# Patient Record
Sex: FEMALE | Race: BLACK OR AFRICAN AMERICAN | HISPANIC OR LATINO | Employment: FULL TIME | URBAN - METROPOLITAN AREA
[De-identification: names, ages, dates, MRNs, and addresses within clinical notes are randomized per-mention and may not be internally consistent; named-entity substitution may affect disease eponyms.]

---

## 2017-01-03 ENCOUNTER — GENERIC CONVERSION - ENCOUNTER (OUTPATIENT)
Dept: OTHER | Facility: OTHER | Age: 52
End: 2017-01-03

## 2017-01-13 ENCOUNTER — GENERIC CONVERSION - ENCOUNTER (OUTPATIENT)
Dept: OTHER | Facility: OTHER | Age: 52
End: 2017-01-13

## 2017-01-13 LAB
BASOPHILS # BLD AUTO: 0 %
BASOPHILS # BLD AUTO: 0 X10E3/UL (ref 0–0.2)
DEPRECATED RDW RBC AUTO: 13.1 % (ref 12.3–15.4)
EOSINOPHIL # BLD AUTO: 0.1 X10E3/UL (ref 0–0.4)
EOSINOPHIL # BLD AUTO: 2 %
HCT VFR BLD AUTO: 38.4 % (ref 34–46.6)
HGB BLD-MCNC: 12.9 G/DL (ref 11.1–15.9)
IMM.GRANULOCYTES (CD4/8) (HISTORICAL): 0 %
IMM.GRANULOCYTES (CD4/8) (HISTORICAL): 0 X10E3/UL (ref 0–0.1)
INR PPP: 2.7 (ref 0.8–1.2)
LYMPHOCYTES # BLD AUTO: 1.4 X10E3/UL (ref 0.7–3.1)
LYMPHOCYTES # BLD AUTO: 26 %
MCH RBC QN AUTO: 29.2 PG (ref 26.6–33)
MCHC RBC AUTO-ENTMCNC: 33.6 G/DL (ref 31.5–35.7)
MCV RBC AUTO: 87 FL (ref 79–97)
MONOCYTES # BLD AUTO: 0.5 X10E3/UL (ref 0.1–0.9)
MONOCYTES (HISTORICAL): 10 %
NEUTROPHILS # BLD AUTO: 3.4 X10E3/UL (ref 1.4–7)
NEUTROPHILS # BLD AUTO: 62 %
PLATELET # BLD AUTO: 141 X10E3/UL (ref 150–379)
PROTHROMBIN TIME: 27.8 SEC (ref 9.1–12)
RBC (HISTORICAL): 4.42 X10E6/UL (ref 3.77–5.28)
WBC # BLD AUTO: 5.4 X10E3/UL (ref 3.4–10.8)

## 2017-01-14 LAB
T4 FREE SERPL-MCNC: 1.47 NG/DL (ref 0.82–1.77)
THYROGLOBULIN AB (HISTORICAL): <1 IU/ML (ref 0–0.9)
THYROGLOBULIN BY IMA (HISTORICAL): 0.1 NG/ML (ref 1.5–38.5)
TSH SERPL DL<=0.05 MIU/L-ACNC: 0.39 UIU/ML (ref 0.45–4.5)

## 2017-01-17 ENCOUNTER — GENERIC CONVERSION - ENCOUNTER (OUTPATIENT)
Dept: OTHER | Facility: OTHER | Age: 52
End: 2017-01-17

## 2017-01-17 ENCOUNTER — ALLSCRIPTS OFFICE VISIT (OUTPATIENT)
Dept: OTHER | Facility: OTHER | Age: 52
End: 2017-01-17

## 2017-01-17 DIAGNOSIS — E78.00 PURE HYPERCHOLESTEROLEMIA: ICD-10-CM

## 2017-01-17 DIAGNOSIS — R31.0 GROSS HEMATURIA: ICD-10-CM

## 2017-01-17 DIAGNOSIS — N18.2 CHRONIC KIDNEY DISEASE, STAGE II (MILD): ICD-10-CM

## 2017-01-17 DIAGNOSIS — E89.0 POSTPROCEDURAL HYPOTHYROIDISM: ICD-10-CM

## 2017-01-17 DIAGNOSIS — E21.3 HYPERPARATHYROIDISM (HCC): ICD-10-CM

## 2017-01-17 DIAGNOSIS — I12.9 HYPERTENSIVE CHRONIC KIDNEY DISEASE WITH STAGE 1 THROUGH STAGE 4 CHRONIC KIDNEY DISEASE, OR UNSPECIFIED CHRONIC KIDNEY DISEASE: ICD-10-CM

## 2017-01-17 DIAGNOSIS — E55.9 VITAMIN D DEFICIENCY: ICD-10-CM

## 2017-01-17 DIAGNOSIS — C73 MALIGNANT NEOPLASM OF THYROID GLAND (HCC): ICD-10-CM

## 2017-02-07 ENCOUNTER — ALLSCRIPTS OFFICE VISIT (OUTPATIENT)
Dept: OTHER | Facility: OTHER | Age: 52
End: 2017-02-07

## 2017-02-07 ENCOUNTER — LAB (OUTPATIENT)
Dept: LAB | Facility: HOSPITAL | Age: 52
End: 2017-02-07
Payer: COMMERCIAL

## 2017-02-07 DIAGNOSIS — R31.9 HEMATURIA SYNDROME: Primary | ICD-10-CM

## 2017-02-07 LAB
BILIRUB UR QL STRIP: NORMAL
CLARITY UR: NORMAL
COLOR UR: YELLOW
GLUCOSE (HISTORICAL): NORMAL
HGB UR QL STRIP.AUTO: NORMAL
KETONES UR STRIP-MCNC: NORMAL MG/DL
LEUKOCYTE ESTERASE UR QL STRIP: NORMAL
NITRITE UR QL STRIP: NORMAL
PH UR STRIP.AUTO: 6 [PH]
PROT UR STRIP-MCNC: NORMAL MG/DL
SP GR UR STRIP.AUTO: 1.02
UROBILINOGEN UR QL STRIP.AUTO: NORMAL

## 2017-02-07 PROCEDURE — 88112 CYTOPATH CELL ENHANCE TECH: CPT

## 2017-02-17 ENCOUNTER — GENERIC CONVERSION - ENCOUNTER (OUTPATIENT)
Dept: OTHER | Facility: OTHER | Age: 52
End: 2017-02-17

## 2017-02-18 LAB
BUN SERPL-MCNC: 18 MG/DL (ref 6–24)
BUN/CREA RATIO (HISTORICAL): 31 (ref 9–23)
CALCIUM SERPL-MCNC: 10.3 MG/DL (ref 8.7–10.2)
CHLORIDE SERPL-SCNC: 108 MMOL/L (ref 96–106)
CO2 SERPL-SCNC: 23 MMOL/L (ref 18–29)
CREAT SERPL-MCNC: 0.59 MG/DL (ref 0.57–1)
DEPRECATED RDW RBC AUTO: 13.4 % (ref 12.3–15.4)
EGFR AFRICAN AMERICAN (HISTORICAL): 124 ML/MIN/1.73
EGFR-AMERICAN CALC (HISTORICAL): 108 ML/MIN/1.73
GLUCOSE SERPL-MCNC: 85 MG/DL (ref 65–99)
HCT VFR BLD AUTO: 42.3 % (ref 34–46.6)
HGB BLD-MCNC: 14.1 G/DL (ref 11.1–15.9)
MAGNESIUM SERPL-MCNC: 1.9 MG/DL (ref 1.6–2.3)
MCH RBC QN AUTO: 28.9 PG (ref 26.6–33)
MCHC RBC AUTO-ENTMCNC: 33.3 G/DL (ref 31.5–35.7)
MCV RBC AUTO: 87 FL (ref 79–97)
PLATELET # BLD AUTO: 184 X10E3/UL (ref 150–379)
POTASSIUM SERPL-SCNC: 4 MMOL/L (ref 3.5–5.2)
RBC (HISTORICAL): 4.88 X10E6/UL (ref 3.77–5.28)
SODIUM SERPL-SCNC: 144 MMOL/L (ref 134–144)
WBC # BLD AUTO: 6.9 X10E3/UL (ref 3.4–10.8)

## 2017-02-19 LAB
CREATININE, URINE (HISTORICAL): 163 MG/DL
PROT/CREAT UR: 2456 MG/G CREAT (ref 0–200)
TOTAL PROTEIN (HISTORICAL): 400.4 MG/DL

## 2017-02-27 ENCOUNTER — GENERIC CONVERSION - ENCOUNTER (OUTPATIENT)
Dept: OTHER | Facility: OTHER | Age: 52
End: 2017-02-27

## 2017-03-02 ENCOUNTER — HOSPITAL ENCOUNTER (OUTPATIENT)
Dept: RADIOLOGY | Age: 52
Discharge: HOME | End: 2017-03-02
Payer: COMMERCIAL

## 2017-03-02 ENCOUNTER — TRANSCRIBE ORDERS (OUTPATIENT)
Dept: ADMINISTRATIVE | Age: 52
End: 2017-03-02

## 2017-03-02 DIAGNOSIS — R31.0 GROSS HEMATURIA: ICD-10-CM

## 2017-03-02 PROCEDURE — 74000 HB X-RAY EXAM OF ABDOMEN (SINGLE ANTEROPOSTERIOR VIEW): CPT

## 2017-03-02 PROCEDURE — 76770 US EXAM ABDO BACK WALL COMP: CPT

## 2017-03-09 ENCOUNTER — ALLSCRIPTS OFFICE VISIT (OUTPATIENT)
Dept: OTHER | Facility: OTHER | Age: 52
End: 2017-03-09

## 2017-03-09 LAB
CLARITY UR: NORMAL
COLOR UR: YELLOW
GLUCOSE (HISTORICAL): NORMAL
HGB UR QL STRIP.AUTO: NORMAL
KETONES UR STRIP-MCNC: NORMAL MG/DL
LEUKOCYTE ESTERASE UR QL STRIP: NORMAL
NITRITE UR QL STRIP: NORMAL
PH UR STRIP.AUTO: 6 [PH]
PROT UR STRIP-MCNC: 2000 MG/DL
SP GR UR STRIP.AUTO: 1.03

## 2017-03-11 ENCOUNTER — GENERIC CONVERSION - ENCOUNTER (OUTPATIENT)
Dept: OTHER | Facility: OTHER | Age: 52
End: 2017-03-11

## 2017-03-12 LAB
CREATININE, URINE (HISTORICAL): 93.5 MG/DL
CREATININE,UR 24HR (HISTORICAL): 935 MG/24 HR (ref 800–1800)
CULTURE RESULT (HISTORICAL): NORMAL
IGA (HISTORICAL): 165 MG/DL (ref 87–352)
MISCELLANEOUS LAB TEST RESULT (HISTORICAL): NORMAL
PROTEIN 24 HOUR URINE (HISTORICAL): 2889 MG/24 HR (ref 30–150)
TOTAL PROTEIN (HISTORICAL): 288.9 MG/DL

## 2017-03-13 LAB
ANTI DNA DOUBLE STRANDED (HISTORICAL): 5 IU/ML (ref 0–9)
CULTURE RESULT (HISTORICAL): NORMAL
MISCELLANEOUS LAB TEST RESULT (HISTORICAL): NORMAL

## 2017-03-15 ENCOUNTER — GENERIC CONVERSION - ENCOUNTER (OUTPATIENT)
Dept: OTHER | Facility: OTHER | Age: 52
End: 2017-03-15

## 2017-03-16 LAB
ANTI JO-1 IGG (HISTORICAL): <0.2 AI (ref 0–0.9)
ANTI-CENTROMERE B ANTIBODIES (HISTORICAL): <0.2 AI (ref 0–0.9)
ANTICHROMATIN ABS (HISTORICAL): <0.2 AI (ref 0–0.9)
ANTINUCLEAR ANTIBODIES, IFA (HISTORICAL): POSITIVE
ENA TO RNP ANTIBODY (HISTORICAL): 0.3 AI (ref 0–0.9)
ENA TO SMITH (SM) ANTIBODY (HISTORICAL): <0.2 AI (ref 0–0.9)
HOMOGENEOUS PATTERN (HISTORICAL): ABNORMAL
NOTE: (HISTORICAL): ABNORMAL
PLEASE NOTE: (HISTORICAL): ABNORMAL
SCLERODERMA (SCL-70) AB (HISTORICAL): <0.2 AI (ref 0–0.9)
SEE BELOW/SEE TEXT (HISTORICAL): ABNORMAL
SSA (RO) ANTIBODY (HISTORICAL): 0.2 AI (ref 0–0.9)
SSB (LA) ANTIBODY (HISTORICAL): <0.2 AI (ref 0–0.9)

## 2017-03-17 ENCOUNTER — LAB CONVERSION - ENCOUNTER (OUTPATIENT)
Dept: OTHER | Facility: OTHER | Age: 52
End: 2017-03-17

## 2017-03-22 ENCOUNTER — GENERIC CONVERSION - ENCOUNTER (OUTPATIENT)
Dept: OTHER | Facility: OTHER | Age: 52
End: 2017-03-22

## 2017-04-02 LAB — HBA1C MFR BLD HPLC: 5.4 % (ref 4.8–5.6)

## 2017-04-06 ENCOUNTER — GENERIC CONVERSION - ENCOUNTER (OUTPATIENT)
Dept: OTHER | Facility: OTHER | Age: 52
End: 2017-04-06

## 2017-04-28 ENCOUNTER — TRANSCRIBE ORDERS (OUTPATIENT)
Dept: ADMINISTRATIVE | Facility: HOSPITAL | Age: 52
End: 2017-04-28

## 2017-04-28 ENCOUNTER — ALLSCRIPTS OFFICE VISIT (OUTPATIENT)
Dept: OTHER | Facility: OTHER | Age: 52
End: 2017-04-28

## 2017-04-28 DIAGNOSIS — Z12.31 VISIT FOR SCREENING MAMMOGRAM: Primary | ICD-10-CM

## 2017-05-15 ENCOUNTER — HOSPITAL ENCOUNTER (OUTPATIENT)
Dept: RADIOLOGY | Age: 52
Discharge: HOME | End: 2017-05-15
Payer: COMMERCIAL

## 2017-05-15 DIAGNOSIS — E21.3 HYPERPARATHYROIDISM (HCC): ICD-10-CM

## 2017-05-15 DIAGNOSIS — N18.2 CHRONIC KIDNEY DISEASE, STAGE II (MILD): ICD-10-CM

## 2017-05-15 DIAGNOSIS — C73 MALIGNANT NEOPLASM OF THYROID GLAND (HCC): ICD-10-CM

## 2017-05-15 DIAGNOSIS — E55.9 VITAMIN D DEFICIENCY: ICD-10-CM

## 2017-05-15 DIAGNOSIS — Z12.31 VISIT FOR SCREENING MAMMOGRAM: ICD-10-CM

## 2017-05-15 DIAGNOSIS — I12.9 HYPERTENSIVE CHRONIC KIDNEY DISEASE WITH STAGE 1 THROUGH STAGE 4 CHRONIC KIDNEY DISEASE, OR UNSPECIFIED CHRONIC KIDNEY DISEASE: ICD-10-CM

## 2017-05-15 DIAGNOSIS — E78.00 PURE HYPERCHOLESTEROLEMIA: ICD-10-CM

## 2017-05-15 DIAGNOSIS — E89.0 POSTPROCEDURAL HYPOTHYROIDISM: ICD-10-CM

## 2017-05-15 PROCEDURE — 76536 US EXAM OF HEAD AND NECK: CPT

## 2017-05-15 PROCEDURE — G0202 SCR MAMMO BI INCL CAD: HCPCS

## 2017-05-16 DIAGNOSIS — D68.61 ANTIPHOSPHOLIPID SYNDROME (HCC): ICD-10-CM

## 2017-05-18 ENCOUNTER — GENERIC CONVERSION - ENCOUNTER (OUTPATIENT)
Dept: OTHER | Facility: OTHER | Age: 52
End: 2017-05-18

## 2017-07-01 ENCOUNTER — GENERIC CONVERSION - ENCOUNTER (OUTPATIENT)
Dept: OTHER | Facility: OTHER | Age: 52
End: 2017-07-01

## 2017-07-01 LAB — PLEASE NOTE (HISTORICAL): NORMAL

## 2017-07-02 LAB
A/G RATIO (HISTORICAL): 1.2 (ref 1.2–2.2)
ALBUMIN SERPL BCP-MCNC: 3.8 G/DL (ref 3.5–5.5)
ALP SERPL-CCNC: 88 IU/L (ref 39–117)
ALT SERPL W P-5'-P-CCNC: 16 IU/L (ref 0–32)
AST SERPL W P-5'-P-CCNC: 18 IU/L (ref 0–40)
BACTERIA UR QL AUTO: ABNORMAL
BASOPHILS # BLD AUTO: 0 %
BASOPHILS # BLD AUTO: 0 X10E3/UL (ref 0–0.2)
BILIRUB SERPL-MCNC: 0.5 MG/DL (ref 0–1.2)
BILIRUB UR QL STRIP: NEGATIVE
BUN SERPL-MCNC: 23 MG/DL (ref 6–24)
BUN/CREA RATIO (HISTORICAL): 28 (ref 9–23)
CALCIUM SERPL-MCNC: 10 MG/DL (ref 8.7–10.2)
CHLORIDE SERPL-SCNC: 103 MMOL/L (ref 96–106)
CO2 SERPL-SCNC: 26 MMOL/L (ref 18–29)
COLOR UR: YELLOW
COMMENT (HISTORICAL): CLEAR
CREAT SERPL-MCNC: 0.81 MG/DL (ref 0.57–1)
CREATININE, URINE (HISTORICAL): 94.1 MG/DL
DEPRECATED RDW RBC AUTO: 13.7 % (ref 12.3–15.4)
DEPRECATED RDW RBC AUTO: 13.9 % (ref 12.3–15.4)
EGFR AFRICAN AMERICAN (HISTORICAL): 98 ML/MIN/1.73
EGFR-AMERICAN CALC (HISTORICAL): 85 ML/MIN/1.73
EOSINOPHIL # BLD AUTO: 0.1 X10E3/UL (ref 0–0.4)
EOSINOPHIL # BLD AUTO: 1 %
FECAL OCCULT BLOOD DIAGNOSTIC (HISTORICAL): ABNORMAL
GLUCOSE (HISTORICAL): NEGATIVE
GLUCOSE SERPL-MCNC: 88 MG/DL (ref 65–99)
HCT VFR BLD AUTO: 41.1 % (ref 34–46.6)
HCT VFR BLD AUTO: 41.1 % (ref 34–46.6)
HGB BLD-MCNC: 14.1 G/DL (ref 11.1–15.9)
HGB BLD-MCNC: 14.2 G/DL (ref 11.1–15.9)
IMM.GRANULOCYTES (CD4/8) (HISTORICAL): 0 %
IMM.GRANULOCYTES (CD4/8) (HISTORICAL): 0 X10E3/UL (ref 0–0.1)
INR PPP: 2.3 (ref 0.8–1.2)
KETONES UR STRIP-MCNC: NEGATIVE MG/DL
LEUKOCYTE ESTERASE UR QL STRIP: NEGATIVE
LYMPHOCYTES # BLD AUTO: 1.6 X10E3/UL (ref 0.7–3.1)
LYMPHOCYTES # BLD AUTO: 32 %
MAGNESIUM SERPL-MCNC: 1.8 MG/DL (ref 1.6–2.3)
MCH RBC QN AUTO: 29.9 PG (ref 26.6–33)
MCH RBC QN AUTO: 30 PG (ref 26.6–33)
MCHC RBC AUTO-ENTMCNC: 34.3 G/DL (ref 31.5–35.7)
MCHC RBC AUTO-ENTMCNC: 34.5 G/DL (ref 31.5–35.7)
MCV RBC AUTO: 87 FL (ref 79–97)
MCV RBC AUTO: 87 FL (ref 79–97)
MICROSCOPIC EXAMINATION (HISTORICAL): ABNORMAL
MONOCYTES # BLD AUTO: 0.4 X10E3/UL (ref 0.1–0.9)
MONOCYTES (HISTORICAL): 8 %
MUCUS THREADS (HISTORICAL): PRESENT
NEUTROPHILS # BLD AUTO: 2.9 X10E3/UL (ref 1.4–7)
NEUTROPHILS # BLD AUTO: 59 %
NITRITE UR QL STRIP: NEGATIVE
NON-SQ EPI CELLS URNS QL MICRO: ABNORMAL /HPF
PH UR STRIP.AUTO: 6 [PH] (ref 5–7.5)
PHOSPHATE SERPL-MCNC: 2.2 MG/DL (ref 2.5–4.5)
PLATELET # BLD AUTO: 164 X10E3/UL (ref 150–379)
PLATELET # BLD AUTO: 164 X10E3/UL (ref 150–379)
POTASSIUM SERPL-SCNC: 4.4 MMOL/L (ref 3.5–5.2)
PROT UR STRIP-MCNC: ABNORMAL MG/DL
PROT/CREAT UR: 1405 MG/G CREAT (ref 0–200)
PROTHROMBIN TIME: 23.3 SEC (ref 9.1–12)
PTH-INTACT SERPL-MCNC: 78 PG/ML (ref 15–65)
RBC (HISTORICAL): 4.72 X10E6/UL (ref 3.77–5.28)
RBC (HISTORICAL): 4.74 X10E6/UL (ref 3.77–5.28)
RBC (HISTORICAL): ABNORMAL /HPF
SODIUM SERPL-SCNC: 142 MMOL/L (ref 134–144)
SP GR UR STRIP.AUTO: 1.02 (ref 1–1.03)
TOT. GLOBULIN, SERUM (HISTORICAL): 3.2 G/DL (ref 1.5–4.5)
TOTAL PROTEIN (HISTORICAL): 132.2 MG/DL
TOTAL PROTEIN (HISTORICAL): 7 G/DL (ref 6–8.5)
UROBILINOGEN UR QL STRIP.AUTO: 0.2 EU/DL (ref 0.2–1)
WBC # BLD AUTO: 5 X10E3/UL (ref 3.4–10.8)
WBC # BLD AUTO: 5 X10E3/UL (ref 3.4–10.8)
WBC # BLD AUTO: ABNORMAL /HPF

## 2017-08-16 DIAGNOSIS — D68.61 ANTIPHOSPHOLIPID SYNDROME (HCC): ICD-10-CM

## 2017-09-12 ENCOUNTER — GENERIC CONVERSION - ENCOUNTER (OUTPATIENT)
Dept: OTHER | Facility: OTHER | Age: 52
End: 2017-09-12

## 2017-09-12 LAB
BUN SERPL-MCNC: 16 MG/DL (ref 6–24)
BUN/CREA RATIO (HISTORICAL): 22 (ref 9–23)
CALCIUM SERPL-MCNC: 10 MG/DL (ref 8.7–10.2)
CHLORIDE SERPL-SCNC: 106 MMOL/L (ref 96–106)
CO2 SERPL-SCNC: 24 MMOL/L (ref 18–29)
CREAT SERPL-MCNC: 0.74 MG/DL (ref 0.57–1)
CREATININE, URINE (HISTORICAL): 198.3 MG/DL
EGFR AFRICAN AMERICAN (HISTORICAL): 109 ML/MIN/1.73
EGFR-AMERICAN CALC (HISTORICAL): 95 ML/MIN/1.73
GLUCOSE SERPL-MCNC: 104 MG/DL (ref 65–99)
POTASSIUM SERPL-SCNC: 4.3 MMOL/L (ref 3.5–5.2)
PROTEIN/CREAT RATIO (HISTORICAL): 2128 MG/G CREAT (ref 0–200)
SODIUM SERPL-SCNC: 142 MMOL/L (ref 134–144)
TOTAL PROTEIN (HISTORICAL): 421.9 MG/DL

## 2017-09-13 LAB
BACTERIA UR QL AUTO: ABNORMAL
BILIRUB UR QL STRIP: NEGATIVE
COLOR UR: YELLOW
COMMENT (HISTORICAL): CLEAR
CRYSTAL TYPE (HISTORICAL): ABNORMAL
CRYSTALS URNS QL MICRO: PRESENT
FECAL OCCULT BLOOD DIAGNOSTIC (HISTORICAL): ABNORMAL
GLUCOSE (HISTORICAL): NEGATIVE
KETONES UR STRIP-MCNC: NEGATIVE MG/DL
LEUKOCYTE ESTERASE UR QL STRIP: NEGATIVE
MICROSCOPIC EXAMINATION (HISTORICAL): ABNORMAL
MUCUS THREADS (HISTORICAL): PRESENT
NITRITE UR QL STRIP: NEGATIVE
NON-SQ EPI CELLS URNS QL MICRO: ABNORMAL /HPF
PH UR STRIP.AUTO: 6 [PH] (ref 5–7.5)
PROT UR STRIP-MCNC: ABNORMAL MG/DL
RBC (HISTORICAL): ABNORMAL /HPF
SP GR UR STRIP.AUTO: 1.02 (ref 1–1.03)
UROBILINOGEN UR QL STRIP.AUTO: 0.2 EU/DL (ref 0.2–1)
WBC # BLD AUTO: ABNORMAL /HPF

## 2017-09-18 ENCOUNTER — ALLSCRIPTS OFFICE VISIT (OUTPATIENT)
Dept: OTHER | Facility: OTHER | Age: 52
End: 2017-09-18

## 2017-09-18 DIAGNOSIS — N18.2 CHRONIC KIDNEY DISEASE, STAGE II (MILD): ICD-10-CM

## 2017-09-18 DIAGNOSIS — M32.14 GLOMERULAR DISEASE IN SYSTEMIC LUPUS ERYTHEMATOSUS (HCC): ICD-10-CM

## 2017-09-18 DIAGNOSIS — N20.0 CALCULUS OF KIDNEY: ICD-10-CM

## 2017-09-22 ENCOUNTER — GENERIC CONVERSION - ENCOUNTER (OUTPATIENT)
Dept: OTHER | Facility: OTHER | Age: 52
End: 2017-09-22

## 2017-10-14 ENCOUNTER — GENERIC CONVERSION - ENCOUNTER (OUTPATIENT)
Dept: OTHER | Facility: OTHER | Age: 52
End: 2017-10-14

## 2017-10-15 LAB
ALBUMIN SERPL BCP-MCNC: 3.9 G/DL (ref 3.5–5.5)
BUN SERPL-MCNC: 21 MG/DL (ref 6–24)
BUN/CREA RATIO (HISTORICAL): 30 (ref 9–23)
CALCIUM SERPL-MCNC: 10.5 MG/DL (ref 8.7–10.2)
CHLORIDE SERPL-SCNC: 105 MMOL/L (ref 96–106)
CO2 SERPL-SCNC: 23 MMOL/L (ref 18–29)
CREAT SERPL-MCNC: 0.71 MG/DL (ref 0.57–1)
EGFR AFRICAN AMERICAN (HISTORICAL): 115 ML/MIN/1.73
EGFR-AMERICAN CALC (HISTORICAL): 100 ML/MIN/1.73
GLUCOSE SERPL-MCNC: 104 MG/DL (ref 65–99)
PHOSPHATE SERPL-MCNC: 2.2 MG/DL (ref 2.5–4.5)
POTASSIUM SERPL-SCNC: 4.2 MMOL/L (ref 3.5–5.2)
PTH-INTACT SERPL-MCNC: 89 PG/ML (ref 15–65)
SODIUM SERPL-SCNC: 142 MMOL/L (ref 134–144)
T4 FREE SERPL-MCNC: 1.48 NG/DL (ref 0.82–1.77)
TSH SERPL DL<=0.05 MIU/L-ACNC: 3.91 UIU/ML (ref 0.45–4.5)

## 2017-10-16 LAB — 25(OH)D3 SERPL-MCNC: 21.2 NG/ML (ref 30–100)

## 2017-10-17 LAB
THYROGLOBULIN AB (HISTORICAL): <1 IU/ML (ref 0–0.9)
THYROGLOBULIN BY IMA (HISTORICAL): <0.1 NG/ML (ref 1.5–38.5)

## 2017-10-18 ENCOUNTER — GENERIC CONVERSION - ENCOUNTER (OUTPATIENT)
Dept: OTHER | Facility: OTHER | Age: 52
End: 2017-10-18

## 2017-10-19 ENCOUNTER — GENERIC CONVERSION - ENCOUNTER (OUTPATIENT)
Dept: OTHER | Facility: OTHER | Age: 52
End: 2017-10-19

## 2017-10-19 ENCOUNTER — ALLSCRIPTS OFFICE VISIT (OUTPATIENT)
Dept: OTHER | Facility: OTHER | Age: 52
End: 2017-10-19

## 2017-10-19 DIAGNOSIS — E21.3 HYPERPARATHYROIDISM (HCC): ICD-10-CM

## 2017-10-19 DIAGNOSIS — D68.61 ANTIPHOSPHOLIPID SYNDROME (HCC): ICD-10-CM

## 2017-10-19 DIAGNOSIS — E55.9 VITAMIN D DEFICIENCY: ICD-10-CM

## 2017-10-19 DIAGNOSIS — E89.0 POSTPROCEDURAL HYPOTHYROIDISM: ICD-10-CM

## 2017-10-19 DIAGNOSIS — C73 MALIGNANT NEOPLASM OF THYROID GLAND (HCC): ICD-10-CM

## 2017-10-19 DIAGNOSIS — D69.3 IMMUNE THROMBOCYTOPENIC PURPURA (HCC): ICD-10-CM

## 2017-10-19 DIAGNOSIS — E83.52 HYPERCALCEMIA: ICD-10-CM

## 2017-10-26 NOTE — PROGRESS NOTES
Assessment  1  Chronic kidney disease (CKD), stage II (mild) (585 2) (N18 2)   2  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,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)   3  Nephrolithiasis (592 0) (N20 0)   4  Proteinuria (791 0) (R80 9)   5  Vitamin D deficiency (268 9) (E55 9)   6  Hyperparathyroidism (252 00) (E21 3)   7  Hypercalcemia (275 42) (E83 52)   8  Hematuria (599 70) (R31 9)   9  Benign hypertensive CKD (403 10) (I12 9)    Plan  Benign hypertensive CKD    · Fosinopril Sodium 20 MG Oral Tablet; Take 1 tablet twice daily   Rx By: Haydee Lynch; Dispense: 45 Days ; #:1 X 90 Tablet Bottle; Refill: 2;For: Benign hypertensive CKD; LEONIDAS = N; Verified Transmission to [employer]; Last Updated By: [employer]; 9/18/2017 5:14:42 PM  Benign hypertensive CKD, Chronic kidney disease (CKD), stage II (mild), Lupus  nephritis, Nephrolithiasis, Proteinuria, Vitamin D deficiency    · Blood Pressure Device; Status:Complete;   Done: [id number] 05:02PM   Perform:Not Applicable; YLQ:[id number];GUAZPWK; For:Benign hypertensive CKD, Chronic kidney disease (CKD), stage II (mild), Lupus nephritis, Nephrolithiasis, Proteinuria, Vitamin D deficiency; Ordered By:Peg Stapleton;  Chronic kidney disease (CKD), stage II (mild), Lupus nephritis, Nephrolithiasis    · (1) STONE RISK PROFILE, 24 HOUR URINE; Status:Active; Requested for:[id number];    Perform:[facility]; Due:97Mxg1930; Ordered; For:Chronic kidney disease (CKD), stage II (mild), Lupus nephritis, Nephrolithiasis; Ordered By:Peg Stapleton;    Discussion/Summary    1  CKD stage II/history of lupus nephritis?sCr stable in 0 7-0 8 range  Latest C3 normal  levels were normal will continue to follow up with urology  proteinuria?+residual proteinuria from prior lupus nephritis (biopsy proven in 1996), now noted to have increased weight as well as HTN   HCTZ due to hypercalcemia, on fosinopril 20mg twice dailynot been taking aldactone 25mg daily as she thinks this is making her more edematousUpCr likely d/t noncompliance with spironolactone  volume status?mild LE edema, restart spironolactone 25mg daily  hypercalcemia/hyperparathyroidism - follows with endo, +nephrolithiasis history with recent Urinalysis showing calcium oxalate crystalsis imperative she drinks enough water to urinate at least 2L per day to prevent stone formation but this is difficult given her edema and need for diuretics  Unfortunately, diuretics will contribute to stone formation high salt diet both for stone reduction and to help blood pressure stay controlled  hypertension?has been high, need to remember to take medications dailynifedipine 30mg daily, fosinopril 20mg twice dailycaffeine tea  vitamin D insufficiency - Vit D level 22 7 - continue oral vitamin D 2000u daily  microscopic hematuria - trace on UA  Monitor this  in 2-3 weeks, to check BP twice a day at home  Have prescribed home BP cuff but may currently use wrist cuff  Bring wrist cuff to office with you to correlate readings  Call the office end of the week with BP readings  have recommended weight loss and exercise as it appears she is progressively gaining weight  The patient was counseled regarding diagnostic results,-- instructions for management,-- prognosis  Reason For Visit  CKD/hx lupus nephritis      History of Present Illness  51 yo F with hx CKD stage 2/lupus nephritis presents in follow up of the same  she feels well was previously on a no salt diet  She has gained weight but thinks it is water weight  - does not check BP at home  - sees Dr Joaquina Stanford, was seen July 10th and to be seen again in November 2017chronic leg painrashesstiffness with standinggross hematuria  Has seen urology in the past for kidney stone  see endo next month regarding hyperparathyroidism  Review of Systems    Constitutional: recent weight gain, but-- no fever-- and-- no chills  Integumentary: no rashes     Gastrointestinal: no abdominal pain,-- no constipation,-- no nausea,-- no diarrhea-- and-- no vomiting  Respiratory: no shortness of breath-- and-- no cough  Cardiovascular: leg edema improved compared to few weeks ago-- and-- lower extremity edema, but-- no chest pain  Musculoskeletal: joint pain-- and-- +dizzy with bending down, but-- no joint swelling  Neurological: dizziness, but-- no headache-- and-- no lightheadedness  Genitourinary: no dysuria-- and-- no hematuria  Eyes: no eyesight problems  ENT: no hearing loss  Psychiatric: no anxiety-- and-- no depression  ROS reviewed  Past Medical History    The active problems and past medical history were reviewed and updated today  Surgical History    The surgical history was reviewed and updated today  Family History    The family history was reviewed and updated today  Social History  The social history was reviewed and updated today  The social history was reviewed and is unchanged  Current Meds   1  Folic Acid 1 MG Oral Tablet; TAKE ONE TABLET BY MOUTH ONCE DAILY; Therapy: [id number] to (Evaluate:14Nov2015)  Requested for: [id number]; Last   Rx:19Nov2014 Ordered   2  Fosinopril Sodium 40 MG Oral Tablet; Take 1 tablet daily; Therapy: 26EEO5749 to (Last Rx:12Jun2017)  Requested for: 12Jun2017 Ordered   3  Iron-Vitamins TABS; Therapy: (Recorded:93Otj9868) to Recorded   4  Levothyroxine Sodium 125 MCG Oral Tablet; Take 1 tablet mon-fri 1 1/2 tablet sat only    Requested for: 30Jun2017; Last Rx:29Jun2017 Ordered   5  NIFEdipine ER Osmotic Release 30 MG Oral Tablet Extended Release 24 Hour; Take 1   tablet daily; Therapy: 91MHQ6748 to (Last Rx:19Apr2017)  Requested for: 19Apr2017 Ordered   6  Vitamin D 2000 UNIT Oral Capsule; take 1 tablet by mouth once daily; Therapy: 41BPR7262 to (Evaluate:83Jxf6559); Last Rx:97Ukt0707 Ordered   7  Warfarin Sodium 2 MG Oral Tablet; TAKE 1 TABLET ON SUNDAY, TUESDAY, FRIDAY, AND   SATURDAY OR AS DIRECTED;    Therapy: 08Apr2013 to (Evaluate:11Nov2017)  Requested for: 93HFK7415; Last   Rx:16Nov2016 Ordered   8  Warfarin Sodium 4 MG Oral Tablet; TAKE 1 TABLET ON MONDAY, WEDNESDAY, AND   THURSDAY OR AS DIRECTED; Therapy: 08Apr2013 to (Evaluate:11Nov2017)  Requested for: 87JFW2801; Last   Rx:16Nov2016 Ordered    The medication list was reviewed and updated today  Allergies  1  Penicillins    Vitals  Vital Signs    Recorded: 58Fnb4415 05:15PM Recorded: 04XGX8782 82:91WD   Systolic 692, RUE, Sitting 204   Diastolic 042, RUE, Sitting 110   Height  5 ft 0 5 in   Weight  205 lb 4 00 oz   BMI Calculated  39 42   BSA Calculated  1 91     Physical Exam    Constitutional: General appearance: No acute distress, well appearing and well nourished  ENT: External ears and nose appear normal      Eyes: Anicteric sclerae  Pulmonary: Respiratory effort: No increased work of breathing or signs of respiratory distress  -- Auscultation of lungs: Clear to auscultation  Cardiovascular: Auscultation of heart: Normal rate and rhythm, normal S1 and S2, without murmurs  Abdomen: Non-tender, no masses  Extremities: Extremities are abnormal--   +b/l LE edema  Rash: No rash present     Neurologic: Non Focal      Psychiatric: Orientation to person, place, and time: Normal  -- and-- Mood and affect: Normal        Results/Data  Nephrology Flowsheet [id number] 04:23PM Olivia Lopez     Test Name Result Flag Reference   WBC 4 9     Hemoglobin 14 4     Hematocrit 42 1     Platelets 38 2     Sodium 141     Potassium 4 0     Chloride 106     Carbon Dioxide 23     Calcium 10 1     GLUCOSE 106     BUN 16     Serum Creatinine 0 79     GFR,  101     GFR, NON- 88     Albumin 3 3     AST 20     ALT 18     Alkaline Phosphatase 88     25 OH VIT D 22 7     C3 117     SPECIFIC GRAVITY UA 1 021     BLOOD UA trace     PH UA 6 0     PROTEIN UA 4+     NITRITE UA NEGATIVE     LEUKOCYTE ESTERASE UA negative     WBC, Urine 0-5     RBC 0-2 SQUAMOUS EPITHELIAL CELLS 0-10     BACTERIA few     Glucose, Urine negative       (1) BASIC METABOLIC PROFILE [id number] 07:35AM Ade Armando     Test Name Result Flag Reference   Glucose, Serum 104 mg/dL H 65-99   BUN 16 mg/dL  6-24   Creatinine, Serum 0 74 mg/dL  0 57-1 00   BUN/Creatinine Ratio 22  9-23   Sodium, Serum 142 mmol/L  134-144   Potassium, Serum 4 3 mmol/L  3 5-5 2   Chloride, Serum 106 mmol/L     Carbon Dioxide, Total 24 mmol/L  18-29   Calcium, Serum 10 0 mg/dL  8 7-10 2   eGFR If NonAfricn Am 95 mL/min/1 73  >59   eGFR If Africn Am 109 mL/min/1 73  >59     (1) URINALYSIS (will reflex a microscopy if leukocytes, occult blood, protein or nitrites are not within normal limits) 12Sep2017 07:35AM Ademirian Roberts     Test Name Result Flag Reference   Specific Gravity 1 022  1 005-1 030   pH 6 0  5 0-7 5   Urine-Color Yellow  Yellow   Appearance Clear  Clear   WBC Esterase Negative  Negative   Protein 4+ A Negative/Trace   Glucose Negative  Negative   Ketones Negative  Negative   Occult Blood Trace A Negative   Bilirubin Negative  Negative   Urobilinogen,Semi-Qn 0 2 EU/dL  0 2-1 0   Nitrite, Urine Negative  Negative   Microscopic Examination See below:     WBC 0-5 /hpf  0 -  5   RBC 0-2 /hpf  0 -  2   Epithelial Cells (non renal) 0-10 /hpf  0 - 10   Mucus Threads Present  Not Estab  Bacteria Few  None seen/Few   Crystals Present A N/A   Crystal Type Calcium Oxalate  N/A     (LC) Prot+CreatU (Random) 12Sep2017 07:35AM Ade Armando     Test Name Result Flag Reference   Creatinine, Urine 198 3 mg/dL  Not Estab  Protein,Total,Urine 421 9 mg/dL  Not Estab  Results confirmed on  dilution     Protein/Creat Ratio 2128 mg/g creat H 0-200       Future Appointments    Date/Time Provider Specialty Site   11/08/2017 03:45 PM KIERSTEN Alexandre , DO, [facility] Hematology Oncology [facility] [place]   10/19/2017 03:15 PM Siomara De, [address]  Endocrinology [facility] ENDOCRINOLOGY [address] [place] [address] Signatures   Electronically signed by : Kelly Azevedo DO; Sep 18 2017  5:26PM EST                       (Author)

## 2017-11-07 NOTE — PROGRESS NOTES
Assessment  1  Postsurgical hypothyroidism (244 0) (E89 0)   2  Thyroid cancer (193) (C73)   3  Hyperparathyroidism (252 00) (E21 3)   4  Vitamin D deficiency (268 9) (E55 9)   5  Hypercalcemia (275 42) (E83 52)    Plan  Hypercalcemia, Hyperparathyroidism, Postsurgical hypothyroidism, Thyroid cancer,  Vitamin D deficiency    · (1) PTH N-TERMINAL (INTACT); Status:Active; Requested for:30Nov2017;    Perform:[facility]; Due:30Nov2018; Ordered;For:Hypercalcemia, Hyperparathyroidism, Postsurgical hypothyroidism, Thyroid cancer, Vitamin D deficiency; Ordered By:Afshan Marroquin;   · (1) RENAL FUNCTION PANEL; Status:Active; Requested for:30Nov2017;    Perform:[facility]; Due:30Nov2018; Ordered;For:Hypercalcemia, Hyperparathyroidism, Postsurgical hypothyroidism, Thyroid cancer, Vitamin D deficiency; Ordered By:Afshan Marroquin;   · (1) T4, FREE; Status:Active; Requested for:30Nov2017;    Perform:[facility]; Due:30Nov2018; Ordered;For:Hypercalcemia, Hyperparathyroidism, Postsurgical hypothyroidism, Thyroid cancer, Vitamin D deficiency; Ordered By:Afshan Marroquin;   · (1) TSH; Status:Active; Requested for:30Nov2017;    Perform:[facility]; Due:30Nov2018; Ordered;For:Hypercalcemia, Hyperparathyroidism, Postsurgical hypothyroidism, Thyroid cancer, Vitamin D deficiency; Ordered By:Afshan Marroquin;   · * DXA BONE DENSITY SPINE HIP AND PELVIS; Status:Active; Requested EEZ:[id number];    Perform:[facility]; Order Comments:check forearm; Due:19Oct2018; Last Updated Alpa Nava; 10/19/2017 5:06:29 PM;Ordered;For:Hypercalcemia, Hyperparathyroidism, Postsurgical hypothyroidism, Thyroid cancer, Vitamin D deficiency; Ordered By:Afshan Marroquin;   · US HEAD NECK LYMPH NODE MAPPING; Status:Active; Requested for:02Apr2018; Perform:[facility]; XNL:[id number]; Last Updated Alpa Nava; 10/19/2017 5:06:20 PM;Ordered;For:Hypercalcemia, Hyperparathyroidism, Postsurgical hypothyroidism, Thyroid cancer, Vitamin D deficiency;  Ordered By:Afshan Marroquin;   · (1) THYROGLOBULIN/QUANT W/ANTIBODY PANEL; Status:Active; Requested  for:03Apr2017;    Perform:[facility]; Due:03Apr2018; Ordered;For:Hypercalcemia, Hyperparathyroidism, Postsurgical hypothyroidism, Thyroid cancer, Vitamin D deficiency; Ordered By:Afshan Marroquin;   · (1) TSH; Status:Active; Requested for:03Apr2017;    Perform:[facility]; Due:03Apr2018; Ordered;For:Hypercalcemia, Hyperparathyroidism, Postsurgical hypothyroidism, Thyroid cancer, Vitamin D deficiency; Ordered By:Afshan Marroquin;   · Follow-up visit in 6 months Evaluation and Treatment  Follow-up  Status: Complete   Done: [id number]   Ordered; For: Hypercalcemia, Hyperparathyroidism, Postsurgical hypothyroidism, Thyroid cancer, Vitamin D deficiency; Ordered By: Keila Iraheta Performed:  Due: 49TFX0299; Last Updated By: Nat Lennon; 10/19/2017 5:06:12 PM  Hypercalcemia, Postsurgical hypothyroidism, Thyroid cancer    · (1) T4, FREE; Status:Active; Requested for:03Apr2017;    Perform:[facility]; Due:03Apr2018; Ordered;For:Hypercalcemia, Postsurgical hypothyroidism, Thyroid cancer; Ordered By:Afshan Marroquin; Thyroid cancer    · Levothyroxine Sodium 125 MCG Oral Tablet (Synthroid); Take one tablet Monday  thru Friday; and 1 5 tablets every Saturday and Sunday   Rx By: Keila Iraheta; Dispense: 90 Days ; #:102 Tablet; Refill: 1;For: Thyroid cancer; LEONIDAS = N; Verified Transmission to [facility]; Last Updated By: System, [employer]; 10/19/2017 4:44:50 PM    Discussion/Summary  Discussion Summary:   1  Post-surgical hypothyroidism- last TSH >3, increase Levothyroxine 125 mcg by taking 1 5 Saturday and Sunday, continue 1 tab Monday thru Friday  Repeat TSH and free T4 in 6 weeks  Thyroid cancer- last neck US with lymph node mapping done in May 2017 showed no evidence of recurrent disease  Recent thyroglobulin level with antibody undetectable  Surveillance neck US due in April 2018    Hyperparathyroidism/hypercalcemia- elevated PTH and calcium, GFR >100, stay well hydrated, repeat renal function panel with next lab  Will discuss with Dr Amanda Valdes need for nuclear medicine CT scan to assess for parathyroid adenoma  Or possibly trying Sensipar if calcium levels increase >11  Vitamin D deficiency- level D level, try to be consistent with OTC D3 2000 iu daily  in 6 months  Counseling Documentation With Imm: The patient was counseled regarding diagnostic results,-- instructions for management,-- risk factor reductions,-- patient and family education,-- impressions,-- importance of compliance with treatment  Patient's Capacity to Self-Care: Patient is able to Self-Care  Medication SE Review and Pt Understands Tx: Possible side effects of new medications were reviewed with the patient/guardian today  The treatment plan was reviewed with the patient/guardian  The patient/guardian understands and agrees with the treatment plan      Chief Complaint  Chief Complaint Free Text Note Form: f/u   Chief Complaint Chronic Condition  Kit Johnson: Patient is here today for follow up of chronic conditions described in HPI  History of Present Illness  HPI: Ramonita Sands is a 51 yo female here for follow-up post-surgical hypothyroidism, history of thyroid cancer, vitamin D deficiency, hyperparathyroidism and hypercalcemia  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  on Levothyroxine 125 mcg 1 tab 6 days a week and 1 5 tab 1 day a week, takes it correctly  cancer- history of right hemithyroidectomy in 2006, s/p total thyroidectomy 2010 with diagnosis of 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, pT1a, N0, G1, Stage 1  No LEONARDO ablation at that time  10/2016 had LEONARDO ablation  d deficiency- on OTC D3 2000 iu but not consistent with doses  history of negative sestamibi scan, previous surgical exploration for parathyroid adenoma with none found  Normal DEXAscan  No calcium supplements  CT scan for localization not done as recommended by Dr Cesilia Franklin    Hyperparathyroidism (Follow-Up):  The patient reports no change in the condition  The patient is currently asymptomatic  Associated symptoms: no abdominal pain,-- no flank pain,-- no paresthesias-- and-- no confusion  Medications include vitamin D supplement  Hypothyroidism: The patient is being seen for follow-up of hypothyroidism  The patient has surgically induced hypothyroidism  Current treatment includes levothyroxine  Electrolyte Imbalance (Brief): The patient is being seen for a routine clinic follow-up of electrolyte imbalance  The patient is currently asymptomatic  Thyroid Cancer: The patient is being seen for follow-up of thyroid cancer  Recent results: thyroglobulin level date 10/2017, thyroglobulin <0 1 ng/mL, anti-thyroglobulin antibody <1 0 IU/mL, TSH date 10/2017, I-131 whole body scan date 10/2016, I-131 whole body scan results: no evidence of recurrent disease, neck ultrasound date 5/2017  Current treatment includes vitamin D  Symptoms:  no dysphagia,-- no dyspnea,-- no cough,-- no cold intolerance,-- no heat intolerance,-- no fatigue,-- no palpitations,-- no constipation-- and-- no frequent stools  Vitamin D Deficiency: The patient is being seen for follow-up of vitamin D deficiency  Disease type: vitamin D deficiency  Current treatment includes vitamin D3 (cholecalciferol)  The patient is currently asymptomatic  Review of Systems  ROS Reviewed:   ROS reviewed  Endo Adult ROS Female Established v2 Update - [facility]:   Constitutional/General: no recent weight gain,-- no recent weight loss,-- no poor energy/fatigue,-- no increased energy level,-- no insomnia/sleep problems,-- no fever-- and-- no feeling weak  Breasts: no nipple discharge  Heart: high blood pressure, but-- no chest pain/tightness,-- no rapid/racing heart rate-- and-- no palpitations  Genitourinary - Urinary: no frequent urination,-- no excess urination-- and-- no urinating during the night     Eyes: no blurred vision,-- no double vision,-- no bulging eyes,-- no gritty/scratchy eyes-- and-- no excessive tearing  Mouth / Throat: no hoarseness-- and-- no difficulty swallowing  Neck: no lumps,-- no swollen glands,-- no neck pain,-- no neck stiffness-- and-- no enlarged thyroid  Respiratory: no wheezing,-- no asthma-- and-- no persistent cough  Musculoskeletal: no muscle aches/pain,-- no joint aches/pain-- and-- no muscle weakness  Skin & Hair: no dry skin,-- no acne,-- the hair texture was not oily,-- no hair loss-- and-- no excessive hair growth  Gastrointestinal: no constipation,-- no diarrhea,-- no waking at night to drink-- and-- no stomach ache  Neurological: no blackouts,-- no weakness-- and-- no tremors  Reproductive:  frequency of period is not applicable  -- duration of period is not applicable  -- Date of last menstruation is not applicable  -- regular periods are not applicable  -- discomfort with periods is not applicable  -- excessive bleeding during period is not applicable  -- mood swings are not applicable  Endocrine: no feeling hot frequently,-- no feeling cold frequently,-- no shifts between feeling hot and cold,-- no cold hands or feet,-- no excessive sweating,-- thyroid problems,-- no blood sugar problems,-- no excessive thirst,-- no excessive hunger,-- no change in shoe size,-- no nausea or vomiting-- and-- no shaky hands  Active Problems  1  Acute pain of right hip (719 45) (M25 551)   2  Antiphospholipid antibody syndrome (289 81) (D68 61)   3  Avascular necrosis (733 40) (M87 00)   4  Benign hypertensive CKD (403 10) (I12 9)   5  Bursitis of left hip (726 5) (M70 72)   6  Chronic kidney disease (CKD), stage II (mild) (585 2) (N18 2)   7  DDD (degenerative disc disease), lumbosacral (722 52) (M51 37)   8  Edema (782 3) (R60 9)   9  Elevated glucose level (790 29) (R73 09)   10  Gross hematuria (599 71) (R31 0)   11  Hematuria (599 70) (R31 9)   12  Homocysteinemia (270 4) (E72 11)   13  Hypercalcemia (275 42) (E83 52)   14  Hypercholesterolemia (272 0) (E78 00)   15  Hyperparathyroidism (252 00) (E21 3)   16  Left hip pain (719 45) (M25 552)   17  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,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)   18  Nephrolithiasis (592 0) (N20 0)   19  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)   20  Postsurgical hypothyroidism (244 0) (E89 0)   21  Proteinuria (791 0) (R80 9)   22  Pulmonary embolism (415 19) (I26 99)   23  Sciatica (724 3) (M54 30)   24  Sleep apnea (780 57) (G47 30)   25  Systemic lupus erythematosus (710 0) (M32 9)   26  Thyroid cancer (193) (C73)   27  Vitamin D deficiency (268 9) (E55 9)    Past Medical History  1  History of anemia (V12 3) (Z86 2)   2  History of gestational diabetes (V12 21) (Z86 32)   3  History of idiopathic thrombocytopenic purpura (V12 3) (Z86 2)   4  History of systemic lupus erythematosus (SLE) (V12 29) (Z87 39)  Active Problems And Past Medical History Reviewed: The active problems and past medical history were reviewed and updated today  Surgical History  1  History of  Section   2  History of Parathyroid Surgery   3  History of Thymectomy  Surgical History Reviewed: The surgical history was reviewed and updated today  Family History  Father    1  Family history of Diabetes (250 00) (E11 9)   2  Family history of cerebrovascular accident (CVA) (V17 1) (Z82 3)  Grandmother    3  Family history of diabetes mellitus (V18 0) (Z83 3)  Grandfather    4  Family history of malignant neoplasm (V16 9) (Z80 9)  Family History Reviewed: The family history was reviewed and updated today  Social History   · No drug use   · Non-smoker (V49 89) (Z78 9)   · Occasional alcohol use  Social History Reviewed: The social history was reviewed and updated today  The social history was reviewed and is unchanged  Current Meds   1  Folic Acid 1 MG Oral Tablet; TAKE ONE TABLET BY MOUTH ONCE DAILY; Therapy: 74ZZS5763 to (Evaluate:2015)  Requested for: [phone];  Last   Rx:2014 Ordered   2  Fosinopril Sodium 20 MG Oral Tablet; Take 1 tablet twice daily; Therapy: 80MNV9243 to (OMSNWIAB:07CBH3536)  Requested for: 74Jec0616; Last   Rx:66Zik2020 Ordered   3  Iron-Vitamins TABS; Therapy: (Recorded:21Bpd5939) to Recorded   4  Levothyroxine Sodium 125 MCG Oral Tablet; Take 1 tablet mon-fri 1 1/2 tablet sat only; Therapy: (Recorded:41Yrl6274) to  Requested for: 07VWB2035 Recorded   5  NIFEdipine ER Osmotic Release 30 MG Oral Tablet Extended Release 24 Hour; Take 1   tablet daily; Therapy: 45XOJ0488 to (Last Rx:64Pzn5019)  Requested for: 43Gdm2214 Ordered   6  Spironolactone 25 MG Oral Tablet; TAKE 1 TABLET DAILY; Therapy: 69YWB9680 to (Evaluate:13Oct2018) Recorded   7  Vitamin D 2000 UNIT Oral Capsule; take 1 tablet by mouth once daily; Therapy: 57LQX7188 to (Evaluate:16Apr2018) Recorded   8  Warfarin Sodium 2 MG Oral Tablet; TAKE 1 TABLET ON SUNDAY, TUESDAY, FRIDAY, AND   SATURDAY OR AS DIRECTED; Therapy: 63Kjm5422 to (Evaluate:11Nov2017)  Requested for: 88OWH8467; Last   Rx:16Nov2016 Ordered   9  Warfarin Sodium 4 MG Oral Tablet; TAKE 1 TABLET ON MONDAY, WEDNESDAY, AND   THURSDAY OR AS DIRECTED; Therapy: 78Umv3978 to (Evaluate:11Nov2017)  Requested for: 29RBD0733; Last   Rx:67Mre3077 Ordered  Medication List Reviewed: The medication list was reviewed and updated today  Allergies  1  Penicillins    Vitals  Vital Signs    Recorded: 49XNZ1837 03:20PM   Heart Rate 70   Systolic 341   Diastolic 78   Height 5 ft 0 5 in   Weight 202 lb 2 08 oz   BMI Calculated 38 83   BSA Calculated 1 89     Physical Exam    Constitutional   General appearance: No acute distress, well appearing and well nourished  Eyes   Conjunctiva and lids: No swelling, erythema, or discharge  Pupils: Equal, round and reactive to light  The sclera are anicteric  Extraocular movements are intact      Ears, Nose, Mouth, and Throat   External inspection of ears, nose and lips: Normal     Oropharynx: Normal with no erythema, edema, exudate or lesions  Exam of Head: The head is atraumatic and normocephalic  Neck: The neck is supple  The thyroid is normal in size with no palpable nodules  Pulmonary   Auscultation of lungs: Clear to auscultation bilaterally with normal chest expansion  Cardiovascular   Auscultation of heart: Normal rate and rhythm with no murmurs, gallops or rubs  Examination of pulses: Dorsalis pedal pulses are +2 and equal bilaterally  Examination of carotids: No bruit    Abdomen   Abdomen: Abnormal   The abdomen was obese  Lymphatic   Palpation of lymph nodes: No supraclavicular or suboccipital lymphadenopathy  Musculoskeletal   Inspection/palpation of joints, bones, and muscles: Muscle bulk and tone is normal     Skin   Skin and subcutaneous tissue: Normal skin temperature and color  Neurologic   Reflexes: 2+ and symmetric  Motor Strength: Strength is 5/5 bilaterally      Psychiatric   Orientation to person, place and time: Normal     Mood and affect: Affect and attention span are normal        Results/Data  (1) RENAL FUNCTION PANEL 14Oct2017 08:30AM [employer]     Test Name Result Flag Reference   Glucose, Serum 104 mg/dL H 65-99   BUN 21 mg/dL  6-24   Creatinine, Serum 0 71 mg/dL  0 57-1 00   BUN/Creatinine Ratio 30 H 9-23   Sodium, Serum 142 mmol/L  134-144   Potassium, Serum 4 2 mmol/L  3 5-5 2   Chloride, Serum 105 mmol/L     Carbon Dioxide, Total 23 mmol/L  18-29   Calcium, Serum 10 5 mg/dL H 8 7-10 2   **Verified by repeat analysis**   Phosphorus, Serum 2 2 mg/dL L 2 5-4 5   Albumin, Serum 3 9 g/dL  3 5-5 5   eGFR If NonAfricn Am 100 mL/min/1 73  >59   eGFR If Africn Am 115 mL/min/1 73  >59     (LC) Thyroxine (T4) Free, Direct, S 13Oii4356 08:30AM [employer]     Test Name Result Flag Reference   T4,Free(Direct) 1 48 ng/dL  0 82-1 77     (1) TSH [id number] 08:30AM [employer]     Test Name Result Flag Reference   TSH 3 910 uIU/mL  0 450-4 500     (1) PTH N-TERMINAL (INTACT) 20XWV1151 08:30AM Charan Shawn     Test Name Result Flag Reference   PTH, Intact 89 pg/mL H 15-65     (1) VITAMIN D 25-HYDROXY 11Opz7455 08:30AM Joy Shawn     Test Name Result Flag Reference   Vitamin D, 25-Hydroxy 21 2 ng/mL L 30 0-100 0   Vitamin D deficiency has been defined by the [address] practice guideline as a  level of serum 25-OH vitamin D less than 20 ng/mL (1,2)  The Endocrine Society went on to further define vitamin D  insufficiency as a level between 21 and 29 ng/mL (2)  1  IOM ([place] [employer])  2010  Dietary reference     intakes for calcium and D  [facility]: The     [employer]  2  Alicja MF, Jayla TORREZ, Kasey LEACH, et al      Evaluation, treatment, and prevention of vitamin D     deficiency: an Endocrine Society clinical practice     guideline  [facility] [place]  2011 Jul; 96(7):1911-30  [facility]) TgAb+Thyroglobulin,MIRACLE or LCMS 60ZSA3050 08:30AM Charan Shawn     Test Name Result Flag Reference   Thyroglobulin Antibody Thyroglobulin Ab <1 0 IU/mL  0 0-0 9   Thyroglobulin Antibody measured by [facility] Methodology   Thyroglobulin by MIRACLE <0 1 ng/mL L 1 5-38 5   According to the [facility] of Clinical Biochemistry, the  reference interval for Thyroglobulin (TG) should be related to  euthyroid patients and not for patients who underwent thyroidectomy  TG reference intervals for these patients depend on the residual  mass of the thyroid tissue left after surgery  Establishing a  post-operative baseline is recommended  The assay limit of quantitation is 0 1 ng/mL  Thyroglobulin measured by [facility] Immunometric Assay       Future Appointments    Date/Time Provider Specialty Site   11/08/2017 03:45 PM KIERSTEN Velasco , , [facility] Hematology Oncology [address]   04/02/2018 02:00 PM KIERSTEN Anaya   Endocrinology [facility]     Signatures Electronically signed by : Katherine Rose; Nov 5 2017 12:14PM EST                       (Author)    Electronically signed by : Isidoro Gottron, M D ; Nov 6 2017  8:48AM EST                       (Author)

## 2017-11-08 ENCOUNTER — ALLSCRIPTS OFFICE VISIT (OUTPATIENT)
Dept: OTHER | Facility: OTHER | Age: 52
End: 2017-11-08

## 2017-11-10 NOTE — PROGRESS NOTES
Assessment  1  Chronic ITP (idiopathic thrombocytopenia) (287 31) (D69 3)    Plan  Antiphospholipid antibody syndrome, Chronic ITP (idiopathic thrombocytopenia)    · Drink plenty of fluids ; Status:Complete;   Done: 48ESH4350   Ordered; For:Antiphospholipid antibody syndrome, Chronic ITP (idiopathic thrombocytopenia); Ordered By:Azeem Duque;  Chronic ITP (idiopathic thrombocytopenia)    · (1) CBC/PLT/DIFF; Status:Active; Requested ANGIE:[id number]; Perform:[facility]; MMR:[id number]; Last Updated By:Deniz Molina; 11/8/2017 4:42:31 PM;Ordered; For:Chronic ITP (idiopathic thrombocytopenia); Ordered By:Kalyan Duque;   · (1) CBC/PLT/DIFF; Status:Active; Requested NQJ:[id number];    Perform:[facility]; VNE:[id number]; Ordered; For:Chronic ITP (idiopathic thrombocytopenia); Ordered By:Kalyan Duque;   · (1) CBC/PLT/DIFF; Status:Active; Requested for:10Oct2018; Perform:[facility]; Due:10Oct2019; Last Updated By:Deniz Molina; 11/8/2017 4:46:14 PM;Ordered; For:Chronic ITP (idiopathic thrombocytopenia); Ordered By:Kalyan Duque;   · (1) CBC/PLT/DIFF; Status:Active; Requested for:81Vam9291; Perform:[facility]; Due:08Lhd9816; Last Updated By:Deniz Molina; 11/8/2017 4:43:03 PM;Ordered; For:Chronic ITP (idiopathic thrombocytopenia); Ordered By:Kalyan Duque;   · (1) CBC/PLT/DIFF; Status:Active; Requested MDS:[id number]; Perform:[facility]; KKL:[id number]; Last Updated By:Deniz Molina; 11/8/2017 4:42:55 PM;Ordered; For:Chronic ITP (idiopathic thrombocytopenia); Ordered By:Kalyan Duque;   · (1) CBC/PLT/DIFF; Status:Active; Requested for:25Apr2018; Perform:[facility]; RBL:34OGV4899; Last Updated By:Deniz Molina; 11/8/2017 4:42:47 PM;Ordered; For:Chronic ITP (idiopathic thrombocytopenia); Ordered By:Kalyan Duque;   · (1) CBC/PLT/DIFF; Status:Active; Requested for:73Act2382;     Perform:[facility]; MARIO:[id number]; Last Updated By:Deniz Molina; 11/8/2017 4:42:38 PM;Ordered;ITP (idiopathic thrombocytopenia); Ordered By:Chelsea Duque;   · (1) CBC/PLT/DIFF; Status:Complete; Requested for:Recurring Schedule: 11/8/2017;1/3/2018; 2/28/2018; 4/25/2018; 6/20/2018; 8/15/2018; 10/10/2018 ; Perform:[facility]; DGS:[id number]; Ordered; For:Chronic ITP (idiopathic thrombocytopenia); Ordered By:Azeem Duque;   · (1) PT WITH INR; Status:Active; Requested UQX:[id number]; Perform:[facility]; VBK:53TZB2161; Last Updated By:Tamanna Molina; 11/8/2017 4:43:34 PM;Ordered; For:Chronic ITP (idiopathic thrombocytopenia); Ordered By:Azeem Duque;   · (1) PT WITH INR; Status:Active; Requested for:54Cpq9696;    Perform:[facility]; SVO:08LOM0045; Last Updated By:Tamanna Molina; 11/8/2017 4:43:19 PM;Ordered; For:Chronic ITP (idiopathic thrombocytopenia); Ordered By:Azeem Duque;   · (1) PT WITH INR; Status:Active; Requested KDA:[id number];    Perform:[facility]; CPJ:[id number]; Ordered; For:Chronic ITP (idiopathic thrombocytopenia); Ordered By:Azeem Duque;   · (1) PT WITH INR; Status:Active; Requested for:45Gnh1880; Perform:[facility]; YLJ:12FSZ4455; Last Updated By:Tamanna Molina; 11/8/2017 4:44:49 PM;Ordered; For:Chronic ITP (idiopathic thrombocytopenia); Ordered By:Azeem Duque;   · (1) PT WITH INR; Status:Active; Requested for:87Iwj5486; Perform:[facility]; Due:45Mqe0263; Last Updated By:Tamanna Molina; 11/8/2017 4:44:33 PM;Ordered; For:Chronic ITP (idiopathic thrombocytopenia); Ordered By:Azeem Duque;   · (1) PT WITH INR; Status:Active; Requested for:05Bum1710; Perform:[facility]; OYJ:[id number]; Last Updated By:Tamanna Molina; 11/8/2017 4:43:57 PM;Ordered; For:Chronic ITP (idiopathic thrombocytopenia); Ordered By:Azeem Duque;   · (1) PT WITH INR; Status:Active; Requested for:30Iuk4151; Perform:[facility]; JQI:73KBV1245; Last Updated By:Tamanna Molina; 11/8/2017 4:44:12 PM;Ordered; For:Chronic ITP (idiopathic thrombocytopenia); Ordered By:Azeem Duque;   · (1) PT WITH INR; Status:Active; Requested ZKW:[id number]; Perform:[facility]; ULO:10RFP7069; Last Updated By:Edward Molina; 11/8/2017 4:43:42 PM;Ordered; For:Chronic ITP (idiopathic thrombocytopenia); Ordered By:Azeem Duque;   · (1) PT WITH INR; Status:Active; Requested for:05Dcy3466;    Perform:[facility]; Due:15Vcq9607; Last Updated By:Edward Molina; 11/8/2017 4:43:27 PM;Ordered; For:Chronic ITP (idiopathic thrombocytopenia); Ordered By:Azeem Duque;   · (1) PT WITH INR; Status:Active; Requested for:22Nov2017;    Perform:[facility]; UXT:[id number]; Last Updated By:Edward Molina; 11/8/2017 4:43:12 PM;Ordered; For:Chronic ITP (idiopathic thrombocytopenia); Ordered By:Azeem Duque;   · (1) PT WITH INR; Status:Active; Requested for:25Apr2018; Perform:[facility]; WBP:49VAB3301; Last Updated By:Edward Molina; 11/8/2017 4:44:41 PM;Ordered; For:Chronic ITP (idiopathic thrombocytopenia); Ordered By:Azeem Duque;   · (1) PT WITH INR; Status:Active; Requested for:77Vda2326; Perform:[facility]; AMBER:[id number]; Last Updated By:Edward Molina; 11/8/2017 4:44:04 PM;Ordered; For:Chronic ITP (idiopathic thrombocytopenia); Ordered By:Azeem Duque;   · (1) PT WITH INR; Status:Active; Requested for:28Mar2018; Perform:[facility]; Due:28Mar2019; Last Updated By:Edward Molina; 11/8/2017 4:44:25 PM;Ordered; For:Chronic ITP (idiopathic thrombocytopenia); Ordered By:Azeem Duque;   · (1) PT WITH INR; Status:Active; Requested [facility]:[id number]; Perform:[facility]; DRV:45OZB7979; Last Updated By:Edward Molina; 11/8/2017 4:43:49 PM;Ordered;ITP (idiopathic thrombocytopenia); Ordered By:Azeem Duque;   · (1) PT WITH INR; Status: In Progress - Order Generated; [facility] 11/8/2017; 11/22/2017; 12/6/2017; 12/20/2017; 1/3/2018; 1/17/2018;1/31/2018; 2/   ;    Perform:[facility]; RIVER:[id number]; Ordered;ITP (idiopathic thrombocytopenia); Ordered By:Keli Duque;   · Follow-up visit in 6 months Evaluation and Treatment  Follow-up  Status: Complete Done: 35LAX4784 03:45PM   Ordered;Chronic ITP (idiopathic thrombocytopenia); Ordered By: Jay Jeffrey Performed:  Due: 30Apr2018; Last Updated By: Corinne Malta; 11/8/2017 4:49:36 PM  Pulmonary embolism    · Warfarin Sodium 4 MG Oral Tablet; TAKE 1 TABLET ON MONDAY, WEDNESDAY,AND THURSDAY OR AS DIRECTED   Rx By: Jay Jeffrey; Dispense: 90 Days ; #:39 Tablet; Refill: 3;Pulmonary embolism; LEONIDAS = N; Verified Transmission to [employer]; Last Updated By: System, [employer]; 11/8/2017 4:42:45 PM    Discussion/Summary  Discussion Summary:   In summary, this is a 82-year-old female history of DVT and ITP  she is doing well  She continues to function without restriction  has no evidence of bleeding or thrombosis  continues on Coumadin as before  was renewed  reviewed the above with her  She voiced understanding and agreement  Counseling Documentation With Imm: The patient was counseled regarding diagnostic results,-- instructions for management,-- patient and family education,-- impressions  total time of encounter was 15 minutes  Chief Complaint  Chief Complaint Free Text Note Form: followup regarding DVT  History of Present Illness  Previous Therapy:   patient is a 82-year-old female with history of SLE and ITP  Her ITP was diagnosed in approximately late 2010  She was treated with pulse Decadron with improvement  also had a pulmonary embolism 20 years ago  She had a positive cardiolipin antibody  homocystinemia  AIHA (IgG +1 DINA)  Current Therapy: Coumadin, target INR 2-3  Interval History: Had steroid shot left SI with benefit some menorrhagia about 6 months ago  Has infrequently but most recent was 2-3 months        Review of Systems  Complete-Female:  Constitutional: No fever, no chills, feels well, no tiredness, no recent weight gain or weight loss  Eyes: No complaints of eye pain, no red eyes, no eyesight problems, no discharge, no dry eyes, no itching of eyes  ENT: no complaints of earache, no loss of hearing, no nose bleeds, no nasal discharge, no sore throat, no hoarseness  Cardiovascular: No complaints of slow heart rate, no fast heart rate, no chest pain, no palpitations, no leg claudication, no lower extremity edema  Respiratory: No complaints of shortness of breath, no wheezing, no cough, no SOB on exertion, no orthopnea, no PND  Gastrointestinal: No complaints of abdominal pain, no constipation, no nausea or vomiting, no diarrhea, no bloody stools  Genitourinary: dysmenorrhea  Musculoskeletal: BL hips, osteonecrosis  Integumentary: No complaints of skin rash or lesions, no itching, no skin wounds, no breast pain or lump  Neurological: No complaints of headache, no confusion, no convulsions, no numbness, no dizziness or fainting, no tingling, no limb weakness, no difficulty walking  Psychiatric: Not suicidal, no sleep disturbance, no anxiety or depression, no change in personality, no emotional problems  Endocrine: No complaints of proptosis, no hot flashes, no muscle weakness, no deepening of the voice, no feelings of weakness  Hematologic/Lymphatic: No complaints of swollen glands, no swollen glands in the neck, does not bleed easily, does not bruise easily  Active Problems    1  Acute pain of right hip (719 45) (M25 551)   2  Antiphospholipid antibody syndrome (289 81) (D68 61)   3  Avascular necrosis (733 40) (M87 00)   4  Benign hypertensive CKD (403 10) (I12 9)   5  Bursitis of left hip (726 5) (M70 72)   6  Chronic kidney disease (CKD), stage II (mild) (585 2) (N18 2)   7  DDD (degenerative disc disease), lumbosacral (722 52) (M51 37)   8  Edema (782 3) (R60 9)   9  Elevated glucose level (790 29) (R73 09)   10  Gross hematuria (599 71) (R31 0)   11  Hematuria (599 70) (R31 9)   12   Homocysteinemia (270 4) (E72 11)   13  Hypercalcemia (275 42) (E83 52)   14  Hypercholesterolemia (272 0) (E78 00)   15  Hyperparathyroidism (252 00) (E21 3)   16  Left hip pain (719 45) (M25 552)   17  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,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)   18  Nephrolithiasis (592 0) (N20 0)   19  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)   20  Postsurgical hypothyroidism (244 0) (E89 0)   21  Proteinuria (791 0) (R80 9)   22  Pulmonary embolism (415 19) (I26 99)   23  Sciatica (724 3) (M54 30)   24  Sleep apnea (780 57) (G47 30)   25  Systemic lupus erythematosus (710 0) (M32 9)   26  Thyroid cancer (193) (C73)   27  Vitamin D deficiency (268 9) (E55 9)    Past Medical History  1  History of anemia (V12 3) (Z86 2)   2  History of gestational diabetes (V12 21) (Z86 32)   3  History of idiopathic thrombocytopenic purpura (V12 3) (Z86 2)   4  History of systemic lupus erythematosus (SLE) (V12 29) (Z87 39)    Surgical History  1  History of  Section   2  History of Parathyroid Surgery   3  History of Thymectomy    Family History  Father    1  Family history of Diabetes (250 00) (E11 9)   2  Family history of cerebrovascular accident (CVA) (V17 1) (Z82 3)  Grandmother    3  Family history of diabetes mellitus (V18 0) (Z83 3)  Grandfather    4  Family history of malignant neoplasm (V16 9) (Z80 9)    Social History     · No drug use   · Non-smoker (V49 89) (Z78 9)   · Occasional alcohol use    Current Meds   1  Folic Acid 1 MG Oral Tablet; TAKE ONE TABLET BY MOUTH ONCE DAILY; Therapy: 63OOF6669 to (Evaluate:58Wvm4300)  Requested for: 134366-; Last Rx:2014 Ordered   2  Fosinopril Sodium 20 MG Oral Tablet; Take 1 tablet twice daily; Therapy: 31SZN7698 to (NIODYOPL:53AWJ7154)  Requested for: 99Abw7059; Last Rx:48Rvs9784 Ordered   3  Iron-Vitamins TABS; Therapy: (Recorded:69Lkw3916) to Recorded   4  Levothyroxine Sodium 125 MCG Oral Tablet;  Take one tablet Monday thru Friday; and 1 5 tablets every Saturday and   Requested for: 20INT0290; Last Rx:19Oct2017 Ordered   5  NIFEdipine ER Osmotic Release 30 MG Oral Tablet Extended Release 24 Hour; Take 1 tablet daily; Therapy: 33OGM5547 to (Last Rx:19Apr2017)  Requested for: 19Apr2017 Ordered   6  Spironolactone 25 MG Oral Tablet; TAKE 1 TABLET DAILY; Therapy: 52KSJ2635 to (Evaluate:13Oct2018) Recorded   7  Vitamin D 2000 UNIT Oral Capsule; take 1 tablet by mouth once daily; Therapy: 44TJB2992 to (Evaluate:16Apr2018) Recorded   8  Warfarin Sodium 2 MG Oral Tablet; TAKE 1 TABLET ON SUNDAY, TUESDAY, FRIDAY, AND SATURDAY OR AS DIRECTED; Therapy: 08Apr2013 to (Evaluate:11Nov2017)  Requested for: 42UEE3100; Last Rx:16Nov2016 Ordered   9  Warfarin Sodium 4 MG Oral Tablet; TAKE 1 TABLET ON MONDAY, WEDNESDAY, AND THURSDAY OR AS DIRECTED; Therapy: 08Apr2013 to (Evaluate:11Nov2017)  Requested for: 78ANQ3618; Last Rx:16Nov2016 Ordered  Medication List Reviewed: The medication list was reviewed and updated today  Allergies    1  Penicillins    Physical Exam   Constitutional  General appearance: No acute distress, well appearing and well nourished  Eyes  Conjunctiva and lids: No swelling, erythema or discharge  Ears, Nose, Mouth, and Throat  External inspection of ears and nose: Normal    Oropharynx: Normal with no erythema, edema, exudate or lesions  Pulmonary  Auscultation of lungs: Clear to auscultation  Cardiovascular  Auscultation of heart: Normal rate and rhythm, normal S1 and S2, without murmurs  Examination of extremities for edema and/or varicosities: Normal    Abdomen  Abdomen: Non-tender, no masses  Liver and spleen: No hepatomegaly or splenomegaly  Lymphatic  Palpation of lymph nodes in neck: No lymphadenopathy  Musculoskeletal  Gait and station: Normal    Skin  Skin and subcutaneous tissue: Normal without rashes or lesions  Neurologic  Cranial nerves: Cranial nerves 2-12 intact     Psychiatric  Orientation to person, place, and time: Normal          Future Appointments    Date/Time Provider Specialty Site   04/25/2018 03:45 PM KIERSTEN Mulligan , [facility] Hematology Oncology [address]   04/02/2018 02:00 PM KIERSTEN De La Torre  Endocrinology [address]       Signatures   Electronically signed by : KIERSTEN Baltazar  Nov 8 2017  4:57PM EST                       (Author)

## 2017-11-18 ENCOUNTER — GENERIC CONVERSION - ENCOUNTER (OUTPATIENT)
Dept: OTHER | Facility: OTHER | Age: 52
End: 2017-11-18

## 2017-11-19 LAB
ALBUMIN SERPL BCP-MCNC: 3.8 G/DL (ref 3.5–5.5)
BASOPHILS # BLD AUTO: 0 %
BASOPHILS # BLD AUTO: 0 X10E3/UL (ref 0–0.2)
BUN SERPL-MCNC: 21 MG/DL (ref 6–24)
BUN/CREA RATIO (HISTORICAL): 31 (ref 9–23)
CALCIUM SERPL-MCNC: 10.3 MG/DL (ref 8.7–10.2)
CHLORIDE SERPL-SCNC: 105 MMOL/L (ref 96–106)
CO2 SERPL-SCNC: 22 MMOL/L (ref 18–29)
CREAT SERPL-MCNC: 0.67 MG/DL (ref 0.57–1)
DEPRECATED RDW RBC AUTO: 12.9 % (ref 12.3–15.4)
EGFR AFRICAN AMERICAN (HISTORICAL): 119 ML/MIN/1.73
EGFR-AMERICAN CALC (HISTORICAL): 103 ML/MIN/1.73
EOSINOPHIL # BLD AUTO: 0.1 X10E3/UL (ref 0–0.4)
EOSINOPHIL # BLD AUTO: 1 %
GLUCOSE SERPL-MCNC: 105 MG/DL (ref 65–99)
HCT VFR BLD AUTO: 39.5 % (ref 34–46.6)
HGB BLD-MCNC: 14 G/DL (ref 11.1–15.9)
IMM.GRANULOCYTES (CD4/8) (HISTORICAL): 0 %
IMM.GRANULOCYTES (CD4/8) (HISTORICAL): 0 X10E3/UL (ref 0–0.1)
INR PPP: 1.5 (ref 0.8–1.2)
LYMPHOCYTES # BLD AUTO: 1.6 X10E3/UL (ref 0.7–3.1)
LYMPHOCYTES # BLD AUTO: 33 %
MCH RBC QN AUTO: 29.3 PG (ref 26.6–33)
MCHC RBC AUTO-ENTMCNC: 35.4 G/DL (ref 31.5–35.7)
MCV RBC AUTO: 83 FL (ref 79–97)
MONOCYTES # BLD AUTO: 0.5 X10E3/UL (ref 0.1–0.9)
MONOCYTES (HISTORICAL): 9 %
NEUTROPHILS # BLD AUTO: 2.7 X10E3/UL (ref 1.4–7)
NEUTROPHILS # BLD AUTO: 57 %
PHOSPHATE SERPL-MCNC: 2.4 MG/DL (ref 2.5–4.5)
PLATELET # BLD AUTO: 155 X10E3/UL (ref 150–379)
POTASSIUM SERPL-SCNC: 4.1 MMOL/L (ref 3.5–5.2)
PROTHROMBIN TIME: 15.1 SEC (ref 9.1–12)
PTH-INTACT SERPL-MCNC: 60 PG/ML (ref 15–65)
RBC (HISTORICAL): 4.78 X10E6/UL (ref 3.77–5.28)
SODIUM SERPL-SCNC: 141 MMOL/L (ref 134–144)
T4 FREE SERPL-MCNC: 1.36 NG/DL (ref 0.82–1.77)
TSH SERPL DL<=0.05 MIU/L-ACNC: 1.01 UIU/ML (ref 0.45–4.5)
WBC # BLD AUTO: 4.9 X10E3/UL (ref 3.4–10.8)

## 2017-11-22 DIAGNOSIS — D69.3 IMMUNE THROMBOCYTOPENIC PURPURA (HCC): ICD-10-CM

## 2017-11-26 ENCOUNTER — GENERIC CONVERSION - ENCOUNTER (OUTPATIENT)
Dept: OTHER | Facility: OTHER | Age: 52
End: 2017-11-26

## 2017-12-08 ENCOUNTER — HOSPITAL ENCOUNTER (OUTPATIENT)
Dept: RADIOLOGY | Age: 52
Discharge: HOME | End: 2017-12-08
Payer: COMMERCIAL

## 2017-12-08 DIAGNOSIS — E55.9 VITAMIN D DEFICIENCY: ICD-10-CM

## 2017-12-08 DIAGNOSIS — E89.0 POSTPROCEDURAL HYPOTHYROIDISM: ICD-10-CM

## 2017-12-08 DIAGNOSIS — E21.3 HYPERPARATHYROIDISM (HCC): ICD-10-CM

## 2017-12-08 DIAGNOSIS — C73 MALIGNANT NEOPLASM OF THYROID GLAND (HCC): ICD-10-CM

## 2017-12-08 DIAGNOSIS — E83.52 HYPERCALCEMIA: ICD-10-CM

## 2017-12-08 PROCEDURE — 77080 DXA BONE DENSITY AXIAL: CPT

## 2017-12-14 ENCOUNTER — GENERIC CONVERSION - ENCOUNTER (OUTPATIENT)
Dept: OTHER | Facility: OTHER | Age: 52
End: 2017-12-14

## 2017-12-21 ENCOUNTER — GENERIC CONVERSION - ENCOUNTER (OUTPATIENT)
Dept: OTHER | Facility: OTHER | Age: 52
End: 2017-12-21

## 2017-12-22 LAB
BASOPHILS # BLD AUTO: 0 %
BASOPHILS # BLD AUTO: 0 X10E3/UL (ref 0–0.2)
DEPRECATED RDW RBC AUTO: 13.2 % (ref 12.3–15.4)
EOSINOPHIL # BLD AUTO: 0.1 X10E3/UL (ref 0–0.4)
EOSINOPHIL # BLD AUTO: 1 %
HCT VFR BLD AUTO: 39.4 % (ref 34–46.6)
HGB BLD-MCNC: 13.5 G/DL (ref 11.1–15.9)
IMM.GRANULOCYTES (CD4/8) (HISTORICAL): 0 %
IMM.GRANULOCYTES (CD4/8) (HISTORICAL): 0 X10E3/UL (ref 0–0.1)
INR PPP: 3.4 (ref 0.8–1.2)
LYMPHOCYTES # BLD AUTO: 1.5 X10E3/UL (ref 0.7–3.1)
LYMPHOCYTES # BLD AUTO: 26 %
MCH RBC QN AUTO: 29.7 PG (ref 26.6–33)
MCHC RBC AUTO-ENTMCNC: 34.3 G/DL (ref 31.5–35.7)
MCV RBC AUTO: 87 FL (ref 79–97)
MONOCYTES # BLD AUTO: 0.6 X10E3/UL (ref 0.1–0.9)
MONOCYTES (HISTORICAL): 11 %
NEUTROPHILS # BLD AUTO: 3.5 X10E3/UL (ref 1.4–7)
NEUTROPHILS # BLD AUTO: 62 %
PLATELET # BLD AUTO: 176 X10E3/UL (ref 150–379)
PROTHROMBIN TIME: 32.8 SEC (ref 9.1–12)
RBC (HISTORICAL): 4.55 X10E6/UL (ref 3.77–5.28)
WBC # BLD AUTO: 5.7 X10E3/UL (ref 3.4–10.8)

## 2018-01-03 DIAGNOSIS — D69.3 IMMUNE THROMBOCYTOPENIC PURPURA (HCC): ICD-10-CM

## 2018-01-11 NOTE — MISCELLANEOUS
Message  Fu with Nephro for elevated proteinuria  She is getting I-131 for thyroid cancer  She is asked to fu with PCP for abnormal LFT;s she has no PCP, and we suggested names but she wants to hold on  Repeat 24 hr urine calcium and cr with PTH for hypercalcemia as they were not done properly a lab  decide on vitamin D dose based on 24 hr urine calcium labs  Cont same lt4 as she has cancer and has no hyperthyroid symptoms  Plan  Benign hypertensive CKD, Hyperparathyroidism, Hypophosphatemia, Vitamin D  deficiency    · (1) CALCIUM, URINE 24HR; Status:Active; Requested for:66Pya8342;    · (1) CREATININE, URINE 24HR; Status:Active; Requested for:22Ktz3906;    · (1) PTH N-TERMINAL (INTACT); Status:Active; Requested for:94Uqh1802;    · (1) RENAL FUNCTION PANEL; Status:Active;  Requested for:72Waf8389;     Signatures   Electronically signed by : KIERSTEN Encarnacion ; Jul 22 2016  3:40PM EST                       (Author)

## 2018-01-11 NOTE — PROCEDURES
Assessment    1  Gross hematuria (169 45) (R31 0)    Plan  Gross hematuria    · Urine Dip Non-Automated- POC; Status:Complete - Retrospective By Protocol  Authorization;   Done: 13BDG0348 02:51PM   Performed: In Office; 21 ; Last Updated Robert Michel; 3/9/2017 2:52:39 PM;Ordered; For:Gross hematuria; Ordered By:Skylar Perez; Nephrolithiasis    · (1) URINE CULTURE; Source:Urine, Clean Catch; Status:Active - Retrospective By  Protocol Authorization; Requested KWF:[id number];    Perform:[facility]; OZJ:[id number]; Last Updated By:Kyleigh Heredia; 3/9/2017 3:51:07 PM;Ordered;  For:Nephrolithiasis; Ordered By:Skylar Perez; Discussion/Summary  Discussion Summary:   73-year-old female with nephrolithiasis and gross hematuria  Cystoscopy today was normal  We reviewed her imaging results  The patient has a large left nonobstructing renal calculi  We discussed that is of a size that would not pass on its own  I discussed options including observation, ESWL, and ureteroscopy with laser lithotripsy  I recommended proceeding with surgery  She wishes to proceed with ESWL but she is on Coumadin for history of PE  We will check with her hematologist whether she can come off of this medication temporarily  We may also be able to do Lovenox bridging  The patient will like to think about her options and schedule in the future  Chief Complaint  Chief Complaint Free Text Note Form: Patient presents for Cystoscopy; gross hematuria      History of Present Illness  HPI: 73-year-old female presents for cystoscopy for evaluation of gross hematuria  The patient denies any recurrent hematuria  She denies any episodes of flank pain  She has no other complaints  Review of Systems  Complete-Female Urology:   Constitutional: No fever, no chills, feels well, no tiredness, no recent weight gain or weight loss     Respiratory: No complaints of shortness of breath, no wheezing, no cough, no SOB on exertion, no orthopnea, no PND    Cardiovascular: No complaints of slow heart rate, no fast heart rate, no chest pain, no palpitations, no leg claudication, no lower extremity edema  Gastrointestinal: No complaints of abdominal pain, no constipation, no nausea or vomiting, no diarrhea, no bloody stools  Genitourinary: Empty sensation and stream quality fair, but No complaints of dysuria, no incontinence, no pelvic pain, no dysmenorrhea, no vaginal discharge or bleeding  Musculoskeletal: No complaints of arthralgias, no myalgias, no joint swelling or stiffness, no limb pain or swelling  Integumentary: No complaints of skin rash or lesions, no itching, no skin wounds, no breast pain or lump  Hematologic/Lymphatic: No complaints of swollen glands, no swollen glands in the neck, does not bleed easily, does not bruise easily  Neurological: No complaints of headache, no confusion, no convulsions, no numbness, no dizziness or fainting, no tingling, no limb weakness, no difficulty walking  ROS Reviewed:   ROS reviewed  Active Problems    1  Acute pain of right hip (719 45) (M25 551)   2  Anemia (285 9) (D64 9)   3  Antiphospholipid antibody syndrome (289 81) (D68 61)   4  Avascular necrosis (733 40) (M87 00)   5  Benign hypertensive CKD (403 10) (I12 9)   6  Bursitis of left hip (726 5) (M70 72)   7  Chronic kidney disease (CKD), stage II (mild) (585 2) (N18 2)   8  DDD (degenerative disc disease), lumbosacral (722 52) (M51 37)   9  Edema (782 3) (R60 9)   10  Gross hematuria (599 71) (R31 0)   11  Hematuria (599 70) (R31 9)   12  Homocysteinemia (270 4) (E72 11)   13  Hypercalcemia (275 42) (E83 52)   14  Hypercholesterolemia (272 0) (E78 00)   15  Hyperparathyroidism (252 00) (E21 3)   16  Left hip pain (719 45) (M25 552)   17  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,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)   18  Nephrolithiasis (592 0) (N20 0)   19  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)   20  Postsurgical hypothyroidism (244 0) (E89 0)   21   Proteinuria (791 0) (R80 9)   22  Pulmonary embolism (415 19) (I26 99)   23  Sciatica (724 3) (M54 30)   24  Sleep apnea (780 57) (G47 30)   25  Systemic lupus erythematosus (710 0) (M32 9)   26  Thyroid cancer (193) (C73)   27  Vitamin D deficiency (268 9) (E55 9)    Past Medical History    1  History of gestational diabetes (V12 21) (Z86 32)   2  History of idiopathic thrombocytopenic purpura (V12 3) (Z86 2)   3  History of systemic lupus erythematosus (SLE) (V12 29) (Z87 39)  Active Problems And Past Medical History Reviewed: The active problems and past medical history were reviewed and updated today  Surgical History    1  History of  Section   2  History of Parathyroid Surgery   3  History of Thymectomy  Surgical History Reviewed: The surgical history was reviewed and updated today  Family History  Father    1  Family history of Diabetes (250 00) (E11 9)   2  Family history of cerebrovascular accident (CVA) (V17 1) (Z82 3)  Grandmother    3  Family history of diabetes mellitus (V18 0) (Z83 3)  Grandfather    4  Family history of malignant neoplasm (V16 9) (Z80 9)  Family History Reviewed: The family history was reviewed and updated today  Social History    · Former smoker (V15 82) (Y91 445)   · No drug use   · Non-smoker (V49 89) (Z78 9)   · Occasional alcohol use  Social History Reviewed: The social history was reviewed and updated today  Current Meds   1  Folic Acid 1 MG Oral Tablet; TAKE ONE TABLET BY MOUTH ONCE DAILY; Therapy: 40SED1117 to (Evaluate:2015)  Requested for: 051-029-550; Last   Rx:2014 Ordered   2  Fosinopril Sodium 40 MG Oral Tablet; Take 1 tablet daily; Therapy: 98FPX9497 to (Evaluate:09Iwv2720)  Requested for: 92NRO4596; Last   Rx:33Bot9442 Ordered   3  Iron-Vitamins TABS; Therapy: (Recorded:85Jsd0955) to Recorded   4  Levothyroxine Sodium 125 MCG Oral Tablet;  Take 1 tablet mon-fri 1 1/2 tablet sat only    Requested for: 57ZLV7985; Last Rx:2017 Ordered   5  NIFEdipine ER Osmotic Release 30 MG Oral Tablet Extended Release 24 Hour; TAKE 1   TABLET DAILY; Therapy: 24FHH0430 to (Evaluate:24Mar2017)  Requested for: 55ZCF8036; Last   Rx:29Mar2016 Ordered   6  Vitamin D 2000 UNIT Oral Capsule; take 1 tablet by mouth once daily; Therapy: 86SMK2038 to (Evaluate:85Kml6088); Last Rx:60Nst1049 Ordered   7  Warfarin Sodium 2 MG Oral Tablet; TAKE 1 TABLET ON SUNDAY, TUESDAY, FRIDAY, AND   SATURDAY OR AS DIRECTED; Therapy: 08Apr2013 to (Evaluate:11Nov2017)  Requested for: 09LHR9290; Last   Rx:16Nov2016 Ordered   8  Warfarin Sodium 4 MG Oral Tablet; TAKE 1 TABLET ON MONDAY, WEDNESDAY, AND   THURSDAY OR AS DIRECTED; Therapy: 08Apr2013 to (Evaluate:11Nov2017)  Requested for: 44ZQG4425; Last   Rx:16Nov2016 Ordered  Medication List Reviewed: The medication list was reviewed and updated today  Allergies    1  Penicillins    Vitals  Vital Signs    Recorded: 81CYD9593 02:50PM   Heart Rate 64   Systolic 910   Diastolic 96   Height 5 ft 0 5 in   Weight 200 lb    BMI Calculated 38 42   BSA Calculated 1 88     Physical Exam    Constitutional   General appearance: No acute distress, well appearing and well nourished  Pulmonary   Respiratory effort: No increased work of breathing or signs of respiratory distress  Auscultation of lungs: Clear to auscultation  Cardiovascular   Auscultation of heart: Normal rate and rhythm, normal S1 and S2, without murmurs  Examination of extremities for edema and/or varicosities: Normal     Abdomen   Abdomen: Non-tender, no masses  Liver and spleen: No hepatomegaly or splenomegaly  Genitourinary   External genitalia and vagina: Normal, no lesions appreciated  Urethra: Normal, no discharge  Bladder: Not distended, no tenderness  Musculoskeletal   Gait and station: Normal     Skin   Skin and subcutaneous tissue: Normal without rashes or lesions      Additional Exam:  Neuro exam nonfocal       Results/Data  Urine Dip Non-Automated- POC 79KSE0493 02:51PM Malik Nunez     Test Name Result Flag Reference   Color Yellow     Clarity Transparent     Leukocytes -     Nitrite -     Blood moderate     Protein 2000     Ph 6 0     Specific Gravity 1 030     Ketone -     Glucose -       * XR ABDOMEN 1 VIEW KUB 02Mar2017 04:32PM Malik Nunez    Order Number: [id number]     Test Name Result Flag Reference   XR ABDOMEN 1 VIEW KUB (Report)     ABDOMEN     INDICATION: History of previous calculus  Hematuria 2 months ago  COMPARISON: 6/29/2006, concurrent renal ultrasound     VIEWS: AP supine     IMAGES: 2     FINDINGS:     9 mm left ureteral calculus  No ureteral calculi identified  Nonobstructive bowel gas pattern  Osseous structures appear intact  IMPRESSION:     9 mm left ureteral calculus  Workstation performed: [id number]     Signed by:   Pam Ross MD   3/3/17     US KIDNEY AND BLADDER 77HPS8801 04:27PM Dotty Du Order Number: [id number]    - Patient Instructions: To schedule this appointment, please contact Central Scheduling at [phone]  Test Name Result Flag Reference   US KIDNEY AND BLADDER (Report)     RENAL ULTRASOUND     INDICATION: Gross hematuria     COMPARISON: 1/6/2009     TECHNIQUE:  Ultrasound of the retroperitoneum was performed with a curvilinear transducer utilizing volumetric sweeps and still imaging techniques  FINDINGS:     KIDNEYS:   Symmetric and normal size  Right kidney: 10 7 x 5 6 cm with cortical thickness of 1 8 cm  Normal echogenicity and contour  No suspicious masses detected  No hydronephrosis  No shadowing calculi  No perinephric fluid collections  Left kidney: 10 9 x 5 7 cm with cortical thickness of 1 4 cm  Normal echogenicity and contour  No suspicious masses detected  No hydronephrosis  9 mm nonobstructing mid renal calculus  No perinephric fluid collections  URETERS:   Nonvisualized       BLADDER: Normally distended  No focal thickening or mass lesions  Bilateral ureteral jets detected  IMPRESSION:     9 mm nonobstructing left mid renal calculus  Workstation performed: [id number]     Signed by:   Hanna Vinson MD   3/3/17     Procedure  The flexible cystoscope was introduced into the urethra and advanced into the bladder  URETHRA: Normal, without strictures or lesions  TRIGONE & UOs: Normal anatomy with efflux of clear urine  No mucosal lesions or subtrigonal masses  BLADDER MUCOSA: Normal, without neoplasms or other lesions  DETRUSOR: Normal capacity, without flaccidity, without excessive compliance, without trabeculation or diverticula, without uninhibited bladder contractions on fillings  RETROFLEXED SCOPE VIEW: Normal bladder neck      Future Appointments    Date/Time Provider Specialty Site   05/17/2017 02:10 PM KIERSTEN Gilman   Endocrinology [facility] ENDOCRINOLOGY [facility]   11/08/2017 03:45 PM KIERSTEN Sexton , [facility] Hematology Oncology [facility] [place]   03/21/2017 04:00 PM Sabrina Arredondo, [address]  Nephrology  [address] [facility]     Signatures   Electronically signed by : KIERSTEN Renner ; Mar 10 2017  8:34AM EST                       (Author)

## 2018-01-11 NOTE — MISCELLANEOUS
Message   Recorded as Task   Date: 03/21/2017 03:04 PM, Created By: Quinten Beltran   Task Name: Miscellaneous   Assigned To: Amy Cydney   Regarding Patient: Johnathan Singer, Status: In Progress   Comment:    Andres Jules - 21 Mar 2017 3:04 PM     TASK CREATED  re: labs:  1) Urine protein increased--- start aldactazide 12 5/12 5 1 daily  2) BMP in 10 and 21 days  3) Notify Dr Adore Morales of elevated HECTOR and document who spoke to   Amy Lamas - 21 Mar 2017 3:10 PM     TASK IN PROGRESS   Olya Arellano - 22 Mar 2017 11:12 AM     TASK EDITED  Left message for pt to call the office  [address]   I spoke with Violet Hope re: increased urine protein,    - Aldactazide 12 5 has been sent to [facility] and will be started today  - BMP have been ordered for 4/1 and 4/12, pt has been made aware  - I spoke with Elvia from Dr Agustin Manual office regarding Rina's elevated HECTOR, she states she will making Dr Adore Morales aware  All labs have been faxed over to the office for review  [address] 3/22/2017      Active Problems    1  Acute pain of right hip (719 45) (M25 551)   2  Anemia (285 9) (D64 9)   3  Antiphospholipid antibody syndrome (289 81) (D68 61)   4  Avascular necrosis (733 40) (M87 00)   5  Benign hypertensive CKD (403 10) (I12 9)   6  Bursitis of left hip (726 5) (M70 72)   7  Chronic kidney disease (CKD), stage II (mild) (585 2) (N18 2)   8  DDD (degenerative disc disease), lumbosacral (722 52) (M51 37)   9  Edema (782 3) (R60 9)   10  Elevated glucose level (790 29) (R73 09)   11  Gross hematuria (599 71) (R31 0)   12  Hematuria (599 70) (R31 9)   13  Homocysteinemia (270 4) (E72 11)   14  Hypercalcemia (275 42) (E83 52)   15  Hypercholesterolemia (272 0) (E78 00)   16  Hyperparathyroidism (252 00) (E21 3)   17  Left hip pain (719 45) (M25 552)   18  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,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)   19  Nephrolithiasis (592 0) (N20 0)   20  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)   21   Postsurgical hypothyroidism (244 0) (E89 0) 22  Proteinuria (791 0) (R80 9)   23  Pulmonary embolism (415 19) (I26 99)   24  Sciatica (724 3) (M54 30)   25  Sleep apnea (780 57) (G47 30)   26  Systemic lupus erythematosus (710 0) (M32 9)   27  Thyroid cancer (193) (C73)   28  Vitamin D deficiency (268 9) (E55 9)    Current Meds   1  Folic Acid 1 MG Oral Tablet; TAKE ONE TABLET BY MOUTH ONCE DAILY; Therapy: 43DQF7024 to (Evaluate:14Nov2015)  Requested for: 051-029-550; Last   Rx:19Nov2014 Ordered   2  Fosinopril Sodium 40 MG Oral Tablet; Take 1 tablet daily; Therapy: 52DPP2798 to (Evaluate:13May2017)  Requested for: 56IFW2811; Last   Rx:18May2016 Ordered   3  Iron-Vitamins TABS; Therapy: (Recorded:13Kgc6134) to Recorded   4  Levothyroxine Sodium 125 MCG Oral Tablet (Synthroid); Take 1 tablet mon-fri 1 1/2   tablet sat only  Requested for: 95POF8722; Last Rx:17Jan2017 Ordered   5  NIFEdipine ER Osmotic Release 30 MG Oral Tablet Extended Release 24 Hour; TAKE 1   TABLET DAILY; Therapy: 02SFS1750 to (Evaluate:24Mar2017)  Requested for: 55KAL8809; Last   Rx:29Mar2016 Ordered   6  Spironolactone-HCTZ 25-25 MG Oral Tablet; TAKE HALF A TABLET DAILY TO EQUAL   12 5 MG; Therapy: 06BYF1929 to (Evaluate:21Apr2017)  Requested for: 22Mar2017; Last   Rx:22Mar2017; Status: ACTIVE - Retrospective By Protocol Authorization Ordered   7  Vitamin D 2000 UNIT Oral Capsule; take 1 tablet by mouth once daily; Therapy: 55QNC9182 to (Evaluate:09Emb9095); Last Rx:57Fbt3912 Ordered   8  Warfarin Sodium 2 MG Oral Tablet; TAKE 1 TABLET ON SUNDAY, TUESDAY, FRIDAY,   AND SATURDAY OR AS DIRECTED; Therapy: 08Apr2013 to (Evaluate:11Nov2017)  Requested for: 89VTY3817; Last   Rx:16Nov2016 Ordered   9  Warfarin Sodium 4 MG Oral Tablet; TAKE 1 TABLET ON MONDAY, WEDNESDAY, AND   THURSDAY OR AS DIRECTED; Therapy: 08Apr2013 to (Evaluate:11Nov2017)  Requested for: 38OAV0922; Last   Rx:16Nov2016 Ordered    Allergies    1   Penicillins    Signatures   Electronically signed by : Davide Givens KIERSTEN Martinez ; Mar 22 2017 11:51AM EST

## 2018-01-11 NOTE — PROGRESS NOTES
Assessment    1  Hyperparathyroidism (252 00) (E21 3)   2  Postsurgical hypothyroidism (244 0) (E89 0)   3  Chronic kidney disease (CKD), stage II (mild) (585 2) (N18 2)   4  Hypercalcemia (275 42) (E83 52)   5  Thyroid cancer (193) (C73)   6  Vitamin D deficiency (268 9) (E55 9)    Plan  Thyroid cancer    · Levothyroxine Sodium 125 MCG Oral Tablet (Synthroid); Take 1 tablet mon-fri 1  1/2 tablet sat and sun   Rx By: Crow Sheppard; Dispense: 90 Days ; #:90 Tablet; Refill: 1; For: Thyroid cancer; LEONIDAS = N; Verified Transmission to [facility]; Last Updated By: System, [employer]; 6/21/2016 10:55:40 AM    Discussion/Summary  Discussion Summary:   54 y o  female:  1) Thyroid cancer/ Hypothyroidism s/p thyroid gland and cancer resection: Pt denies prev hx of I-131  Obtain old surgical path reports to understand her baseline  Will order thyrogen stim Tg and scan to r/o residual or recurrent disease  A thyroid US has showed some thyroid vs PTH tissue in past  Thyroid function ordered to adjust the Lt4 dose  f/u in 6-weeks  2) Hyperparathyroidism: Will order 24-hour urine calcium and serum calcium levels  Prev parathyroid sestamibi scan was neg but can be false neg in 20% of cases  Obtain old surg path reports to understand the procedures  Normal BMD and no recent fractures  Normal renal fn and no stones  Hypercalcemia is mild and stable  Remain hydrated  3) Vit D deficiency: will check vitamin D level, and adjust supplementation  She does not know the dose and we will adjust it  Counseling Documentation With Imm: The patient was counseled regarding diagnostic results, instructions for management, risk factor reductions, prognosis, patient and family education, impressions, risks and benefits of treatment options, importance of compliance with treatment  total time of encounter was 46 minutes and 34 minutes was spent counseling     Medication SE Review and Pt Understands Tx: Possible side effects of new medications were reviewed with the patient/guardian today  The treatment plan was reviewed with the patient/guardian  The patient/guardian understands and agrees with the treatment plan      Chief Complaint  Chief Complaint Free Text Note Form: Follow up   Chief Complaint Chronic Condition [facility]: Patient is here today for follow up of chronic conditions described in HPI  History of Present Illness  HPI: 54 y o  female with hyperparathyroidism, hypothyroidism 2/2 thyroidectomy, h/o thyroid cancer, h/o PE/DVT, CKD stage 2, Vit D deficiency, SLE, is seen for follow up  She takes 125 mcg levothyroxine daily with an extra half tablet on Saturday and Sunday  She is compliant with her treatment regimen  Does not miss any doses  She denies any symptoms hot/cold intolerance, diarrhea/constipation, weight loss/gain, diaphoresis, hair loss, brittle, abdominal pain, palpitations  Review of Systems  Endo Adult ROS Female Established v2 - [facility]:   Constitutional/General: no recent weight gain, no recent weight loss, no poor energy/fatigue, no increased energy level, no insomnia/sleep problems, no fever and no feeling weak  Breasts: no nipple discharge  Heart: high blood pressure, but no chest pain/tightness, no rapid/racing heart rate and no palpitations  Genitourinary - Urinary no frequent urination, no excess urination and no urinating during the night  Eyes: no blurred vision, no double vision, no bulging eyes, no gritty/scratchy eyes and no excessive tearing  Mouth / Throat: no hoarseness and no difficulty swallowing  Neck: no lumps, no swollen glands, no neck pain, no neck stiffness and no enlarged thyroid  Respiratory: no wheezing, no asthma and no persistent cough  Musculoskeletal: no muscle aches/pain, joint aches/pain and muscle weakness  Skin & Hair: no dry skin, no acne, the hair texture was not oily, no hair loss and no excessive hair growth     Gastrointestinal: no constipation, no diarrhea, no waking at night to drink and no stomach ache  Neurological: no blackouts, no weakness and no tremors  Reproductive: Unknown, unknown, date of last menstruation February? , periods are not regular, discomfort with periods, excessive bleeding with periods and no mood swings  Endocrine: no feeling hot frequently, no feeling cold frequently, no shifts between feeling hot and cold, no cold hands or feet, no excessive sweating, thyroid problems, no blood sugar problems, no excessive thirst, no excessive hunger, no change in shoe size, no nausea or vomiting and no shaky hands  ROS Reviewed:   ROS reviewed  Active Problems    1  Acute pain of right hip (719 45) (M25 551)   2  Anemia (285 9) (D64 9)   3  Antiphospholipid antibody syndrome (289 81) (D68 61)   4  Avascular necrosis (733 40) (M87 00)   5  Benign hypertensive CKD (403 10) (I12 9)   6  Bursitis of left hip (726 5) (M70 72)   7  Chronic kidney disease (CKD), stage II (mild) (585 2) (N18 2)   8  DDD (degenerative disc disease), lumbosacral (722 52) (M51 37)   9  Edema (782 3) (R60 9)   10  Hematuria (599 70) (R31 9)   11  Homocysteinemia (270 4) (E72 11)   12  Hypercalcemia (275 42) (E83 52)   13  Hypercholesterolemia (272 0) (E78 0)   14  Hyperparathyroidism (252 00) (E21 3)   15  Hypophosphatemia (275 3)   16  Left hip pain (719 45) (M25 552)   17  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,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)   18  Nephrolithiasis (592 0) (N20 0)   19  Postsurgical hypothyroidism (244 0) (E89 0)   20  Proteinuria (791 0) (R80 9)   21  Pulmonary embolism (415 19) (I26 99)   22  Sciatica (724 3) (M54 30)   23  Sleep apnea (780 57) (G47 30)   24  Systemic lupus erythematosus (710 0) (M32 9)   25  Thyroid cancer (193) (C73)   26  Vitamin D deficiency (268 9) (E55 9)    Past Medical History    1  History of gestational diabetes (V12 21) (Z86 32)   2  History of idiopathic thrombocytopenic purpura (V12 3) (Z86 2)   3   History of systemic lupus erythematosus (SLE) (V12 29) (Z87 39)  Active Problems And Past Medical History Reviewed: The active problems and past medical history were reviewed and updated today  Surgical History    1  History of Parathyroid Surgery   2  History of Thymectomy  Surgical History Reviewed: The surgical history was reviewed and updated today  Family History  Father    1  Family history of Diabetes (250 00) (E11 9)  Grandmother    2  Family history of diabetes mellitus (V18 0) (Z83 3)  Grandfather    3  Family history of malignant neoplasm (V16 9) (Z80 9)  Family History Reviewed: The family history was reviewed and updated today  Social History    · Former smoker (V15 82) (F16 928)   · No drug use   · Non-smoker (V49 89) (Z78 9)   · Occasional alcohol use  Social History Reviewed: The social history was reviewed and updated today  The social history was reviewed and is unchanged  Current Meds   1  Folic Acid 1 MG Oral Tablet; TAKE ONE TABLET BY MOUTH ONCE DAILY; Therapy: 60YNS9280 to (Evaluate:14Nov2015)  Requested for: [phone]; Last   Rx:19Nov2014 Ordered   2  Fosinopril Sodium 40 MG Oral Tablet; Take 1 tablet daily; Therapy: 19EXP5445 to (Evaluate:56Pqt8432)  Requested for: 28LSV2758; Last   Rx:[id number] Ordered   3  Levothyroxine Sodium 125 MCG Oral Tablet; Take 1 tablet mon-fri 1 1/2 tablet sat and   sun  Requested for: 28CQX2176; Last Rx:84Ajz9991 Ordered   4  Magnesium 400 MG Oral Capsule; Therapy: 46UTI4657 to Recorded   5  NIFEdipine ER Osmotic Release 30 MG Oral Tablet Extended Release 24 Hour; TAKE 1   TABLET DAILY; Therapy: 58XSE2366 to (Evaluate:24Mar2017)  Requested for: 77HZX8908; Last   Rx:29Mar2016 Ordered   6  TraMADol HCl - 50 MG Oral Tablet; Therapy: ([phone]) to Recorded   7  Warfarin Sodium 2 MG Oral Tablet; TAKE 1 TABLET ON SUNDAY, TUESDAY, FRIDAY, AND   SATURDAY OR AS DIRECTED;    Therapy: 08Apr2013 to (Jayme Fish)  Requested for: 68NDH9365; Last Rx:34Xpv7956 Ordered   8  Warfarin Sodium 4 MG Oral Tablet; TAKE 1 TABLET ON MONDAY, WEDNESDAY, AND   THURSDAY OR AS DIRECTED; Therapy: 08Apr2013 to (Keith Salazar)  Requested for: 89AJT8843; Last   Rx:[id number] Ordered  Medication List Reviewed: The medication list was reviewed and updated today  Allergies    1  Penicillins    Vitals  Vital Signs [Data Includes: Current Encounter]    Recorded: 21Jun2016 09:59AM Recorded: 21Jun2016 09:56AM   Heart Rate 56    Systolic 265    Diastolic 90    Height  5 ft 1 2 in   Weight  192 lb 2 00 oz   BMI Calculated  36 06   BSA Calculated  1 85     Physical Exam    Constitutional   General appearance: No acute distress, well appearing and well nourished  Ears, Nose, Mouth, and Throat   Oropharynx: Normal with no erythema, edema, exudate or lesions  Exam of Head: The head is atraumatic and normocephalic  Neck: Abnormal   absent thyroid, no neck LAP  Pulmonary   Respiratory effort: No increased work of breathing or signs of respiratory distress  Auscultation of lungs: Clear to auscultation bilaterally with normal chest expansion  Cardiovascular   Palpation of heart: Normal PMI, no thrills  Auscultation of heart: Normal rate and rhythm with no murmurs, gallops or rubs  Examination of extremities for edema and/or varicosities: Normal     Examination of pulses: Dorsalis pedal pulses are +2 and equal bilaterally  Abdomen   Abdomen: Abdomen is soft, non-tender with normal bowel sounds  Liver and spleen: No hepatomegaly or splenomegaly  Lymphatic   Palpation of lymph nodes: No supraclavicular or suboccipital lymphadenopathy  Musculoskeletal   Gait and station: Normal     Digits and nails: Normal without clubbing or cyanosis  Inspection/palpation of joints, bones, and muscles: Muscle bulk and tone is normal     Skin   Skin and subcutaneous tissue: Normal skin temperature and color      Neurologic   Cranial nerves: Cranial nerves 2-12 intact  Reflexes: 2+ and symmetric  Sensation: No sensory loss  Motor Strength: Strength is 5/5 bilaterally  Psychiatric   Orientation to person, place and time: Normal     Mood and affect: Affect and attention span are normal        Results/Data  Diagnostic Studies Reviewed: I personally reviewed the films/images/results in the office today  My interpretation follows  Results   (1) THYROGLOBULIN/QUANT W/ANTIBODY PANEL 21TUC1727 04:54PM Sina Balzarine     Test Name Result Flag Reference   THYROGLOB AB <1 0 IU/mL  0 0 - 0 9   Thyroglobulin Antibody measured by [facility] Methodology  Performed at:  [address]  [id number]  : Julio Cesar Dukes MD, Phone:  [phone]   THYROGLOBULIN-MIRACLE 0 5 ng/mL L 1 5 - 38 5   According to the [facility] of Clinical Biochemistry, thereference interval for Thyroglobulin (TG) should be related toeuthyroid patients and not for patients who underwent thyroidectomy  TG reference intervals for these patients depend on the residualmass of the thyroid tissue left after surgery  Establishing apost-operative baseline is recommended  The assay limit of quantitation is 0 1 ng/mLThyroglobulin measured by [facility] Immunometric Assay  Performed at:  [address]  [id number]  : Julio Cesar Dukes MD, Phone:  [phone]     (Q) CBC (H/H, RBC, INDICES, WBC, PLT) [id number] 10:47AM Reynaldo Valle   REPORT COMMENT:  FASTING:YES     Test Name Result Flag Reference   WHITE BLOOD CELL COUNT 4 7 Thousand/uL  3 8-10 8   RED BLOOD CELL COUNT 5 32 Million/uL H 3 80-5 10   HEMOGLOBIN 14 5 g/dL  11 7-15 5   HEMATOCRIT 44 6 %  35 0-45 0   MCV 83 8 fL  80 0-100 0   MCH 27 3 pg  27 0-33 0   MCHC 32 6 g/dL  32 0-36 0   RDW 15 0 %  11 0-15 0   PLATELET COUNT 868 Thousand/uL  140-400     (Q) PTH, INTACT AND CALCIUM 54Bjp8179 10:33AM Sina Balzarine     Test Name Result Flag Reference   PARATHYROID HORMONE,$INTACT 135 pg/mL H 14-64   Interpretive Guide    Intact PTH           Calcium  ------------------    ----------           -------  Normal Parathyroid    Normal               Normal  Hypoparathyroidism    Low or Low Normal    Low  Hyperparathyroidism     Primary            Normal or High       High     Secondary          High                 Normal or Low     Tertiary           High                 High  Non-Parathyroid     Hypercalcemia      Low or Low Normal    High   CALCIUM 10 8 mg/dL H 8 6-10 2     (1) RENAL FUNCTION PANEL 36Ycg6510 10:33AM Alivia Minor     Test Name Result Flag Reference   GLUCOSE 97 mg/dL  65-99   Fasting reference interval   UREA NITROGEN (BUN) 18 mg/dL  7-25   CREATININE 0 73 mg/dL  0 50-1 10   eGFR NON-AFR  AMERICAN 97 mL/min/1 73m2  > OR = 60   eGFR AFRICAN AMERICAN 113 mL/min/1 73m2  > OR = 60   BUN/CREATININE RATIO   0-93   NOT APPLICABLE (calc)   SODIUM 139 mmol/L  135-146   POTASSIUM 4 7 mmol/L  3 5-5 3   CHLORIDE 105 mmol/L     CARBON DIOXIDE 28 mmol/L  19-30   CALCIUM 10 8 mg/dL H 8 6-10 2   PHOSPHATE (AS PHOSPHORUS) 2 5 mg/dL  2 5-4 5   ALBUMIN 3 7 g/dL  3 6-5 1     *(Q) VITAMIN D, 25-HYDROXY, LC/MS/MS [id number] 10:33AM Afshan Mccall   REPORT COMMENT:  FASTING:YES     Test Name Result Flag Reference   VITAMIN D, 25-OH, TOTAL 13 ng/mL L    Vitamin D Status         25-OH Vitamin D:     Deficiency:                    <20 ng/mL  Insufficiency:             20 - 29 ng/mL  Optimal:                 > or = 30 ng/mL     For 25-OH Vitamin D testing on patients on   D2-supplementation and patients for whom quantitation   of D2 and D3 fractions is required, the QuestAssureD(TM)  25-OH VIT D, (D2,D3), LC/MS/MS is recommended: order   code 43191 (patients >2yrs)  For more information on this test, go to:  [email or web address]/JKI805  (This link is being provided for   informational/educational purposes only )     U/S Head and Neck ( Soft Tissue) 36PJW5235 11: Braulio Kaplan     Test Name Result Flag Reference   U/S Head and Neck (Report)     [place] [address];;[place];[address]   11/20/2015 1243   11/20/2015 1303       NECK ULTRASOUND     INDICATION- Status post thyroidectomy  Evaluate cervical lymph node   status     COMPARISON- 4/1/2015     FINDINGS-     Ultrasound of the thyroidectomy bed and cervical lymph node chains was   performed with a high frequency linear transducer  Soft tissue density in the right thyroid bed currently measures 0 6 x   2 0 x 0 8 cm earlier 2 4 x 0 7 x 0 8 cm  Hypoechoic nodular area in   the inferior portion of the thyroid bed measures 0 7 x 0 4 x 0 6 cm   earlier 0 8 x 0 4 x 0 7 cm  Lymph nodes maintain normal morphologic contour, echogenicity and short   axis dimensions of less than 0 7 cm  No evidence for   microcalcification or focal nodularity  IMPRESSION-        1  Soft tissue in the right thyroid bed, probably indicative of   residual or recurrent thyroid tissue  Stable size of a hypoechoic   nodule within the right inferior thyroid bed  2  No residual tissue seen on the left   3   No pathologically enlarged or morphologically abnormal-appearing   cervical lymph nodes       Transcribed on- [id number]     - DOMINGO Mayo MD   Reading Radiologist- DOMINGO Mayo MD   Electronically Signed- DOMINGO Mayo MD   Released Date Time- 11/20/15 1324   ------------------------------------------------------------------------------   [id number]^NOEMY REED   92600^NOEMY REED     (Q) PTH, INTACT AND CALCIUM [id number] 08:34AM Hari Garcia     Test Name Result Flag Reference   PARATHYROID HORMONE,$INTACT 128 pg/mL H 14-64   Interpretive Guide    Intact PTH           Calcium  ------------------    ----------           -------  Normal Parathyroid    Normal               Normal  Hypoparathyroidism    Low or Low Normal    Low  Hyperparathyroidism     Primary Normal or High       High     Secondary          High                 Normal or Low     Tertiary           High                 High  Non-Parathyroid     Hypercalcemia      Low or Low Normal    High   CALCIUM 10 2 mg/dL  8 6-10 2     Attending Note  Attending Note [address]: Attending Note: I interviewed, took the history and examined the patient, I discussed the case with the Resident and reviewed the Resident's note, I supervised the Resident and I agree with the Resident management plan as it was presented to me  I agree with the Resident's note  Future Appointments    Date/Time Provider Specialty Site   08/02/2016 03:30 PM KIERSTEN Haney  Endocrinology [facility]   11/16/2016 03:45 PM KIERSTEN Silva , [facility]     Signatures   Electronically signed by :  Regulo Rae MD; Jun 21 2016 10:55AM EST                       (Author)    Electronically signed by : KIERSTEN Obando ; Jun 21 2016  5:42PM EST                       (Author)

## 2018-01-12 NOTE — PROGRESS NOTES
Assessment    1  Antiphospholipid antibody syndrome (289 81) (D68 61)    Plan  Antiphospholipid antibody syndrome    · Drink plenty of fluids ; Status:Complete;   Done: 66JPC5453   Ordered; For:Antiphospholipid antibody syndrome; Ordered By:Raghu Duque;   · (1) CBC/PLT/DIFF; Status:Active; Requested [id number];    Perform:[facility]; ISL:91NOB2219;HZEYUHK; For:Antiphospholipid antibody syndrome; Ordered By:Azeem Duque;   · (1) PT WITH INR; Status:Active; Requested NKD:48THE1254;    Perform:[facility]; OGS:55KXL9689; Last Updated By:Marisa Tran; 2016 4:36:07 PM;Ordered; For:Antiphospholipid antibody syndrome; Ordered By:Azeem Duque;   · (1) PT WITH INR; Status:Active; Requested ICP:93MPF0741;    Perform:[facility]; CYK:53TVF4977; Last Updated By:Marisa Tran; 2016 4:36:02 PM;Ordered; For:Antiphospholipid antibody syndrome; Ordered By:Azeem Duque;   · (1) PT WITH INR; Status:Active; Requested YMO:78IGK5365;    Perform:[facility]; XIF:45CAS0626; Last Updated By:Marisa Tran; 2016 4:35:53 PM;Ordered; For:Antiphospholipid antibody syndrome; Ordered By:Azeem Duque;   · (1) PT WITH INR; Status:Active; Requested for:15Por9017;    Perform:[facility]; YTH:76WSI5501;UIJLCZU; For:Antiphospholipid antibody syndrome; Ordered By:Azeem Duque;   · (1) PT WITH INR; Status:Active; Requested for:03Dlj6984;    Perform:[facility]; Due:21Lzi8329; Last Updated By:Marisa Tran; 2016 4:36:34 PM;Ordered; For:Antiphospholipid antibody syndrome; Ordered By:Azeem Duque;   · (1) PT WITH INR; Status:Active; Requested for:2016;    Perform:[facility]; VGO:06NCG1079; Last Updated By:Marisa Tran; 2016 4:36:28 PM;Ordered; For:Antiphospholipid antibody syndrome; Ordered By:Azeem Duque;   · (1) PT WITH INR; Status:Active;  Requested WND:48KEZ9712;    Perform:[facility]; NLX:86COS9800; Last Updated By:Chelsea Marisa; 5/11/2016 4:36:23 PM;Ordered; For:Antiphospholipid antibody syndrome; Ordered By:Azeem Duque;   · (1) PT WITH INR; Status:Active; Requested XLA:47GUP6711;    Perform:[facility]; Due:26Vze8676; Last Updated By:Marisa Tran; 5/11/2016 4:36:18 PM;Ordered; For:Antiphospholipid antibody syndrome; Ordered By:Azeem Duque;   · (1) PT WITH INR; Status:Active; Requested for:77Chy1262;    Perform:[facility]; Due:03Xml9254; Last Updated By:Marisa Tran; 5/11/2016 4:36:13 PM;Ordered; For:Antiphospholipid antibody syndrome; Ordered By:Azeem Duque;   · (1) PT WITH INR; Status: In Progress - Order Generated; Requested for:Recurring  Schedule: 5/11/2016; 6/8/2016; 7/6/2016; 8/3/2016; 8/31/2016; 9/28/2016; 10/26/2016;  11/23   ;    Perform:[facility]; JHT:27VDX1225;FWOLMGF; For:Antiphospholipid antibody syndrome; Ordered By:Liliane Duque;   · Follow-up visit in 6 months Evaluation and Treatment  Follow-up  Status: Complete   Done: 14KLG4539 03:45PM   Ordered; For: Antiphospholipid antibody syndrome; Ordered By: Fabricio Dumont Performed:  Due: 12NXX8641; Last Updated By: Christine Maddox; 5/11/2016 4:41:48 PM  Pulmonary embolism    · Warfarin Sodium 2 MG Oral Tablet; TAKE 1 TABLET ON SUNDAY, TUESDAY,  FRIDAY, AND SATURDAY OR AS DIRECTED   Rx By: Fabricio Dumont; Dispense: 90 Days ; #:156 Tablet; Refill: 3; For: Pulmonary embolism; LEONIDAS = N; Verified Transmission to [facility]; Last Updated By: System, [employer]; 5/11/2016 4:35:00 PM   · Warfarin Sodium 4 MG Oral Tablet; TAKE 1 TABLET ON MONDAY, WEDNESDAY,  AND THURSDAY OR AS DIRECTED   Rx By: Fabricio Dumont; Dispense: 90 Days ; #:39 Tablet; Refill: 1; For: Pulmonary embolism; LEONIDAS = N; Verified Transmission to [facility];  Last Updated By: System, [employer]; 5/11/2016 4:35:00 PM    Discussion/Summary  Discussion Summary:   In summary, this is a 54year old female with a history of lupus, as well as ITP   Clinically, she is doing well  She continues to work full-time, as well as a part-time job at a [employer]  She has no bleeding symptoms  At present  She has no shortness of breath or cough  She does have dyspnea with exertion  We will continue her Coumadin forward  CBC is requested on when necessary basis  I'll see her back in 6 months for review  Understands and agrees with treatment plan: The treatment plan was reviewed with the patient/guardian  The patient/guardian understands and agrees with the treatment plan   Counseling Documentation With Imm: The patient was counseled regarding diagnostic results, instructions for management, patient and family education, impressions  total time of encounter was 15 minutes  Chief Complaint  Chief Complaint Free Text Note Form: Followup regarding anemia  History of Present Illness  Previous Therapy:   patient is a 53-year-old female with history of SLE and ITP  Her ITP was diagnosed in approximately late 2010  She was treated with pulse Decadron with improvement  She also had a pulmonary embolism 20 years ago  She had a positive cardiolipin antibody  Hyper homocystinemia  Subclinical AIHA (IgG +1 DINA)  Current Therapy: Coumadin, target INR 2-3  Interval History: Had steroid shot left SI with benefit  Had some menorrhagia about 6 months ago  Has infrequently but most recent was 2-3 months  Review of Systems  Complete-Female:   Constitutional: No fever, no chills, feels well, no tiredness, no recent weight gain or weight loss  Eyes: No complaints of eye pain, no red eyes, no eyesight problems, no discharge, no dry eyes, no itching of eyes  ENT: no complaints of earache, no loss of hearing, no nose bleeds, no nasal discharge, no sore throat, no hoarseness  Cardiovascular: No complaints of slow heart rate, no fast heart rate, no chest pain, no palpitations, no leg claudication, no lower extremity edema     Respiratory: No complaints of shortness of breath, no wheezing, no cough, no SOB on exertion, no orthopnea, no PND  Gastrointestinal: No complaints of abdominal pain, no constipation, no nausea or vomiting, no diarrhea, no bloody stools  Genitourinary: dysmenorrhea  Musculoskeletal: BL hips, osteonecrosis  Integumentary: No complaints of skin rash or lesions, no itching, no skin wounds, no breast pain or lump  Neurological: No complaints of headache, no confusion, no convulsions, no numbness, no dizziness or fainting, no tingling, no limb weakness, no difficulty walking  Psychiatric: Not suicidal, no sleep disturbance, no anxiety or depression, no change in personality, no emotional problems  Endocrine: No complaints of proptosis, no hot flashes, no muscle weakness, no deepening of the voice, no feelings of weakness  Hematologic/Lymphatic: No complaints of swollen glands, no swollen glands in the neck, does not bleed easily, does not bruise easily  Active Problems    1  Acute pain of right hip (719 45) (M25 551)   2  Anemia (285 9) (D64 9)   3  Antiphospholipid antibody syndrome (289 81) (D68 61)   4  Avascular necrosis (733 40) (M87 00)   5  Benign hypertensive CKD (403 10) (I12 9)   6  Bursitis of left hip (726 5) (M70 72)   7  Chronic kidney disease (CKD), stage II (mild) (585 2) (N18 2)   8  DDD (degenerative disc disease), lumbosacral (722 52) (M51 37)   9  Edema (782 3) (R60 9)   10  Hematuria (599 70) (R31 9)   11  Homocysteinemia (270 4) (E72 11)   12  Hypercalcemia (275 42) (E83 52)   13  Hypercholesterolemia (272 0) (E78 0)   14  Hyperparathyroidism (252 00) (E21 3)   15  Hypophosphatemia (275 3)   16  Left hip pain (719 45) (M25 552)   17  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,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)   18  Nephrolithiasis (592 0) (N20 0)   19  Postsurgical hypothyroidism (244 0) (E89 0)   20  Proteinuria (791 0) (R80 9)   21  Pulmonary embolism (415 19) (I26 99)   22  Sciatica (724 3) (M54 30)   23   Sleep apnea (129 57) (G47 30)   24  Systemic lupus erythematosus (710 0) (M32 9)   25  Thyroid cancer (193) (C73)   26  Vitamin D deficiency (268 9) (E55 9)    Past Medical History    1  History of gestational diabetes (V12 21) (Z86 32)   2  History of idiopathic thrombocytopenic purpura (V12 3) (Z86 2)   3  History of systemic lupus erythematosus (SLE) (V12 29) (Z87 39)    Surgical History    1  History of Parathyroid Surgery   2  History of Thymectomy    Family History  Father    1  Family history of Diabetes (250 00) (E11 9)  Grandmother    2  Family history of diabetes mellitus (V18 0) (Z83 3)  Grandfather    3  Family history of malignant neoplasm (V16 9) (Z80 9)    Social History    · Former smoker (V15 82) (M41 679)   · No drug use   · Non-smoker (V49 89) (Z78 9)   · Occasional alcohol use    Current Meds   1  Folic Acid 1 MG Oral Tablet; TAKE ONE TABLET BY MOUTH ONCE DAILY; Therapy: 54SEI6338 to (Evaluate:14Nov2015)  Requested for: 051-029-550; Last   Rx:19Nov2014 Ordered   2  Fosinopril Sodium 40 MG Oral Tablet; Take 1 tablet daily; Therapy: 74NOG1903 to (Last Rx:93Itr0786)  Requested for: 59WII9944 Ordered   3  Levothyroxine Sodium 125 MCG Oral Tablet; Take 1 tablet mon-fri 1 1/2 tablet sat and   sun  Requested for: 48XXZ1181; Last Rx:71Esv4198 Ordered   4  Magnesium 400 MG Oral Capsule; Therapy: 00WFK6165 to Recorded   5  NIFEdipine ER Osmotic Release 30 MG Oral Tablet Extended Release 24 Hour; TAKE 1   TABLET DAILY; Therapy: 89BCT8271 to (Evaluate:24Mar2017)  Requested for: 96IYU7924; Last   Rx:29Mar2016 Ordered   6  TraMADol HCl - 50 MG Oral Tablet; Therapy: (0498 72 13 49) to Recorded   7  Warfarin Sodium 2 MG Oral Tablet; TAKE 1 TABLET ON SUNDAY, TUESDAY, FRIDAY, AND   SATURDAY OR AS DIRECTED; Therapy: 83Kvp5778 to (Evaluate:12Nov2016)  Requested for: 31SKY9781; Last   Rx:18Nov2015 Ordered   8  Warfarin Sodium 4 MG Oral Tablet; TAKE 1 TABLET ON MONDAY, WEDNESDAY, AND   THURSDAY OR AS DIRECTED;    Therapy: 70NGP3196 to (Evaluate:13Jun2016)  Requested for: 26ULX9206; Last   Rx:56Grf8679 Ordered    Allergies    1  Penicillins    Vitals  Vital Signs [Data Includes: Current Encounter]    Recorded: 69GRY4326 04:04PM   Temperature 98 3 F   Heart Rate 52   Respiration 16   Systolic 276   Diastolic 84   Height 5 ft 1 2 in   Weight 195 lb    BMI Calculated 36 6   BSA Calculated 1 87   O2 Saturation 99     Physical Exam    Constitutional   General appearance: No acute distress, well appearing and well nourished  Eyes   Conjunctiva and lids: No swelling, erythema or discharge  Ears, Nose, Mouth, and Throat   External inspection of ears and nose: Normal     Oropharynx: Normal with no erythema, edema, exudate or lesions  Pulmonary   Auscultation of lungs: Clear to auscultation  Cardiovascular   Auscultation of heart: Normal rate and rhythm, normal S1 and S2, without murmurs  Examination of extremities for edema and/or varicosities: Normal     Abdomen   Abdomen: Non-tender, no masses  Liver and spleen: No hepatomegaly or splenomegaly  Lymphatic   Palpation of lymph nodes in neck: No lymphadenopathy  Musculoskeletal   Gait and station: Normal     Skin   Skin and subcutaneous tissue: Normal without rashes or lesions  Neurologic   Cranial nerves: Cranial nerves 2-12 intact  Psychiatric   Orientation to person, place, and time: Normal          Future Appointments    Date/Time Provider Specialty Site   06/21/2016 09:50 AM KIERSTEN Bonilla   Endocrinology [facility] ENDOCRINOLOGY [facility]   11/16/2016 03:45 PM KIERSTEN Shaver , DO, [facility] H[facility]ology On[facility] [place]     Signatures   Electronically signed by : KIERSTEN Levine ,DO; May 11 2016  6:04PM EST                       (Author)

## 2018-01-13 VITALS
BODY MASS INDEX: 37.76 KG/M2 | SYSTOLIC BLOOD PRESSURE: 140 MMHG | HEART RATE: 64 BPM | HEIGHT: 61 IN | DIASTOLIC BLOOD PRESSURE: 96 MMHG | WEIGHT: 200 LBS

## 2018-01-13 VITALS
BODY MASS INDEX: 38.19 KG/M2 | WEIGHT: 202.25 LBS | HEIGHT: 61 IN | HEART RATE: 70 BPM | SYSTOLIC BLOOD PRESSURE: 138 MMHG | DIASTOLIC BLOOD PRESSURE: 88 MMHG

## 2018-01-13 VITALS
SYSTOLIC BLOOD PRESSURE: 170 MMHG | HEIGHT: 61 IN | BODY MASS INDEX: 38.75 KG/M2 | DIASTOLIC BLOOD PRESSURE: 100 MMHG | WEIGHT: 205.25 LBS

## 2018-01-13 NOTE — MISCELLANEOUS
Message   Recorded as Task   Date: 02/26/2017 02:45 PM, Created By: Raquel Lau   Task Name: Miscellaneous   Assigned To: Trinh Saul   Regarding Patient: Bienvenido Nicole, Status: In Progress   Yesica Mascorro - 26 Feb 2017 2:45 PM     TASK CREATED  re: elevated urine protein  1) Qasim't w D or V for BP check in next 1-3 weeks  2) Labs for now: 24 hour urine for protein and creat clearance/ C3/C4/CH50/HECTOR/DsDNA /  IgA/ urine for culture   Olya Arellano - 27 Feb 2017 9:26 AM     TASK IN PROGRESS   Olya Arellano - 27 Feb 2017 9:40 AM     TASK EDITED  Left message for pt to call the office  [facility]   The following was discussed with Kathy Mendez re: elevated urine protein,    - Qasim't with Matt Ashby for a BP check has been scheduled on 3/21  - All above labs have been ordered and faxed over to [facility], pt will be going tomorrow morning for those labs  [facility] 2/27/2017      Active Problems    1  Acute pain of right hip (719 45) (M25 551)   2  Anemia (285 9) (D64 9)   3  Antiphospholipid antibody syndrome (289 81) (D68 61)   4  Avascular necrosis (733 40) (M87 00)   5  Benign hypertensive CKD (403 10) (I12 9)   6  Bursitis of left hip (726 5) (M70 72)   7  Chronic kidney disease (CKD), stage II (mild) (585 2) (N18 2)   8  DDD (degenerative disc disease), lumbosacral (722 52) (M51 37)   9  Edema (782 3) (R60 9)   10  Gross hematuria (599 71) (R31 0)   11  Hematuria (599 70) (R31 9)   12  Homocysteinemia (270 4) (E72 11)   13  Hypercalcemia (275 42) (E83 52)   14  Hypercholesterolemia (272 0) (E78 00)   15  Hyperparathyroidism (252 00) (E21 3)   16  Left hip pain (719 45) (M25 552)   17  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,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)   18  Nephrolithiasis (592 0) (N20 0)   19  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)   20  Postsurgical hypothyroidism (244 0) (E89 0)   21  Proteinuria (791 0) (R80 9)   22  Pulmonary embolism (415 19) (I26 99)   23  Sciatica (724 3) (M54 30)   24   Sleep apnea (780 57) (G47 30) 25  Systemic lupus erythematosus (710 0) (M32 9)   26  Thyroid cancer (193) (C73)   27  Vitamin D deficiency (268 9) (E55 9)    Current Meds   1  Folic Acid 1 MG Oral Tablet; TAKE ONE TABLET BY MOUTH ONCE DAILY; Therapy: 72QRZ0946 to (Evaluate:14Nov2015)  Requested for: [phone]; Last   Rx:19Nov2014 Ordered   2  Fosinopril Sodium 40 MG Oral Tablet; Take 1 tablet daily; Therapy: 39ALP9663 to (Evaluate:13May2017)  Requested for: 05MAX9234; Last   Rx:18May2016 Ordered   3  Iron-Vitamins TABS; Therapy: (Recorded:63Ret9691) to Recorded   4  Levothyroxine Sodium 125 MCG Oral Tablet (Synthroid); Take 1 tablet mon-fri 1 1/2   tablet sat only  Requested for: 39HUB3534; Last Rx:17Jan2017 Ordered   5  NIFEdipine ER Osmotic Release 30 MG Oral Tablet Extended Release 24 Hour; TAKE 1   TABLET DAILY; Therapy: 69IIE5966 to (Evaluate:24Mar2017)  Requested for: 23MAL4621; Last   Rx:29Mar2016 Ordered   6  Vitamin D 2000 UNIT Oral Capsule; take 1 tablet by mouth once daily; Therapy: 71GTL6754 to (Evaluate:14May2017); Last Rx:15Nov2016 Ordered   7  Warfarin Sodium 2 MG Oral Tablet; TAKE 1 TABLET ON SUNDAY, TUESDAY, FRIDAY,   AND SATURDAY OR AS DIRECTED; Therapy: 08Apr2013 to (Evaluate:11Nov2017)  Requested for: 89QFI5875; Last   Rx:16Nov2016 Ordered   8  Warfarin Sodium 4 MG Oral Tablet; TAKE 1 TABLET ON MONDAY, WEDNESDAY, AND   THURSDAY OR AS DIRECTED; Therapy: 08Apr2013 to (Evaluate:11Nov2017)  Requested for: 82BBE8607; Last   Rx:16Nov2016 Ordered    Allergies    1   Penicillins    Signatures   Electronically signed by : KIERSTEN Mtz ; Feb 28 2017 10:27AM EST

## 2018-01-14 VITALS
SYSTOLIC BLOOD PRESSURE: 122 MMHG | DIASTOLIC BLOOD PRESSURE: 78 MMHG | HEIGHT: 61 IN | WEIGHT: 199 LBS | BODY MASS INDEX: 37.57 KG/M2 | HEART RATE: 66 BPM

## 2018-01-14 VITALS
DIASTOLIC BLOOD PRESSURE: 78 MMHG | SYSTOLIC BLOOD PRESSURE: 122 MMHG | HEART RATE: 70 BPM | HEIGHT: 61 IN | WEIGHT: 202.13 LBS | BODY MASS INDEX: 38.16 KG/M2

## 2018-01-14 VITALS — SYSTOLIC BLOOD PRESSURE: 138 MMHG | DIASTOLIC BLOOD PRESSURE: 100 MMHG | HEART RATE: 62 BPM

## 2018-01-14 NOTE — RESULT NOTES
Message   Chronic history of hypercalcemia, elevated PTH, normal renal function, vitamin D low but given history of hypercalcemia will not recommend supplements at this time  24 hour urine shows elevated calcium, parathyroid scan did not localize to identify parathyroid adenoma  Dexascan normal  History of thyroid cancer and is on LT4 suppression  Will continue to monitor calcium levels  Verified Results  (Q) PTH, INTACT AND CALCIUM 61Wfu6319 10:33AM [employer]     Test Name Result Flag Reference   PARATHYROID HORMONE,$INTACT 135 pg/mL H 14-64   Interpretive Guide    Intact PTH           Calcium  ------------------    ----------           -------  Normal Parathyroid    Normal               Normal  Hypoparathyroidism    Low or Low Normal    Low  Hyperparathyroidism     Primary            Normal or High       High     Secondary          High                 Normal or Low     Tertiary           High                 High  Non-Parathyroid     Hypercalcemia      Low or Low Normal    High   CALCIUM 10 8 mg/dL H 8 6-10 2     (1) MAGNESIUM 10Bit8955 10:33AM [employer]     Test Name Result Flag Reference   MAGNESIUM 2 1 mg/dL  1 5-2 5     (1) RENAL FUNCTION PANEL 89Qcp3395 10:33AM [employer]     Test Name Result Flag Reference   GLUCOSE 97 mg/dL  65-99   Fasting reference interval   UREA NITROGEN (BUN) 18 mg/dL  7-25   CREATININE 0 73 mg/dL  0 50-1 10   eGFR NON-AFR   AMERICAN 97 mL/min/1 73m2  > OR = 60   eGFR AFRICAN AMERICAN 113 mL/min/1 73m2  > OR = 60   BUN/CREATININE RATIO   4-21   NOT APPLICABLE (calc)   SODIUM 139 mmol/L  135-146   POTASSIUM 4 7 mmol/L  3 5-5 3   CHLORIDE 105 mmol/L     CARBON DIOXIDE 28 mmol/L  19-30   CALCIUM 10 8 mg/dL H 8 6-10 2   PHOSPHATE (AS PHOSPHORUS) 2 5 mg/dL  2 5-4 5   ALBUMIN 3 7 g/dL  3 6-5 1     *(Q) VITAMIN D, 25-HYDROXY, LC/MS/MS 36PTH8750 10:33AM [employer]   REPORT COMMENT:  FASTING:YES     Test Name Result Flag Reference   VITAMIN D, 25-OH, TOTAL 13 ng/mL L  Vitamin D Status         25-OH Vitamin D:     Deficiency:                    <20 ng/mL  Insufficiency:             20 - 29 ng/mL  Optimal:                 > or = 30 ng/mL     For 25-OH Vitamin D testing on patients on   D2-supplementation and patients for whom quantitation   of D2 and D3 fractions is required, the QuestAssureD(TM)  25-OH VIT D, (D2,D3), LC/MS/MS is recommended: order   code 99584 (patients >2yrs)  For more information on this test, go to:  http://education  [employer]/faq/PLN275  (This link is being provided for   informational/educational purposes only )       Signatures   Electronically signed by : Diana Abraham, ; Jan 18 2016 10:45AM EST                       (Author)

## 2018-01-14 NOTE — MISCELLANEOUS
Message  Isak Novak called the office this morning c/o of blood in her urine  She denied having any other symptoms associated with this  As per Dr Kumar Heart request pt was advised to increase her fluid intake and should she develop gross hematuria she is advised to go the hospital since pt still does NOT have a PCP  Pt also stated she does not follow with urology  Dr Cristiane Mac did review Rina's labs from 12/7 and her kidney function remains stable  Isak Novak did state she will call the office should the bleeding persist and will report to the [facility] 12/9/2016      Active Problems    1  Acute pain of right hip (719 45) (M25 551)   2  Anemia (285 9) (D64 9)   3  Antiphospholipid antibody syndrome (289 81) (D68 61)   4  Avascular necrosis (733 40) (M87 00)   5  Benign hypertensive CKD (403 10) (I12 9)   6  Bursitis of left hip (726 5) (M70 72)   7  Chronic kidney disease (CKD), stage II (mild) (585 2) (N18 2)   8  DDD (degenerative disc disease), lumbosacral (722 52) (M51 37)   9  Edema (782 3) (R60 9)   10  Hematuria (599 70) (R31 9)   11  Homocysteinemia (270 4) (E72 11)   12  Hypercalcemia (275 42) (E83 52)   13  Hypercholesterolemia (272 0) (E78 00)   14  Hyperparathyroidism (252 00) (E21 3)   15  Left hip pain (719 45) (M25 552)   16  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,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)   17  Nephrolithiasis (592 0) (N20 0)   18  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)   19  Postsurgical hypothyroidism (244 0) (E89 0)   20  Proteinuria (791 0) (R80 9)   21  Pulmonary embolism (415 19) (I26 99)   22  Sciatica (724 3) (M54 30)   23  Sleep apnea (780 57) (G47 30)   24  Systemic lupus erythematosus (710 0) (M32 9)   25  Thyroid cancer (193) (C73)   26  Vitamin D deficiency (268 9) (E55 9)    Current Meds   1  Folic Acid 1 MG Oral Tablet; TAKE ONE TABLET BY MOUTH ONCE DAILY; Therapy: 72TGL4861 to (Evaluate:14Nov2015)  Requested for: 253-297-113; Last   Rx:19Nov2014 Ordered   2   Fosinopril Sodium 40 MG Oral Tablet; Take 1 tablet daily; Therapy: 58BNJ7368 to (Evaluate:13May2017)  Requested for: 85WMH1549; Last   Rx:79Gek8425 Ordered   3  Levothyroxine Sodium 125 MCG Oral Tablet; Take 1 tablet mon-fri 1 1/2 tablet sat and   sun  Requested for: 21Jun2016; Last Rx:21Jun2016 Ordered   4  Magnesium 400 MG CAPS; Therapy: 43RRH9974 to Recorded   5  NIFEdipine ER Osmotic Release 30 MG Oral Tablet Extended Release 24 Hour; TAKE 1   TABLET DAILY; Therapy: 70HYO0729 to (Evaluate:24Mar2017)  Requested for: 66KAX0292; Last   Rx:29Mar2016 Ordered   6  Phos-NaK 280-160-250 MG Oral Packet; take 1 packet twice daily; Therapy: 70YNP7748 to (Evaluate:19Nov2016)  Requested for: 10QQX7603; Last   Rx:14Nov2016 Ordered   7  TraMADol HCl - 50 MG Oral Tablet; Therapy: (0498 72 13 49) to Recorded   8  Vitamin D 2000 UNIT Oral Capsule; take 1 tablet by mouth once daily; Therapy: 11SGK8595 to (Evaluate:14May2017); Last Rx:72Qio3229 Ordered   9  Vitamin D3 13579 UNIT Oral Capsule; TAKE 1 CAPSULE Weekly; Therapy: 39SXB6088 to (Jennifer Apodaca)  Requested for: 20RLH6954; Last   Rx:14Nov2016 Ordered   10  Warfarin Sodium 2 MG Oral Tablet; TAKE 1 TABLET ON SUNDAY, TUESDAY, FRIDAY,    AND SATURDAY OR AS DIRECTED; Therapy: 08Apr2013 to (Evaluate:11Nov2017)  Requested for: 48SBY7083; Last    Rx:16Nov2016 Ordered   11  Warfarin Sodium 4 MG Oral Tablet; TAKE 1 TABLET ON MONDAY, WEDNESDAY, AND    THURSDAY OR AS DIRECTED; Therapy: 08Apr2013 to (Evaluate:11Nov2017)  Requested for: 51VTX0538; Last    Rx:16Nov2016 Ordered    Allergies    1   Penicillins    Signatures   Electronically signed by : KIERSTEN Ross Ala ; Dec 12 2016 11:59AM EST

## 2018-01-14 NOTE — RESULT NOTES
Message   TG level trending down and antibody undetectable, TSH within normal, continue Levothyroxine  Forward copy of renal function panel to PCP  Verified Results  (1) RENAL FUNCTION PANEL [id number] 08:45AM Maridee Felty courtesy copy of this report has been sent to  the patient  Test Name Result Flag Reference   Glucose, Serum 96 mg/dL  65-99   BUN 15 mg/dL  6-24   Creatinine, Serum 0 62 mg/dL  0 57-1 00   eGFR If NonAfricn Am 106 mL/min/1 73  >59   eGFR If Africn Am 122 mL/min/1 73  >59   BUN/Creatinine Ratio 24 H 9-23   Sodium, Serum 142 mmol/L  136-144   **Effective December 12, 2016 the reference interval**                   for Sodium, Serum will be changing to:                                                             134 - 144   Potassium, Serum 4 5 mmol/L  3 5-5 2   Chloride, Serum 107 mmol/L H    **Effective December 12, 2016 the reference interval**                   for Chloride, Serum will be changing to:                                                              96 - 106   Carbon Dioxide, Total 23 mmol/L  18-29   Calcium, Serum 10 4 mg/dL H 8 7-10 2   **Verified by repeat analysis**   Phosphorus, Serum 2 6 mg/dL  2 5-4 5   Albumin, Serum 3 7 g/dL  3 5-5 5     (1) TSH [id number] 08:45AM [facility]     Test Name Result Flag Reference   TSH 0 687 uIU/mL  0 450-4 500     (LC) Thyroxine (T4) Free, Direct, [facility] [place] [id number] 08:45AM [facility]     Test Name Result Flag Reference   T4,Free(Direct) 1 64 ng/dL  0 82-1 77     [facility]) TgAb+Thyroglobulin,MIRACLE or LCMS [id number] 08:45AM [facility]     Test Name Result Flag Reference   Thyroglobulin Antibody Thyroglobulin Ab <1 0 IU/mL  0 0-0 9   Thyroglobulin Antibody measured by [facility] Rajendra Methodology   Thyroglobulin by MIRACLE 0 4 ng/mL L 1 5-38 5   According to the [facility] of Clinical Biochemistry, the  reference interval for Thyroglobulin (TG) should be related to  euthyroid patients and not for patients who underwent thyroidectomy    TG reference intervals for these patients depend on the residual  mass of the thyroid tissue left after surgery  Establishing a  post-operative baseline is recommended  The assay limit of quantitation is 0 1 ng/mL  Thyroglobulin measured by [facility] Jackson Immunometric Assay       Plan  Hyperparathyroidism    · (1) CALCIUM, URINE 24HR; Status:Active; Requested for:16Efq3746;    · (1) CREATININE, URINE 24HR; Status:Active; Requested for:82Hsv8369;    · (1) RENAL FUNCTION PANEL; Status:Active;  Requested for:42Ept1508;

## 2018-01-15 NOTE — RESULT NOTES
Message   a1c is normal     Verified Results  (1) HEMOGLOBIN A1C 01Apr2017 08:01AM Radha PALACIO courtesy copy of this report has been sent to  the patient       Test Name Result Flag Reference   Hemoglobin A1c 5 4 %  4 8-5 6   Pre-diabetes: 5 7 - 6 4           Diabetes: >6 4           Glycemic control for adults with diabetes: <7 0

## 2018-01-15 NOTE — RESULT NOTES
Message   No evidence of distant metastases  Repeat TSH, free T4, thyroglobulin, antithyroglobulin antibody, renal panel, PTH and vitamin D one week before next visit  Verified Results  NM THYROID CANCER METASTATIC SCAN WHOLE BODY 13Oct2016 07:36AM Mercy Stevenson Order Number: [id number]     Test Name Result Flag Reference   NM THYROID CANCER METASTATIC SCAN WHOLE BODY (Report)     POST I-131 THERAPY WHOLE BODY TUMOR LOCALIZATION SCAN     INDICATION: Thyroid cancer     COMPARISON: 8/18/2016     FINDINGS:      Whole body images demonstrate a stable distribution of radioiodine  Stable anterior neck activity  There are no new lesions that are suspicious for distant metastases  Physiologic bowel uptake is noted  IMPRESSION:     Stable anterior neck activity  No distant metastases visualized  Workstation performed: [id number]     Signed by:   Blanca Hutchins MD   10/14/16   Pt had post therapy wbs   Images reviewed with Dr Isi Abrams    Pt will follow up with Dr Maddie Brown

## 2018-01-15 NOTE — RESULT NOTES
Message   TSH at goal 0 1-0 5, continue current dose of Levothyroxine  TG levels show no evidence of recurrent disease  Verified Results  [facility]) Thyroxine (T4) Free, Direct, [place] [id number] 07:20AM Trice Phlegm     Test Name Result Flag Reference   T4,Free(Direct) 1 47 ng/dL  0 82-1 77     (1) TSH [id number] 07:20AM Trice Phlegm     Test Name Result Flag Reference   TSH 0 392 uIU/mL L 0 450-4 500     (LC) TgAb+Thyroglobulin,MIRACLE or TRISH [id number] 07:20AM Trice Phlegm     Test Name Result Flag Reference   Thyroglobulin Antibody Thyroglobulin Ab <1 0 IU/mL  0 0-0 9   Thyroglobulin Antibody measured by [facility] Methodology   Thyroglobulin by MIRACLE 0 1 ng/mL L 1 5-38 5   According to the [facility] of Clinical Biochemistry, the  reference interval for Thyroglobulin (TG) should be related to  euthyroid patients and not for patients who underwent thyroidectomy  TG reference intervals for these patients depend on the residual  mass of the thyroid tissue left after surgery  Establishing a  post-operative baseline is recommended    The assay limit of quantitation is 0 1 ng/mL  Thyroglobulin measured by [facility] Immunometric Assay

## 2018-01-15 NOTE — RESULT NOTES
Message   stay well hydrated  can take vitamin D 2000 units a day  will discuss all labs and next steps in office     Verified Results  (1) TSH [id number] 10:46AM Jason Speed     Test Name Result Flag Reference   TSH 0 812 uIU/mL  0 450-4 500     (1) RENAL FUNCTION PANEL [id number] 10:46AM Jason Speed     Test Name Result Flag Reference   Glucose, Serum 94 mg/dL  65-99   BUN 17 mg/dL  6-24   Creatinine, Serum 0 58 mg/dL  0 57-1 00   eGFR If NonAfricn Am 109 mL/min/1 73  >59   eGFR If Africn Am 125 mL/min/1 73  >59   BUN/Creatinine Ratio 29 H 9-23   Sodium, Serum 147 mmol/L H 136-144   Potassium, Serum 4 2 mmol/L  3 5-5 2   Chloride, Serum 108 mmol/L H    Carbon Dioxide, Total 23 mmol/L  18-29   Calcium, Serum 10 3 mg/dL H 8 7-10 2   Phosphorus, Serum 2 3 mg/dL L 2 5-4 5   Albumin, Serum 3 7 g/dL  3 5-5 5     (1) PTH N-TERMINAL (INTACT) [id number] 10:46AM Jason Speed     Test Name Result Flag Reference   PTH, Intact 79 pg/mL H 15-65     (1) VITAMIN D 25-HYDROXY [id number] 10:46AM Jason Speed     Test Name Result Flag Reference   Vitamin D, 25-Hydroxy 16 9 ng/mL L 30 0-100 0   Vitamin D deficiency has been defined by the [place]  [employer] and an Endocrine Society practice guideline as a  level of serum 25-OH vitamin D less than 20 ng/mL (1,2)  The Endocrine Society went on to further define vitamin D  insufficiency as a level between 21 and 29 ng/mL (2)  1  IOM ([place] [employer])  2010  Dietary reference     intakes for calcium and D  [facility]: The     [employer]  2  Alicja MF, Jayla NC, Kasey LEACH, et al      Evaluation, treatment, and prevention of vitamin D     deficiency: an Endocrine Society clinical practice     guideline  JC  2011 Jul; 96(7):1911-30       [facility]) Thyroxine (T4) Free, Direct, [place] [id number] 10:46AM Jason Speed     Test Name Result Flag Reference   T4,Free(Direct) 1 33 ng/dL  0 82-1 77     (LC) TgAb+Thyroglobulin,MIRACLE or TRISH [id number] 10:46AM Jason Speed     Test Name Result Flag Reference   Thyroglobulin Antibody Thyroglobulin Ab <1 0 IU/mL  0 0-0 9   Thyroglobulin Antibody measured by [facility] [place] Methodology   Thyroglobulin by MIRACLE 4 9 ng/mL  1 5-38 5   According to the [facility] of Clinical Biochemistry, the  reference interval for Thyroglobulin (TG) should be related to  euthyroid patients and not for patients who underwent thyroidectomy  TG reference intervals for these patients depend on the residual  mass of the thyroid tissue left after surgery  Establishing a  post-operative baseline is recommended    The assay limit of quantitation is 0 1 ng/mL  Thyroglobulin measured by [facility] Immunometric Assay

## 2018-01-15 NOTE — RESULT NOTES
Message   remain hydrated to prevent high calcium levels  Recheck renal panel before next visit  When is she scheduled for thyroid cancer iodine treatment? Verified Results  (1) TSH 30Sep2016 07:30AM Willistine Jovita     Test Name Result Flag Reference   TSH 0 143 uIU/mL L 0 450-4 500     (1) PTH N-TERMINAL (INTACT) 30Sep2016 07:30AM Iqra Robins courtesy copy of this report has been sent to  the patient       Test Name Result Flag Reference   PTH, Intact 78 pg/mL H 15-65     (1) RENAL FUNCTION PANEL 30Sep2016 07:30AM Willistine Jovita     Test Name Result Flag Reference   Glucose, Serum 93 mg/dL  65-99   BUN 27 mg/dL H 6-24   Creatinine, Serum 0 84 mg/dL  0 57-1 00   eGFR If NonAfricn Am 82 mL/min/1 73  >59   eGFR If Africn Am 94 mL/min/1 73  >59   BUN/Creatinine Ratio 32 H 9-23   Sodium, Serum 142 mmol/L  134-144   Potassium, Serum 4 5 mmol/L  3 5-5 2   Chloride, Serum 105 mmol/L     Carbon Dioxide, Total 21 mmol/L  18-29   Calcium, Serum 10 5 mg/dL H 8 7-10 2   **Verified by repeat analysis**   Phosphorus, Serum 2 0 mg/dL L 2 5-4 5   Albumin, Serum 3 8 g/dL  3 5-5 5     (LC) Thyroxine (T4) Free, Direct, S 30Sep2016 07:30AM Willistine Jovita     Test Name Result Flag Reference   T4,Free(Direct) 1 58 ng/dL  0 82-1 77     (LC) Thyroglobulin Antibody [id number] 07:30AM Willistine Jovita     Test Name Result Flag Reference   Thyroglobulin Antibody Thyroglobulin Ab <1 0 IU/mL  0 0-0 9   Thyroglobulin Antibody measured by [facility]

## 2018-01-15 NOTE — RESULT NOTES
Message   TG level has decreased and improved  Make sure patient has follow-up appt  Verified Results  (1) THYROGLOBULIN/QUANT W/ANTIBODY PANEL 05NIH1413 04:54PM Shreya Cash     Test Name Result Flag Reference   THYROGLOB AB <1 0 IU/mL  0 0 - 0 9   Thyroglobulin Antibody measured by [facility] Methodology  Performed at:  [address] [place] [address]  [id number]  : Sacha Lomas MD, Phone:  [phone]   THYROGLOBULIN-MIRACLE 0 5 ng/mL L 1 5 - 38 5   According to the [facility] of Clinical Biochemistry, thereference interval for Thyroglobulin (TG) should be related toeuthyroid patients and not for patients who underwent thyroidectomy  TG reference intervals for these patients depend on the residualmass of the thyroid tissue left after surgery  Establishing apost-operative baseline is recommended  The assay limit of quantitation is 0 1 ng/mLThyroglobulin measured by [facility] Immunometric Assay  Performed at:  [address] [place] [address]  [id number]  : Sacha Lomas MD, Phone:  [phone]       Signatures   Electronically signed by : Bebeto Simons, ; May 18 2016  6:30PM EST                       (Author)

## 2018-01-15 NOTE — MISCELLANEOUS
Message   Recorded as Task   Date: 03/13/2017 10:15 AM, Created By: Kaleigh Lake   Task Name: Miscellaneous   Assigned To: Linda Andino   Regarding Patient: Jules Patel, Status: In Progress   AidaSheeba Cordova - 13 Mar 2017 10:15 AM     TASK CREATED  1) Does Quanrenay Mtz have nohemi't w D or V for BP check re increased urine protein  2) Please send Dr Israel Dunn results re: Increased calcium and glucose   Olya Arellano - 15 Mar 2017 11:58 AM     TASK IN PROGRESS   [facility] does have a BP check on 3/21 with Pema Boone, I did fax over lab results to Dr Padmini Tate to address increase calcium and glucose levels  [facility] 3/15/2017      Active Problems    1  Acute pain of right hip (719 45) (M25 551)   2  Anemia (285 9) (D64 9)   3  Antiphospholipid antibody syndrome (289 81) (D68 61)   4  Avascular necrosis (733 40) (M87 00)   5  Benign hypertensive CKD (403 10) (I12 9)   6  Bursitis of left hip (726 5) (M70 72)   7  Chronic kidney disease (CKD), stage II (mild) (585 2) (N18 2)   8  DDD (degenerative disc disease), lumbosacral (722 52) (M51 37)   9  Edema (782 3) (R60 9)   10  Gross hematuria (599 71) (R31 0)   11  Hematuria (599 70) (R31 9)   12  Homocysteinemia (270 4) (E72 11)   13  Hypercalcemia (275 42) (E83 52)   14  Hypercholesterolemia (272 0) (E78 00)   15  Hyperparathyroidism (252 00) (E21 3)   16  Left hip pain (719 45) (M25 552)   17  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,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)   18  Nephrolithiasis (592 0) (N20 0)   19  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)   20  Postsurgical hypothyroidism (244 0) (E89 0)   21  Proteinuria (791 0) (R80 9)   22  Pulmonary embolism (415 19) (I26 99)   23  Sciatica (724 3) (M54 30)   24  Sleep apnea (780 57) (G47 30)   25  Systemic lupus erythematosus (710 0) (M32 9)   26  Thyroid cancer (193) (C73)   27  Vitamin D deficiency (268 9) (E55 9)    Current Meds   1  Folic Acid 1 MG Oral Tablet; TAKE ONE TABLET BY MOUTH ONCE DAILY;    Therapy: 15QXY5839 to (Evaluate:14Nov2015)  Requested for: [phone]; Last   Rx:19Nov2014 Ordered   2  Fosinopril Sodium 40 MG Oral Tablet; Take 1 tablet daily; Therapy: 92KVJ1253 to (Evaluate:13May2017)  Requested for: 77FGD1693; Last   Rx:18May2016 Ordered   3  Iron-Vitamins TABS; Therapy: (Recorded:76Pkw9553) to Recorded   4  Levothyroxine Sodium 125 MCG Oral Tablet (Synthroid); Take 1 tablet mon-fri 1 1/2   tablet sat only  Requested for: 48GPC5717; Last Rx:17Jan2017 Ordered   5  NIFEdipine ER Osmotic Release 30 MG Oral Tablet Extended Release 24 Hour; TAKE 1   TABLET DAILY; Therapy: 56VXA8920 to (Evaluate:24Mar2017)  Requested for: 13VMO3136; Last   Rx:29Mar2016 Ordered   6  Vitamin D 2000 UNIT Oral Capsule; take 1 tablet by mouth once daily; Therapy: 50WZS1983 to (Evaluate:14May2017); Last Rx:22Zdq5190 Ordered   7  Warfarin Sodium 2 MG Oral Tablet; TAKE 1 TABLET ON SUNDAY, TUESDAY, FRIDAY,   AND SATURDAY OR AS DIRECTED; Therapy: 25Jro3339 to (Evaluate:11Nov2017)  Requested for: 06SYY1124; Last   Rx:16Nov2016 Ordered   8  Warfarin Sodium 4 MG Oral Tablet; TAKE 1 TABLET ON MONDAY, WEDNESDAY, AND   THURSDAY OR AS DIRECTED; Therapy: 22Vcy0125 to (Evaluate:11Nov2017)  Requested for: 06ODZ9996; Last   Rx:16Nov2016 Ordered    Allergies    1   Penicillins    Signatures   Electronically signed by : KIERSTEN Laird ; Mar 15 2017  2:45PM EST

## 2018-01-15 NOTE — MISCELLANEOUS
Message   Recorded as Task   Date: 09/22/2016 01:37 PM, Created By: Carlos A Sen   Task Name: Miscellaneous   Assigned To: Salena Cedillo   Regarding Patient: Ene Santana, Status: In Progress   Comment:    Andres Jules - 22 Sep 2016 1:37 PM     TASK CREATED  Please order spot urine for prot and creat now   Olya Arellano - 22 Sep 2016 1:59 PM     TASK IN PROGRESS   Olya Arellano - 22 Sep 2016 2:00 PM     TASK EDITED  Left message for pt to call the office  [facility]   A spot urine was ordered for Cesilia Godoy, pt did state that she will have it done as soon as possible  She states she is having some car trouble at the moment  She also states she was recently diagnosed with Thyroid cancer stage 1 by her endocrinologist Dr Dale Munoz  Office note from that visit 9/19/2016 is in [facility] 9/23/2016      Active Problems    1  Acute pain of right hip (719 45) (M25 551)   2  Anemia (285 9) (D64 9)   3  Antiphospholipid antibody syndrome (289 81) (D68 61)   4  Avascular necrosis (733 40) (M87 00)   5  Benign hypertensive CKD (403 10) (I12 9)   6  Bursitis of left hip (726 5) (M70 72)   7  Chronic kidney disease (CKD), stage II (mild) (585 2) (N18 2)   8  DDD (degenerative disc disease), lumbosacral (722 52) (M51 37)   9  Edema (782 3) (R60 9)   10  Hematuria (599 70) (R31 9)   11  Homocysteinemia (270 4) (E72 11)   12  Hypercalcemia (275 42) (E83 52)   13  Hypercholesterolemia (272 0) (E78 0)   14  Hyperparathyroidism (252 00) (E21 3)   15  Left hip pain (719 45) (M25 552)   16  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,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)   17  Nephrolithiasis (592 0) (N20 0)   18  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)   19  Postsurgical hypothyroidism (244 0) (E89 0)   20  Proteinuria (791 0) (R80 9)   21  Pulmonary embolism (415 19) (I26 99)   22  Sciatica (724 3) (M54 30)   23  Sleep apnea (780 57) (G47 30)   24  Systemic lupus erythematosus (710 0) (M32 9)   25  Thyroid cancer (193) (C73)   26   Vitamin D deficiency (268 9) (E55 9)    Current Meds   1  Folic Acid 1 MG Oral Tablet; TAKE ONE TABLET BY MOUTH ONCE DAILY; Therapy: 89YMZ5266 to (Evaluate:14Nov2015)  Requested for: [phone]; Last   Rx:19Nov2014 Ordered   2  Fosinopril Sodium 40 MG Oral Tablet; Take 1 tablet daily; Therapy: 99XKA6027 to (Evaluate:13May2017)  Requested for: 18DTU0339; Last   Rx:29Ybr2114 Ordered   3  Levothyroxine Sodium 125 MCG Oral Tablet (Synthroid); Take 1 tablet mon-fri 1 1/2   tablet sat and sun  Requested for: 21Jun2016; Last Rx:21Jun2016 Ordered   4  Magnesium 400 MG Oral Capsule; Therapy: 45UIK2524 to Recorded   5  NIFEdipine ER Osmotic Release 30 MG Oral Tablet Extended Release 24 Hour; TAKE 1   TABLET DAILY; Therapy: 23ZJV7367 to (Evaluate:24Mar2017)  Requested for: 95QKP1507; Last   Rx:29Mar2016 Ordered   6  TraMADol HCl - 50 MG Oral Tablet; Therapy: ([phone]) to Recorded   7  Vitamin D 400 UNIT/ML Oral Liquid; TAKES 400 IU DAILY; Therapy: 87Gle3590 to (Last Rx:06Lhv0260) Ordered   8  Warfarin Sodium 2 MG Oral Tablet; TAKE 1 TABLET ON SUNDAY, TUESDAY, FRIDAY,   AND SATURDAY OR AS DIRECTED; Therapy: 08Apr2013 to (Minerva Mealing)  Requested for: 18YXH5698; Last   Rx:11May2016 Ordered   9  Warfarin Sodium 4 MG Oral Tablet; TAKE 1 TABLET ON MONDAY, WEDNESDAY, AND   THURSDAY OR AS DIRECTED; Therapy: 08Apr2013 to (Evaluate:07Nov2016)  Requested for: 13HNZ5186; Last   Rx:51Dps1004 Ordered    Allergies    1  Penicillins    Plan  Chronic kidney disease (CKD), stage II (mild), Proteinuria    · (1) URINE PROTEIN CREATININE RATIO; Status:Active - Retrospective By Protocol  Authorization;  Requested ZXB:16BRJ5355;     Signatures   Electronically signed by : KIERSTEN Shipman ; Sep 26 2016  8:56PM EST

## 2018-01-16 NOTE — RESULT NOTES
Discussion/Summary   TSH normal, continue current dose of Levothyroxine  Calcium improved, PTH level within normal, stay well hydrated, no calcium supplements, continue D supplements  Elevated blood sugar, stay away from high carbs         Verified Results  (1) TSH 98WFE6141 07:59AM Julio Inkster     Test Name Result Flag Reference   TSH 1 010 uIU/mL  0 450-4 500     (1) RENAL FUNCTION PANEL 11QCM4202 07:59AM Julio Inkster     Test Name Result Flag Reference   Glucose, Serum 105 mg/dL H 65-99   BUN 21 mg/dL  6-24   Creatinine, Serum 0 67 mg/dL  0 57-1 00   BUN/Creatinine Ratio 31 H 9-23   Sodium, Serum 141 mmol/L  134-144   Potassium, Serum 4 1 mmol/L  3 5-5 2   Chloride, Serum 105 mmol/L     Carbon Dioxide, Total 22 mmol/L  18-29   Calcium, Serum 10 3 mg/dL H 8 7-10 2   Phosphorus, Serum 2 4 mg/dL L 2 5-4 5   Albumin, Serum 3 8 g/dL  3 5-5 5   eGFR If NonAfricn Am 103 mL/min/1 73  >59   eGFR If Africn Am 119 mL/min/1 73  >59     (1) PTH N-TERMINAL (INTACT) 16BWL5778 07:59AM [facility]     Test Name Result Flag Reference   PTH, Intact 60 pg/mL  15-65     [facility]) Thyroxine (T4) Free, Direct, S 56MCX6596 07:59AM Julio Inkster     Test Name Result Flag Reference   T4,Free(Direct) 1 36 ng/dL  0 82-1 77

## 2018-01-16 NOTE — MISCELLANEOUS
Message   Recorded as Task   Date: 12/12/2016 12:00 PM, Created By: Supriya Davidson   Task Name: Miscellaneous   Assigned To: Andres Abraham   Regarding Patient: Kim Fields, Status: In Progress   Comment:    Andres Jules - 12 Dec 2016 12:00 PM     TASK CREATED  re: her gross hematuria  1) Call Levell Bumpers and see if she still has any gross hematuria  2) Urine for culture now  3) Eval w urology  Ilya Becker - 12 Dec 2016 1:52 PM     TASK IN PROGRESS   Olya Arellano - 12 Dec 2016 2:25 PM     TASK EDITED  Left message for pt to call the office  [facility]   I spoke with Levell Bumpers re: the following,  - Levell Bubrian states that she no longer has gross hematuria and has stayed well hydrated over the weekend  - She has been referred to Urology to be evaluated, a order slip was mailed to pt home along slip for urine culture  [facility] 12/12/2016      Active Problems    1  Acute pain of right hip (719 45) (M25 551)   2  Anemia (285 9) (D64 9)   3  Antiphospholipid antibody syndrome (289 81) (D68 61)   4  Avascular necrosis (733 40) (M87 00)   5  Benign hypertensive CKD (403 10) (I12 9)   6  Bursitis of left hip (726 5) (M70 72)   7  Chronic kidney disease (CKD), stage II (mild) (585 2) (N18 2)   8  DDD (degenerative disc disease), lumbosacral (722 52) (M51 37)   9  Edema (782 3) (R60 9)   10  Gross hematuria (599 71) (R31 0)   11  Hematuria (599 70) (R31 9)   12  Homocysteinemia (270 4) (E72 11)   13  Hypercalcemia (275 42) (E83 52)   14  Hypercholesterolemia (272 0) (E78 00)   15  Hyperparathyroidism (252 00) (E21 3)   16  Left hip pain (719 45) (M25 552)   17  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,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)   18  Nephrolithiasis (592 0) (N20 0)   19  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)   20  Postsurgical hypothyroidism (244 0) (E89 0)   21  Proteinuria (791 0) (R80 9)   22  Pulmonary embolism (415 19) (I26 99)   23  Sciatica (724 3) (M54 30)   24  Sleep apnea (780 57) (G47 30)   25   Systemic lupus erythematosus (710 0) (M32 9)   26  Thyroid cancer (193) (C73)   27  Vitamin D deficiency (268 9) (E55 9)    Current Meds   1  Folic Acid 1 MG Oral Tablet; TAKE ONE TABLET BY MOUTH ONCE DAILY; Therapy: 91TIN4892 to (Evaluate:14Nov2015)  Requested for: [phone]; Last   Rx:19Nov2014 Ordered   2  Fosinopril Sodium 40 MG Oral Tablet; Take 1 tablet daily; Therapy: 69PRH1827 to (Evaluate:13May2017)  Requested for: 93BPH3246; Last   Rx:18May2016 Ordered   3  Levothyroxine Sodium 125 MCG Oral Tablet (Synthroid); Take 1 tablet mon-fri 1 1/2   tablet sat and sun  Requested for: 21Jun2016; Last Rx:21Jun2016 Ordered   4  Magnesium 400 MG CAPS; Therapy: 17ERO3398 to Recorded   5  NIFEdipine ER Osmotic Release 30 MG Oral Tablet Extended Release 24 Hour; TAKE 1   TABLET DAILY; Therapy: 07XBT1149 to (Evaluate:24Mar2017)  Requested for: 78BZU8293; Last   Rx:29Mar2016 Ordered   6  Phos-NaK 280-160-250 MG Oral Packet; take 1 packet twice daily; Therapy: 07UFX5941 to (Evaluate:19Nov2016)  Requested for: 34EHG4355; Last   Rx:14Nov2016 Ordered   7  TraMADol HCl - 50 MG Oral Tablet; Therapy: ([phone]) to Recorded   8  Vitamin D 2000 UNIT Oral Capsule; take 1 tablet by mouth once daily; Therapy: 13WRL5165 to (Evaluate:14May2017); Last Rx:15Nov2016 Ordered   9  Vitamin D3 92728 UNIT Oral Capsule; TAKE 1 CAPSULE Weekly; Therapy: 44JQC4336 to (Donzetta Carlie)  Requested for: 85NVS0098; Last   Rx:14Nov2016 Ordered   10  Warfarin Sodium 2 MG Oral Tablet; TAKE 1 TABLET ON SUNDAY, TUESDAY, FRIDAY,    AND SATURDAY OR AS DIRECTED; Therapy: 08Apr2013 to (Evaluate:11Nov2017)  Requested for: 36HHW9211; Last    Rx:16Nov2016 Ordered   11  Warfarin Sodium 4 MG Oral Tablet; TAKE 1 TABLET ON MONDAY, WEDNESDAY, AND    THURSDAY OR AS DIRECTED; Therapy: 08Apr2013 to (Evaluate:11Nov2017)  Requested for: 67ZUE0007; Last    Rx:16Nov2016 Ordered    Allergies    1   Penicillins    Signatures   Electronically signed by : Deven Macias Merlinda Byars, M D ; Dec 12 2016 10:20PM EST

## 2018-01-16 NOTE — MISCELLANEOUS
Message   Recorded as Task   Date: 11/11/2016 12:28 PM, Created By: Bienvenido Acevedo   Task Name: Miscellaneous   Assigned To: [facility]   Regarding Patient: Yolanda Samuels, Status: In Progress   CommentTammi Paredes - 11 Nov 2016 12:28 PM     TASK CREATED  re Hypophosphatemia and vit D def    1) change vit d to 50,000 units weekly x 2 months and then 4000 units daily  2) Low phos likely due to dec'd vit d    Sharmila Cutting Neutraphos or equivalent 1 po BID x 5 days  3) Repeat ca and p04 level in 4 weeks   Olya Arellano - 14 Nov 2016 8:20 AM     TASK IN PROGRESS   Olya Arellano - 14 Nov 2016 9:27 AM     TASK EDITED  Left message for pt to call the office  MC   The following was discussed with Noa Perez,     - Vit D was changed to 50,000 units weekly for 2 months and then will resume to 4,000 units daily      - Low phos is likely do to her Vid D deficiency, phos - NaK 1 po BID has been sent to [facility] pharmacy for a total of 5 days  - A repeat ca and P04 has been ordered and mailed to pt home address to be done on 12/12/206  [facility] 11/14/2016      Active Problems    1  Acute pain of right hip (719 45) (M25 551)   2  Anemia (285 9) (D64 9)   3  Antiphospholipid antibody syndrome (289 81) (D68 61)   4  Avascular necrosis (733 40) (M87 00)   5  Benign hypertensive CKD (403 10) (I12 9)   6  Bursitis of left hip (726 5) (M70 72)   7  Chronic kidney disease (CKD), stage II (mild) (585 2) (N18 2)   8  DDD (degenerative disc disease), lumbosacral (722 52) (M51 37)   9  Edema (782 3) (R60 9)   10  Hematuria (599 70) (R31 9)   11  Homocysteinemia (270 4) (E72 11)   12  Hypercalcemia (275 42) (E83 52)   13  Hypercholesterolemia (272 0) (E78 00)   14  Hyperparathyroidism (252 00) (E21 3)   15  Left hip pain (719 45) (M25 552)   16  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,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)   17  Nephrolithiasis (592 0) (N20 0)   18  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)   19  Postsurgical hypothyroidism (244 0) (E89 0)   20   Proteinuria (791 0) (R80 9)   21  Pulmonary embolism (415 19) (I26 99)   22  Sciatica (724 3) (M54 30)   23  Sleep apnea (780 57) (G47 30)   24  Systemic lupus erythematosus (710 0) (M32 9)   25  Thyroid cancer (193) (C73)   26  Vitamin D deficiency (268 9) (E55 9)    Current Meds   1  Folic Acid 1 MG Oral Tablet; TAKE ONE TABLET BY MOUTH ONCE DAILY; Therapy: 13RNU8465 to (Evaluate:14Nov2015)  Requested for: [phone]; Last   Rx:19Nov2014 Ordered   2  Fosinopril Sodium 40 MG Oral Tablet; Take 1 tablet daily; Therapy: 63EAC3130 to (Evaluate:13May2017)  Requested for: 66LYG6796; Last   Rx:18May2016 Ordered   3  Levothyroxine Sodium 125 MCG Oral Tablet (Synthroid); Take 1 tablet mon-fri 1 1/2   tablet sat and sun  Requested for: 21Jun2016; Last Rx:21Jun2016 Ordered   4  Magnesium 400 MG CAPS; Therapy: 52MIQ0060 to Recorded   5  NIFEdipine ER Osmotic Release 30 MG Oral Tablet Extended Release 24 Hour; TAKE 1   TABLET DAILY; Therapy: 78QYI9392 to (Evaluate:24Mar2017)  Requested for: 21SRO8803; Last   Rx:29Mar2016 Ordered   6  Phos-NaK 280-160-250 MG Oral Packet; take 1 packet twice daily; Therapy: 59XHI9943 to (Evaluate:19Nov2016)  Requested for: 20WGT0667; Last   Rx:14Nov2016; Status: ACTIVE - Retrospective By Protocol Authorization Ordered   7  TraMADol HCl - 50 MG Oral Tablet; Therapy: (498 7863) to Recorded   8  Vitamin D 400 UNIT/ML Oral Liquid; TAKES 400 IU DAILY; Therapy: 04Syp8397 to (Last Rx:01Qlj5434) Ordered   9  Vitamin D3 06409 UNIT Oral Capsule; TAKE 1 CAPSULE Weekly; Therapy: 95FXA5762 to (Paulette Estimable)  Requested for: 51GTZ2152; Last   Rx:14Nov2016; Status: ACTIVE - Retrospective By Protocol Authorization Ordered   10  Warfarin Sodium 2 MG Oral Tablet; TAKE 1 TABLET ON SUNDAY, TUESDAY, FRIDAY,    AND SATURDAY OR AS DIRECTED; Therapy: 83Xvb6301 to (Aiyana Friend)  Requested for: 25RNU2815; Last    Rx:78Bba5269 Ordered   11   Warfarin Sodium 4 MG Oral Tablet; TAKE 1 TABLET ON MONDAY, WEDNESDAY, AND    THURSDAY OR AS DIRECTED; Therapy: 08Apr2013 to (Evaluate:07Nov2016)  Requested for: 81RBF6757; Last    Rx:26Vsg8753 Ordered    Allergies    1   Penicillins    Signatures   Electronically signed by : KIERSTEN Sheriff ; Nov 14 2016  3:55PM EST

## 2018-01-17 NOTE — RESULT NOTES
Message   residual thyroid tissue in right bed is stable  No new lymph nodes  pls also get old thyroid operative reports from portal in 2010     Verified Results  [facility] SOFT TISSUE 65Wpc8250 08:00AM Sherrie Mike Order Number: [id number]     Test Name Result Flag Reference   US HEAD NECK SOFT TISSUE (Report)     NECK ULTRASOUND     INDICATION:  History of thyroid cancer     COMPARISON: November 20, 2015     FINDINGS:   Ultrasound of the thyroidectomy bed and cervical lymph node chains was performed with a high frequency linear transducer  Stable tissue or prominent lymph node in the right thyroidectomy bed  Lymph nodes maintain normal morphologic contour, echogenicity and short axis dimensions of less than 0 7 cm  No evidence for microcalcification or focal nodularity  IMPRESSION:   Stable residual thyroid tissue in the right thyroid bed  No adenopathy seen  Workstation performed: [id number]     Signed by:    Davida Ly DO   6/23/16

## 2018-01-17 NOTE — RESULT NOTES
Discussion/Summary   neck US does not show recurrence or presence of mass or lymph nodes     Verified Results  US HEAD NECK LYMPH NODE MAPPING [id number] 01:20PM Vanessa Gaytan Order Number: [id number]    - Patient Instructions: To schedule this appointment, please contact Central Scheduling at [phone]  Test Name Result Flag Reference   US HEAD NECK LYMPH NODE MAPPING (Report)     NECK ULTRASOUND     INDICATION:  History of thyroid cancer status post thyroidectomy  COMPARISON: Ultrasound 6/22/2016     FINDINGS:     Ultrasound of the thyroidectomy bed and cervical lymph node chains was performed with a high frequency linear transducer  There is no suspicion of recurrent mass in the thyroidectomy bed  Lymph nodes maintain normal morphologic contour, echogenicity and short axis dimensions of less than 0 7 cm  No evidence for microcalcification or focal nodularity  IMPRESSION:     No evidence of recurrent or metastatic disease         Workstation performed: [id number]     Signed by:   Khadijah Hong DO   5/17/17

## 2018-01-18 NOTE — RESULT NOTES
Message   Please scan copy of lab results, where is urine calcium? Verified Results  (1) CREATININE, URINE 24HR [id number] 07:54AM Marika Clark   A courtesy copy of this report has been sent to  the patient  Test Name Result Flag Reference   Creatinine, Urine 70 9 mg/dL  Not Estab     Creatinine, Ur 24hr 1064 mg/24 hr  800-1800     (1) RENAL FUNCTION PANEL [id number] 07:54AM Marika Clark     Test Name Result Flag Reference   Glucose, Serum 103 mg/dL H 65-99   BUN 17 mg/dL  6-24   Creatinine, Serum 0 72 mg/dL  0 57-1 00   eGFR If NonAfricn Am 99 mL/min/1 73  >59   eGFR If Africn Am 114 mL/min/1 73  >59   BUN/Creatinine Ratio 24 H 9-23   Sodium, Serum 143 mmol/L  134-144   Potassium, Serum 4 2 mmol/L  3 5-5 2   Chloride, Serum 104 mmol/L     Carbon Dioxide, Total 23 mmol/L  18-29   Calcium, Serum 10 4 mg/dL H 8 7-10 2   **Verified by repeat analysis**   Phosphorus, Serum 2 2 mg/dL L 2 5-4 5   Albumin, Serum 3 6 g/dL  3 5-5 5

## 2018-01-18 NOTE — MISCELLANEOUS
Message   Recorded as Task   Date: 03/23/2016 04:17 PM, Created By: Laura Carlin   Task Name: Miscellaneous   Assigned To: Nazia Gardiner   Regarding Patient: Eliceo Lara, Status: In Progress   CommentCatterese Basurto - 23 Mar 2016 4:17 PM     TASK CREATED  HCO# low   ? any diarrhea  Repeat BMP in 2 weeks   Nazia Gardiner - 23 Mar 2016 4:26 PM     TASK REASSIGNED: Previously Assigned To NEPHROLOGY ASSASHU Trevizo - 24 Mar 2016 9:00 AM     TASK IN PROGRESS   Nazia Gardiner - 24 Mar 2016 9:00 AM     TASK EDITED  I left a message for the patient to call the office  I spoke with the patient and she states that she has not had any recent diarrhea  She is aware to repeat the BMP in 2 weeks  I mailed her the lab slip  angelique      Active Problems    1  Acute pain of right hip (719 45) (M25 551)   2  Anemia (285 9) (D64 9)   3  Antiphospholipid antibody syndrome (289 81) (D68 61)   4  Avascular necrosis (733 40) (M87 00)   5  Benign hypertensive CKD (403 10) (I12 9)   6  Bursitis of left hip (726 5) (M70 72)   7  Chronic kidney disease (CKD), stage II (mild) (585 2) (N18 2)   8  DDD (degenerative disc disease), lumbosacral (722 52) (M51 37)   9  Edema (782 3) (R60 9)   10  Hematuria (599 70) (R31 9)   11  Homocysteinemia (270 4) (E72 11)   12  Hypercalcemia (275 42) (E83 52)   13  Hypercholesterolemia (272 0) (E78 0)   14  Hyperparathyroidism (252 00) (E21 3)   15  Hypophosphatemia (275 3)   16  Left hip pain (719 45) (M25 552)   17  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,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)   18  Nephrolithiasis (592 0) (N20 0)   19  Postsurgical hypothyroidism (244 0) (E89 0)   20  Proteinuria (791 0) (R80 9)   21  Pulmonary embolism (415 19) (I26 99)   22  Sciatica (724 3) (M54 30)   23  Sleep apnea (780 57) (G47 30)   24  Systemic lupus erythematosus (710 0) (M32 9)   25  Thyroid cancer (193) (C73)   26  Vitamin D deficiency (268 9) (E55 9)    Current Meds   1   Folic Acid 1 MG Oral Tablet; TAKE ONE TABLET BY MOUTH ONCE DAILY; Therapy: 56FZZ3458 to (Evaluate:14Nov2015)  Requested for: 051-029-550; Last   Rx:82Nyj2960 Ordered   2  Fosinopril Sodium 40 MG Oral Tablet; Take 1 tablet daily; Therapy: 44LAU0606 to (Last Rx:89Akw9904)  Requested for: 48SLG0660 Ordered   3  Levothyroxine Sodium 125 MCG Oral Tablet (Synthroid); Take 1 tablet mon-fri 1 1/2   tablet sat and sun  Requested for: 55TGY8338; Last Rx:35Pms3573 Ordered   4  Magnesium 400 MG Oral Capsule; Therapy: 60AJK0655 to Recorded   5  NIFEdipine ER Osmotic Release 30 MG Oral Tablet Extended Release 24 Hour; Take 1   tablet daily; Therapy: 97WEY1237 to (Evaluate:22Dec2015)  Requested for: 85YUZ5894; Last   Rx:95Mxa5885 Ordered   6  TraMADol HCl - 50 MG Oral Tablet; Therapy: () to Recorded   7  Warfarin Sodium 2 MG Oral Tablet; TAKE 1 TABLET ON SUNDAY, TUESDAY, FRIDAY,   AND SATURDAY OR AS DIRECTED; Therapy: 08Apr2013 to (Evaluate:12Nov2016)  Requested for: 46PIT3738; Last   Rx:18Nov2015 Ordered   8  Warfarin Sodium 4 MG Oral Tablet; TAKE 1 TABLET ON MONDAY, WEDNESDAY, AND   THURSDAY OR AS DIRECTED; Therapy: 08Apr2013 to (Evaluate:13Jun2016)  Requested for: 59RWL5486; Last   Rx:00Iyc3093 Ordered    Allergies    1  Penicillins    Plan  Benign hypertensive CKD, Chronic kidney disease (CKD), stage II (mild)    · (1) BASIC METABOLIC PROFILE; Status:Active - Retrospective By Protocol  Authorization;  Requested for:07Apr2016;     Signatures   Electronically signed by : KIERSTEN Sheriff ; Mar 28 2016  3:48PM EST

## 2018-01-18 NOTE — MISCELLANEOUS
Message   Recorded as Task   Date: 09/18/2017 05:24 PM, Created By: Blaire Chan   Task Name: Care Coordination   Assigned To: Michael Parry   Regarding Patient: Radha Neville, Status: In Progress   Comment:    Blaire Chan - 18 Sep 2017 5:24 PM     TASK CREATED  Please obtain this patient's bloodwork performed at [facility] from 9/11/17 and scan in  Could not find the scanned version of the bloodwork  Thanks  Adrienne Myers - 21 Sep 2017 3:18 PM     TASK IN PROGRESS   I have reviewed the scanned in bloodwork - HT      Active Problems    1  Acute pain of right hip (719 45) (M25 551)   2  Antiphospholipid antibody syndrome (289 81) (D68 61)   3  Avascular necrosis (733 40) (M87 00)   4  Benign hypertensive CKD (403 10) (I12 9)   5  Bursitis of left hip (726 5) (M70 72)   6  Chronic kidney disease (CKD), stage II (mild) (585 2) (N18 2)   7  DDD (degenerative disc disease), lumbosacral (722 52) (M51 37)   8  Edema (782 3) (R60 9)   9  Elevated glucose level (790 29) (R73 09)   10  Gross hematuria (599 71) (R31 0)   11  Hematuria (599 70) (R31 9)   12  Homocysteinemia (270 4) (E72 11)   13  Hypercalcemia (275 42) (E83 52)   14  Hypercholesterolemia (272 0) (E78 00)   15  Hyperparathyroidism (252 00) (E21 3)   16  Left hip pain (719 45) (M25 552)   17  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,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)   18  Nephrolithiasis (592 0) (N20 0)   19  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)   20  Postsurgical hypothyroidism (244 0) (E89 0)   21  Proteinuria (791 0) (R80 9)   22  Pulmonary embolism (415 19) (I26 99)   23  Sciatica (724 3) (M54 30)   24  Sleep apnea (780 57) (G47 30)   25  Systemic lupus erythematosus (710 0) (M32 9)   26  Thyroid cancer (193) (C73)   27  Vitamin D deficiency (268 9) (E55 9)    Current Meds   1  Folic Acid 1 MG Oral Tablet; TAKE ONE TABLET BY MOUTH ONCE DAILY; Therapy: 06SSX6097 to (Evaluate:14Nov2015)  Requested for: [phone]; Last   Rx:19Nov2014 Ordered   2   Fosinopril Sodium 20 MG Oral Tablet; Take 1 tablet twice daily; Therapy: 73VUZ8155 to (YBNUTZGX:55ACT0403)  Requested for: 76Xgt7793; Last   Rx:52Hqq9859 Ordered   3  Iron-Vitamins TABS; Therapy: (Recorded:34Lnd3137) to Recorded   4  Levothyroxine Sodium 125 MCG Oral Tablet; Take 1 tablet mon-fri 1 1/2 tablet sat only    Requested for: 30Jun2017; Last Rx:29Jun2017 Ordered   5  NIFEdipine ER Osmotic Release 30 MG Oral Tablet Extended Release 24 Hour; Take 1   tablet daily; Therapy: 13DCF2423 to (Last Rx:87Jpj6899)  Requested for: 69Ggg0186 Ordered   6  Vitamin D 2000 UNIT Oral Capsule; take 1 tablet by mouth once daily; Therapy: 56PKO8321 to (Evaluate:26Nhh9579); Last Rx:07Brv8644 Ordered   7  Warfarin Sodium 2 MG Oral Tablet; TAKE 1 TABLET ON SUNDAY, TUESDAY, FRIDAY,   AND SATURDAY OR AS DIRECTED; Therapy: 08Apr2013 to (Evaluate:11Nov2017)  Requested for: 99OWX0101; Last   Rx:16Nov2016 Ordered   8  Warfarin Sodium 4 MG Oral Tablet; TAKE 1 TABLET ON MONDAY, WEDNESDAY, AND   THURSDAY OR AS DIRECTED; Therapy: 08Apr2013 to (Evaluate:11Nov2017)  Requested for: 28MZN8019; Last   Rx:16Nov2016 Ordered    Allergies    1   Penicillins    Signatures   Electronically signed by : Taniya Mcintosh DO; Sep 22 2017  4:51PM EST                       (Author)

## 2018-01-22 VITALS — SYSTOLIC BLOOD PRESSURE: 130 MMHG | DIASTOLIC BLOOD PRESSURE: 84 MMHG

## 2018-01-22 VITALS — BODY MASS INDEX: 38.21 KG/M2 | HEIGHT: 61 IN | WEIGHT: 202.38 LBS

## 2018-01-23 NOTE — RESULT NOTES
Discussion/Summary   Normal     Verified Results  * DXA BONE DENSITY SPINE HIP AND PELVIS 81BVU2724 06:51AM [address] Order Number: [id number]   Performing Comments: check forearm   - Patient Instructions: To schedule this appointment, please contact Central Scheduling at [phone]  Test Name Result Flag Reference   DXA BONE DENSITY SPINE HIP AND PELVIS (Report)     CENTRAL DXA SCAN     CLINICAL HISTORY:  50year old postmenopausal  female with history of thyroid cancer, hypothyroidism, lupus and left hip replacement  The patient took steroids in the past  She does not exercise and takes vitamin D supplements  TECHNIQUE: Bone densitometry was performed using a HoloAlverix Horizon A bone densitometer  Regions of interest appear properly placed  There are no obvious fractures or other confounding variables which could limit the study  COMPARISON: June 29, 2015 and before     RESULTS:    LUMBAR SPINE: L1-L4:   BMD 1 040 gm/cm2   T-score -0 1   Z-score 0 7     RIGHT TOTAL HIP:   BMD 1 049 gm/cm2   T-score 0 9   Z-score 1 1     RIGHT FEMORAL NECK:   BMD 0 775 gm/cm2   T-score -0 7   Z-score -0 1     LEFT FOREARM-ONE 3RD REGION:   BMD 0 774 gm/cm2   T-score 1 6   Z-score 2 3       IMPRESSION:   1  Based on the [facility] classification, this study is normal and the patient is considered at low risk for fracture  2  The 10 year risk of hip fracture is 0 1 %, with the 10 year risk of major osteoporotic fracture being 3 5 %, as calculated by the WHO fracture risk assessment tool (FRAX)  The current NOF guidelines recommend treating patients with FRAX 10 year risk   score of >3% for hip fracture and >20% for major osteoporotic fracture  3  Since the prior study, there has been a significant decrease in BMD in the lumbar spine of 0 048 Gm/cm2 or 4 4%  In the right hip, there has been a significant decrease in BMD of 0 039 Gm/cm2 or 3 6%   There is been no significant change in BMD in    the forearm  These changes do exceed our own least significant change and, therefore, are statistically significant within 95% confidence level  4  A daily intake of calcium of at least 1200 mg and vitamin D, 800-1000 IU, as well as weight bearing and muscle strengthening exercise, fall prevention and avoidance of tobacco and excessive alcohol intake  5  Repeat DXA in 18 - 24 months, on the same machine, as clinically indicated       WHO CLASSIFICATION:   Normal (a T-score of -1 0 or higher)   Low bone mineral density (a T-score of less than -1 0 but higher than -2 5)   Osteoporosis (a T-score of -2 5 or less)   Severe osteoporosis (a T-score of -2 5 or less with a fragility fracture)             Workstation performed: [id number]     Signed by:   Héctor Joel MD   12/8/17

## 2018-01-30 DIAGNOSIS — I10 ESSENTIAL HYPERTENSION: Primary | ICD-10-CM

## 2018-01-31 RX ORDER — NIFEDIPINE 30 MG/1
TABLET, EXTENDED RELEASE ORAL
Qty: 90 TABLET | Refills: 2 | OUTPATIENT
Start: 2018-01-31

## 2018-01-31 RX ORDER — NIFEDIPINE 30 MG/1
30 TABLET, EXTENDED RELEASE ORAL DAILY
Qty: 90 TABLET | Refills: 1 | Status: SHIPPED | OUTPATIENT
Start: 2018-01-31 | End: 2018-07-18 | Stop reason: SDUPTHER

## 2018-02-14 DIAGNOSIS — D69.3 IMMUNE THROMBOCYTOPENIC PURPURA (HCC): ICD-10-CM

## 2018-02-28 DIAGNOSIS — D69.3 IMMUNE THROMBOCYTOPENIC PURPURA (HCC): ICD-10-CM

## 2018-03-29 ENCOUNTER — TELEPHONE (OUTPATIENT)
Dept: ENDOCRINOLOGY | Facility: CLINIC | Age: 53
End: 2018-03-29

## 2018-03-29 DIAGNOSIS — E55.9 VITAMIN D DEFICIENCY: ICD-10-CM

## 2018-03-29 DIAGNOSIS — C73 THYROID CANCER (HCC): ICD-10-CM

## 2018-03-29 DIAGNOSIS — E83.52 HYPERCALCEMIA: Primary | ICD-10-CM

## 2018-03-29 DIAGNOSIS — E21.3 HYPERPARATHYROIDISM (HCC): ICD-10-CM

## 2018-03-29 DIAGNOSIS — E89.0 POSTOPERATIVE HYPOTHYROIDISM: ICD-10-CM

## 2018-03-29 NOTE — TELEPHONE ENCOUNTER
----- Message from Dwaine Rushing sent at 3/29/2018  1:14 PM EDT -----  Patient has upcoming appointment but no labs  What would you like to order?

## 2018-03-29 NOTE — TELEPHONE ENCOUNTER
Patient will need PTH, TSH, free T4 , thyroglobulin tumor marker, vitamin-D  She will also need thyroid ultrasound and DEXA scan before next appointment  Omkar Walker MD

## 2018-03-29 NOTE — TELEPHONE ENCOUNTER
Labs, US and Dexa ordered  Please sign off  Clerical - please call patient to reschedule so she can get all tests completed prior to appointment

## 2018-04-02 DIAGNOSIS — E83.52 HYPERCALCEMIA: ICD-10-CM

## 2018-04-02 DIAGNOSIS — C73 MALIGNANT NEOPLASM OF THYROID GLAND (HCC): ICD-10-CM

## 2018-04-02 DIAGNOSIS — E55.9 VITAMIN D DEFICIENCY: ICD-10-CM

## 2018-04-02 DIAGNOSIS — E89.0 POSTPROCEDURAL HYPOTHYROIDISM: ICD-10-CM

## 2018-04-02 DIAGNOSIS — D69.3 IMMUNE THROMBOCYTOPENIC PURPURA (HCC): ICD-10-CM

## 2018-04-02 DIAGNOSIS — E21.3 HYPERPARATHYROIDISM (HCC): ICD-10-CM

## 2018-04-22 LAB
BUN SERPL-MCNC: 27 MG/DL (ref 6–24)
BUN/CREAT SERPL: 36 (ref 9–23)
CALCIUM SERPL-MCNC: 10.2 MG/DL (ref 8.7–10.2)
CHLORIDE SERPL-SCNC: 103 MMOL/L (ref 96–106)
CO2 SERPL-SCNC: 22 MMOL/L (ref 18–29)
CREAT SERPL-MCNC: 0.76 MG/DL (ref 0.57–1)
GLUCOSE SERPL-MCNC: 84 MG/DL (ref 65–99)
POTASSIUM SERPL-SCNC: 4.9 MMOL/L (ref 3.5–5.2)
SL AMB EGFR AFRICAN AMERICAN: 106 ML/MIN/1.73
SL AMB EGFR NON AFRICAN AMERICAN: 92 ML/MIN/1.73
SODIUM SERPL-SCNC: 141 MMOL/L (ref 134–144)

## 2018-04-24 LAB
25(OH)D3+25(OH)D2 SERPL-MCNC: 26.4 NG/ML (ref 30–100)
BASOPHILS # BLD AUTO: 0 X10E3/UL (ref 0–0.2)
BASOPHILS NFR BLD AUTO: 0 %
EOSINOPHIL # BLD AUTO: 0.1 X10E3/UL (ref 0–0.4)
EOSINOPHIL NFR BLD AUTO: 1 %
ERYTHROCYTE [DISTWIDTH] IN BLOOD BY AUTOMATED COUNT: 13 % (ref 12.3–15.4)
HCT VFR BLD AUTO: 40 % (ref 34–46.6)
HGB BLD-MCNC: 13.6 G/DL (ref 11.1–15.9)
IMM GRANULOCYTES # BLD: 0 X10E3/UL (ref 0–0.1)
IMM GRANULOCYTES NFR BLD: 0 %
INR PPP: 2.2 (ref 0.8–1.2)
LYMPHOCYTES # BLD AUTO: 1.6 X10E3/UL (ref 0.7–3.1)
LYMPHOCYTES NFR BLD AUTO: 27 %
MCH RBC QN AUTO: 29.4 PG (ref 26.6–33)
MCHC RBC AUTO-ENTMCNC: 34 G/DL (ref 31.5–35.7)
MCV RBC AUTO: 86 FL (ref 79–97)
MONOCYTES # BLD AUTO: 0.4 X10E3/UL (ref 0.1–0.9)
MONOCYTES NFR BLD AUTO: 7 %
NEUTROPHILS # BLD AUTO: 3.9 X10E3/UL (ref 1.4–7)
NEUTROPHILS NFR BLD AUTO: 65 %
PLATELET # BLD AUTO: 181 X10E3/UL (ref 150–379)
PROTHROMBIN TIME: 22.2 SEC (ref 9.1–12)
PTH-INTACT SERPL-MCNC: 74 PG/ML (ref 15–65)
RBC # BLD AUTO: 4.63 X10E6/UL (ref 3.77–5.28)
T4 FREE SERPL-MCNC: 1.77 NG/DL (ref 0.82–1.77)
THYROGLOB AB SERPL-ACNC: <1 IU/ML (ref 0–0.9)
THYROGLOB SERPL-MCNC: <0.1 NG/ML (ref 1.5–38.5)
TSH SERPL DL<=0.005 MIU/L-ACNC: 0.18 UIU/ML (ref 0.45–4.5)
WBC # BLD AUTO: 6 X10E3/UL (ref 3.4–10.8)

## 2018-04-25 ENCOUNTER — OFFICE VISIT (OUTPATIENT)
Dept: HEMATOLOGY ONCOLOGY | Facility: CLINIC | Age: 53
End: 2018-04-25
Payer: COMMERCIAL

## 2018-04-25 VITALS
HEART RATE: 61 BPM | WEIGHT: 207 LBS | BODY MASS INDEX: 39.08 KG/M2 | DIASTOLIC BLOOD PRESSURE: 80 MMHG | TEMPERATURE: 97.9 F | RESPIRATION RATE: 16 BRPM | SYSTOLIC BLOOD PRESSURE: 160 MMHG | OXYGEN SATURATION: 98 % | HEIGHT: 61 IN

## 2018-04-25 DIAGNOSIS — Z86.2 HISTORY OF ITP: ICD-10-CM

## 2018-04-25 DIAGNOSIS — I82.5Y3 CHRONIC DEEP VEIN THROMBOSIS (DVT) OF PROXIMAL VEIN OF BOTH LOWER EXTREMITIES (HCC): Primary | ICD-10-CM

## 2018-04-25 PROCEDURE — 99215 OFFICE O/P EST HI 40 MIN: CPT | Performed by: INTERNAL MEDICINE

## 2018-04-25 RX ORDER — LANOLIN ALCOHOL/MO/W.PET/CERES
CREAM (GRAM) TOPICAL
COMMUNITY
Start: 2013-03-06 | End: 2018-05-04 | Stop reason: SDUPTHER

## 2018-04-25 RX ORDER — WARFARIN SODIUM 4 MG/1
TABLET ORAL
COMMUNITY
Start: 2015-05-26 | End: 2018-05-04 | Stop reason: SDUPTHER

## 2018-04-25 RX ORDER — LEVOTHYROXINE SODIUM 0.12 MG/1
TABLET ORAL
COMMUNITY
Start: 2015-08-13 | End: 2018-05-04 | Stop reason: SDUPTHER

## 2018-04-25 RX ORDER — TRAMADOL HYDROCHLORIDE 50 MG/1
TABLET ORAL
COMMUNITY
Start: 2015-06-26 | End: 2018-11-30 | Stop reason: ALTCHOICE

## 2018-04-25 RX ORDER — SPIRONOLACTONE 25 MG/1
TABLET ORAL
COMMUNITY
Start: 2017-10-10 | End: 2018-05-04 | Stop reason: SDUPTHER

## 2018-04-25 RX ORDER — FOSINOPRIL SODIUM 10 MG/1
20 TABLET ORAL
COMMUNITY
Start: 2010-04-12 | End: 2018-05-04 | Stop reason: SDUPTHER

## 2018-04-25 RX ORDER — WARFARIN SODIUM 2 MG/1
TABLET ORAL
COMMUNITY
Start: 2015-05-25 | End: 2018-04-25 | Stop reason: SDUPTHER

## 2018-04-25 RX ORDER — WARFARIN SODIUM 2 MG/1
TABLET ORAL
Qty: 30 TABLET | Refills: 5 | Status: SHIPPED | OUTPATIENT
Start: 2018-04-25 | End: 2018-05-04 | Stop reason: SDUPTHER

## 2018-04-25 RX ORDER — NYSTATIN 100000 [USP'U]/G
POWDER TOPICAL AS NEEDED
COMMUNITY
Start: 2018-01-05 | End: 2019-05-07 | Stop reason: ALTCHOICE

## 2018-04-25 NOTE — PROGRESS NOTES
Daron Calderón  1965  [address] [place] [address] [facility] [phone]    Chief Complaint   Patient presents with    Follow-up            No history exists  History of Present Illness:  -Anabela Yin MD:  -Previous Therapy (if not listed in Oncology History):  -Current Therapy (if not listed in Oncology History):  -Disease Status:  -Interval History:  -Tumor Markers:    Review of Systems:  Review of Systems   Constitutional: Negative for appetite change, diaphoresis, fatigue and fever  HENT: Negative for sinus pain  Eyes: Negative for discharge  Respiratory: Negative for cough and shortness of breath  Cardiovascular: Negative for chest pain  Gastrointestinal: Negative for abdominal pain, constipation and diarrhea  Endocrine: Negative for cold intolerance  Genitourinary: Negative for difficulty urinating and hematuria  Musculoskeletal: Negative for joint swelling  Skin: Negative for rash  Allergic/Immunologic: Negative for environmental allergies  Neurological: Negative for dizziness and headaches  Hematological: Negative for adenopathy  Psychiatric/Behavioral: Negative for agitation  There is no problem list on file for this patient  History reviewed  No pertinent past medical history  History reviewed  No pertinent surgical history  History reviewed  No pertinent family history  Social History     Social History    Marital status: /Civil Union     Spouse name: N/A    Number of children: N/A    Years of education: N/A     Occupational History    Not on file       Social History Main Topics    Smoking status: Not on file    Smokeless tobacco: Not on file    Alcohol use Not on file    Drug use: Unknown    Sexual activity: Not on file     Other Topics Concern    Not on file     Social History Narrative    No narrative on file       Current Outpatient Prescriptions:     folic acid ([place] Best) 400 mcg tablet, , Disp: , Rfl:     fosinopril (MONOPRIL) 10 mg tablet, 20 mg, Disp: , Rfl:     IRON PO, , Disp: , Rfl:     levothyroxine (SYNTHROID) 125 mcg tablet, Indications: saturday and sunday take an extra half tablet   , Disp: , Rfl:     nystatin (MYCOSTATIN) powder, Apply topically, Disp: , Rfl:     spironolactone (ALDACTONE) 25 mg tablet, TAKE 1 TABLET EVERY DAY, Disp: , Rfl:     traMADol (ULTRAM) 50 mg tablet, Reported on 11/18/2016 , Disp: , Rfl:     warfarin (COUMADIN) 2 mg tablet, , Disp: , Rfl:     warfarin (COUMADIN) 4 mg tablet, , Disp: , Rfl:     Cholecalciferol (VITAMIN D) 2000 units CAPS, Take 1 tablet by mouth daily, Disp: , Rfl:     Cholecalciferol 2000 units CAPS, Take 1 capsule by mouth, Disp: , Rfl:     folic acid (FOLVITE) 1 mg tablet, Take 1 tablet by mouth daily, Disp: , Rfl:     fosinopril (MONOPRIL) 20 mg tablet, Take 1 tablet by mouth 2 (two) times a day, Disp: , Rfl:     IRON-VITAMINS PO, Take by mouth, Disp: , Rfl:     levothyroxine 125 mcg tablet, Take by mouth, Disp: , Rfl:     NIFEdipine (PROCARDIA XL) 30 mg 24 hr tablet, Take 1 tablet (30 mg total) by mouth daily, Disp: 90 tablet, Rfl: 1    spironolactone (ALDACTONE) 25 mg tablet, Take 1 tablet by mouth daily, Disp: , Rfl:     warfarin (COUMADIN) 2 mg tablet, Take by mouth, Disp: , Rfl:     warfarin (COUMADIN) 4 mg tablet, Take by mouth, Disp: , Rfl:   Allergies   Allergen Reactions    Penicillins      Vitals:    04/25/18 1600   BP: 160/80   Pulse: 61   Resp: 16   Temp: 97 9 °F (36 6 °C)   SpO2: 98%         Physical Exam   Constitutional: She is oriented to person, place, and time  She appears well-developed  HENT:   Head: Normocephalic  Eyes: Pupils are equal, round, and reactive to light  Neck: Neck supple  Cardiovascular: Normal rate  No murmur heard  Pulmonary/Chest: No respiratory distress  She has no wheezes  She has no rales  Abdominal: Soft  She exhibits no distension   There is no tenderness  There is no rebound  Musculoskeletal: She exhibits no edema  Lymphadenopathy:     She has no cervical adenopathy  Neurological: She is alert and oriented to person, place, and time  She displays normal reflexes  Skin: Skin is warm  No rash noted  Psychiatric: She has a normal mood and affect  Thought content normal            Performance Status: ECOG/Zubrod/WHO: 1 - Symptomatic but completely ambulatory    Labs:  CBC, Coags, BMP, Mg, Phos     Imaging  No results found  I reviewed the above laboratory and imaging data  Discussion/Summary:  In summary, this is a 65-year-old female 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   More distantly, she had history of pulmonary embolism  She is on maintenance anticoagulation  INR most recently was 2 2  CBC showed normal platelet count  She is eligible for screening colonoscopy  We reviewed that discontinuation of anticoagulation about 5 days prior to the procedure would be recommended with resumption of oral therapy the next day  We reviewed that this does leave open a slight increase in risk of DVT  Bridging does not seem necessary, in my opinion  Additionally, we reviewed the potential utility of temporary IVC filter  The patient is not in favor of that  Alternative to colonoscopy would include fecal occult blood testing and possible CT colonography  Again, the patient is in favor of routine colonoscopy  If all is well see her back in 6 months for review

## 2018-04-25 NOTE — LETTER
April 25, 2018     Kandace Verde, [address]    Patient: Valentino Hosteller   YOB: 1965   Date of Visit: 4/25/2018       Dear Dr Ryan Cook: Thank you for referring Valentino Hosteller to me for evaluation  Below are my notes for this consultation  If you have questions, please do not hesitate to call me  I look forward to following your patient along with you  Sincerely,        Julien Kent DO        CC: No Recipients  Julien Kent DO  4/25/2018  4:41 PM  Incomplete  Valentino Hosteller  1965  [id number] [facility]  [facility] [place]  [address] [place] [id number]    Chief Complaint   Patient presents with    Follow-up            No history exists  History of Present Illness:  -Brenda Johnson MD:  -Previous Therapy (if not listed in Oncology History):  -Current Therapy (if not listed in Oncology History):  -Disease Status:  -Interval History:  -Tumor Markers:    Review of Systems:  Review of Systems   Constitutional: Negative for appetite change, diaphoresis, fatigue and fever  HENT: Negative for sinus pain  Eyes: Negative for discharge  Respiratory: Negative for cough and shortness of breath  Cardiovascular: Negative for chest pain  Gastrointestinal: Negative for abdominal pain, constipation and diarrhea  Endocrine: Negative for cold intolerance  Genitourinary: Negative for difficulty urinating and hematuria  Musculoskeletal: Negative for joint swelling  Skin: Negative for rash  Allergic/Immunologic: Negative for environmental allergies  Neurological: Negative for dizziness and headaches  Hematological: Negative for adenopathy  Psychiatric/Behavioral: Negative for agitation  There is no problem list on file for this patient  History reviewed  No pertinent past medical history  History reviewed  No pertinent surgical history  History reviewed  No pertinent family history    Social History Social History    Marital status: /Civil Union     Spouse name: N/A    Number of children: N/A    Years of education: N/A     Occupational History    Not on file       Social History Main Topics    Smoking status: Not on file    Smokeless tobacco: Not on file    Alcohol use Not on file    Drug use: Unknown    Sexual activity: Not on file     Other Topics Concern    Not on file     Social History Narrative    No narrative on file       Current Outpatient Prescriptions:     folic acid (FOLVITE) 165 mcg tablet, , Disp: , Rfl:     fosinopril (MONOPRIL) 10 mg tablet, 20 mg, Disp: , Rfl:     IRON PO, , Disp: , Rfl:     levothyroxine (SYNTHROID) 125 mcg tablet, Indications: saturday and sunday take an extra half tablet   , Disp: , Rfl:     nystatin (MYCOSTATIN) powder, Apply topically, Disp: , Rfl:     spironolactone (ALDACTONE) 25 mg tablet, TAKE 1 TABLET EVERY DAY, Disp: , Rfl:     traMADol (ULTRAM) 50 mg tablet, Reported on 11/18/2016 , Disp: , Rfl:     warfarin (COUMADIN) 2 mg tablet, , Disp: , Rfl:     warfarin (COUMADIN) 4 mg tablet, , Disp: , Rfl:     Cholecalciferol (VITAMIN D) 2000 units CAPS, Take 1 tablet by mouth daily, Disp: , Rfl:     Cholecalciferol 2000 units CAPS, Take 1 capsule by mouth, Disp: , Rfl:     folic acid (FOLVITE) 1 mg tablet, Take 1 tablet by mouth daily, Disp: , Rfl:     fosinopril (MONOPRIL) 20 mg tablet, Take 1 tablet by mouth 2 (two) times a day, Disp: , Rfl:     IRON-VITAMINS PO, Take by mouth, Disp: , Rfl:     levothyroxine 125 mcg tablet, Take by mouth, Disp: , Rfl:     NIFEdipine (PROCARDIA XL) 30 mg 24 hr tablet, Take 1 tablet (30 mg total) by mouth daily, Disp: 90 tablet, Rfl: 1    spironolactone (ALDACTONE) 25 mg tablet, Take 1 tablet by mouth daily, Disp: , Rfl:     warfarin (COUMADIN) 2 mg tablet, Take by mouth, Disp: , Rfl:     warfarin (COUMADIN) 4 mg tablet, Take by mouth, Disp: , Rfl:   Allergies   Allergen Reactions    Penicillins Vitals:    04/25/18 1600   BP: 160/80   Pulse: 61   Resp: 16   Temp: 97 9 °F (36 6 °C)   SpO2: 98%         Physical Exam   Constitutional: She is oriented to person, place, and time  She appears well-developed  HENT:   Head: Normocephalic  Eyes: Pupils are equal, round, and reactive to light  Neck: Neck supple  Cardiovascular: Normal rate  No murmur heard  Pulmonary/Chest: No respiratory distress  She has no wheezes  She has no rales  Abdominal: Soft  She exhibits no distension  There is no tenderness  There is no rebound  Musculoskeletal: She exhibits no edema  Lymphadenopathy:     She has no cervical adenopathy  Neurological: She is alert and oriented to person, place, and time  She displays normal reflexes  Skin: Skin is warm  No rash noted  Psychiatric: She has a normal mood and affect  Thought content normal            Performance Status: ECOG/Zubrod/WHO: 1 - Symptomatic but completely ambulatory    Labs:  CBC, Coags, BMP, Mg, Phos     Imaging  No results found  I reviewed the above laboratory and imaging data        Discussion/Summary:

## 2018-05-04 ENCOUNTER — OFFICE VISIT (OUTPATIENT)
Dept: ENDOCRINOLOGY | Facility: CLINIC | Age: 53
End: 2018-05-04
Payer: COMMERCIAL

## 2018-05-04 VITALS
HEART RATE: 64 BPM | SYSTOLIC BLOOD PRESSURE: 128 MMHG | HEIGHT: 61 IN | DIASTOLIC BLOOD PRESSURE: 90 MMHG | BODY MASS INDEX: 39.08 KG/M2 | WEIGHT: 207 LBS

## 2018-05-04 DIAGNOSIS — E83.52 HYPERCALCEMIA: Primary | ICD-10-CM

## 2018-05-04 DIAGNOSIS — E89.0 POSTOPERATIVE HYPOTHYROIDISM: ICD-10-CM

## 2018-05-04 DIAGNOSIS — E21.3 HYPERPARATHYROIDISM (HCC): ICD-10-CM

## 2018-05-04 DIAGNOSIS — C73 THYROID CANCER (HCC): ICD-10-CM

## 2018-05-04 DIAGNOSIS — E55.9 VITAMIN D DEFICIENCY: ICD-10-CM

## 2018-05-04 PROBLEM — I82.5Y3 CHRONIC DEEP VEIN THROMBOSIS (DVT) OF PROXIMAL VEIN OF BOTH LOWER EXTREMITIES (HCC): Chronic | Status: ACTIVE | Noted: 2018-04-25

## 2018-05-04 PROCEDURE — 99214 OFFICE O/P EST MOD 30 MIN: CPT | Performed by: INTERNAL MEDICINE

## 2018-05-04 RX ORDER — LEVOTHYROXINE SODIUM 0.12 MG/1
TABLET ORAL
Qty: 100 TABLET | Refills: 1 | Status: SHIPPED | OUTPATIENT
Start: 2018-05-04 | End: 2018-10-15 | Stop reason: DRUGHIGH

## 2018-05-04 NOTE — PROGRESS NOTES
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) is here for follow-up    Hyperparathyroidism (Follow-Up): secondary to primary hyperparathyroidism/secondary hyperparathyroidism    Medications include vitamin D supplement, 2000 International Units daily  H/o hypercalcemia- history of negative sestamibi scan, previous surgical exploration for parathyroid adenoma with none found  Normal DEXAscan  No calcium supplements  Calcium has been within normal range recently  - prev PTH exploration has failed to reveal the adenoma in 2010, Super and infe PTh gland fragments identified,  Intra PTH gland was noted in 2010 and 2006 at [place], continues to have mild hypercalcemia  -now has stable Mild hypercalcemia in mid 10 range and phos 2 2, gfr is 90 , slightly elevated 24-hour urine calcium is 358 and FeCa is 0 02  Normal BMD and no recent fractures  Normal renal fn and no stones  Plan is to continue to monitor calcium   -most recent bone density from December 2017 showed lumbar spine L1-L4 T-score-0 1, right total hip T-score -0 9 right femoral neck T-score-0 7, left forearm T-score 1 6, normal bone density      Hypothyroidism: The patient is being seen for follow-up of hypothyroidism  The patient has surgically induced hypothyroidism  Current treatment includes levothyroxine  She takes levothyroxine, generic 125 mcg daily except on Saturday and Sunday she takes 1 5 tablets     Thyroid Cancer: The patient is being seen for follow-up of thyroid cancer   Recent results: thyroglobulin level date 10/2017, as well as from April 2018 thyroglobulin <0 1 ng/mL, anti-thyroglobulin antibody <1 0 IU/mL, Thyroid cancer: pT1a, No G1 stage I, S/p TThx for PTC unifocal left lobe micro cancer in  with capsule invasion, patient had total thyroidectomy for thyroid cancer,  Rt thyroid lobecotmy in  at [place] ,  [facility] Tg level is 12 and s/p remnant I-131 ablation 2016, No distant mets in WBS and Tg levels dropped to 0 1    Vitamin D Deficiency: The patient is being seen for follow-up of vitamin D deficiency  Disease type: vitamin D deficiency  Current treatment includes vitamin D3 (cholecalciferol), 2000 IU daily   The patient is currently asymptomatic  Most recent vitamin-D is 26         Component      Latest Ref Rng & Units 2018   BUN      6 - 24 mg/dL 27 (H)   Creatinine      0 57 - 1 00 mg/dL 0 76   eGFR Non       >59 mL/min/1 73 92   SL AMB EGFR AFRICAN AMERICAN      >59 mL/min/1 73 106   SL AMB BUN/CREATININE RATIO      9 - 23 36 (H)   SL AMB SODIUM      134 - 144 mmol/L 141   SL AMB POTASSIUM      3 5 - 5 2 mmol/L 4 9   SL AMB CHLORIDE      96 - 106 mmol/L 103   SL AMB CARBON DIOXIDE      18 - 29 mmol/L 22   CALCIUM      8 7 - 10 2 mg/dL 10 2   TSH      0 450 - 4 500 uIU/mL 0 178 (L)   Free T4      0 82 - 1 77 ng/dL 1 77   25-Hydroxy, Vitamin D (HISTORICAL)      30 0 - 100 0 ng/mL 26 4 (L)   THYROGLOBULIN AB      0 0 - 0 9 IU/mL <1 0   Thyroglobulin-MIRACLE      1 5 - 38 5 ng/mL <0 1 (L)   PTH, Intact      15 - 65 pg/mL 74 (H)     Patient Active Problem List   Diagnosis    Chronic deep vein thrombosis (DVT) of proximal vein of both lower extremities (HCC)    History of ITP     History reviewed  No pertinent past medical history     Past Surgical History:   Procedure Laterality Date     SECTION      /0    HERNIA REPAIR      THYROID SURGERY        Family History   Problem Relation Age of Onset    Diabetes type II Father     Diabetes type II Paternal Aunt     Diabetes type II Paternal Uncle     Diabetes type II Paternal Grandmother      Social History   Substance Use Topics    Smoking status: Never Smoker    Smokeless tobacco: Never Used    Alcohol use Yes      Comment: rare     Allergies   Allergen Reactions    Penicillins      [unfilled]    Review of Systems   Constitutional: Positive for unexpected weight change  HENT: Negative  Eyes: Negative for visual disturbance  Respiratory: Negative  Cardiovascular: Negative  Gastrointestinal: Negative  Musculoskeletal: Negative  Hematological: Negative  Psychiatric/Behavioral: Negative  Physical Exam:  Body mass index is 39 11 kg/m²  /90   Pulse 64   Ht 5' 1" (1 549 m)   Wt 93 9 kg (207 lb)   BMI 39 11 kg/m²    Wt Readings from Last 3 Encounters:   05/04/18 93 9 kg (207 lb)   04/25/18 93 9 kg (207 lb)   10/19/17 91 7 kg (202 lb 2 1 oz)       Physical Exam   Constitutional: She is oriented to person, place, and time  She appears well-developed and well-nourished  No distress  HENT:   Head: Normocephalic and atraumatic  Eyes: Conjunctivae and EOM are normal  Right eye exhibits no discharge  Left eye exhibits no discharge  Neck: Normal range of motion  Neck supple  No thyromegaly present  Scar present    Cardiovascular: Normal rate, regular rhythm and normal heart sounds  No murmur heard  Pulmonary/Chest: Effort normal and breath sounds normal  No respiratory distress  She has no wheezes  Musculoskeletal: Normal range of motion  She exhibits no edema, tenderness or deformity  Neurological: She is alert and oriented to person, place, and time  She has normal reflexes  No cranial nerve deficit  She exhibits normal muscle tone  Skin: Skin is warm and dry  She is not diaphoretic  Psychiatric: She has a normal mood and affect  Her behavior is normal    Vitals reviewed      Diabetic Foot Exam    Labs:   No components found for: HA1C  No results found for: GLU    Lab Results   Component Value Date    CREATININE 0 76 04/21/2018    CREATININE 0 67 11/18/2017    CREATININE 0 71 10/14/2017    BUN 27 (H) 04/21/2018     11/18/2017    K 4 1 11/18/2017     11/18/2017    CO2 22 11/18/2017     No results found for: EGFR  No components found for: MALBCRER    No results found for: CHOL, HDL, TRIG    Lab Results   Component Value Date    ALT 16 07/01/2017    AST 18 07/01/2017    ALKPHOS 88 07/01/2017    BILITOT 0 5 07/01/2017       Lab Results   Component Value Date    FREET4 1 77 04/21/2018       Impression:  1  Hypercalcemia    2  Vitamin D deficiency    3  Postoperative hypothyroidism    4  Thyroid cancer ([facility] [address] )    5  [facility] ([facility] )         Plan:    d all ordDiagnoses aners for this visit:    Hypercalcemia  Recent calcium is 10 2  Patient had parathyroid adenoma exploration surgery previously, however calcium did not normalize completely  Will continue to monitor calcium  DEXA scan from December 2017 showed stable findings with normal bone density, will repeat DEXA scan in summer 2019  Continue to monitor calcium every 6 months    Vitamin D deficiency  Recent vitamin D is normal, continue current dose of vitamin  d  -     Vitamin D 25 hydroxy; Future    Postoperative hypothyroidism  TSH is low , goal for TSH is 0 5-2 0 as TG undetectable now   Will make following adjustment   Repeat TSH , free T4 in 6 weeks   Follow up in 6 mths   -     levothyroxine 125 mcg tablet; Take one tablet daily, except on Sunday take 1 5 tablet  -     TSH, 3rd generation; Future  -     T4, free; Future  -     TSH, 3rd generation; Future  -     T4, free; Future  -     TSH, 3rd generation; Future  -     T4, free;  Future    Thyroid cancer [facility])  S/p TThx for PTC unifocal left lobe micro cancer in 2010 with capsule invasion  -Rt thyroid lobecotmy in 2006 at [address] did not show recurrent disease from 5/5/2017  - No distant mets on  WBS  -TG undetectable on levothyroxine which is assuring suggestive of no recurrence or persistence of disease   - Will relax the goals of TSH to 0 5-2 0   -Get Neck USG for lymph node mapping soon     -     Thyroglobulin, Quant w/ AB ([place]); Future    Hyperparathyroidism ([facility] [address] )  See above   Recent calcium is 10 2  Patient had parathyroid adenoma exploration surgery previously, however calcium did not normalize completely  Will continue to monitor calcium  DEXA scan from December 2017 showed stable findings with normal bone density, will repeat DEXA scan in summer 2019  Continue to monitor calcium every 6 months  Other orders  -     Basic metabolic panel; Future    Discussed with the patient and all questioned fully answered  She will call me if any problems arise      Counseled patient on diagnostic results, prognosis, risk and benefit of treatment options, instruction for management, importance of treatment compliance, Risk  factor reduction and impressions      Izaiah Hendrix MD

## 2018-05-09 DIAGNOSIS — D69.3 IMMUNE THROMBOCYTOPENIC PURPURA (HCC): ICD-10-CM

## 2018-06-20 DIAGNOSIS — D69.3 IMMUNE THROMBOCYTOPENIC PURPURA (HCC): ICD-10-CM

## 2018-07-18 DIAGNOSIS — I10 ESSENTIAL HYPERTENSION: ICD-10-CM

## 2018-07-18 RX ORDER — NIFEDIPINE 30 MG/1
TABLET, EXTENDED RELEASE ORAL
Qty: 90 TABLET | Refills: 1 | Status: SHIPPED | OUTPATIENT
Start: 2018-07-18 | End: 2019-03-31 | Stop reason: SDUPTHER

## 2018-07-25 ENCOUNTER — OFFICE VISIT (OUTPATIENT)
Dept: NEPHROLOGY | Facility: CLINIC | Age: 53
End: 2018-07-25
Payer: COMMERCIAL

## 2018-07-25 VITALS
HEIGHT: 61 IN | SYSTOLIC BLOOD PRESSURE: 132 MMHG | WEIGHT: 211 LBS | DIASTOLIC BLOOD PRESSURE: 82 MMHG | BODY MASS INDEX: 39.84 KG/M2

## 2018-07-25 DIAGNOSIS — N18.2 CHRONIC KIDNEY DISEASE (CKD), STAGE II (MILD): Primary | ICD-10-CM

## 2018-07-25 DIAGNOSIS — R80.9 PROTEINURIA, UNSPECIFIED TYPE: ICD-10-CM

## 2018-07-25 DIAGNOSIS — I10 ESSENTIAL HYPERTENSION: ICD-10-CM

## 2018-07-25 DIAGNOSIS — N20.0 NEPHROLITHIASIS: ICD-10-CM

## 2018-07-25 DIAGNOSIS — M32.14 SYSTEMIC LUPUS ERYTHEMATOSUS WITH GLOMERULAR DISEASE, UNSPECIFIED SLE TYPE (HCC): ICD-10-CM

## 2018-07-25 DIAGNOSIS — E55.9 VITAMIN D DEFICIENCY: ICD-10-CM

## 2018-07-25 DIAGNOSIS — E83.52 HYPERCALCEMIA: ICD-10-CM

## 2018-07-25 PROCEDURE — 99214 OFFICE O/P EST MOD 30 MIN: CPT | Performed by: INTERNAL MEDICINE

## 2018-07-25 NOTE — PATIENT INSTRUCTIONS
1  CKD stage II/history of lupus nephritis  -sCr stable in 0 7-0 8 range  Latest C3 normal  Last sCr 0 76 from 4/21/18   -continue to avoid nonsteroidals(ibuprofen, aleve, advil, motrin, celebrex, naproxen, toradol)     2  proteinuria-+residual proteinuria from prior lupus nephritis (biopsy proven in 1996), now noted to have increased weight as well as HTN    -Off HCTZ due to hypercalcemia, on fosinopril 20mg twice daily  -back on aldactone 25mg daily as she thinks this is making her more edematous  -will recheck UpCr      3  volume status-mild LE edema, continue spironolactone 25mg daily, K ok     4  hypercalcemia/hyperparathyroidism - follows with endo, +nephrolithiasis history with recent Urinalysis showing calcium oxalate crystals  -You must drink enough water to urinate at least 2L per day to prevent stone formation but this is difficult given her edema and need for diuretics  Unfortunately, diuretics will contribute to stone formation   -avoid high salt diet both for stone reduction and to help blood pressure stay controlled     5  hypertension-well controlled  -continue nifedipine 30mg daily(at night), fosinopril 20mg twice daily, spironolactone 25mg daily (at lunch)  -avoid caffeine/tea     6  vitamin D insufficiency - Vit D level 21 2 - restart oral vitamin D 2000u daily, should discuss this with endocrinology    Obtain bloodwork and urine testing next month  Return to office in 6 months

## 2018-07-25 NOTE — PROGRESS NOTES
NEPHROLOGY OUTPATIENT PROGRESS NOTE   Sly Lemons 48 y o  female MRN: [id number]  DATE: 7/25/2018  Reason for visit:   Chief Complaint   Patient presents with    Follow-up        Patient Instructions   1  CKD stage II/history of lupus nephritis  -sCr stable in 0 7-0 8 range  Latest C3 normal  Last sCr 0 76 from 4/21/18   -continue to avoid nonsteroidals(ibuprofen, aleve, advil, motrin, celebrex, naproxen, toradol)     2  proteinuria-+residual proteinuria from prior lupus nephritis (biopsy proven in 1996), now noted to have increased weight as well as HTN    -Off HCTZ due to hypercalcemia, on fosinopril 20mg twice daily  -back on aldactone 25mg daily as she thinks this is making her more edematous  -will recheck UpCr      3  volume status-mild LE edema, continue spironolactone 25mg daily, K ok     4  hypercalcemia/hyperparathyroidism - follows with endo, +nephrolithiasis history with recent Urinalysis showing calcium oxalate crystals  -You must drink enough water to urinate at least 2L per day to prevent stone formation but this is difficult given her edema and need for diuretics  Unfortunately, diuretics will contribute to stone formation   -avoid high salt diet both for stone reduction and to help blood pressure stay controlled     5  hypertension-well controlled  -continue nifedipine 30mg daily(at night), fosinopril 20mg twice daily, spironolactone 25mg daily (at lunch)  -avoid caffeine/tea     6  vitamin D insufficiency - Vit D level 21 2 - restart oral vitamin D 2000u daily, should discuss this with endocrinology    Obtain bloodwork and urine testing next month  Return to office in 6 months  Diagnoses and all orders for this visit:    Chronic kidney disease (CKD), stage II (mild)  -     Basic metabolic panel; Future    Essential hypertension    Systemic lupus erythematosus with glomerular disease, unspecified SLE type ([facility] [address] )    Hypercalcemia  -     Basic metabolic panel;  Future    Proteinuria, unspecified type  -     Protein / creatinine ratio, urine; Future    Vitamin D deficiency    Nephrolithiasis        Assessment/Plan:  1  CKD stage II/history of lupus nephritis  -sCr stable in 0 7-0 8 range  Latest C3 normal  Last sCr 0 76 from 4/21/18   -continue to avoid nonsteroidals(ibuprofen, aleve, advil, motrin, celebrex, naproxen, toradol)     2  proteinuria-+residual proteinuria from prior lupus nephritis (biopsy proven in 1996), now noted to have increased weight as well as HTN    -Off HCTZ due to hypercalcemia, on fosinopril 20mg twice daily  -back on aldactone 25mg daily as she thinks this is making her more edematous  -will recheck UpCr      3  volume status-mild LE edema, continue spironolactone 25mg daily, potassium ok     4  hypercalcemia/hyperparathyroidism - follows with endo, +nephrolithiasis history with recent Urinalysis showing calcium oxalate crystals  -You must drink enough water to urinate at least 2L per day to prevent stone formation but this is difficult given her edema and need for diuretics  Unfortunately, diuretics will contribute to stone formation   -avoid high salt diet both for stone reduction and to help blood pressure stay controlled     5  hypertension-well controlled  -continue nifedipine 30mg daily(at night), fosinopril 20mg twice daily, spironolactone 25mg daily (at lunch)  -avoid caffeine/tea     6  vitamin D insufficiency - Vit D level 21 2 - continue oral vitamin D 2000u daily    Obtain bloodwork and urine testing next month  Return to office in 6 months  SUBJECTIVE / INTERVAL HISTORY:  48 y o  female presents in follow up of CKD  Gia Mackay denies any recent illness/hospitalizations/medication changes since last office visit  She feels she is gaining weight despite not eating much  She has been swollen but does not think this is an issue  She tries to drink fluids all day  Denies NSAID use      Review of Systems   Constitutional: Negative for chills and fever  HENT: Negative for sore throat and trouble swallowing  Eyes: Negative for visual disturbance  Respiratory: Negative for cough and shortness of breath  Cardiovascular: Negative for chest pain and leg swelling  Gastrointestinal: Negative for diarrhea, nausea and vomiting  Endocrine: Negative for polyuria  Genitourinary: Negative for difficulty urinating, dysuria and hematuria  Musculoskeletal: Negative for back pain and neck pain  Left hip pain, chronic   Skin: Negative for rash  Neurological: Negative for dizziness, light-headedness and numbness  Hematological: Negative for adenopathy  Does not bruise/bleed easily  Psychiatric/Behavioral: The patient is not nervous/anxious  OBJECTIVE:  /82 (BP Location: Left arm, Patient Position: Sitting, Cuff Size: Large)   Ht 5' 1 25" (1 556 m)   Wt 95 7 kg (211 lb)   BMI 39 54 kg/m²  Body mass index is 39 54 kg/m²  Physical exam:  Physical Exam   Constitutional: She appears well-developed and well-nourished  No distress  HENT:   Head: Normocephalic and atraumatic  Mouth/Throat: No oropharyngeal exudate  Eyes: Right eye exhibits no discharge  Left eye exhibits no discharge  No scleral icterus  Neck: Normal range of motion  Neck supple  No thyromegaly present  Cardiovascular: Normal rate and regular rhythm  No murmur heard  Pulmonary/Chest: Effort normal and breath sounds normal  No respiratory distress  She has no wheezes  Abdominal: Soft  Bowel sounds are normal  She exhibits no distension  Musculoskeletal: She exhibits no edema  Neurological: She is alert  She exhibits normal muscle tone  awake   Skin: Skin is warm and dry  No rash noted  She is not diaphoretic  Psychiatric: She has a normal mood and affect  Her behavior is normal    Nursing note and vitals reviewed        Medications:    Current Outpatient Prescriptions:     Cholecalciferol (VITAMIN D) 2000 units CAPS, Take 1 tablet by mouth daily, Disp: , Rfl:     folic acid (FOLVITE) 1 mg tablet, Take 1 tablet by mouth daily, Disp: , Rfl:     fosinopril (MONOPRIL) 20 mg tablet, Take 1 tablet by mouth 2 (two) times a day, Disp: , Rfl:     IRON-VITAMINS PO, Take 1 tablet by mouth 3 (three) times a week  , Disp: , Rfl:     levothyroxine 125 mcg tablet, Take one tablet daily, except on Sunday take 1 5 tablet, Disp: 100 tablet, Rfl: 1    NIFEdipine (PROCARDIA XL) 30 mg 24 hr tablet, TAKE 1 TABLET DAILY, Disp: 90 tablet, Rfl: 1    nystatin (MYCOSTATIN) powder, Apply topically as needed  , Disp: , Rfl:     spironolactone (ALDACTONE) 25 mg tablet, Take 1 tablet by mouth daily, Disp: , Rfl:     traMADol (ULTRAM) 50 mg tablet, Reported on 11/18/2016 , Disp: , Rfl:     warfarin (COUMADIN) 2 mg tablet, Take by mouth, Disp: , Rfl:     warfarin (COUMADIN) 4 mg tablet, Take by mouth, Disp: , Rfl:     Allergies: Allergies as of 07/25/2018 - Reviewed 05/04/2018   Allergen Reaction Noted    Penicillins  04/25/2013       The following portions of the patient's history were reviewed and updated as appropriate: past family history, past surgical history and problem list     Laboratory Results:  Lab Results   Component Value Date    GLUCOSE 105 (H) 11/18/2017    CALCIUM 10 3 (H) 11/18/2017     11/18/2017    K 4 1 11/18/2017    CO2 22 11/18/2017     11/18/2017    BUN 27 (H) 04/21/2018    CREATININE 0 76 04/21/2018        Lab Results   Component Value Date    PTH 60 11/18/2017    CALCIUM 10 3 (H) 11/18/2017    PHOS 2 4 (L) 11/18/2017       Portions of the record may have been created with voice recognition software   Occasional wrong word or "sound a like" substitutions may have occurred due to the inherent limitations of voice recognition software   Read the chart carefully and recognize, using context, where substitutions have occurred

## 2018-08-15 DIAGNOSIS — D69.3 IMMUNE THROMBOCYTOPENIC PURPURA (HCC): ICD-10-CM

## 2018-10-10 DIAGNOSIS — D69.3 IMMUNE THROMBOCYTOPENIC PURPURA (HCC): ICD-10-CM

## 2018-10-14 LAB — TSH SERPL DL<=0.005 MIU/L-ACNC: 0.3 UIU/ML (ref 0.45–4.5)

## 2018-10-15 DIAGNOSIS — E89.0 POSTOPERATIVE HYPOTHYROIDISM: ICD-10-CM

## 2018-10-15 LAB
CREAT ?TM UR-SCNC: 33.2 UMOL/L
EXT GLUCOSE BLD: 94
EXT PROTEIN URINE: 63
EXTERNAL BUN: 22
EXTERNAL CALCIUM: 10.8
EXTERNAL CHLORIDE: 104
EXTERNAL CO2: 22
EXTERNAL CREATININE: 0.73
EXTERNAL POTASSIUM: 4.6
EXTERNAL SODIUM: 138
PROT/CREAT UR: 1898 MG/G{CREAT}

## 2018-10-15 RX ORDER — LEVOTHYROXINE SODIUM 0.12 MG/1
TABLET ORAL
Qty: 100 TABLET | Refills: 0
Start: 2018-10-15 | End: 2019-01-22 | Stop reason: SDUPTHER

## 2018-10-15 NOTE — TELEPHONE ENCOUNTER
Spoke to pt who understood change in levo dose  Med list updated  Labs ordered for 11/2019 pt will have done before appt

## 2018-10-15 NOTE — TELEPHONE ENCOUNTER
----- Message from Judi Rojas MD sent at 10/15/2018 12:35 PM EDT -----  Please call the patient regarding her change in levothyroxine dose , she should take levothyroxine 125 mcg from Monday to Saturday, no tablet on Sunday, she will need repeat TSH and free T4 before her next appointment

## 2018-10-23 ENCOUNTER — TELEPHONE (OUTPATIENT)
Dept: NEPHROLOGY | Facility: CLINIC | Age: 53
End: 2018-10-23

## 2018-10-23 NOTE — TELEPHONE ENCOUNTER
----- Message from Go Gilliland DO sent at 10/23/2018  4:07 PM EDT -----  Please encourage the patient to drink more fluids in light of high calcium level at 10 8  She should stop taking vitamin D  Her urine protein level has increased  Should avoid ibuprofen, aleve, advil, motrin  Ensure she continues taking fosinopril twice daily as well as spironolactone 2mg daily  Renal function appears stable

## 2018-10-24 ENCOUNTER — TELEPHONE (OUTPATIENT)
Dept: HEMATOLOGY ONCOLOGY | Facility: CLINIC | Age: 53
End: 2018-10-24

## 2018-10-24 DIAGNOSIS — I12.9 PARENCHYMAL RENAL HYPERTENSION, STAGE 1 THROUGH STAGE 4 OR UNSPECIFIED CHRONIC KIDNEY DISEASE: Primary | ICD-10-CM

## 2018-10-24 RX ORDER — FOSINOPRIL SODIUM 20 MG/1
20 TABLET ORAL 2 TIMES DAILY
Qty: 180 TABLET | Refills: 3 | Status: SHIPPED | OUTPATIENT
Start: 2018-10-24 | End: 2019-02-26 | Stop reason: SDUPTHER

## 2018-10-24 NOTE — TELEPHONE ENCOUNTER
Patient is aware of stopping Vitamin D and increasing fluids  She is still on the Fosinopril and the Spironalactone

## 2018-10-24 NOTE — TELEPHONE ENCOUNTER
Patient needs lab scripts faxed to [phone]    Faxed bmp, protein and protime to [phone]

## 2018-10-24 NOTE — TELEPHONE ENCOUNTER
----- Message from Henok Serra DO sent at 10/23/2018  4:07 PM EDT -----  Please encourage the patient to drink more fluids in light of high calcium level at 10 8  She should stop taking vitamin D  Her urine protein level has increased  Should avoid ibuprofen, aleve, advil, motrin  Ensure she continues taking fosinopril twice daily as well as spironolactone 2mg daily  Renal function appears stable

## 2018-10-30 LAB
BUN SERPL-MCNC: 21 MG/DL (ref 6–24)
BUN/CREAT SERPL: 28 (ref 9–23)
CALCIUM SERPL-MCNC: 10.1 MG/DL (ref 8.7–10.2)
CHLORIDE SERPL-SCNC: 107 MMOL/L (ref 96–106)
CO2 SERPL-SCNC: 23 MMOL/L (ref 20–29)
CREAT SERPL-MCNC: 0.76 MG/DL (ref 0.57–1)
GLUCOSE SERPL-MCNC: 98 MG/DL (ref 65–99)
INR PPP: 2.6 (ref 0.8–1.2)
POTASSIUM SERPL-SCNC: 4.9 MMOL/L (ref 3.5–5.2)
PROTHROMBIN TIME: 25.6 SEC (ref 9.1–12)
SL AMB EGFR AFRICAN AMERICAN: 105 ML/MIN/1.73
SL AMB EGFR NON AFRICAN AMERICAN: 91 ML/MIN/1.73
SODIUM SERPL-SCNC: 142 MMOL/L (ref 134–144)

## 2018-11-20 ENCOUNTER — TELEPHONE (OUTPATIENT)
Dept: NEPHROLOGY | Facility: CLINIC | Age: 53
End: 2018-11-20

## 2018-11-20 NOTE — TELEPHONE ENCOUNTER
Called patient to schedule her January follow up appointment  She is scheduled for January 22nd at 2pm  Patient is aware of time, date and location of appointment

## 2018-11-28 ENCOUNTER — OFFICE VISIT (OUTPATIENT)
Dept: HEMATOLOGY ONCOLOGY | Facility: CLINIC | Age: 53
End: 2018-11-28
Payer: COMMERCIAL

## 2018-11-28 ENCOUNTER — TRANSITIONAL CARE MANAGEMENT (OUTPATIENT)
Dept: HEMATOLOGY ONCOLOGY | Facility: CLINIC | Age: 53
End: 2018-11-28

## 2018-11-28 VITALS
SYSTOLIC BLOOD PRESSURE: 124 MMHG | WEIGHT: 213 LBS | TEMPERATURE: 98.3 F | DIASTOLIC BLOOD PRESSURE: 80 MMHG | HEIGHT: 61 IN | RESPIRATION RATE: 12 BRPM | HEART RATE: 81 BPM | OXYGEN SATURATION: 97 % | BODY MASS INDEX: 40.22 KG/M2

## 2018-11-28 DIAGNOSIS — Z86.2 HISTORY OF ITP: Primary | ICD-10-CM

## 2018-11-28 DIAGNOSIS — Z79.01 ANTICOAGULANT LONG-TERM USE: ICD-10-CM

## 2018-11-28 DIAGNOSIS — D68.61 APS (ANTIPHOSPHOLIPID SYNDROME) (HCC): Primary | ICD-10-CM

## 2018-11-28 DIAGNOSIS — Z86.2 HISTORY OF ITP: ICD-10-CM

## 2018-11-28 PROCEDURE — 99215 OFFICE O/P EST HI 40 MIN: CPT | Performed by: PHYSICIAN ASSISTANT

## 2018-11-28 NOTE — PROGRESS NOTES
[address] [facility] [id number]  Hematology Ambulatory Follow-Up  Tim Peña, 1965, [id number]  11/28/2018    Assessment/Plan:    1  History of ITP  Patient's typical range for platelets is between 140 and 200 Thous/uL  The development of ITP in this patient with other significant rheumatologic disorders is not surprising  In the past, patient has had 2 episodes of acute ITP  Patient is under regular observation of her platelet count with hematology  She is to continue to go for blood work at every 6 month intervals for us specifically  Patient does get CBCs completed approximately every 3 months  I will have this office look into CBC that was drawn earlier today  We will call the patient with the results  I rediscussed signs and symptoms of progressing low platelet count  Patient understands to call our office if she has any questions or concerns  2   Antiphospholipid syndrome  Patient notes being noncompliant following up with INR checks  I have provided the patient with weekly labs for INR draws  Patient understands that at this time she is to follow up with blood work every 4 weeks  Patient will go and have INR checked within the next week  Regimen:   Coumadin - titrated dose depending on INR  INR checks every 4 weeks unless outside ranges detected  Therapeutic range for INR = 2-3    3  Hypercalcemia  This is a relatively new finding  Blood work was reviewed and did not demonstrate hyperproteinemia, anemia  Patient has underlying kidney issues from lupus however creatinine and BUN are stable  I do not believe that the patient has an underlying plasma cell disorder  Patient will continue workup with Endocrinology  4   Multiple medical conditions seeing multiple specialist   Patient notes she has trouble with coordinating and the site for ring multiple specialists    Patient does not have a primary  I discussed with the patient at length the role of a primary physician in coordinating in the care and also to help manage when there are discrepancies between specialist   At this time, the patient does not wish to follow up with the PCP and had another doctor to her list     Labs and imaging for follow up:  Orders Placed This Encounter   Procedures    CBC and differential     This is a patient instruction: This test is non-fasting  Please drink two glasses of water morning of bloodwork  Standing Status:   Future     Standing Expiration Date:   11/28/2019    Protime-INR     Standing Status:   Standing     Number of Occurrences:   24     Standing Expiration Date:   11/28/2019     The patient is scheduled for follow-up in approximately 6 months with Dr Wendy Bertrand  Patient voiced agreement and understanding to the above  Patient knows to call the Hematology/Oncology office with any questions and concerns regarding the above  Carefully review your medication list in verify the list is accurate and up-to-date  Please call the hematologic/oncology office if there medications missing from the less, medications on the list that your not currently taking or if there is a dosage or instruction that is different from higher taking medication  I have spent 30 minutes with Patient  today in which greater than 50% of this time was spent in counseling/coordination of care regarding Diagnostic results and Intructions for management  Barrier(s) to care: None  The patient is able to self care     -------------------------------------------------------------------------------------------------------    Chief complaint:   Chief Complaint   Patient presents with    Follow-up     Chronic deep vein thrombosis (DVT) of proximal vein of both lower extremities        History of present illness:   This is a 43-year-old female with past medical history of SLE, hypertension, chronic kidney disease, APS with a history of DVT hyperparathyroidism, nephrolithiasis and protein urea who has been following with Hematology since 2010 secondary to acute episode of ITP  Patient was diagnosed in late 2010  At that time she underwent pulse Decadron with significant improvement  At the start of her diagnosis, the patient noted that she had petechiae on her tongue  Patient also gives a thrombosis history including DVT and pulmonary embolism in the 1990s  At that time patient had a positive cardiolipin antibody  Patient was also noted to have hyper homocystinemia  Patient's blood work also demonstrated a 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  Patient was started on Coumadin with a target between 2 and 3 and has been on this medication since  Patient follows with hematology every 6 months to review platelet count as well as to check in on Coumadin/compliance on therapy  Interval history:  Overall patient feels well  Patient notes that she is losing her job in a few months and is getting paid significantly in severance  Otherwise patient notes that she has developed hypercalcemia for which he is following up with her endocrinologist   Patient notes that she was told to discontinue vitamin-D however physicians are disagree regarding this issue  Review of Systems   Constitutional: Negative for appetite change, fatigue, fever and unexpected weight change  HENT: Negative for nosebleeds  Respiratory: Negative for cough, choking and shortness of breath  Negative hemoptysis  Cardiovascular: Negative for chest pain, palpitations and leg swelling  Gastrointestinal: Negative  Negative for abdominal distention, abdominal pain, anal bleeding, blood in stool, constipation, diarrhea, nausea and vomiting  Endocrine: Negative  Negative for cold intolerance  Genitourinary: Negative  Negative for hematuria, menstrual problem, vaginal bleeding, vaginal discharge and vaginal pain  Musculoskeletal: Negative  Negative for arthralgias, myalgias, neck pain and neck stiffness  Skin: Negative  Negative for color change, pallor and rash  Allergic/Immunologic: Negative  Negative for immunocompromised state  Neurological: Negative  Negative for weakness and headaches  Hematological: Negative for adenopathy  Does not bruise/bleed easily  All other systems reviewed and are negative  Patient Active Problem List   Diagnosis    Chronic deep vein thrombosis (DVT) of proximal vein of both lower extremities (HCC)    History of ITP    Benign hypertensive CKD    Chronic kidney disease (CKD), stage II (mild)    Edema    Gross hematuria    Hypercalcemia    [facility]roidism ([facility] [address]    Hypertension    Nephrolithiasis    Proteinuria    Systemic lupus erythematosus ([facility] [address] [facility]    Vitamin D deficiency     History reviewed  No pertinent past medical history      Past Surgical History:   Procedure Laterality Date     SECTION          HERNIA REPAIR      THYROID SURGERY         Family History   Problem Relation Age of Onset    Diabetes type II Father     Diabetes type II Paternal Aunt     Diabetes type II Paternal Uncle     Diabetes type II Paternal Grandmother        Social History     Social History    Marital status: /Civil Union     Spouse name: N/A    Number of children: N/A    Years of education: N/A     Social History Main Topics    Smoking status: Never Smoker    Smokeless tobacco: Never Used    Alcohol use Yes      Comment: rare    Drug use: No    Sexual activity: Not Asked     Other Topics Concern    None     Social History Narrative    None         Current Outpatient Prescriptions:     Cholecalciferol (VITAMIN D) 2000 units CAPS, Take 1 tablet by mouth daily, Disp: , Rfl:     folic acid (FOLVITE) 1 mg tablet, Take 1 tablet by mouth daily, Disp: , Rfl:     fosinopril (MONOPRIL) 20 mg tablet, Take 1 tablet (20 mg total) by mouth 2 (two) times a day, Disp: 180 tablet, Rfl: 3    IRON-VITAMINS PO, Take 1 tablet by mouth 3 (three) times a week  , Disp: , Rfl:     levothyroxine 125 mcg tablet, Take one tablet daily, No tablet on Sunday, Disp: 100 tablet, Rfl: 0    NIFEdipine (PROCARDIA XL) 30 mg 24 hr tablet, TAKE 1 TABLET DAILY, Disp: 90 tablet, Rfl: 1    nystatin (MYCOSTATIN) powder, Apply topically as needed  , Disp: , Rfl:     spironolactone (ALDACTONE) 25 mg tablet, Take 1 tablet by mouth daily, Disp: , Rfl:     traMADol (ULTRAM) 50 mg tablet, Reported on 11/18/2016 , Disp: , Rfl:     warfarin (COUMADIN) 2 mg tablet, Take by mouth, Disp: , Rfl:     warfarin (COUMADIN) 4 mg tablet, Take by mouth, Disp: , Rfl:     Allergies   Allergen Reactions    Penicillins        Objective:  /80   Pulse 81   Temp 98 3 °F (36 8 °C)   Resp 12   Ht 5' 1 25" (1 556 m)   Wt 96 6 kg (213 lb)   SpO2 97%   BMI 39 92 kg/m²    Physical Exam   Constitutional: She is oriented to person, place, and time  She appears well-developed and well-nourished  No distress  HENT:   Head: Normocephalic and atraumatic  Eyes: Pupils are equal, round, and reactive to light  No scleral icterus  Cardiovascular: Normal rate and regular rhythm  No murmur heard  Pulmonary/Chest: Effort normal  No respiratory distress  Musculoskeletal: She exhibits no edema  Neurological: She is alert and oriented to person, place, and time  Skin: Skin is warm  No rash noted  No pallor  Psychiatric: She has a normal mood and affect  Thought content normal      Result Review  Labs:  [facility]- see scanned image for 10/14/18  Imaging:     Please note: This report has been generated by a voice recognition software system  Therefore there may be syntax, spelling, and/or grammatical errors  Please call if you have any questions

## 2018-11-29 LAB
25(OH)D3+25(OH)D2 SERPL-MCNC: 27.9 NG/ML (ref 30–100)
T4 FREE SERPL-MCNC: 1.45 NG/DL (ref 0.82–1.77)
THYROGLOB AB SERPL-ACNC: <1 IU/ML (ref 0–0.9)
THYROGLOB SERPL-MCNC: <0.1 NG/ML (ref 1.5–38.5)
TSH SERPL DL<=0.005 MIU/L-ACNC: 4.87 UIU/ML (ref 0.45–4.5)

## 2018-11-30 ENCOUNTER — OFFICE VISIT (OUTPATIENT)
Dept: ENDOCRINOLOGY | Facility: CLINIC | Age: 53
End: 2018-11-30
Payer: COMMERCIAL

## 2018-11-30 VITALS
HEIGHT: 61 IN | WEIGHT: 212 LBS | HEART RATE: 64 BPM | SYSTOLIC BLOOD PRESSURE: 102 MMHG | DIASTOLIC BLOOD PRESSURE: 74 MMHG | BODY MASS INDEX: 40.02 KG/M2

## 2018-11-30 DIAGNOSIS — C73 THYROID CANCER (HCC): ICD-10-CM

## 2018-11-30 DIAGNOSIS — E89.0 POSTOPERATIVE HYPOTHYROIDISM: ICD-10-CM

## 2018-11-30 DIAGNOSIS — E55.9 VITAMIN D DEFICIENCY: ICD-10-CM

## 2018-11-30 DIAGNOSIS — E21.3 HYPERPARATHYROIDISM (HCC): ICD-10-CM

## 2018-11-30 DIAGNOSIS — E83.52 HYPERCALCEMIA: Primary | ICD-10-CM

## 2018-11-30 PROCEDURE — 99214 OFFICE O/P EST MOD 30 MIN: CPT | Performed by: INTERNAL MEDICINE

## 2018-11-30 NOTE — PROGRESS NOTES
Follow up  Patient Progress Note      Referring Provider  No referring provider defined for this encounter  History of Present Illness:   80-year-old woman with past medical history of postsurgical hypothyroidism, history of thyroid cancer, vitamin-D deficiency, hyperparathyroidism and hypercalcemia, CKD with lupus nephritis is here for follow-up  He has history of thyroid cancer  She has pT1a, N0 M1 stage I thyroid cancer, status post left thyroidectomy in 2010  She had capsular invasion  She also had removal of superior inferior left parathyroid gland, cannot exclude parathyroid adenoma  She had parathyroid exploration for hypercalcemia and primary hyperparathyroidism  Patient had right thyroid lobectomy in 2006 at [facility]  And pathology results showed hyperplastic nodule  She also had removal of right parathyroid gland, for primary hyperparathyroidism, as well as was positive for neuroendocrine tumor on biopsy results in 2006  She continues to have hypercalcemia, in the range of 10 5-10 8, her GFR is in 90 range she had 24 hr urine calcium was 358 weight fax renal excretion calcium creatinine ratio is 0 02 suggestive of primary hyperparathyroidism  She has normal bone density and no recent history of fracture  She has history of kidney stones  Last bone density from December 2017 showed lumbar spine T-score -0 1 total hip T-score-0 9, right femoral neck T-score -0 7 left forearm T-score 1 6      For vitamin-D deficiency she is currently taking vitamin-D 3 2000 International Units daily      Lab Results   Component Value Date    GLUCOSE 105 (H) 11/18/2017    CALCIUM 10 8 10/13/2018     11/18/2017    K 4 9 10/27/2018    CO2 23 10/27/2018     (H) 10/27/2018    BUN 21 10/27/2018    CREATININE 0 73 10/13/2018     Patient Active Problem List   Diagnosis    Chronic deep vein thrombosis (DVT) of proximal vein of both lower extremities (HCC)    History of ITP    Benign hypertensive CKD    Chronic kidney disease (CKD), stage II (mild)    Edema    Gross hematuria    Hypercalcemia    [facility]roidism ([facility] [address]    Hypertension    Nephrolithiasis    Proteinuria    Systemic lupus erythematosus ([facility] [address] [facility]    Vitamin D deficiency     History reviewed  No pertinent past medical history  Past Surgical History:   Procedure Laterality Date     SECTION      [id number]    HERNIA REPAIR      THYROID SURGERY        Family History   Problem Relation Age of Onset    Diabetes type II Father     Diabetes type II Paternal Aunt     Diabetes type II Paternal Uncle     Diabetes type II Paternal Grandmother      Social History   Substance Use Topics    Smoking status: Never Smoker    Smokeless tobacco: Never Used    Alcohol use Yes      Comment: rare     Allergies   Allergen Reactions    Nsaids Other (See Comments)    Penicillins      [unfilled]    Review of Systems   Constitutional: Positive for unexpected weight change  Negative for activity change, diaphoresis, fatigue and fever  HENT: Negative  Eyes: Negative for visual disturbance  Respiratory: Negative for cough, chest tightness and shortness of breath  Cardiovascular: Negative for chest pain, palpitations and leg swelling  Gastrointestinal: Negative for abdominal pain, blood in stool, constipation, diarrhea, nausea and vomiting  Endocrine: Negative for cold intolerance, heat intolerance, polydipsia, polyphagia and polyuria  Genitourinary: Negative for dysuria, enuresis, frequency and urgency  Musculoskeletal: Negative for arthralgias and myalgias  Skin: Negative for pallor, rash and wound  Allergic/Immunologic: Negative  Neurological: Negative for dizziness, tremors, weakness and numbness  Hematological: Negative  Psychiatric/Behavioral: Negative  Physical Exam:  Body mass index is 39 73 kg/m²    /74   Pulse 64   Ht 5' 1 25" (1 556 m)   Wt 96 2 kg (212 lb)   BMI 39 73 kg/m²    Wt Readings from Last 3 Encounters:   11/30/18 96 2 kg (212 lb)   11/28/18 96 6 kg (213 lb)   07/25/18 95 7 kg (211 lb)       Physical Exam   Constitutional: She is oriented to person, place, and time  She appears well-developed and well-nourished  No distress  HENT:   Head: Normocephalic and atraumatic  Eyes: Conjunctivae and EOM are normal  Right eye exhibits no discharge  Left eye exhibits no discharge  Neck: Normal range of motion  Neck supple  No thyromegaly present  Cardiovascular: Normal rate, regular rhythm and normal heart sounds  No murmur heard  Pulmonary/Chest: Effort normal and breath sounds normal  No respiratory distress  She has no wheezes  Abdominal: Soft  Bowel sounds are normal  She exhibits no distension  There is no tenderness  Musculoskeletal: Normal range of motion  She exhibits no edema, tenderness or deformity  Neurological: She is alert and oriented to person, place, and time  She has normal reflexes  Skin: Skin is warm and dry  She is not diaphoretic  Psychiatric: She has a normal mood and affect  Her behavior is normal    Vitals reviewed  Diabetic Foot Exam    Labs:   No components found for: HA1C  No components found for: GLU    Lab Results   Component Value Date    CREATININE 0 73 10/13/2018    CREATININE 0 67 11/18/2017    CREATININE 0 71 10/14/2017    BUN 21 10/27/2018     11/18/2017    K 4 9 10/27/2018     (H) 10/27/2018    CO2 23 10/27/2018     No results found for: EGFR  No components found for: MALBCRER    No results found for: CHOL, HDL, TRIG, CHOLHDL    Lab Results   Component Value Date    ALT 16 07/01/2017    AST 18 07/01/2017    ALKPHOS 88 07/01/2017    BILITOT 0 5 07/01/2017       Lab Results   Component Value Date    TSH 4 870 (H) 11/28/2018    FREET4 1 45 11/28/2018       Impression:  1  Hypercalcemia    2  Vitamin D deficiency    3  Postoperative hypothyroidism    4  Thyroid cancer ([facility] [address] )    5   Hyperparathyroidism ([facility] [address] )         Plan:    Diagnoses and all orders for this visit:    Hypercalcemia  Most recent calcium was 10 8, patient has been having calcium in the range of 10 3-10 8  She has history of kidney stones  She has underwent parathyroid exploration, twice in 2006 as well as in 2010, and parathyroid suspicious adenoma was removed, with persistent hypercalcemia  Her recent 24 hr urine calcium creatinine excretion ratio was 0 02, consistent with primary hyperparathyroidism  Will repeat 24 hr urine calcium and creatinine, if the ratio is more than 0 01, discussed with patient that we can refer her again opinion for Parathyroid surgery  She has failed to previous parathyroid surgery  (parathyroid scan negative x2 previously)  Patient is hesitant to go for 3rd surgery, she will let us know  -     Calcium, urine, 24 hour Lab Collect; Future  -     Creatinine, urine, 24 hour- Lab Collect; Future  -     Basic metabolic panel; Future  -     Calcium, ionized; Future    Vitamin D deficiency  Continue current supplementation of vitamin-D 27 9    -     Vitamin D 25 hydroxy; Future    Postoperative hypothyroidism  TSH is slightly elevated, 4 8, however free T4 is 1 45 which is upper range of normal  Will not make any changes in her dose as her free T4 is on the high side  Will repeat TSH and free T4  -     TSH, 3rd generation; Future  -     T4, free; Future    Thyroid cancer ([facility] [address] )  Thyroglobulin tumor marker is undetectable with negative antibodies as well as neck ultrasound not suggestive of recurrence are persistent disease  Will continue to monitor with ultrasound as well as thyroglobulin tumor marker on suppression  -     US head neck lymph node mapping; Future    Hyperparathyroidism ([facility] [address] )    Will repeat 24 hr urine calcium as well as creatinine  If the ratio is more than 0 01, discussed with patient that she will benefit from parathyroid surgery  Previously patient had 2 surgeries with removal of portion of superior and inferior parathyroid gland    As well as right parathyroid gland, as per previous records  Patient is hesitant about going for 3rd surgery, she will let us know when she makes up her mind    -educated to avoid calcium supplementation  -also discussed to check with family members to see they have elevated calcium     -     PTH, intact- Lab Collect; Future      Discussed with the patient and all questioned fully answered  She will call me if any problems arise      Counseled patient on diagnostic results, prognosis, risk and benefit of treatment options, instruction for management, importance of treatment compliance, Risk  factor reduction and impressions      Robin Barrett MD

## 2018-12-17 LAB
INR PPP: 1.6 (ref 0.8–1.2)
PROTHROMBIN TIME: 16.5 SEC (ref 9.1–12)

## 2018-12-18 LAB
CALCIUM 24H UR-MCNC: 13.5 MG/DL
CALCIUM 24H UR-MRATE: 202.5 MG/24 HR (ref 100–300)
CREAT 24H UR-MRATE: 1271 MG/24 HR (ref 800–1800)
CREAT UR-MCNC: 84.7 MG/DL

## 2018-12-18 NOTE — PROGRESS NOTES
Pt INR is subtherapeutic, please see if she was on antibiotics recently or if she is taking her medication    Please report back to me for further action

## 2018-12-19 ENCOUNTER — ANTICOAG VISIT (OUTPATIENT)
Dept: HEMATOLOGY ONCOLOGY | Facility: CLINIC | Age: 53
End: 2018-12-19

## 2018-12-20 DIAGNOSIS — I10 ESSENTIAL HYPERTENSION, BENIGN: Primary | ICD-10-CM

## 2018-12-20 DIAGNOSIS — E89.0 POSTOPERATIVE HYPOTHYROIDISM: ICD-10-CM

## 2018-12-20 DIAGNOSIS — I82.90 DEEP VEIN THROMBOSIS (DVT) OF NON-EXTREMITY VEIN, UNSPECIFIED CHRONICITY: Primary | ICD-10-CM

## 2018-12-20 RX ORDER — SPIRONOLACTONE 25 MG/1
TABLET ORAL
Qty: 90 TABLET | Refills: 3 | Status: SHIPPED | OUTPATIENT
Start: 2018-12-20 | End: 2020-02-20 | Stop reason: SDUPTHER

## 2018-12-20 RX ORDER — LEVOTHYROXINE SODIUM 0.12 MG/1
TABLET ORAL
Qty: 100 TABLET | Refills: 1 | Status: SHIPPED | OUTPATIENT
Start: 2018-12-20 | End: 2019-04-12

## 2018-12-21 RX ORDER — WARFARIN SODIUM 4 MG/1
TABLET ORAL
Qty: 39 TABLET | Refills: 3 | Status: SHIPPED | OUTPATIENT
Start: 2018-12-21 | End: 2019-05-07 | Stop reason: ALTCHOICE

## 2019-01-21 ENCOUNTER — TELEPHONE (OUTPATIENT)
Dept: NEPHROLOGY | Facility: CLINIC | Age: 54
End: 2019-01-21

## 2019-01-22 ENCOUNTER — TRANSCRIBE ORDERS (OUTPATIENT)
Dept: ADMINISTRATIVE | Age: 54
End: 2019-01-22

## 2019-01-22 ENCOUNTER — APPOINTMENT (OUTPATIENT)
Dept: RADIOLOGY | Age: 54
End: 2019-01-22
Payer: COMMERCIAL

## 2019-01-22 ENCOUNTER — OFFICE VISIT (OUTPATIENT)
Dept: NEPHROLOGY | Facility: CLINIC | Age: 54
End: 2019-01-22
Payer: COMMERCIAL

## 2019-01-22 VITALS
SYSTOLIC BLOOD PRESSURE: 142 MMHG | HEART RATE: 68 BPM | WEIGHT: 214.19 LBS | DIASTOLIC BLOOD PRESSURE: 96 MMHG | HEIGHT: 61 IN | RESPIRATION RATE: 16 BRPM | BODY MASS INDEX: 40.44 KG/M2
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
## 2019-01-22 DIAGNOSIS — N20.0 NEPHROLITHIASIS: ICD-10-CM

## 2019-01-22 DIAGNOSIS — N18.2 CHRONIC KIDNEY DISEASE (CKD), STAGE II (MILD): Primary | ICD-10-CM

## 2019-01-22 DIAGNOSIS — E83.52 HYPERCALCEMIA: ICD-10-CM

## 2019-01-22 DIAGNOSIS — I12.9 BENIGN HYPERTENSIVE KIDNEY DISEASE WITH CHRONIC KIDNEY DISEASE STAGE I THROUGH STAGE IV, OR UNSPECIFIED: ICD-10-CM

## 2019-01-22 DIAGNOSIS — R60.9 EDEMA, UNSPECIFIED TYPE: ICD-10-CM

## 2019-01-22 DIAGNOSIS — R80.9 PROTEINURIA, UNSPECIFIED TYPE: ICD-10-CM

## 2019-01-22 DIAGNOSIS — E55.9 VITAMIN D DEFICIENCY: ICD-10-CM

## 2019-01-22 PROCEDURE — 73130 X-RAY EXAM OF HAND: CPT

## 2019-01-22 PROCEDURE — 99214 OFFICE O/P EST MOD 30 MIN: CPT | Performed by: INTERNAL MEDICINE

## 2019-01-22 NOTE — PATIENT INSTRUCTIONS
1  CKD stage II/history of lupus nephritis  -sCr stable in 0 7-0 8 range  Latest C3 normal  Last sCr 0 76 from 10/27/18   -continue to avoid nonsteroidals(ibuprofen, aleve, advil, motrin, celebrex, naproxen, toradol)  -repeat BMP along with UpCr now     2  proteinuria-+residual proteinuria from prior lupus nephritis (biopsy proven in 1996), now noted to have increased weight as well as HTN    -Off HCTZ due to hypercalcemia, on fosinopril 20mg twice daily  -back on aldactone 25mg daily as she thinks this is making her more edematous  -latest UpCr 1898 as of Oct  2018     3  volume status-mild LE edema, continue spironolactone 25mg daily, potassium high normal as of Oct  2018     4  hypercalcemia/hyperparathyroidism - follows with endo, +nephrolithiasis history with recent Urinalysis showing calcium oxalate crystals  -You must drink enough water to urinate at least 2L per day to prevent stone formation but this is difficult given her edema and need for diuretics  Unfortunately, diuretics will contribute to stone formation   -avoid high salt diet both for stone reduction and to help blood pressure stay controlled     5  hypertension-BP well controlled generally    -continue nifedipine 30mg daily(at night), fosinopril 20mg twice daily, spironolactone 25mg daily (at lunch)  -avoid caffeine/tea     6  vitamin D insufficiency - Vit D level 21 2 - restart oral vitamin D 2000u daily, should discuss this with endocrinology    7  Right finger swelling s/p fall - will obtain xray of right hand to evaluate for fracture     Obtain bloodwork and urine testing this month  Return to office in 6 months  Consider calling this office for primary care doctor, Daniel Blake 85: [phone]   Ask to see Dr Montez Monson or Dr Betsy Kraus

## 2019-01-22 NOTE — PROGRESS NOTES
NEPHROLOGY OUTPATIENT PROGRESS NOTE   Ozzie Peralta 48 y o  female MRN: [id number]  DATE: 1/22/2019  Reason for visit:   Chief Complaint   Patient presents with    Chronic Kidney Disease    Follow-up        Patient Instructions   1  CKD stage II/history of lupus nephritis  -sCr stable in 0 7-0 8 range  Latest C3 normal  Last sCr 0 76 from 10/27/18   -continue to avoid nonsteroidals(ibuprofen, aleve, advil, motrin, celebrex, naproxen, toradol)  -repeat BMP along with UpCr now     2  proteinuria-+residual proteinuria from prior lupus nephritis (biopsy proven in 1996), now noted to have increased weight as well as HTN    -Off HCTZ due to hypercalcemia, on fosinopril 20mg twice daily  -back on aldactone 25mg daily as she thinks this is making her more edematous  -latest UpCr 1898 as of Oct  2018     3  volume status-mild LE edema, continue spironolactone 25mg daily, potassium high normal as of Oct  2018     4  hypercalcemia/hyperparathyroidism - follows with endo, +nephrolithiasis history with recent Urinalysis showing calcium oxalate crystals  -You must drink enough water to urinate at least 2L per day to prevent stone formation but this is difficult given her edema and need for diuretics  Unfortunately, diuretics will contribute to stone formation   -avoid high salt diet both for stone reduction and to help blood pressure stay controlled     5  hypertension-BP well controlled generally    -continue nifedipine 30mg daily(at night), fosinopril 20mg twice daily, spironolactone 25mg daily (at lunch)  -avoid caffeine/tea     6  vitamin D insufficiency - Vit D level 21 2 - restart oral vitamin D 2000u daily, should discuss this with endocrinology    7  Right finger swelling s/p fall - will obtain xray of right hand to evaluate for fracture     Obtain bloodwork and urine testing this month  Return to office in 6 months    Consider calling this office for primary care doctor, Daniel Blake 83: [phone]  Ask to see Dr Liza Burroughs or Dr Karsten Cheng was seen today for chronic kidney disease and follow-up  Diagnoses and all orders for this visit:    Chronic kidney disease (CKD), stage II (mild)  -     Basic metabolic panel; Future    Benign hypertensive kidney disease with chronic kidney disease stage I through stage IV, or unspecified    Nephrolithiasis    Hypercalcemia  -     Basic metabolic panel; Future    Proteinuria, unspecified type  -     Protein / creatinine ratio, urine; Future    Edema, unspecified type    Vitamin D deficiency    Fall, initial encounter  -     XR hand 2 vw right; Future        Assessment/Plan:  1  CKD stage II/history of lupus nephritis  -sCr stable in 0 7-0 8 range  Latest C3 normal  Last sCr 0 76 from 10/27/18   -continue to avoid nonsteroidals(ibuprofen, aleve, advil, motrin, celebrex, naproxen, toradol)  -repeat BMP along with UpCr now     2  proteinuria-+residual proteinuria from prior lupus nephritis (biopsy proven in 1996), now noted to have increased weight as well as HTN    -Off HCTZ due to hypercalcemia, on fosinopril 20mg twice daily  -back on aldactone 25mg daily as she thinks this is making her more edematous  -latest UpCr 1898 as of Oct  2018     3  volume status-mild LE edema, continue spironolactone 25mg daily, potassium high normal as of Oct  2018     4  hypercalcemia/hyperparathyroidism - follows with endo, +nephrolithiasis history with recent Urinalysis showing calcium oxalate crystals  -You must drink enough water to urinate at least 2L per day to prevent stone formation but this is difficult given her edema and need for diuretics   Unfortunately, diuretics will contribute to stone formation   -avoid high salt diet both for stone reduction and to help blood pressure stay controlled     5  hypertension-BP well controlled generally    -continue nifedipine 30mg daily(at night), fosinopril 20mg twice daily, spironolactone 25mg daily (at lunch)  -avoid caffeine/tea     6  vitamin D insufficiency - Vit D level 21 2 - restart oral vitamin D 2000u daily, should discuss this with endocrinology    7  Right finger swelling s/p fall - will obtain xray of right hand to evaluate for fracture     Obtain bloodwork and urine testing this month  Return to office in 6 months  SUBJECTIVE / INTERVAL HISTORY:  48 y o  female presents in follow up of CKD  Bernarda Baumann denies any recent illness/hospitalizations/medication changes since last office visit  Denies NSAID use  Does not have significant LE edema  Has not yet taken her BP medications today  She fell three weeks ago and jammed her finger  Review of Systems   Constitutional: Negative for chills and fever  HENT: Negative for sore throat and trouble swallowing  Eyes: Negative for visual disturbance  Respiratory: Negative for cough and shortness of breath  Cardiovascular: Negative for chest pain and leg swelling  Gastrointestinal: Negative for abdominal pain, constipation, diarrhea, nausea and vomiting  Endocrine: Negative for polyuria  Genitourinary: Negative for difficulty urinating, dysuria and hematuria  Musculoskeletal: Positive for back pain  Negative for neck pain  Skin: Negative for rash  Neurological: Negative for dizziness, light-headedness and numbness  Hematological: Negative for adenopathy  Bruises/bleeds easily  Psychiatric/Behavioral: The patient is not nervous/anxious  OBJECTIVE:  /96 (BP Location: Left arm, Patient Position: Sitting, Cuff Size: Large)   Pulse 68   Resp 16   Ht 5' 1 25" (1 556 m)   Wt 97 2 kg (214 lb 3 oz)   BMI 40 14 kg/m²  Body mass index is 40 14 kg/m²  Physical exam:  Physical Exam   Constitutional: She appears well-developed and well-nourished  No distress  obese   HENT:   Head: Normocephalic and atraumatic  Mouth/Throat: No oropharyngeal exudate  Eyes: Right eye exhibits no discharge   Left eye exhibits no discharge  No scleral icterus  Neck: Normal range of motion  Neck supple  No thyromegaly present  Cardiovascular: Normal rate and regular rhythm  No murmur heard  Pulmonary/Chest: Effort normal and breath sounds normal  No respiratory distress  She has no wheezes  Abdominal: Soft  Bowel sounds are normal  She exhibits no distension  Musculoskeletal: She exhibits edema (b/l LE)  Neurological: She is alert  She exhibits normal muscle tone  awake   Skin: Skin is warm and dry  No rash noted  She is not diaphoretic  Psychiatric: She has a normal mood and affect  Her behavior is normal    Nursing note and vitals reviewed  Medications:    Current Outpatient Prescriptions:     Cholecalciferol (VITAMIN D) 2000 units CAPS, Take 1 tablet by mouth daily, Disp: , Rfl:     folic acid (FOLVITE) 1 mg tablet, Take 1 tablet by mouth daily, Disp: , Rfl:     fosinopril (MONOPRIL) 20 mg tablet, Take 1 tablet (20 mg total) by mouth 2 (two) times a day, Disp: 180 tablet, Rfl: 3    IRON-VITAMINS PO, Take 1 tablet by mouth 3 (three) times a week  , Disp: , Rfl:     levothyroxine 125 mcg tablet, TAKE 1 TABLET DAILY EXCEPT ON SUNDAY TAKE ONE AND ONE-HALF TABLETS, Disp: 100 tablet, Rfl: 1    NIFEdipine (PROCARDIA XL) 30 mg 24 hr tablet, TAKE 1 TABLET DAILY, Disp: 90 tablet, Rfl: 1    spironolactone (ALDACTONE) 25 mg tablet, TAKE 1 TABLET DAILY, Disp: 90 tablet, Rfl: 3    warfarin (COUMADIN) 2 mg tablet, Take by mouth, Disp: , Rfl:     warfarin (COUMADIN) 4 mg tablet, TAKE 1 TABLET ON MONDAY, WEDNESDAY AND THURSDAY OR AS DIRECTED, Disp: 39 tablet, Rfl: 3    nystatin (MYCOSTATIN) powder, Apply topically as needed  , Disp: , Rfl:     Allergies:   Allergies as of 01/22/2019 - Reviewed 01/22/2019   Allergen Reaction Noted    Nsaids Other (See Comments)     Penicillins  04/25/2013       The following portions of the patient's history were reviewed and updated as appropriate: past family history, past surgical history and problem list     Laboratory Results:  Lab Results   Component Value Date    GLUCOSE 105 (H) 11/18/2017    CALCIUM 10 8 10/13/2018     11/18/2017    K 4 9 10/27/2018    CO2 23 10/27/2018     (H) 10/27/2018    BUN 21 10/27/2018    CREATININE 0 73 10/13/2018        Lab Results   Component Value Date    PTH 60 11/18/2017    CALCIUM 10 8 10/13/2018    PHOS 2 4 (L) 11/18/2017       Portions of the record may have been created with voice recognition software   Occasional wrong word or "sound a like" substitutions may have occurred due to the inherent limitations of voice recognition software   Read the chart carefully and recognize, using context, where substitutions have occurred

## 2019-01-23 ENCOUNTER — TELEPHONE (OUTPATIENT)
Dept: NEPHROLOGY | Facility: CLINIC | Age: 54
End: 2019-01-23

## 2019-02-03 LAB
INR PPP: 4.3 (ref 0.8–1.2)
PROTHROMBIN TIME: 41.9 SEC (ref 9.1–12)

## 2019-02-05 ENCOUNTER — TELEPHONE (OUTPATIENT)
Dept: HEMATOLOGY ONCOLOGY | Facility: CLINIC | Age: 54
End: 2019-02-05

## 2019-02-05 LAB
CREAT ?TM UR-SCNC: 144.8 UMOL/L
EXT GLUCOSE BLD: 96
EXT PROTEIN URINE: 105.1
EXTERNAL BUN: 25
EXTERNAL CALCIUM: 9.5
EXTERNAL CHLORIDE: 105
EXTERNAL CO2: 23
EXTERNAL CREATININE: 0.93
EXTERNAL POTASSIUM: 4.5
EXTERNAL SODIUM: 140
PROT/CREAT UR: 726 MG/G{CREAT}

## 2019-02-05 NOTE — TELEPHONE ENCOUNTER
----- Message from Emma Gil sent at 2/5/2019 10:46 AM EST -----      ----- Message -----  From: Grabiel Miller PA-C  Sent: 2/4/2019   4:22 PM  To: Alize Clinton MA    INR is elevated  Please check dose of medication  Please let me know when you talk to her

## 2019-02-05 NOTE — TELEPHONE ENCOUNTER
LVM instructing patient to call our office so she is able to provide us with her current Coumadin dose

## 2019-02-05 NOTE — TELEPHONE ENCOUNTER
Patient called and informed us that she is taking 4 mg daily  I informed her per DOLLY Tinoco she should hold today dose and start tomorrow, 02/06/19, taking 4 mg and alternating with 3 mg and she is to recheck her labs in 1 week  Patient voiced understanding       [id number], [address] [facility] [address] [facility] [address] [place] script into this pharmacy per patient request

## 2019-02-21 DIAGNOSIS — I82.5Y3 CHRONIC DEEP VEIN THROMBOSIS (DVT) OF PROXIMAL VEIN OF BOTH LOWER EXTREMITIES (HCC): Primary | Chronic | ICD-10-CM

## 2019-02-21 RX ORDER — WARFARIN SODIUM 3 MG/1
TABLET ORAL
Qty: 30 TABLET | Refills: 2 | Status: SHIPPED | OUTPATIENT
Start: 2019-02-21 | End: 2019-04-24 | Stop reason: SDUPTHER

## 2019-02-26 DIAGNOSIS — I12.9 PARENCHYMAL RENAL HYPERTENSION, STAGE 1 THROUGH STAGE 4 OR UNSPECIFIED CHRONIC KIDNEY DISEASE: ICD-10-CM

## 2019-02-26 RX ORDER — FOSINOPRIL SODIUM 20 MG/1
40 TABLET ORAL DAILY
Qty: 90 TABLET | Refills: 3 | Status: SHIPPED | OUTPATIENT
Start: 2019-02-26 | End: 2020-01-07 | Stop reason: SDUPTHER

## 2019-02-28 ENCOUNTER — TELEPHONE (OUTPATIENT)
Dept: NEPHROLOGY | Facility: CLINIC | Age: 54
End: 2019-02-28

## 2019-02-28 NOTE — TELEPHONE ENCOUNTER
[facility]  The women's name is damir  [phone]  Its in regards to patient's medication: Fosinopril 20 mg 1 tablet 2x a day  Ref #[id number]

## 2019-02-28 NOTE — TELEPHONE ENCOUNTER
I spoke to the pharmacist and they asked to change the script from 20mg 2 tablets a day to 40mg once a day as a cost reduced option for the patient

## 2019-03-31 DIAGNOSIS — I10 ESSENTIAL HYPERTENSION: ICD-10-CM

## 2019-03-31 LAB
INR PPP: 2 (ref 0.8–1.2)
PROTHROMBIN TIME: 19.9 SEC (ref 9.1–12)

## 2019-04-01 LAB
25(OH)D3+25(OH)D2 SERPL-MCNC: 20.8 NG/ML (ref 30–100)
BUN SERPL-MCNC: 21 MG/DL (ref 6–24)
BUN/CREAT SERPL: 24 (ref 9–23)
CA-I SERPL ISE-MCNC: 5.8 MG/DL (ref 4.5–5.6)
CALCIUM SERPL-MCNC: 10.4 MG/DL (ref 8.7–10.2)
CHLORIDE SERPL-SCNC: 102 MMOL/L (ref 96–106)
CO2 SERPL-SCNC: 25 MMOL/L (ref 20–29)
CREAT SERPL-MCNC: 0.87 MG/DL (ref 0.57–1)
GLUCOSE SERPL-MCNC: 92 MG/DL (ref 65–99)
POTASSIUM SERPL-SCNC: 4.9 MMOL/L (ref 3.5–5.2)
PTH-INTACT SERPL-MCNC: 72 PG/ML (ref 15–65)
SL AMB EGFR AFRICAN AMERICAN: 89 ML/MIN/1.73
SL AMB EGFR NON AFRICAN AMERICAN: 77 ML/MIN/1.73
SODIUM SERPL-SCNC: 140 MMOL/L (ref 134–144)
T4 FREE SERPL-MCNC: 1.15 NG/DL (ref 0.82–1.77)
TSH SERPL DL<=0.005 MIU/L-ACNC: 16.08 UIU/ML (ref 0.45–4.5)

## 2019-04-01 RX ORDER — NIFEDIPINE 30 MG/1
TABLET, EXTENDED RELEASE ORAL
Qty: 90 TABLET | Refills: 1 | Status: SHIPPED | OUTPATIENT
Start: 2019-04-01 | End: 2019-10-11 | Stop reason: SDUPTHER

## 2019-04-12 ENCOUNTER — TRANSCRIBE ORDERS (OUTPATIENT)
Dept: ADMINISTRATIVE | Facility: HOSPITAL | Age: 54
End: 2019-04-12

## 2019-04-12 ENCOUNTER — HOSPITAL ENCOUNTER (OUTPATIENT)
Dept: RADIOLOGY | Age: 54
Discharge: HOME | End: 2019-04-12
Payer: COMMERCIAL

## 2019-04-12 ENCOUNTER — OFFICE VISIT (OUTPATIENT)
Dept: ENDOCRINOLOGY | Facility: CLINIC | Age: 54
End: 2019-04-12
Payer: COMMERCIAL

## 2019-04-12 VITALS
WEIGHT: 213 LBS | HEIGHT: 61 IN | HEART RATE: 68 BPM | BODY MASS INDEX: 40.22 KG/M2 | DIASTOLIC BLOOD PRESSURE: 90 MMHG | SYSTOLIC BLOOD PRESSURE: 132 MMHG

## 2019-04-12 VITALS — WEIGHT: 213 LBS | BODY MASS INDEX: 40.22 KG/M2 | HEIGHT: 61 IN

## 2019-04-12 DIAGNOSIS — Z12.39 BREAST SCREENING, UNSPECIFIED: ICD-10-CM

## 2019-04-12 DIAGNOSIS — E89.0 POSTOPERATIVE HYPOTHYROIDISM: ICD-10-CM

## 2019-04-12 DIAGNOSIS — E21.0 PRIMARY HYPERPARATHYROIDISM (HCC): ICD-10-CM

## 2019-04-12 DIAGNOSIS — E55.9 VITAMIN D DEFICIENCY: ICD-10-CM

## 2019-04-12 DIAGNOSIS — Z12.39 BREAST SCREENING, UNSPECIFIED: Primary | ICD-10-CM

## 2019-04-12 DIAGNOSIS — Z85.850 HISTORY OF THYROID CANCER: ICD-10-CM

## 2019-04-12 DIAGNOSIS — Z85.850 HISTORY OF THYROID CANCER: Primary | ICD-10-CM

## 2019-04-12 PROCEDURE — 77067 SCR MAMMO BI INCL CAD: CPT

## 2019-04-12 PROCEDURE — 99214 OFFICE O/P EST MOD 30 MIN: CPT | Performed by: PHYSICIAN ASSISTANT

## 2019-04-12 PROCEDURE — 76536 US EXAM OF HEAD AND NECK: CPT

## 2019-04-12 RX ORDER — LEVOTHYROXINE SODIUM 0.12 MG/1
125 TABLET ORAL DAILY
Qty: 90 TABLET | Refills: 1 | Status: SHIPPED | OUTPATIENT
Start: 2019-04-12 | End: 2019-06-13 | Stop reason: SDUPTHER

## 2019-04-15 ENCOUNTER — TELEPHONE (OUTPATIENT)
Dept: ENDOCRINOLOGY | Facility: CLINIC | Age: 54
End: 2019-04-15

## 2019-04-24 ENCOUNTER — TELEPHONE (OUTPATIENT)
Dept: HEMATOLOGY ONCOLOGY | Facility: CLINIC | Age: 54
End: 2019-04-24

## 2019-04-24 DIAGNOSIS — I82.5Y3 CHRONIC DEEP VEIN THROMBOSIS (DVT) OF PROXIMAL VEIN OF BOTH LOWER EXTREMITIES (HCC): Chronic | ICD-10-CM

## 2019-04-24 RX ORDER — WARFARIN SODIUM 3 MG/1
TABLET ORAL
Qty: 90 TABLET | Refills: 1 | Status: SHIPPED | OUTPATIENT
Start: 2019-04-24 | End: 2019-10-11 | Stop reason: SDUPTHER

## 2019-05-01 PROBLEM — Z85.850 HISTORY OF THYROID CANCER: Status: ACTIVE | Noted: 2019-05-01

## 2019-05-01 PROBLEM — R79.89 ELEVATED PTHRP LEVEL: Status: ACTIVE | Noted: 2019-05-01

## 2019-05-07 ENCOUNTER — CONSULT (OUTPATIENT)
Dept: SURGICAL ONCOLOGY | Facility: CLINIC | Age: 54
End: 2019-05-07
Payer: COMMERCIAL

## 2019-05-07 VITALS
WEIGHT: 214 LBS | SYSTOLIC BLOOD PRESSURE: 120 MMHG | RESPIRATION RATE: 16 BRPM | DIASTOLIC BLOOD PRESSURE: 80 MMHG | HEIGHT: 61 IN | HEART RATE: 71 BPM | BODY MASS INDEX: 40.4 KG/M2

## 2019-05-07 DIAGNOSIS — E21.3 HYPERPARATHYROIDISM (HCC): Primary | ICD-10-CM

## 2019-05-07 DIAGNOSIS — E83.52 HYPERCALCEMIA: ICD-10-CM

## 2019-05-07 DIAGNOSIS — R79.89 ELEVATED PTHRP LEVEL: ICD-10-CM

## 2019-05-07 DIAGNOSIS — E21.0 PRIMARY HYPERPARATHYROIDISM (HCC): ICD-10-CM

## 2019-05-07 PROCEDURE — 99204 OFFICE O/P NEW MOD 45 MIN: CPT | Performed by: SURGERY

## 2019-05-07 RX ORDER — NYSTATIN 100000 [USP'U]/G
POWDER TOPICAL AS NEEDED
COMMUNITY
End: 2020-03-05 | Stop reason: SDUPTHER

## 2019-05-14 ENCOUNTER — HOSPITAL ENCOUNTER (OUTPATIENT)
Dept: NUCLEAR MEDICINE | Facility: HOSPITAL | Age: 54
Discharge: HOME | End: 2019-05-14
Attending: SURGERY
Payer: COMMERCIAL

## 2019-05-14 DIAGNOSIS — E21.3 HYPERPARATHYROIDISM (HCC): ICD-10-CM

## 2019-05-14 PROCEDURE — 78072 PARATHYRD PLANAR W/SPECT&CT: CPT

## 2019-05-14 PROCEDURE — A9500 TC99M SESTAMIBI: HCPCS

## 2019-05-16 LAB
25(OH)D3+25(OH)D2 SERPL-MCNC: 28.7 NG/ML (ref 30–100)
CALCIUM SERPL-MCNC: 9.5 MG/DL (ref 8.7–10.2)
PTH-INTACT SERPL-MCNC: 113 PG/ML (ref 15–65)

## 2019-05-17 LAB
BUTYRYL+ISOBUTYRYLCARN SERPL-SCNC: 34 UMOL/L
DSDNA AB SER-ACNC: 4 [IU]/ML
ERYTHROCYTE [SEDIMENTATION RATE] IN BLOOD BY WESTERGREN METHOD: 47 MM/H
EXT BILIRUBIN, UA: NEGATIVE
EXT BLOOD, UA: NORMAL
EXT COLOR, UA: YELLOW
EXT DIFF-ABS BASOPHILS: 0
EXT DIFF-ABS EOSINOPHILS: 1
EXT DIFF-ABS LYMPHOCYTES: 24
EXT DIFF-ABS MONOCYTES: 9
EXT DIFF-ABS NEUTROPHILS: 66
EXT GLUCOSE BLD: 93
EXT GLUCOSE, UA: NORMAL
EXT KETONES: NORMAL
EXT NITRITE, UA: NEGATIVE
EXT PH, UA: 6
EXT PROTEIN, UA: NORMAL
EXT SPECIFIC GRAVITY, UA: 1.02
EXT UROBILINOGEN: 0.2
EXTERNAL ALBUMIN: 3.9
EXTERNAL ALK PHOS: 88
EXTERNAL ALT: 33
EXTERNAL AST: 34
EXTERNAL BACTERIA (UA): NORMAL
EXTERNAL BICARBONATE: 20
EXTERNAL BUN: 18
EXTERNAL CALCIUM: 9.6
EXTERNAL CASTS (UA): PRESENT
EXTERNAL CHLORIDE: 107
EXTERNAL CREATININE: 0.92
EXTERNAL EGFR: 72
EXTERNAL HEMATOCRIT: 41 %
EXTERNAL HEMOGLOBIN: 13.9 G/DL
EXTERNAL MCV: 87
EXTERNAL PLATELET COUNT: 181 K/ΜL
EXTERNAL POTASSIUM: 4
EXTERNAL RBC (UA): NORMAL
EXTERNAL RBC: 4.65
EXTERNAL RDW: 12.6
EXTERNAL SODIUM: 140
EXTERNAL T.BILIRUBIN: 0.3
EXTERNAL TOTAL PROTEIN: 7
EXTERNAL WBC (UA): NORMAL
EXTERNAL WBC: 6.4
PROPIONYLCARN SERPL-SCNC: 131 UMOL/L
WBC # BLD EST: NEGATIVE 10*3/UL

## 2019-05-20 ENCOUNTER — OFFICE VISIT (OUTPATIENT)
Dept: SURGICAL ONCOLOGY | Facility: CLINIC | Age: 54
End: 2019-05-20
Payer: COMMERCIAL

## 2019-05-20 VITALS
DIASTOLIC BLOOD PRESSURE: 80 MMHG | HEIGHT: 61 IN | BODY MASS INDEX: 40.22 KG/M2 | HEART RATE: 72 BPM | RESPIRATION RATE: 16 BRPM | SYSTOLIC BLOOD PRESSURE: 128 MMHG | TEMPERATURE: 99.1 F | WEIGHT: 213 LBS

## 2019-05-20 DIAGNOSIS — E21.3 HYPERPARATHYROIDISM (HCC): Primary | ICD-10-CM

## 2019-05-20 PROCEDURE — 99212 OFFICE O/P EST SF 10 MIN: CPT | Performed by: SURGERY

## 2019-05-22 ENCOUNTER — TELEPHONE (OUTPATIENT)
Dept: HEMATOLOGY ONCOLOGY | Facility: CLINIC | Age: 54
End: 2019-05-22

## 2019-05-23 DIAGNOSIS — D68.61 APS (ANTIPHOSPHOLIPID SYNDROME) (HCC): ICD-10-CM

## 2019-05-23 DIAGNOSIS — I82.90 DEEP VEIN THROMBOSIS (DVT) OF NON-EXTREMITY VEIN, UNSPECIFIED CHRONICITY: Primary | ICD-10-CM

## 2019-05-24 ENCOUNTER — TELEPHONE (OUTPATIENT)
Dept: HEMATOLOGY ONCOLOGY | Facility: CLINIC | Age: 54
End: 2019-05-24

## 2019-05-24 ENCOUNTER — HOSPITAL ENCOUNTER (OUTPATIENT)
Dept: RADIOLOGY | Age: 54
Discharge: HOME | End: 2019-05-24
Payer: COMMERCIAL

## 2019-05-24 DIAGNOSIS — E21.3 HYPERPARATHYROIDISM (HCC): ICD-10-CM

## 2019-05-24 PROCEDURE — 70492 CT SFT TSUE NCK W/O & W/DYE: CPT

## 2019-05-24 RX ADMIN — IOHEXOL 85 ML: 350 INJECTION, SOLUTION INTRAVENOUS at 09:45

## 2019-05-29 ENCOUNTER — OFFICE VISIT (OUTPATIENT)
Dept: HEMATOLOGY ONCOLOGY | Facility: CLINIC | Age: 54
End: 2019-05-29
Payer: COMMERCIAL

## 2019-05-29 VITALS
HEART RATE: 80 BPM | DIASTOLIC BLOOD PRESSURE: 70 MMHG | HEIGHT: 61 IN | TEMPERATURE: 98.1 F | WEIGHT: 210 LBS | SYSTOLIC BLOOD PRESSURE: 104 MMHG | BODY MASS INDEX: 39.65 KG/M2 | RESPIRATION RATE: 14 BRPM

## 2019-05-29 DIAGNOSIS — D68.61 APS (ANTIPHOSPHOLIPID SYNDROME) (HCC): ICD-10-CM

## 2019-05-29 DIAGNOSIS — Z86.2 HISTORY OF ITP: Primary | ICD-10-CM

## 2019-05-29 PROCEDURE — 99215 OFFICE O/P EST HI 40 MIN: CPT | Performed by: PHYSICIAN ASSISTANT

## 2019-06-03 LAB
INR PPP: 2.2 (ref 0.8–1.2)
PROTHROMBIN TIME: 22.2 SEC (ref 9.1–12)

## 2019-06-04 ENCOUNTER — TELEPHONE (OUTPATIENT)
Dept: HEMATOLOGY ONCOLOGY | Facility: CLINIC | Age: 54
End: 2019-06-04

## 2019-06-04 NOTE — TELEPHONE ENCOUNTER
Spoke to pt  INR is 2 2  She is to discontinue her Xarelto and just stay on her 3mg of coumadin daily and recheck her INR in 1 week per DOLLY Monsivais  She voiced understanding

## 2019-06-10 ENCOUNTER — OFFICE VISIT (OUTPATIENT)
Dept: SURGICAL ONCOLOGY | Facility: CLINIC | Age: 54
End: 2019-06-10
Payer: COMMERCIAL

## 2019-06-10 VITALS
TEMPERATURE: 98 F | HEIGHT: 61 IN | RESPIRATION RATE: 16 BRPM | DIASTOLIC BLOOD PRESSURE: 80 MMHG | SYSTOLIC BLOOD PRESSURE: 112 MMHG | BODY MASS INDEX: 40.02 KG/M2 | WEIGHT: 212 LBS | HEART RATE: 61 BPM

## 2019-06-10 DIAGNOSIS — E21.3 HYPERPARATHYROIDISM (HCC): Primary | ICD-10-CM

## 2019-06-10 PROCEDURE — 99213 OFFICE O/P EST LOW 20 MIN: CPT | Performed by: SURGERY

## 2019-06-11 LAB
T4 FREE SERPL-MCNC: 1.15 NG/DL (ref 0.82–1.77)
THYROGLOB AB SERPL-ACNC: <1 IU/ML (ref 0–0.9)
TSH SERPL DL<=0.005 MIU/L-ACNC: 6.14 UIU/ML (ref 0.45–4.5)

## 2019-06-13 DIAGNOSIS — E89.0 POSTOPERATIVE HYPOTHYROIDISM: ICD-10-CM

## 2019-06-13 RX ORDER — LEVOTHYROXINE SODIUM 0.12 MG/1
TABLET ORAL
Qty: 100 TABLET | Refills: 1 | Status: SHIPPED | OUTPATIENT
Start: 2019-06-13 | End: 2020-02-04

## 2019-08-05 ENCOUNTER — TELEPHONE (OUTPATIENT)
Dept: ENDOCRINOLOGY | Facility: CLINIC | Age: 54
End: 2019-08-05

## 2019-08-07 LAB
ALBUMIN SERPL-MCNC: 3.7 G/DL (ref 3.5–5.5)
BUN SERPL-MCNC: 20 MG/DL (ref 6–24)
BUN/CREAT SERPL: 26 (ref 9–23)
CALCIUM SERPL-MCNC: 10.9 MG/DL (ref 8.7–10.2)
CHLORIDE SERPL-SCNC: 109 MMOL/L (ref 96–106)
CO2 SERPL-SCNC: 21 MMOL/L (ref 20–29)
CREAT ?TM UR-SCNC: 69.8 UMOL/L
CREAT SERPL-MCNC: 0.76 MG/DL (ref 0.57–1)
EXT GLUCOSE BLD: 108
EXT PROTEIN URINE: 80.9
EXTERNAL BUN: 19
EXTERNAL CALCIUM: 10.6
EXTERNAL CHLORIDE: 108
EXTERNAL CO2: 21
EXTERNAL CREATININE: 0.73
EXTERNAL POTASSIUM: 4.4
EXTERNAL SODIUM: 139
GLUCOSE SERPL-MCNC: 109 MG/DL (ref 65–99)
POTASSIUM SERPL-SCNC: 4.3 MMOL/L (ref 3.5–5.2)
PROT/CREAT UR: 1159 MG/G{CREAT}
PTH-INTACT SERPL-MCNC: 64 PG/ML (ref 15–65)
SL AMB EGFR AFRICAN AMERICAN: 104 ML/MIN/1.73
SL AMB EGFR NON AFRICAN AMERICAN: 90 ML/MIN/1.73
SODIUM SERPL-SCNC: 141 MMOL/L (ref 134–144)
T4 FREE SERPL-MCNC: 1.42 NG/DL (ref 0.82–1.77)
TSH SERPL DL<=0.005 MIU/L-ACNC: 0.47 UIU/ML (ref 0.45–4.5)

## 2019-08-08 ENCOUNTER — TELEPHONE (OUTPATIENT)
Dept: ENDOCRINOLOGY | Facility: CLINIC | Age: 54
End: 2019-08-08

## 2019-08-08 ENCOUNTER — TELEPHONE (OUTPATIENT)
Dept: NEPHROLOGY | Facility: CLINIC | Age: 54
End: 2019-08-08

## 2019-08-08 DIAGNOSIS — E83.52 HYPERCALCEMIA: ICD-10-CM

## 2019-08-08 DIAGNOSIS — E21.3 HYPERPARATHYROIDISM (HCC): Primary | ICD-10-CM

## 2019-08-08 NOTE — TELEPHONE ENCOUNTER
Spoke to patient who understood calcium results   Pt has appt with Neph on Tuesday  Pt understood to have labs done, labs ordered  Per pt she is not taking any calcium supplements

## 2019-08-08 NOTE — TELEPHONE ENCOUNTER
----- Message from Jose G Radford PA-C sent at 8/8/2019 12:31 PM EDT -----  Please call the patient regarding her abnormal result  Calcium levels elevated at 10 9 which is high! May need to consider starting medication to lower calcium levels  However, advise patient to hydrate well for the next couple days and repeat blood work to see if calcium levels improve with improving hydration  Order calcium + albumin to do in 1-2 days

## 2019-08-13 ENCOUNTER — OFFICE VISIT (OUTPATIENT)
Dept: NEPHROLOGY | Facility: CLINIC | Age: 54
End: 2019-08-13
Payer: COMMERCIAL

## 2019-08-13 VITALS
HEIGHT: 61 IN | RESPIRATION RATE: 20 BRPM | SYSTOLIC BLOOD PRESSURE: 114 MMHG | WEIGHT: 210 LBS | HEART RATE: 58 BPM | DIASTOLIC BLOOD PRESSURE: 60 MMHG | BODY MASS INDEX: 39.65 KG/M2

## 2019-08-13 DIAGNOSIS — I10 ESSENTIAL HYPERTENSION: ICD-10-CM

## 2019-08-13 DIAGNOSIS — R80.9 PROTEINURIA, UNSPECIFIED TYPE: ICD-10-CM

## 2019-08-13 DIAGNOSIS — N18.2 CHRONIC KIDNEY DISEASE (CKD), STAGE II (MILD): Primary | ICD-10-CM

## 2019-08-13 DIAGNOSIS — E83.52 HYPERCALCEMIA: ICD-10-CM

## 2019-08-13 DIAGNOSIS — N20.0 NEPHROLITHIASIS: ICD-10-CM

## 2019-08-13 PROCEDURE — 3074F SYST BP LT 130 MM HG: CPT | Performed by: INTERNAL MEDICINE

## 2019-08-13 PROCEDURE — 99214 OFFICE O/P EST MOD 30 MIN: CPT | Performed by: INTERNAL MEDICINE

## 2019-08-13 NOTE — PROGRESS NOTES
NEPHROLOGY OUTPATIENT PROGRESS NOTE   Sly Lemons 48 y o  female MRN: [id number]  DATE: 8/13/2019  Reason for visit:   Chief Complaint   Patient presents with    Follow-up    Chronic Kidney Disease        Patient Instructions   1  CKD stage II/history of lupus nephritis  -sCr stable in 0 7-0 8 range  Latest C3 normal  Last sCr 0 76 from 8/6/19  -continue to avoid nonsteroidals(ibuprofen, aleve, advil, motrin, celebrex, naproxen, toradol)  -monitor BMP, f/u UA with microscopy as well along with repeat UpCr       2  proteinuria-+residual proteinuria from prior lupus nephritis (biopsy proven in 1996), now noted to have increased weight as well as HTN    -Off HCTZ due to hypercalcemia, on fosinopril 20mg twice daily  -on aldactone 25mg daily   -latest UpCr 1159 as of August 2019 - improved     3  volume status-appears euvolemic, continue spironolactone 25mg daily, potassium normal as of 8/6/19     4  hypercalcemia/hyperparathyroidism - follows with endo, +nephrolithiasis history with recent Urinalysis showing calcium oxalate crystals  -You must drink enough water to urinate at least 2L per day to prevent stone formation but this is difficult given her edema and need for diuretics  Unfortunately, diuretics will contribute to stone formation   -avoid high salt diet both for stone reduction and to help blood pressure stay controlled  -last serum calcium level 10 6 (slightly high)  -repeat calcium, albumin and PTH levels     5  hypertension-BP well controlled  -continue nifedipine XL 30mg daily(at night), fosinopril 20mg twice daily, spironolactone 25mg daily (at lunch)  -avoid caffeine/tea     6  vitamin D insufficiency - Vit D level 21 2 - on vitamin D, stop vitamin D     Obtain bloodwork and urine testing in 3 months and again just a BMP in 6 months prior to appointment  Return to office in 6 months  Consider calling this office for primary care doctorDaniel 85: [phone]  Ask to see Dr Maximiliano Miguel or Dr Mortimer Chough was seen today for follow-up and chronic kidney disease  Diagnoses and all orders for this visit:    Chronic kidney disease (CKD), stage II (mild)  -     Basic metabolic panel; Future  -     Urinalysis with microscopic; Future  -     Protein / creatinine ratio, urine; Future  -     Basic metabolic panel; Future  -     PTH, intact; Future    Essential hypertension    Nephrolithiasis    Hypercalcemia  -     Basic metabolic panel; Future  -     Albumin; Future  -     PTH, intact; Future    Proteinuria, unspecified type  -     Protein / creatinine ratio, urine; Future        Assessment/Plan:  1  CKD stage II/history of lupus nephritis  -sCr stable in 0 7-0 8 range  Latest C3 normal  Last sCr 0 76 from 8/6/19  -continue to avoid nonsteroidals(ibuprofen, aleve, advil, motrin, celebrex, naproxen, toradol)  -monitor BMP, f/u UA with microscopy as well along with repeat UpCr       2  proteinuria-+residual proteinuria from prior lupus nephritis (biopsy proven in 1996), now noted to have increased weight as well as HTN    -Off HCTZ due to hypercalcemia, on fosinopril 20mg twice daily  -on aldactone 25mg daily   -latest UpCr 1159 as of August 2019 - improved     3  volume status-mild LE edema, continue spironolactone 25mg daily, potassium normal as of 8/6/19     4  hypercalcemia/hyperparathyroidism - follows with endo, +nephrolithiasis history with recent Urinalysis showing calcium oxalate crystals  -You must drink enough water to urinate at least 2L per day to prevent stone formation but this is difficult given her edema and need for diuretics  Unfortunately, diuretics will contribute to stone formation   -avoid high salt diet both for stone reduction and to help blood pressure stay controlled  -last serum calcium level 10 6 (slightly high)  -repeat calcium, albumin and PTH levels     5  hypertension-BP well controlled     -continue nifedipine XL 30mg daily(at night), fosinopril 20mg twice daily, spironolactone 25mg daily (at lunch)  -avoid caffeine/tea     6  vitamin D insufficiency - Vit D level 21 2 - on vitamin D, stop vitamin D     Obtain bloodwork and urine testing in 3 months and again just a BMP in 6 months prior to appointment  Return to office in 6 months  Consider calling this office for primary care doctorDaniel  Sys[place]scott 83: [phone]  Ask to see Dr Olivia Muñoz or Dr Chadwick Oro / INTERVAL HISTORY:  48 y o  female presents in follow up of CKD  Oswaldo Crys denies any recent illness/hospitalizations/medication changes since last office visit  Denies NSAID use  She is excited about the potential of a new job  Lost her job in May 2019  BP well controlled  No recent kidney stones  Review of Systems   Constitutional: Negative for chills and fever  HENT: Negative for sore throat and trouble swallowing  Eyes: Negative for visual disturbance  Respiratory: Positive for shortness of breath (with over exertion at times)  Negative for cough  Cardiovascular: Negative for chest pain and leg swelling  Gastrointestinal: Negative for abdominal pain, constipation, diarrhea, nausea and vomiting  Endocrine: Negative for polyuria  Genitourinary: Negative for difficulty urinating, dysuria and hematuria  Musculoskeletal: Positive for back pain (occasional)  Negative for neck pain  Skin: Negative for rash  Neurological: Positive for numbness (down left leg gets tingling)  Negative for dizziness and light-headedness  Hematological: Negative for adenopathy  Psychiatric/Behavioral: The patient is not nervous/anxious  OBJECTIVE:  /60 (BP Location: Left arm, Patient Position: Sitting, Cuff Size: Large)   Pulse 58   Resp 20   Ht 5' 1" (1 549 m)   Wt 95 3 kg (210 lb)   BMI 39 68 kg/m²  Body mass index is 39 68 kg/m²      Physical exam:  Physical Exam   Constitutional: She appears well-developed and well-nourished  No distress  HENT:   Head: Normocephalic and atraumatic  Mouth/Throat: No oropharyngeal exudate  Eyes: Right eye exhibits no discharge  Left eye exhibits no discharge  No scleral icterus  Neck: Normal range of motion  Neck supple  No thyromegaly present  Cardiovascular: Normal rate and regular rhythm  No murmur heard  Pulmonary/Chest: Effort normal and breath sounds normal  No respiratory distress  She has no wheezes  Abdominal: Soft  Bowel sounds are normal  She exhibits no distension  Musculoskeletal: She exhibits edema (trace b/l LE)  Neurological: She is alert  She exhibits normal muscle tone  awake   Skin: Skin is warm and dry  No rash noted  She is not diaphoretic  Psychiatric: She has a normal mood and affect  Her behavior is normal    Nursing note and vitals reviewed  Medications:    Current Outpatient Medications:     Cholecalciferol (VITAMIN D) 2000 units CAPS, Take 1 tablet by mouth daily , Disp: , Rfl:     folic acid (FOLVITE) 1 mg tablet, Take 1 tablet by mouth daily, Disp: , Rfl:     fosinopril (MONOPRIL) 20 mg tablet, Take 2 tablets (40 mg total) by mouth daily (Patient taking differently: Take 20 mg by mouth 2 (two) times a day ), Disp: 90 tablet, Rfl: 3    IRON-VITAMINS PO, Take 1 tablet by mouth 3 (three) times a week  , Disp: , Rfl:     levothyroxine 125 mcg tablet, Take 1 tablet Monday-Saturday, 1 5 tablets on Sunday, Disp: 100 tablet, Rfl: 1    NIFEdipine (PROCARDIA XL) 30 mg 24 hr tablet, TAKE 1 TABLET DAILY, Disp: 90 tablet, Rfl: 1    nystatin (MYCOSTATIN) powder, Apply topically as needed, Disp: , Rfl:     spironolactone (ALDACTONE) 25 mg tablet, TAKE 1 TABLET DAILY, Disp: 90 tablet, Rfl: 3    warfarin (COUMADIN) 3 mg tablet, One tablet by mouth daily or as directed, Disp: 90 tablet, Rfl: 1    Allergies:   Allergies as of 08/13/2019 - Reviewed 08/13/2019   Allergen Reaction Noted    Penicillins Itching 04/25/2013 The following portions of the patient's history were reviewed and updated as appropriate: past family history, past surgical history and problem list     Laboratory Results:  Lab Results   Component Value Date    SODIUM 141 08/06/2019    K 4 3 08/06/2019     (H) 08/06/2019    CO2 21 08/06/2019    BUN 20 08/06/2019    CREATININE 0 76 08/06/2019    GLUC 109 (H) 08/06/2019    CALCIUM 10 6 08/06/2019        Lab Results   Component Value Date    PTH 60 11/18/2017    CALCIUM 10 6 08/06/2019    PHOS 2 4 (L) 11/18/2017       Portions of the record may have been created with voice recognition software   Occasional wrong word or "sound a like" substitutions may have occurred due to the inherent limitations of voice recognition software   Read the chart carefully and recognize, using context, where substitutions have occurred

## 2019-08-13 NOTE — PATIENT INSTRUCTIONS
1  CKD stage II/history of lupus nephritis  -sCr stable in 0 7-0 8 range  Latest C3 normal  Last sCr 0 76 from 8/6/19  -continue to avoid nonsteroidals(ibuprofen, aleve, advil, motrin, celebrex, naproxen, toradol)  -monitor BMP, f/u UA with microscopy as well along with repeat UpCr       2  proteinuria-+residual proteinuria from prior lupus nephritis (biopsy proven in 1996), now noted to have increased weight as well as HTN    -Off HCTZ due to hypercalcemia, on fosinopril 20mg twice daily  -on aldactone 25mg daily   -latest UpCr 1159 as of August 2019 - improved     3  volume status-appears euvolemic, continue spironolactone 25mg daily, potassium normal as of 8/6/19     4  hypercalcemia/hyperparathyroidism - follows with endo, +nephrolithiasis history with recent Urinalysis showing calcium oxalate crystals  -You must drink enough water to urinate at least 2L per day to prevent stone formation but this is difficult given her edema and need for diuretics  Unfortunately, diuretics will contribute to stone formation   -avoid high salt diet both for stone reduction and to help blood pressure stay controlled  -last serum calcium level 10 6 (slightly high)  -repeat calcium, albumin and PTH levels     5  hypertension-BP well controlled  -continue nifedipine XL 30mg daily(at night), fosinopril 20mg twice daily, spironolactone 25mg daily (at lunch)  -avoid caffeine/tea     6  vitamin D insufficiency - Vit D level 21 2 - on vitamin D, stop vitamin D     Obtain bloodwork and urine testing in 3 months and again just a BMP in 6 months prior to appointment  Return to office in 6 months  Consider calling this office for primary care doctor, Daniel Blake 83: [phone]   Ask to see Dr Mendez Haywood or Dr Marylen Atta

## 2019-08-13 NOTE — LETTER
August 13, 2019     [address] [place] [address]    Patient: Sandra Wong   YOB: 1965   Date of Visit: 8/13/2019       Dear Dr Amanda Valdes: Thank you for referring Sandra Wong to me for evaluation  Below are my notes for this consultation  Serum calcium recently elevated  I will check PTH level with next bloodwork but will hold vitamin D OTC due to higher serum calcium since May 2019  If you have questions, please do not hesitate to call me  I look forward to following your patient along with you  Sincerely,        Rhiannon Dutta DO        CC: No Recipients  Rhiannon Dutta DO  8/13/2019  7:52 AM  Incomplete  NEPHROLOGY OUTPATIENT PROGRESS NOTE   Sandra Wong 48 y o  female MRN: [id number]  DATE: 8/13/2019  Reason for visit:   Chief Complaint   Patient presents with    Follow-up    Chronic Kidney Disease        There are no Patient Instructions on file for this visit  Ramonita Sands was seen today for follow-up and chronic kidney disease  Diagnoses and all orders for this visit:    Chronic kidney disease (CKD), stage II (mild)  -     Basic metabolic panel; Future  -     Urinalysis with microscopic; Future  -     Protein / creatinine ratio, urine; Future  -     Basic metabolic panel; Future    Essential hypertension    Nephrolithiasis    Hypercalcemia  -     Basic metabolic panel; Future  -     Albumin; Future    Proteinuria, unspecified type  -     Protein / creatinine ratio, urine; Future        Assessment/Plan:  1  CKD stage II/history of lupus nephritis  sCr stable in 0 7-0 8 range   Latest C3 normal  Last sCr 0 76 from 8/6/19  -continue to avoid nonsteroidals(ibuprofen, aleve, advil, motrin, celebrex, naproxen, toradol)  -monitor BMP, f/u UA with microscopy as well along with repeat UpCr       2  proteinuria+residual proteinuria from prior lupus nephritis (biopsy proven in 1996), now noted to have increased weight as well as HTN    -Off HCTZ due to hypercalcemia, on fosinopril 20mg twice daily  -on aldactone 25mg daily   -latest UpCr 1159 as of August 2019 - improved     3  volume statusmild LE edema, continue spironolactone 25mg daily, potassium high normal as of Oct  2018     4  hypercalcemia/hyperparathyroidism - follows with endo, +nephrolithiasis history with recent Urinalysis showing calcium oxalate crystals  -You must drink enough water to urinate at least 2L per day to prevent stone formation but this is difficult given her edema and need for diuretics  Unfortunately, diuretics will contribute to stone formation   -avoid high salt diet both for stone reduction and to help blood pressure stay controlled  -last serum calcium level 10 6 (slightly high)     5  hypertensionBP well controlled  -continue nifedipine XL 30mg daily(at night), fosinopril 20mg twice daily, spironolactone 25mg daily (at lunch)  -avoid caffeine/tea     6  vitamin D insufficiency - Vit D level 21 2 - on vitamin D, stop      Obtain bloodwork and urine testing in 3 months and again just a BMP in 6 months prior to appointment  Return to office in 6 months  Consider calling this office for primary care doctor, Daniel Blake 85: [phone]  Ask to see Dr Jose Urena or Dr Wiliam Powell / INTERVAL HISTORY:  48 y o  female presents in follow up of CKD  Cam Squibb denies any recent illness/hospitalizations/medication changes since last office visit  ?NSAID use  She is excited about the potential of a new job  Lost her job in May 2019  Review of Systems   Constitutional: Negative for chills and fever  HENT: Negative for sore throat and trouble swallowing  Eyes: Negative for visual disturbance  Respiratory: Negative for cough and shortness of breath  Cardiovascular: Negative for chest pain and leg swelling  Gastrointestinal: Negative for diarrhea, nausea and vomiting     Endocrine: Negative for polyuria  Genitourinary: Negative for difficulty urinating, dysuria and hematuria  Musculoskeletal: Negative for back pain and neck pain  Skin: Negative for rash  Neurological: Negative for dizziness, light-headedness and numbness  Hematological: Negative for adenopathy  Does not bruise/bleed easily  Psychiatric/Behavioral: The patient is not nervous/anxious  OBJECTIVE:  /60 (BP Location: Left arm, Patient Position: Sitting, Cuff Size: Large)   Pulse 58   Resp 20   Ht 5' 1" (1 549 m)   Wt 95 3 kg (210 lb)   BMI 39 68 kg/m²   Body mass index is 39 68 kg/m²  Physical exam:  Physical Exam   Constitutional: She appears well-developed and well-nourished  No distress  HENT:   Head: Normocephalic and atraumatic  Mouth/Throat: No oropharyngeal exudate  Eyes: Right eye exhibits no discharge  Left eye exhibits no discharge  No scleral icterus  Neck: Normal range of motion  Neck supple  No thyromegaly present  Cardiovascular: Normal rate and regular rhythm  No murmur heard  Pulmonary/Chest: Effort normal and breath sounds normal  No respiratory distress  She has no wheezes  Abdominal: Soft  Bowel sounds are normal  She exhibits no distension  Musculoskeletal: She exhibits no edema  Neurological: She is alert  She exhibits normal muscle tone  awake   Skin: Skin is warm and dry  No rash noted  She is not diaphoretic  Psychiatric: She has a normal mood and affect  Her behavior is normal    Nursing note and vitals reviewed        Medications:    Current Outpatient Medications:     Cholecalciferol (VITAMIN D) 2000 units CAPS, Take 1 tablet by mouth daily , Disp: , Rfl:     folic acid (FOLVITE) 1 mg tablet, Take 1 tablet by mouth daily, Disp: , Rfl:     fosinopril (MONOPRIL) 20 mg tablet, Take 2 tablets (40 mg total) by mouth daily (Patient taking differently: Take 20 mg by mouth 2 (two) times a day ), Disp: 90 tablet, Rfl: 3    IRON-VITAMINS PO, Take 1 tablet by mouth 3 (three) times a week  , Disp: , Rfl:     levothyroxine 125 mcg tablet, Take 1 tablet Monday-Saturday, 1 5 tablets on Sunday, Disp: 100 tablet, Rfl: 1    NIFEdipine (PROCARDIA XL) 30 mg 24 hr tablet, TAKE 1 TABLET DAILY, Disp: 90 tablet, Rfl: 1    nystatin (MYCOSTATIN) powder, Apply topically as needed, Disp: , Rfl:     spironolactone (ALDACTONE) 25 mg tablet, TAKE 1 TABLET DAILY, Disp: 90 tablet, Rfl: 3    warfarin (COUMADIN) 3 mg tablet, One tablet by mouth daily or as directed, Disp: 90 tablet, Rfl: 1    Allergies: Allergies as of 08/13/2019 - Reviewed 08/13/2019   Allergen Reaction Noted    Penicillins Itching 04/25/2013       The following portions of the patient's history were reviewed and updated as appropriate: past family history, past surgical history and problem list     Laboratory Results:  Lab Results   Component Value Date    SODIUM 141 08/06/2019    K 4 3 08/06/2019     (H) 08/06/2019    CO2 21 08/06/2019    BUN 20 08/06/2019    CREATININE 0 76 08/06/2019    GLUC 109 (H) 08/06/2019    CALCIUM 10 6 08/06/2019        Lab Results   Component Value Date    PTH 60 11/18/2017    CALCIUM 10 6 08/06/2019    PHOS 2 4 (L) 11/18/2017       Portions of the record may have been created with voice recognition software   Occasional wrong word or "sound a like" substitutions may have occurred due to the inherent limitations of voice recognition software   Read the chart carefully and recognize, using context, where substitutions have occurred  Kaelyn Taylor DO  8/13/2019  7:48 AM  Sign at close encounter  [facility]  48 y o  female MRN: [id number]  DATE: 8/13/2019  Reason for visit:   Chief Complaint   Patient presents with    Follow-up    Chronic Kidney Disease        There are no Patient Instructions on file for this visit  Ganganavya Duval was seen today for follow-up and chronic kidney disease      Diagnoses and all orders for this visit:    Chronic kidney disease (CKD), stage II (mild)    Essential hypertension    Nephrolithiasis    Hypercalcemia    Proteinuria, unspecified type        Assessment/Plan:  1  CKD stage II/history of lupus nephritis  sCr stable in 0 7-0 8 range  Latest C3 normal  Last sCr 0 76 from 8/6/19  -continue to avoid nonsteroidals(ibuprofen, aleve, advil, motrin, celebrex, naproxen, toradol)  -monitor BMP, f/u UA with microscopy as well along with repeat UpCr       2  proteinuria+residual proteinuria from prior lupus nephritis (biopsy proven in 1996), now noted to have increased weight as well as HTN    -Off HCTZ due to hypercalcemia, on fosinopril 20mg twice daily  -on aldactone 25mg daily   -latest UpCr 1159 as of August 2019 - improved     3  volume statusmild LE edema, continue spironolactone 25mg daily, potassium high normal as of Oct  2018     4  hypercalcemia/hyperparathyroidism - follows with endo, +nephrolithiasis history with recent Urinalysis showing calcium oxalate crystals  -You must drink enough water to urinate at least 2L per day to prevent stone formation but this is difficult given her edema and need for diuretics  Unfortunately, diuretics will contribute to stone formation   -avoid high salt diet both for stone reduction and to help blood pressure stay controlled  -last serum calcium level 10 6 (slightly high)     5  hypertensionBP well controlled  -continue nifedipine XL 30mg daily(at night), fosinopril 20mg twice daily, spironolactone 25mg daily (at lunch)  -avoid caffeine/tea     6  vitamin D insufficiency - Vit D level 21 2 - continue vitamin D 2000u daily, should discuss this with endocrinology     Obtain bloodwork and urine testing in 3 months and again just a BMP in 6 months prior to appointment  Return to office in 6 months  Consider calling this office for primary care doctor, Daniel Blake 85: [phone]   Ask to see Dr Sacha Gonzáles or Dr Kaye Costello / Yudelka Primes HISTORY:  48 y o  female presents in follow up of CKD  Valentino Hosteller denies any recent illness/hospitalizations/medication changes since last office visit  ?NSAID use    Review of Systems   Constitutional: Negative for chills and fever  HENT: Negative for sore throat and trouble swallowing  Eyes: Negative for visual disturbance  Respiratory: Negative for cough and shortness of breath  Cardiovascular: Negative for chest pain and leg swelling  Gastrointestinal: Negative for diarrhea, nausea and vomiting  Endocrine: Negative for polyuria  Genitourinary: Negative for difficulty urinating, dysuria and hematuria  Musculoskeletal: Negative for back pain and neck pain  Skin: Negative for rash  Neurological: Negative for dizziness, light-headedness and numbness  Hematological: Negative for adenopathy  Does not bruise/bleed easily  Psychiatric/Behavioral: The patient is not nervous/anxious  OBJECTIVE:  /60 (BP Location: Left arm, Patient Position: Sitting, Cuff Size: Large)   Pulse 58   Resp 20   Ht 5' 1" (1 549 m)   Wt 95 3 kg (210 lb)   BMI 39 68 kg/m²   Body mass index is 39 68 kg/m²  Physical exam:  Physical Exam   Constitutional: She appears well-developed and well-nourished  No distress  HENT:   Head: Normocephalic and atraumatic  Mouth/Throat: No oropharyngeal exudate  Eyes: Right eye exhibits no discharge  Left eye exhibits no discharge  No scleral icterus  Neck: Normal range of motion  Neck supple  No thyromegaly present  Cardiovascular: Normal rate and regular rhythm  No murmur heard  Pulmonary/Chest: Effort normal and breath sounds normal  No respiratory distress  She has no wheezes  Abdominal: Soft  Bowel sounds are normal  She exhibits no distension  Musculoskeletal: She exhibits no edema  Neurological: She is alert  She exhibits normal muscle tone  awake   Skin: Skin is warm and dry  No rash noted  She is not diaphoretic     Psychiatric: She has a normal mood and affect  Her behavior is normal    Nursing note and vitals reviewed  Medications:    Current Outpatient Medications:     Cholecalciferol (VITAMIN D) 2000 units CAPS, Take 1 tablet by mouth daily , Disp: , Rfl:     folic acid (FOLVITE) 1 mg tablet, Take 1 tablet by mouth daily, Disp: , Rfl:     fosinopril (MONOPRIL) 20 mg tablet, Take 2 tablets (40 mg total) by mouth daily (Patient taking differently: Take 20 mg by mouth 2 (two) times a day ), Disp: 90 tablet, Rfl: 3    IRON-VITAMINS PO, Take 1 tablet by mouth 3 (three) times a week  , Disp: , Rfl:     levothyroxine 125 mcg tablet, Take 1 tablet Monday-Saturday, 1 5 tablets on Sunday, Disp: 100 tablet, Rfl: 1    NIFEdipine (PROCARDIA XL) 30 mg 24 hr tablet, TAKE 1 TABLET DAILY, Disp: 90 tablet, Rfl: 1    nystatin (MYCOSTATIN) powder, Apply topically as needed, Disp: , Rfl:     spironolactone (ALDACTONE) 25 mg tablet, TAKE 1 TABLET DAILY, Disp: 90 tablet, Rfl: 3    warfarin (COUMADIN) 3 mg tablet, One tablet by mouth daily or as directed, Disp: 90 tablet, Rfl: 1    Allergies:   Allergies as of 08/13/2019 - Reviewed 08/13/2019   Allergen Reaction Noted    Penicillins Itching 04/25/2013       The following portions of the patient's history were reviewed and updated as appropriate: past family history, past surgical history and problem list     Laboratory Results:  Lab Results   Component Value Date    SODIUM 141 08/06/2019    K 4 3 08/06/2019     (H) 08/06/2019    CO2 21 08/06/2019    BUN 20 08/06/2019    CREATININE 0 76 08/06/2019    GLUC 109 (H) 08/06/2019    CALCIUM 10 6 08/06/2019        Lab Results   Component Value Date    PTH 60 11/18/2017    CALCIUM 10 6 08/06/2019    PHOS 2 4 (L) 11/18/2017       Portions of the record may have been created with voice recognition software   Occasional wrong word or "sound a like" substitutions may have occurred due to the inherent limitations of voice recognition software   Read the chart carefully and recognize, using context, where substitutions have occurred

## 2019-08-27 ENCOUNTER — OFFICE VISIT (OUTPATIENT)
Dept: INTERNAL MEDICINE CLINIC | Facility: CLINIC | Age: 54
End: 2019-08-27
Payer: COMMERCIAL

## 2019-08-27 VITALS
HEART RATE: 58 BPM | WEIGHT: 210.2 LBS | HEIGHT: 61 IN | OXYGEN SATURATION: 95 % | TEMPERATURE: 97.9 F | RESPIRATION RATE: 14 BRPM | DIASTOLIC BLOOD PRESSURE: 88 MMHG | BODY MASS INDEX: 39.69 KG/M2 | SYSTOLIC BLOOD PRESSURE: 132 MMHG

## 2019-08-27 DIAGNOSIS — Z23 NEED FOR TETANUS BOOSTER: Primary | ICD-10-CM

## 2019-08-27 DIAGNOSIS — Z00.00 ANNUAL PHYSICAL EXAM: ICD-10-CM

## 2019-08-27 DIAGNOSIS — Z23 NEED FOR MMR VACCINE: ICD-10-CM

## 2019-08-27 DIAGNOSIS — Z12.11 SCREENING FOR MALIGNANT NEOPLASM OF COLON: ICD-10-CM

## 2019-08-27 DIAGNOSIS — Z11.1 SCREENING FOR TUBERCULOSIS: ICD-10-CM

## 2019-08-27 PROCEDURE — 86580 TB INTRADERMAL TEST: CPT

## 2019-08-27 PROCEDURE — 90715 TDAP VACCINE 7 YRS/> IM: CPT

## 2019-08-27 PROCEDURE — 90471 IMMUNIZATION ADMIN: CPT

## 2019-08-27 PROCEDURE — 99396 PREV VISIT EST AGE 40-64: CPT | Performed by: NURSE PRACTITIONER

## 2019-08-27 NOTE — PROGRESS NOTES
ADULT ANNUAL PHYSICAL  [facility]    NAME: Donato Salter  AGE: 48 y o  SEX: female  : 1965     DATE: 9/3/2019     Assessment and Plan:     Problem List Items Addressed This Visit     None      Visit Diagnoses     Need for tetanus booster    -  Primary    Relevant Orders    TDAP VACCINE GREATER THAN OR EQUAL TO 6YO IM (Completed)    Hepatitis B surface antibody    Screening for malignant neoplasm of colon        Relevant Orders    Cologuard    Need for MMR vaccine        Relevant Orders    Hepatitis B surface antibody    Measles/Mumps/Rubella Immunity    Annual physical exam        Screening for tuberculosis        Relevant Orders    TB Skin Test (Completed)          Immunizations and preventive care screenings were discussed with patient today  Appropriate education was printed on patient's after visit summary  Counseling:  · BMI Counseling: Body mass index is 40 04 kg/m²  Discussed the patient's BMI with her  The BMI is above average  BMI counseling and education was provided to the patient  Nutrition recommendations include reducing portion sizes and decreasing overall calorie intake  Exercise recommendations include exercising 3-5 times per week  Return in about 6 months (around 2020) for Next scheduled follow up  Chief Complaint:     Chief Complaint   Patient presents with   [facility]     new patient, forms filled out      History of Present Illness:     Adult Annual Physical   Patient here for a comprehensive physical exam  The patient reports no problems  Diet and Physical Activity  · Diet/Nutrition: well balanced diet  · Exercise: walking        Depression Screening  PHQ-9 Depression Screening    PHQ-9:    Frequency of the following problems over the past two weeks:       Little interest or pleasure in doing things:  0 - not at all  Feeling down, depressed, or hopeless:  0 - not at all  PHQ-2 Score:  0       General Health  · Sleep: sleeps well  · Hearing: normal - bilateral   · Vision: no vision problems  · Dental: regular dental visits  ·    Review of Systems:     Review of Systems   Constitutional: Negative  HENT: Negative  Eyes: Negative  Respiratory: Negative  Cardiovascular: Negative  Gastrointestinal: Negative  Musculoskeletal: Negative  Neurological: Negative         Past Medical History:     Past Medical History:   Diagnosis Date    Gestational diabetes     Hyperparathyroidism ([facility] )     Hypertension     Idiopathic thrombocytopenic purpura (ITP) ([facility] )     Lupus (HCC)     Vitamin D deficiency       Past Surgical History:     Past Surgical History:   Procedure Laterality Date     SECTION          HERNIA REPAIR      HIP SURGERY      left side, bone decompression    THYROID SURGERY        Social History:     Social History     Socioeconomic History    Marital status: /Civil Union     Spouse name: None    Number of children: None    Years of education: None    Highest education level: None   Occupational History    None   Social Needs    Financial resource strain: None    Food insecurity:     Worry: None     Inability: None    Transportation needs:     Medical: None     Non-medical: None   Tobacco Use    Smoking status: Never Smoker    Smokeless tobacco: Never Used   Substance and Sexual Activity    Alcohol use: Not Currently    Drug use: No    Sexual activity: None   Lifestyle    Physical activity:     Days per week: None     Minutes per session: None    Stress: None   Relationships    Social connections:     Talks on phone: None     Gets together: None     Attends [employer]ism service: None     Active member of club or organization: None     Attends meetings of clubs or organizations: None     Relationship status: None    Intimate partner violence:     Fear of current or ex partner: None     Emotionally abused: None     Physically abused: None Forced sexual activity: None   Other Topics Concern    None   Social History Narrative    None      Family History:     Family History   Problem Relation Age of Onset    Diabetes type II Father     Diabetes Father     Stroke Father     Diabetes type II Paternal Aunt     Diabetes type II Paternal Uncle     Diabetes type II Paternal Grandmother     Stomach cancer Paternal Grandfather       Current Medications:     Current Outpatient Medications   Medication Sig Dispense Refill    folic acid (FOLVITE) 1 mg tablet Take 1 tablet by mouth daily      fosinopril (MONOPRIL) 20 mg tablet Take 2 tablets (40 mg total) by mouth daily (Patient taking differently: Take 20 mg by mouth 2 (two) times a day ) 90 tablet 3    levothyroxine 125 mcg tablet Take 1 tablet Monday-Saturday, 1 5 tablets on Sunday 100 tablet 1    NIFEdipine (PROCARDIA XL) 30 mg 24 hr tablet TAKE 1 TABLET DAILY 90 tablet 1    nystatin (MYCOSTATIN) powder Apply topically as needed      spironolactone (ALDACTONE) 25 mg tablet TAKE 1 TABLET DAILY 90 tablet 3    warfarin (COUMADIN) 3 mg tablet One tablet by mouth daily or as directed 90 tablet 1    IRON-VITAMINS PO Take 1 tablet by mouth 3 (three) times a week         No current facility-administered medications for this visit  Allergies: Allergies   Allergen Reactions    Penicillins Itching      Physical Exam:     /88 (BP Location: Left arm, Patient Position: Sitting, Cuff Size: Large)   Pulse 58   Temp 97 9 °F (36 6 °C) (Oral)   Resp 14   Ht 5' 0 75" (1 543 m)   Wt 95 3 kg (210 lb 3 2 oz)   SpO2 95%   BMI 40 04 kg/m²     Physical Exam   Constitutional: She is oriented to person, place, and time  She appears well-developed and well-nourished  HENT:   Head: Normocephalic and atraumatic     Right Ear: External ear normal    Left Ear: External ear normal    Nose: Nose normal    Mouth/Throat: Oropharynx is clear and moist    Eyes: Pupils are equal, round, and reactive to light  Conjunctivae are normal    Neck: Normal range of motion  Neck supple  Cardiovascular: Normal rate and regular rhythm  Pulmonary/Chest: Effort normal and breath sounds normal    Abdominal: Soft  Bowel sounds are normal    Musculoskeletal: Normal range of motion  Neurological: She is alert and oriented to person, place, and time  Skin: Skin is warm and dry  Nursing note and vitals reviewed        PEACE Harrington  [facility]

## 2019-08-27 NOTE — PATIENT INSTRUCTIONS

## 2019-08-29 LAB
HBV SURFACE AB SER QL: NON REACTIVE
MEV IGG SER IA-ACNC: 28.1 AU/ML
MUV IGG SER IA-ACNC: 186 AU/ML
RUBV IGG SERPL IA-ACNC: 6.27 INDEX

## 2019-09-03 ENCOUNTER — CLINICAL SUPPORT (OUTPATIENT)
Dept: INTERNAL MEDICINE CLINIC | Facility: CLINIC | Age: 54
End: 2019-09-03
Payer: COMMERCIAL

## 2019-09-03 DIAGNOSIS — Z23 NEED FOR VACCINATION: Primary | ICD-10-CM

## 2019-09-03 PROCEDURE — 90471 IMMUNIZATION ADMIN: CPT

## 2019-09-03 PROCEDURE — 90746 HEPB VACCINE 3 DOSE ADULT IM: CPT

## 2019-09-24 ENCOUNTER — TELEPHONE (OUTPATIENT)
Dept: ENDOCRINOLOGY | Facility: CLINIC | Age: 54
End: 2019-09-24

## 2019-09-28 ENCOUNTER — APPOINTMENT (OUTPATIENT)
Dept: LAB | Age: 54
End: 2019-09-28
Attending: PREVENTIVE MEDICINE

## 2019-09-28 ENCOUNTER — TRANSCRIBE ORDERS (OUTPATIENT)
Dept: ADMINISTRATIVE | Age: 54
End: 2019-09-28

## 2019-09-28 DIAGNOSIS — Z02.1 PHYSICAL EXAM, PRE-EMPLOYMENT: Primary | ICD-10-CM

## 2019-09-28 DIAGNOSIS — Z02.1 PHYSICAL EXAM, PRE-EMPLOYMENT: ICD-10-CM

## 2019-09-28 PROCEDURE — 86480 TB TEST CELL IMMUN MEASURE: CPT

## 2019-09-28 PROCEDURE — 36415 COLL VENOUS BLD VENIPUNCTURE: CPT

## 2019-09-28 PROCEDURE — 86787 VARICELLA-ZOSTER ANTIBODY: CPT

## 2019-09-30 LAB
GAMMA INTERFERON BACKGROUND BLD IA-ACNC: 0.05 IU/ML
M TB IFN-G BLD-IMP: NEGATIVE
M TB IFN-G CD4+ BCKGRND COR BLD-ACNC: -0.01 IU/ML
M TB IFN-G CD4+ BCKGRND COR BLD-ACNC: 0 IU/ML
MITOGEN IGNF BCKGRD COR BLD-ACNC: >10 IU/ML
VZV IGG SER IA-ACNC: NORMAL

## 2019-10-01 ENCOUNTER — ANTICOAG VISIT (OUTPATIENT)
Dept: HEMATOLOGY ONCOLOGY | Facility: MEDICAL CENTER | Age: 54
End: 2019-10-01

## 2019-10-01 ENCOUNTER — OFFICE VISIT (OUTPATIENT)
Dept: ENDOCRINOLOGY | Facility: CLINIC | Age: 54
End: 2019-10-01
Payer: COMMERCIAL

## 2019-10-01 ENCOUNTER — TELEPHONE (OUTPATIENT)
Dept: HEMATOLOGY ONCOLOGY | Facility: CLINIC | Age: 54
End: 2019-10-01

## 2019-10-01 VITALS
SYSTOLIC BLOOD PRESSURE: 138 MMHG | WEIGHT: 211 LBS | HEIGHT: 61 IN | BODY MASS INDEX: 39.84 KG/M2 | DIASTOLIC BLOOD PRESSURE: 72 MMHG

## 2019-10-01 DIAGNOSIS — Z85.850 HISTORY OF THYROID CANCER: ICD-10-CM

## 2019-10-01 DIAGNOSIS — E55.9 VITAMIN D DEFICIENCY: ICD-10-CM

## 2019-10-01 DIAGNOSIS — E89.0 POSTOPERATIVE HYPOTHYROIDISM: Primary | ICD-10-CM

## 2019-10-01 DIAGNOSIS — E21.3 HYPERPARATHYROIDISM (HCC): ICD-10-CM

## 2019-10-01 LAB
INR PPP: 2.5 (ref 0.8–1.2)
PROTHROMBIN TIME: 24 SEC (ref 9.1–12)
T4 FREE SERPL-MCNC: 1.64 NG/DL (ref 0.82–1.77)
TSH SERPL DL<=0.005 MIU/L-ACNC: 0.35 UIU/ML (ref 0.45–4.5)

## 2019-10-01 PROCEDURE — 99214 OFFICE O/P EST MOD 30 MIN: CPT | Performed by: PHYSICIAN ASSISTANT

## 2019-10-01 NOTE — TELEPHONE ENCOUNTER
Patient called on 10/1 regarding fmla paper work that was dropped off at the [place] location yesterday on 9/30  Patient stated that if the paper work was completed and faxed that her employer has yet to receive it  Patient would like a call back when possible [phone]

## 2019-10-01 NOTE — PROGRESS NOTES
Patient Progress Note    CC: hypothyroidism, primary hyperparathyroidism     Referring Provider  Henrry Carpenter, [address] [facility], [address] [facility] [place]      History of Present Illness:     Patient is a 62-year-old female here for follow-up of postsurgical hypothyroidism, primary hyperparathyroidism, vitamin-D deficiency  Hypothyroidism:  Patient has a history of papillary thyroid cancer  Patient had right hemithyroidectomy and right parathyroid gland removal in 2006  In 2010 patient underwent completion of thyroidectomy  Pathology was consistent with papillary thyroid cancer  At that time she also underwent removal of left parathyroid gland  Patient is on levothyroxine 125 mcg 1 tablet daily Monday through Saturday and 1 5 tablets on Sunday  Patient is taking it 1 hr before breakfast on an empty stomach and at least 4 hrs apart from supplements  Tolerating medication well  Hyperparathyroidism: despite removal of 2 parathyroid glands, she continues to have elevated calcium levels and elevated PTH  She had a 24 hour urine calcium done and calcium creatinine excretion ratio was greater than 0 01 which is consistent with primary hyperparathyroidism  Patient did follow-up with Surgical Oncology to discuss treatment options  She had a CT scan done which suggested a right-sided target  Dr Boaz Kelly did suggest surgery, however surgery was canceled by patient  She is on chronic blood thinners for history of hypercoagulability and PE  For the surgery she would need to be off Coumadin for 5 days and would need a Lovenox bridge which she stated at the time that she could not afford  It was decided that if surgery would not done, she would follow-up with Dr Boaz Kelly in 3 months  She is avoiding any calcium supplementation  She reports she was advised by Dr Bubba Villatoro to stop vitamin D3 supplementation  She does have a history of nephrolithiasis but denies history of fractured bones        Patient Active Problem List Diagnosis    Chronic deep vein thrombosis (DVT) of proximal vein of both lower extremities (HCC)    History of ITP    Benign hypertensive CKD    Chronic kidney disease (CKD), stage II (mild)    Edema    Gross hematuria    Hypercalcemia    [facility] [address] [facility]    Hypertension    Nephrolithiasis    Proteinuria    Systemic lupus erythematosus ([facility] [address] [facility]    Vitamin D deficiency    Elevated PTHrP level    History of thyroid cancer     Past Medical History:   Diagnosis Date    Gestational diabetes     Hyperparathyroidism ([facility] [address] [facility]     Hypertension     Idiopathic thrombocytopenic purpura (ITP) (HCC)     Lupus (HCC)     Vitamin D deficiency       Past Surgical History:   Procedure Laterality Date     SECTION          HERNIA REPAIR      HIP SURGERY      left side, bone decompression    THYROID SURGERY        Family History   Problem Relation Age of Onset    Diabetes type II Father     Diabetes Father     Stroke Father     Diabetes type II Paternal Aunt     Diabetes type II Paternal Uncle     Diabetes type II Paternal Grandmother     Stomach cancer Paternal Grandfather      Social History     Tobacco Use    Smoking status: Never Smoker    Smokeless tobacco: Never Used   Substance Use Topics    Alcohol use: Not Currently     Allergies   Allergen Reactions    Penicillins Itching     Current Outpatient Medications   Medication Sig Dispense Refill    folic acid (FOLVITE) 1 mg tablet Take 1 tablet by mouth daily      fosinopril (MONOPRIL) 20 mg tablet Take 2 tablets (40 mg total) by mouth daily (Patient taking differently: Take 20 mg by mouth 2 (two) times a day ) 90 tablet 3    levothyroxine 125 mcg tablet Take 1 tablet Monday-Saturday, 1 5 tablets on  100 tablet 1    NIFEdipine (PROCARDIA XL) 30 mg 24 hr tablet TAKE 1 TABLET DAILY 90 tablet 1    nystatin (MYCOSTATIN) powder Apply topically as needed      spironolactone (ALDACTONE) 25 mg tablet TAKE 1 TABLET DAILY 90 tablet 3    warfarin (COUMADIN) 3 mg tablet One tablet by mouth daily or as directed 90 tablet 1    IRON-VITAMINS PO Take 1 tablet by mouth 3 (three) times a week         No current facility-administered medications for this visit  Review of Systems   Constitutional: Negative for activity change, appetite change, fatigue and unexpected weight change  HENT: Negative for trouble swallowing  Eyes: Negative for visual disturbance  Respiratory: Negative for shortness of breath  Cardiovascular: Negative for chest pain and palpitations  Gastrointestinal: Negative for constipation and diarrhea  Endocrine: Negative for cold intolerance, heat intolerance, polydipsia and polyuria  Musculoskeletal: Negative  Skin: Negative  Neurological: Negative for tremors  Psychiatric/Behavioral: Negative  Physical Exam:  Body mass index is 40 2 kg/m²  /72   Ht 5' 0 75" (1 543 m)   Wt 95 7 kg (211 lb)   BMI 40 20 kg/m²    Wt Readings from Last 3 Encounters:   10/01/19 95 7 kg (211 lb)   08/27/19 95 3 kg (210 lb 3 2 oz)   08/13/19 95 3 kg (210 lb)       Physical Exam   Constitutional: She appears well-developed and well-nourished  HENT:   Head: Normocephalic  Eyes: Pupils are equal, round, and reactive to light  EOM are normal  No scleral icterus  Neck: Neck supple  No thyromegaly present  Cardiovascular: Normal rate and regular rhythm  No murmur heard  Pulses:       Radial pulses are 2+ on the right side, and 2+ on the left side  Pulmonary/Chest: Effort normal and breath sounds normal  No respiratory distress  She has no wheezes  Neurological: She is alert  She has normal reflexes  Skin: Skin is warm and dry  Psychiatric: She has a normal mood and affect  Nursing note and vitals reviewed  Patient's shoes and socks were not removed  Labs:    Ref   Range 5/15/2019 07:52 8/6/2019 00:00 8/6/2019 07:46   Sodium Latest Ref Range: 134 - 144 mmol/L  139 141   Potassium Latest Ref Range: 3 5 - 5 2 mmol/L  4 4 4 3   Chloride Latest Ref Range: 96 - 106 mmol/L  108 109 (H)   CO2 Latest Ref Range: 20 - 29 mmol/L  21 21   BUN Latest Ref Range: 6 - 24 mg/dL  19 20   Creatinine Latest Ref Range: 0 57 - 1 00 mg/dL  0 73 0 76   SL AMB BUN/CREATININE RATIO Latest Ref Range: 9 - 23    26 (H)   Glucose, Random Latest Ref Range: 65 - 99 mg/dL  108 109 (H)   Calcium Unknown  10 6    Albumin Latest Ref Range: 3 5 - 5 5 g/dL   3 7   eGFR African American Latest Ref Range: >59 mL/min/1 73   104   eGFR Non  Latest Ref Range: >59 mL/min/1 73   90      Ref  Range 6/10/2019 09:29 8/6/2019 07:46 9/30/2019 11:41   TSH, POC Latest Ref Range: 0 450 - 4 500 uIU/mL 6 140 (H) 0 467 0 354 (L)   Free T4 Latest Ref Range: 0 82 - 1 77 ng/dL 1 15 1 42 1 64   THYROGLOBULIN AB Latest Ref Range: 0 0 - 0 9 IU/mL <1 0        Ref  Range 12/17/2018 07:36   CALCIUM URINE Latest Ref Range: Not Estab  mg/dL 13 5   CALCIUM, 24UR Latest Ref Range: 100 0 - 300 0 mg/24 hr 202 5   EXT Creatinine Urine Latest Ref Range: Not Estab  mg/dL 84 7     Plan:    Diagnoses and all orders for this visit:    Postoperative hypothyroidism  Thyroid function tests abnormal, TSH low at 0 354 and free T4 normal at 1 64  Decrease dose of levothyroxine  Take levothyroxine 125 mcg 1 tablet daily  Repeat thyroid function tests in 6 weeks  -     T4, free; Future  -     TSH, 3rd generation; Future  -     T4, free; Future  -     TSH, 3rd generation; Future    History of thyroid cancer  Thyroglobulin level previously undetectable and thyroglobulin antibody less than 1 0  Head neck US in April 2019 did not show evidence of recurrent or metastatic disease  Check thyroglobulin level in 6 weeks  -     Thyroglobulin;  Future    Vitamin D deficiency  Per patient, discontinued by nephrologist     Hyperparathyroidism ([facility] [address] )  Calcium level elevated at 10 9  Continue follow-up with Dr Radha Perez (surgical oncology)  Avoid calcium supplementation  Hydrate well  She admits to not hydrating well recently  Continue to monitor blood work   -     Basic metabolic panel; Future  -     Albumin; Future  -     PTH, intact; Future      Discussed with the patient and all questions fully answered  She will call me if any problems arise      Counseled patient on diagnostic results, prognosis, risk and benefit of treatment options, instruction for management, importance of treatment compliance, risk  factor reduction and impressions      Diana Jin PA-C

## 2019-10-01 NOTE — TELEPHONE ENCOUNTER
Jean Carlos for pt stating that her paperwork was faxed over yesterday to [phone]  I also let her know that I faxed it again today

## 2019-10-11 DIAGNOSIS — I82.5Y3 CHRONIC DEEP VEIN THROMBOSIS (DVT) OF PROXIMAL VEIN OF BOTH LOWER EXTREMITIES (HCC): Chronic | ICD-10-CM

## 2019-10-11 DIAGNOSIS — I10 ESSENTIAL HYPERTENSION: ICD-10-CM

## 2019-10-11 RX ORDER — WARFARIN SODIUM 3 MG/1
TABLET ORAL
Qty: 90 TABLET | Refills: 4 | Status: SHIPPED | OUTPATIENT
Start: 2019-10-11 | End: 2020-02-05 | Stop reason: SDUPTHER

## 2019-10-11 RX ORDER — NIFEDIPINE 30 MG/1
TABLET, EXTENDED RELEASE ORAL
Qty: 90 TABLET | Refills: 4 | Status: SHIPPED | OUTPATIENT
Start: 2019-10-11 | End: 2020-01-20 | Stop reason: SDUPTHER

## 2019-10-14 ENCOUNTER — TELEPHONE (OUTPATIENT)
Dept: GYNECOLOGIC ONCOLOGY | Facility: CLINIC | Age: 54
End: 2019-10-14

## 2019-11-14 ENCOUNTER — OFFICE VISIT (OUTPATIENT)
Dept: INTERNAL MEDICINE CLINIC | Facility: CLINIC | Age: 54
End: 2019-11-14
Payer: COMMERCIAL

## 2019-11-14 VITALS
HEIGHT: 61 IN | TEMPERATURE: 97.1 F | DIASTOLIC BLOOD PRESSURE: 78 MMHG | WEIGHT: 213.4 LBS | BODY MASS INDEX: 40.29 KG/M2 | SYSTOLIC BLOOD PRESSURE: 122 MMHG | RESPIRATION RATE: 16 BRPM | HEART RATE: 69 BPM | OXYGEN SATURATION: 97 %

## 2019-11-14 DIAGNOSIS — Z12.11 SCREENING FOR MALIGNANT NEOPLASM OF COLON: ICD-10-CM

## 2019-11-14 DIAGNOSIS — J06.9 UPPER RESPIRATORY TRACT INFECTION, UNSPECIFIED TYPE: Primary | ICD-10-CM

## 2019-11-14 PROCEDURE — 99214 OFFICE O/P EST MOD 30 MIN: CPT | Performed by: NURSE PRACTITIONER

## 2019-11-14 RX ORDER — AZITHROMYCIN 250 MG/1
TABLET, FILM COATED ORAL
Qty: 6 TABLET | Refills: 0 | Status: SHIPPED | OUTPATIENT
Start: 2019-11-14 | End: 2019-11-18

## 2019-11-14 RX ORDER — WARFARIN SODIUM 5 MG/1
TABLET ORAL
COMMUNITY
Start: 2010-07-01 | End: 2020-02-05 | Stop reason: DRUGHIGH

## 2019-11-14 NOTE — PROGRESS NOTES
Assessment/Plan:    Upper respiratory tract infection  Drink plenty of fluids and rest  May take over the counter mucinex and cough syrup/cough drops  Call if worsening  Start antibiotics       Diagnoses and all orders for this visit:    Upper respiratory tract infection, unspecified type  -     azithromycin (ZITHROMAX) 250 mg tablet; Take 2 tablets today then 1 tablet daily x 4 days    Screening for malignant neoplasm of colon  -     Cologuard; Future    Other orders  -     warfarin (COUMADIN) 5 mg tablet; Take as directed  -     Cancel: Ambulatory referral to Gastroenterology; Future          Subjective:      Patient ID: Chana Espino is a 48 y o  female  Cough   This is a new problem  The current episode started in the past 7 days  The problem has been gradually worsening  The problem occurs every few minutes  The cough is productive of sputum  Associated symptoms include headaches, nasal congestion, rhinorrhea and a sore throat  The following portions of the patient's history were reviewed and updated as appropriate: allergies, current medications, past family history, past medical history, past social history, past surgical history and problem list     Review of Systems   Constitutional: Negative  HENT: Positive for rhinorrhea and sore throat  Eyes: Negative  Respiratory: Positive for cough  Cardiovascular: Negative  Gastrointestinal: Negative  Musculoskeletal: Negative  Neurological: Positive for headaches  Objective:      /78   Pulse 69   Temp (!) 97 1 °F (36 2 °C) (Tympanic)   Resp 16   Ht 5' 0 7" (1 542 m)   Wt 96 8 kg (213 lb 6 4 oz)   SpO2 97%   BMI 40 72 kg/m²          Physical Exam   Constitutional: She is oriented to person, place, and time  She appears well-developed and well-nourished  HENT:   Head: Normocephalic and atraumatic     Right Ear: External ear normal    Left Ear: External ear normal    Nose: Nose normal    Mouth/Throat: Oropharynx is clear and moist    Eyes: Pupils are equal, round, and reactive to light  Conjunctivae are normal    Neck: Normal range of motion  Neck supple  Cardiovascular: Normal rate and regular rhythm  Pulmonary/Chest: Effort normal and breath sounds normal    Abdominal: Soft  Bowel sounds are normal    Musculoskeletal: Normal range of motion  Neurological: She is alert and oriented to person, place, and time  Skin: Skin is warm and dry  Nursing note and vitals reviewed

## 2019-11-14 NOTE — LETTER
November 14, 2019     Patient: Abimael Randall   YOB: 1965   Date of Visit: 11/14/2019       To Whom it May Concern:    Abimael Randall is under my professional care  She was seen in my office on 11/14/2019  She may return to work on 11/14/19  If you have any questions or concerns, please don't hesitate to call           Sincerely,          PEACE Carlisle        CC: No Recipients

## 2019-11-17 PROBLEM — J06.9 UPPER RESPIRATORY TRACT INFECTION: Status: ACTIVE | Noted: 2019-11-17

## 2019-11-19 DIAGNOSIS — Z86.2 HISTORY OF ITP: ICD-10-CM

## 2019-11-19 DIAGNOSIS — Z79.01 ANTICOAGULANT LONG-TERM USE: Primary | ICD-10-CM

## 2019-11-19 DIAGNOSIS — I82.90 DEEP VEIN THROMBOSIS (DVT) OF NON-EXTREMITY VEIN, UNSPECIFIED CHRONICITY: ICD-10-CM

## 2019-11-19 DIAGNOSIS — D68.61 APS (ANTIPHOSPHOLIPID SYNDROME) (HCC): ICD-10-CM

## 2019-12-03 ENCOUNTER — CLINICAL SUPPORT (OUTPATIENT)
Dept: INTERNAL MEDICINE CLINIC | Facility: CLINIC | Age: 54
End: 2019-12-03
Payer: COMMERCIAL

## 2019-12-03 DIAGNOSIS — Z23 ENCOUNTER FOR IMMUNIZATION: Primary | ICD-10-CM

## 2019-12-03 PROCEDURE — 90471 IMMUNIZATION ADMIN: CPT

## 2019-12-03 PROCEDURE — 90746 HEPB VACCINE 3 DOSE ADULT IM: CPT

## 2020-01-06 DIAGNOSIS — I12.9 PARENCHYMAL RENAL HYPERTENSION, STAGE 1 THROUGH STAGE 4 OR UNSPECIFIED CHRONIC KIDNEY DISEASE: ICD-10-CM

## 2020-01-07 RX ORDER — FOSINOPRIL SODIUM 20 MG/1
20 TABLET ORAL 2 TIMES DAILY
Qty: 360 TABLET | Refills: 2 | Status: SHIPPED | OUTPATIENT
Start: 2020-01-07 | End: 2020-10-13

## 2020-01-08 ENCOUNTER — TELEPHONE (OUTPATIENT)
Dept: NEPHROLOGY | Facility: CLINIC | Age: 55
End: 2020-01-08

## 2020-01-20 DIAGNOSIS — I10 ESSENTIAL HYPERTENSION: ICD-10-CM

## 2020-01-20 RX ORDER — NIFEDIPINE 30 MG/1
30 TABLET, EXTENDED RELEASE ORAL DAILY
Qty: 90 TABLET | Refills: 3 | Status: SHIPPED | OUTPATIENT
Start: 2020-01-20 | End: 2021-01-20 | Stop reason: SDUPTHER

## 2020-01-20 NOTE — TELEPHONE ENCOUNTER
Pt is requesting a refill on her Nifedipine 30mg for 90 day supply to be sent to [facility] in [facility]

## 2020-01-24 ENCOUNTER — LAB (OUTPATIENT)
Dept: LAB | Age: 55
End: 2020-01-24
Payer: COMMERCIAL

## 2020-01-24 DIAGNOSIS — Z86.2 HISTORY OF ITP: ICD-10-CM

## 2020-01-24 DIAGNOSIS — E21.3 HYPERPARATHYROIDISM (HCC): ICD-10-CM

## 2020-01-24 DIAGNOSIS — R80.9 PROTEINURIA, UNSPECIFIED TYPE: ICD-10-CM

## 2020-01-24 DIAGNOSIS — E89.0 POSTOPERATIVE HYPOTHYROIDISM: ICD-10-CM

## 2020-01-24 DIAGNOSIS — D68.61 APS (ANTIPHOSPHOLIPID SYNDROME) (HCC): ICD-10-CM

## 2020-01-24 DIAGNOSIS — E83.52 HYPERCALCEMIA: ICD-10-CM

## 2020-01-24 DIAGNOSIS — N18.2 CHRONIC KIDNEY DISEASE (CKD), STAGE II (MILD): ICD-10-CM

## 2020-01-24 DIAGNOSIS — Z85.850 HISTORY OF THYROID CANCER: ICD-10-CM

## 2020-01-24 DIAGNOSIS — Z12.11 SCREENING FOR MALIGNANT NEOPLASM OF COLON: ICD-10-CM

## 2020-01-24 LAB
ALBUMIN SERPL BCP-MCNC: 3.4 G/DL (ref 3.5–5)
ALBUMIN SERPL BCP-MCNC: 3.5 G/DL (ref 3.5–5)
ANION GAP SERPL CALCULATED.3IONS-SCNC: 1 MMOL/L (ref 4–13)
BACTERIA UR QL AUTO: ABNORMAL /HPF
BASOPHILS # BLD AUTO: 0.03 THOUSANDS/ΜL (ref 0–0.1)
BASOPHILS NFR BLD AUTO: 1 % (ref 0–1)
BILIRUB UR QL STRIP: NEGATIVE
BUN SERPL-MCNC: 25 MG/DL (ref 5–25)
CALCIUM ALBUM COR SERPL-MCNC: 10.9 MG/DL (ref 8.3–10.1)
CALCIUM SERPL-MCNC: 10.5 MG/DL (ref 8.3–10.1)
CALCIUM SERPL-MCNC: 10.5 MG/DL (ref 8.3–10.1)
CHLORIDE SERPL-SCNC: 109 MMOL/L (ref 100–108)
CLARITY UR: CLEAR
CO2 SERPL-SCNC: 28 MMOL/L (ref 21–32)
COLOR UR: YELLOW
CREAT SERPL-MCNC: 0.86 MG/DL (ref 0.6–1.3)
CREAT UR-MCNC: 158 MG/DL
EOSINOPHIL # BLD AUTO: 0.06 THOUSAND/ΜL (ref 0–0.61)
EOSINOPHIL NFR BLD AUTO: 1 % (ref 0–6)
ERYTHROCYTE [DISTWIDTH] IN BLOOD BY AUTOMATED COUNT: 12.5 % (ref 11.6–15.1)
GFR SERPL CREATININE-BSD FRML MDRD: 78 ML/MIN/1.73SQ M
GLUCOSE P FAST SERPL-MCNC: 83 MG/DL (ref 65–99)
GLUCOSE UR STRIP-MCNC: NEGATIVE MG/DL
HCT VFR BLD AUTO: 41.5 % (ref 34.8–46.1)
HGB BLD-MCNC: 14.1 G/DL (ref 11.5–15.4)
HGB UR QL STRIP.AUTO: ABNORMAL
HYALINE CASTS #/AREA URNS LPF: ABNORMAL /LPF
IMM GRANULOCYTES # BLD AUTO: 0.01 THOUSAND/UL (ref 0–0.2)
IMM GRANULOCYTES NFR BLD AUTO: 0 % (ref 0–2)
KETONES UR STRIP-MCNC: NEGATIVE MG/DL
LEUKOCYTE ESTERASE UR QL STRIP: NEGATIVE
LYMPHOCYTES # BLD AUTO: 1.33 THOUSANDS/ΜL (ref 0.6–4.47)
LYMPHOCYTES NFR BLD AUTO: 28 % (ref 14–44)
MCH RBC QN AUTO: 30.7 PG (ref 26.8–34.3)
MCHC RBC AUTO-ENTMCNC: 34 G/DL (ref 31.4–37.4)
MCV RBC AUTO: 90 FL (ref 82–98)
MONOCYTES # BLD AUTO: 0.49 THOUSAND/ΜL (ref 0.17–1.22)
MONOCYTES NFR BLD AUTO: 10 % (ref 4–12)
NEUTROPHILS # BLD AUTO: 2.91 THOUSANDS/ΜL (ref 1.85–7.62)
NEUTS SEG NFR BLD AUTO: 60 % (ref 43–75)
NITRITE UR QL STRIP: NEGATIVE
NON-SQ EPI CELLS URNS QL MICRO: ABNORMAL /HPF
NRBC BLD AUTO-RTO: 0 /100 WBCS
PH UR STRIP.AUTO: 5.5 [PH]
PLATELET # BLD AUTO: 169 THOUSANDS/UL (ref 149–390)
PMV BLD AUTO: 10.9 FL (ref 8.9–12.7)
POTASSIUM SERPL-SCNC: 3.9 MMOL/L (ref 3.5–5.3)
PROT UR STRIP-MCNC: ABNORMAL MG/DL
PROT UR-MCNC: 94 MG/DL
PROT/CREAT UR: 0.59 MG/G{CREAT} (ref 0–0.1)
PTH-INTACT SERPL-MCNC: 149.2 PG/ML (ref 18.4–80.1)
RBC # BLD AUTO: 4.6 MILLION/UL (ref 3.81–5.12)
RBC #/AREA URNS AUTO: ABNORMAL /HPF
SODIUM SERPL-SCNC: 138 MMOL/L (ref 136–145)
SP GR UR STRIP.AUTO: 1.02 (ref 1–1.03)
T4 FREE SERPL-MCNC: 1.38 NG/DL (ref 0.76–1.46)
TSH SERPL DL<=0.05 MIU/L-ACNC: 2.92 UIU/ML (ref 0.36–3.74)
UROBILINOGEN UR QL STRIP.AUTO: 0.2 E.U./DL
WBC # BLD AUTO: 4.83 THOUSAND/UL (ref 4.31–10.16)
WBC #/AREA URNS AUTO: ABNORMAL /HPF

## 2020-01-24 PROCEDURE — 85025 COMPLETE CBC W/AUTO DIFF WBC: CPT

## 2020-01-24 PROCEDURE — 83970 ASSAY OF PARATHORMONE: CPT

## 2020-01-24 PROCEDURE — 84439 ASSAY OF FREE THYROXINE: CPT

## 2020-01-24 PROCEDURE — 81001 URINALYSIS AUTO W/SCOPE: CPT

## 2020-01-24 PROCEDURE — 82040 ASSAY OF SERUM ALBUMIN: CPT

## 2020-01-24 PROCEDURE — 86800 THYROGLOBULIN ANTIBODY: CPT

## 2020-01-24 PROCEDURE — 84156 ASSAY OF PROTEIN URINE: CPT

## 2020-01-24 PROCEDURE — 80048 BASIC METABOLIC PNL TOTAL CA: CPT

## 2020-01-24 PROCEDURE — 84443 ASSAY THYROID STIM HORMONE: CPT

## 2020-01-24 PROCEDURE — 84432 ASSAY OF THYROGLOBULIN: CPT

## 2020-01-24 PROCEDURE — 82570 ASSAY OF URINE CREATININE: CPT

## 2020-01-25 LAB
THYROGLOB AB SERPL-ACNC: <1 IU/ML (ref 0–0.9)
THYROGLOB SERPL-MCNC: <0.1 NG/ML (ref 1.5–38.5)

## 2020-02-04 ENCOUNTER — OFFICE VISIT (OUTPATIENT)
Dept: ENDOCRINOLOGY | Facility: CLINIC | Age: 55
End: 2020-02-04
Payer: COMMERCIAL

## 2020-02-04 VITALS
SYSTOLIC BLOOD PRESSURE: 132 MMHG | WEIGHT: 209 LBS | HEIGHT: 61 IN | HEART RATE: 60 BPM | DIASTOLIC BLOOD PRESSURE: 80 MMHG | BODY MASS INDEX: 39.46 KG/M2

## 2020-02-04 DIAGNOSIS — Z85.850 HISTORY OF THYROID CANCER: ICD-10-CM

## 2020-02-04 DIAGNOSIS — E21.0 PRIMARY HYPERPARATHYROIDISM (HCC): ICD-10-CM

## 2020-02-04 DIAGNOSIS — E89.0 POSTOPERATIVE HYPOTHYROIDISM: Primary | ICD-10-CM

## 2020-02-04 PROCEDURE — 1036F TOBACCO NON-USER: CPT | Performed by: PHYSICIAN ASSISTANT

## 2020-02-04 PROCEDURE — 99214 OFFICE O/P EST MOD 30 MIN: CPT | Performed by: PHYSICIAN ASSISTANT

## 2020-02-04 PROCEDURE — 3008F BODY MASS INDEX DOCD: CPT | Performed by: PHYSICIAN ASSISTANT

## 2020-02-04 PROCEDURE — 3075F SYST BP GE 130 - 139MM HG: CPT | Performed by: PHYSICIAN ASSISTANT

## 2020-02-04 PROCEDURE — 3079F DIAST BP 80-89 MM HG: CPT | Performed by: PHYSICIAN ASSISTANT

## 2020-02-04 RX ORDER — LEVOTHYROXINE SODIUM 0.12 MG/1
125 TABLET ORAL DAILY
Start: 2020-02-04 | End: 2020-05-13 | Stop reason: DRUGHIGH

## 2020-02-04 NOTE — PROGRESS NOTES
Patient Progress Note    CC: hypothyroidism, primary hyperparathyroidism     Referring Provider  Carina Noguera, [facility]  [place], [address]     History of Present Illness:     Patient is a 61-year-old female here for follow-up of postsurgical hypothyroidism, primary hyperparathyroidism, vitamin-D deficiency  She has a past medical history of stage I papillary thyroid cancer  She underwent right hemithyroidectomy and right parathyroid gland removal in 2006, followed by completion of thyroidectomy in 2010  In 2010 she also had resection of left parathyroid gland  She also had LEONARDO therapy  Patient is on levothyroxine 125 mcg 1 tablet daily  Patient is taking it 30 minutes before breakfast on an empty stomach and at least 4 hrs apart from supplements  Tolerating medication well  No recent Iodine loading in form of medication, erbs or kelp supplements or radiological diagnostic studies  Most recent thyroid function tests were normal, TSH at 2 920 and free T4 1 38  Thyroglobulin antibody level was less than 1 0 and thyroglobulin less than 0 1  Head neck ultrasound done in April 2019 did not show any evidence of recurrent or metastatic disease  Despite removal of 2 parathyroid glands patient continue to have elevated calcium and PTH levels  Her calcium excretion ratio was greater than 0 01 which is suggestive of primary hyperparathyroidism  She did follow-up with surgical oncology who suggested surgery as CT scan of parathyroid did show a right-sided target  Patient cancel this surgery because she is on chronic blood thinners for history of hypercoagulability and PE and for this surgery she would have to be off Coumadin and would need Lovenox bridge which she could not afford at the time  She continues to avoid calcium and vitamin-D supplementation    Vitamin-D supplementation was stopped by her nephrologist   Most recent corrected calcium elevated at 10 9 and PTH elevated at 149 2        Patient Active Problem List   Diagnosis    Chronic deep vein thrombosis (DVT) of proximal vein of both lower extremities (HCC)    History of ITP    Benign hypertensive CKD    Chronic kidney disease (CKD), stage II (mild)    Edema    Gross hematuria    Hypercalcemia    [facility]par[facility]roidism ([facility] [address] [facility]    Hypertension    Nephrolithiasis    Proteinuria    Systemic lupus erythematosus ([facility] [address] [facility]    Vitamin D deficiency    Elevated PTHrP level    History of thyroid cancer    Upper respiratory tract infection     Past Medical History:   Diagnosis Date    Gestational diabetes     Hyperparathyroidism ([facility] [address] [facility]     Hypertension     Idiopathic thrombocytopenic purpura (ITP) (HCC)     Lupus (HCC)     Vitamin D deficiency       Past Surgical History:   Procedure Laterality Date     SECTION          HERNIA REPAIR      HIP SURGERY      left side, bone decompression    THYROID SURGERY        Family History   Problem Relation Age of Onset    Diabetes type II Father     Diabetes Father     Stroke Father     Diabetes type II Paternal Aunt     Diabetes type II Paternal Uncle     Diabetes type II Paternal Grandmother     Stomach cancer Paternal Grandfather      Social History     Tobacco Use    Smoking status: Never Smoker    Smokeless tobacco: Never Used   Substance Use Topics    Alcohol use: Not Currently     Allergies   Allergen Reactions    Penicillins Itching     Current Outpatient Medications   Medication Sig Dispense Refill    folic acid (FOLVITE) 1 mg tablet Take 1 tablet by mouth daily      fosinopril (MONOPRIL) 20 mg tablet Take 1 tablet (20 mg total) by mouth 2 (two) times a day 360 tablet 2    levothyroxine 125 mcg tablet Take 1 tablet (125 mcg total) by mouth daily      NIFEdipine (PROCARDIA XL) 30 mg 24 hr tablet Take 1 tablet (30 mg total) by mouth daily 90 tablet 3    nystatin (MYCOSTATIN) powder Apply topically as needed      spironolactone (ALDACTONE) 25 mg tablet TAKE 1 TABLET DAILY 90 tablet 3    warfarin (COUMADIN) 3 mg tablet TAKE 1 TABLET DAILY OR AS DIRECTED 90 tablet 4    IRON-VITAMINS PO Take 1 tablet by mouth 3 (three) times a week        warfarin (COUMADIN) 5 mg tablet Take as directed       No current facility-administered medications for this visit  Review of Systems   Constitutional: Negative for activity change, appetite change, fatigue and unexpected weight change  HENT: Negative for trouble swallowing  Eyes: Negative for visual disturbance  Respiratory: Negative for shortness of breath  Cardiovascular: Negative for chest pain and palpitations  Gastrointestinal: Negative for constipation and diarrhea  Endocrine: Positive for cold intolerance  Negative for heat intolerance  Musculoskeletal: Positive for arthralgias (mild, intermittent)  Skin: Negative  Neurological: Negative for numbness  Psychiatric/Behavioral: Negative  Physical Exam:  Body mass index is 39 87 kg/m²  /80   Pulse 60   Ht 5' 0 71" (1 542 m)   Wt 94 8 kg (209 lb)   BMI 39 87 kg/m²    Wt Readings from Last 3 Encounters:   02/04/20 94 8 kg (209 lb)   11/14/19 96 8 kg (213 lb 6 4 oz)   10/01/19 95 7 kg (211 lb)       Physical Exam   Constitutional: She appears well-developed and well-nourished  HENT:   Head: Normocephalic  Eyes: Pupils are equal, round, and reactive to light  EOM are normal  No scleral icterus  Neck: Neck supple  No thyromegaly present  Cardiovascular: Normal rate and regular rhythm  No murmur heard  Pulses:       Radial pulses are 2+ on the right side, and 2+ on the left side  Pulmonary/Chest: Effort normal and breath sounds normal  No respiratory distress  She has no wheezes  Neurological: She is alert  She has normal reflexes  Skin: Skin is warm and dry  Psychiatric: She has a normal mood and affect  Nursing note and vitals reviewed  Patient's shoes and socks were not removed        Labs: Component      Latest Ref Rng & Units 8/6/2019 9/30/2019 1/24/2020 1/24/2020            11:47 AM 11:47 AM   Glucose, Random      65 - 99 mg/dL 109 (H)      BUN      5 - 25 mg/dL 20  25    Creatinine      0 60 - 1 30 mg/dL 0 76  0 86    eGFR Non       >59 mL/min/1 73 90      eGFR       >59 mL/min/1 73 104      SL AMB BUN/CREATININE RATIO      9 - 23 26 (H)      Sodium      136 - 145 mmol/L 141  138    Potassium      3 5 - 5 3 mmol/L 4 3  3 9    Chloride      100 - 108 mmol/L 109 (H)  109 (H)    CO2      21 - 32 mmol/L 21  28    CALCIUM      8 7 - 10 2 mg/dL 10 9 (H)      Anion Gap      4 - 13 mmol/L   1 (L)    GLUCOSE FASTING      65 - 99 mg/dL   83    Calcium      8 3 - 10 1 mg/dL   10 5 (H)    eGFR      ml/min/1 73sq m   78    EXT Creatinine Urine      mg/dL   158 0    Protein Urine Random      mg/dL   94    Prot/Creat Ratio, Ur      0 00 - 0 10   0 59 (H)    Albumin      3 5 - 5 0 g/dL 3 7  3 4 (L) 3 5   CORRECTED CALCIUM      8 3 - 10 1 mg/dL    10 9 (H)   CALCIUM FOR CA/ALB      8 3 - 10 1 mg/dL    10 5 (H)   Free T4      0 76 - 1 46 ng/dL 1 42 1 64 1 38    TSH, POC      0 450 - 4 500 uIU/mL 0 467 0 354 (L)     PTH, Intact      15 - 65 pg/mL 64      TSH 3RD GENERATON      0 358 - 3 740 uIU/mL   2 920    THYROGLOBULIN AB      0 0 - 0 9 IU/mL   <1 0    PARATHYROID HORMONE      18 4 - 80 1 pg/mL   149 2 (H)        Plan:  Diagnoses and all orders for this visit:    Postoperative hypothyroidism  Thyroid function tests within normal range, 2 920 and free T4 1 38  Although TSH is slightly above goal, will continue with current dose of levothyroxine as free T4 is at upper limit of normal     Repeat thyroid function test prior to next visit  -     T4, free; Future  -     TSH, 3rd generation; Future  -     levothyroxine 125 mcg tablet;  Take 1 tablet (125 mcg total) by mouth daily    History of thyroid cancer  Thyroglobulin antibody is less than 1 0 and thyroglobulin level is less than 0 1   Head neck ultrasound done in April 2019 did not show any evidence of recurrent or metastatic disease  Recheck head neck ultrasound in April 2020  -     US head neck lymph node mapping; Future  -     Thyroglobulin; Future    Primary hyperparathyroidism ([address] )  Corrected calcium elevated at 10 9, PTH elevated at 149 2  Advised to hydrate well  She admits to not drinking enough fluids  Patient states she will follow-up with surgical oncology in about 3 months to reconsider surgery because that is when she will be eligible for using disability in the event she needs it after surgery  Continue to avoid calcium supplementation  Repeat DEXA has been ordered in the past and reprinted for patient   -     Basic metabolic panel; Future  -     Albumin; Future  -     PTH, intact; Future      Discussed with the patient and all questions fully answered  She will call me if any problems arise      Counseled patient on diagnostic results, prognosis, risk and benefit of treatment options, instruction for management, importance of treatment compliance, risk  factor reduction and impressions      Diana Jin PA-C

## 2020-02-05 ENCOUNTER — OFFICE VISIT (OUTPATIENT)
Dept: HEMATOLOGY ONCOLOGY | Facility: CLINIC | Age: 55
End: 2020-02-05
Payer: COMMERCIAL

## 2020-02-05 VITALS
RESPIRATION RATE: 17 BRPM | BODY MASS INDEX: 39.27 KG/M2 | OXYGEN SATURATION: 98 % | HEART RATE: 65 BPM | WEIGHT: 208 LBS | DIASTOLIC BLOOD PRESSURE: 74 MMHG | SYSTOLIC BLOOD PRESSURE: 130 MMHG | HEIGHT: 61 IN | TEMPERATURE: 97.9 F

## 2020-02-05 DIAGNOSIS — I82.5Y3 CHRONIC DEEP VEIN THROMBOSIS (DVT) OF PROXIMAL VEIN OF BOTH LOWER EXTREMITIES (HCC): Primary | Chronic | ICD-10-CM

## 2020-02-05 DIAGNOSIS — E21.3 HYPERPARATHYROIDISM (HCC): ICD-10-CM

## 2020-02-05 DIAGNOSIS — I82.5Y3 CHRONIC DEEP VEIN THROMBOSIS (DVT) OF PROXIMAL VEIN OF BOTH LOWER EXTREMITIES (HCC): Chronic | ICD-10-CM

## 2020-02-05 DIAGNOSIS — Z86.2 HISTORY OF ITP: ICD-10-CM

## 2020-02-05 PROCEDURE — 3075F SYST BP GE 130 - 139MM HG: CPT | Performed by: INTERNAL MEDICINE

## 2020-02-05 PROCEDURE — 99214 OFFICE O/P EST MOD 30 MIN: CPT | Performed by: INTERNAL MEDICINE

## 2020-02-05 PROCEDURE — 1036F TOBACCO NON-USER: CPT | Performed by: INTERNAL MEDICINE

## 2020-02-05 PROCEDURE — 3008F BODY MASS INDEX DOCD: CPT | Performed by: INTERNAL MEDICINE

## 2020-02-05 PROCEDURE — 3078F DIAST BP <80 MM HG: CPT | Performed by: INTERNAL MEDICINE

## 2020-02-05 RX ORDER — WARFARIN SODIUM 3 MG/1
TABLET ORAL
Qty: 90 TABLET | Refills: 4 | Status: SHIPPED | OUTPATIENT
Start: 2020-02-05 | End: 2020-05-12

## 2020-02-05 NOTE — PROGRESS NOTES
[facility]  [address]  [facility] [address]  [phone]    Jennifer Taylor,1965, [id number]  02/05/20    Discussion:   In summary, this is a 51-year-old female history of antiphospholipid antibody syndrome  With previous thrombosis  She is currently on Coumadin  INR is generally in the 2 0-3 0 range, 65-70% of the time  She does have some bruising symptoms  These are generally on the extremities and minor  She has been found to have hot primary hyperparathyroidism  Surgical intervention is planned at sometime in the future  Lovenox bridging is recommended  We preliminarily reviewed this today  I asked her to call 1 she has a surgical date established so that we could review the instructions and send the appropriate prescriptions  Platelets have been in the normal range  ITP is not active at this time  We reviewed signs of clinical bleeding that should prompt evaluation, CBC, INR, etcetera  I discussed the above with the patient  The patient  voiced understanding and agreement   ______________________________________________________________________    Chief Complaint   Patient presents with    Follow-up       HPI:   No history exists  Interval History:    Clinically stable  ECOG-  0 - Asymptomatic    Review of Systems   Constitutional: Negative for appetite change, diaphoresis, fatigue and fever  HENT: Negative for sinus pain  Eyes: Negative for discharge  Respiratory: Negative for cough and shortness of breath  Cardiovascular: Negative for chest pain  Gastrointestinal: Negative for abdominal pain, constipation and diarrhea  Endocrine: Negative for cold intolerance  Genitourinary: Negative for difficulty urinating and hematuria  Musculoskeletal: Negative for joint swelling  Skin: Negative for rash  Allergic/Immunologic: Negative for environmental allergies     Neurological: Negative for dizziness and headaches  Hematological: Negative for adenopathy  Psychiatric/Behavioral: Negative for agitation         Past Medical History:   Diagnosis Date    Gestational diabetes     Hyperparathyroidism ([facility] [address] )     Hypertension     [facility]     Lupus (HCC)     Vitamin D deficiency      Patient Active Problem List   Diagnosis    Chronic deep vein thrombosis (DVT) of proximal vein of both lower extremities (HCC)    History of ITP    Benign hypertensive CKD    Chronic kidney disease (CKD), stage II (mild)    Edema    Gross hematuria    Hypercalcemia    [facility]    Hypertension    Nephrolithiasis    Proteinuria    Systemic lupus erythematosus (HCC)    Vitamin D deficiency    Elevated PTHrP level    History of thyroid cancer    Upper respiratory tract infection       Current Outpatient Medications:     folic acid (FOLVITE) 1 mg tablet, Take 1 tablet by mouth daily, Disp: , Rfl:     fosinopril (MONOPRIL) 20 mg tablet, Take 1 tablet (20 mg total) by mouth 2 (two) times a day, Disp: 360 tablet, Rfl: 2    levothyroxine 125 mcg tablet, Take 1 tablet (125 mcg total) by mouth daily, Disp: , Rfl:     NIFEdipine (PROCARDIA XL) 30 mg 24 hr tablet, Take 1 tablet (30 mg total) by mouth daily, Disp: 90 tablet, Rfl: 3    nystatin (MYCOSTATIN) powder, Apply topically as needed, Disp: , Rfl:     spironolactone (ALDACTONE) 25 mg tablet, TAKE 1 TABLET DAILY, Disp: 90 tablet, Rfl: 3    warfarin (COUMADIN) 3 mg tablet, TAKE 1 TABLET DAILY OR AS DIRECTED, Disp: 90 tablet, Rfl: 4    warfarin (COUMADIN) 5 mg tablet, Take as directed, Disp: , Rfl:     IRON-VITAMINS PO, Take 1 tablet by mouth 3 (three) times a week  , Disp: , Rfl:   Allergies   Allergen Reactions    Penicillins Itching     Past Surgical History:   Procedure Laterality Date     SECTION          HERNIA REPAIR      HIP SURGERY      left side, bone decompression    THYROID SURGERY Social History     Objective:  Vitals:    02/05/20 0957   BP: 130/74   BP Location: Left arm   Patient Position: Sitting   Pulse: 65   Resp: 17   Temp: 97 9 °F (36 6 °C)   TempSrc: Tympanic   SpO2: 98%   Weight: 94 3 kg (208 lb)   Height: 5' 0 8" (1 544 m)     Physical Exam   Constitutional: She is oriented to person, place, and time  She appears well-developed  HENT:   Head: Normocephalic  Eyes: Pupils are equal, round, and reactive to light  Neck: Neck supple  Cardiovascular: Normal rate  No murmur heard  Pulmonary/Chest: No respiratory distress  She has no wheezes  She has no rales  Abdominal: Soft  She exhibits no distension  There is no tenderness  There is no rebound  Musculoskeletal: She exhibits no edema  Lymphadenopathy:     She has no cervical adenopathy  Neurological: She is alert and oriented to person, place, and time  She displays normal reflexes  Skin: Skin is warm  No rash noted  Psychiatric: She has a normal mood and affect  Thought content normal          Labs: I personally reviewed the labs and imaging pertinent to this patient care

## 2020-02-20 DIAGNOSIS — I10 ESSENTIAL HYPERTENSION, BENIGN: ICD-10-CM

## 2020-02-20 RX ORDER — SPIRONOLACTONE 25 MG/1
25 TABLET ORAL DAILY
Qty: 90 TABLET | Refills: 3 | Status: SHIPPED | OUTPATIENT
Start: 2020-02-20 | End: 2021-01-20 | Stop reason: SDUPTHER

## 2020-03-05 ENCOUNTER — OFFICE VISIT (OUTPATIENT)
Dept: INTERNAL MEDICINE CLINIC | Facility: CLINIC | Age: 55
End: 2020-03-05
Payer: COMMERCIAL

## 2020-03-05 VITALS
RESPIRATION RATE: 14 BRPM | HEIGHT: 61 IN | DIASTOLIC BLOOD PRESSURE: 72 MMHG | WEIGHT: 206.4 LBS | BODY MASS INDEX: 38.97 KG/M2 | HEART RATE: 52 BPM | OXYGEN SATURATION: 97 % | SYSTOLIC BLOOD PRESSURE: 122 MMHG

## 2020-03-05 DIAGNOSIS — R21 RASH: ICD-10-CM

## 2020-03-05 DIAGNOSIS — Z12.11 SCREENING FOR MALIGNANT NEOPLASM OF COLON: ICD-10-CM

## 2020-03-05 DIAGNOSIS — I10 ESSENTIAL HYPERTENSION: ICD-10-CM

## 2020-03-05 DIAGNOSIS — I82.5Y3 CHRONIC DEEP VEIN THROMBOSIS (DVT) OF PROXIMAL VEIN OF BOTH LOWER EXTREMITIES (HCC): Primary | Chronic | ICD-10-CM

## 2020-03-05 DIAGNOSIS — Z23 NEED FOR VACCINATION: ICD-10-CM

## 2020-03-05 PROCEDURE — 90471 IMMUNIZATION ADMIN: CPT

## 2020-03-05 PROCEDURE — 3078F DIAST BP <80 MM HG: CPT | Performed by: NURSE PRACTITIONER

## 2020-03-05 PROCEDURE — 3074F SYST BP LT 130 MM HG: CPT | Performed by: NURSE PRACTITIONER

## 2020-03-05 PROCEDURE — 99214 OFFICE O/P EST MOD 30 MIN: CPT | Performed by: NURSE PRACTITIONER

## 2020-03-05 PROCEDURE — 90746 HEPB VACCINE 3 DOSE ADULT IM: CPT

## 2020-03-05 PROCEDURE — 1036F TOBACCO NON-USER: CPT | Performed by: NURSE PRACTITIONER

## 2020-03-05 RX ORDER — NYSTATIN 100000 [USP'U]/G
POWDER TOPICAL 2 TIMES DAILY
Qty: 15 G | Refills: 1 | Status: SHIPPED | OUTPATIENT
Start: 2020-03-05 | End: 2020-03-23 | Stop reason: SDUPTHER

## 2020-03-05 RX ORDER — CLOTRIMAZOLE AND BETAMETHASONE DIPROPIONATE 10; .64 MG/G; MG/G
CREAM TOPICAL 2 TIMES DAILY
Qty: 30 G | Refills: 0 | Status: SHIPPED | OUTPATIENT
Start: 2020-03-05 | End: 2020-09-02 | Stop reason: ALTCHOICE

## 2020-03-05 NOTE — PROGRESS NOTES
Assessment/Plan:    Rash  Ordered nystatin cream    Hypertension  Controlled  Counseled patient on proper diet that is low in sodium, exercise, and weight loss  No change in medication regimen      Chronic deep vein thrombosis (DVT) of proximal vein of both lower extremities (HCC)  On coumadin inr therapeutic       Diagnoses and all orders for this visit:    Chronic deep vein thrombosis (DVT) of proximal vein of both lower extremities (HCC)    Screening for malignant neoplasm of colon  -     Cologuard; Future    Rash  -     nystatin (MYCOSTATIN) powder; Apply topically 2 (two) times a day  -     clotrimazole-betamethasone (LOTRISONE) 1-0 05 % cream; Apply topically 2 (two) times a day    Essential hypertension    Need for vaccination  -     Cancel: HEPATITIS A VACCINE ADULT IM  -     HEPATITIS B VACCINE ADULT IM    Other orders  -     Cancel: Ambulatory referral to Gastroenterology; Future          Subjective:      Patient ID: Johana Hong is a 48 y o  female  Patient is here for a regular follow up  Has primary parathyroidism and needs surgery in the future  Followed by hematology and endocrine and has seen dr Nathalia Flynn in the past  She is on coumadin for dvt    Blood pressure stable    Has a rash under her breasts and requests nystatin cream    She has a small wart on her finger      The following portions of the patient's history were reviewed and updated as appropriate: allergies, current medications, past family history, past medical history, past social history, past surgical history and problem list     Review of Systems   Constitutional: Negative  HENT: Negative  Eyes: Negative  Respiratory: Negative  Cardiovascular: Negative  Gastrointestinal: Negative  Musculoskeletal: Negative  Neurological: Negative            Objective:      /72   Pulse (!) 52   Resp 14   Ht 5' 0 8" (1 544 m)   Wt 93 6 kg (206 lb 6 4 oz)   SpO2 97%   BMI 39 26 kg/m²          Physical Exam Constitutional: She is oriented to person, place, and time  She appears well-developed and well-nourished  HENT:   Head: Normocephalic and atraumatic  Right Ear: External ear normal    Left Ear: External ear normal    Nose: Nose normal    Mouth/Throat: Oropharynx is clear and moist    Eyes: Pupils are equal, round, and reactive to light  Conjunctivae are normal    Neck: Normal range of motion  Neck supple  Cardiovascular: Normal rate and regular rhythm  Pulmonary/Chest: Effort normal and breath sounds normal    Abdominal: Soft  Bowel sounds are normal    Musculoskeletal: Normal range of motion  Neurological: She is alert and oriented to person, place, and time  Skin: Skin is warm and dry  Nursing note and vitals reviewed

## 2020-03-11 PROBLEM — R21 RASH: Status: ACTIVE | Noted: 2020-03-11

## 2020-03-11 NOTE — ASSESSMENT & PLAN NOTE
Controlled  Counseled patient on proper diet that is low in sodium, exercise, and weight loss   No change in medication regimen

## 2020-03-23 ENCOUNTER — TELEMEDICINE (OUTPATIENT)
Dept: INTERNAL MEDICINE CLINIC | Facility: CLINIC | Age: 55
End: 2020-03-23
Payer: COMMERCIAL

## 2020-03-23 DIAGNOSIS — R21 RASH: ICD-10-CM

## 2020-03-23 DIAGNOSIS — H92.02 LEFT EAR PAIN: Primary | ICD-10-CM

## 2020-03-23 PROCEDURE — 99213 OFFICE O/P EST LOW 20 MIN: CPT | Performed by: NURSE PRACTITIONER

## 2020-03-23 RX ORDER — NYSTATIN 100000 [USP'U]/G
POWDER TOPICAL 2 TIMES DAILY
Qty: 60 G | Refills: 2 | Status: SHIPPED | OUTPATIENT
Start: 2020-03-23 | End: 2021-06-08 | Stop reason: SDUPTHER

## 2020-03-23 RX ORDER — CIPROFLOXACIN AND DEXAMETHASONE 3; 1 MG/ML; MG/ML
4 SUSPENSION/ DROPS AURICULAR (OTIC) 2 TIMES DAILY
Qty: 7.5 ML | Refills: 0 | Status: SHIPPED | OUTPATIENT
Start: 2020-03-23 | End: 2020-05-12 | Stop reason: ALTCHOICE

## 2020-03-23 NOTE — PROGRESS NOTES
Virtual Regular Visit    Problem List Items Addressed This Visit        Musculoskeletal and Integument    Rash     Refilled nystatin powder         Relevant Medications    nystatin (MYCOSTATIN) powder       Other    Left ear pain - Primary     Start antibiotic ear drops         Relevant Medications    ciprofloxacin-dexamethasone (CIPRODEX) otic suspension               Reason for visit is left ear scant dry blood since yesterday  Encounter provider Eileen Florez, [facility]    Provider located at [address] [facility]  [address] [facility] [address]      Recent Visits  No visits were found meeting these conditions  Showing recent visits within past 7 days and meeting all other requirements     Future Appointments  No visits were found meeting these conditions  Showing future appointments within next 150 days and meeting all other requirements        After connecting through [employer], the patient was identified by name and date of birth  Kostas Rivas was informed that this is a telemedicine visit and that the visit is being conducted through telephone which may not be secure and therefore, might not be HIPAA-compliant  My office door was closed  No one else was in the room  She acknowledged consent and understanding of privacy and security of the video platform  The patient has agreed to participate and understands they can discontinue the visit at any time  Subjective  Kostas Rivas is a 48 y o  female is here for left ear dry blood since yesterday  Denies pain, no changes in hearing  No cough, fever, sore throat  No travel or exposure to covid19   Also needs a refill of nystatin powder for rash under her breasts      Past Medical History:   Diagnosis Date    Gestational diabetes     Hyperparathyroidism ([address] )     Hypertension     Idiopathic [facility]) [facility]     Lupus ([address] )     Vitamin D deficiency        Past Surgical History:   Procedure Laterality Date     SECTION          HERNIA REPAIR      HIP SURGERY      left side, bone decompression    THYROID SURGERY         Current Outpatient Medications   Medication Sig Dispense Refill    ciprofloxacin-dexamethasone (CIPRODEX) otic suspension Administer 4 drops into the left ear 2 (two) times a day 7 5 mL 0    clotrimazole-betamethasone (LOTRISONE) 1-0 05 % cream Apply topically 2 (two) times a day 30 g 0    folic acid (FOLVITE) 1 mg tablet Take 1 tablet by mouth daily      fosinopril (MONOPRIL) 20 mg tablet Take 1 tablet (20 mg total) by mouth 2 (two) times a day 360 tablet 2    IRON-VITAMINS PO Take 1 tablet by mouth 3 (three) times a week        levothyroxine 125 mcg tablet Take 1 tablet (125 mcg total) by mouth daily      NIFEdipine (PROCARDIA XL) 30 mg 24 hr tablet Take 1 tablet (30 mg total) by mouth daily 90 tablet 3    nystatin (MYCOSTATIN) powder Apply topically 2 (two) times a day 60 g 2    spironolactone (ALDACTONE) 25 mg tablet Take 1 tablet (25 mg total) by mouth daily 90 tablet 3    warfarin (COUMADIN) 3 mg tablet TAKE 1 TABLET DAILY OR AS DIRECTED 90 tablet 4     No current facility-administered medications for this visit  Allergies   Allergen Reactions    Penicillins Itching       Review of Systems   Constitutional: Negative  HENT: Positive for ear pain  Eyes: Negative  Respiratory: Negative  Cardiovascular: Negative  Gastrointestinal: Negative  Musculoskeletal: Negative  Neurological: Negative  I spent 15 minutes with the patient during this visit

## 2020-04-28 ENCOUNTER — TELEPHONE (OUTPATIENT)
Dept: NEPHROLOGY | Facility: CLINIC | Age: 55
End: 2020-04-28

## 2020-05-09 ENCOUNTER — TRANSCRIBE ORDERS (OUTPATIENT)
Dept: LAB | Facility: CLINIC | Age: 55
End: 2020-05-09

## 2020-05-09 ENCOUNTER — APPOINTMENT (OUTPATIENT)
Dept: LAB | Facility: CLINIC | Age: 55
End: 2020-05-09
Payer: COMMERCIAL

## 2020-05-09 DIAGNOSIS — Z85.850 HISTORY OF THYROID CANCER: ICD-10-CM

## 2020-05-09 DIAGNOSIS — E21.0 PRIMARY HYPERPARATHYROIDISM (HCC): ICD-10-CM

## 2020-05-09 DIAGNOSIS — E89.0 POSTOPERATIVE HYPOTHYROIDISM: ICD-10-CM

## 2020-05-09 LAB
ALBUMIN SERPL BCP-MCNC: 3.2 G/DL (ref 3.5–5)
ANION GAP SERPL CALCULATED.3IONS-SCNC: 6 MMOL/L (ref 4–13)
BUN SERPL-MCNC: 25 MG/DL (ref 5–25)
CALCIUM SERPL-MCNC: 10 MG/DL (ref 8.3–10.1)
CHLORIDE SERPL-SCNC: 105 MMOL/L (ref 100–108)
CO2 SERPL-SCNC: 27 MMOL/L (ref 21–32)
CREAT SERPL-MCNC: 0.98 MG/DL (ref 0.6–1.3)
GFR SERPL CREATININE-BSD FRML MDRD: 66 ML/MIN/1.73SQ M
GLUCOSE P FAST SERPL-MCNC: 106 MG/DL (ref 65–99)
INR PPP: 3.77 (ref 0.84–1.19)
POTASSIUM SERPL-SCNC: 4.1 MMOL/L (ref 3.5–5.3)
PROTHROMBIN TIME: 36.1 SECONDS (ref 11.6–14.5)
PTH-INTACT SERPL-MCNC: 181.5 PG/ML (ref 18.4–80.1)
SODIUM SERPL-SCNC: 138 MMOL/L (ref 136–145)
T4 FREE SERPL-MCNC: 1.53 NG/DL (ref 0.76–1.46)
TSH SERPL DL<=0.05 MIU/L-ACNC: 1.43 UIU/ML (ref 0.36–3.74)

## 2020-05-09 PROCEDURE — 84439 ASSAY OF FREE THYROXINE: CPT

## 2020-05-09 PROCEDURE — 80048 BASIC METABOLIC PNL TOTAL CA: CPT

## 2020-05-09 PROCEDURE — 85610 PROTHROMBIN TIME: CPT

## 2020-05-09 PROCEDURE — 86800 THYROGLOBULIN ANTIBODY: CPT

## 2020-05-09 PROCEDURE — 84432 ASSAY OF THYROGLOBULIN: CPT

## 2020-05-09 PROCEDURE — 36415 COLL VENOUS BLD VENIPUNCTURE: CPT

## 2020-05-09 PROCEDURE — 83970 ASSAY OF PARATHORMONE: CPT

## 2020-05-09 PROCEDURE — 84443 ASSAY THYROID STIM HORMONE: CPT

## 2020-05-09 PROCEDURE — 82040 ASSAY OF SERUM ALBUMIN: CPT

## 2020-05-11 ENCOUNTER — TELEPHONE (OUTPATIENT)
Dept: NEPHROLOGY | Facility: CLINIC | Age: 55
End: 2020-05-11

## 2020-05-11 ENCOUNTER — TELEPHONE (OUTPATIENT)
Dept: HEMATOLOGY ONCOLOGY | Facility: CLINIC | Age: 55
End: 2020-05-11

## 2020-05-12 ENCOUNTER — TELEMEDICINE (OUTPATIENT)
Dept: NEPHROLOGY | Facility: CLINIC | Age: 55
End: 2020-05-12
Payer: COMMERCIAL

## 2020-05-12 DIAGNOSIS — E55.9 VITAMIN D DEFICIENCY: ICD-10-CM

## 2020-05-12 DIAGNOSIS — R80.9 PROTEINURIA, UNSPECIFIED TYPE: ICD-10-CM

## 2020-05-12 DIAGNOSIS — N20.0 NEPHROLITHIASIS: ICD-10-CM

## 2020-05-12 DIAGNOSIS — N18.2 CHRONIC KIDNEY DISEASE (CKD), STAGE II (MILD): Primary | ICD-10-CM

## 2020-05-12 DIAGNOSIS — E21.3 HYPERPARATHYROIDISM (HCC): ICD-10-CM

## 2020-05-12 DIAGNOSIS — I82.5Y3 CHRONIC DEEP VEIN THROMBOSIS (DVT) OF PROXIMAL VEIN OF BOTH LOWER EXTREMITIES (HCC): Chronic | ICD-10-CM

## 2020-05-12 DIAGNOSIS — I10 ESSENTIAL HYPERTENSION: ICD-10-CM

## 2020-05-12 DIAGNOSIS — E83.52 HYPERCALCEMIA: ICD-10-CM

## 2020-05-12 LAB
THYROGLOB AB SERPL-ACNC: <1 IU/ML (ref 0–0.9)
THYROGLOB SERPL-MCNC: <0.1 NG/ML (ref 1.5–38.5)

## 2020-05-12 PROCEDURE — 99442 PR PHYS/QHP TELEPHONE EVALUATION 11-20 MIN: CPT | Performed by: INTERNAL MEDICINE

## 2020-05-12 RX ORDER — WARFARIN SODIUM 3 MG/1
TABLET ORAL
Qty: 90 TABLET | Refills: 4
Start: 2020-05-12 | End: 2021-06-09 | Stop reason: SDUPTHER

## 2020-05-13 DIAGNOSIS — E89.0 POSTOPERATIVE HYPOTHYROIDISM: ICD-10-CM

## 2020-05-13 RX ORDER — LEVOTHYROXINE SODIUM 0.12 MG/1
TABLET ORAL
Start: 2020-05-13 | End: 2020-05-26 | Stop reason: SDUPTHER

## 2020-05-26 DIAGNOSIS — E89.0 POSTOPERATIVE HYPOTHYROIDISM: ICD-10-CM

## 2020-05-26 RX ORDER — LEVOTHYROXINE SODIUM 0.12 MG/1
TABLET ORAL
Qty: 135 TABLET | Refills: 1 | Status: SHIPPED | OUTPATIENT
Start: 2020-05-26 | End: 2020-06-02 | Stop reason: SDUPTHER

## 2020-06-01 DIAGNOSIS — Z85.850 HISTORY OF THYROID CANCER: Primary | ICD-10-CM

## 2020-06-01 DIAGNOSIS — E21.0 PRIMARY HYPERPARATHYROIDISM (HCC): ICD-10-CM

## 2020-06-01 DIAGNOSIS — E83.52 HYPERCALCEMIA: ICD-10-CM

## 2020-06-01 DIAGNOSIS — E89.0 POSTOPERATIVE HYPOTHYROIDISM: ICD-10-CM

## 2020-06-02 RX ORDER — LEVOTHYROXINE SODIUM 0.12 MG/1
TABLET ORAL
Qty: 135 TABLET | Refills: 1
Start: 2020-06-02 | End: 2020-08-17 | Stop reason: SDUPTHER

## 2020-08-10 ENCOUNTER — LAB (OUTPATIENT)
Dept: LAB | Age: 55
End: 2020-08-10
Payer: COMMERCIAL

## 2020-08-10 ENCOUNTER — TRANSCRIBE ORDERS (OUTPATIENT)
Dept: ADMINISTRATIVE | Age: 55
End: 2020-08-10

## 2020-08-10 DIAGNOSIS — Z85.850 HISTORY OF THYROID CANCER: ICD-10-CM

## 2020-08-10 DIAGNOSIS — I82.90 DEEP VEIN THROMBOSIS (DVT) OF NON-EXTREMITY VEIN, UNSPECIFIED CHRONICITY: ICD-10-CM

## 2020-08-10 DIAGNOSIS — N18.2 CHRONIC KIDNEY DISEASE (CKD), STAGE II (MILD): ICD-10-CM

## 2020-08-10 DIAGNOSIS — R80.9 PROTEINURIA, UNSPECIFIED TYPE: ICD-10-CM

## 2020-08-10 DIAGNOSIS — E21.0 PRIMARY HYPERPARATHYROIDISM (HCC): ICD-10-CM

## 2020-08-10 DIAGNOSIS — E83.52 HYPERCALCEMIA: ICD-10-CM

## 2020-08-10 DIAGNOSIS — E89.0 POSTOPERATIVE HYPOTHYROIDISM: ICD-10-CM

## 2020-08-10 DIAGNOSIS — Z79.01 LONG TERM (CURRENT) USE OF ANTICOAGULANTS: Primary | ICD-10-CM

## 2020-08-10 LAB
ALBUMIN SERPL BCP-MCNC: 3.2 G/DL (ref 3.5–5)
ALBUMIN SERPL BCP-MCNC: 3.3 G/DL (ref 3.5–5)
ANION GAP SERPL CALCULATED.3IONS-SCNC: 2 MMOL/L (ref 4–13)
BACTERIA UR QL AUTO: ABNORMAL /HPF
BILIRUB UR QL STRIP: NEGATIVE
BUN SERPL-MCNC: 19 MG/DL (ref 5–25)
CALCIUM ALBUM COR SERPL-MCNC: 11 MG/DL (ref 8.3–10.1)
CALCIUM SERPL-MCNC: 10.4 MG/DL (ref 8.3–10.1)
CALCIUM SERPL-MCNC: 10.4 MG/DL (ref 8.3–10.1)
CHLORIDE SERPL-SCNC: 112 MMOL/L (ref 100–108)
CLARITY UR: CLEAR
CO2 SERPL-SCNC: 28 MMOL/L (ref 21–32)
COLOR UR: YELLOW
CREAT SERPL-MCNC: 0.93 MG/DL (ref 0.6–1.3)
CREAT UR-MCNC: 153 MG/DL
GFR SERPL CREATININE-BSD FRML MDRD: 70 ML/MIN/1.73SQ M
GLUCOSE P FAST SERPL-MCNC: 102 MG/DL (ref 65–99)
GLUCOSE UR STRIP-MCNC: NEGATIVE MG/DL
HGB UR QL STRIP.AUTO: ABNORMAL
HYALINE CASTS #/AREA URNS LPF: ABNORMAL /LPF
INR PPP: 2.38 (ref 0.84–1.19)
KETONES UR STRIP-MCNC: NEGATIVE MG/DL
LEUKOCYTE ESTERASE UR QL STRIP: NEGATIVE
NITRITE UR QL STRIP: NEGATIVE
NON-SQ EPI CELLS URNS QL MICRO: ABNORMAL /HPF
PH UR STRIP.AUTO: 6 [PH]
POTASSIUM SERPL-SCNC: 4.3 MMOL/L (ref 3.5–5.3)
PROT UR STRIP-MCNC: ABNORMAL MG/DL
PROT UR-MCNC: 215 MG/DL
PROT/CREAT UR: 1.41 MG/G{CREAT} (ref 0–0.1)
PROTHROMBIN TIME: 25.9 SECONDS (ref 11.6–14.5)
RBC #/AREA URNS AUTO: ABNORMAL /HPF
SODIUM SERPL-SCNC: 142 MMOL/L (ref 136–145)
SP GR UR STRIP.AUTO: 1.02 (ref 1–1.03)
T4 FREE SERPL-MCNC: 1.06 NG/DL (ref 0.76–1.46)
TSH SERPL DL<=0.05 MIU/L-ACNC: 5.53 UIU/ML (ref 0.36–3.74)
UROBILINOGEN UR QL STRIP.AUTO: 1 E.U./DL
WBC #/AREA URNS AUTO: ABNORMAL /HPF

## 2020-08-10 PROCEDURE — 81001 URINALYSIS AUTO W/SCOPE: CPT

## 2020-08-10 PROCEDURE — 82570 ASSAY OF URINE CREATININE: CPT

## 2020-08-10 PROCEDURE — 84439 ASSAY OF FREE THYROXINE: CPT

## 2020-08-10 PROCEDURE — 84156 ASSAY OF PROTEIN URINE: CPT

## 2020-08-10 PROCEDURE — 84443 ASSAY THYROID STIM HORMONE: CPT

## 2020-08-10 PROCEDURE — 80048 BASIC METABOLIC PNL TOTAL CA: CPT

## 2020-08-10 PROCEDURE — 85610 PROTHROMBIN TIME: CPT

## 2020-08-10 PROCEDURE — 82040 ASSAY OF SERUM ALBUMIN: CPT

## 2020-08-11 ENCOUNTER — TELEPHONE (OUTPATIENT)
Dept: ENDOCRINOLOGY | Facility: CLINIC | Age: 55
End: 2020-08-11

## 2020-08-11 ENCOUNTER — TELEPHONE (OUTPATIENT)
Dept: NEPHROLOGY | Facility: HOSPITAL | Age: 55
End: 2020-08-11

## 2020-08-11 NOTE — TELEPHONE ENCOUNTER
----- Message from Clovis Gilmore MD sent at 8/11/2020 12:40 PM EDT -----  Please call the patient regarding her abnormal result  Please inform patient that her Calcium is elevated, she should keep herself hydrated, she should drink 50-60 oz of water daily, repeat basic metabolic profile on Thursday  Please order basic metabolic profile   Also she should follow-up as schedule for her appointment,

## 2020-08-11 NOTE — TELEPHONE ENCOUNTER
Pt made aware of lab results as well as recommendations  She has been scheduled to for f/u on 9/2 at our [place] office  She has no questions or concerns at this time

## 2020-08-11 NOTE — TELEPHONE ENCOUNTER
----- Message from Yoshi Peng DO sent at 8/11/2020 11:59 AM EDT -----  Her calcium is elevated - she should increase hydration  UpCr higher  She should follow up with me in the office in Sept 2020  Please help schedule this, thanks

## 2020-08-11 NOTE — TELEPHONE ENCOUNTER
Left message asking patient to return call to go over lab results and to schedule follow up appointment for September

## 2020-08-12 ENCOUNTER — TELEPHONE (OUTPATIENT)
Dept: ENDOCRINOLOGY | Facility: CLINIC | Age: 55
End: 2020-08-12

## 2020-08-12 NOTE — TELEPHONE ENCOUNTER
Yes she can go for blood work on the Saturday or Monday morning ( fasting)   Thanks     Ramón Enriquez MD

## 2020-08-12 NOTE — TELEPHONE ENCOUNTER
Pt has not been able to drink that amt of water prior to you wanting blood work on Thursday  She does have an appt  This coming Monday, can she get the labs on Friday or sat instead?

## 2020-08-15 ENCOUNTER — TRANSCRIBE ORDERS (OUTPATIENT)
Dept: ADMINISTRATIVE | Age: 55
End: 2020-08-15

## 2020-08-15 ENCOUNTER — LAB (OUTPATIENT)
Dept: LAB | Age: 55
End: 2020-08-15
Payer: COMMERCIAL

## 2020-08-15 DIAGNOSIS — E86.0 DEHYDRATION: Primary | ICD-10-CM

## 2020-08-15 DIAGNOSIS — E86.0 DEHYDRATION: ICD-10-CM

## 2020-08-15 LAB
ANION GAP SERPL CALCULATED.3IONS-SCNC: 7 MMOL/L (ref 4–13)
BUN SERPL-MCNC: 19 MG/DL (ref 5–25)
CALCIUM SERPL-MCNC: 9.5 MG/DL (ref 8.3–10.1)
CHLORIDE SERPL-SCNC: 110 MMOL/L (ref 100–108)
CO2 SERPL-SCNC: 23 MMOL/L (ref 21–32)
CREAT SERPL-MCNC: 0.82 MG/DL (ref 0.6–1.3)
GFR SERPL CREATININE-BSD FRML MDRD: 82 ML/MIN/1.73SQ M
GLUCOSE P FAST SERPL-MCNC: 98 MG/DL (ref 65–99)
POTASSIUM SERPL-SCNC: 3.4 MMOL/L (ref 3.5–5.3)
SODIUM SERPL-SCNC: 140 MMOL/L (ref 136–145)

## 2020-08-15 PROCEDURE — 36415 COLL VENOUS BLD VENIPUNCTURE: CPT

## 2020-08-15 PROCEDURE — 80048 BASIC METABOLIC PNL TOTAL CA: CPT

## 2020-08-17 ENCOUNTER — OFFICE VISIT (OUTPATIENT)
Dept: ENDOCRINOLOGY | Facility: CLINIC | Age: 55
End: 2020-08-17
Payer: COMMERCIAL

## 2020-08-17 VITALS
TEMPERATURE: 97.9 F | WEIGHT: 211 LBS | DIASTOLIC BLOOD PRESSURE: 90 MMHG | HEIGHT: 61 IN | HEART RATE: 68 BPM | BODY MASS INDEX: 39.84 KG/M2 | SYSTOLIC BLOOD PRESSURE: 130 MMHG

## 2020-08-17 DIAGNOSIS — E83.52 HYPERCALCEMIA: ICD-10-CM

## 2020-08-17 DIAGNOSIS — E89.0 POSTOPERATIVE HYPOTHYROIDISM: Primary | ICD-10-CM

## 2020-08-17 DIAGNOSIS — Z85.850 HISTORY OF THYROID CANCER: ICD-10-CM

## 2020-08-17 DIAGNOSIS — E21.0 PRIMARY HYPERPARATHYROIDISM (HCC): ICD-10-CM

## 2020-08-17 PROCEDURE — 3008F BODY MASS INDEX DOCD: CPT | Performed by: INTERNAL MEDICINE

## 2020-08-17 PROCEDURE — 3075F SYST BP GE 130 - 139MM HG: CPT | Performed by: INTERNAL MEDICINE

## 2020-08-17 PROCEDURE — 99214 OFFICE O/P EST MOD 30 MIN: CPT | Performed by: INTERNAL MEDICINE

## 2020-08-17 PROCEDURE — 3080F DIAST BP >= 90 MM HG: CPT | Performed by: INTERNAL MEDICINE

## 2020-08-17 PROCEDURE — 1036F TOBACCO NON-USER: CPT | Performed by: INTERNAL MEDICINE

## 2020-08-17 RX ORDER — LEVOTHYROXINE SODIUM 0.12 MG/1
TABLET ORAL
Qty: 135 TABLET | Refills: 1
Start: 2020-08-17 | End: 2021-01-20

## 2020-08-17 NOTE — PROGRESS NOTES
Tim Peña 50 y o  female MRN: [id number]    Encounter: [id number]      Assessment/Plan     Assessment: This is a 50y o -year-old female with hypothyroidism  Plan:    Diagnoses and all orders for this visit:    Postoperative hypothyroidism  TSH is elevated about the goal with normal free T4  Will make following change in levothyroxine dose  Repeat TSH and free T4 in 6 weeks  Repeat thyroid blood work in 6 months  -     levothyroxine 125 mcg tablet; Take 1 tablet Mon-Sat & 1/2 tablet on Sunday  -     T4, free; Future  -     TSH, 3rd generation; Future  -     US head neck lymph node mapping; Future    History of thyroid cancer  Patient has stage I thyroid cancer(papillary thyroid cancer)  Thyroglobulin tumor marker undetectable which is assuring  Neck ultrasound from 2019 did not show any recurrence or metastatic disease  Will obtain neck ultrasound before next appointment in 6 months  Obtain thyroglobulin in 6 months  -     levothyroxine 125 mcg tablet; Take 1 tablet Mon-Sat & 1/2 tablet on Sunday  -     Thyroglobulin w/ab Lab Collect; Future    Primary hyperparathyroidism ([facility] [address] )  Calcium is within normal range, which was repeated on August 15, 2020  Patient prefers nonsurgical management, as she had 2 parathyroid glands removed for hyperparathyroidism however persistent hypercalcemia  Discussed to keep herself hydrated  Her BMD is normal previously  Follow-up in 6 months  -     levothyroxine 125 mcg tablet; Take 1 tablet Mon-Sat & 1/2 tablet on Sunday  -     Basic metabolic panel; Future  -     Albumin Lab Collect; Future    Hypercalcemia  Improved after hydration  Discussed to avoid calcium and vitamin-D supplementation  -     levothyroxine 125 mcg tablet; Take 1 tablet Mon-Sat & 1/2 tablet on Sunday        CC:     Hypothyroidism     History of Present Illness     HPI:    Tim Peña is 51-year-old woman with medical history of hypothyroidism, primary hyperparathyroidism, vitamin-D deficiency    She has history of stage I papillary thyroid cancer  She underwent right hemithyroidectomy and right parathyroid gland removal in 2006 at [facility]     followed by completion thyroidectomy in 2010  In 2010  she also had resection of left parathyroid gland  She is status post radioactive iodine therapy for thyroid cancer  She was treated with 52 mCi in August 2016  Her thyroglobulin tumor marker has been less than 0 1 since April 2018 which is assuring  Pathology -pT1a, No G1 stage I, S/p TThx for PTC unifocal left lobe micro cancer in 2010 with capsule invasion (this was obtained from previous records)    She has negative thyroid cancer tumor marker  Despite removal of 2 parathyroid gland patient continues to have elevated calcium and PTH level  She had a 24 hour urine calcium testing done and her calcium excretion ratio is greater than 0 01 suggestive of primary hyperparathyroidism  She was referred to Surgical Oncology however patient canceled the appointment for surgery  She is currently not taking any calcium or vitamin-D supplementation  Most recent calcium    Results for Noemy Fernandez (MRN [id number]) as of 8/17/2020 09:07   Ref  Range 5/9/2020 08:22   TSH 3RD GENERATON Latest Ref Range: 0 358 - 3 740 uIU/mL 1 428   Free T4 Latest Ref Range: 0 76 - 1 46 ng/dL 1 53 (H)   THYROGLOBULIN AB Latest Ref Range: 0 0 - 0 9 IU/mL <1 0   Thyroglobulin-MIRACLE Latest Ref Range: 1 5 - 38 5 ng/mL <0 1 (L)     Results for Noemy Fernandez (MRN [id number]) as of 8/17/2020 09:07   Ref   Range 8/10/2020 11:23   TSH 3RD GENERATON Latest Ref Range: 0 358 - 3 740 uIU/mL 5 530 (H)   Free T4 Latest Ref Range: 0 76 - 1 46 ng/dL 1 06     Lab Results   Component Value Date     5 (H) 05/09/2020    CALCIUM 9 5 08/15/2020    PHOS 2 4 (L) 11/18/2017     Lab Results   Component Value Date    CALCIUM 9 5 08/15/2020    PHOS 2 4 (L) 11/18/2017         Review of Systems    Historical Information   Past Medical History:   Diagnosis Date    Chronic kidney disease     Gestational diabetes     [facility] ([facility] [address] [facility]     Hypertension     Idiopathic thrombocytopenic purpura (ITP) (HCC)     Lupus (HCC)     Vitamin D deficiency      Past Surgical History:   Procedure Laterality Date     SECTION      5264/3323    HERNIA REPAIR      HIP SURGERY      left side, bone decompression    THYROID SURGERY       Social History   Social History     Substance and Sexual Activity   Alcohol Use Not Currently     Social History     Substance and Sexual Activity   Drug Use No     Social History     Tobacco Use   Smoking Status Never Smoker   Smokeless Tobacco Never Used     Family History:   Family History   Problem Relation Age of Onset    Diabetes type II Father     Diabetes Father     Stroke Father     Diabetes type II Paternal Aunt     Diabetes type II Paternal Uncle     Diabetes type II Paternal Grandmother     Stomach cancer Paternal Grandfather        Meds/Allergies   Current Outpatient Medications   Medication Sig Dispense Refill    folic acid (FOLVITE) 1 mg tablet Take 1 tablet by mouth daily      fosinopril (MONOPRIL) 20 mg tablet Take 1 tablet (20 mg total) by mouth 2 (two) times a day 360 tablet 2    levothyroxine 125 mcg tablet Take 1 tablet Mon-Sat & 1/2 tablet on  135 tablet 1    NIFEdipine (PROCARDIA XL) 30 mg 24 hr tablet Take 1 tablet (30 mg total) by mouth daily 90 tablet 3    nystatin (MYCOSTATIN) powder Apply topically 2 (two) times a day 60 g 2    spironolactone (ALDACTONE) 25 mg tablet Take 1 tablet (25 mg total) by mouth daily 90 tablet 3    warfarin (COUMADIN) 3 mg tablet TAKE 1 TABLET DAILY except on  and  90 tablet 4    clotrimazole-betamethasone (LOTRISONE) 1-0 05 % cream Apply topically 2 (two) times a day (Patient not taking: Reported on 2020) 30 g 0     No current facility-administered medications for this visit        Allergies   Allergen Reactions    Penicillins Itching Objective   Vitals: Blood pressure 130/90, pulse 68, temperature 97 9 °F (36 6 °C), height 5' 1" (1 549 m), weight 95 7 kg (211 lb)  Physical Exam    The history was obtained from the review of the chart, patient  Lab Results:   Lab Results   Component Value Date/Time    TSH 3RD GENERATON 5 530 (H) 08/10/2020 11:23 AM    TSH 3RD GENERATON 1 428 05/09/2020 08:22 AM    TSH 3RD GENERATON 2 920 01/24/2020 11:47 AM    Free T4 1 06 08/10/2020 11:23 AM    Free T4 1 53 (H) 05/09/2020 08:22 AM    Free T4 1 38 01/24/2020 11:47 AM       Imaging Studies:        I have personally reviewed pertinent reports  Portions of the record may have been created with voice recognition software  Occasional wrong word or "sound a like" substitutions may have occurred due to the inherent limitations of voice recognition software  Read the chart carefully and recognize, using context, where substitutions have occurred

## 2020-09-02 ENCOUNTER — OFFICE VISIT (OUTPATIENT)
Dept: NEPHROLOGY | Facility: CLINIC | Age: 55
End: 2020-09-02
Payer: COMMERCIAL

## 2020-09-02 VITALS
TEMPERATURE: 98.6 F | SYSTOLIC BLOOD PRESSURE: 140 MMHG | DIASTOLIC BLOOD PRESSURE: 88 MMHG | WEIGHT: 207 LBS | BODY MASS INDEX: 39.08 KG/M2 | RESPIRATION RATE: 16 BRPM | HEIGHT: 61 IN | HEART RATE: 72 BPM

## 2020-09-02 DIAGNOSIS — N18.2 CHRONIC KIDNEY DISEASE (CKD), STAGE II (MILD): Primary | ICD-10-CM

## 2020-09-02 DIAGNOSIS — M32.14 SYSTEMIC LUPUS ERYTHEMATOSUS WITH GLOMERULAR DISEASE, UNSPECIFIED SLE TYPE (HCC): ICD-10-CM

## 2020-09-02 DIAGNOSIS — E83.52 HYPERCALCEMIA: ICD-10-CM

## 2020-09-02 DIAGNOSIS — I12.9 BENIGN HYPERTENSIVE KIDNEY DISEASE WITH CHRONIC KIDNEY DISEASE STAGE I THROUGH STAGE IV, OR UNSPECIFIED: ICD-10-CM

## 2020-09-02 DIAGNOSIS — N20.0 NEPHROLITHIASIS: ICD-10-CM

## 2020-09-02 DIAGNOSIS — E21.3 HYPERPARATHYROIDISM (HCC): ICD-10-CM

## 2020-09-02 DIAGNOSIS — R80.9 PROTEINURIA, UNSPECIFIED TYPE: ICD-10-CM

## 2020-09-02 DIAGNOSIS — E55.9 VITAMIN D DEFICIENCY: ICD-10-CM

## 2020-09-02 PROCEDURE — 99214 OFFICE O/P EST MOD 30 MIN: CPT | Performed by: INTERNAL MEDICINE

## 2020-09-02 NOTE — PROGRESS NOTES
NEPHROLOGY OUTPATIENT PROGRESS NOTE   Lord Moya 50 y o  female MRN: [id number]  DATE: 9/2/2020  Reason for visit:   Chief Complaint   Patient presents with    Chronic Kidney Disease    Follow-up        Patient Instructions   1  CKD stage II/history of lupus nephritis  -sCr stable in 0 7-0 8 range  Latest C3 normal  Last sCr 0 82 from 8/15/20  -continue to avoid nonsteroidals(ibuprofen, aleve, advil, motrin, celebrex, naproxen, toradol)  -latest urinalysis shows small blood, 3+ protein, 4-10 RBCs, 10-20 WBCs with occasional bacteria  -monitor BMP, UA with microscopy as well along with repeat UpCr    -no recent lupus flares     2  proteinuria-+residual proteinuria from prior lupus nephritis (biopsy proven in 1996), now noted to have increased weight as well as HTN    -Off HCTZ due to hypercalcemia, on fosinopril 20mg twice daily  -on aldactone 25mg daily   -latest UpCr 1159 as of August 2019 - improved, UpCr has risen to 1 41g as of 8/10/20   -repeat UpCr      3  volume status- continue spironolactone 25mg daily, potassium normal as of 5/9/20     4  hypercalcemia/hyperparathyroidism - follows with endo, +nephrolithiasis history with recent Urinalysis showing calcium oxalate crystals  -You must drink enough water to urinate at least 2L per day to prevent stone formation but this is difficult given her edema and need for diuretics  Unfortunately, diuretics will contribute to stone formation   -avoid high salt diet both for stone reduction and to help blood pressure stay controlled  -last serum calcium level 10 (high normal range), PTH slightly elevated at 181  5     5  hypertension-BP elevated today likely d/t nonadherence with medication this morning    -continue nifedipine XL 30mg daily(at night), fosinopril 20mg twice daily, spironolactone 25mg daily (at lunch)  -avoid caffeine/tea     6  vitamin D insufficiency - Vit D level 21 2 - off vitamin D supplementation, repeat vitamin D level    7   Hypokalemia - K 3 4 as of 8/15/20, repeat BMP and mag level     Obtain bloodwork and urine testing now  Jenifer Arias was seen today for chronic kidney disease and follow-up  Diagnoses and all orders for this visit:    Chronic kidney disease (CKD), stage II (mild)  -     Basic metabolic panel; Future  -     Urinalysis with microscopic; Future  -     Protein / creatinine ratio, urine; Future  -     Magnesium; Future  -     Microalbumin / creatinine urine ratio; Future  -     Vitamin D 25 hydroxy; Future    Hyperparathyroidism ([facility] )    Benign hypertensive kidney disease with chronic kidney disease stage I through stage IV, or unspecified  -     Basic metabolic panel; Future    Nephrolithiasis    Hypercalcemia  -     Basic metabolic panel; Future    Proteinuria, unspecified type  -     Protein / creatinine ratio, urine; Future  -     Microalbumin / creatinine urine ratio; Future    Systemic lupus erythematosus with glomerular disease, unspecified SLE type ([facility] )    Vitamin D deficiency        Assessment/Plan:  1  CKD stage II/history of lupus nephritis  -sCr stable in 0 7-0 8 range   Latest C3 normal  Last sCr 0 82 from 8/15/20  -continue to avoid nonsteroidals(ibuprofen, aleve, advil, motrin, celebrex, naproxen, toradol)  -latest urinalysis shows small blood, 3+ protein, 4-10 RBCs, 10-20 WBCs with occasional bacteria  -monitor BMP, UA with microscopy as well along with repeat UpCr    -no recent lupus flares     2  proteinuria-+residual proteinuria from prior lupus nephritis (biopsy proven in 1996), now noted to have increased weight as well as HTN    -Off HCTZ due to hypercalcemia, on fosinopril 20mg twice daily  -on aldactone 25mg daily   -latest UpCr 1159 as of August 2019 - improved, UpCr has risen to 1 41g as of 8/10/20   -repeat UpCr      3  volume status- continue spironolactone 25mg daily, potassium normal as of 5/9/20     4  hypercalcemia/hyperparathyroidism - follows with endo, +nephrolithiasis history with recent Urinalysis showing calcium oxalate crystals  -You must drink enough water to urinate at least 2L per day to prevent stone formation but this is difficult given her edema and need for diuretics  Unfortunately, diuretics will contribute to stone formation   -avoid high salt diet both for stone reduction and to help blood pressure stay controlled  -last serum calcium level 10 (high normal range), PTH slightly elevated at 181  5     5  hypertension-BP elevated today likely d/t nonadherence with medication this morning    -continue nifedipine XL 30mg daily(at night), fosinopril 20mg twice daily, spironolactone 25mg daily (at lunch)  -avoid caffeine/tea     6  vitamin D insufficiency - Vit D level 21 2 - off vitamin D supplementation, repeat vitamin D level    7  Hypokalemia - K 3 4 as of 8/15/20, repeat BMP and mag level     Obtain bloodwork and urine testing now  SUBJECTIVE / INTERVAL HISTORY:  50 y o  female presents in follow up of CKD/lupus nephritis  Tessa Cuevae denies any recent illness/hospitalizations/medication changes since last office visit  She burnt her right ankle while cooking  ?NSAID use  HTN - does not check BP at home  Review of Systems   Constitutional: Negative for chills and fever  HENT: Negative for sore throat and trouble swallowing  Eyes: Negative for visual disturbance  Respiratory: Negative for cough and shortness of breath  Cardiovascular: Negative for chest pain and leg swelling  Gastrointestinal: Negative for abdominal pain, constipation, diarrhea, nausea and vomiting  Urgency for BM in the mornings with associated back pain   Endocrine: Negative for polyuria  Genitourinary: Negative for difficulty urinating, dysuria and hematuria  Musculoskeletal: Negative for back pain and neck pain  Skin: Negative for rash  Neurological: Negative for dizziness, light-headedness and numbness  Hematological: Negative for adenopathy   Bruises/bleeds easily (bruises easily after bloodwork)  Psychiatric/Behavioral: The patient is not nervous/anxious  OBJECTIVE:  /88 (BP Location: Left arm, Patient Position: Sitting, Cuff Size: Large)   Pulse 72   Temp 98 6 °F (37 °C) (Skin)   Resp 16   Ht 5' 1" (1 549 m)   Wt 93 9 kg (207 lb)   BMI 39 11 kg/m²  Body mass index is 39 11 kg/m²  Physical exam:  Physical Exam  Vitals signs and nursing note reviewed  Constitutional:       General: She is not in acute distress  Appearance: She is well-developed  She is not diaphoretic  Comments: obese   HENT:      Head: Normocephalic and atraumatic  Mouth/Throat:      Pharynx: No oropharyngeal exudate  Eyes:      General: No scleral icterus  Right eye: No discharge  Left eye: No discharge  Neck:      Musculoskeletal: Normal range of motion and neck supple  Thyroid: No thyromegaly  Cardiovascular:      Rate and Rhythm: Normal rate and regular rhythm  Heart sounds: No murmur  Pulmonary:      Effort: Pulmonary effort is normal  No respiratory distress  Breath sounds: Normal breath sounds  No wheezing  Abdominal:      General: Bowel sounds are normal  There is no distension  Palpations: Abdomen is soft  Skin:     General: Skin is warm and dry  Findings: Lesion (right ankle burn) present  No rash  Neurological:      Mental Status: She is alert  Motor: No abnormal muscle tone        Comments: awake   Psychiatric:         Behavior: Behavior normal          Medications:    Current Outpatient Medications:     folic acid (FOLVITE) 1 mg tablet, Take 1 tablet by mouth daily, Disp: , Rfl:     fosinopril (MONOPRIL) 20 mg tablet, Take 1 tablet (20 mg total) by mouth 2 (two) times a day, Disp: 360 tablet, Rfl: 2    levothyroxine 125 mcg tablet, Take 1 tablet Mon-Sat & 1/2 tablet on Sunday, Disp: 135 tablet, Rfl: 1    NIFEdipine (PROCARDIA XL) 30 mg 24 hr tablet, Take 1 tablet (30 mg total) by mouth daily, Disp: 90 tablet, Rfl: 3    nystatin (MYCOSTATIN) powder, Apply topically 2 (two) times a day, Disp: 60 g, Rfl: 2    spironolactone (ALDACTONE) 25 mg tablet, Take 1 tablet (25 mg total) by mouth daily, Disp: 90 tablet, Rfl: 3    warfarin (COUMADIN) 3 mg tablet, TAKE 1 TABLET DAILY except on Mondays and Thursdays, Disp: 90 tablet, Rfl: 4    Allergies: Allergies as of 09/02/2020 - Reviewed 09/02/2020   Allergen Reaction Noted    Penicillins Itching 04/25/2013       The following portions of the patient's history were reviewed and updated as appropriate: past family history, past surgical history and problem list     Laboratory Results:  Lab Results   Component Value Date    SODIUM 140 08/15/2020    K 3 4 (L) 08/15/2020     (H) 08/15/2020    CO2 23 08/15/2020    BUN 19 08/15/2020    CREATININE 0 82 08/15/2020    GLUC 109 (H) 08/06/2019    CALCIUM 9 5 08/15/2020        Lab Results   Component Value Date     5 (H) 05/09/2020    CALCIUM 9 5 08/15/2020    PHOS 2 4 (L) 11/18/2017       Portions of the record may have been created with voice recognition software   Occasional wrong word or "sound a like" substitutions may have occurred due to the inherent limitations of voice recognition software   Read the chart carefully and recognize, using context, where substitutions have occurred

## 2020-09-02 NOTE — PATIENT INSTRUCTIONS
1  CKD stage II/history of lupus nephritis  -sCr stable in 0 7-0 8 range  Latest C3 normal  Last sCr 0 82 from 8/15/20  -continue to avoid nonsteroidals(ibuprofen, aleve, advil, motrin, celebrex, naproxen, toradol)  -latest urinalysis shows small blood, 3+ protein, 4-10 RBCs, 10-20 WBCs with occasional bacteria  -monitor BMP, UA with microscopy as well along with repeat UpCr    -no recent lupus flares     2  proteinuria-+residual proteinuria from prior lupus nephritis (biopsy proven in 1996), now noted to have increased weight as well as HTN    -Off HCTZ due to hypercalcemia, on fosinopril 20mg twice daily  -on aldactone 25mg daily   -latest UpCr 1159 as of August 2019 - improved, UpCr has risen to 1 41g as of 8/10/20   -repeat UpCr      3  volume status- continue spironolactone 25mg daily, potassium normal as of 5/9/20     4  hypercalcemia/hyperparathyroidism - follows with endo, +nephrolithiasis history with recent Urinalysis showing calcium oxalate crystals  -You must drink enough water to urinate at least 2L per day to prevent stone formation but this is difficult given her edema and need for diuretics  Unfortunately, diuretics will contribute to stone formation   -avoid high salt diet both for stone reduction and to help blood pressure stay controlled  -last serum calcium level 10 (high normal range), PTH slightly elevated at 181  5     5  hypertension-BP elevated today likely d/t nonadherence with medication this morning    -continue nifedipine XL 30mg daily(at night), fosinopril 20mg twice daily, spironolactone 25mg daily (at lunch)  -avoid caffeine/tea     6  vitamin D insufficiency - Vit D level 21 2 - off vitamin D supplementation, repeat vitamin D level    7  Hypokalemia - K 3 4 as of 8/15/20, repeat BMP and mag level     Obtain bloodwork and urine testing now

## 2020-09-04 ENCOUNTER — APPOINTMENT (OUTPATIENT)
Dept: LAB | Facility: HOSPITAL | Age: 55
End: 2020-09-04
Attending: INTERNAL MEDICINE
Payer: COMMERCIAL

## 2020-09-04 ENCOUNTER — TRANSCRIBE ORDERS (OUTPATIENT)
Dept: ADMINISTRATIVE | Facility: HOSPITAL | Age: 55
End: 2020-09-04

## 2020-09-04 DIAGNOSIS — E21.0 PRIMARY HYPERPARATHYROIDISM (HCC): ICD-10-CM

## 2020-09-04 DIAGNOSIS — E83.52 HYPERCALCEMIA: ICD-10-CM

## 2020-09-04 DIAGNOSIS — E89.0 POSTOPERATIVE HYPOTHYROIDISM: ICD-10-CM

## 2020-09-04 DIAGNOSIS — I12.9 BENIGN HYPERTENSIVE KIDNEY DISEASE WITH CHRONIC KIDNEY DISEASE STAGE I THROUGH STAGE IV, OR UNSPECIFIED: ICD-10-CM

## 2020-09-04 DIAGNOSIS — N18.2 CHRONIC KIDNEY DISEASE (CKD), STAGE II (MILD): ICD-10-CM

## 2020-09-04 DIAGNOSIS — D68.62 LUPUS ANTICOAGULANT DISORDER (HCC): Primary | ICD-10-CM

## 2020-09-04 DIAGNOSIS — R80.9 PROTEINURIA, UNSPECIFIED TYPE: ICD-10-CM

## 2020-09-04 DIAGNOSIS — D68.62 LUPUS ANTICOAGULANT DISORDER (HCC): ICD-10-CM

## 2020-09-04 DIAGNOSIS — M85.89 DISAPPEARING BONE DISEASE: ICD-10-CM

## 2020-09-04 LAB
25(OH)D3 SERPL-MCNC: 14 NG/ML (ref 30–100)
ALBUMIN SERPL BCP-MCNC: 3.3 G/DL (ref 3.5–5)
ALP SERPL-CCNC: 83 U/L (ref 46–116)
ALT SERPL W P-5'-P-CCNC: 28 U/L (ref 12–78)
ANION GAP SERPL CALCULATED.3IONS-SCNC: 8 MMOL/L (ref 4–13)
AST SERPL W P-5'-P-CCNC: 23 U/L (ref 5–45)
BACTERIA UR QL AUTO: ABNORMAL /HPF
BASOPHILS # BLD AUTO: 0.02 THOUSANDS/ΜL (ref 0–0.1)
BASOPHILS NFR BLD AUTO: 0 % (ref 0–1)
BILIRUB SERPL-MCNC: 0.99 MG/DL (ref 0.2–1)
BILIRUB UR QL STRIP: NEGATIVE
BUN SERPL-MCNC: 21 MG/DL (ref 5–25)
C3 SERPL-MCNC: 92 MG/DL (ref 90–180)
CALCIUM ALBUM COR SERPL-MCNC: 10.8 MG/DL (ref 8.3–10.1)
CALCIUM SERPL-MCNC: 10.2 MG/DL (ref 8.3–10.1)
CHLORIDE SERPL-SCNC: 107 MMOL/L (ref 100–108)
CLARITY UR: ABNORMAL
CO2 SERPL-SCNC: 24 MMOL/L (ref 21–32)
COLOR UR: YELLOW
CREAT SERPL-MCNC: 0.97 MG/DL (ref 0.6–1.3)
CREAT UR-MCNC: 162 MG/DL
CREAT UR-MCNC: 170.8 MG/DL
EOSINOPHIL # BLD AUTO: 0.06 THOUSAND/ΜL (ref 0–0.61)
EOSINOPHIL NFR BLD AUTO: 1 % (ref 0–6)
ERYTHROCYTE [DISTWIDTH] IN BLOOD BY AUTOMATED COUNT: 12 % (ref 11.6–15.1)
ERYTHROCYTE [SEDIMENTATION RATE] IN BLOOD: 17 MM/HOUR (ref 0–29)
GFR SERPL CREATININE-BSD FRML MDRD: 67 ML/MIN/1.73SQ M
GLUCOSE P FAST SERPL-MCNC: 94 MG/DL (ref 65–99)
GLUCOSE UR STRIP-MCNC: NEGATIVE MG/DL
HCT VFR BLD AUTO: 43.1 % (ref 34.8–46.1)
HGB BLD-MCNC: 13.9 G/DL (ref 11.5–15.4)
HGB UR QL STRIP.AUTO: ABNORMAL
IMM GRANULOCYTES # BLD AUTO: 0.03 THOUSAND/UL (ref 0–0.2)
IMM GRANULOCYTES NFR BLD AUTO: 1 % (ref 0–2)
KETONES UR STRIP-MCNC: NEGATIVE MG/DL
LEUKOCYTE ESTERASE UR QL STRIP: NEGATIVE
LYMPHOCYTES # BLD AUTO: 1.44 THOUSANDS/ΜL (ref 0.6–4.47)
LYMPHOCYTES NFR BLD AUTO: 29 % (ref 14–44)
MAGNESIUM SERPL-MCNC: 1.8 MG/DL (ref 1.6–2.6)
MCH RBC QN AUTO: 29.5 PG (ref 26.8–34.3)
MCHC RBC AUTO-ENTMCNC: 32.3 G/DL (ref 31.4–37.4)
MCV RBC AUTO: 92 FL (ref 82–98)
MICROALBUMIN UR-MCNC: 1120 MG/L (ref 0–20)
MICROALBUMIN/CREAT 24H UR: 691 MG/G CREATININE (ref 0–30)
MONOCYTES # BLD AUTO: 0.5 THOUSAND/ΜL (ref 0.17–1.22)
MONOCYTES NFR BLD AUTO: 10 % (ref 4–12)
NEUTROPHILS # BLD AUTO: 3 THOUSANDS/ΜL (ref 1.85–7.62)
NEUTS SEG NFR BLD AUTO: 59 % (ref 43–75)
NITRITE UR QL STRIP: NEGATIVE
NON-SQ EPI CELLS URNS QL MICRO: ABNORMAL /HPF
NRBC BLD AUTO-RTO: 0 /100 WBCS
PH UR STRIP.AUTO: 6 [PH]
PLATELET # BLD AUTO: 190 THOUSANDS/UL (ref 149–390)
PMV BLD AUTO: 10.7 FL (ref 8.9–12.7)
POTASSIUM SERPL-SCNC: 4.1 MMOL/L (ref 3.5–5.3)
PROT SERPL-MCNC: 7.6 G/DL (ref 6.4–8.2)
PROT UR STRIP-MCNC: ABNORMAL MG/DL
PROT UR-MCNC: 138 MG/DL
PROT/CREAT UR: 0.81 MG/G{CREAT} (ref 0–0.1)
RBC # BLD AUTO: 4.71 MILLION/UL (ref 3.81–5.12)
RBC #/AREA URNS AUTO: ABNORMAL /HPF
SODIUM SERPL-SCNC: 139 MMOL/L (ref 136–145)
SP GR UR STRIP.AUTO: 1.02 (ref 1–1.03)
T4 FREE SERPL-MCNC: 1.54 NG/DL (ref 0.76–1.46)
TSH SERPL DL<=0.05 MIU/L-ACNC: 1.07 UIU/ML (ref 0.36–3.74)
UROBILINOGEN UR QL STRIP.AUTO: 0.2 E.U./DL
WBC # BLD AUTO: 5.05 THOUSAND/UL (ref 4.31–10.16)
WBC #/AREA URNS AUTO: ABNORMAL /HPF

## 2020-09-04 PROCEDURE — 84443 ASSAY THYROID STIM HORMONE: CPT

## 2020-09-04 PROCEDURE — 85652 RBC SED RATE AUTOMATED: CPT

## 2020-09-04 PROCEDURE — 84156 ASSAY OF PROTEIN URINE: CPT

## 2020-09-04 PROCEDURE — 82043 UR ALBUMIN QUANTITATIVE: CPT

## 2020-09-04 PROCEDURE — 80053 COMPREHEN METABOLIC PANEL: CPT

## 2020-09-04 PROCEDURE — 86225 DNA ANTIBODY NATIVE: CPT

## 2020-09-04 PROCEDURE — 82306 VITAMIN D 25 HYDROXY: CPT

## 2020-09-04 PROCEDURE — 83735 ASSAY OF MAGNESIUM: CPT

## 2020-09-04 PROCEDURE — 85025 COMPLETE CBC W/AUTO DIFF WBC: CPT

## 2020-09-04 PROCEDURE — 81001 URINALYSIS AUTO W/SCOPE: CPT | Performed by: INTERNAL MEDICINE

## 2020-09-04 PROCEDURE — 84439 ASSAY OF FREE THYROXINE: CPT

## 2020-09-04 PROCEDURE — 86160 COMPLEMENT ANTIGEN: CPT

## 2020-09-04 PROCEDURE — 82570 ASSAY OF URINE CREATININE: CPT

## 2020-09-04 PROCEDURE — 36415 COLL VENOUS BLD VENIPUNCTURE: CPT

## 2020-09-05 LAB — DSDNA AB SER-ACNC: 6 IU/ML (ref 0–9)

## 2020-09-10 ENCOUNTER — TELEPHONE (OUTPATIENT)
Dept: ENDOCRINOLOGY | Facility: CLINIC | Age: 55
End: 2020-09-10

## 2020-09-10 NOTE — TELEPHONE ENCOUNTER
Left msg asking pt to call back and let us know that she got her mychart msg and if pt is having any symptoms

## 2020-09-10 NOTE — TELEPHONE ENCOUNTER
----- Message from Kate Schuster MD sent at 9/10/2020  1:04 PM EDT -----  Fahad Llanos, your TSH is normal, however, free T4 is slightly elevated, if you do not have palpitations, nervousness or anxiety, we can continue current dose of levothyroxine  But if you do have palpitations, lack of sleep, please let us know as we will have to adjust her dose of levothyroxine    Thank you    I sent my chart message but please conform if she received it

## 2020-09-14 ENCOUNTER — TELEPHONE (OUTPATIENT)
Dept: NEPHROLOGY | Facility: HOSPITAL | Age: 55
End: 2020-09-14

## 2020-09-14 NOTE — TELEPHONE ENCOUNTER
----- Message from Henok Vazquez DO sent at 9/11/2020  2:32 PM EDT -----  Vitamin D level low  This has been chronic  Has high serum calcium and hyperparathyroidism managed by endorcinology so will avoid vitamin D supplementation  Proteinuria has improved  Renal function is stable  Please inform the patient of these results  Thanks

## 2020-09-15 ENCOUNTER — OFFICE VISIT (OUTPATIENT)
Dept: INTERNAL MEDICINE CLINIC | Facility: CLINIC | Age: 55
End: 2020-09-15
Payer: COMMERCIAL

## 2020-09-15 VITALS
DIASTOLIC BLOOD PRESSURE: 70 MMHG | HEIGHT: 64 IN | TEMPERATURE: 97.8 F | SYSTOLIC BLOOD PRESSURE: 135 MMHG | WEIGHT: 204 LBS | BODY MASS INDEX: 34.83 KG/M2

## 2020-09-15 DIAGNOSIS — Z12.4 SCREENING FOR MALIGNANT NEOPLASM OF CERVIX: ICD-10-CM

## 2020-09-15 DIAGNOSIS — Z00.00 ANNUAL PHYSICAL EXAM: Primary | ICD-10-CM

## 2020-09-15 DIAGNOSIS — Z12.11 ENCOUNTER FOR SCREENING COLONOSCOPY: ICD-10-CM

## 2020-09-15 DIAGNOSIS — Z12.39 SCREENING FOR MALIGNANT NEOPLASM OF BREAST: ICD-10-CM

## 2020-09-15 PROCEDURE — 99396 PREV VISIT EST AGE 40-64: CPT | Performed by: NURSE PRACTITIONER

## 2020-09-15 PROCEDURE — 3008F BODY MASS INDEX DOCD: CPT | Performed by: NURSE PRACTITIONER

## 2020-09-15 NOTE — PROGRESS NOTES
[address] [facility]    NAME: Keenan Coyne  AGE: 50 y o  SEX: female  : 1965     DATE: 2020     Assessment and Plan:     Problem List Items Addressed This Visit        Other    BMI 39 0-39 9,adult     BMI Counseling: There is no height or weight on file to calculate BMI  The BMI is above normal  Nutrition recommendations include decreasing overall calorie intake, 3-5 servings of fruits/vegetables daily and reducing fast food intake  Exercise recommendations include exercising 3-5 times per week  Other Visit Diagnoses     Annual physical exam    -  Primary    Screening for malignant neoplasm of breast        Relevant Orders    Mammo screening bilateral w 3d & cad    Screening for malignant neoplasm of cervix        Relevant Orders    Ambulatory referral to Gynecology    Encounter for screening colonoscopy        Relevant Orders    Cologuard          Immunizations and preventive care screenings were discussed with patient today  Appropriate education was printed on patient's after visit summary  Counseling:  · Exercise: the importance of regular exercise/physical activity was discussed  Recommend exercise 3-5 times per week for at least 30 minutes  Return in about 6 months (around 3/15/2021) for Next scheduled follow up  Chief Complaint:     No chief complaint on file  History of Present Illness:     Adult Annual Physical   Patient here for a comprehensive physical exam  The patient reports no problems  Diet and Physical Activity  · Diet/Nutrition: well balanced diet  · Exercise: walking  Depression Screening  PHQ-9 Depression Screening    PHQ-9:    Frequency of the following problems over the past two weeks:       Little interest or pleasure in doing things:  0 - not at all  Feeling down, depressed, or hopeless:  0 - not at all  PHQ-2 Score:  0       General Health  · Sleep: sleeps well  · Hearing: normal - bilateral   · Vision: no vision problems  · Dental: regular dental visits  /GYN Health  · Patient is: postmenopausal  ·      Review of Systems:     Review of Systems   Constitutional: Negative  HENT: Negative  Eyes: Negative  Respiratory: Negative  Cardiovascular: Negative  Gastrointestinal: Negative  Musculoskeletal: Negative  Neurological: Negative         Past Medical History:     Past Medical History:   Diagnosis Date    Chronic kidney disease     Gestational diabetes     Hyperparathyroidism ([address] )     Hypertension     Idiopathic thrombocytopenic purpura (ITP) (HCC)     Lupus (HCC)     Vitamin D deficiency       Past Surgical History:     Past Surgical History:   Procedure Laterality Date     SECTION      505    HERNIA REPAIR      HIP SURGERY      left side, bone decompression    THYROID SURGERY        Social History:        Social History     Socioeconomic History    Marital status: /Civil Union     Spouse name: Not on file    Number of children: Not on file    Years of education: Not on file    Highest education level: Not on file   Occupational History    Not on file   Social Needs    Financial resource strain: Not on file    Food insecurity     Worry: Not on file     Inability: Not on file    Transportation needs     Medical: Not on file     Non-medical: Not on file   Tobacco Use    Smoking status: Never Smoker    Smokeless tobacco: Never Used   Substance and Sexual Activity    Alcohol use: Not Currently    Drug use: No    Sexual activity: Not on file   Lifestyle    Physical activity     Days per week: Not on file     Minutes per session: Not on file    Stress: Not on file   Relationships    Social connections     Talks on phone: Not on file     Gets together: Not on file     Attends [employer] service: Not on file     Active member of club or organization: Not on file     Attends meetings of clubs or organizations: Not on file     Relationship status: Not on file    Intimate partner violence     Fear of current or ex partner: Not on file     Emotionally abused: Not on file     Physically abused: Not on file     Forced sexual activity: Not on file   Other Topics Concern    Not on file   Social History Narrative    Not on file      Family History:     Family History   Problem Relation Age of Onset    Diabetes type II Father     Diabetes Father     Stroke Father     Diabetes type II Paternal Aunt     Diabetes type II Paternal Uncle     Diabetes type II Paternal Grandmother     Stomach cancer Paternal Grandfather       Current Medications:     Current Outpatient Medications   Medication Sig Dispense Refill    folic acid (FOLVITE) 1 mg tablet Take 1 tablet by mouth daily      fosinopril (MONOPRIL) 20 mg tablet Take 1 tablet (20 mg total) by mouth 2 (two) times a day 360 tablet 2    levothyroxine 125 mcg tablet Take 1 tablet Mon-Sat & 1/2 tablet on Sunday 135 tablet 1    NIFEdipine (PROCARDIA XL) 30 mg 24 hr tablet Take 1 tablet (30 mg total) by mouth daily 90 tablet 3    nystatin (MYCOSTATIN) powder Apply topically 2 (two) times a day 60 g 2    spironolactone (ALDACTONE) 25 mg tablet Take 1 tablet (25 mg total) by mouth daily 90 tablet 3    warfarin (COUMADIN) 3 mg tablet TAKE 1 TABLET DAILY except on Mondays and Thursdays 90 tablet 4     No current facility-administered medications for this visit  Allergies: Allergies   Allergen Reactions    Penicillins Itching      Physical Exam:     /70   Temp 97 8 °F (36 6 °C)   Ht 5' 4" (1 626 m)   Wt 92 5 kg (204 lb)   BMI 35 02 kg/m²     Physical Exam  Vitals signs reviewed  Constitutional:       Appearance: Normal appearance  HENT:      Head: Normocephalic        Right Ear: Tympanic membrane, ear canal and external ear normal       Left Ear: Tympanic membrane, ear canal and external ear normal       Nose: Nose normal  Mouth/Throat:      Mouth: Mucous membranes are moist    Eyes:      Conjunctiva/sclera: Conjunctivae normal       Pupils: Pupils are equal, round, and reactive to light  Neck:      Musculoskeletal: Normal range of motion and neck supple  Cardiovascular:      Rate and Rhythm: Normal rate and regular rhythm  Pulses: Normal pulses  Heart sounds: Normal heart sounds  Pulmonary:      Effort: Pulmonary effort is normal       Breath sounds: Normal breath sounds  Musculoskeletal: Normal range of motion  Skin:     General: Skin is warm and dry  Neurological:      General: No focal deficit present  Mental Status: She is alert and oriented to person, place, and time     Psychiatric:         Mood and Affect: Mood normal          Behavior: Behavior normal           Julissa Russell, [facility]

## 2020-09-15 NOTE — ASSESSMENT & PLAN NOTE
BMI Counseling: There is no height or weight on file to calculate BMI  The BMI is above normal  Nutrition recommendations include decreasing overall calorie intake, 3-5 servings of fruits/vegetables daily and reducing fast food intake  Exercise recommendations include exercising 3-5 times per week

## 2020-09-15 NOTE — PATIENT INSTRUCTIONS

## 2020-10-13 DIAGNOSIS — I12.9 PARENCHYMAL RENAL HYPERTENSION, STAGE 1 THROUGH STAGE 4 OR UNSPECIFIED CHRONIC KIDNEY DISEASE: ICD-10-CM

## 2020-10-13 RX ORDER — FOSINOPRIL SODIUM 20 MG/1
TABLET ORAL
Qty: 180 TABLET | Refills: 0 | Status: SHIPPED | OUTPATIENT
Start: 2020-10-13 | End: 2021-01-20 | Stop reason: SDUPTHER

## 2020-12-21 ENCOUNTER — TELEPHONE (OUTPATIENT)
Dept: NEPHROLOGY | Facility: CLINIC | Age: 55
End: 2020-12-21

## 2021-01-04 ENCOUNTER — IMMUNIZATIONS (OUTPATIENT)
Dept: FAMILY MEDICINE CLINIC | Facility: HOSPITAL | Age: 56
End: 2021-01-04

## 2021-01-04 DIAGNOSIS — Z23 ENCOUNTER FOR IMMUNIZATION: ICD-10-CM

## 2021-01-04 PROCEDURE — 91301 SARS-COV-2 / COVID-19 MRNA VACCINE (MODERNA) 100 MCG: CPT

## 2021-01-04 PROCEDURE — 0011A SARS-COV-2 / COVID-19 MRNA VACCINE (MODERNA) 100 MCG: CPT

## 2021-01-20 ENCOUNTER — OFFICE VISIT (OUTPATIENT)
Dept: NEPHROLOGY | Facility: CLINIC | Age: 56
End: 2021-01-20
Payer: COMMERCIAL

## 2021-01-20 VITALS
HEART RATE: 56 BPM | SYSTOLIC BLOOD PRESSURE: 132 MMHG | TEMPERATURE: 97.2 F | BODY MASS INDEX: 35.85 KG/M2 | WEIGHT: 210 LBS | HEIGHT: 64 IN | DIASTOLIC BLOOD PRESSURE: 72 MMHG | OXYGEN SATURATION: 96 %

## 2021-01-20 DIAGNOSIS — E83.52 HYPERCALCEMIA: ICD-10-CM

## 2021-01-20 DIAGNOSIS — N20.0 NEPHROLITHIASIS: ICD-10-CM

## 2021-01-20 DIAGNOSIS — M32.14 SYSTEMIC LUPUS ERYTHEMATOSUS WITH GLOMERULAR DISEASE, UNSPECIFIED SLE TYPE (HCC): ICD-10-CM

## 2021-01-20 DIAGNOSIS — I12.9 PARENCHYMAL RENAL HYPERTENSION, STAGE 1 THROUGH STAGE 4 OR UNSPECIFIED CHRONIC KIDNEY DISEASE: ICD-10-CM

## 2021-01-20 DIAGNOSIS — E21.3 HYPERPARATHYROIDISM (HCC): ICD-10-CM

## 2021-01-20 DIAGNOSIS — Z85.850 HISTORY OF THYROID CANCER: ICD-10-CM

## 2021-01-20 DIAGNOSIS — E55.9 VITAMIN D DEFICIENCY: ICD-10-CM

## 2021-01-20 DIAGNOSIS — I10 ESSENTIAL HYPERTENSION: ICD-10-CM

## 2021-01-20 DIAGNOSIS — E21.0 PRIMARY HYPERPARATHYROIDISM (HCC): ICD-10-CM

## 2021-01-20 DIAGNOSIS — E89.0 POSTOPERATIVE HYPOTHYROIDISM: ICD-10-CM

## 2021-01-20 DIAGNOSIS — N18.2 CHRONIC KIDNEY DISEASE (CKD), STAGE II (MILD): Primary | ICD-10-CM

## 2021-01-20 DIAGNOSIS — R80.9 PROTEINURIA, UNSPECIFIED TYPE: ICD-10-CM

## 2021-01-20 DIAGNOSIS — I10 ESSENTIAL HYPERTENSION, BENIGN: ICD-10-CM

## 2021-01-20 PROCEDURE — 99214 OFFICE O/P EST MOD 30 MIN: CPT | Performed by: INTERNAL MEDICINE

## 2021-01-20 RX ORDER — NIFEDIPINE 30 MG/1
30 TABLET, EXTENDED RELEASE ORAL DAILY
Qty: 90 TABLET | Refills: 3 | Status: SHIPPED | OUTPATIENT
Start: 2021-01-20 | End: 2022-01-03 | Stop reason: SDUPTHER

## 2021-01-20 RX ORDER — FOSINOPRIL SODIUM 20 MG/1
20 TABLET ORAL 2 TIMES DAILY
Qty: 180 TABLET | Refills: 0 | Status: SHIPPED | OUTPATIENT
Start: 2021-01-20 | End: 2021-05-05 | Stop reason: SDUPTHER

## 2021-01-20 RX ORDER — LEVOTHYROXINE SODIUM 0.12 MG/1
TABLET ORAL
Qty: 90 TABLET | Refills: 0 | Status: SHIPPED | OUTPATIENT
Start: 2021-01-20 | End: 2021-03-05

## 2021-01-20 RX ORDER — SPIRONOLACTONE 25 MG/1
25 TABLET ORAL DAILY
Qty: 90 TABLET | Refills: 3 | Status: SHIPPED | OUTPATIENT
Start: 2021-01-20 | End: 2021-08-13 | Stop reason: SDUPTHER

## 2021-01-20 NOTE — PATIENT INSTRUCTIONS
1  CKD stage II/history of lupus nephritis  -sCr stable in 0 7-0 8 range  Latest C3 normal  Last sCr 0 97 from 9/4/20  -continue to avoid nonsteroidals(ibuprofen, aleve, advil, motrin, celebrex, naproxen, toradol)  -latest urinalysis shows moderate blood, 2+ protein, 0-1 RBCs, 10-20 WBCs with innumerable bacteria  -monitor BMP, UA with microscopy as well along with repeat UpCr    -no recent lupus flares     2  proteinuria-+residual proteinuria from prior lupus nephritis (biopsy proven in 1996), now noted to have increased weight as well as HTN    -Off HCTZ due to hypercalcemia, on fosinopril 20mg twice daily  -on aldactone 25mg daily   -latest UpCr 1159 as of August 2019 - improved, UpCr has risen to 1 41g as of 8/10/20 with microalb:Cr 691 mg/g as of 9/4/20  -repeat UpCr      3  volume status- continue spironolactone 25mg daily, potassium normal as of 5/9/20     4  hypercalcemia/hyperparathyroidism - follows with endo, +nephrolithiasis history with recent Urinalysis showing calcium oxalate crystals  -You must drink enough water to urinate at least 2L per day to prevent stone formation but this is difficult given her edema and need for diuretics  Unfortunately, diuretics will contribute to stone formation   -avoid high salt diet both for stone reduction and to help blood pressure stay controlled  -last serum calcium level 10 8 (high normal range) as of Sept 2020, PTH slightly elevated at 181 5 as of May 2020     5  hypertension-BP well controlled today likely d/t nonadherence with medication this morning    -continue nifedipine XL 30mg daily(at night), fosinopril 20mg twice daily, spironolactone 25mg daily (at lunch)  -avoid caffeine/tea     6  vitamin D insufficiency - Vit D level 14 as of Sept 2020 - off vitamin D supplementation in light of hypercalcemia     Obtain bloodwork and urine testing Feb 2021

## 2021-01-20 NOTE — PROGRESS NOTES
NEPHROLOGY OUTPATIENT PROGRESS NOTE   Brenda Cannon 50 y o  female MRN: [id number]  DATE: 1/20/2021  Reason for visit:   Chief Complaint   Patient presents with    Chronic Kidney Disease    Follow-up        Patient Instructions   1  CKD stage II/history of lupus nephritis  -sCr stable in 0 7-0 8 range  Latest C3 normal  Last sCr 0 97 from 9/4/20  -continue to avoid nonsteroidals(ibuprofen, aleve, advil, motrin, celebrex, naproxen, toradol)  -latest urinalysis shows moderate blood, 2+ protein, 0-1 RBCs, 10-20 WBCs with innumerable bacteria  -monitor BMP, UA with microscopy as well along with repeat UpCr    -no recent lupus flares     2  proteinuria-+residual proteinuria from prior lupus nephritis (biopsy proven in 1996), now noted to have increased weight as well as HTN    -Off HCTZ due to hypercalcemia, on fosinopril 20mg twice daily  -on aldactone 25mg daily   -latest UpCr 1159 as of August 2019 - improved, UpCr has risen to 1 41g as of 8/10/20 with microalb:Cr 691 mg/g as of 9/4/20  -repeat UpCr      3  volume status- continue spironolactone 25mg daily, potassium normal as of 5/9/20     4  hypercalcemia/hyperparathyroidism - follows with endo, +nephrolithiasis history with recent Urinalysis showing calcium oxalate crystals  -You must drink enough water to urinate at least 2L per day to prevent stone formation but this is difficult given her edema and need for diuretics   Unfortunately, diuretics will contribute to stone formation   -avoid high salt diet both for stone reduction and to help blood pressure stay controlled  -last serum calcium level 10 8 (high normal range) as of Sept 2020, PTH slightly elevated at 181 5 as of May 2020     5  hypertension-BP well controlled today likely d/t nonadherence with medication this morning    -continue nifedipine XL 30mg daily(at night), fosinopril 20mg twice daily, spironolactone 25mg daily (at lunch)  -avoid caffeine/tea     6  vitamin D insufficiency - Vit D level 14 as of Sept 2020 - off vitamin D supplementation in light of hypercalcemia     Obtain bloodwork and urine testing Feb 2021  Jennifer Brown was seen today for chronic kidney disease and follow-up  Diagnoses and all orders for this visit:    Chronic kidney disease (CKD), stage II (mild)  -     Protein / creatinine ratio, urine; Future  -     Microalbumin / creatinine urine ratio; Future    Hyperparathyroidism ([facility] )    Essential hypertension  -     NIFEdipine (PROCARDIA XL) 30 mg 24 hr tablet; Take 1 tablet (30 mg total) by mouth daily    Nephrolithiasis    Hypercalcemia    Proteinuria, unspecified type  -     Protein / creatinine ratio, urine; Future  -     Microalbumin / creatinine urine ratio; Future    Systemic lupus erythematosus with glomerular disease, unspecified SLE type ([facility] )    Vitamin D deficiency    Parenchymal renal hypertension, stage 1 through stage 4 or unspecified chronic kidney disease  -     fosinopril (MONOPRIL) 20 mg tablet; Take 1 tablet (20 mg total) by mouth 2 (two) times a day    Essential hypertension, benign  -     spironolactone (ALDACTONE) 25 mg tablet; Take 1 tablet (25 mg total) by mouth daily        Assessment/Plan:  1  CKD stage II/history of lupus nephritis  -sCr stable in 0 7-0 8 range   Latest C3 normal  Last sCr 0 97 from 9/4/20  -continue to avoid nonsteroidals(ibuprofen, aleve, advil, motrin, celebrex, naproxen, toradol)  -latest urinalysis shows moderate blood, 2+ protein, 0-1 RBCs, 10-20 WBCs with innumerable bacteria  -monitor BMP, UA with microscopy as well along with repeat UpCr    -no recent lupus flares     2  proteinuria-+residual proteinuria from prior lupus nephritis (biopsy proven in 1996), now noted to have increased weight as well as HTN    -Off HCTZ due to hypercalcemia, on fosinopril 20mg twice daily  -on aldactone 25mg daily   -latest UpCr 1159 as of August 2019 - improved, UpCr has risen to 1 41g as of 8/10/20 with microalb:Cr 691 mg/g as of 9/4/20  -repeat UpCr      3  volume status- continue spironolactone 25mg daily, potassium normal as of 5/9/20     4  hypercalcemia/hyperparathyroidism - follows with endo, +nephrolithiasis history with recent Urinalysis showing calcium oxalate crystals  -You must drink enough water to urinate at least 2L per day to prevent stone formation but this is difficult given her edema and need for diuretics  Unfortunately, diuretics will contribute to stone formation   -avoid high salt diet both for stone reduction and to help blood pressure stay controlled  -last serum calcium level 10 8 (high normal range) as of Sept 2020, PTH slightly elevated at 181 5 as of May 2020     5  hypertension-BP well controlled today likely d/t nonadherence with medication this morning    -continue nifedipine XL 30mg daily(at night), fosinopril 20mg twice daily, spironolactone 25mg daily (at lunch)  -avoid caffeine/tea     6  vitamin D insufficiency - Vit D level 14 as of Sept 2020 - off vitamin D supplementation in light of hypercalcemia     Obtain bloodwork and urine testing Feb 2021  SUBJECTIVE / INTERVAL HISTORY:  50 y o  female presents in follow up of CKD  Jeff Fulton denies any recent illness/hospitalizations/medication changes since last office visit  Denies NSAID use  HTN - has not been checking BP at home  But has been well controlled in doctor's offices  No recent lupus flares  Review of Systems   Constitutional: Negative for chills and fever  HENT: Negative for sore throat and trouble swallowing  Eyes: Negative for visual disturbance  Respiratory: Negative for cough and shortness of breath  Cardiovascular: Negative for chest pain and leg swelling  Gastrointestinal: Negative for diarrhea, nausea and vomiting  Endocrine: Negative for polyuria  Genitourinary: Negative for difficulty urinating, dysuria and hematuria  Musculoskeletal: Positive for back pain (after a night's sleep, resolves with back pain)   Negative for neck pain  Skin: Negative for rash  Neurological: Negative for dizziness, light-headedness and numbness  Hematological: Negative for adenopathy  Does not bruise/bleed easily  Psychiatric/Behavioral: The patient is not nervous/anxious  OBJECTIVE:  /72 (BP Location: Left arm, Patient Position: Sitting, Cuff Size: Standard)   Pulse 56   Temp (!) 97 2 °F (36 2 °C) (Tympanic)   Ht 5' 4" (1 626 m)   Wt 95 3 kg (210 lb)   SpO2 96%   BMI 36 05 kg/m²  Body mass index is 36 05 kg/m²  Physical exam:  Physical Exam  Vitals signs and nursing note reviewed  Constitutional:       General: She is not in acute distress  Appearance: Normal appearance  She is well-developed  She is obese  She is not diaphoretic  HENT:      Head: Normocephalic and atraumatic  Mouth/Throat:      Mouth: Mucous membranes are moist       Pharynx: No oropharyngeal exudate  Eyes:      General: No scleral icterus  Right eye: No discharge  Left eye: No discharge  Comments: +eyeglasses   Neck:      Musculoskeletal: Normal range of motion and neck supple  Thyroid: No thyromegaly  Cardiovascular:      Rate and Rhythm: Normal rate and regular rhythm  Heart sounds: No murmur  Pulmonary:      Effort: Pulmonary effort is normal  No respiratory distress  Breath sounds: Normal breath sounds  No wheezing  Abdominal:      General: Bowel sounds are normal  There is no distension  Palpations: Abdomen is soft  Musculoskeletal: Normal range of motion  General: No swelling  Skin:     General: Skin is warm and dry  Findings: No rash  Neurological:      General: No focal deficit present  Mental Status: She is alert  Motor: No abnormal muscle tone        Comments: awake   Psychiatric:         Mood and Affect: Mood normal          Behavior: Behavior normal          Medications:    Current Outpatient Medications:     folic acid (FOLVITE) 1 mg tablet, Take 1 tablet by mouth daily, Disp: , Rfl:     fosinopril (MONOPRIL) 20 mg tablet, Take 1 tablet (20 mg total) by mouth 2 (two) times a day, Disp: 180 tablet, Rfl: 0    levothyroxine 125 mcg tablet, Take 1 tablet Mon-Sat & 1/2 tablet on Sunday (Patient taking differently: Takes 1/2 tablet on Sunday), Disp: 135 tablet, Rfl: 1    NIFEdipine (PROCARDIA XL) 30 mg 24 hr tablet, Take 1 tablet (30 mg total) by mouth daily, Disp: 90 tablet, Rfl: 3    nystatin (MYCOSTATIN) powder, Apply topically 2 (two) times a day, Disp: 60 g, Rfl: 2    spironolactone (ALDACTONE) 25 mg tablet, Take 1 tablet (25 mg total) by mouth daily, Disp: 90 tablet, Rfl: 3    warfarin (COUMADIN) 3 mg tablet, TAKE 1 TABLET DAILY except on Mondays and Thursdays (Patient taking differently: TAKE 1 TABLET DAILY except Thursdays), Disp: 90 tablet, Rfl: 4    Allergies: Allergies as of 01/20/2021 - Reviewed 01/20/2021   Allergen Reaction Noted    Penicillins Itching 04/25/2013       The following portions of the patient's history were reviewed and updated as appropriate: past family history, past surgical history and problem list     Laboratory Results:  Lab Results   Component Value Date    SODIUM 139 09/04/2020    K 4 1 09/04/2020     09/04/2020    CO2 24 09/04/2020    BUN 21 09/04/2020    CREATININE 0 97 09/04/2020    GLUC 109 (H) 08/06/2019    CALCIUM 10 2 (H) 09/04/2020        Lab Results   Component Value Date     5 (H) 05/09/2020    CALCIUM 10 2 (H) 09/04/2020    PHOS 2 4 (L) 11/18/2017       Portions of the record may have been created with voice recognition software   Occasional wrong word or "sound a like" substitutions may have occurred due to the inherent limitations of voice recognition software   Read the chart carefully and recognize, using context, where substitutions have occurred

## 2021-01-21 ENCOUNTER — TELEPHONE (OUTPATIENT)
Dept: ENDOCRINOLOGY | Facility: CLINIC | Age: 56
End: 2021-01-21

## 2021-02-02 ENCOUNTER — APPOINTMENT (EMERGENCY)
Dept: RADIOLOGY | Facility: HOSPITAL | Age: 56
End: 2021-02-02
Payer: COMMERCIAL

## 2021-02-02 ENCOUNTER — HOSPITAL ENCOUNTER (EMERGENCY)
Facility: HOSPITAL | Age: 56
Discharge: HOME | End: 2021-02-02
Attending: EMERGENCY MEDICINE
Payer: COMMERCIAL

## 2021-02-02 VITALS
WEIGHT: 210 LBS | SYSTOLIC BLOOD PRESSURE: 140 MMHG | TEMPERATURE: 97.9 F | HEART RATE: 66 BPM | OXYGEN SATURATION: 100 % | RESPIRATION RATE: 18 BRPM | DIASTOLIC BLOOD PRESSURE: 94 MMHG | BODY MASS INDEX: 36.05 KG/M2

## 2021-02-02 DIAGNOSIS — R10.9 FLANK PAIN: Primary | ICD-10-CM

## 2021-02-02 DIAGNOSIS — R31.9 HEMATURIA: ICD-10-CM

## 2021-02-02 LAB
ANION GAP SERPL CALCULATED.3IONS-SCNC: 3 MMOL/L (ref 4–13)
BACTERIA UR QL AUTO: ABNORMAL /HPF
BASOPHILS # BLD AUTO: 0.02 THOUSANDS/ΜL (ref 0–0.1)
BASOPHILS NFR BLD AUTO: 0 % (ref 0–1)
BILIRUB UR QL STRIP: NEGATIVE
BUN SERPL-MCNC: 28 MG/DL (ref 5–25)
CALCIUM SERPL-MCNC: 10.6 MG/DL (ref 8.3–10.1)
CHLORIDE SERPL-SCNC: 113 MMOL/L (ref 100–108)
CLARITY UR: CLEAR
CO2 SERPL-SCNC: 26 MMOL/L (ref 21–32)
COLOR UR: YELLOW
CREAT SERPL-MCNC: 0.98 MG/DL (ref 0.6–1.3)
EOSINOPHIL # BLD AUTO: 0.07 THOUSAND/ΜL (ref 0–0.61)
EOSINOPHIL NFR BLD AUTO: 1 % (ref 0–6)
ERYTHROCYTE [DISTWIDTH] IN BLOOD BY AUTOMATED COUNT: 12.3 % (ref 11.6–15.1)
GFR SERPL CREATININE-BSD FRML MDRD: 66 ML/MIN/1.73SQ M
GLUCOSE SERPL-MCNC: 110 MG/DL (ref 65–140)
GLUCOSE UR STRIP-MCNC: NEGATIVE MG/DL
HCT VFR BLD AUTO: 42.1 % (ref 34.8–46.1)
HGB BLD-MCNC: 14 G/DL (ref 11.5–15.4)
HGB UR QL STRIP.AUTO: ABNORMAL
HYALINE CASTS #/AREA URNS LPF: ABNORMAL /LPF
IMM GRANULOCYTES # BLD AUTO: 0.02 THOUSAND/UL (ref 0–0.2)
IMM GRANULOCYTES NFR BLD AUTO: 0 % (ref 0–2)
KETONES UR STRIP-MCNC: NEGATIVE MG/DL
LEUKOCYTE ESTERASE UR QL STRIP: ABNORMAL
LYMPHOCYTES # BLD AUTO: 1.87 THOUSANDS/ΜL (ref 0.6–4.47)
LYMPHOCYTES NFR BLD AUTO: 30 % (ref 14–44)
MCH RBC QN AUTO: 29.5 PG (ref 26.8–34.3)
MCHC RBC AUTO-ENTMCNC: 33.3 G/DL (ref 31.4–37.4)
MCV RBC AUTO: 89 FL (ref 82–98)
MONOCYTES # BLD AUTO: 0.64 THOUSAND/ΜL (ref 0.17–1.22)
MONOCYTES NFR BLD AUTO: 10 % (ref 4–12)
NEUTROPHILS # BLD AUTO: 3.58 THOUSANDS/ΜL (ref 1.85–7.62)
NEUTS SEG NFR BLD AUTO: 59 % (ref 43–75)
NITRITE UR QL STRIP: NEGATIVE
NON-SQ EPI CELLS URNS QL MICRO: ABNORMAL /HPF
NRBC BLD AUTO-RTO: 0 /100 WBCS
PH UR STRIP.AUTO: 6 [PH] (ref 4.5–8)
PLATELET # BLD AUTO: 185 THOUSANDS/UL (ref 149–390)
PMV BLD AUTO: 10.2 FL (ref 8.9–12.7)
POTASSIUM SERPL-SCNC: 4.3 MMOL/L (ref 3.5–5.3)
PROT UR STRIP-MCNC: ABNORMAL MG/DL
RBC # BLD AUTO: 4.74 MILLION/UL (ref 3.81–5.12)
RBC #/AREA URNS AUTO: ABNORMAL /HPF
SODIUM SERPL-SCNC: 142 MMOL/L (ref 136–145)
SP GR UR STRIP.AUTO: 1.02 (ref 1–1.03)
UROBILINOGEN UR QL STRIP.AUTO: 0.2 E.U./DL
WBC # BLD AUTO: 6.2 THOUSAND/UL (ref 4.31–10.16)
WBC #/AREA URNS AUTO: ABNORMAL /HPF

## 2021-02-02 PROCEDURE — 36415 COLL VENOUS BLD VENIPUNCTURE: CPT | Performed by: EMERGENCY MEDICINE

## 2021-02-02 PROCEDURE — 81001 URINALYSIS AUTO W/SCOPE: CPT

## 2021-02-02 PROCEDURE — 87086 URINE CULTURE/COLONY COUNT: CPT

## 2021-02-02 PROCEDURE — 85025 COMPLETE CBC W/AUTO DIFF WBC: CPT | Performed by: EMERGENCY MEDICINE

## 2021-02-02 PROCEDURE — 99284 EMERGENCY DEPT VISIT MOD MDM: CPT | Performed by: EMERGENCY MEDICINE

## 2021-02-02 PROCEDURE — 74176 CT ABD & PELVIS W/O CONTRAST: CPT

## 2021-02-02 PROCEDURE — 96374 THER/PROPH/DIAG INJ IV PUSH: CPT

## 2021-02-02 PROCEDURE — 99284 EMERGENCY DEPT VISIT MOD MDM: CPT

## 2021-02-02 PROCEDURE — 80048 BASIC METABOLIC PNL TOTAL CA: CPT | Performed by: EMERGENCY MEDICINE

## 2021-02-02 PROCEDURE — G1004 CDSM NDSC: HCPCS

## 2021-02-02 RX ORDER — KETOROLAC TROMETHAMINE 30 MG/ML
15 INJECTION, SOLUTION INTRAMUSCULAR; INTRAVENOUS ONCE
Status: COMPLETED | OUTPATIENT
Start: 2021-02-02 | End: 2021-02-02

## 2021-02-02 RX ADMIN — KETOROLAC TROMETHAMINE 15 MG: 30 INJECTION, SOLUTION INTRAMUSCULAR; INTRAVENOUS at 11:52

## 2021-02-02 NOTE — ED NOTES
Pt  Returned from [address] [place] [address], [address] [place] [address]  02/02/21 8360

## 2021-02-02 NOTE — ED PROVIDER NOTES
History  Chief Complaint   Patient presents with    Flank Pain     Pt  reports known kidney stone but unsure which side  States that she has been having R flank/ back pain that is sometimes relieved with urination  Also reporting red blood coming fromn vagina when wiping  HPI  Patient is a 61-year-old female history of SLE, lupus glomerulonephritis followed by Nephrology as outpatient, nephrolithiasis presenting for evaluation of right-sided hip and flank pain for the past few days  Patient states that the pain is constant, exacerbated by twisting or sitting up  Patient denies significant pain with hip movement, states that she has been ambulating without issue  Patient stating some relief with urination and passage of a bowel movement  Patient denies dysuria, change in urinary frequency, states several episodes of possible hematuria but states that she is unsure if she is bleeding into her vagina  Patient denies fevers, chills, fatigue, constitutional symptoms  Prior to Admission Medications   Prescriptions Last Dose Informant Patient Reported? Taking? NIFEdipine (PROCARDIA XL) 30 mg 24 hr tablet 2/1/2021 at Unknown time  No Yes   Sig: Take 1 tablet (30 mg total) by mouth daily   folic acid (FOLVITE) 1 mg tablet 2/1/2021 at Unknown time Self Yes Yes   Sig: Take 1 tablet by mouth daily   fosinopril (MONOPRIL) 20 mg tablet 2/1/2021 at Unknown time  No Yes   Sig: Take 1 tablet (20 mg total) by mouth 2 (two) times a day   levothyroxine 125 mcg tablet 2/1/2021 at Unknown time  No Yes   Sig: TAKE ONE TABLET BY MOUTH EVERY DAY MON-SAT   THEN TAKE 1/2 TABLET BY MOUTH SUNDAY   nystatin (MYCOSTATIN) powder Past Week at Unknown time Self No Yes   Sig: Apply topically 2 (two) times a day   spironolactone (ALDACTONE) 25 mg tablet 2/1/2021 at Unknown time  No Yes   Sig: Take 1 tablet (25 mg total) by mouth daily   warfarin (COUMADIN) 3 mg tablet 2/1/2021 at Unknown time Self No Yes   Sig: TAKE 1 TABLET DAILY except on  and    Patient taking differently: TAKE 1 TABLET DAILY except       Facility-Administered Medications: None       Past Medical History:   Diagnosis Date    Chronic kidney disease     Gestational diabetes     Hyperparathyroidism ([address] )     Hypertension     Idiopathic thrombocytopenic purpura (ITP) (HCC)     Lupus (HCC)     Vitamin D deficiency        Past Surgical History:   Procedure Laterality Date     SECTION          HERNIA REPAIR      HIP SURGERY      left side, bone decompression    THYROID SURGERY         Family History   Problem Relation Age of Onset    Diabetes type II Father     Diabetes Father     Stroke Father     Diabetes type II Paternal Aunt     Diabetes type II Paternal Uncle     Diabetes type II Paternal Grandmother     Stomach cancer Paternal Grandfather      I have reviewed and agree with the history as documented  E-Cigarette/Vaping    E-Cigarette Use Never User      E-Cigarette/Vaping Substances     Social History     Tobacco Use    Smoking status: Never Smoker    Smokeless tobacco: Never Used   Substance Use Topics    Alcohol use: Not Currently    Drug use: No        Review of Systems   Constitutional: Negative for chills, fatigue and fever  HENT: Negative for hearing loss  Eyes: Negative for visual disturbance  Respiratory: Negative for cough, chest tightness and shortness of breath  Cardiovascular: Negative for chest pain  Gastrointestinal: Negative for abdominal distention, abdominal pain, constipation, diarrhea, nausea and vomiting  Endocrine: Negative for polydipsia and polyuria  Genitourinary: Positive for flank pain, hematuria and vaginal bleeding  Negative for decreased urine volume, difficulty urinating, dysuria, frequency, pelvic pain, urgency, vaginal discharge and vaginal pain  Musculoskeletal: Positive for arthralgias (right hip)  Negative for myalgias     Skin: Negative for color change and rash    Neurological: Negative for dizziness and headaches  Psychiatric/Behavioral: Negative for confusion  Physical Exam  ED Triage Vitals [02/02/21 1044]   Temperature Pulse Respirations Blood Pressure SpO2   97 9 °F (36 6 °C) 66 18 140/94 100 %      Temp Source Heart Rate Source Patient Position - Orthostatic VS BP Location FiO2 (%)   Oral Monitor Lying Right arm --      Pain Score       9             Orthostatic Vital Signs  Vitals:    02/02/21 1044   BP: 140/94   Pulse: 66   Patient Position - Orthostatic VS: Lying       Physical Exam  Vitals signs reviewed  Constitutional:       General: She is not in acute distress  Appearance: She is well-developed  She is not diaphoretic  Comments: Well-appearing, non-distressed    HENT:      Head: Normocephalic and atraumatic  Right Ear: External ear normal       Left Ear: External ear normal       Nose: Nose normal       Mouth/Throat:      Pharynx: No oropharyngeal exudate  Eyes:      Pupils: Pupils are equal, round, and reactive to light  Neck:      Musculoskeletal: Normal range of motion  Cardiovascular:      Rate and Rhythm: Normal rate and regular rhythm  Heart sounds: Normal heart sounds  No murmur  No friction rub  No gallop  Pulmonary:      Effort: Pulmonary effort is normal  No respiratory distress  Breath sounds: Normal breath sounds  No wheezing  Chest:      Chest wall: No tenderness  Abdominal:      General: Bowel sounds are normal  There is no distension  Palpations: Abdomen is soft  There is no mass  Tenderness: There is no abdominal tenderness  There is no guarding  Genitourinary:     Comments: Right sided flank tenderness without true CVA tenderness, paraspinal tenderness without true midline tenderness, and gluteal spasm and tenderness on right  Musculoskeletal: Normal range of motion  General: No deformity  Comments: Full hip ROM bilaterally  Patient able to ambulate without issue  Lymphadenopathy:      Cervical: No cervical adenopathy  Skin:     General: Skin is warm and dry  Capillary Refill: Capillary refill takes less than 2 seconds  Neurological:      Mental Status: She is alert and oriented to person, place, and time           ED Medications  Medications   ketorolac (TORADOL) injection 15 mg (15 mg Intravenous Given 2/2/21 1152)       Diagnostic Studies  Results Reviewed     Procedure Component Value Units Date/Time    Urine culture [[id number]] Collected: 02/02/21 1155    Lab Status: Preliminary result Specimen: Urine, Other Updated: 02/03/21 1257     Urine Culture Culture too young- will reincubate    Urine Microscopic [[id number]]  (Abnormal) Collected: 02/02/21 1155    Lab Status: Final result Specimen: Urine, Other Updated: 02/02/21 1214     RBC, UA 4-10 /hpf      WBC, UA 20-30 /hpf      Epithelial Cells Moderate /hpf      Bacteria, UA Occasional /hpf      Hyaline Casts, UA 5-10 /lpf     POCT urinalysis dipstick [[id number]]  (Abnormal) Resulted: 02/02/21 1157    Lab Status: Final result Updated: 02/02/21 1157    Urine Macroscopic, POC [[id number]]  (Abnormal) Collected: 02/02/21 1155    Lab Status: Final result Specimen: Urine Updated: 02/02/21 1156     Color, UA Yellow     Clarity, UA Clear     pH, UA 6 0     Leukocytes, UA Trace     Nitrite, UA Negative     Protein,  (2+) mg/dl      Glucose, UA Negative mg/dl      Ketones, UA Negative mg/dl      Urobilinogen, UA 0 2 E U /dl      Bilirubin, UA Negative     Blood, UA Large     Specific Gravity, UA 1 025    Narrative:      CLINITEK RESULT    Basic metabolic panel [[id number]]  (Abnormal) Collected: 02/02/21 1050    Lab Status: Final result Specimen: Blood from Arm, Left Updated: 02/02/21 1111     Sodium 142 mmol/L      Potassium 4 3 mmol/L      Chloride 113 mmol/L      CO2 26 mmol/L      ANION GAP 3 mmol/L      BUN 28 mg/dL      Creatinine 0 98 mg/dL      Glucose 110 mg/dL      Calcium 10 6 mg/dL      eGFR 66 ml/min/1 73sq m     Narrative:      National Kidney Disease Foundation guidelines for Chronic Kidney Disease (CKD):     Stage 1 with normal or high GFR (GFR > 90 mL/min/1 73 square meters)    Stage 2 Mild CKD (GFR = 60-89 mL/min/1 73 square meters)    Stage 3A Moderate CKD (GFR = 45-59 mL/min/1 73 square meters)    Stage 3B Moderate CKD (GFR = 30-44 mL/min/1 73 square meters)    Stage 4 Severe CKD (GFR = 15-29 mL/min/1 73 square meters)    Stage 5 End Stage CKD (GFR <15 mL/min/1 73 square meters)  Note: GFR calculation is accurate only with a steady state creatinine    CBC and differential [[id number]] Collected: 02/02/21 1050    Lab Status: Final result Specimen: Blood from Arm, Left Updated: 02/02/21 1101     WBC 6 20 Thousand/uL      RBC 4 74 Million/uL      Hemoglobin 14 0 g/dL      Hematocrit 42 1 %      MCV 89 fL      MCH 29 5 pg      MCHC 33 3 g/dL      RDW 12 3 %      MPV 10 2 fL      Platelets 328 Thousands/uL      nRBC 0 /100 WBCs      Neutrophils Relative 59 %      Immat GRANS % 0 %      Lymphocytes Relative 30 %      Monocytes Relative 10 %      Eosinophils Relative 1 %      Basophils Relative 0 %      Neutrophils Absolute 3 58 Thousands/µL      Immature Grans Absolute 0 02 Thousand/uL      Lymphocytes Absolute 1 87 Thousands/µL      Monocytes Absolute 0 64 Thousand/µL      Eosinophils Absolute 0 07 Thousand/µL      Basophils Absolute 0 02 Thousands/µL                  CT renal stone study abdomen pelvis wo contrast   Final Result by Sheree Watts MD (02/02 1148)      1  Nonobstructing bilateral renal calculi as described  No hydronephrosis or findings of obstructive uropathy  2   Uterine fibroids  3   Increased sclerosis of the femoral heads bilaterally, suspicious for avascular necrosis  No subchondral collapse                Workstation performed: [id number]               Procedures  Procedures      ED Course                             SBIRT 20yo+      Most Recent Value   SBIRT (21 yo +)   In order to provide better care to our patients, we are screening all of our patients for alcohol and drug use  Would it be okay to ask you these screening questions? Yes Filed at: 02/02/2021 1047   Initial Alcohol Screen: US AUDIT-C    1  How often do you have a drink containing alcohol?  0 Filed at: 02/02/2021 1047   2  How many drinks containing alcohol do you have on a typical day you are drinking? 0 Filed at: 02/02/2021 1047   3a  Male UNDER 65: How often do you have five or more drinks on one occasion? 0 Filed at: 02/02/2021 1047   3b  FEMALE Any Age, or MALE 65+: How often do you have 4 or more drinks on one occassion? 0 Filed at: 02/02/2021 1047   Audit-C Score  0 Filed at: 02/02/2021 1047   KARL: How many times in the past year have you    Used an illegal drug or used a prescription medication for non-medical reasons? Never Filed at: 02/02/2021 1047                MDM  Number of Diagnoses or Management Options  Flank pain:   Hematuria:   Diagnosis management comments: 59-year-old female with right hip and back pain in the setting of lupus, denying additional constitutional symptoms, full right hip range of motion without evidence of hip injury or septic joint  Right flank tenderness and paraspinal tenderness suggestive of muscle strain/spasm, less likely renal pathology  Basic labs demonstrating renal function at baseline, urinalysis demonstrating hematuria, CT stone study without demonstrated ureterolithiasis  At this point unclear etiology of patient's symptomatology, musculoskeletal verses a passed stone verses complication of lupus  Recommending patient follow-up with nephrologist, additionally provided with [facility] information, discharged with strict return precautions      Patient Progress  Patient progress: improved      Disposition  Final diagnoses:   Flank pain   Hematuria     Time reflects when diagnosis was documented in both MDM as applicable and the Disposition within this note     Time User Action Codes Description Comment    2/2/2021 12:33 PM Bryant Taylor Add [R10 9] Flank pain     2/2/2021 12:33 PM Bryant Taylor Add [R31 9] Hematuria       ED Disposition     ED Disposition Condition Date/Time Comment    Discharge Stable Tue Feb 2, 2021 12:33 PM Sonja Sweet discharge to home/self care  Follow-up Information     Follow up With Specialties Details Why Contact Info Additional Information    [address] [facility]   [address] [id number] [phone] [address] [place] [address], [address]  [facility]          Discharge Medication List as of 2/2/2021 12:38 PM      CONTINUE these medications which have NOT CHANGED    Details   folic acid (FOLVITE) 1 mg tablet Take 1 tablet by mouth daily, Starting Wed 5/18/2011, Historical Med      fosinopril (MONOPRIL) 20 mg tablet Take 1 tablet (20 mg total) by mouth 2 (two) times a day, Starting Wed 1/20/2021, Normal      levothyroxine 125 mcg tablet TAKE ONE TABLET BY MOUTH EVERY DAY MON-SAT  THEN TAKE 1/2 TABLET BY MOUTH SUNDAY, Normal      NIFEdipine (PROCARDIA XL) 30 mg 24 hr tablet Take 1 tablet (30 mg total) by mouth daily, Starting Wed 1/20/2021, Normal      nystatin (MYCOSTATIN) powder Apply topically 2 (two) times a day, Starting Mon 3/23/2020, Normal      spironolactone (ALDACTONE) 25 mg tablet Take 1 tablet (25 mg total) by mouth daily, Starting Wed 1/20/2021, Normal      warfarin (COUMADIN) 3 mg tablet TAKE 1 TABLET DAILY except on Mondays and Thursdays, No Print           No discharge procedures on file  PDMP Review     None           ED Provider  Attending physically available and evaluated Sonja Sweet I managed the patient along with the ED Attending      Electronically Signed by         Chelsea Cotrez MD  02/03/21 3449

## 2021-02-02 NOTE — Clinical Note
Jeff Fulton was seen and treated in our emergency department on 2/2/2021  Diagnosis:     Tiffanie Hernandezaakash    She may return on this date: 02/05/2021         If you have any questions or concerns, please don't hesitate to call        Maggy Calix MD    ______________________________           _______________          _______________  Hospital Representative                              Date                                Time

## 2021-02-02 NOTE — ED ATTENDING ATTESTATION
2021  IAmaya, , saw and evaluated the patient  I have discussed the patient with the resident/non-physician practitioner and agree with the resident's/non-physician practitioner's findings, Plan of Care, and MDM as documented in the resident's/non-physician practitioner's note, except where noted  All available labs and Radiology studies were reviewed  I was present for key portions of any procedure(s) performed by the resident/non-physician practitioner and I was immediately available to provide assistance  At this point I agree with the current assessment done in the Emergency Department  I have conducted an independent evaluation of this patient a history and physical is as follows:    ED Course     Emergency Department Note- Mack Ordonez 50 y o  female MRN: [id number]    Unit/Bed#: ED 01 Encounter: [id number]    Mack Ordonez is a 50 y o  female who presents with   Chief Complaint   Patient presents with    Flank Pain     Pt  reports known kidney stone but unsure which side  States that she has been having R flank/ back pain that is sometimes relieved with urination  Also reporting red blood coming fromn vagina when wiping  History of Present Illness   HPI:  Mack Ordonez is a 50 y o  female who presents with right flank and right low back pain intermittently x2 weeks  Pain worse with movement relieved by remaining still  Does have history of kidney stones and lupus  No zoster rash  Hematuria occasions  ROS is otherwise unremarkable      Historical Information   Past Medical History:   Diagnosis Date    Chronic kidney disease     Gestational diabetes     Hyperparathyroidism ([address] )     Hypertension     Idiopathic thrombocytopenic purpura (ITP) ([address] )     Lupus (HCC)     Vitamin D deficiency      Past Surgical History:   Procedure Laterality Date     SECTION          HERNIA REPAIR      HIP SURGERY      left side, bone decompression    THYROID SURGERY       Social History   Social History     Substance and Sexual Activity   Alcohol Use Not Currently     Social History     Substance and Sexual Activity   Drug Use No     Social History     Tobacco Use   Smoking Status Never Smoker   Smokeless Tobacco Never Used       Family History:   Meds/Allergies     Allergies   Allergen Reactions    Penicillins Itching       Objective   First Vitals:   Blood Pressure: 140/94 (21 1044)  Pulse: 66 (21 1044)  Temperature: 97 9 °F (36 6 °C) (21 1044)  Temp Source: Oral (21 104)  Respirations: 18 (21 104)  Weight - Scale: 95 3 kg (210 lb) (21 1044)  SpO2: 100 % (21)    Current Vitals:   Blood Pressure: 140/94 (214)  Pulse: 66 (214)  Temperature: 97 9 °F (36 6 °C) (21)  Temp Source: Oral (21)  Respirations: 18 (21)  Weight - Scale: 95 3 kg (210 lb) (21 1044)  SpO2: 100 % (214)    No intake or output data in the 24 hours ending 21 1231    Invasive Devices     Peripheral Intravenous Line            Peripheral IV 21 Left Antecubital less than 1 day                      Medical Decision Makin  Pain Improved, CT and labs reviewed  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  Could be referred pain worse sacroiliitis versus passed kidney stone x-ray is hematuria present  Follow-up with orthopedic surgery, Urology primary care physician return worsens  Pain is currently controlled  Remains afebrile with otherwise normal vital signs  Recent Results (from the past 36 hour(s))   CBC and differential    Collection Time: 21 10:50 AM   Result Value Ref Range    WBC 6 20 4  31 - 10 16 Thousand/uL    RBC 4 74 3 81 - 5 12 Million/uL    Hemoglobin 14 0 11 5 - 15 4 g/dL    Hematocrit 42 1 34 8 - 46 1 %    MCV 89 82 - 98 fL    MCH 29 5 26 8 - 34 3 pg    MCHC 33 3 31 4 - 37 4 g/dL    RDW 12 3 11 6 - 15 1 %    MPV 10 2 8 9 - 12 7 fL    Platelets 102 529 - 977 Thousands/uL    nRBC 0 /100 WBCs    Neutrophils Relative 59 43 - 75 %    Immat GRANS % 0 0 - 2 %    Lymphocytes Relative 30 14 - 44 %    Monocytes Relative 10 4 - 12 %    Eosinophils Relative 1 0 - 6 %    Basophils Relative 0 0 - 1 %    Neutrophils Absolute 3 58 1 85 - 7 62 Thousands/µL    Immature Grans Absolute 0 02 0 00 - 0 20 Thousand/uL    Lymphocytes Absolute 1 87 0 60 - 4 47 Thousands/µL    Monocytes Absolute 0 64 0 17 - 1 22 Thousand/µL    Eosinophils Absolute 0 07 0 00 - 0 61 Thousand/µL    Basophils Absolute 0 02 0 00 - 0 10 Thousands/µL   Basic metabolic panel    Collection Time: 02/02/21 10:50 AM   Result Value Ref Range    Sodium 142 136 - 145 mmol/L    Potassium 4 3 3 5 - 5 3 mmol/L    Chloride 113 (H) 100 - 108 mmol/L    CO2 26 21 - 32 mmol/L    ANION GAP 3 (L) 4 - 13 mmol/L    BUN 28 (H) 5 - 25 mg/dL    Creatinine 0 98 0 60 - 1 30 mg/dL    Glucose 110 65 - 140 mg/dL    Calcium 10 6 (H) 8 3 - 10 1 mg/dL    eGFR 66 ml/min/1 73sq m   Urine Macroscopic, POC    Collection Time: 02/02/21 11:55 AM   Result Value Ref Range    Color, UA Yellow     Clarity, UA Clear     pH, UA 6 0 4 5 - 8 0    Leukocytes, UA Trace (A) Negative    Nitrite, UA Negative Negative    Protein,  (2+) (A) Negative mg/dl    Glucose, UA Negative Negative mg/dl    Ketones, UA Negative Negative mg/dl    Urobilinogen, UA 0 2 0 2, 1 0 E U /dl E U /dl    Bilirubin, UA Negative Negative    Blood, UA Large (A) Negative    Specific Gravity, UA 1 025 1 003 - 1 030   Urine Microscopic    Collection Time: 02/02/21 11:55 AM   Result Value Ref Range    RBC, UA 4-10 (A) None Seen, 2-4 /hpf    WBC, UA 20-30 (A) None Seen, 2-4 /hpf    Epithelial Cells Moderate (A) None Seen, Occasional /hpf    Bacteria, UA Occasional None Seen, Occasional /hpf    Hyaline Casts, UA 5-10 (A) None Seen /lpf     CT renal stone study abdomen pelvis wo contrast   Final Result      1    Nonobstructing bilateral renal calculi as described  No hydronephrosis or findings of obstructive uropathy  2   Uterine fibroids  3   Increased sclerosis of the femoral heads bilaterally, suspicious for avascular necrosis  No subchondral collapse  Workstation performed: [id number]               Portions of the record may have been created with voice recognition software  Occasional wrong word or "sound a like" substitutions may have occurred due to the inherent limitations of voice recognition software          Critical Care Time  Procedures

## 2021-02-02 NOTE — Clinical Note
Eve Weeks was seen and treated in our emergency department on 2/2/2021  Diagnosis:     Cathern Opitz    She may return on this date: 02/05/2021         If you have any questions or concerns, please don't hesitate to call        Rigo Kim MD    ______________________________           _______________          _______________  Hospital Representative                              Date                                Time

## 2021-02-02 NOTE — Clinical Note
Valente Batista was seen and treated in our emergency department on 2/2/2021  Diagnosis:     Valerie Swift    She may return on this date: 02/05/2021         If you have any questions or concerns, please don't hesitate to call        Claudia Hatfield MD    ______________________________           _______________          _______________  Hospital Representative                              Date                                Time

## 2021-02-03 ENCOUNTER — TELEPHONE (OUTPATIENT)
Dept: NEPHROLOGY | Facility: CLINIC | Age: 56
End: 2021-02-03

## 2021-02-03 DIAGNOSIS — R31.9 HEMATURIA: Primary | ICD-10-CM

## 2021-02-03 NOTE — TELEPHONE ENCOUNTER
Patient sees Dr Mik Sanz  Patient stated that she was recently in the ED due to flank pain  She also stated that she had blood in urine and wanted her labs to be reviewed   Patient can be reached at [phone]

## 2021-02-04 ENCOUNTER — TELEPHONE (OUTPATIENT)
Dept: HEMATOLOGY ONCOLOGY | Facility: CLINIC | Age: 56
End: 2021-02-04

## 2021-02-04 DIAGNOSIS — I82.5Y3 CHRONIC DEEP VEIN THROMBOSIS (DVT) OF PROXIMAL VEIN OF BOTH LOWER EXTREMITIES (HCC): Primary | ICD-10-CM

## 2021-02-04 LAB — BACTERIA UR CULT: NORMAL

## 2021-02-04 NOTE — TELEPHONE ENCOUNTER
She did have flank pain but this seems to be resolving  She was told she has uterine fibroids  She is unsure if she is having vaginal bleeding versus blood in her urine  I recommend that she have a repeat urinalysis, urine protein:creatinine ratio, microalbumin:creatinine ratio, BMP at that time that her vaginal bleeding resolved  She should see gyn and she will reschedule her endocrinology appointment in setting of recent hypercalcemia  Labs have been ordered for her

## 2021-02-04 NOTE — TELEPHONE ENCOUNTER
Patient had CBC and BMP drawn on 2/2/21  Reviewed with patient that she should have PT INR checked ASAP  Patient reports she will go tomorrow or Saturday

## 2021-02-04 NOTE — TELEPHONE ENCOUNTER
Patient has an appointment coming up with Dr Bryson Acosta  Per patient Dr Bryson Acosta manages her coumadin  Patient has not had her PT/INR checked since August   Patient admits that she forgot about checking it    Will send to [place] to confirm if any additional lab work is needed with PT/INR    Please let me know and I can enter orders

## 2021-02-05 ENCOUNTER — TELEMEDICINE (OUTPATIENT)
Dept: ENDOCRINOLOGY | Facility: CLINIC | Age: 56
End: 2021-02-05
Payer: COMMERCIAL

## 2021-02-05 DIAGNOSIS — Z85.850 HISTORY OF THYROID CANCER: ICD-10-CM

## 2021-02-05 DIAGNOSIS — E21.0 PRIMARY HYPERPARATHYROIDISM (HCC): ICD-10-CM

## 2021-02-05 DIAGNOSIS — E89.0 POSTOPERATIVE HYPOTHYROIDISM: ICD-10-CM

## 2021-02-05 DIAGNOSIS — N20.0 KIDNEY STONES: Primary | ICD-10-CM

## 2021-02-05 DIAGNOSIS — E21.3 HYPERPARATHYROIDISM (HCC): ICD-10-CM

## 2021-02-05 DIAGNOSIS — E55.9 VITAMIN D DEFICIENCY: ICD-10-CM

## 2021-02-05 DIAGNOSIS — E83.52 HYPERCALCEMIA: ICD-10-CM

## 2021-02-05 PROCEDURE — 99214 OFFICE O/P EST MOD 30 MIN: CPT | Performed by: INTERNAL MEDICINE

## 2021-02-05 RX ORDER — OMEGA-3S/DHA/EPA/FISH OIL/D3 300MG-1000
400 CAPSULE ORAL DAILY
Qty: 30 TABLET | Refills: 4
Start: 2021-02-05 | End: 2022-06-22

## 2021-02-05 NOTE — PROGRESS NOTES
Virtual Regular Visit      Assessment/Plan:      Roland Patel was seen today for virtual regular visit  Diagnoses and all orders for this visit:    Kidney stones   patient has bilateral kidney stones Most likely from primary hyperparathyroidism, will refer her to Urology  also discussed with patient that she is a candidate for parathyroid surgery given she has recurrent hypercalcemia along with elevated PTH and active kidney stones  discussed to make appointment with surgical Oncology as well for possible parathyroid adenoma surgery  -     Ambulatory referral to Urology; Future    Postoperative hypothyroidism   discussed to obtain thyroid function tests now, based on the results she will need adjustment in levothyroxine dose  Repeat thyroid function test again in 3-4 months and follow-up  -     T4, free; Future  -     TSH, 3rd generation; Future  -     T4, free; Future  -     TSH, 3rd generation; Future    Hypercalcemia   discussed to avoid calcium supplementation  Discussed that she can take vitamin-D 3 supplementation 400 International Units daily  repeat the 24 hour urine calcium and creatinine, follow-up in 4 months  -     cholecalciferol (VITAMIN D3) 400 units tablet; Take 1 tablet (400 Units total) by mouth daily  -     Calcium, urine, 24 hour Lab Collect; Future  -     Creatinine, urine, 24 hour; Future  -     Basic metabolic panel; Future    Primary hyperparathyroidism ([address] )   will repeat DEXA scan to assess bone density at pelvis, hip and forearm  -     DXA bone density spine hip and pelvis; Future  -     Calcium, urine, 24 hour Lab Collect; Future  -     Creatinine, urine, 24 hour; Future  -     PTH, intact- Lab Collect; Future    Hyperparathyroidism ([address] )  -     cholecalciferol (VITAMIN D3) 400 units tablet; Take 1 tablet (400 Units total) by mouth daily  -     DXA bone density spine hip and pelvis; Future    Vitamin D deficiency  -     Vitamin D 25 hydroxy;  Future    History of thyroid cancer previously thyroglobulin tumor marker is negative which is assuring, neck ultrasound has been negative   continue to monitor thyroglobulin tumor marker   discussed that she should get ultrasound of neck for follow-up as the last 1 was done in 2017, it was ordered  Multiple times however patient did not complete ultrasound  2007 neck ultrasound showed no evidence of recurrent or metastatic disease    -     Thyroglobulin; Future        Problem List Items Addressed This Visit        Endocrine    Hyperparathyroidism ([address] )    Relevant Medications    cholecalciferol (VITAMIN D3) 400 units tablet    Other Relevant Orders    DXA bone density spine hip and pelvis       Other    Hypercalcemia    Relevant Medications    cholecalciferol (VITAMIN D3) 400 units tablet    Other Relevant Orders    Calcium, urine, 24 hour Lab Collect    Creatinine, urine, 24 hour    Basic metabolic panel    Vitamin D deficiency    Relevant Orders    Vitamin D 25 hydroxy    History of thyroid cancer    Relevant Orders    Thyroglobulin      Other Visit Diagnoses     Kidney stones    -  Primary    Relevant Orders    Ambulatory referral to Urology    Postoperative hypothyroidism        Relevant Orders    T4, free    TSH, 3rd generation    T4, free    TSH, 3rd generation    Primary hyperparathyroidism ([facility] )        Relevant Orders    DXA bone density spine hip and pelvis    Calcium, urine, 24 hour Lab Collect    Creatinine, urine, 24 hour    PTH, intact- Lab Collect               Reason for visit is   Chief Complaint   Patient presents with    Virtual Regular Visit        Encounter provider Tonny Romero MD    Provider located at [address] [address]      Recent Visits  No visits were found meeting these conditions     Showing recent visits within past 7 days and meeting all other requirements     Today's Visits  Date Type Provider Dept 02/05/21 Telemedicine Terri Torres MD [facility]   Showing today's visits and meeting all other requirements     Future Appointments  No visits were found meeting these conditions  Showing future appointments within next 150 days and meeting all other requirements        The patient was identified by name and date of birth  Latasha Kaba was informed that this is a telemedicine visit and that the visit is being conducted through [facility] and patient was informed that this is a secure, HIPAA-compliant platform  She agrees to proceed     My office door was closed  No one else was in the room  She acknowledged consent and understanding of privacy and security of the video platform  The patient has agreed to participate and understands they can discontinue the visit at any time  Patient is aware this is a billable service  Subjective  Latasha Kaba is a 50 y o  female  With medical history of postsurgical hypothyroidism, history of thyroid cancer( papillary thyroid cancer)  primary hyperparathyroidism, vit D deficiency, is here for follow-up    she gives history of right hemithyroidectomy and right parathyroid gland removal in 2006, patient underwent completion thyroidectomy in 2010 which showed stage I papillary thyroid cancer  At that time she had removal of left parathyroid gland  She continues to have elevated calcium along with elevated PTH suggestive of primary hyperparathyroidism, she also had 24 hour urine calcium and creatinine which was suggestive of primary hyperparathyroidism  she is currently not taking vitamin-D 3 supplementation her last vitamin-D was 19    she is not taking calcium supplementation  She has history of nephrolithiasis , and was recently admitted in ED for abdominal pains, and CT scan showed bilateral renal calculi, she has not been seen by Urology        for hypothyroidism she currently takes levothyroxine 125 mcg, 1 tablet from Monday through Saturday and half tablet on   she did not have any blood work for thyroid since 2020  patient had CT scan of the neck with contrast which was ordered by Dr Carolina Stubbs,  Ca 1 2 x 4 5 x 10 4 mm lesion located at junction of right neck and superior mediastinum  she was supposed to follow-up with Surgical Oncology which she did not yet  on most recent blood work she was found to have slightly elevated calcium,  Of 10 6 mg per dL  she had DEXA scan of lumbar spine which showed T-score -0 1, right total hip T-score 0 9, right femoral neck T-score -0 7, left  Forearm T-score 1 6   10 year risk of hip fracture is 0 1% and 10 year risk  For major osteoporotic fracture is 3 5%  Lab Results   Component Value Date    CALCIUM 10 6 (H) 2021    PHOS 2 4 (L) 2017       Lab Results   Component Value Date    TBO1QUPGAUGW 1 072 2020    TSH 0 354 (L) 2019             IMPRESSION:     1  Nonobstructing bilateral renal calculi as described  No hydronephrosis or findings of obstructive uropathy  Past Medical History:   Diagnosis Date    Chronic kidney disease     Gestational diabetes     [facility] [facility] [address]     Hypertension     Idiopathic thrombocytopenic purpura (ITP) (HCC)     Lupus ([facility] [address] [facility] )     Vitamin D deficiency        Past Surgical History:   Procedure Laterality Date     SECTION      2667/4323    HERNIA REPAIR      HIP SURGERY      left side, bone decompression    THYROID SURGERY         Current Outpatient Medications   Medication Sig Dispense Refill    cholecalciferol (VITAMIN D3) 400 units tablet Take 1 tablet (400 Units total) by mouth daily 30 tablet 4    folic acid (FOLVITE) 1 mg tablet Take 1 tablet by mouth daily      fosinopril (MONOPRIL) 20 mg tablet Take 1 tablet (20 mg total) by mouth 2 (two) times a day 180 tablet 0    levothyroxine 125 mcg tablet TAKE ONE TABLET BY MOUTH EVERY DAY MON-SAT   THEN TAKE 1/2 TABLET BY MOUTH SUNDAY 90 tablet 0    NIFEdipine (PROCARDIA XL) 30 mg 24 hr tablet Take 1 tablet (30 mg total) by mouth daily 90 tablet 3    nystatin (MYCOSTATIN) powder Apply topically 2 (two) times a day 60 g 2    spironolactone (ALDACTONE) 25 mg tablet Take 1 tablet (25 mg total) by mouth daily 90 tablet 3    warfarin (COUMADIN) 3 mg tablet TAKE 1 TABLET DAILY except on Mondays and Thursdays (Patient taking differently: TAKE 1 TABLET DAILY except Thursdays) 90 tablet 4     No current facility-administered medications for this visit  Allergies   Allergen Reactions    Penicillins Itching       Review of Systems   Constitutional: Positive for fatigue  HENT: Negative  Eyes: Negative for visual disturbance  Respiratory: Negative  Cardiovascular: Negative  Gastrointestinal: Positive for abdominal pain  Endocrine: Negative for polydipsia, polyphagia and polyuria  Genitourinary: Positive for pelvic pain  Abnormal bleeding through vagina   Musculoskeletal: Positive for arthralgias and myalgias  Neurological: Negative  Psychiatric/Behavioral: Negative  Video Exam    There were no vitals filed for this visit  Physical Exam  Vitals signs reviewed  Constitutional:       General: She is not in acute distress  Appearance: Normal appearance  She is not ill-appearing  HENT:      Head: Normocephalic and atraumatic  Nose: Nose normal    Eyes:      Extraocular Movements: Extraocular movements intact  Conjunctiva/sclera: Conjunctivae normal    Neck:      Musculoskeletal: Normal range of motion  Pulmonary:      Effort: No respiratory distress  Neurological:      General: No focal deficit present  Mental Status: She is alert and oriented to person, place, and time     Psychiatric:         Mood and Affect: Mood normal          Behavior: Behavior normal           I spent 22 minutes directly with the patient during this visit      [employer] acknowledges that she has consented to an online visit or consultation  She understands that the online visit is based solely on information provided by her, and that, in the absence of a face-to-face physical evaluation by the physician, the diagnosis she receives is both limited and provisional in terms of accuracy and completeness  This is not intended to replace a full medical face-to-face evaluation by the physician  Manuela Ortiz understands and accepts these terms

## 2021-02-06 ENCOUNTER — IMMUNIZATIONS (OUTPATIENT)
Dept: FAMILY MEDICINE CLINIC | Facility: HOSPITAL | Age: 56
End: 2021-02-06

## 2021-02-06 DIAGNOSIS — Z23 ENCOUNTER FOR IMMUNIZATION: Primary | ICD-10-CM

## 2021-02-06 PROCEDURE — 0012A SARS-COV-2 / COVID-19 MRNA VACCINE (MODERNA) 100 MCG: CPT

## 2021-02-06 PROCEDURE — 91301 SARS-COV-2 / COVID-19 MRNA VACCINE (MODERNA) 100 MCG: CPT

## 2021-02-11 ENCOUNTER — DOCUMENTATION (OUTPATIENT)
Dept: HEMATOLOGY ONCOLOGY | Facility: MEDICAL CENTER | Age: 56
End: 2021-02-11

## 2021-02-11 ENCOUNTER — LAB (OUTPATIENT)
Dept: LAB | Facility: HOSPITAL | Age: 56
End: 2021-02-11
Attending: INTERNAL MEDICINE
Payer: COMMERCIAL

## 2021-02-11 DIAGNOSIS — I82.5Y3 CHRONIC DEEP VEIN THROMBOSIS (DVT) OF PROXIMAL VEIN OF BOTH LOWER EXTREMITIES (HCC): ICD-10-CM

## 2021-02-11 LAB
INR PPP: 1.78 (ref 0.84–1.19)
PROTHROMBIN TIME: 20.4 SECONDS (ref 11.6–14.5)

## 2021-02-11 PROCEDURE — 36415 COLL VENOUS BLD VENIPUNCTURE: CPT

## 2021-02-11 PROCEDURE — 85610 PROTHROMBIN TIME: CPT

## 2021-02-11 NOTE — PROGRESS NOTES
Voicemail message left for patient with new location of [facility] [place] for her appointment  It has been rescheduled from the [facility] office to the [facility] office from [place]  2/24/21 at 10:40am to Wed  3/17/21 at 11:40am   Sheila Cowan has also been mailed

## 2021-02-12 ENCOUNTER — OFFICE VISIT (OUTPATIENT)
Dept: OBGYN CLINIC | Facility: CLINIC | Age: 56
End: 2021-02-12
Payer: COMMERCIAL

## 2021-02-12 VITALS — DIASTOLIC BLOOD PRESSURE: 84 MMHG | WEIGHT: 210.4 LBS | SYSTOLIC BLOOD PRESSURE: 132 MMHG | BODY MASS INDEX: 36.12 KG/M2

## 2021-02-12 DIAGNOSIS — D21.9 FIBROIDS: ICD-10-CM

## 2021-02-12 DIAGNOSIS — N95.0 POST-MENOPAUSAL BLEEDING: Primary | ICD-10-CM

## 2021-02-12 PROCEDURE — 99204 OFFICE O/P NEW MOD 45 MIN: CPT | Performed by: STUDENT IN AN ORGANIZED HEALTH CARE EDUCATION/TRAINING PROGRAM

## 2021-02-12 NOTE — PROGRESS NOTES
Assessment/Plan:     Problem List Items Addressed This Visit     None      Visit Diagnoses     Post-menopausal bleeding    -  Primary    Relevant Orders    US pelvis complete w transvaginal          -Discussed etiology, evaluation, management of postmenopausal bleeding  Most common etiology atrophy, also possibly structural ie polyps, however always possible that hyperplasia or malignancy is the cause  In her case, bleeding in setting of coumadin also distinct possibility as to cause  Given possibility of malignancy, further evaluation necessary  Discussed risks and benefits of endometrial biopsy vs  Transvaginal ultrasound for evaluation of endometrial lining  Pt elects for transvaginal ultrasound  -Discussed, should ultrasound demonstrate thin endometrial lining, evaluation would be complete and reassuring that atrophy was the cause of this bleeding, but with thickened lining, we would recommend hysteroscopy, D&C for definitive diagnosis  -discussed uterine fibroids are benign, not seen well on CT, but can be visualized on ultrasound  Unlikely cause of PMB     RTO annual    Subjective:      Patient ID: Vlad Self is a 50 y o  female  HPI  She presents today for discussion of postmenopausal bleeding  She has a complicated medical history, including history of thyroid cancer, CKD and Lupus, and is therefore very concerned about the cause of this postmenopausal bleeding  She distinctly is concerned about cancer  She also notes that she was told that she has fibroids after a recent CT  She wonders if this is related in any way  She had a prior endometrial biopsy in 2015  She does not remember the actual procedure, but describes an episode of extremely heavy bleeding several years ago, treated with ?progesterone and needed eval in the ED  Resolved and then has had no bleeding in 2-3 years  She has not seen a gyn, had a Pap in many years       The following portions of the patient's history were reviewed and updated as appropriate: allergies, current medications, past family history, past medical history, past social history, past surgical history and problem list     Review of Systems   Constitutional: Negative for chills and fever  HENT: Negative for congestion and sore throat  Eyes: Negative for visual disturbance  Respiratory: Negative for cough  Cardiovascular: Negative for chest pain  Gastrointestinal: Negative for nausea and vomiting  Endocrine: Negative for cold intolerance and heat intolerance  Genitourinary: Negative for dysuria, hematuria and urgency  Neurological: Negative for dizziness and light-headedness  Psychiatric/Behavioral: Negative for dysphoric mood  Objective:  /84 (BP Location: Left arm, Patient Position: Sitting, Cuff Size: Large)   Wt 95 4 kg (210 lb 6 4 oz)   BMI 36 12 kg/m²        Physical Exam  Vitals signs reviewed  Constitutional:       Appearance: She is well-developed  HENT:      Head: Normocephalic  Neck:      Musculoskeletal: Normal range of motion  Pulmonary:      Effort: Pulmonary effort is normal    Abdominal:      Palpations: Abdomen is soft  There is no mass  Tenderness: There is no guarding or rebound  Genitourinary:     General: Normal vulva  Exam position: Lithotomy position  Pubic Area: No rash  Labia:         Right: No rash, tenderness, lesion or injury  Left: No rash, tenderness, lesion or injury  Urethra: No urethral lesion  Vagina: Normal  No signs of injury  No vaginal discharge, bleeding or lesions  Cervix: No discharge, friability, lesion or cervical bleeding  Uterus: Normal        Adnexa: Right adnexa normal and left adnexa normal    Musculoskeletal: Normal range of motion  Neurological:      Mental Status: She is alert and oriented to person, place, and time     Psychiatric:         Behavior: Behavior normal          Results Reviewed  INR 1 78 2/11/20  TSH 1 072 on 9/4/20  Pap 11/2015 NILM/HPV neg  EMB 9/2015 negative for hyperplasia/atypia

## 2021-02-15 ENCOUNTER — HOSPITAL ENCOUNTER (OUTPATIENT)
Dept: RADIOLOGY | Age: 56
Discharge: HOME | End: 2021-02-15
Payer: COMMERCIAL

## 2021-02-15 DIAGNOSIS — N95.0 POST-MENOPAUSAL BLEEDING: ICD-10-CM

## 2021-02-15 PROCEDURE — 76830 TRANSVAGINAL US NON-OB: CPT

## 2021-02-15 PROCEDURE — 76856 US EXAM PELVIC COMPLETE: CPT

## 2021-02-19 ENCOUNTER — TELEPHONE (OUTPATIENT)
Dept: OBGYN CLINIC | Facility: CLINIC | Age: 56
End: 2021-02-19

## 2021-02-19 NOTE — TELEPHONE ENCOUNTER
Please let Roland Patel know that the ultrasound was unable to rule out malignancy, so we will need to proceed with a hysteroscopy  I will need to see her in the office to discuss in more detail and sign consent forms

## 2021-02-26 ENCOUNTER — OFFICE VISIT (OUTPATIENT)
Dept: OBGYN CLINIC | Facility: CLINIC | Age: 56
End: 2021-02-26
Payer: COMMERCIAL

## 2021-02-26 VITALS — DIASTOLIC BLOOD PRESSURE: 88 MMHG | WEIGHT: 208.6 LBS | BODY MASS INDEX: 35.81 KG/M2 | SYSTOLIC BLOOD PRESSURE: 132 MMHG

## 2021-02-26 DIAGNOSIS — I82.5Y3 CHRONIC DEEP VEIN THROMBOSIS (DVT) OF PROXIMAL VEIN OF BOTH LOWER EXTREMITIES (HCC): ICD-10-CM

## 2021-02-26 DIAGNOSIS — N95.0 POST-MENOPAUSAL BLEEDING: Primary | ICD-10-CM

## 2021-02-26 PROCEDURE — 99214 OFFICE O/P EST MOD 30 MIN: CPT | Performed by: STUDENT IN AN ORGANIZED HEALTH CARE EDUCATION/TRAINING PROGRAM

## 2021-02-26 NOTE — PROGRESS NOTES
Assessment/Plan:     Problem List Items Addressed This Visit     None        -reviewed ultrasound report in entirety  -fibroid: reassured regarding benign nature of this  -Desires to move forward with hysteroscopy, D&C  We discussed risks, benefits, alternatives  We discussed alternatives to include expectant management  We discussed risks including bleeding, infection, damage to surrounding organs, DVT/PE, death  Discussed her particular increased risk of DVT, given her history, but discussed that her use of blood thinners would decreased this, actually may increase her bleeding  Discussed minimal risks with blood transfusion to include transfusion reaction, TRALI, rare transmission of HIV/Hepatitis C  Benefits include definitive diagnosis  Patient wishes to proceed  -Will plan to proceed with hysteroscopy, D&C  Consent form reviewed in detail with patient, signed by patient    RTO postop    Subjective:      Patient ID: Carlee Ramos is a 50 y o  female  HPI  She presents today for results review and follow up planning  She has read her ultrasound report and has questions regarding the read  She has not had recent postmenopausal bleeding  The following portions of the patient's history were reviewed and updated as appropriate: allergies, current medications, past family history, past medical history, past social history, past surgical history and problem list     Review of Systems   Constitutional: Negative for chills and fever  HENT: Negative for congestion and sore throat  Eyes: Negative for visual disturbance  Respiratory: Negative for cough  Cardiovascular: Negative for chest pain  Gastrointestinal: Negative for nausea and vomiting  Endocrine: Negative for cold intolerance and heat intolerance  Genitourinary: Negative for vaginal bleeding  Musculoskeletal: Positive for arthralgias  Psychiatric/Behavioral: Negative for dysphoric mood           Objective:  /88 (BP Location: Left arm, Patient Position: Sitting, Cuff Size: Standard)   Wt 94 6 kg (208 lb 9 6 oz)   BMI 35 81 kg/m²      Physical Exam  Vitals signs reviewed  Constitutional:       Appearance: She is well-developed  HENT:      Head: Normocephalic  Neck:      Musculoskeletal: Normal range of motion  Cardiovascular:      Rate and Rhythm: Normal rate  Pulmonary:      Effort: Pulmonary effort is normal    Musculoskeletal: Normal range of motion  Neurological:      Mental Status: She is alert and oriented to person, place, and time     Psychiatric:         Behavior: Behavior normal

## 2021-03-02 ENCOUNTER — OFFICE VISIT (OUTPATIENT)
Dept: SURGICAL ONCOLOGY | Facility: CLINIC | Age: 56
End: 2021-03-02
Payer: COMMERCIAL

## 2021-03-02 VITALS
HEIGHT: 64 IN | DIASTOLIC BLOOD PRESSURE: 80 MMHG | WEIGHT: 207 LBS | SYSTOLIC BLOOD PRESSURE: 118 MMHG | TEMPERATURE: 96.8 F | BODY MASS INDEX: 35.34 KG/M2 | RESPIRATION RATE: 16 BRPM | HEART RATE: 55 BPM

## 2021-03-02 DIAGNOSIS — E21.3 HYPERPARATHYROIDISM (HCC): Primary | ICD-10-CM

## 2021-03-02 DIAGNOSIS — M25.559 HIP PAIN: Primary | ICD-10-CM

## 2021-03-02 PROCEDURE — 99215 OFFICE O/P EST HI 40 MIN: CPT | Performed by: SURGERY

## 2021-03-02 NOTE — LETTER
March 2, 2021     [facility]iva [facility], [address] [place] [address]    Patient: Jeff Fulton   YOB: 1965   Date of Visit: 3/2/2021       Dear Dr Keily Siddiqi:    Thank you for referring Jeff Fulton to me for evaluation  Below are my notes for this consultation  If you have questions, please do not hesitate to call me  I look forward to following your patient along with you  Sincerely,        Kimberly Cleveland MD        CC: MD Errol Rosario, MD Narda Brennan MD Courtney Factor, MD Ivan Chow, Patricia Cuadra MD  3/2/2021 10:47 AM  Sign when Signing Visit     Surgical Oncology Follow Up       [address] [facility] [address] [address] [phone]    Jeff Fulton  1965  [id number]  [address] [facility] [place]  [address] [place] [phone]    Chief Complaint   Patient presents with    Follow-up    Hyperparathyroidism       Assessment/Plan:    No problem-specific Assessment & Plan notes found for this encounter  Diagnoses and all orders for this visit:    Hyperparathyroidism ([address] )  -     CT parathyroid study w wo contrast; Future  -     PTH, intact; Future  -     Basic metabolic panel; Future      Discussed disease status, cancer treatment plans and/or cancer treatment goals with the patient  Advance Care Planning/Advance Directives: Oncology History    No history exists  History of Present Illness:   Patient is a 62-year-old woman with primary hyperparathyroidism  We had worked her up a couple years ago for what turned out to be a likely right-sided target, though she has a complicated history of 2 prior thyroid operations in inverted removal of a right-sided parathyroid gland   -Interval History:   We would set up for exploration 2 years ago but she canceled due to job issues  She is now back on track with wanting to treat her primary hyperparathyroidism  In the interim, she has developed kidney stones  Review of Systems:  Review of Systems   Constitutional: Positive for fatigue  HENT: Negative  Eyes: Negative  Respiratory: Negative  Cardiovascular: Negative  Gastrointestinal: Negative  Endocrine: Negative  Genitourinary:        Kidney sto   Musculoskeletal: Positive for arthralgias and myalgias  Skin: Negative  Allergic/Immunologic: Negative  Neurological: Negative  Hematological: Negative  Psychiatric/Behavioral: Negative          Patient Active Problem List   Diagnosis    Chronic deep vein thrombosis (DVT) of proximal vein of both lower extremities (HCC)    History of ITP    Benign hypertensive CKD    Chronic kidney disease (CKD), stage II (mild)    Edema    Gross hematuria    Hypercalcemia    Hyperparathyroidism ([address] )    Hypertension    Nephrolithiasis    Proteinuria    Systemic lupus erythematosus (HCC)    Vitamin D deficiency    Elevated PTHrP level    History of thyroid cancer    Upper respiratory tract infection    Rash    Left ear pain    BMI 39 0-39 9,adult    Post-menopausal bleeding     Past Medical History:   Diagnosis Date    Chronic kidney disease     Gestational diabetes     Hyperparathyroidism ([address] )     Hypertension     Idiopathic thrombocytopenic purpura (ITP) (HCC)     Lupus (HCC)     Vitamin D deficiency      Past Surgical History:   Procedure Laterality Date     SECTION      6054/8197    HERNIA REPAIR      HIP SURGERY      left side, bone decompression    THYROID SURGERY       Family History   Problem Relation Age of Onset    Diabetes type II Father     Diabetes Father     Stroke Father     Diabetes type II Paternal Aunt     Diabetes type II Paternal Uncle     Diabetes type II Paternal Grandmother     Stomach cancer Paternal Grandfather      Social History Socioeconomic History    Marital status: /Civil Union     Spouse name: Not on file    Number of children: Not on file    Years of education: Not on file    Highest education level: Not on file   Occupational History    Not on file   Social Needs    Financial resource strain: Not on file    Food insecurity     Worry: Not on file     Inability: Not on file    Transportation needs     Medical: Not on file     Non-medical: Not on file   Tobacco Use    Smoking status: Never Smoker    Smokeless tobacco: Never Used   Substance and Sexual Activity    Alcohol use: Not Currently    Drug use: No    Sexual activity: Not on file   Lifestyle    Physical activity     Days per week: Not on file     Minutes per session: Not on file    Stress: Not on file   Relationships    Social connections     Talks on phone: Not on file     Gets together: Not on file     Attends [employer] service: Not on file     Active member of club or organization: Not on file     Attends meetings of clubs or organizations: Not on file     Relationship status: Not on file    Intimate partner violence     Fear of current or ex partner: Not on file     Emotionally abused: Not on file     Physically abused: Not on file     Forced sexual activity: Not on file   Other Topics Concern    Not on file   Social History Narrative    Not on file       Current Outpatient Medications:     cholecalciferol (VITAMIN D3) 400 units tablet, Take 1 tablet (400 Units total) by mouth daily, Disp: 30 tablet, Rfl: 4    folic acid (FOLVITE) 1 mg tablet, Take 1 tablet by mouth daily, Disp: , Rfl:     fosinopril (MONOPRIL) 20 mg tablet, Take 1 tablet (20 mg total) by mouth 2 (two) times a day, Disp: 180 tablet, Rfl: 0    levothyroxine 125 mcg tablet, TAKE ONE TABLET BY MOUTH EVERY DAY MON-SAT   THEN TAKE 1/2 TABLET BY MOUTH SUNDAY, Disp: 90 tablet, Rfl: 0    NIFEdipine (PROCARDIA XL) 30 mg 24 hr tablet, Take 1 tablet (30 mg total) by mouth daily, Disp: 90 tablet, Rfl: 3    nystatin (MYCOSTATIN) powder, Apply topically 2 (two) times a day, Disp: 60 g, Rfl: 2    spironolactone (ALDACTONE) 25 mg tablet, Take 1 tablet (25 mg total) by mouth daily, Disp: 90 tablet, Rfl: 3    warfarin (COUMADIN) 3 mg tablet, TAKE 1 TABLET DAILY except on Mondays and Thursdays (Patient taking differently: TAKE 1 TABLET DAILY except Thursdays), Disp: 90 tablet, Rfl: 4  Allergies   Allergen Reactions    Penicillins Itching     Vitals:    03/02/21 0954   BP: 118/80   Pulse: 55   Resp: 16   Temp: (!) 96 8 °F (36 °C)       Physical Exam  Constitutional:       Appearance: Normal appearance  HENT:      Head: Normocephalic and atraumatic  Eyes:      Extraocular Movements: Extraocular movements intact  Pupils: Pupils are equal, round, and reactive to light  Neck:      Musculoskeletal: Normal range of motion  No neck rigidity  Cardiovascular:      Rate and Rhythm: Normal rate and regular rhythm  Heart sounds: Normal heart sounds  Pulmonary:      Effort: Pulmonary effort is normal       Breath sounds: Normal breath sounds  Abdominal:      General: Abdomen is flat  Palpations: Abdomen is soft  Musculoskeletal: Normal range of motion  Lymphadenopathy:      Cervical: No cervical adenopathy  Skin:     General: Skin is warm and dry  Neurological:      General: No focal deficit present  Mental Status: She is alert and oriented to person, place, and time  Psychiatric:         Mood and Affect: Mood normal          Behavior: Behavior normal          Thought Content:  Thought content normal          Judgment: Judgment normal            Results:  Labs:  Lab Results   Component Value Date     5 (H) 05/09/2020    CALCIUM 10 6 (H) 02/02/2021    PHOS 2 4 (L) 11/18/2017           Imaging  Ct Renal Stone Study Abdomen Pelvis Wo Contrast    Result Date: 2/2/2021  Narrative: CT ABDOMEN AND PELVIS WITHOUT IV CONTRAST - LOW DOSE RENAL STONE INDICATION:   Flank pain, kidney stone suspected right flank pain  COMPARISON:  Renal ultrasound dated March 2, 2017  TECHNIQUE:  Low dose thin section CT examination of the abdomen and pelvis was performed without intravenous or oral contrast according to a protocol specifically designed to evaluate for urinary tract calculus  Axial, sagittal, and coronal 2D reformatted images were created from the source data and submitted for interpretation  Evaluation for pathology in the abdomen and pelvis that is unrelated to urinary tract calculi is limited  Radiation dose length product (DLP) for this visit:  782 31 mGy-cm   This examination, like all CT scans performed in the [facility], was performed utilizing techniques to minimize radiation dose exposure, including the use of iterative  reconstruction and automated exposure control  FINDINGS: RIGHT KIDNEY AND URETER: 2 nonobstructing renal calculi measuring 2 mm each in the mid kidney  No hydronephrosis or hydroureter  LEFT KIDNEY AND URETER: 7 mm nonobstructing calculus in the mid kidney  No hydronephrosis or hydroureter  URINARY BLADDER: Unremarkable  Mild bibasilar dependent atelectasis  Limited low radiation dose noncontrast CT evaluation demonstrates no clinically significant abnormality of liver, spleen, pancreas, or adrenal glands  No calcified gallstones or gallbladder wall thickening noted  No ascites or bulky lymphadenopathy on this limited noncontrast study  There are scattered colonic diverticuli without findings of acute diverticulitis  No bowel obstruction  The appendix is well seen and there is no evidence of acute appendicitis  There is an enlarged uterus with several uterine fibroids  No acute fracture or destructive osseous lesion is identified  Increased sclerosis of the femoral heads bilaterally, suspicious for avascular necrosis  No subchondral collapse  There is associated mild bilateral hip osteoarthritis  Impression: 1    Nonobstructing bilateral renal calculi as described  No hydronephrosis or findings of obstructive uropathy  2   Uterine fibroids  3   Increased sclerosis of the femoral heads bilaterally, suspicious for avascular necrosis  No subchondral collapse  Workstation performed: [id number]     Us Pelvis Complete W Transvaginal    Result Date: 2/19/2021  Narrative: PELVIC ULTRASOUND, COMPLETE INDICATION:  50years old  N95 0: Postmenopausal bleeding  COMPARISON: 3/2/2017 TECHNIQUE:   Transabdominal pelvic ultrasound was performed in sagittal and transverse planes with a curvilinear transducer  Additional transvaginal imaging was performed to better evaluate the endometrium and ovaries  Imaging included volumetric sweeps as well as traditional still imaging technique  FINDINGS: UTERUS: The uterus is anteverted in position, measuring 9 6 x 5 4 x 6 2 cm  Myometrial echotexture is heterogeneous  There is an anterior intramural fibroid measuring 2 5 x 2 4 x 3 0 cm  There is a posterior subendometrial cyst  The cervix shows no suspicious abnormality  ENDOMETRIUM:  Normal caliber of 11 mm  Small cystic foci and areas of arterial vascularity  OVARIES/ADNEXA: Right ovary:  2 1 x 1 3 x 2 3 cm  No suspicious right ovarian abnormality  There is a paraovarian cyst measuring 1 5 x 0 8 x 1 6 cm  Doppler flow within normal limits  Left ovary:  2 2 x 1 4 x 1 7 cm, with limited visualization on transabdominal imaging only due to high position  No suspicious left ovarian abnormality  Doppler flow within normal limits  No suspicious adnexal mass or loculated collections  There is no free fluid  Impression:  1  Thickened, heterogeneous endometrium with areas of vascularity  Biopsy recommended to exclude neoplasm  2   Anterior intramural fibroid with evidence of underlying adenomyosis 3    1 6 cm right paraovarian cyst  Based on the ACR O-RADS system, this is O-RADS category 2 (almost certain benign with <5% risk of malignancy ) The management recommendation is no additional workup or followup needed  4   Suboptimal visualization of the left ovary on transabdominal imaging only due to high position  REFERENCE: Radiology 2020; [id number] The study was marked in EPIC for significant notification  Workstation performed: [id number]     CT  NECK  WITHOUT AND WITH CONTRAST FROM ZULAY TO SKULL BASE     INDICATION: Hyperparathyroidism  Unrevealing sestamibi  Patient is status post partial thyroidectomy in the past      COMPARISON:  None      TECHNIQUE:This examination, like all CT scans performed in the [facility], was performed utilizing techniques to minimize radiation dose exposure, including the use of iterative reconstruction and automated exposure control      Both noncontrast, contrast, and post contrast delayed images were performed according to standard parathyroid protocol (4D technique) Coronal and sagittal reconstructions were performed  3D reconstructions were performed on an independent workstation,   and are supplied for review      85 ml of Omnipaque 350 was injected intravenously without immediate consequence      FINDINGS:     SERIAL NONCONTRAST, POST CONTRAST AND DELAYS: There is a 7 2 x 4 5 x 10 4 mm lesion demonstrated just below the level of the sternal notch on the right, at the junction of the right neck and the superior mediastinum  The lesion sits posterior and   slightly to the right of the brachiocephalic artery, and anterior to the trachea  The lesion is seen on multiple series  On the arterial bolus images series 4 images 84 through 90, visualization lesion is somewhat limited secondary to streak artifact   from the contrast injected via a left upper extremity vein, however, with re-windowing of the series can be clearly seen  The lesion is also seen on the delayed images of series 5 images 39 through 40    For better appreciation of the exact location of   the lesion, it is also seen on series 601 image 66, and 602 image 65  No other suspicious lesions are identified      VASCULAR STRUCTURES:  This study was not performed for the evaluation of the carotid arteries, and therefore Nascet criteria do not apply  There is normal enhancement of the cervical vasculature  Ectasia of the ascending aorta is noted, measuring 34 mm      VISUALIZED PARANASAL SINUSES:  Normal      NASAL CAVITY AND NASOPHARYNX:  Normal      SUPRAHYOID NECK:  Normal oropharynx, oral cavity, parapharyngeal and retropharyngeal spaces      INFRAHYOID NECK:  Normal oropharynx, oral cavity, parapharyngeal and retropharyngeal spaces      THYROID GLAND:  Status post thyroid surgery  A small amount of residual thyroid tissue is noted on the left, with a very focal small amount noted on the right  Surgical clips are also noted      LYMPH NODES:  No pathologic or enlarged adenopathy      MEDIASTINUM:  Otherwise unremarkable      BONY STRUCTURES  Normal for age      LUNG APICES:  Unremarkable      IMPRESSION:     1   7 2 x 4 5 x 10 4 mm lesion located at the junction of the right neck and superior mediastinum, just below the level of the sternal notch as described above  2   A small amount of residual thyroid tissue is noted, left greater than right  I reviewed the above laboratory and imaging data  Discussion/Summary:  History of hyperparathyroidism, suspected right-sided target  Complicated surgical history including 2 parathyroid surgeries for thyroid cancer  Repeat scan at this time and blood work in order to obtain up-to-date data and preparation for reoperation

## 2021-03-02 NOTE — PROGRESS NOTES
Surgical Oncology Follow Up       [address] [facility] [place]  [address] [facility] [address] [phone]    Dustin Smith  1965  [id number]  [address] [facility] [place]rrycara Feng  [address] [facility] [id number] [phone]    Chief Complaint   Patient presents with    Follow-up    Hyperparathyroidism       Assessment/Plan:    No problem-specific Assessment & Plan notes found for this encounter  Diagnoses and all orders for this visit:    Hyperparathyroidism ([address] )  -     CT parathyroid study w wo contrast; Future  -     PTH, intact; Future  -     Basic metabolic panel; Future      Discussed disease status, cancer treatment plans and/or cancer treatment goals with the patient  Advance Care Planning/Advance Directives: Oncology History    No history exists  History of Present Illness:   Patient is a 47-year-old woman with primary hyperparathyroidism  We had worked her up a couple years ago for what turned out to be a likely right-sided target, though she has a complicated history of 2 prior thyroid operations in inverted removal of a right-sided parathyroid gland   -Interval History: We would set up for exploration 2 years ago but she canceled due to job issues  She is now back on track with wanting to treat her primary hyperparathyroidism  In the interim, she has developed kidney stones  Review of Systems:  Review of Systems   Constitutional: Positive for fatigue  HENT: Negative  Eyes: Negative  Respiratory: Negative  Cardiovascular: Negative  Gastrointestinal: Negative  Endocrine: Negative  Genitourinary:        Kidney sto   Musculoskeletal: Positive for arthralgias and myalgias  Skin: Negative  Allergic/Immunologic: Negative  Neurological: Negative  Hematological: Negative  Psychiatric/Behavioral: Negative          Patient Active Problem List   Diagnosis    Chronic deep vein thrombosis (DVT) of proximal vein of both lower extremities (HCC)    History of ITP    Benign hypertensive CKD    Chronic kidney disease (CKD), stage II (mild)    Edema    Gross hematuria    Hypercalcemia    Hyperparathyroidism ([facility] [facility] )    Hypertension    Nephrolithiasis    Proteinuria    Systemic lupus erythematosus (HCC)    Vitamin D deficiency    Elevated PTHrP level    History of thyroid cancer    Upper respiratory tract infection    Rash    Left ear pain    BMI 39 0-39 9,adult    Post-menopausal bleeding     Past Medical History:   Diagnosis Date    Chronic kidney disease     Gestational diabetes     Hyperparathyroidism ([facility] [address] )     Hypertension     Idiopathic thrombocytopenic purpura (ITP) (HCC)     Lupus (HCC)     Vitamin D deficiency      Past Surgical History:   Procedure Laterality Date     SECTION      1543/0127    HERNIA REPAIR      HIP SURGERY      left side, bone decompression    THYROID SURGERY       Family History   Problem Relation Age of Onset    Diabetes type II Father     Diabetes Father     Stroke Father     Diabetes type II Paternal Aunt     Diabetes type II Paternal Uncle     Diabetes type II Paternal Grandmother     Stomach cancer Paternal Grandfather      Social History     Socioeconomic History    Marital status: /Civil Union     Spouse name: Not on file    Number of children: Not on file    Years of education: Not on file    Highest education level: Not on file   Occupational History    Not on file   Social Needs    Financial resource strain: Not on file    Food insecurity     Worry: Not on file     Inability: Not on file   [employer] needs     Medical: Not on file     Non-medical: Not on file   Tobacco Use    Smoking status: Never Smoker    Smokeless tobacco: Never Used   Substance and Sexual Activity    Alcohol use: Not Currently    Drug use: No    Sexual activity: Not on file   Lifestyle    Physical activity     Days per week: Not on file     Minutes per session: Not on file    Stress: Not on file   Relationships    Social connections     Talks on phone: Not on file     Gets together: Not on file     Attends Hoahaoism service: Not on file     Active member of club or organization: Not on file     Attends meetings of clubs or organizations: Not on file     Relationship status: Not on file    Intimate partner violence     Fear of current or ex partner: Not on file     Emotionally abused: Not on file     Physically abused: Not on file     Forced sexual activity: Not on file   Other Topics Concern    Not on file   Social History Narrative    Not on file       Current Outpatient Medications:     cholecalciferol (VITAMIN D3) 400 units tablet, Take 1 tablet (400 Units total) by mouth daily, Disp: 30 tablet, Rfl: 4    folic acid (FOLVITE) 1 mg tablet, Take 1 tablet by mouth daily, Disp: , Rfl:     fosinopril (MONOPRIL) 20 mg tablet, Take 1 tablet (20 mg total) by mouth 2 (two) times a day, Disp: 180 tablet, Rfl: 0    levothyroxine 125 mcg tablet, TAKE ONE TABLET BY MOUTH EVERY DAY MON-SAT  THEN TAKE 1/2 TABLET BY MOUTH SUNDAY, Disp: 90 tablet, Rfl: 0    NIFEdipine (PROCARDIA XL) 30 mg 24 hr tablet, Take 1 tablet (30 mg total) by mouth daily, Disp: 90 tablet, Rfl: 3    nystatin (MYCOSTATIN) powder, Apply topically 2 (two) times a day, Disp: 60 g, Rfl: 2    spironolactone (ALDACTONE) 25 mg tablet, Take 1 tablet (25 mg total) by mouth daily, Disp: 90 tablet, Rfl: 3    warfarin (COUMADIN) 3 mg tablet, TAKE 1 TABLET DAILY except on Mondays and Thursdays (Patient taking differently: TAKE 1 TABLET DAILY except Thursdays), Disp: 90 tablet, Rfl: 4  Allergies   Allergen Reactions    Penicillins Itching     Vitals:    03/02/21 0954   BP: 118/80   Pulse: 55   Resp: 16   Temp: (!) 96 8 °F (36 °C)       Physical Exam  Constitutional:       Appearance: Normal appearance     HENT:      Head: Normocephalic and atraumatic  Eyes:      Extraocular Movements: Extraocular movements intact  Pupils: Pupils are equal, round, and reactive to light  Neck:      Musculoskeletal: Normal range of motion  No neck rigidity  Cardiovascular:      Rate and Rhythm: Normal rate and regular rhythm  Heart sounds: Normal heart sounds  Pulmonary:      Effort: Pulmonary effort is normal       Breath sounds: Normal breath sounds  Abdominal:      General: Abdomen is flat  Palpations: Abdomen is soft  Musculoskeletal: Normal range of motion  Lymphadenopathy:      Cervical: No cervical adenopathy  Skin:     General: Skin is warm and dry  Neurological:      General: No focal deficit present  Mental Status: She is alert and oriented to person, place, and time  Psychiatric:         Mood and Affect: Mood normal          Behavior: Behavior normal          Thought Content: Thought content normal          Judgment: Judgment normal            Results:  Labs:  Lab Results   Component Value Date     5 (H) 05/09/2020    CALCIUM 10 6 (H) 02/02/2021    PHOS 2 4 (L) 11/18/2017           Imaging  Ct Renal Stone Study Abdomen Pelvis Wo Contrast    Result Date: 2/2/2021  Narrative: CT ABDOMEN AND PELVIS WITHOUT IV CONTRAST - LOW DOSE RENAL STONE INDICATION:   Flank pain, kidney stone suspected right flank pain  COMPARISON:  Renal ultrasound dated March 2, 2017  TECHNIQUE:  Low dose thin section CT examination of the abdomen and pelvis was performed without intravenous or oral contrast according to a protocol specifically designed to evaluate for urinary tract calculus  Axial, sagittal, and coronal 2D reformatted images were created from the source data and submitted for interpretation  Evaluation for pathology in the abdomen and pelvis that is unrelated to urinary tract calculi is limited  Radiation dose length product (DLP) for this visit:  782 31 mGy-cm     This examination, like all CT scans performed in the [facility], was performed utilizing techniques to minimize radiation dose exposure, including the use of iterative  reconstruction and automated exposure control  FINDINGS: RIGHT KIDNEY AND URETER: 2 nonobstructing renal calculi measuring 2 mm each in the mid kidney  No hydronephrosis or hydroureter  LEFT KIDNEY AND URETER: 7 mm nonobstructing calculus in the mid kidney  No hydronephrosis or hydroureter  URINARY BLADDER: Unremarkable  Mild bibasilar dependent atelectasis  Limited low radiation dose noncontrast CT evaluation demonstrates no clinically significant abnormality of liver, spleen, pancreas, or adrenal glands  No calcified gallstones or gallbladder wall thickening noted  No ascites or bulky lymphadenopathy on this limited noncontrast study  There are scattered colonic diverticuli without findings of acute diverticulitis  No bowel obstruction  The appendix is well seen and there is no evidence of acute appendicitis  There is an enlarged uterus with several uterine fibroids  No acute fracture or destructive osseous lesion is identified  Increased sclerosis of the femoral heads bilaterally, suspicious for avascular necrosis  No subchondral collapse  There is associated mild bilateral hip osteoarthritis  Impression: 1  Nonobstructing bilateral renal calculi as described  No hydronephrosis or findings of obstructive uropathy  2   Uterine fibroids  3   Increased sclerosis of the femoral heads bilaterally, suspicious for avascular necrosis  No subchondral collapse  Workstation performed: [id number]     Us Pelvis Complete W Transvaginal    Result Date: 2/19/2021  Narrative: PELVIC ULTRASOUND, COMPLETE INDICATION:  50years old  N95 0: Postmenopausal bleeding  COMPARISON: 3/2/2017 TECHNIQUE:   Transabdominal pelvic ultrasound was performed in sagittal and transverse planes with a curvilinear transducer    Additional transvaginal imaging was performed to better evaluate the endometrium and ovaries  Imaging included volumetric sweeps as well as traditional still imaging technique  FINDINGS: UTERUS: The uterus is anteverted in position, measuring 9 6 x 5 4 x 6 2 cm  Myometrial echotexture is heterogeneous  There is an anterior intramural fibroid measuring 2 5 x 2 4 x 3 0 cm  There is a posterior subendometrial cyst  The cervix shows no suspicious abnormality  ENDOMETRIUM:  Normal caliber of 11 mm  Small cystic foci and areas of arterial vascularity  OVARIES/ADNEXA: Right ovary:  2 1 x 1 3 x 2 3 cm  No suspicious right ovarian abnormality  There is a paraovarian cyst measuring 1 5 x 0 8 x 1 6 cm  Doppler flow within normal limits  Left ovary:  2 2 x 1 4 x 1 7 cm, with limited visualization on transabdominal imaging only due to high position  No suspicious left ovarian abnormality  Doppler flow within normal limits  No suspicious adnexal mass or loculated collections  There is no free fluid  Impression:  1  Thickened, heterogeneous endometrium with areas of vascularity  Biopsy recommended to exclude neoplasm  2   Anterior intramural fibroid with evidence of underlying adenomyosis 3  1 6 cm right paraovarian cyst  Based on the ACR O-RADS system, this is O-RADS category 2 (almost certain benign with <6% risk of malignancy ) The management recommendation is no additional workup or followup needed  4   Suboptimal visualization of the left ovary on transabdominal imaging only due to high position  REFERENCE: Radiology 2020; [phone] The study was marked in EPIC for significant notification  Workstation performed: [id number]     CT  NECK  WITHOUT AND WITH CONTRAST FROM ZULAY TO SKULL BASE     INDICATION: Hyperparathyroidism  Unrevealing sestamibi    Patient is status post partial thyroidectomy in the past      COMPARISON:  None      TECHNIQUE:This examination, like all CT scans performed in the [facility], was performed utilizing techniques to minimize radiation dose exposure, including the use of iterative reconstruction and automated exposure control      Both noncontrast, contrast, and post contrast delayed images were performed according to standard parathyroid protocol (4D technique) Coronal and sagittal reconstructions were performed  3D reconstructions were performed on an independent workstation,   and are supplied for review      85 ml of Omnipaque 350 was injected intravenously without immediate consequence      FINDINGS:     SERIAL NONCONTRAST, POST CONTRAST AND DELAYS: There is a 7 2 x 4 5 x 10 4 mm lesion demonstrated just below the level of the sternal notch on the right, at the junction of the right neck and the superior mediastinum  The lesion sits posterior and   slightly to the right of the brachiocephalic artery, and anterior to the trachea  The lesion is seen on multiple series  On the arterial bolus images series 4 images 84 through 90, visualization lesion is somewhat limited secondary to streak artifact   from the contrast injected via a left upper extremity vein, however, with re-windowing of the series can be clearly seen  The lesion is also seen on the delayed images of series 5 images 39 through 40  For better appreciation of the exact location of   the lesion, it is also seen on series 601 image 66, and 602 image 65  No other suspicious lesions are identified      VASCULAR STRUCTURES:  This study was not performed for the evaluation of the carotid arteries, and therefore Nascet criteria do not apply  There is normal enhancement of the cervical vasculature    Ectasia of the ascending aorta is noted, measuring 34 mm      VISUALIZED PARANASAL SINUSES:  Normal      NASAL CAVITY AND NASOPHARYNX:  Normal      SUPRAHYOID NECK:  Normal oropharynx, oral cavity, parapharyngeal and retropharyngeal spaces      INFRAHYOID NECK:  Normal oropharynx, oral cavity, parapharyngeal and retropharyngeal spaces      THYROID GLAND:  Status post thyroid surgery  A small amount of residual thyroid tissue is noted on the left, with a very focal small amount noted on the right  Surgical clips are also noted      LYMPH NODES:  No pathologic or enlarged adenopathy      MEDIASTINUM:  Otherwise unremarkable      BONY STRUCTURES  Normal for age      LUNG APICES:  Unremarkable      IMPRESSION:     1   7 2 x 4 5 x 10 4 mm lesion located at the junction of the right neck and superior mediastinum, just below the level of the sternal notch as described above  2   A small amount of residual thyroid tissue is noted, left greater than right  I reviewed the above laboratory and imaging data  Discussion/Summary:  History of hyperparathyroidism, suspected right-sided target  Complicated surgical history including 2 parathyroid surgeries for thyroid cancer  Repeat scan at this time and blood work in order to obtain up-to-date data and preparation for reoperation

## 2021-03-04 ENCOUNTER — TRANSCRIBE ORDERS (OUTPATIENT)
Dept: ADMINISTRATIVE | Age: 56
End: 2021-03-04

## 2021-03-04 ENCOUNTER — OFFICE VISIT (OUTPATIENT)
Dept: INTERNAL MEDICINE CLINIC | Facility: CLINIC | Age: 56
End: 2021-03-04
Payer: COMMERCIAL

## 2021-03-04 ENCOUNTER — TELEPHONE (OUTPATIENT)
Dept: ENDOCRINOLOGY | Facility: CLINIC | Age: 56
End: 2021-03-04

## 2021-03-04 ENCOUNTER — APPOINTMENT (OUTPATIENT)
Dept: LAB | Age: 56
End: 2021-03-04
Payer: COMMERCIAL

## 2021-03-04 VITALS
BODY MASS INDEX: 35.51 KG/M2 | OXYGEN SATURATION: 98 % | DIASTOLIC BLOOD PRESSURE: 72 MMHG | HEART RATE: 63 BPM | TEMPERATURE: 98.1 F | WEIGHT: 208 LBS | RESPIRATION RATE: 16 BRPM | HEIGHT: 64 IN | SYSTOLIC BLOOD PRESSURE: 108 MMHG

## 2021-03-04 DIAGNOSIS — E89.0 POSTOPERATIVE HYPOTHYROIDISM: ICD-10-CM

## 2021-03-04 DIAGNOSIS — R80.9 PROTEINURIA, UNSPECIFIED TYPE: ICD-10-CM

## 2021-03-04 DIAGNOSIS — R31.9 HEMATURIA: ICD-10-CM

## 2021-03-04 DIAGNOSIS — E21.3 HYPERPARATHYROIDISM (HCC): ICD-10-CM

## 2021-03-04 DIAGNOSIS — Z12.11 SCREENING FOR COLORECTAL CANCER: ICD-10-CM

## 2021-03-04 DIAGNOSIS — Z85.850 HISTORY OF THYROID CANCER: ICD-10-CM

## 2021-03-04 DIAGNOSIS — Z12.12 SCREENING FOR COLORECTAL CANCER: ICD-10-CM

## 2021-03-04 DIAGNOSIS — I82.5Y3 CHRONIC DEEP VEIN THROMBOSIS (DVT) OF PROXIMAL VEIN OF BOTH LOWER EXTREMITIES (HCC): ICD-10-CM

## 2021-03-04 DIAGNOSIS — N20.0 NEPHROLITHIASIS: ICD-10-CM

## 2021-03-04 DIAGNOSIS — N18.2 CHRONIC KIDNEY DISEASE (CKD), STAGE II (MILD): ICD-10-CM

## 2021-03-04 DIAGNOSIS — L03.011 CELLULITIS OF RIGHT INDEX FINGER: Primary | ICD-10-CM

## 2021-03-04 DIAGNOSIS — E21.0 PRIMARY HYPERPARATHYROIDISM (HCC): ICD-10-CM

## 2021-03-04 LAB
ALBUMIN SERPL BCP-MCNC: 3.5 G/DL (ref 3.5–5)
ALP SERPL-CCNC: 91 U/L (ref 46–116)
ALT SERPL W P-5'-P-CCNC: 33 U/L (ref 12–78)
ANION GAP SERPL CALCULATED.3IONS-SCNC: 2 MMOL/L (ref 4–13)
AST SERPL W P-5'-P-CCNC: 25 U/L (ref 5–45)
BACTERIA UR QL AUTO: ABNORMAL /HPF
BASOPHILS # BLD AUTO: 0.03 THOUSANDS/ΜL (ref 0–0.1)
BASOPHILS NFR BLD AUTO: 1 % (ref 0–1)
BILIRUB SERPL-MCNC: 0.46 MG/DL (ref 0.2–1)
BILIRUB UR QL STRIP: NEGATIVE
BUN SERPL-MCNC: 29 MG/DL (ref 5–25)
CALCIUM SERPL-MCNC: 9.9 MG/DL (ref 8.3–10.1)
CHLORIDE SERPL-SCNC: 111 MMOL/L (ref 100–108)
CLARITY UR: CLEAR
CO2 SERPL-SCNC: 28 MMOL/L (ref 21–32)
COLOR UR: YELLOW
CREAT SERPL-MCNC: 1.06 MG/DL (ref 0.6–1.3)
CREAT UR-MCNC: 136 MG/DL
CREAT UR-MCNC: 136 MG/DL
EOSINOPHIL # BLD AUTO: 0.06 THOUSAND/ΜL (ref 0–0.61)
EOSINOPHIL NFR BLD AUTO: 1 % (ref 0–6)
ERYTHROCYTE [DISTWIDTH] IN BLOOD BY AUTOMATED COUNT: 12 % (ref 11.6–15.1)
GFR SERPL CREATININE-BSD FRML MDRD: 60 ML/MIN/1.73SQ M
GLUCOSE SERPL-MCNC: 91 MG/DL (ref 65–140)
GLUCOSE UR STRIP-MCNC: NEGATIVE MG/DL
HCT VFR BLD AUTO: 40.5 % (ref 34.8–46.1)
HGB BLD-MCNC: 12.8 G/DL (ref 11.5–15.4)
HGB UR QL STRIP.AUTO: ABNORMAL
HYALINE CASTS #/AREA URNS LPF: ABNORMAL /LPF
IMM GRANULOCYTES # BLD AUTO: 0.03 THOUSAND/UL (ref 0–0.2)
IMM GRANULOCYTES NFR BLD AUTO: 1 % (ref 0–2)
KETONES UR STRIP-MCNC: NEGATIVE MG/DL
LEUKOCYTE ESTERASE UR QL STRIP: ABNORMAL
LYMPHOCYTES # BLD AUTO: 1.98 THOUSANDS/ΜL (ref 0.6–4.47)
LYMPHOCYTES NFR BLD AUTO: 33 % (ref 14–44)
MCH RBC QN AUTO: 29.4 PG (ref 26.8–34.3)
MCHC RBC AUTO-ENTMCNC: 31.6 G/DL (ref 31.4–37.4)
MCV RBC AUTO: 93 FL (ref 82–98)
MICROALBUMIN UR-MCNC: 340 MG/L (ref 0–20)
MICROALBUMIN/CREAT 24H UR: 250 MG/G CREATININE (ref 0–30)
MONOCYTES # BLD AUTO: 0.54 THOUSAND/ΜL (ref 0.17–1.22)
MONOCYTES NFR BLD AUTO: 9 % (ref 4–12)
NEUTROPHILS # BLD AUTO: 3.41 THOUSANDS/ΜL (ref 1.85–7.62)
NEUTS SEG NFR BLD AUTO: 55 % (ref 43–75)
NITRITE UR QL STRIP: NEGATIVE
NON-SQ EPI CELLS URNS QL MICRO: ABNORMAL /HPF
NRBC BLD AUTO-RTO: 0 /100 WBCS
PH UR STRIP.AUTO: 6.5 [PH]
PLATELET # BLD AUTO: 164 THOUSANDS/UL (ref 149–390)
PMV BLD AUTO: 11.1 FL (ref 8.9–12.7)
POTASSIUM SERPL-SCNC: 4.5 MMOL/L (ref 3.5–5.3)
PROT SERPL-MCNC: 7.4 G/DL (ref 6.4–8.2)
PROT UR STRIP-MCNC: ABNORMAL MG/DL
PROT UR-MCNC: 57 MG/DL
PROT/CREAT UR: 0.42 MG/G{CREAT} (ref 0–0.1)
RBC # BLD AUTO: 4.35 MILLION/UL (ref 3.81–5.12)
RBC #/AREA URNS AUTO: ABNORMAL /HPF
SODIUM SERPL-SCNC: 141 MMOL/L (ref 136–145)
SP GR UR STRIP.AUTO: 1.02 (ref 1–1.03)
T4 FREE SERPL-MCNC: 1.33 NG/DL (ref 0.76–1.46)
TSH SERPL DL<=0.05 MIU/L-ACNC: 0.29 UIU/ML (ref 0.36–3.74)
UROBILINOGEN UR QL STRIP.AUTO: 0.2 E.U./DL
WBC # BLD AUTO: 6.05 THOUSAND/UL (ref 4.31–10.16)
WBC #/AREA URNS AUTO: ABNORMAL /HPF

## 2021-03-04 PROCEDURE — 82570 ASSAY OF URINE CREATININE: CPT

## 2021-03-04 PROCEDURE — 84439 ASSAY OF FREE THYROXINE: CPT

## 2021-03-04 PROCEDURE — 36415 COLL VENOUS BLD VENIPUNCTURE: CPT

## 2021-03-04 PROCEDURE — 80053 COMPREHEN METABOLIC PANEL: CPT

## 2021-03-04 PROCEDURE — 82043 UR ALBUMIN QUANTITATIVE: CPT

## 2021-03-04 PROCEDURE — 99213 OFFICE O/P EST LOW 20 MIN: CPT | Performed by: NURSE PRACTITIONER

## 2021-03-04 PROCEDURE — 84443 ASSAY THYROID STIM HORMONE: CPT

## 2021-03-04 PROCEDURE — 84432 ASSAY OF THYROGLOBULIN: CPT

## 2021-03-04 PROCEDURE — 85025 COMPLETE CBC W/AUTO DIFF WBC: CPT

## 2021-03-04 PROCEDURE — 86800 THYROGLOBULIN ANTIBODY: CPT

## 2021-03-04 PROCEDURE — 81001 URINALYSIS AUTO W/SCOPE: CPT

## 2021-03-04 PROCEDURE — 84156 ASSAY OF PROTEIN URINE: CPT

## 2021-03-04 RX ORDER — CEPHALEXIN 500 MG/1
500 CAPSULE ORAL EVERY 6 HOURS SCHEDULED
Qty: 28 CAPSULE | Refills: 0 | Status: SHIPPED | OUTPATIENT
Start: 2021-03-04 | End: 2021-03-11

## 2021-03-04 NOTE — TELEPHONE ENCOUNTER
----- Message from Juliana Goel MD sent at 3/4/2021  4:27 PM EST -----  Please call the patient regarding her  Blood work results, her calcium is within normal range  9 9, kidney function is normal, electrolytes are within normal range, liver enzymes are normal  Rest of the blood work is pending

## 2021-03-04 NOTE — PROGRESS NOTES
Assessment/Plan:    Hyperparathyroidism ([address] )  Sees endo, needs surgery    Nephrolithiasis  See urologist    Cellulitis of right index finger  Start antibiotics  She will check her inr tomorrow  Refer to ortho if not improving       Diagnoses and all orders for this visit:    Cellulitis of right index finger  -     cephalexin (KEFLEX) 500 mg capsule; Take 1 capsule (500 mg total) by mouth every 6 (six) hours for 7 days    Screening for colorectal cancer    Nephrolithiasis    Hyperparathyroidism ([facility] [address] )    Other orders  -     Cancel: Ambulatory referral to Gastroenterology; Future  -     Cancel: Liquid-based pap, screening ([facility]); Future          Subjective:      Patient ID: Selam Bueno is a 50 y o  female  Patient is here for an infected right index finger  There is pain, redness and swelling  No discharge    She also cut her left finger with a knife but that doesn't bother her  It is healing    She has a few health issues right now including possible parathyroid surgery, kidney stones from parathyroid adenoma,   Post menopausal bleeding and fibroids- due for cystoscopy      The following portions of the patient's history were reviewed and updated as appropriate: allergies, current medications, past family history, past medical history, past social history, past surgical history and problem list     Review of Systems   Constitutional: Negative  HENT: Negative  Eyes: Negative  Respiratory: Negative  Cardiovascular: Negative  Gastrointestinal: Negative  Musculoskeletal: Negative  Neurological: Negative  Objective:      /72   Pulse 63   Temp 98 1 °F (36 7 °C)   Resp 16   Ht 5' 4" (1 626 m)   Wt 94 3 kg (208 lb)   SpO2 98%   BMI 35 70 kg/m²          Physical Exam  Vitals signs and nursing note reviewed  Constitutional:       Appearance: She is well-developed  HENT:      Head: Normocephalic and atraumatic        Right Ear: External ear normal       Left Ear: External ear normal       Nose: Nose normal    Eyes:      Conjunctiva/sclera: Conjunctivae normal       Pupils: Pupils are equal, round, and reactive to light  Neck:      Musculoskeletal: Normal range of motion and neck supple  Cardiovascular:      Rate and Rhythm: Normal rate and regular rhythm  Pulmonary:      Effort: Pulmonary effort is normal       Breath sounds: Normal breath sounds  Abdominal:      General: Bowel sounds are normal       Palpations: Abdomen is soft  Musculoskeletal: Normal range of motion  Skin:     General: Skin is warm and dry  Neurological:      Mental Status: She is alert and oriented to person, place, and time

## 2021-03-05 ENCOUNTER — TELEPHONE (OUTPATIENT)
Dept: HEMATOLOGY ONCOLOGY | Facility: CLINIC | Age: 56
End: 2021-03-05

## 2021-03-05 DIAGNOSIS — E21.0 PRIMARY HYPERPARATHYROIDISM (HCC): ICD-10-CM

## 2021-03-05 DIAGNOSIS — I82.5Y3 CHRONIC DEEP VEIN THROMBOSIS (DVT) OF PROXIMAL VEIN OF BOTH LOWER EXTREMITIES (HCC): Primary | ICD-10-CM

## 2021-03-05 DIAGNOSIS — E83.52 HYPERCALCEMIA: ICD-10-CM

## 2021-03-05 DIAGNOSIS — Z85.850 HISTORY OF THYROID CANCER: ICD-10-CM

## 2021-03-05 DIAGNOSIS — E89.0 POSTOPERATIVE HYPOTHYROIDISM: ICD-10-CM

## 2021-03-05 LAB
THYROGLOB AB SERPL-ACNC: <1 IU/ML (ref 0–0.9)
THYROGLOB SERPL-MCNC: <0.1 NG/ML (ref 1.5–38.5)

## 2021-03-05 RX ORDER — LEVOTHYROXINE SODIUM 0.12 MG/1
TABLET ORAL
Qty: 90 TABLET | Refills: 0
Start: 2021-03-05 | End: 2021-07-15

## 2021-03-05 NOTE — TELEPHONE ENCOUNTER
----- Message from Eden Hickey MD sent at 3/5/2021 12:53 PM EST -----  Please call the patient regarding her abnormal result  Her TSH is low, currently she is taking levothyroxine 125 mcg 6 days a week and half tablet on Sunday  If it is right, she should reduce the dose of levothyroxine to 125 mcg daily from Monday through Saturday and no tablet on Sunday     she will need repeat TSH and free T4 in 6 weeks

## 2021-03-05 NOTE — TELEPHONE ENCOUNTER
Patient's last PT/INR was drawn on 2/11/21  Patient is currently on Coumadin 3 mg daily except Thursday  Patient would like to have an order to have a PT/INR drawn tomorrow  Will forward request to Dr Iker Baker MA  Please call patient if order is approved      Ko Joya (Self) [phone] [place])

## 2021-03-06 ENCOUNTER — APPOINTMENT (OUTPATIENT)
Dept: LAB | Age: 56
End: 2021-03-06
Payer: COMMERCIAL

## 2021-03-06 ENCOUNTER — LAB (OUTPATIENT)
Dept: LAB | Age: 56
End: 2021-03-06
Payer: COMMERCIAL

## 2021-03-06 DIAGNOSIS — E21.3 HYPERPARATHYROIDISM (HCC): ICD-10-CM

## 2021-03-06 DIAGNOSIS — E21.0 PRIMARY HYPERPARATHYROIDISM (HCC): ICD-10-CM

## 2021-03-06 DIAGNOSIS — I82.5Y3 CHRONIC DEEP VEIN THROMBOSIS (DVT) OF PROXIMAL VEIN OF BOTH LOWER EXTREMITIES (HCC): ICD-10-CM

## 2021-03-06 DIAGNOSIS — E83.52 HYPERCALCEMIA: ICD-10-CM

## 2021-03-06 LAB
CALCIUM 24H UR-MCNC: 90.9 MG/24 HRS (ref 42–353)
CREAT 24H UR-MRATE: 0.8 G/24HR (ref 0.6–1.8)
INR PPP: 1.75 (ref 0.84–1.19)
PROTHROMBIN TIME: 20.4 SECONDS (ref 11.6–14.5)
SPECIMEN VOL UR: 900 ML
SPECIMEN VOL UR: 900 ML

## 2021-03-06 PROCEDURE — 82340 ASSAY OF CALCIUM IN URINE: CPT

## 2021-03-06 PROCEDURE — 82570 ASSAY OF URINE CREATININE: CPT

## 2021-03-06 PROCEDURE — 85610 PROTHROMBIN TIME: CPT

## 2021-03-06 PROCEDURE — 36415 COLL VENOUS BLD VENIPUNCTURE: CPT

## 2021-03-09 ENCOUNTER — OFFICE VISIT (OUTPATIENT)
Dept: OBGYN CLINIC | Facility: HOSPITAL | Age: 56
End: 2021-03-09
Payer: COMMERCIAL

## 2021-03-09 ENCOUNTER — HOSPITAL ENCOUNTER (OUTPATIENT)
Dept: RADIOLOGY | Facility: HOSPITAL | Age: 56
Discharge: HOME | End: 2021-03-09
Attending: SURGERY
Payer: COMMERCIAL

## 2021-03-09 ENCOUNTER — HOSPITAL ENCOUNTER (OUTPATIENT)
Dept: RADIOLOGY | Facility: HOSPITAL | Age: 56
Discharge: HOME | End: 2021-03-09
Attending: ORTHOPAEDIC SURGERY

## 2021-03-09 VITALS
BODY MASS INDEX: 35.7 KG/M2 | SYSTOLIC BLOOD PRESSURE: 145 MMHG | DIASTOLIC BLOOD PRESSURE: 99 MMHG | HEIGHT: 64 IN | HEART RATE: 62 BPM

## 2021-03-09 DIAGNOSIS — M70.62 GREATER TROCHANTERIC BURSITIS OF LEFT HIP: Primary | ICD-10-CM

## 2021-03-09 DIAGNOSIS — E21.3 HYPERPARATHYROIDISM (HCC): ICD-10-CM

## 2021-03-09 DIAGNOSIS — M25.559 HIP PAIN: ICD-10-CM

## 2021-03-09 PROCEDURE — 73522 X-RAY EXAM HIPS BI 3-4 VIEWS: CPT

## 2021-03-09 PROCEDURE — 20610 DRAIN/INJ JOINT/BURSA W/O US: CPT | Performed by: ORTHOPAEDIC SURGERY

## 2021-03-09 PROCEDURE — G1004 CDSM NDSC: HCPCS

## 2021-03-09 PROCEDURE — 70492 CT SFT TSUE NCK W/O & W/DYE: CPT

## 2021-03-09 PROCEDURE — 99243 OFF/OP CNSLTJ NEW/EST LOW 30: CPT | Performed by: ORTHOPAEDIC SURGERY

## 2021-03-09 RX ORDER — BETAMETHASONE SODIUM PHOSPHATE AND BETAMETHASONE ACETATE 3; 3 MG/ML; MG/ML
6 INJECTION, SUSPENSION INTRA-ARTICULAR; INTRALESIONAL; INTRAMUSCULAR; SOFT TISSUE
Status: COMPLETED | OUTPATIENT
Start: 2021-03-09 | End: 2021-03-09

## 2021-03-09 RX ORDER — LIDOCAINE HYDROCHLORIDE 10 MG/ML
2 INJECTION, SOLUTION INFILTRATION; PERINEURAL
Status: COMPLETED | OUTPATIENT
Start: 2021-03-09 | End: 2021-03-09

## 2021-03-09 RX ORDER — BUPIVACAINE HYDROCHLORIDE 2.5 MG/ML
2 INJECTION, SOLUTION INFILTRATION; PERINEURAL
Status: COMPLETED | OUTPATIENT
Start: 2021-03-09 | End: 2021-03-09

## 2021-03-09 RX ADMIN — IOHEXOL 100 ML: 350 INJECTION, SOLUTION INTRAVENOUS at 07:42

## 2021-03-09 RX ADMIN — BUPIVACAINE HYDROCHLORIDE 2 ML: 2.5 INJECTION, SOLUTION INFILTRATION; PERINEURAL at 08:43

## 2021-03-09 RX ADMIN — BETAMETHASONE SODIUM PHOSPHATE AND BETAMETHASONE ACETATE 6 MG: 3; 3 INJECTION, SUSPENSION INTRA-ARTICULAR; INTRALESIONAL; INTRAMUSCULAR; SOFT TISSUE at 08:43

## 2021-03-09 RX ADMIN — LIDOCAINE HYDROCHLORIDE 2 ML: 10 INJECTION, SOLUTION INFILTRATION; PERINEURAL at 08:43

## 2021-03-09 NOTE — PROGRESS NOTES
Assessment  Diagnoses and all orders for this visit:    Greater trochanteric bursitis of left hip       Discussion and Plan:    Weightbearing as tolerated left lower extremity   Steroid injection provided the Left greater trochanteric bursa today  Ambulatory referral for physical therapy for hip abductor strengthening was provided   Follow-up in an as-needed basis    Subjective:   Patient ID: Mayo Jimenez is a 50 y o  female      61-year-old female presents for evaluation of left hip pain  Pain has been present for the past months without any inciting event  Pain is localized mostly to the lateral aspect of the hip, is worst during the morning, standing up and being on her feet for long hours  She has not had any recent treatment for this  She does states that approximately 20 years ago she had a left femoral head core decompression for 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 from [facility] with Dr Oliva Draper  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  The following portions of the patient's history were reviewed and updated as appropriate: allergies, current medications, past family history, past medical history, past social history, past surgical history and problem list     Review of Systems   Constitutional: Negative for appetite change and fever  HENT: Negative for sore throat  Eyes: Negative for visual disturbance  Respiratory: Negative for shortness of breath  Cardiovascular: Negative for chest pain  Gastrointestinal: Negative for nausea and vomiting  Musculoskeletal: Positive for arthralgias and myalgias  Skin: Negative for wound         Objective:  /99   Pulse 62   Ht 5' 4" (1 626 m)   BMI 35 70 kg/m²       Right Hip Exam     Comments:  Leg lengths equal   No limitations with range of motion  No reproducible groin pain with FADIR or Stinchfield tests   Ankle and toes strongly plantar flex/dorsiflex        Left Hip Exam     Comments:  Leg lengths equal   Mild limitation with internal rotation   No reproducible groin pain with FADIR or Stinchfield tests   Well-healed lateral hip scar  Ankle and toes strongly plantar flex/dorsiflex  Tenderness to palpation over the greater trochanteric flare              Physical Exam  Vitals signs and nursing note reviewed  Constitutional:       Appearance: Normal appearance  HENT:      Head: Normocephalic and atraumatic  Nose: Nose normal       Mouth/Throat:      Mouth: Mucous membranes are moist    Eyes:      Extraocular Movements: Extraocular movements intact  Conjunctiva/sclera: Conjunctivae normal    Neck:      Musculoskeletal: Normal range of motion  Cardiovascular:      Rate and Rhythm: Normal rate  Pulses: Normal pulses  Pulmonary:      Effort: Pulmonary effort is normal       Breath sounds: Normal breath sounds  Abdominal:      Palpations: Abdomen is soft  Musculoskeletal:      Comments: See Ortho Exam   Skin:     General: Skin is warm  Capillary Refill: Capillary refill takes less than 2 seconds  Neurological:      General: No focal deficit present  Mental Status: She is alert  Mental status is at baseline  Psychiatric:         Mood and Affect: Mood normal          Behavior: Behavior normal        Large joint arthrocentesis: L greater trochanteric bursa  Universal Protocol:  Consent: Verbal consent obtained    Risks and benefits: risks, benefits and alternatives were discussed  Consent given by: patient  Timeout called at: 3/9/2021 8:31 AM   Patient understanding: patient states understanding of the procedure being performed    Supporting Documentation  Indications: pain   Procedure Details  Location: hip - L greater trochanteric bursa  Needle size: 22 G (spinal)  Approach: lateral  Medications administered: 2 mL bupivacaine 0 25 %; 2 mL lidocaine 1 %; 6 mg betamethasone acetate-betamethasone sodium phosphate 6 (3-3) mg/mL    Patient tolerance: patient tolerated the procedure well with no immediate complications  Dressing:  Sterile dressing applied          I have personally reviewed pertinent films in PACS and my interpretation is as follows      X-rays of pelvis and bilateral hips show mild degenerative changes in the left hip joint with joint space narrowing, no evidence of fracture dislocations, degenerative changes noted in the lower back

## 2021-03-15 ENCOUNTER — EVALUATION (OUTPATIENT)
Dept: PHYSICAL THERAPY | Facility: REHABILITATION | Age: 56
End: 2021-03-15
Payer: COMMERCIAL

## 2021-03-15 DIAGNOSIS — G89.29 CHRONIC LEFT HIP PAIN: Primary | ICD-10-CM

## 2021-03-15 DIAGNOSIS — M70.62 GREATER TROCHANTERIC BURSITIS OF LEFT HIP: ICD-10-CM

## 2021-03-15 DIAGNOSIS — M25.552 CHRONIC LEFT HIP PAIN: Primary | ICD-10-CM

## 2021-03-15 PROCEDURE — 97112 NEUROMUSCULAR REEDUCATION: CPT | Performed by: PHYSICAL THERAPIST

## 2021-03-15 PROCEDURE — 97161 PT EVAL LOW COMPLEX 20 MIN: CPT | Performed by: PHYSICAL THERAPIST

## 2021-03-15 NOTE — PROGRESS NOTES
PT Evaluation     Today's date: 3/15/2021  Patient name: Mikey Marin  : 1965  MRN: [id number]  Referring provider: Fatou Espinal MD  Dx:   Encounter Diagnosis     ICD-10-CM    1  Chronic left hip pain  M25 552     G89 29    2  Greater trochanteric bursitis of left hip  M70 62 Ambulatory referral to Physical Therapy                  Assessment  Assessment details: Mikey Marin is a pleasant 50 y o  female who presents with chronic hip pain  The patient's greatest concerns are the pain she is experiencing, worry over not knowing what's wrong, fear of not being able to keep active, wanting to avoid surgery, and future ill health (and wanting to prevent it)  No further referral appears necessary at this time based upon examination results  The primary movement problem is left hip weakness, resulting in pathoanatomical symptoms of chronic lateral hip pain, which is limiting her ability to stand for long periods of time, perform transitional movements, lay on left side comfortably, walk for long periods of time, squat, negotiate stairs, and perform other ADL's  The patient has a history of lupus and chronic steroid use, which led to 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, left worse than right, and had a bone decompression surgery 28 years ago  Since that time period she has had good hip health; however she had an acute flare up of left hip pain that started 3 weeks ago  She did consult with orthopedics, where plain films were performed showing no osseous abnormalities as well as an injection into the left hip with a referral to outpatient physical therapy  Objectively, the patient had a favoring of her right hip in standing and walking, with a left lateral trunk lean in midstance  Her active straight leg raise was weaker on her left compared to her right, with tenderness along her anterior lateral thigh and over her greater trochanter   She did not have tenderness along her gluteal region and no pain with resistive testing of her hip rotators  At this time, the patient would benefit from skilled physical therapy to address her current deficits, improve her quality of life, and restore her PLOF  Primary Impairments:  1) strength deficits of the left hip  2) poor gait     Etiologic factors includes history of avascular necrosis  Impairments: abnormal coordination, abnormal gait, abnormal muscle firing, abnormal muscle tone, abnormal or restricted ROM, abnormal movement, activity intolerance, impaired balance, impaired physical strength, lacks appropriate home exercise program, pain with function, weight-bearing intolerance, poor posture  and poor body mechanics    Symptom irritability: moderateUnderstanding of Dx/Px/POC: good   Prognosis: good  Prognosis details: Positive prognostic indicators include positive attitude toward recovery  Negative prognostic indicators include chronicity of symptoms, overweight, and age  Goals  Impairment Based Goals:   Patient will improve FOTO score greater than predicted increase in 4 weeks  Patient will have decrease in pain of at least 50% in 4 weeks  Patient will improve strength by at least 1/2 MMT grade in 4 weeks  Functional Based Goals: Upon Discharge  Patient will be independent with home exercise program    Patient will be able to manage symptoms independently  Plan  Plan details: Prognosis above is given PT services 2x/week tapering to 1x/week over the next 2 months and home program adherence    Patient would benefit from: skilled physical therapy  Referral necessary: No  Planned modality interventions: thermotherapy: hydrocollator packs  Planned therapy interventions: activity modification, joint mobilization, manual therapy, motor coordination training, neuromuscular re-education, patient education, self care, therapeutic activities, therapeutic exercise, graded activity, home exercise program, behavior modification, balance, balance/weight bearing training, flexibility, functional ROM exercises, gait training, stretching and strengthening  Plan of Care beginning date: 3/15/2021  Plan of Care expiration date: 5/10/2021  Treatment plan discussed with: patient        Subjective Evaluation    History of Present Illness  Mechanism of injury: I have been dealing with some chronic left hip pain  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  I feel the pain on the outside of my hip that goes down the side of my left thigh  I have had the same issues about 10-15 years ago  I had a bone decompression done 28 years ago because I had avascular necrosis  I did see the orthopedist recently and my X-Rays looked good  I notice that I start walking a different way to try and avoid pain  When I go up and down steps, I take pressure off the left leg because of the pain  In the morning, I use a cane because I am afraid I will be off balance  This recent episode of my hip worsening has been going on for about 3 weeks  I did receive an injection, but it only felt better for one day  I also wake up with lower back pain every morning, but it alleviates as I start moving     Pain  Current pain ratin  At best pain ratin  At worst pain ratin  Quality: tight, dull ache and discomfort  Relieving factors: rest, change in position and medications  Aggravating factors: walking, standing, stair climbing and sitting    Treatments  Previous treatment: injection treatment, medication and physical therapy  Current treatment: physical therapy  Patient Goals  Patient goals for therapy: decreased pain, improved balance, increased motion, increased strength, independence with ADLs/IADLs, return to sport/leisure activities and return to work          Objective     General Comments:      Hip Comments   TU seconds     5 times sit to stand: 18 seconds; trunk shift towards right lower extremity; did sometimes push with hands on thighs for support     Gait: Antalgic with left lateral trunk lean (uncompensated trendelenburg)     Strength:   Hip Flexion: 4/5 B   Knee Extension: 5/5 B   Knee Flexion: 4/5 B     Palpation: TTP of greater trochanter, and anterior/lateral aspect of left thigh     Hip PROM:   Hip Flexion: WNL   Hip Abduction: WNL   Hip ER: WNL with reported discomfort   Hip IR: 25% limited     External Derotation Test: (-)   Hip Scour: (-)   ROSA ELENA: (+)   ASLR: Able to do but weak     Hip AROM:   Flexion: 25% limited   ER: WNL   IR: 25% limited     Red Flags: Unremarkable                  Precautions: History of avascular necrosis, history of systemic lupus, history of chronic steroid use, history of hyperparathyroidism         Manuals 3/15            L hip PROM             L hip LAD                                        Neuro Re-Ed             Bridges 2x10            Clamshells 2x10            Sidestepping             Hip Hike             Isometric Hip ER at wall             Standing hip 3 way             Leg Press             Ther Ex             Nustep             Seated hip flexion  gtb 2x10            Standing hip flexion with TrA on physioball press             Squats             Step Ups/Downs             Lunges                                       Ther Activity                                       Gait Training                                       Modalities

## 2021-03-17 ENCOUNTER — OFFICE VISIT (OUTPATIENT)
Dept: HEMATOLOGY ONCOLOGY | Facility: CLINIC | Age: 56
End: 2021-03-17
Payer: COMMERCIAL

## 2021-03-17 ENCOUNTER — TELEPHONE (OUTPATIENT)
Dept: ENDOCRINOLOGY | Facility: CLINIC | Age: 56
End: 2021-03-17

## 2021-03-17 VITALS
TEMPERATURE: 96.9 F | HEART RATE: 60 BPM | WEIGHT: 213 LBS | OXYGEN SATURATION: 98 % | DIASTOLIC BLOOD PRESSURE: 74 MMHG | HEIGHT: 64 IN | RESPIRATION RATE: 17 BRPM | SYSTOLIC BLOOD PRESSURE: 128 MMHG | BODY MASS INDEX: 36.37 KG/M2

## 2021-03-17 DIAGNOSIS — D68.61 ANTI-PHOSPHOLIPID SYNDROME (HCC): ICD-10-CM

## 2021-03-17 DIAGNOSIS — E21.3 HYPERPARATHYROIDISM (HCC): ICD-10-CM

## 2021-03-17 DIAGNOSIS — D68.61 APS (ANTIPHOSPHOLIPID SYNDROME) (HCC): Primary | ICD-10-CM

## 2021-03-17 DIAGNOSIS — I82.5Y3 CHRONIC DEEP VEIN THROMBOSIS (DVT) OF PROXIMAL VEIN OF BOTH LOWER EXTREMITIES (HCC): Chronic | ICD-10-CM

## 2021-03-17 PROCEDURE — 99214 OFFICE O/P EST MOD 30 MIN: CPT | Performed by: INTERNAL MEDICINE

## 2021-03-17 NOTE — TELEPHONE ENCOUNTER
----- Message from Jolynn Brar MD sent at 3/17/2021 12:31 PM EDT -----  Please call the patient regarding her abnormal result  Urine  Collection is inadequate, she will need to repeat 24 hour urine calcium and creatinine, please inform patient and order the test

## 2021-03-17 NOTE — PROGRESS NOTES
[facility] [address] [facility] [address] [phone]    Alyssa Box ,1965, [id number]  03/17/21    Discussion:   In summary, this is a 51-year-old female history of phospholipid antibody syndrome  She is currently on Coumadin  She is in the target range 70-80% of the time  This is acceptable  She has hypercalcemia  She will be seeing surgical oncology for resection evaluation  This had been planned last year but was deferred due to lack of insurance, COVID, other processes  Bridging with Lovenox would be recommended around that time  She has some kidney stones  No treatment is planned at present  She had an increase in right hip pain attributed to avascular necrosis  She had an injection with some benefit  She is going to physical therapy  I discussed the above with the patient  The patient  voiced understanding and agreement   ______________________________________________________________________    Chief Complaint   Patient presents with    Follow-up       HPI:  Oncology History    No history exists  Interval History:  Clinically stable  ECOG-  1 - Symptomatic but completely ambulatory    Review of Systems   Constitutional: Negative for appetite change, diaphoresis, fatigue and fever  HENT: Negative for sinus pain  Eyes: Negative for discharge  Respiratory: Negative for cough and shortness of breath  Cardiovascular: Negative for chest pain  Gastrointestinal: Negative for abdominal pain, constipation and diarrhea  Endocrine: Negative for cold intolerance  Genitourinary: Negative for difficulty urinating and hematuria  Musculoskeletal: Negative for joint swelling  Skin: Negative for rash  Allergic/Immunologic: Negative for environmental allergies  Neurological: Negative for dizziness and headaches  Hematological: Negative for adenopathy  Psychiatric/Behavioral: Negative for agitation  Past Medical History:   Diagnosis Date    Chronic kidney disease     Gestational diabetes     Hyperparathyroidism ([address] )     Hypertension     Idiopathic thrombocytopenic purpura (ITP) (HCC)     Lupus (HCC)     Vitamin D deficiency      Patient Active Problem List   Diagnosis    Chronic deep vein thrombosis (DVT) of proximal vein of both lower extremities (HCC)    History of ITP    Benign hypertensive CKD    Chronic kidney disease (CKD), stage II (mild)    Edema    Gross hematuria    Hypercalcemia    Hyperparathyroidism ([address] )    Hypertension    Nephrolithiasis    Proteinuria    Systemic lupus erythematosus (HCC)    Vitamin D deficiency    Elevated PTHrP level    History of thyroid cancer    Upper respiratory tract infection    Rash    Left ear pain    BMI 39 0-39 9,adult    Post-menopausal bleeding    Cellulitis of right index finger    Greater trochanteric bursitis of left hip       Current Outpatient Medications:     cholecalciferol (VITAMIN D3) 400 units tablet, Take 1 tablet (400 Units total) by mouth daily, Disp: 30 tablet, Rfl: 4    folic acid (FOLVITE) 1 mg tablet, Take 1 tablet by mouth daily, Disp: , Rfl:     fosinopril (MONOPRIL) 20 mg tablet, Take 1 tablet (20 mg total) by mouth 2 (two) times a day, Disp: 180 tablet, Rfl: 0    levothyroxine 125 mcg tablet, TAKE ONE TABLET BY MOUTH EVERY DAY MON-SAT , Disp: 90 tablet, Rfl: 0    NIFEdipine (PROCARDIA XL) 30 mg 24 hr tablet, Take 1 tablet (30 mg total) by mouth daily, Disp: 90 tablet, Rfl: 3    nystatin (MYCOSTATIN) powder, Apply topically 2 (two) times a day, Disp: 60 g, Rfl: 2    spironolactone (ALDACTONE) 25 mg tablet, Take 1 tablet (25 mg total) by mouth daily, Disp: 90 tablet, Rfl: 3    warfarin (COUMADIN) 3 mg tablet, TAKE 1 TABLET DAILY except on Mondays and Thursdays (Patient taking differently: TAKE 1 TABLET DAILY except Thursdays), Disp: 90 tablet, Rfl: 4  Allergies   Allergen Reactions    Penicillins Itching     Past Surgical History:   Procedure Laterality Date     SECTION      8272/0820    HERNIA REPAIR      HIP SURGERY      left side, bone decompression    THYROID SURGERY       Social History     Objective:  Vitals:    21 1220   BP: 128/74   BP Location: Left arm   Patient Position: Sitting   Pulse: 60   Resp: 17   Temp: (!) 96 9 °F (36 1 °C)   TempSrc: Tympanic   SpO2: 98%   Weight: 96 6 kg (213 lb)   Height: 5' 4" (1 626 m)     Physical Exam  Constitutional:       Appearance: She is well-developed  HENT:      Head: Normocephalic and atraumatic  Eyes:      Pupils: Pupils are equal, round, and reactive to light  Neck:      Musculoskeletal: Neck supple  Cardiovascular:      Rate and Rhythm: Normal rate  Heart sounds: No murmur  Pulmonary:      Effort: No respiratory distress  Breath sounds: No wheezing or rales  Abdominal:      General: There is no distension  Palpations: Abdomen is soft  Tenderness: There is no abdominal tenderness  There is no rebound  Musculoskeletal:         General: No tenderness  Lymphadenopathy:      Cervical: No cervical adenopathy  Skin:     General: Skin is warm  Findings: No rash  Neurological:      Mental Status: She is alert and oriented to person, place, and time  Deep Tendon Reflexes: Reflexes normal    Psychiatric:         Thought Content: Thought content normal            Labs: I personally reviewed the labs and imaging pertinent to this patient care

## 2021-03-17 NOTE — TELEPHONE ENCOUNTER
Pt called back states she returned a full jug to lab   She states she has too many appointments right now and cannot repeat

## 2021-03-19 ENCOUNTER — TELEPHONE (OUTPATIENT)
Dept: SURGICAL ONCOLOGY | Facility: CLINIC | Age: 56
End: 2021-03-19

## 2021-03-19 ENCOUNTER — TELEPHONE (OUTPATIENT)
Dept: OTHER | Facility: OTHER | Age: 56
End: 2021-03-19

## 2021-03-19 NOTE — TELEPHONE ENCOUNTER
Pt called and was very confused about lab work  She will be calling Monday morning to see if she is suppsed to come in or not  She was not very happy  She is going to get her lab work on Saturday

## 2021-03-19 NOTE — TELEPHONE ENCOUNTER
LM for pt to have her blood work, PTH, before f/u with Dr Kimberly Kelly on 3/22  If unable to have labs before f/u, notified her to call office back and r/s appt to a later date

## 2021-03-20 ENCOUNTER — APPOINTMENT (OUTPATIENT)
Dept: LAB | Age: 56
End: 2021-03-20
Payer: COMMERCIAL

## 2021-03-20 ENCOUNTER — TRANSCRIBE ORDERS (OUTPATIENT)
Dept: ADMINISTRATIVE | Age: 56
End: 2021-03-20

## 2021-03-20 DIAGNOSIS — E21.3 HYPERPARATHYROIDISM (HCC): ICD-10-CM

## 2021-03-20 DIAGNOSIS — Z79.899 ENCOUNTER FOR LONG-TERM (CURRENT) USE OF OTHER MEDICATIONS: ICD-10-CM

## 2021-03-20 DIAGNOSIS — I43 DILATED CARDIOMYOPATHY SECONDARY TO SYSTEMIC LUPUS ERYTHEMATOSUS (HCC): Primary | ICD-10-CM

## 2021-03-20 DIAGNOSIS — M32.19 DILATED CARDIOMYOPATHY SECONDARY TO SYSTEMIC LUPUS ERYTHEMATOSUS (HCC): ICD-10-CM

## 2021-03-20 DIAGNOSIS — M32.19 DILATED CARDIOMYOPATHY SECONDARY TO SYSTEMIC LUPUS ERYTHEMATOSUS (HCC): Primary | ICD-10-CM

## 2021-03-20 DIAGNOSIS — I43 DILATED CARDIOMYOPATHY SECONDARY TO SYSTEMIC LUPUS ERYTHEMATOSUS (HCC): ICD-10-CM

## 2021-03-20 LAB
ALBUMIN SERPL BCP-MCNC: 3.3 G/DL (ref 3.5–5)
ALP SERPL-CCNC: 85 U/L (ref 46–116)
ALT SERPL W P-5'-P-CCNC: 30 U/L (ref 12–78)
ANION GAP SERPL CALCULATED.3IONS-SCNC: 4 MMOL/L (ref 4–13)
AST SERPL W P-5'-P-CCNC: 18 U/L (ref 5–45)
BACTERIA UR QL AUTO: ABNORMAL /HPF
BASOPHILS # BLD AUTO: 0.04 THOUSANDS/ΜL (ref 0–0.1)
BASOPHILS NFR BLD AUTO: 1 % (ref 0–1)
BILIRUB SERPL-MCNC: 0.68 MG/DL (ref 0.2–1)
BILIRUB UR QL STRIP: NEGATIVE
BUN SERPL-MCNC: 26 MG/DL (ref 5–25)
C3 SERPL-MCNC: 103 MG/DL (ref 90–180)
CALCIUM ALBUM COR SERPL-MCNC: 11 MG/DL (ref 8.3–10.1)
CALCIUM SERPL-MCNC: 10.4 MG/DL (ref 8.3–10.1)
CHLORIDE SERPL-SCNC: 112 MMOL/L (ref 100–108)
CLARITY UR: CLEAR
CO2 SERPL-SCNC: 25 MMOL/L (ref 21–32)
COLOR UR: YELLOW
CREAT SERPL-MCNC: 0.97 MG/DL (ref 0.6–1.3)
EOSINOPHIL # BLD AUTO: 0.08 THOUSAND/ΜL (ref 0–0.61)
EOSINOPHIL NFR BLD AUTO: 1 % (ref 0–6)
ERYTHROCYTE [DISTWIDTH] IN BLOOD BY AUTOMATED COUNT: 12.4 % (ref 11.6–15.1)
ERYTHROCYTE [SEDIMENTATION RATE] IN BLOOD: 21 MM/HOUR (ref 0–29)
GFR SERPL CREATININE-BSD FRML MDRD: 67 ML/MIN/1.73SQ M
GLUCOSE SERPL-MCNC: 113 MG/DL (ref 65–140)
GLUCOSE UR STRIP-MCNC: NEGATIVE MG/DL
HCT VFR BLD AUTO: 39.9 % (ref 34.8–46.1)
HGB BLD-MCNC: 13.3 G/DL (ref 11.5–15.4)
HGB UR QL STRIP.AUTO: ABNORMAL
HYALINE CASTS #/AREA URNS LPF: ABNORMAL /LPF
IMM GRANULOCYTES # BLD AUTO: 0.05 THOUSAND/UL (ref 0–0.2)
IMM GRANULOCYTES NFR BLD AUTO: 1 % (ref 0–2)
INR PPP: 2.44 (ref 0.84–1.19)
KETONES UR STRIP-MCNC: NEGATIVE MG/DL
LEUKOCYTE ESTERASE UR QL STRIP: NEGATIVE
LYMPHOCYTES # BLD AUTO: 1.85 THOUSANDS/ΜL (ref 0.6–4.47)
LYMPHOCYTES NFR BLD AUTO: 29 % (ref 14–44)
MCH RBC QN AUTO: 30 PG (ref 26.8–34.3)
MCHC RBC AUTO-ENTMCNC: 33.3 G/DL (ref 31.4–37.4)
MCV RBC AUTO: 90 FL (ref 82–98)
MONOCYTES # BLD AUTO: 0.5 THOUSAND/ΜL (ref 0.17–1.22)
MONOCYTES NFR BLD AUTO: 8 % (ref 4–12)
NEUTROPHILS # BLD AUTO: 3.77 THOUSANDS/ΜL (ref 1.85–7.62)
NEUTS SEG NFR BLD AUTO: 60 % (ref 43–75)
NITRITE UR QL STRIP: NEGATIVE
NON-SQ EPI CELLS URNS QL MICRO: ABNORMAL /HPF
NRBC BLD AUTO-RTO: 0 /100 WBCS
PH UR STRIP.AUTO: 6 [PH]
PLATELET # BLD AUTO: 182 THOUSANDS/UL (ref 149–390)
PMV BLD AUTO: 11 FL (ref 8.9–12.7)
POTASSIUM SERPL-SCNC: 4.2 MMOL/L (ref 3.5–5.3)
PROT SERPL-MCNC: 7.1 G/DL (ref 6.4–8.2)
PROT UR STRIP-MCNC: ABNORMAL MG/DL
PROTHROMBIN TIME: 26.4 SECONDS (ref 11.6–14.5)
PTH-INTACT SERPL-MCNC: 239.9 PG/ML (ref 18.4–80.1)
RBC # BLD AUTO: 4.44 MILLION/UL (ref 3.81–5.12)
RBC #/AREA URNS AUTO: ABNORMAL /HPF
SODIUM SERPL-SCNC: 141 MMOL/L (ref 136–145)
SP GR UR STRIP.AUTO: 1.02 (ref 1–1.03)
UROBILINOGEN UR QL STRIP.AUTO: 0.2 E.U./DL
WBC # BLD AUTO: 6.29 THOUSAND/UL (ref 4.31–10.16)
WBC #/AREA URNS AUTO: ABNORMAL /HPF

## 2021-03-20 PROCEDURE — 81001 URINALYSIS AUTO W/SCOPE: CPT | Performed by: INTERNAL MEDICINE

## 2021-03-20 PROCEDURE — 86160 COMPLEMENT ANTIGEN: CPT

## 2021-03-20 PROCEDURE — 83970 ASSAY OF PARATHORMONE: CPT

## 2021-03-20 PROCEDURE — 86225 DNA ANTIBODY NATIVE: CPT

## 2021-03-20 PROCEDURE — 85652 RBC SED RATE AUTOMATED: CPT

## 2021-03-20 PROCEDURE — 85025 COMPLETE CBC W/AUTO DIFF WBC: CPT

## 2021-03-20 PROCEDURE — 85610 PROTHROMBIN TIME: CPT | Performed by: INTERNAL MEDICINE

## 2021-03-20 PROCEDURE — 36415 COLL VENOUS BLD VENIPUNCTURE: CPT | Performed by: INTERNAL MEDICINE

## 2021-03-20 PROCEDURE — 80053 COMPREHEN METABOLIC PANEL: CPT

## 2021-03-22 ENCOUNTER — OFFICE VISIT (OUTPATIENT)
Dept: SURGICAL ONCOLOGY | Facility: CLINIC | Age: 56
End: 2021-03-22
Payer: COMMERCIAL

## 2021-03-22 VITALS
SYSTOLIC BLOOD PRESSURE: 128 MMHG | HEIGHT: 64 IN | RESPIRATION RATE: 16 BRPM | TEMPERATURE: 97 F | WEIGHT: 213 LBS | HEART RATE: 50 BPM | DIASTOLIC BLOOD PRESSURE: 80 MMHG | BODY MASS INDEX: 36.37 KG/M2

## 2021-03-22 DIAGNOSIS — E21.3 HYPERPARATHYROIDISM (HCC): Primary | ICD-10-CM

## 2021-03-22 LAB — DSDNA AB SER-ACNC: 10 IU/ML (ref 0–9)

## 2021-03-22 PROCEDURE — 99213 OFFICE O/P EST LOW 20 MIN: CPT | Performed by: SURGERY

## 2021-03-22 NOTE — PROGRESS NOTES
Surgical Oncology Follow Up       [id number] [facility]  [address] [facility] [address] [phone]    Marie Mcallister  1965  [id number]  [address] [facility]CER CARE SURGICAL ONCOLOGY [place] Boehringer  [address] [facility] [id number] [phone]    Chief Complaint   Patient presents with    Follow-up     F/U after Sestamibi scan and labs       Assessment/Plan:    No problem-specific Assessment & Plan notes found for this encounter  Diagnoses and all orders for this visit:    Hyperparathyroidism [facility])        Advance Care Planning/Advance Directives:  Discussed disease status, cancer treatment plans and/or cancer treatment goals with the patient  Oncology History    No history exists  History of Present Illness:   -Interval History: patient is a 61-year-old woman here for follow-up status post CT scan to look for potential parathyroid adenoma  Diagnosis of persistent hyperparathyroidism  Review of Systems:  Review of Systems   Constitutional: Negative  HENT: Negative  Eyes: Negative  Respiratory: Negative  Cardiovascular: Negative  Gastrointestinal: Negative  Endocrine: Negative  Genitourinary: Positive for vaginal bleeding  Musculoskeletal: Negative  Skin: Negative  Allergic/Immunologic: Negative  Neurological: Negative  Hematological: Negative  Psychiatric/Behavioral: Negative          Patient Active Problem List   Diagnosis    Chronic deep vein thrombosis (DVT) of proximal vein of both lower extremities (HCC)    History of ITP    Benign hypertensive CKD    Chronic kidney disease (CKD), stage II (mild)    Edema    Gross hematuria    Hypercalcemia    Hyperparathyroidism ([address] )    Hypertension    Nephrolithiasis    Proteinuria    Systemic lupus erythematosus ([address] )    Vitamin D deficiency    Elevated PTHrP level    History of thyroid cancer  Upper respiratory tract infection    Rash    Left ear pain    BMI 39 0-39 9,adult    Post-menopausal bleeding    Cellulitis of right index finger    Greater trochanteric bursitis of left hip    Anti-phospholipid syndrome (HCC)     Past Medical History:   Diagnosis Date    Chronic kidney disease     Gestational diabetes     Hyperparathyroidism ([address] )     Hypertension     Idiopathic thrombocytopenic purpura (ITP) (HCC)     Lupus (HCC)     Vitamin D deficiency      Past Surgical History:   Procedure Laterality Date     SECTION      [id number]    HERNIA REPAIR      HIP SURGERY      left side, bone decompression    THYROID SURGERY       Family History   Problem Relation Age of Onset    Diabetes type II Father     Diabetes Father     Stroke Father     Diabetes type II Paternal Aunt     Diabetes type II Paternal Uncle     Diabetes type II Paternal Grandmother     Stomach cancer Paternal Grandfather      Social History     Socioeconomic History    Marital status: /Civil Union     Spouse name: Not on file    Number of children: Not on file    Years of education: Not on file    Highest education level: Not on file   Occupational History    Not on file   Social Needs    Financial resource strain: Not on file    Food insecurity     Worry: Not on file     Inability: Not on file    Transportation needs     Medical: Not on file     Non-medical: Not on file   Tobacco Use    Smoking status: Never Smoker    Smokeless tobacco: Never Used   Substance and Sexual Activity    Alcohol use: Not Currently    Drug use: No    Sexual activity: Not on file   Lifestyle    Physical activity     Days per week: Not on file     Minutes per session: Not on file    Stress: Not on file   Relationships    Social connections     Talks on phone: Not on file     Gets together: Not on file     Attends Faith service: Not on file     Active member of club or organization: Not on file     Attends meetings of clubs or organizations: Not on file     Relationship status: Not on file    Intimate partner violence     Fear of current or ex partner: Not on file     Emotionally abused: Not on file     Physically abused: Not on file     Forced sexual activity: Not on file   Other Topics Concern    Not on file   Social History Narrative    Not on file       Current Outpatient Medications:     cholecalciferol (VITAMIN D3) 400 units tablet, Take 1 tablet (400 Units total) by mouth daily, Disp: 30 tablet, Rfl: 4    folic acid (FOLVITE) 1 mg tablet, Take 1 tablet by mouth daily, Disp: , Rfl:     fosinopril (MONOPRIL) 20 mg tablet, Take 1 tablet (20 mg total) by mouth 2 (two) times a day, Disp: 180 tablet, Rfl: 0    levothyroxine 125 mcg tablet, TAKE ONE TABLET BY MOUTH EVERY DAY MON-SAT , Disp: 90 tablet, Rfl: 0    NIFEdipine (PROCARDIA XL) 30 mg 24 hr tablet, Take 1 tablet (30 mg total) by mouth daily, Disp: 90 tablet, Rfl: 3    nystatin (MYCOSTATIN) powder, Apply topically 2 (two) times a day, Disp: 60 g, Rfl: 2    spironolactone (ALDACTONE) 25 mg tablet, Take 1 tablet (25 mg total) by mouth daily, Disp: 90 tablet, Rfl: 3    warfarin (COUMADIN) 3 mg tablet, TAKE 1 TABLET DAILY except on Mondays and Thursdays (Patient taking differently: TAKE 1 TABLET DAILY except Thursdays), Disp: 90 tablet, Rfl: 4  Allergies   Allergen Reactions    Penicillins Itching     Vitals:    03/22/21 1007   BP: 128/80   Pulse: (!) 50   Resp: 16   Temp: (!) 97 °F (36 1 °C)       Physical Exam  Constitutional:       Appearance: Normal appearance  HENT:      Head: Normocephalic and atraumatic  Nose: Nose normal    Eyes:      Conjunctiva/sclera: Conjunctivae normal    Neck:      Musculoskeletal: Normal range of motion and neck supple  Musculoskeletal: Normal range of motion  Skin:     General: Skin is warm and dry  Neurological:      General: No focal deficit present        Mental Status: She is alert and oriented to person, place, and time  Psychiatric:         Mood and Affect: Mood normal          Behavior: Behavior normal          Thought Content: Thought content normal          Judgment: Judgment normal            Results:  Labs:  Lab Results   Component Value Date     9 (H) 03/20/2021    CALCIUM 10 4 (H) 03/20/2021    PHOS 2 4 (L) 11/18/2017         Imaging  Xr Hips Bilateral 3-4 Vw W Pelvis If Performed    Result Date: 3/15/2021  Narrative: BILATERAL HIPS AND PELVIS INDICATION:   M25 559: Pain in unspecified hip  Dr Sophie Duong note from 3/9/2020 was reviewed  Patient has known history of avascular necrosis of the left hip status post core decompression 28 years ago  COMPARISON:  Abdomen and pelvic CT from 2/2/2021  VIEWS:  XR HIPS BILATERAL 3-4 VW W PELVIS IF PERFORMED  FINDINGS: The bony pelvis appears intact  There is contrast material urinary bladder  Visualized lower lumbar spine is unremarkable  LEFT HIP: There is no acute fracture or dislocation  Sclerotic changes in the femoral head consistent with avascular necrosis  Mild left hip osteoarthritis  This is evidenced by joint space narrowing  No lytic or blastic osseous lesion  Soft tissues are unremarkable  RIGHT HIP: There is no acute fracture or dislocation  Subtle sclerosis of the right femoral head consistent with avascular necrosis, better visualized on prior CT from 2/2/2021  There is no associated right hip osteoarthritis  No lytic or blastic osseous lesion  Soft tissues are unremarkable  Impression: Sclerotic changes in the femoral head consistent with avascular necrosis  Mild left hip osteoarthritis  Subtle sclerosis of the right femoral head consistent with avascular necrosis, better visualized on prior CT from 2/2/2021  There is no associated right hip osteoarthritis   Workstation performed: [id number]    Ct Parathyroid Study W Wo Contrast    Result Date: 3/11/2021  Narrative: CT  NECK  WITHOUT AND WITH CONTRAST FROM ZULAY TO SKULL BASE INDICATION: Hyperparathyroidism  Possible lesion identified on previous scan dated May 24, 2019  COMPARISON:  Parathyroid CT dated May 24, 2019  TECHNIQUE:This examination, like all CT scans performed in the [facility], was performed utilizing techniques to minimize radiation dose exposure, including the use of iterative reconstruction and automated exposure control  Both noncontrast, contrast, and post contrast delayed images were performed according to standard parathyroid protocol (4D technique) Coronal and sagittal reconstructions were performed  3D reconstructions were performed on an independent workstation, and are supplied for review  100 mL of iohexol (OMNIPAQUE) was injected intravenously without immediate consequence  Radiation dose: 1698 88 mGy-cm FINDINGS: SERIAL NONCONTRAST, POST CONTRAST AND DELAYS: The lesion identified previously is again seen, however, the streak artifact is again particularly prominent, more so than on the previous study, and confirmation of enhancement pattern is not reliably possible  The lesion has decreased in size, now measuring 0 59 x 0 50 x 0 57 cm, and still has some significant enhancement on the delayed images  The findings are more consistent with a lymph node rather than an adenoma  There is a 0 41 x 0 34 x 0 55 cm lesion in the right neck in the thyroid bed, posterior to a small amount of residual thyroid remaining on the right  This lesion was present previously but was significantly smaller in size and was believed to represent a part of the residual thyroid  The lesion meets the CT enhancement criteria suspicious for parathyroid adenoma  The lesion is best seen on series 4 images 134 through 139  VASCULAR STRUCTURES: There is stable ectasia of the ascending aorta measuring 3 4 cm  There is no carotid stenosis by N[place]et criteria   VISUALIZED PARANASAL SINUSES: There is a minimum amount of mucosal thickening noted in the inferior aspect of the right maxillary sinus  The remainder of the sinuses are clear  NASAL CAVITY AND NASOPHARYNX: Normal  SUPRAHYOID NECK: Normal oropharynx, oral cavity, parapharyngeal and retropharyngeal spaces  INFRAHYOID NECK: Normal oropharynx, oral cavity, parapharyngeal and retropharyngeal spaces  THYROID GLAND: The patient is again noted to be postoperative status post partial thyroidectomy  There is a small amount of residual thyroid tissue on the left, and an even smaller amount on the right  The appearance is stable  LYMPH NODES: No pathologic or enlarged adenopathy  MEDIASTINUM: Unremarkable  BONY STRUCTURES Mild osteopenia is present  LUNG APICES: Unremarkable  Impression: 1  Previously seen lesion in the superior mediastinum that had some level of suspicion for adenoma has decreased in size, and the enhancement pattern can still not be reliably tract due to streak artifact  The decrease in size makes this meet lesion more likely to represent a lymph node  2   Enlargement of a previously present but smaller lesion which was believed to be part of the thyroid on the right measuring 0 41 x 0 34 x 0 55 cm  This lesion meets the CT enhancement criteria suspicious for parathyroid adenoma  The lesion is located within the right thyroid bed  3   Stable ectasia of the ascending aorta measuring 3 4 cm  4   Mild mucosal thickening of the inferior aspect of the right maxillary sinus  5   Mild osteopenia  Workstation performed: [id number]    I reviewed the above laboratory and imaging data  Discussion/Summary:  Persistent hyperparathyroidism, suspected right-sided target in setting of prior thyroidectomy  She is amenable to surgery, but will be undergoing procedure with the [facility] team for recent vaginal bleeding  She will get back to us in a month after her gyne procedure

## 2021-03-24 ENCOUNTER — OFFICE VISIT (OUTPATIENT)
Dept: PHYSICAL THERAPY | Facility: REHABILITATION | Age: 56
End: 2021-03-24
Payer: COMMERCIAL

## 2021-03-24 DIAGNOSIS — M25.552 CHRONIC LEFT HIP PAIN: Primary | ICD-10-CM

## 2021-03-24 DIAGNOSIS — M70.62 GREATER TROCHANTERIC BURSITIS OF LEFT HIP: ICD-10-CM

## 2021-03-24 DIAGNOSIS — G89.29 CHRONIC LEFT HIP PAIN: Primary | ICD-10-CM

## 2021-03-24 PROCEDURE — 97110 THERAPEUTIC EXERCISES: CPT | Performed by: PHYSICAL THERAPIST

## 2021-03-24 PROCEDURE — 97112 NEUROMUSCULAR REEDUCATION: CPT | Performed by: PHYSICAL THERAPIST

## 2021-03-24 PROCEDURE — 97140 MANUAL THERAPY 1/> REGIONS: CPT | Performed by: PHYSICAL THERAPIST

## 2021-03-24 NOTE — PROGRESS NOTES
Daily Note     Today's date: 3/24/2021  Patient name: Latasha Kaba  : 1965  MRN: [id number]  Referring provider: Sarah Calvo MD  Dx:   Encounter Diagnosis     ICD-10-CM    1  Chronic left hip pain  M25 552     G89 29    2  Greater trochanteric bursitis of left hip  M70 62                   Subjective: Hip is doing ok  I notice that I lean to my left side when I walk  I worked over the weekend so it is a bit bothered today  Objective: See treatment diary below      Assessment: patient did well with first treatment today  Does still have left hip pain that is exacerbated with left side lying and more weight bearing activity  She does continue to have an antalgic gait and uncompensated left sided trendelenburg as well  Did focus on prolonged hold times with her hip strengthening exercises to promote tendon loading  Would continue to benefit from skilled PT  Plan: Continue per plan of care  Precautions: History of avascular necrosis, history of systemic lupus, history of chronic steroid use, history of hyperparathyroidism         Manuals 3/15 3/24           L hip PROM  SE 5'           L hip LAD   SE 5' Gr IV                                     Neuro Re-Ed             Bridges 2x10 2x10 3"           Clamshells 2x10 2x10 B gtb           Sidestepping  3 laps mirror           Sit to stands (raised chair height)  3x8            Isometric Hip ER at wall             Standing hip abduction   2x10 B            Leg Press             Ther Ex             Nustep  10' L5            Seated hip flexion  gtb 2x10            Standing hip flexion with TrA on physioball press             Squats             Step Ups/Downs             Lunges             Lumbar rollouts   15x5"                         Ther Activity                                       Gait Training                                       Modalities

## 2021-03-29 ENCOUNTER — OFFICE VISIT (OUTPATIENT)
Dept: PHYSICAL THERAPY | Facility: REHABILITATION | Age: 56
End: 2021-03-29
Payer: COMMERCIAL

## 2021-03-29 DIAGNOSIS — M70.62 GREATER TROCHANTERIC BURSITIS OF LEFT HIP: ICD-10-CM

## 2021-03-29 DIAGNOSIS — M25.552 CHRONIC LEFT HIP PAIN: Primary | ICD-10-CM

## 2021-03-29 DIAGNOSIS — G89.29 CHRONIC LEFT HIP PAIN: Primary | ICD-10-CM

## 2021-03-29 PROCEDURE — 97140 MANUAL THERAPY 1/> REGIONS: CPT | Performed by: PHYSICAL THERAPIST

## 2021-03-29 PROCEDURE — 97110 THERAPEUTIC EXERCISES: CPT | Performed by: PHYSICAL THERAPIST

## 2021-03-29 PROCEDURE — 97112 NEUROMUSCULAR REEDUCATION: CPT | Performed by: PHYSICAL THERAPIST

## 2021-03-29 NOTE — PROGRESS NOTES
Daily Note     Today's date: 3/29/2021  Patient name: Lew Tong  : 1965  MRN: [id number]  Referring provider: Jesús García MD  Dx:   Encounter Diagnosis     ICD-10-CM    1  Chronic left hip pain  M25 552     G89 29    2  Greater trochanteric bursitis of left hip  M70 62                   Subjective: Did not do too much over the weekend, just relaxed, which was needed  Objective: See treatment diary below      Assessment: Patient still exhibiting lateral hip pain, antalgic gait, and poor strength of left lower extremity in comparison to right  She was able to perform more reps of bridges and clamshells today, but struggles more once in weight bearing positions  Hip mobility also significantly limited in rotational and sagittal planes  Would continue to benefit from PT  Plan: Continue per plan of care  Precautions: History of avascular necrosis, history of systemic lupus, history of chronic steroid use, history of hyperparathyroidism         Manuals 3/15 3/24 3/29          L hip PROM  SE 5' SE 5'          L hip LAD   SE 5' Gr IV SE 5' Gr IV                                    Neuro Re-Ed             Bridges 2x10 2x10 3" 3x10 3"          Clamshells 2x10 2x10 B gtb 3x10 R SL only gtb          Sidestepping  3 laps mirror 3 laps mirror          Sit to stands (raised chair height)  3x8  3x8           Isometric Hip ER at wall             Standing hip abduction   2x10 B  2x10 B           Leg Press             Ther Ex             Nustep  10' L5  10' L5           Seated hip flexion  gtb 2x10  gtb 3x10          Standing hip flexion with TrA on physioball press   2x10 no ball press          Squats             Step Ups/Downs             Lunges             Lumbar rollouts   15x5"  15x5"                        Ther Activity                                       Gait Training                                       Modalities

## 2021-03-31 ENCOUNTER — HOSPITAL ENCOUNTER (OUTPATIENT)
Dept: RADIOLOGY | Age: 56
Discharge: HOME | End: 2021-03-31
Payer: COMMERCIAL

## 2021-03-31 ENCOUNTER — TELEPHONE (OUTPATIENT)
Dept: ENDOCRINOLOGY | Facility: CLINIC | Age: 56
End: 2021-03-31

## 2021-03-31 DIAGNOSIS — E21.0 PRIMARY HYPERPARATHYROIDISM (HCC): ICD-10-CM

## 2021-03-31 DIAGNOSIS — E21.3 HYPERPARATHYROIDISM (HCC): ICD-10-CM

## 2021-03-31 PROCEDURE — 77080 DXA BONE DENSITY AXIAL: CPT

## 2021-03-31 NOTE — TELEPHONE ENCOUNTER
----- Message from Tammi Baldwin MD sent at 3/31/2021  4:52 PM EDT -----  Please call the patient regarding her results, her BMD is normal

## 2021-04-01 DIAGNOSIS — N18.2 CHRONIC KIDNEY DISEASE (CKD), STAGE II (MILD): Primary | ICD-10-CM

## 2021-04-01 DIAGNOSIS — I12.9 BENIGN HYPERTENSIVE KIDNEY DISEASE WITH CHRONIC KIDNEY DISEASE STAGE I THROUGH STAGE IV, OR UNSPECIFIED: ICD-10-CM

## 2021-04-01 DIAGNOSIS — N20.0 NEPHROLITHIASIS: ICD-10-CM

## 2021-04-05 ENCOUNTER — PROCEDURE VISIT (OUTPATIENT)
Dept: OBGYN CLINIC | Facility: CLINIC | Age: 56
End: 2021-04-05
Payer: COMMERCIAL

## 2021-04-05 DIAGNOSIS — N95.0 POST-MENOPAUSAL BLEEDING: ICD-10-CM

## 2021-04-05 DIAGNOSIS — R93.89 ENDOMETRIAL THICKENING ON ULTRASOUND: Primary | ICD-10-CM

## 2021-04-05 DIAGNOSIS — Z12.4 SCREENING FOR MALIGNANT NEOPLASM OF CERVIX: ICD-10-CM

## 2021-04-05 DIAGNOSIS — N93.9 ABNORMAL UTERINE BLEEDING (AUB): ICD-10-CM

## 2021-04-05 LAB — SL AMB POCT URINE HCG: NORMAL

## 2021-04-05 PROCEDURE — 81025 URINE PREGNANCY TEST: CPT | Performed by: STUDENT IN AN ORGANIZED HEALTH CARE EDUCATION/TRAINING PROGRAM

## 2021-04-05 PROCEDURE — 58558 HYSTEROSCOPY BIOPSY: CPT | Performed by: STUDENT IN AN ORGANIZED HEALTH CARE EDUCATION/TRAINING PROGRAM

## 2021-04-05 PROCEDURE — 88305 TISSUE EXAM BY PATHOLOGIST: CPT | Performed by: PATHOLOGY

## 2021-04-05 NOTE — PROGRESS NOTES
Dilation and curettage     Date/Time 4/5/2021 3:43 PM     Performed by  Erin Yip MD     Authorized by Erin Yip MD      Universal Protocol   Consent: Verbal consent obtained  Written consent obtained  Consent given by: patient  Time out: Immediately prior to procedure a "time out" was called to verify the correct patient, procedure, equipment, support staff and site/side marked as required  Patient understanding: patient states understanding of the procedure being performed  Patient consent: the patient's understanding of the procedure matches consent given  Procedure consent: procedure consent matches procedure scheduled  Relevant documents: relevant documents present and verified  Test results: test results available and properly labeled  Patient identity confirmed: verbally with patient        Local anesthesia used: yes     Anesthesia   Local anesthesia used: yes  Local Anesthetic: lidocaine 1% with epinephrine     Sedation   Patient sedated: yes  Sedation type: moderate (conscious) sedation  Sedatives: propofol  Analgesia: see MAR for details  Vitals: Vital signs were monitored during sedation  Specimen: yes   Procedure Details   Procedure Notes: Vaginal prep was performed with betadine  Speculum was inserted into the vagina and anterior lip of the cervix grasped with a single toothed tenaculum  10mL lidocaine with epinephrine injected into cervical stroma  The uterus was sounded to 10 cm  The cervix was serially dilated to accommodate the hysteroscope  Hysteroscope was introduced under direct visualization using normal saline solution as the distention media  Hysteroscope was advanced to the uterine fundus and the entire uterine cavity was inspected in a systematic manner  There was noted to be a small polyp, polypoid tissue   Hysteroscope was withdrawn and sharp curetting was performed, starting at the 12'oclock position and rotating a total of 360 degrees to cover all surfaces  Endometrial tissue was obtained and sent for pathology  The single toothed tenaculum was removed from the anterior lip of the cervix  Good hemostasis was confirmed at the tenaculum puncture sites  Speculum was then removed from the vagina    Patient tolerance: patient tolerated the procedure well with no immediate complications

## 2021-04-06 ENCOUNTER — APPOINTMENT (OUTPATIENT)
Dept: PHYSICAL THERAPY | Facility: REHABILITATION | Age: 56
End: 2021-04-06
Payer: COMMERCIAL

## 2021-04-13 ENCOUNTER — OFFICE VISIT (OUTPATIENT)
Dept: PHYSICAL THERAPY | Facility: REHABILITATION | Age: 56
End: 2021-04-13
Payer: COMMERCIAL

## 2021-04-13 DIAGNOSIS — G89.29 CHRONIC LEFT HIP PAIN: Primary | ICD-10-CM

## 2021-04-13 DIAGNOSIS — M70.62 GREATER TROCHANTERIC BURSITIS OF LEFT HIP: ICD-10-CM

## 2021-04-13 DIAGNOSIS — M25.552 CHRONIC LEFT HIP PAIN: Primary | ICD-10-CM

## 2021-04-13 PROCEDURE — 97112 NEUROMUSCULAR REEDUCATION: CPT

## 2021-04-13 PROCEDURE — 97140 MANUAL THERAPY 1/> REGIONS: CPT

## 2021-04-13 NOTE — PROGRESS NOTES
Daily Note     Today's date: 2021  Patient name: Arina Castillo  : 1965  MRN: [id number]  Referring provider: Felicia Gonzalez MD  Dx:   Encounter Diagnosis     ICD-10-CM    1  Chronic left hip pain  M25 552     G89 29    2  Greater trochanteric bursitis of left hip  M70 62                   Subjective: Pt  reports no change in status upon arrival       Objective: See treatment diary below      Assessment: Tolerated treatment well  Patient would benefit from continued PT  Pt had some pain present in hip throughout session, but noted no overall increase during of after exercises  Pt would benefit from continued PT to address current deficits and restore PLOF  Pt 1:1 with PTA [id number], IEP for remainder  Plan: Continue per plan of care  Precautions: History of avascular necrosis, history of systemic lupus, history of chronic steroid use, history of hyperparathyroidism         Manuals 3/15 3/24 3/29 4/13         L hip PROM  SE 5' SE 5' KP 4'         L hip LAD   SE 5' Gr IV SE 5' Gr IV KP 4'                                   Neuro Re-Ed             Bridges 2x10 2x10 3" 3x10 3" 3x10 3"         Clamshells 2x10 2x10 B gtb 3x10 R SL only gtb 2x15 gtb supine         Sidestepping  3 laps mirror 3 laps mirror 3 laps         Sit to stands (raised chair height)  3x8  3x8  3x8         Isometric Hip ER at wall             Standing hip abduction   2x10 B  2x10 B  2x15 ea b/l         Leg Press             Ther Ex             Nustep  10' L5  10' L5  10' L5         Seated hip flexion  gtb 2x10  gtb 3x10          Standing hip flexion with TrA on physioball press   2x10 no ball press 2x15 ea no ball         Squats             Step Ups/Downs             Lunges             Lumbar rollouts   15x5"  15x5"  15x5"                      Ther Activity                                       Gait Training                                       Modalities

## 2021-04-26 ENCOUNTER — OFFICE VISIT (OUTPATIENT)
Dept: SURGICAL ONCOLOGY | Facility: CLINIC | Age: 56
End: 2021-04-26
Payer: COMMERCIAL

## 2021-04-26 VITALS
TEMPERATURE: 97.7 F | BODY MASS INDEX: 37.22 KG/M2 | RESPIRATION RATE: 18 BRPM | WEIGHT: 218 LBS | DIASTOLIC BLOOD PRESSURE: 80 MMHG | HEART RATE: 62 BPM | HEIGHT: 64 IN | OXYGEN SATURATION: 98 % | SYSTOLIC BLOOD PRESSURE: 124 MMHG

## 2021-04-26 DIAGNOSIS — E21.3 HYPERPARATHYROIDISM (HCC): Primary | ICD-10-CM

## 2021-04-26 DIAGNOSIS — E83.52 HYPERCALCEMIA: ICD-10-CM

## 2021-04-26 PROCEDURE — 99214 OFFICE O/P EST MOD 30 MIN: CPT | Performed by: SURGERY

## 2021-04-26 RX ORDER — TRAMADOL HYDROCHLORIDE 50 MG/1
50 TABLET ORAL EVERY 6 HOURS PRN
Qty: 10 TABLET | Refills: 0 | Status: SHIPPED | OUTPATIENT
Start: 2021-04-26 | End: 2021-09-13

## 2021-04-26 NOTE — H&P (VIEW-ONLY)
[address] [facility] SURG[facility]  [address] [place] [id number] [phone]     Lata Rock  1965  [id number]  [place] [facility] [place] [address] [place] [address] [phone]          Chief Complaint   Patient presents with    Follow-up       F/U after Sestamibi scan and labs         Assessment/Plan:     No problem-specific Assessment & Plan notes found for this encounter          Diagnoses and all orders for this visit:     Hyperparathyroidism [facility])         Advance Care Planning/Advance Directives:  Discussed disease status, cancer treatment plans and/or cancer treatment goals with the patient           Oncology History     No history exists          History of Present Illness:   -Interval History: patient is a 37-year-old woman here for follow-up status post CT scan to look for potential parathyroid adenoma  She is here to schedule surgery having completed a Gyne procedure recently      Review of Systems:  Review of Systems   Constitutional: Negative  HENT: Negative  Eyes: Negative  Respiratory: Negative  Cardiovascular: Negative  Gastrointestinal: Negative  Endocrine: Negative  Genitourinary: Positive for vaginal bleeding  Musculoskeletal: Negative  Skin: Negative  Allergic/Immunologic: Negative  Neurological: Negative  Hematological: Negative      Psychiatric/Behavioral: Negative               Patient Active Problem List   Diagnosis    Chronic deep vein thrombosis (DVT) of proximal vein of both lower extremities (HCC)    History of ITP    Benign hypertensive CKD    Chronic kidney disease (CKD), stage II (mild)    Edema    Gross hematuria    Hypercalcemia    Hyperparathyroidism ([address] )    Hypertension    Nephrolithiasis    Proteinuria    Systemic lupus erythematosus (HCC)    Vitamin D deficiency    Elevated PTHrP level    History of thyroid cancer    Upper respiratory tract infection    Rash    Left ear pain    BMI 39 0-39 9,adult    Post-menopausal bleeding    Cellulitis of right index finger    Greater trochanteric bursitis of left hip    Anti-phospholipid syndrome ([place] [address] )      Medical History        Past Medical History:   Diagnosis Date    Chronic kidney disease      Gestational diabetes      Hyperparathyroidism ([place] [address] )      Hypertension      Idiopathic thrombocytopenic purpura (ITP) ([facility])      Lupus ([place] [address] )      Vitamin D deficiency          Surgical History         Past Surgical History:   Procedure Laterality Date     SECTION             HERNIA REPAIR        HIP SURGERY         left side, bone decompression    THYROID SURGERY            Family History   Problem Relation Age of Onset    Diabetes type II Father      Diabetes Father      Stroke Father      Diabetes type II Paternal Aunt      Diabetes type II Paternal Uncle      Diabetes type II Paternal Grandmother      Stomach cancer Paternal Grandfather        Social History               Socioeconomic History    Marital status: /Civil Union       Spouse name: Not on file    Number of children: Not on file    Years of education: Not on file    Highest education level: Not on file   Occupational History    Not on file   Social Needs    Financial resource strain: Not on file    Food insecurity       Worry: Not on file       Inability: Not on file    Transportation needs       Medical: Not on file       Non-medical: Not on file   Tobacco Use    Smoking status: Never Smoker    Smokeless tobacco: Never Used   Substance and Sexual Activity    Alcohol use: Not Currently    Drug use: No    Sexual activity: Not on file   Lifestyle    Physical activity       Days per week: Not on file       Minutes per session: Not on file    Stress: Not on file   Relationships    Social connections       Talks on phone: Not on file       Gets together: Not on file       Attends Yazdanism service: Not on file       Active member of club or organization: Not on file       Attends meetings of clubs or organizations: Not on file       Relationship status: Not on file    Intimate partner violence       Fear of current or ex partner: Not on file       Emotionally abused: Not on file       Physically abused: Not on file       Forced sexual activity: Not on file   Other Topics Concern    Not on file   Social History Narrative    Not on file           Current Outpatient Medications:     cholecalciferol (VITAMIN D3) 400 units tablet, Take 1 tablet (400 Units total) by mouth daily, Disp: 30 tablet, Rfl: 4    folic acid (FOLVITE) 1 mg tablet, Take 1 tablet by mouth daily, Disp: , Rfl:     fosinopril (MONOPRIL) 20 mg tablet, Take 1 tablet (20 mg total) by mouth 2 (two) times a day, Disp: 180 tablet, Rfl: 0    levothyroxine 125 mcg tablet, TAKE ONE TABLET BY MOUTH EVERY DAY MON-SAT , Disp: 90 tablet, Rfl: 0    NIFEdipine (PROCARDIA XL) 30 mg 24 hr tablet, Take 1 tablet (30 mg total) by mouth daily, Disp: 90 tablet, Rfl: 3    nystatin (MYCOSTATIN) powder, Apply topically 2 (two) times a day, Disp: 60 g, Rfl: 2    spironolactone (ALDACTONE) 25 mg tablet, Take 1 tablet (25 mg total) by mouth daily, Disp: 90 tablet, Rfl: 3    warfarin (COUMADIN) 3 mg tablet, TAKE 1 TABLET DAILY except on Mondays and Thursdays (Patient taking differently: TAKE 1 TABLET DAILY except Thursdays), Disp: 90 tablet, Rfl: 4       Allergies   Allergen Reactions    Penicillins Itching          Vitals:   /80   Pulse 62   Temp 97 7 °F (36 5 °C) (Temporal)   Resp 18   Ht 5' 4" (1 626 m)   Wt 98 9 kg (218 lb)   SpO2 98%   BMI 37 42 kg/m²     Physical Exam  Constitutional:       Appearance: Normal appearance  HENT:      Head: Normocephalic and atraumatic        Nose: Nose normal    Eyes:      Conjunctiva/sclera: Conjunctivae normal    Neck:      Musculoskeletal: Normal range of motion and neck supple  Musculoskeletal: Normal range of motion  Skin:     General: Skin is warm and dry  Neurological:      General: No focal deficit present  Mental Status: She is alert and oriented to person, place, and time  Psychiatric:         Mood and Affect: Mood normal          Behavior: Behavior normal          Thought Content: Thought content normal          Judgment: Judgment normal                Results:  Labs:  Lab Results   Component Value Date      9 (H) 03/20/2021     CALCIUM 10 4 (H) 03/20/2021     PHOS 2 4 (L) 11/18/2017            Imaging  Xr Hips Bilateral 3-4 Vw W Pelvis If Performed     Result Date: 3/15/2021  Narrative: BILATERAL HIPS AND PELVIS INDICATION:   [id number]: Pain in unspecified hip  Dr Ashley Young note from 3/9/2020 was reviewed  Patient has known history of avascular necrosis of the left hip status post core decompression 28 years ago  COMPARISON:  Abdomen and pelvic CT from 2/2/2021  VIEWS:  XR HIPS BILATERAL 3-4 VW W PELVIS IF PERFORMED  FINDINGS: The bony pelvis appears intact  There is contrast material urinary bladder  Visualized lower lumbar spine is unremarkable  LEFT HIP: There is no acute fracture or dislocation  Sclerotic changes in the femoral head consistent with avascular necrosis  Mild left hip osteoarthritis  This is evidenced by joint space narrowing  No lytic or blastic osseous lesion  Soft tissues are unremarkable  RIGHT HIP: There is no acute fracture or dislocation  Subtle sclerosis of the right femoral head consistent with avascular necrosis, better visualized on prior CT from 2/2/2021  There is no associated right hip osteoarthritis  No lytic or blastic osseous lesion  Soft tissues are unremarkable      Impression: Sclerotic changes in the femoral head consistent with avascular necrosis  Mild left hip osteoarthritis  Subtle sclerosis of the right femoral head consistent with avascular necrosis, better visualized on prior CT from 2/2/2021  There is no associated right hip osteoarthritis  Workstation performed: [id number]     Ct Parathyroid Study W Wo Contrast     Result Date: 3/11/2021  Narrative: CT  NECK  WITHOUT AND WITH CONTRAST FROM ZULAY TO SKULL BASE INDICATION: Hyperparathyroidism  Possible lesion identified on previous scan dated May 24, 2019  COMPARISON:  Parathyroid CT dated May 24, 2019  TECHNIQUE:This examination, like all CT scans performed in the [facility], was performed utilizing techniques to minimize radiation dose exposure, including the use of iterative reconstruction and automated exposure control  Both noncontrast, contrast, and post contrast delayed images were performed according to standard parathyroid protocol (4D technique) Coronal and sagittal reconstructions were performed  3D reconstructions were performed on an independent workstation, and are supplied for review  100 mL of iohexol (OMNIPAQUE) was injected intravenously without immediate consequence  Radiation dose: 1698 88 mGy-cm FINDINGS: SERIAL NONCONTRAST, POST CONTRAST AND DELAYS: The lesion identified previously is again seen, however, the streak artifact is again particularly prominent, more so than on the previous study, and confirmation of enhancement pattern is not reliably possible  The lesion has decreased in size, now measuring 0 59 x 0 50 x 0 57 cm, and still has some significant enhancement on the delayed images  The findings are more consistent with a lymph node rather than an adenoma  There is a 0 41 x 0 34 x 0 55 cm lesion in the right neck in the thyroid bed, posterior to a small amount of residual thyroid remaining on the right  This lesion was present previously but was significantly smaller in size and was believed to represent a part of the residual thyroid  The lesion meets the CT enhancement criteria suspicious for parathyroid adenoma  The lesion is best seen on series 4 images 134 through 139   VASCULAR STRUCTURES: There is stable ectasia of the ascending aorta measuring 3 4 cm  There is no carotid stenosis by Nascet criteria  VISUALIZED PARANASAL SINUSES: There is a minimum amount of mucosal thickening noted in the inferior aspect of the right maxillary sinus  The remainder of the sinuses are clear  NASAL CAVITY AND NASOPHARYNX: Normal  SUPRAHYOID NECK: Normal oropharynx, oral cavity, parapharyngeal and retropharyngeal spaces  INFRAHYOID NECK: Normal oropharynx, oral cavity, parapharyngeal and retropharyngeal spaces  THYROID GLAND: The patient is again noted to be postoperative status post partial thyroidectomy  There is a small amount of residual thyroid tissue on the left, and an even smaller amount on the right  The appearance is stable  LYMPH NODES: No pathologic or enlarged adenopathy  MEDIASTINUM: Unremarkable  BONY STRUCTURES Mild osteopenia is present  LUNG APICES: Unremarkable      Impression: 1  Previously seen lesion in the superior mediastinum that had some level of suspicion for adenoma has decreased in size, and the enhancement pattern can still not be reliably tract due to streak artifact  The decrease in size makes this meet lesion more likely to represent a lymph node  2   Enlargement of a previously present but smaller lesion which was believed to be part of the thyroid on the right measuring 0 41 x 0 34 x 0 55 cm  This lesion meets the CT enhancement criteria suspicious for parathyroid adenoma  The lesion is located within the right thyroid bed  3   Stable ectasia of the ascending aorta measuring 3 4 cm  4   Mild mucosal thickening of the inferior aspect of the right maxillary sinus  5   Mild osteopenia  Workstation performed: [id number]     I reviewed the above laboratory and imaging data      Discussion/Summary:  Persistent hyperparathyroidism, suspected right-sided target in setting of prior thyroidectomy    She is amenable to surgery, specifically minimally invasive right parathyroidectomy, possible 4 gland exploration with intraoperative PTH monitoring  Risks and benefits of surgery including infection, bleeding, need for possible additional surgery, discussed with her  She understands the plan wishes to proceed as outlined  All questions answered and consents signed at this visit

## 2021-04-26 NOTE — PROGRESS NOTES
[address] [facility] CANCER CARE SURGICAL ONCOLOGY Eldaniel Espinosa  [address] [facility] [address] [phone]     Ale Hi  1965  [id number]  [facility] [address] [address] [phone]          Chief Complaint   Patient presents with    Follow-up       F/U after Sestamibi scan and labs         Assessment/Plan:     No problem-specific Assessment & Plan notes found for this encounter          Diagnoses and all orders for this visit:     Hyperparathyroidism [facility])         Advance Care Planning/Advance Directives:  Discussed disease status, cancer treatment plans and/or cancer treatment goals with the patient           Oncology History     No history exists          History of Present Illness:   -Interval History: patient is a 21-year-old woman here for follow-up status post CT scan to look for potential parathyroid adenoma  She is here to schedule surgery having completed a Gyne procedure recently      Review of Systems:  Review of Systems   Constitutional: Negative  HENT: Negative  Eyes: Negative  Respiratory: Negative  Cardiovascular: Negative  Gastrointestinal: Negative  Endocrine: Negative  Genitourinary: Positive for vaginal bleeding  Musculoskeletal: Negative  Skin: Negative  Allergic/Immunologic: Negative  Neurological: Negative  Hematological: Negative      Psychiatric/Behavioral: Negative               Patient Active Problem List   Diagnosis    Chronic deep vein thrombosis (DVT) of proximal vein of both lower extremities (HCC)    History of ITP    Benign hypertensive CKD    Chronic kidney disease (CKD), stage II (mild)    Edema    Gross hematuria    Hypercalcemia    Hyperparathyroidism ([address] )    Hypertension    Nephrolithiasis    Proteinuria    Systemic lupus erythematosus (HCC)    Vitamin D deficiency    Elevated PTHrP level    History of thyroid cancer    Upper respiratory tract infection    Rash    Left ear pain    BMI 39 0-39 9,adult    Post-menopausal bleeding    Cellulitis of right index finger    Greater trochanteric bursitis of left hip    Anti-phospholipid syndrome ([place] [address] )      Medical History        Past Medical History:   Diagnosis Date    Chronic kidney disease      Gestational diabetes      Hyperparathyroidism ([place] [address] )      Hypertension      Idiopathic thrombocytopen[facility] purpura ([facility]P) ([facility])      Lupus ([place] [address] )      Vitamin D deficiency          Surgical History         Past Surgical History:   Procedure Laterality Date     SECTION             HERNIA REPAIR        HIP SURGERY         left side, bone decompression    THYROID SURGERY            Family History   Problem Relation Age of Onset    Diabetes type II Father      Diabetes Father      Stroke Father      Diabetes type II Paternal Aunt      Diabetes type II Paternal Uncle      Diabetes type II Paternal Grandmother      Stomach cancer Paternal Grandfather        Social History               Socioeconomic History    Marital status: /Civil Union       Spouse name: Not on file    Number of children: Not on file    Years of education: Not on file    Highest education level: Not on file   Occupational History    Not on file   Social Needs    Financial resource strain: Not on file    Food insecurity       Worry: Not on file       Inability: Not on file    Transportation needs       Medical: Not on file       Non-medical: Not on file   Tobacco Use    Smoking status: Never Smoker    Smokeless tobacco: Never Used   Substance and Sexual Activity    Alcohol use: Not Currently    Drug use: No    Sexual activity: Not on file   Lifestyle    Physical activity       Days per week: Not on file       Minutes per session: Not on file    Stress: Not on file   Relationships    Social connections       Talks on phone: Not on file       Gets together: Not on file       Attends Jainism service: Not on file       Active member of club or organization: Not on file       Attends meetings of clubs or organizations: Not on file       Relationship status: Not on file    Intimate partner violence       Fear of current or ex partner: Not on file       Emotionally abused: Not on file       Physically abused: Not on file       Forced sexual activity: Not on file   Other Topics Concern    Not on file   Social History Narrative    Not on file           Current Outpatient Medications:     cholecalciferol (VITAMIN D3) 400 units tablet, Take 1 tablet (400 Units total) by mouth daily, Disp: 30 tablet, Rfl: 4    folic acid (FOLVITE) 1 mg tablet, Take 1 tablet by mouth daily, Disp: , Rfl:     fosinopril (MONOPRIL) 20 mg tablet, Take 1 tablet (20 mg total) by mouth 2 (two) times a day, Disp: 180 tablet, Rfl: 0    levothyroxine 125 mcg tablet, TAKE ONE TABLET BY MOUTH EVERY DAY MON-SAT , Disp: 90 tablet, Rfl: 0    NIFEdipine (PROCARDIA XL) 30 mg 24 hr tablet, Take 1 tablet (30 mg total) by mouth daily, Disp: 90 tablet, Rfl: 3    nystatin (MYCOSTATIN) powder, Apply topically 2 (two) times a day, Disp: 60 g, Rfl: 2    spironolactone (ALDACTONE) 25 mg tablet, Take 1 tablet (25 mg total) by mouth daily, Disp: 90 tablet, Rfl: 3    warfarin (COUMADIN) 3 mg tablet, TAKE 1 TABLET DAILY except on Mondays and Thursdays (Patient taking differently: TAKE 1 TABLET DAILY except Thursdays), Disp: 90 tablet, Rfl: 4       Allergies   Allergen Reactions    Penicillins Itching          Vitals:   /80   Pulse 62   Temp 97 7 °F (36 5 °C) (Temporal)   Resp 18   Ht 5' 4" (1 626 m)   Wt 98 9 kg (218 lb)   SpO2 98%   BMI 37 42 kg/m²     Physical Exam  Constitutional:       Appearance: Normal appearance  HENT:      Head: Normocephalic and atraumatic        Nose: Nose normal    Eyes:      Conjunctiva/sclera: Conjunctivae normal    Neck:      Musculoskeletal: Normal range of motion and neck supple  Musculoskeletal: Normal range of motion  Skin:     General: Skin is warm and dry  Neurological:      General: No focal deficit present  Mental Status: She is alert and oriented to person, place, and time  Psychiatric:         Mood and Affect: Mood normal          Behavior: Behavior normal          Thought Content: Thought content normal          Judgment: Judgment normal                Results:  Labs:  Lab Results   Component Value Date      9 (H) 03/20/2021     CALCIUM 10 4 (H) 03/20/2021     PHOS 2 4 (L) 11/18/2017            Imaging  Xr Hips Bilateral 3-4 Vw W Pelvis If Performed     Result Date: 3/15/2021  Narrative: BILATERAL HIPS AND PELVIS INDICATION:   M25 [id number]: Pain in unspecified hip  Dr Garcia Davalos note from 3/9/2020 was reviewed  Patient has known history of avascular necrosis of the left hip status post core decompression 28 years ago  COMPARISON:  Abdomen and pelvic CT from 2/2/2021  VIEWS:  XR HIPS BILATERAL 3-4 VW W PELVIS IF PERFORMED  FINDINGS: The bony pelvis appears intact  There is contrast material urinary bladder  Visualized lower lumbar spine is unremarkable  LEFT HIP: There is no acute fracture or dislocation  Sclerotic changes in the femoral head consistent with avascular necrosis  Mild left hip osteoarthritis  This is evidenced by joint space narrowing  No lytic or blastic osseous lesion  Soft tissues are unremarkable  RIGHT HIP: There is no acute fracture or dislocation  Subtle sclerosis of the right femoral head consistent with avascular necrosis, better visualized on prior CT from 2/2/2021  There is no associated right hip osteoarthritis  No lytic or blastic osseous lesion  Soft tissues are unremarkable      Impression: Sclerotic changes in the femoral head consistent with avascular necrosis  Mild left hip osteoarthritis  Subtle sclerosis of the right femoral head consistent with avascular necrosis, better visualized on prior CT from 2/2/2021  There is no associated right hip osteoarthritis  Workstation performed: [id number]     Ct Parathyroid Study W Wo Contrast     Result Date: 3/11/2021  Narrative: CT  NECK  WITHOUT AND WITH CONTRAST FROM ZULAY TO SKULL BASE INDICATION: Hyperparathyroidism  Possible lesion identified on previous scan dated May 24, 2019  COMPARISON:  Parathyroid CT dated May 24, 2019  TECHNIQUE:This examination, like all CT scans performed in the [facility], was performed utilizing techniques to minimize radiation dose exposure, including the use of iterative reconstruction and automated exposure control  Both noncontrast, contrast, and post contrast delayed images were performed according to standard parathyroid protocol (4D technique) Coronal and sagittal reconstructions were performed  3D reconstructions were performed on an independent workstation, and are supplied for review  100 mL of iohexol (OMNIPAQUE) was injected intravenously without immediate consequence  Radiation dose: 1698 88 mGy-cm FINDINGS: SERIAL NONCONTRAST, POST CONTRAST AND DELAYS: The lesion identified previously is again seen, however, the streak artifact is again particularly prominent, more so than on the previous study, and confirmation of enhancement pattern is not reliably possible  The lesion has decreased in size, now measuring 0 59 x 0 50 x 0 57 cm, and still has some significant enhancement on the delayed images  The findings are more consistent with a lymph node rather than an adenoma  There is a 0 41 x 0 34 x 0 55 cm lesion in the right neck in the thyroid bed, posterior to a small amount of residual thyroid remaining on the right  This lesion was present previously but was significantly smaller in size and was believed to represent a part of the residual thyroid  The lesion meets the CT enhancement criteria suspicious for parathyroid adenoma  The lesion is best seen on series 4 images 134 through 139   VASCULAR STRUCTURES: There is stable ectasia of the ascending aorta measuring 3 4 cm  There is no carotid stenosis by Nascet criteria  VISUALIZED PARANASAL SINUSES: There is a minimum amount of mucosal thickening noted in the inferior aspect of the right maxillary sinus  The remainder of the sinuses are clear  NASAL CAVITY AND NASOPHARYNX: Normal  SUPRAHYOID NECK: Normal oropharynx, oral cavity, parapharyngeal and retropharyngeal spaces  INFRAHYOID NECK: Normal oropharynx, oral cavity, parapharyngeal and retropharyngeal spaces  THYROID GLAND: The patient is again noted to be postoperative status post partial thyroidectomy  There is a small amount of residual thyroid tissue on the left, and an even smaller amount on the right  The appearance is stable  LYMPH NODES: No pathologic or enlarged adenopathy  MEDIASTINUM: Unremarkable  BONY STRUCTURES Mild osteopenia is present  LUNG APICES: Unremarkable      Impression: 1  Previously seen lesion in the superior mediastinum that had some level of suspicion for adenoma has decreased in size, and the enhancement pattern can still not be reliably tract due to streak artifact  The decrease in size makes this meet lesion more likely to represent a lymph node  2   Enlargement of a previously present but smaller lesion which was believed to be part of the thyroid on the right measuring 0 41 x 0 34 x 0 55 cm  This lesion meets the CT enhancement criteria suspicious for parathyroid adenoma  The lesion is located within the right thyroid bed  3   Stable ectasia of the ascending aorta measuring 3 4 cm  4   Mild mucosal thickening of the inferior aspect of the right maxillary sinus  5   Mild osteopenia  Workstation performed: [id number]     I reviewed the above laboratory and imaging data      Discussion/Summary:  Persistent hyperparathyroidism, suspected right-sided target in setting of prior thyroidectomy    She is amenable to surgery, specifically minimally invasive right parathyroidectomy, possible 4 gland exploration with intraoperative PTH monitoring  Risks and benefits of surgery including infection, bleeding, need for possible additional surgery, discussed with her  She understands the plan wishes to proceed as outlined  All questions answered and consents signed at this visit

## 2021-05-05 DIAGNOSIS — I12.9 PARENCHYMAL RENAL HYPERTENSION, STAGE 1 THROUGH STAGE 4 OR UNSPECIFIED CHRONIC KIDNEY DISEASE: ICD-10-CM

## 2021-05-05 RX ORDER — FOSINOPRIL SODIUM 20 MG/1
20 TABLET ORAL 2 TIMES DAILY
Qty: 180 TABLET | Refills: 0 | Status: SHIPPED | OUTPATIENT
Start: 2021-05-05 | End: 2021-08-13 | Stop reason: SDUPTHER

## 2021-05-06 ENCOUNTER — TELEPHONE (OUTPATIENT)
Dept: NEPHROLOGY | Facility: CLINIC | Age: 56
End: 2021-05-06

## 2021-05-10 ENCOUNTER — TELEPHONE (OUTPATIENT)
Dept: CARDIAC SURGERY | Facility: CLINIC | Age: 56
End: 2021-05-10

## 2021-05-10 NOTE — TELEPHONE ENCOUNTER
Received Paperwork   What type of form  FMLA   Received by fax or patient drop off  Patient Drop off   Emailed to  [address] [place] [address]  Patient dropped off forms to be filled out   Provided fax number to send when completed

## 2021-05-11 ENCOUNTER — TELEPHONE (OUTPATIENT)
Dept: HEMATOLOGY ONCOLOGY | Facility: CLINIC | Age: 56
End: 2021-05-11

## 2021-05-11 NOTE — TELEPHONE ENCOUNTER
Discussed patient's upcoming surgery for 5/25/21 with regard to stopping her coumadin  Dr Paris Lugo would like pt to do the following:    Stop taking her coumadin 7 days prior to her surgery (5/18/21)  Start taking eliquis 5mg bid 4 days prior to surgery (5/21/21)  Stop taking eliquis 48 hours prior to surgery  Pt to re-start her coumadin and eliquis 24 hours after surgery  (To take in pm),  X 4 days  On the 5th day, pt to have INR re checked  Call our office for results  Called and spoke to Lu and discussed the above  Will ck to see if we have samples of eliquis and call pt on 5/12/21    Pt is aware of the plan and in agreement 
never used

## 2021-05-12 ENCOUNTER — TELEPHONE (OUTPATIENT)
Dept: HEMATOLOGY ONCOLOGY | Facility: CLINIC | Age: 56
End: 2021-05-12

## 2021-05-12 NOTE — TELEPHONE ENCOUNTER
Called pt to review "bridge" for her upcoming surgery  Left VM    Pt to come to [facility], to p/u samples of Eliquis 5mg  Awaiting return phone call for time of p/u

## 2021-05-14 ENCOUNTER — HOSPITAL ENCOUNTER (OUTPATIENT)
Dept: RADIOLOGY | Facility: HOSPITAL | Age: 56
Discharge: HOME | End: 2021-05-14
Attending: SURGERY
Payer: COMMERCIAL

## 2021-05-14 ENCOUNTER — OFFICE VISIT (OUTPATIENT)
Dept: LAB | Facility: HOSPITAL | Age: 56
End: 2021-05-14
Attending: SURGERY
Payer: COMMERCIAL

## 2021-05-14 ENCOUNTER — TRANSCRIBE ORDERS (OUTPATIENT)
Dept: ADMINISTRATIVE | Facility: HOSPITAL | Age: 56
End: 2021-05-14

## 2021-05-14 ENCOUNTER — APPOINTMENT (OUTPATIENT)
Dept: LAB | Facility: HOSPITAL | Age: 56
End: 2021-05-14
Attending: INTERNAL MEDICINE
Payer: COMMERCIAL

## 2021-05-14 ENCOUNTER — TELEPHONE (OUTPATIENT)
Dept: HEMATOLOGY ONCOLOGY | Facility: CLINIC | Age: 56
End: 2021-05-14

## 2021-05-14 DIAGNOSIS — N18.2 CHRONIC KIDNEY DISEASE (CKD), STAGE II (MILD): ICD-10-CM

## 2021-05-14 DIAGNOSIS — N20.0 NEPHROLITHIASIS: ICD-10-CM

## 2021-05-14 DIAGNOSIS — I12.9 BENIGN HYPERTENSIVE KIDNEY DISEASE WITH CHRONIC KIDNEY DISEASE STAGE I THROUGH STAGE IV, OR UNSPECIFIED: ICD-10-CM

## 2021-05-14 DIAGNOSIS — E21.3 HYPERPARATHYROIDISM (HCC): ICD-10-CM

## 2021-05-14 LAB
ALBUMIN SERPL BCP-MCNC: 3.3 G/DL (ref 3.5–5)
ALP SERPL-CCNC: 90 U/L (ref 46–116)
ALT SERPL W P-5'-P-CCNC: 23 U/L (ref 12–78)
ANION GAP SERPL CALCULATED.3IONS-SCNC: 4 MMOL/L (ref 4–13)
AST SERPL W P-5'-P-CCNC: 16 U/L (ref 5–45)
ATRIAL RATE: 53 BPM
BACTERIA UR QL AUTO: ABNORMAL /HPF
BASOPHILS # BLD AUTO: 0.03 THOUSANDS/ΜL (ref 0–0.1)
BASOPHILS NFR BLD AUTO: 1 % (ref 0–1)
BILIRUB SERPL-MCNC: 0.39 MG/DL (ref 0.2–1)
BILIRUB UR QL STRIP: NEGATIVE
BUN SERPL-MCNC: 24 MG/DL (ref 5–25)
CALCIUM ALBUM COR SERPL-MCNC: 11 MG/DL (ref 8.3–10.1)
CALCIUM SERPL-MCNC: 10.4 MG/DL (ref 8.3–10.1)
CHLORIDE SERPL-SCNC: 112 MMOL/L (ref 100–108)
CLARITY UR: ABNORMAL
CO2 SERPL-SCNC: 26 MMOL/L (ref 21–32)
COLOR UR: YELLOW
CREAT SERPL-MCNC: 1 MG/DL (ref 0.6–1.3)
EOSINOPHIL # BLD AUTO: 0.06 THOUSAND/ΜL (ref 0–0.61)
EOSINOPHIL NFR BLD AUTO: 1 % (ref 0–6)
ERYTHROCYTE [DISTWIDTH] IN BLOOD BY AUTOMATED COUNT: 12.7 % (ref 11.6–15.1)
GFR SERPL CREATININE-BSD FRML MDRD: 64 ML/MIN/1.73SQ M
GLUCOSE P FAST SERPL-MCNC: 114 MG/DL (ref 65–99)
GLUCOSE UR STRIP-MCNC: NEGATIVE MG/DL
HCT VFR BLD AUTO: 42.2 % (ref 34.8–46.1)
HGB BLD-MCNC: 13.8 G/DL (ref 11.5–15.4)
HGB UR QL STRIP.AUTO: ABNORMAL
HYALINE CASTS #/AREA URNS LPF: ABNORMAL /LPF
IMM GRANULOCYTES # BLD AUTO: 0.02 THOUSAND/UL (ref 0–0.2)
IMM GRANULOCYTES NFR BLD AUTO: 0 % (ref 0–2)
KETONES UR STRIP-MCNC: NEGATIVE MG/DL
LEUKOCYTE ESTERASE UR QL STRIP: ABNORMAL
LYMPHOCYTES # BLD AUTO: 1.77 THOUSANDS/ΜL (ref 0.6–4.47)
LYMPHOCYTES NFR BLD AUTO: 30 % (ref 14–44)
MAGNESIUM SERPL-MCNC: 2.1 MG/DL (ref 1.6–2.6)
MCH RBC QN AUTO: 29.6 PG (ref 26.8–34.3)
MCHC RBC AUTO-ENTMCNC: 32.7 G/DL (ref 31.4–37.4)
MCV RBC AUTO: 90 FL (ref 82–98)
MONOCYTES # BLD AUTO: 0.54 THOUSAND/ΜL (ref 0.17–1.22)
MONOCYTES NFR BLD AUTO: 9 % (ref 4–12)
NEUTROPHILS # BLD AUTO: 3.55 THOUSANDS/ΜL (ref 1.85–7.62)
NEUTS SEG NFR BLD AUTO: 59 % (ref 43–75)
NITRITE UR QL STRIP: NEGATIVE
NON-SQ EPI CELLS URNS QL MICRO: ABNORMAL /HPF
NRBC BLD AUTO-RTO: 0 /100 WBCS
P AXIS: 49 DEGREES
PH UR STRIP.AUTO: 6 [PH]
PHOSPHATE SERPL-MCNC: 1.8 MG/DL (ref 2.7–4.5)
PLATELET # BLD AUTO: 186 THOUSANDS/UL (ref 149–390)
PMV BLD AUTO: 10.8 FL (ref 8.9–12.7)
POTASSIUM SERPL-SCNC: 4.2 MMOL/L (ref 3.5–5.3)
PR INTERVAL: 138 MS
PROT SERPL-MCNC: 7.3 G/DL (ref 6.4–8.2)
PROT UR STRIP-MCNC: ABNORMAL MG/DL
QRS AXIS: -27 DEGREES
QRSD INTERVAL: 82 MS
QT INTERVAL: 416 MS
QTC INTERVAL: 390 MS
RBC # BLD AUTO: 4.67 MILLION/UL (ref 3.81–5.12)
RBC #/AREA URNS AUTO: ABNORMAL /HPF
SODIUM SERPL-SCNC: 142 MMOL/L (ref 136–145)
SP GR UR STRIP.AUTO: 1.02 (ref 1–1.03)
T WAVE AXIS: 12 DEGREES
UROBILINOGEN UR QL STRIP.AUTO: 0.2 E.U./DL
VENTRICULAR RATE: 53 BPM
WBC # BLD AUTO: 5.97 THOUSAND/UL (ref 4.31–10.16)
WBC #/AREA URNS AUTO: ABNORMAL /HPF

## 2021-05-14 PROCEDURE — 83735 ASSAY OF MAGNESIUM: CPT

## 2021-05-14 PROCEDURE — 71046 X-RAY EXAM CHEST 2 VIEWS: CPT

## 2021-05-14 PROCEDURE — 93010 ELECTROCARDIOGRAM REPORT: CPT | Performed by: INTERNAL MEDICINE

## 2021-05-14 PROCEDURE — 84100 ASSAY OF PHOSPHORUS: CPT

## 2021-05-14 PROCEDURE — 93005 ELECTROCARDIOGRAM TRACING: CPT

## 2021-05-14 PROCEDURE — 85025 COMPLETE CBC W/AUTO DIFF WBC: CPT

## 2021-05-14 PROCEDURE — 80053 COMPREHEN METABOLIC PANEL: CPT

## 2021-05-14 PROCEDURE — 81001 URINALYSIS AUTO W/SCOPE: CPT

## 2021-05-14 NOTE — TELEPHONE ENCOUNTER
Patient states that she is scheduled for surgery on 5/25/2021 and will needs [facility] paperwork to be completed  She also states that she hand delivered the forms to the MR at the  in the [facility] office on Monday 5/10/2021  She could not provide the name of the person she handed the forms to  Patient is requesting a return call to [phone]

## 2021-05-20 NOTE — PROGRESS NOTES
OFFICE FOLLOW UP - Nephrology   Meryle Peacemaker 49 y o  female MRN: [id number]       ASSESSMENT and PLAN:  Bill Morton was seen today for follow-up, chronic kidney disease and hypertension  Diagnoses and all orders for this visit:    Chronic kidney disease (CKD), stage II (mild)  -     Basic metabolic panel; Future  -     CBC and differential; Future  -     Magnesium; Future  -     Phosphorus; Future  -     PTH, intact; Future  -     Protein / creatinine ratio, urine;  Future    Benign hypertensive kidney disease with chronic kidney disease stage I through stage IV, or unspecified    Hyperparathyroidism ([facility] [address] )    Vitamin D deficiency    Nephrolithiasis    Hypercalcemia    Proteinuria, unspecified type        Chronic kidney disease II:  With proteinuria  Assessment and Plan:   Etiology in the setting of lupus nephritis biopsied in 1996   After review medical records through [facility] everywhere baseline creatinine 0 8-1 0 dating back to 2020   Most recent creatinine 1 0 and at baseline   Currently on fosinopril 20 mg 2 times daily, spironolactone 25 mg daily   Instructed patient to hold fosinopril the night before and day of as well as spironolactone the day of procedure for perioperative optimization to reduce the incidence of acute kidney injury in the setting of Chronic Kidney Disease II with known proteinuria   Will have patient return in four months with updated blood work and urine studies for ongoing management with Dr Stapleton    Hypertension:  Assessment and plan:   BP acceptable   Current medication includes fosinopril 20 mg 2 times daily, spironolactone 25 mg daily and Procardia xl 30 mg daily   Encourage low-sodium diet no more than 2000 mg salt per day  [employer] and exercise   As above recommend holding fosinopril the night before and day of and spironolactone day of procedure for perioperative optimization to reduce the incidence of acute kidney injury    Hyperparathyroidism: Assessment and plan:  · Following surgery as outpatient  · Plan parathyroidectomy on 05/28/2021 Dr Georgette Sousa  · As above instructed patient to hold fosinopril and spironolactone the day of procedure-patient agree    Hypercalcemia:  Likely in the setting of hyperparathyroidism  Assessment and plan:  · Currently off vitamin-D and calcium supplements  · Current corrected calcium 11 0 on 05/14/2021  · As above plan parathyroidectomy on 05/28/2021  · Recommend close monitoring for potential hypocalcemia post postprocedure    Electrolytes/acid base:  Assessment and plan:   Hypophosphatemia-current phosphorus 1 8   Encourage increase phosphorus in diet   Will continue to trend with next office visit    Proteinuria:  Previously in the setting of lupus nephritis biopsy-proven in 1996  · Now with previous weight gain and hypertension  · Encourage diet and exercise for weight loss  · Last UPCR 0 42 in March-improving  · Will continue trend with next appt    History of nephrolithiasis:  Assessment and plan:  · No current episodes  · Encourage hydration 2-2 5 L of water per day  · Not on HCTZ in the setting of hypercalcemia  · Will continue to monitor    Age related screening: Your primary caregiver may do yearly screening for colorectal cancer  It is recommended in all men and women over 48years old  You may have screening earlier if you have colon disease or a family history of colorectal cancer  Patient Instructions    All questions asked and answered  The patient has been instructed to call office at [phone] with any questions or concerns  Please obtain prescribed blood work and urine studies prior to next appointment  Avoid NSAID products which include:  Motrin, Advil, Ibuprofen, Aleve, Naprosyn, Naproxen, Mobic or Celebrex   Encourage hydration 2 0-2 5 L of water per day  Continue to avoid calcium and vitamin-D supplements  Encourage foods with phosphorus low phosphorus level  Hold Fosinopril the night before and day of procedure and spironolactone the day of surgical procedure  Return in four months with updated blood work and urine studies with Flaco Rich to take nifedipine          HPI: Bozena Chavez is a 49 y o  female with past medical history of hypertension, Chronic Kidney Disease II, history of lupus nephritis biopsy-proven in 1996, nephrolithiasis, vitamin-D deficiency, hypercalcemia, hyperparathyroidism, anti-phospholipid syndrome on Coumadin and follows Hematology as outpatient, history of DVT, who presents for Chronic Kidney Disease and hypertension follow-up  She was last seen on 01/20/2021 with Dr Stapleton and renal function was stable  Patient encouraged to increase hydration  Most recent blood work on 05/14/2021 has already been reviewed by Lily Anders and discussed with patient today in office  Renal function remains stable  She continues with elevated calcium and is scheduled for parathyroidectomy 5/28/2021  Medication list reviewed  On discussion overall she is feeling okay  Denies chest pain, shortness of breath, dizziness, lightheadedness, nausea, vomiting, urinary issues, fevers or chills  ROS:   All the systems were reviewed and were negative except as documented on the HPI      Allergies: Penicillins    Medications:   Current Outpatient Medications:     apixaban (Eliquis) 5 mg, Take 5 mg by mouth 2 (two) times a day, Disp: , Rfl:     folic acid (FOLVITE) 1 mg tablet, Take 1 tablet by mouth daily, Disp: , Rfl:     fosinopril (MONOPRIL) 20 mg tablet, Take 1 tablet (20 mg total) by mouth 2 (two) times a day, Disp: 180 tablet, Rfl: 0    levothyroxine 125 mcg tablet, TAKE ONE TABLET BY MOUTH EVERY DAY MON-SAT , Disp: 90 tablet, Rfl: 0    NIFEdipine (PROCARDIA XL) 30 mg 24 hr tablet, Take 1 tablet (30 mg total) by mouth daily, Disp: 90 tablet, Rfl: 3    nystatin (MYCOSTATIN) powder, Apply topically 2 (two) times a day, Disp: 60 g, Rfl: 2    spironolactone (ALDACTONE) 25 mg tablet, Take 1 tablet (25 mg total) by mouth daily, Disp: 90 tablet, Rfl: 3    warfarin (COUMADIN) 3 mg tablet, TAKE 1 TABLET DAILY except on  and  (Patient taking differently: TAKE 1 TABLET DAILY except ), Disp: 90 tablet, Rfl: 4    cholecalciferol (VITAMIN D3) 400 units tablet, Take 1 tablet (400 Units total) by mouth daily (Patient not taking: Reported on 2021), Disp: 30 tablet, Rfl: 4    traMADol (ULTRAM) 50 mg tablet, Take 1 tablet (50 mg total) by mouth every 6 (six) hours as needed for moderate pain (Patient not taking: Reported on 2021), Disp: 10 tablet, Rfl: 0    Past Medical History:   Diagnosis Date    Chronic kidney disease     Gestational diabetes     Hyperpar[facility] ([facility] [address] )     Hypertension     Idiopathic thrombocytopenic purpura (ITP) (HCC)     Lupus (HCC)     Vitamin D deficiency      Past Surgical History:   Procedure Laterality Date     SECTION      60    HERNIA REPAIR      HIP SURGERY      left side, bone decompression    THYROID SURGERY       Family History   Problem Relation Age of Onset    Diabetes type II Father     Diabetes Father     Stroke Father     Diabetes type II Paternal Aunt     Diabetes type II Paternal Uncle     Diabetes type II Paternal Grandmother     Stomach cancer Paternal Grandfather       reports that she has never smoked  She has never used smokeless tobacco  She reports previous alcohol use  She reports that she does not use drugs  Physical Exam:   Vitals:    21 1024   BP: 130/70   BP Location: Left arm   Patient Position: Sitting   Cuff Size: Extra-Large   Pulse: (!) 53   Weight: 95 6 kg (210 lb 12 8 oz)   Height: 5' 4" (1 626 m)     Body mass index is 36 18 kg/m²      General: cooperative, in not acute distress  Eyes: conjunctivae pink, anicteric sclerae  ENT: lips and mucous membranes moist  Neck: supple, no JVD  Chest: clear breath sounds bilateral, no crackles, ronchus or wheezings  CVS: distinct S1 & S2, normal rate, regular rhythm  Abdomen: soft, non-tender, non-distended, normoactive bowel sounds  Back: no CVA tenderness  Extremities: trace lower edema of both legs  Skin: no rash  Neuro: awake, alert, oriented      Lab Results:        Portions of the record may have been created with voice recognition software  Occasional wrong word or "sound a like" substitutions may have occurred due to the inherent limitations of voice recognition software   Read the chart carefully and recognize,

## 2021-05-20 NOTE — PATIENT INSTRUCTIONS
 All questions asked and answered  The patient has been instructed to call office at [phone] with any questions or concerns  Please obtain prescribed blood work and urine studies prior to next appointment  Avoid NSAID products which include:  Motrin, Advil, Ibuprofen, Aleve, Naprosyn, Naproxen, Mobic or Celebrex   Encourage hydration 2 0-2 5 L of water per day  Continue to avoid calcium and vitamin-D supplements  Encourage foods with phosphorus low phosphorus level  Hold Fosinopril the night before and day of procedure and spironolactone the day of surgical procedure  Return in four months with updated blood work and urine studies with Dr mohan Christensen to take nifedipine

## 2021-05-21 ENCOUNTER — OFFICE VISIT (OUTPATIENT)
Dept: NEPHROLOGY | Facility: CLINIC | Age: 56
End: 2021-05-21
Payer: COMMERCIAL

## 2021-05-21 VITALS
WEIGHT: 210.8 LBS | HEIGHT: 64 IN | HEART RATE: 53 BPM | SYSTOLIC BLOOD PRESSURE: 130 MMHG | BODY MASS INDEX: 35.99 KG/M2 | DIASTOLIC BLOOD PRESSURE: 70 MMHG

## 2021-05-21 DIAGNOSIS — E21.3 HYPERPARATHYROIDISM (HCC): ICD-10-CM

## 2021-05-21 DIAGNOSIS — R80.9 PROTEINURIA, UNSPECIFIED TYPE: ICD-10-CM

## 2021-05-21 DIAGNOSIS — E83.52 HYPERCALCEMIA: ICD-10-CM

## 2021-05-21 DIAGNOSIS — I12.9 BENIGN HYPERTENSIVE KIDNEY DISEASE WITH CHRONIC KIDNEY DISEASE STAGE I THROUGH STAGE IV, OR UNSPECIFIED: ICD-10-CM

## 2021-05-21 DIAGNOSIS — N20.0 NEPHROLITHIASIS: ICD-10-CM

## 2021-05-21 DIAGNOSIS — N18.2 CHRONIC KIDNEY DISEASE (CKD), STAGE II (MILD): Primary | ICD-10-CM

## 2021-05-21 DIAGNOSIS — E55.9 VITAMIN D DEFICIENCY: ICD-10-CM

## 2021-05-21 PROCEDURE — 99213 OFFICE O/P EST LOW 20 MIN: CPT | Performed by: NURSE PRACTITIONER

## 2021-05-24 ENCOUNTER — TELEPHONE (OUTPATIENT)
Dept: SURGICAL ONCOLOGY | Facility: CLINIC | Age: 56
End: 2021-05-24

## 2021-05-24 ENCOUNTER — ANESTHESIA EVENT (OUTPATIENT)
Dept: PERIOP | Facility: HOSPITAL | Age: 56
End: 2021-05-24
Payer: COMMERCIAL

## 2021-05-24 NOTE — PRE-PROCEDURE INSTRUCTIONS
Pre-Surgery Instructions:   Medication Instructions    apixaban (Eliquis) 5 mg pt has been instructed by Dr to hold for 2 days befroe surgery    cholecalciferol (VITAMIN D3) 400 units tablet hold till after surgery    folic acid (FOLVITE) 1 mg tablet hold day of surgery    fosinopril (MONOPRIL) 20 mg tablet pt has been instructed by nephrologist to hold 5/24 pm and day of surgery    levothyroxine 125 mcg tablet take day of surgery    NIFEdipine (PROCARDIA XL) 30 mg 24 hr tablet takes at night    nystatin (MYCOSTATIN) powder hold day of surgery    spironolactone (ALDACTONE) 25 mg tablet hold day of surgery    traMADol (ULTRAM) 50 mg tablet hold day of surgery    warfarin (COUMADIN) 3 mg tablet pt has been instructed to hold for 7 days pre procedure    My Surgical Experience    The following information was developed to assist you to prepare for your operation  What do I need to do before coming to the hospital?   Arrange for a responsible person to drive you to and from the hospital    Arrange care for your children at home  Children are not allowed in the recovery areas of the hospital   Plan to wear clothing that is easy to put on and take off  If you are having shoulder surgery, wear a shirt that buttons or zippers in the front  Bathing  o Shower the evening before and the morning of your surgery with an antibacterial soap  Please refer to the Pre Op Showering Instructions for Surgery Patients Sheet   o Remove nail polish and all body piercing jewelry  o Do not shave any body part for at least 24 hours before surgery-this includes face, arms, legs and upper body  Food  o Nothing to eat or drink after midnight the night before your surgery   This includes candy and chewing gum  o Exception: If your surgery is after 12:00pm (noon), you may have clear liquids such as 7-Up®, ginger ale, apple or cranberry juice, Jell-O®, water, or clear broth until 8:00 am  o Do not drink milk or juice with pulp on the morning before surgery  o Do not drink alcohol 24 hours before surgery  Medicine  o Follow instructions you received from your surgeon about which medicines you may take on the day of surgery  o If instructed to take medicine on the morning of surgery, take pills with just a small sip of water  Call your prescribing doctor for specific infroamtion on what to do if you take insulin    What should I bring to the hospital?    Bring:  Radha Andrews or a walker, if you have them, for foot or knee surgery   A list of the daily medicines, vitamins, minerals, herbals and nutritional supplements you take  Include the dosages of medicines and the time you take them each day   Glasses, dentures or hearing aids   Minimal clothing; you will be wearing hospital sleepwear   Photo ID; required to verify your identity   If you have a Living Will or Power of , bring a copy of the documents   If you have an ostomy, bring an extra pouch and any supplies you use    Do not bring   Medicines or inhalers   Money, valuables or jewelry    What other information should I know about the day of surgery?  Notify your surgeons if you develop a cold, sore throat, cough, fever, rash or any other illness   Report to the Ambulatory Surgical/Same Day Surgery Unit   You will be instructed to stop at Registration only if you have not been pre-registered   Inform your  fi they do not stay that they will be asked by the staff to leave a phone number where they can be reached   Be available to be reached before surgery  In the event the operating room schedule changes, you may be asked to come in earlier or later than expected    *It is important to tell your doctor and others involved in your health care if you are taking or have been taking any non-prescription drugs, vitamins, minerals, herbals or other nutritional supplements   Any of these may interact with some food or medicines and cause a reaction

## 2021-05-24 NOTE — TELEPHONE ENCOUNTER
Elie Alpers "Diann Baker" stated she's an nurse for [facility] and called to go over the patient's chart  Diann Baker can be contacted at [phone]

## 2021-05-24 NOTE — PRE-PROCEDURE INSTRUCTIONS
Pre-Surgery Instructions:   Medication Instructions    apixaban (Eliquis) 5 mg pt has been instructed to hold from Dr 2 days before procedure    cholecalciferol (VITAMIN D3) 400 units tablet hold till after procedure    folic acid (FOLVITE) 1 mg tablet hold till after surgery    fosinopril (MONOPRIL) 20 mg tablet pt has been instructed to hold 5/24 pm and day of surgery    levothyroxine 125 mcg tablet take day of surgery    NIFEdipine (PROCARDIA XL) 30 mg 24 hr tablet takes at night    nystatin (MYCOSTATIN) powder hold day of surgery    spironolactone (ALDACTONE) 25 mg tablet hold day of surgery    traMADol (ULTRAM) 50 mg tablet hold day of surgery    My Surgical Experience    The following information was developed to assist you to prepare for your operation  What do I need to do before coming to the hospital?   Arrange for a responsible person to drive you to and from the hospital    Arrange care for your children at home  Children are not allowed in the recovery areas of the hospital   Plan to wear clothing that is easy to put on and take off  If you are having shoulder surgery, wear a shirt that buttons or zippers in the front  Bathing  o Shower the evening before and the morning of your surgery with an antibacterial soap  Please refer to the Pre Op Showering Instructions for Surgery Patients Sheet   o Remove nail polish and all body piercing jewelry  o Do not shave any body part for at least 24 hours before surgery-this includes face, arms, legs and upper body  Food  o Nothing to eat or drink after midnight the night before your surgery   This includes candy and chewing gum  o Exception: If your surgery is after 12:00pm (noon), you may have clear liquids such as 7-Up®, ginger ale, apple or cranberry juice, Jell-O®, water, or clear broth until 8:00 am  o Do not drink milk or juice with pulp on the morning before surgery  o Do not drink alcohol 24 hours before surgery  Medicine  o Follow instructions you received from your surgeon about which medicines you may take on the day of surgery  o If instructed to take medicine on the morning of surgery, take pills with just a small sip of water  Call your prescribing doctor for specific infroamtion on what to do if you take insulin    What should I bring to the hospital?    Bring:  Mica Gell or a walker, if you have them, for foot or knee surgery   A list of the daily medicines, vitamins, minerals, herbals and nutritional supplements you take  Include the dosages of medicines and the time you take them each day   Glasses, dentures or hearing aids   Minimal clothing; you will be wearing hospital sleepwear   Photo ID; required to verify your identity   If you have a Living Will or Power of , bring a copy of the documents   If you have an ostomy, bring an extra pouch and any supplies you use    Do not bring   Medicines or inhalers   Money, valuables or jewelry    What other information should I know about the day of surgery?  Notify your surgeons if you develop a cold, sore throat, cough, fever, rash or any other illness   Report to the Ambulatory Surgical/Same Day Surgery Unit   You will be instructed to stop at Registration only if you have not been pre-registered   Inform your  fi they do not stay that they will be asked by the staff to leave a phone number where they can be reached   Be available to be reached before surgery  In the event the operating room schedule changes, you may be asked to come in earlier or later than expected    *It is important to tell your doctor and others involved in your health care if you are taking or have been taking any non-prescription drugs, vitamins, minerals, herbals or other nutritional supplements   Any of these may interact with some food or medicines and cause a reaction

## 2021-05-25 ENCOUNTER — HOSPITAL ENCOUNTER (OUTPATIENT)
Facility: HOSPITAL | Age: 56
Setting detail: OUTPATIENT SURGERY
Discharge: HOME | End: 2021-05-25
Attending: SURGERY | Admitting: SURGERY
Payer: COMMERCIAL

## 2021-05-25 ENCOUNTER — ANESTHESIA (OUTPATIENT)
Dept: PERIOP | Facility: HOSPITAL | Age: 56
End: 2021-05-25
Payer: COMMERCIAL

## 2021-05-25 VITALS
HEART RATE: 74 BPM | WEIGHT: 210 LBS | BODY MASS INDEX: 39.65 KG/M2 | OXYGEN SATURATION: 96 % | SYSTOLIC BLOOD PRESSURE: 137 MMHG | HEIGHT: 61 IN | TEMPERATURE: 97.5 F | DIASTOLIC BLOOD PRESSURE: 96 MMHG | RESPIRATION RATE: 20 BRPM

## 2021-05-25 DIAGNOSIS — E21.3 HYPERPARATHYROIDISM (HCC): ICD-10-CM

## 2021-05-25 LAB
CALCIUM SERPL-MCNC: 10.2 MG/DL (ref 8.3–10.1)
CALCIUM SERPL-MCNC: 10.4 MG/DL (ref 8.3–10.1)
CALCIUM SERPL-MCNC: 10.4 MG/DL (ref 8.3–10.1)
PTH-INTACT SERPL-MCNC: 158.2 PG/ML (ref 18.4–80.1)
PTH-INTACT SERPL-MCNC: 194.6 PG/ML (ref 18.4–80.1)
PTH-INTACT SERPL-MCNC: 206.4 PG/ML (ref 18.4–80.1)
PTH-INTACT SERPL-MCNC: 241.8 PG/ML (ref 18.4–80.1)
PTH-INTACT SERPL-MCNC: 47.7 PG/ML (ref 18.4–80.1)
PTH-INTACT SERPL-MCNC: 64.7 PG/ML (ref 18.4–80.1)
PTH-INTACT SERPL-MCNC: 71.6 PG/ML (ref 18.4–80.1)

## 2021-05-25 PROCEDURE — 83970 ASSAY OF PARATHORMONE: CPT | Performed by: SURGERY

## 2021-05-25 PROCEDURE — 88331 PATH CONSLTJ SURG 1 BLK 1SPC: CPT | Performed by: PATHOLOGY

## 2021-05-25 PROCEDURE — 31575 DIAGNOSTIC LARYNGOSCOPY: CPT | Performed by: SURGERY

## 2021-05-25 PROCEDURE — 88305 TISSUE EXAM BY PATHOLOGIST: CPT | Performed by: PATHOLOGY

## 2021-05-25 PROCEDURE — 60500 EXPLORE PARATHYROID GLANDS: CPT | Performed by: SURGERY

## 2021-05-25 PROCEDURE — 82310 ASSAY OF CALCIUM: CPT | Performed by: SURGERY

## 2021-05-25 RX ORDER — ONDANSETRON 2 MG/ML
4 INJECTION INTRAMUSCULAR; INTRAVENOUS ONCE AS NEEDED
Status: DISCONTINUED | OUTPATIENT
Start: 2021-05-25 | End: 2021-05-25 | Stop reason: HOSPADM

## 2021-05-25 RX ORDER — MIDAZOLAM HYDROCHLORIDE 2 MG/2ML
INJECTION, SOLUTION INTRAMUSCULAR; INTRAVENOUS AS NEEDED
Status: DISCONTINUED | OUTPATIENT
Start: 2021-05-25 | End: 2021-05-25

## 2021-05-25 RX ORDER — PROPOFOL 10 MG/ML
INJECTION, EMULSION INTRAVENOUS AS NEEDED
Status: DISCONTINUED | OUTPATIENT
Start: 2021-05-25 | End: 2021-05-25

## 2021-05-25 RX ORDER — NEOSTIGMINE METHYLSULFATE 1 MG/ML
INJECTION INTRAVENOUS AS NEEDED
Status: DISCONTINUED | OUTPATIENT
Start: 2021-05-25 | End: 2021-05-25

## 2021-05-25 RX ORDER — OXYCODONE HYDROCHLORIDE 5 MG/1
5 TABLET ORAL EVERY 4 HOURS PRN
Status: DISCONTINUED | OUTPATIENT
Start: 2021-05-25 | End: 2021-05-25 | Stop reason: HOSPADM

## 2021-05-25 RX ORDER — ONDANSETRON 2 MG/ML
INJECTION INTRAMUSCULAR; INTRAVENOUS AS NEEDED
Status: DISCONTINUED | OUTPATIENT
Start: 2021-05-25 | End: 2021-05-25

## 2021-05-25 RX ORDER — LIDOCAINE HYDROCHLORIDE 10 MG/ML
INJECTION, SOLUTION EPIDURAL; INFILTRATION; INTRACAUDAL; PERINEURAL AS NEEDED
Status: DISCONTINUED | OUTPATIENT
Start: 2021-05-25 | End: 2021-05-25

## 2021-05-25 RX ORDER — MAGNESIUM HYDROXIDE 1200 MG/15ML
LIQUID ORAL AS NEEDED
Status: DISCONTINUED | OUTPATIENT
Start: 2021-05-25 | End: 2021-05-25 | Stop reason: HOSPADM

## 2021-05-25 RX ORDER — ROCURONIUM BROMIDE 10 MG/ML
INJECTION, SOLUTION INTRAVENOUS AS NEEDED
Status: DISCONTINUED | OUTPATIENT
Start: 2021-05-25 | End: 2021-05-25

## 2021-05-25 RX ORDER — BUPIVACAINE HYDROCHLORIDE 2.5 MG/ML
INJECTION, SOLUTION EPIDURAL; INFILTRATION; INTRACAUDAL AS NEEDED
Status: DISCONTINUED | OUTPATIENT
Start: 2021-05-25 | End: 2021-05-25 | Stop reason: HOSPADM

## 2021-05-25 RX ORDER — SODIUM CHLORIDE, SODIUM LACTATE, POTASSIUM CHLORIDE, CALCIUM CHLORIDE 600; 310; 30; 20 MG/100ML; MG/100ML; MG/100ML; MG/100ML
50 INJECTION, SOLUTION INTRAVENOUS CONTINUOUS
Status: DISCONTINUED | OUTPATIENT
Start: 2021-05-25 | End: 2021-05-25 | Stop reason: HOSPADM

## 2021-05-25 RX ORDER — EPHEDRINE SULFATE 50 MG/ML
INJECTION INTRAVENOUS AS NEEDED
Status: DISCONTINUED | OUTPATIENT
Start: 2021-05-25 | End: 2021-05-25

## 2021-05-25 RX ORDER — GLYCOPYRROLATE 0.2 MG/ML
INJECTION INTRAMUSCULAR; INTRAVENOUS AS NEEDED
Status: DISCONTINUED | OUTPATIENT
Start: 2021-05-25 | End: 2021-05-25

## 2021-05-25 RX ORDER — FENTANYL CITRATE/PF 50 MCG/ML
50 SYRINGE (ML) INJECTION
Status: COMPLETED | OUTPATIENT
Start: 2021-05-25 | End: 2021-05-25

## 2021-05-25 RX ORDER — ACETAMINOPHEN 325 MG/1
650 TABLET ORAL EVERY 6 HOURS PRN
Status: DISCONTINUED | OUTPATIENT
Start: 2021-05-25 | End: 2021-05-25 | Stop reason: HOSPADM

## 2021-05-25 RX ORDER — HYDROMORPHONE HCL/PF 1 MG/ML
0.5 SYRINGE (ML) INJECTION
Status: DISCONTINUED | OUTPATIENT
Start: 2021-05-25 | End: 2021-05-25 | Stop reason: HOSPADM

## 2021-05-25 RX ORDER — FENTANYL CITRATE 50 UG/ML
INJECTION, SOLUTION INTRAMUSCULAR; INTRAVENOUS AS NEEDED
Status: DISCONTINUED | OUTPATIENT
Start: 2021-05-25 | End: 2021-05-25

## 2021-05-25 RX ORDER — LIDOCAINE HYDROCHLORIDE 10 MG/ML
1 INJECTION, SOLUTION EPIDURAL; INFILTRATION; INTRACAUDAL; PERINEURAL ONCE
Status: COMPLETED | OUTPATIENT
Start: 2021-05-25 | End: 2021-05-25

## 2021-05-25 RX ORDER — DEXAMETHASONE SODIUM PHOSPHATE 10 MG/ML
INJECTION, SOLUTION INTRAMUSCULAR; INTRAVENOUS AS NEEDED
Status: DISCONTINUED | OUTPATIENT
Start: 2021-05-25 | End: 2021-05-25

## 2021-05-25 RX ORDER — HYDRALAZINE HYDROCHLORIDE 20 MG/ML
5 INJECTION INTRAMUSCULAR; INTRAVENOUS ONCE
Status: COMPLETED | OUTPATIENT
Start: 2021-05-25 | End: 2021-05-25

## 2021-05-25 RX ADMIN — Medication 50 MCG: at 14:02

## 2021-05-25 RX ADMIN — Medication 50 MCG: at 13:35

## 2021-05-25 RX ADMIN — PROPOFOL 180 MG: 10 INJECTION, EMULSION INTRAVENOUS at 10:41

## 2021-05-25 RX ADMIN — PHENYLEPHRINE HYDROCHLORIDE 30 MCG/MIN: 10 INJECTION INTRAVENOUS at 10:52

## 2021-05-25 RX ADMIN — DEXAMETHASONE SODIUM PHOSPHATE 10 MG: 10 INJECTION, SOLUTION INTRAMUSCULAR; INTRAVENOUS at 10:41

## 2021-05-25 RX ADMIN — PROPOFOL 20 MG: 10 INJECTION, EMULSION INTRAVENOUS at 12:32

## 2021-05-25 RX ADMIN — MIDAZOLAM 2 MG: 1 INJECTION INTRAMUSCULAR; INTRAVENOUS at 10:31

## 2021-05-25 RX ADMIN — Medication 50 MCG: at 13:57

## 2021-05-25 RX ADMIN — LIDOCAINE HYDROCHLORIDE 0.2 ML: 10 INJECTION, SOLUTION EPIDURAL; INFILTRATION; INTRACAUDAL; PERINEURAL at 10:28

## 2021-05-25 RX ADMIN — ONDANSETRON 4 MG: 2 INJECTION INTRAMUSCULAR; INTRAVENOUS at 10:41

## 2021-05-25 RX ADMIN — SODIUM CHLORIDE, SODIUM LACTATE, POTASSIUM CHLORIDE, AND CALCIUM CHLORIDE 50 ML/HR: .6; .31; .03; .02 INJECTION, SOLUTION INTRAVENOUS at 10:26

## 2021-05-25 RX ADMIN — EPHEDRINE SULFATE 5 MG: 50 INJECTION, SOLUTION INTRAVENOUS at 10:51

## 2021-05-25 RX ADMIN — GLYCOPYRROLATE 0.4 MG: 0.2 INJECTION, SOLUTION INTRAMUSCULAR; INTRAVENOUS at 12:53

## 2021-05-25 RX ADMIN — FENTANYL CITRATE 100 MCG: 50 INJECTION INTRAMUSCULAR; INTRAVENOUS at 10:41

## 2021-05-25 RX ADMIN — ROCURONIUM BROMIDE 50 MG: 50 INJECTION, SOLUTION INTRAVENOUS at 10:41

## 2021-05-25 RX ADMIN — NEOSTIGMINE METHYLSULFATE 3 MG: 1 INJECTION INTRAVENOUS at 12:53

## 2021-05-25 RX ADMIN — Medication 50 MCG: at 13:41

## 2021-05-25 RX ADMIN — SODIUM CHLORIDE, SODIUM LACTATE, POTASSIUM CHLORIDE, AND CALCIUM CHLORIDE: .6; .31; .03; .02 INJECTION, SOLUTION INTRAVENOUS at 12:59

## 2021-05-25 RX ADMIN — EPHEDRINE SULFATE 5 MG: 50 INJECTION, SOLUTION INTRAVENOUS at 11:00

## 2021-05-25 RX ADMIN — PROPOFOL 30 MG: 10 INJECTION, EMULSION INTRAVENOUS at 12:36

## 2021-05-25 RX ADMIN — LIDOCAINE HYDROCHLORIDE 50 MG: 10 INJECTION, SOLUTION EPIDURAL; INFILTRATION; INTRACAUDAL; PERINEURAL at 10:41

## 2021-05-25 RX ADMIN — HYDRALAZINE HYDROCHLORIDE 5 MG: 20 INJECTION, SOLUTION INTRAMUSCULAR; INTRAVENOUS at 13:57

## 2021-05-25 NOTE — INTERVAL H&P NOTE
H&P reviewed  After examining the patient I find no changes in the patients condition since the H&P had been written      Vitals:    05/25/21 0931   BP: 139/82   Pulse: (!) 53   Resp: 18   Temp: (!) 97 2 °F (36 2 °C)   SpO2: 99%

## 2021-05-25 NOTE — OP NOTE
OPERATIVE REPORT  PATIENT NAME: Bozena Chavez    :  1965  MRN: [id number]  Pt Location: [facility] OR ROOM 05    SURGERY DATE: 2021    Surgeon(s) and Role:     * Ashtyn Isbell MD - Primary     * Andrey Crandall MD - Assisting    Preop Diagnosis:  Hyperparathyroidism ([facility] [address] ) [E21 3]    Post-Op Diagnosis Codes:     * Hyperparathyroidism ([address] ) [E21 3]    Procedure(s) (LRB):  PARATHYROIDECTOMY, MINIMALLY INVASIVE, right, intraoperative PTH monitoring (Right)  DIAGNOSTIC FLEXIBLE LARYNGOSCOPY (N/A)    Specimen(s):  ID Type Source Tests Collected by Time Destination   1 : right superior Tissue Parathyroid TISSUE EXAM Ashtyn Isbell MD 2021 1023    2 : right superior Tissue Parathyroid TISSUE EXAM Ashtyn Isbell MD 2021 1158        Estimated Blood Loss:   Minimal    Drains:  * No LDAs found *    Anesthesia Type:   General    Operative Indications:  Hyperparathyroidism ([facility] [address] ) [E21 3]      Operative Findings:  80n mg right superior parathyroid adenoma    Complications:   None    Procedure and Technique:  The patient was brought to the OR and identified by proper time-out  Following this, flexible laryngoscopy was performed  Vocal cords were visualized and noted to movve bilaterally and symmetrically  Following this she was intubated by the anesthesia team, then was  prepped and draped with the neck exposed in the extended position  Local anesthesia was given, then a 3 cm incision was made sharply 2 finger breaths above the sternal notch  Cautery was used to dissect through the dermis subcutaneous tissue  Wheatlanner  retractor was placed  The platysma was then transected with cautery  Strap muscles were then divided the midline  This exposed the  anterior surface of the trachea  Patient had a prior history of thyroidectomy, therefore we could not use our usual thyroid landmarks to help locate a gland  We developed the space between the trachea medially and the carotid artery laterally  Inferiorly, along the inferior thyroid pedicle vessels, we saw what appeared to be a parathyroid gland  This was resected  Clips were placed on what appeared to be a vascular pedicle  PTH levels were sent but levels not decrease appropriately  Frozen section confirmed this to be a lymph node  We then proceeded to work along the more superior aspect of the lateral trachea, close to the thyroid cartilage  There was what appeared to be a prominent looking parathyroid gland  Further dissection here revealed what appeared to be a large superior parathyroid gland with a pedicle  The recurrent nerve was visualized and preserved  This was dissected out from surrounding tissue in hemostatic fashion with cautery  Vascular pedicle was visualized  The pedicle was clipped  PTH levels were drawn at 5 min, and 10 min after the case  Levels decreased appropriately to normal range upon 10 min post resection of the gland  We then irrigated the field and closed using 3-0 Vicryl to close the strap muscles the midline followed by 3-0 Vicryl to close the platysma, followed by 4-0 Vicryl close the dermis, followed by 5-0 Monocryl to close skin in subcuticular fashion  Benzoin and Steri-Strips were then applied in the usual fashion  The patient was awakened transferred to the recovery room in stable condition      I was present for the entire procedure    Patient Disposition:  PACU     SIGNATURE: Lizett Hankins MD  DATE: May 25, 2021  TIME: 12:54 PM

## 2021-05-25 NOTE — ANESTHESIA PREPROCEDURE EVALUATION
Procedure:  PARATHYROIDECTOMY, MINIMALLY INVASIVE, right, possible four gland exploration, intraoperative PTH monitoring (Right Neck)    Relevant Problems   CARDIO   (+) Chronic deep vein thrombosis (DVT) of proximal vein of both lower extremities (HCC)   (+) Hypertension      ENDO   (+) Hyperparathyroidism (HCC)      /RENAL   (+) Benign hypertensive CKD   (+) Chronic kidney disease (CKD), stage II (mild)   (+) Nephrolithiasis      MUSCULOSKELETAL   (+) Systemic lupus erythematosus (HCC)      NEURO/PSYCH   (+) History of ITP   (+) History of thyroid cancer      PULMONARY   (+) Upper respiratory tract infection        Physical Exam    Airway    Mallampati score: II  TM Distance: >3 FB  Neck ROM: full     Dental       Cardiovascular  Cardiovascular exam normal    Pulmonary  Pulmonary exam normal     Other Findings        Anesthesia Plan  ASA Score- 3     Anesthesia Type- general with ASA Monitors  Additional Monitors:   Airway Plan: ETT  Plan Factors-Exercise tolerance (METS): >4 METS  Chart reviewed  Induction- intravenous  Postoperative Plan-     Informed Consent- Anesthetic plan and risks discussed with patient  I personally reviewed this patient with the CRNA  Discussed and agreed on the Anesthesia Plan with the CRNA  Janey Romeo

## 2021-05-25 NOTE — ANESTHESIA POSTPROCEDURE EVALUATION
Post-Op Assessment Note    CV Status:  Stable  Pain Score: 0    Pain management: adequate     Mental Status:  Awake and alert   Hydration Status:  Stable   PONV Controlled:  None   Airway Patency:  Patent and adequate      Post Op Vitals Reviewed: Yes      Staff: CRNA, Anesthesiologist         No complications documented      BP   162/81   Temp (!) (P) 97 °F (36 1 °C) (05/25/21 1314)    Pulse (P) 66 (05/25/21 1314)   Resp (P) 16 (05/25/21 1314)    SpO2 (P) 100 % (05/25/21 1314)

## 2021-05-25 NOTE — DISCHARGE INSTRUCTIONS
Parathyroidectomy   WHAT YOU NEED TO KNOW:   A parathyroidectomy is surgery to remove part or all of your parathyroid glands  The parathyroid is made of 4 small glands that usually sit near the thyroid gland  The parathyroid glands make a hormone that controls the amount of calcium in your blood  DISCHARGE INSTRUCTIONS:   Medicines: You may need any of the following:  · NSAIDs  may decrease swelling and pain  This medicine can be bought with or without a doctor's order  This medicine can cause stomach bleeding or kidney problems in certain people  If you take blood thinner medicine, always ask your healthcare provider if NSAIDs are safe for you  Always read the medicine label and follow the directions on it before using this medicine  · Acetaminophen  decreases pain  It is available without a doctor's order  Ask how much to take and how often to take it  Follow directions  Acetaminophen can cause liver damage if not taken correctly  · Take your medicine as directed  Contact your healthcare provider if you think your medicine is not helping or if you have side effects  Tell him or her if you are allergic to any medicine  Keep a list of the medicines, vitamins, and herbs you take  Include the amounts, and when and why you take them  Bring the list or the pill bottles to follow-up visits  Carry your medicine list with you in case of an emergency  Follow up with your healthcare provider or surgeon as directed: You will need to return to have tests, your incision checked, and your drain or stitches removed  You may be referred to an endocrinologist  Write down your questions so you remember to ask them during your visits  Wound care:  Care for your wound as directed  You may need to carefully wash the wound with soap and water  Dry the area and put on new, clean bandages as directed  Change your bandages when they get wet or dirty  Check your drain when you change the bandages   Do not pull the drain out  Ask for more information about how to care for your drain  Rest as needed:  Slowly start to do more each day  Return to your daily activities as directed  Take supplements as directed: You may need to take calcium medicine to keep your blood calcium level normal  It may also help prevent and treat bone loss  Your healthcare provider may also tell you to take vitamin D to help your body absorb the calcium  Contact your healthcare provider or surgeon if:   · You have a fever  · Your wound is red, swollen, or draining pus  · You have pain in your neck area that does not go away, or gets worse even after you take your pain medicine  · You have chills, a cough, or feel weak and achy  · You have nausea or are vomiting  · You have questions or concerns about your condition or care  Seek care immediately or call 911 if:   · You cough up blood  · You feel lightheaded, short of breath, and have chest pain  · Your arm or leg feels warm, tender, and painful  It may look swollen and red  · You have trouble swallowing or talking, or you lose your voice  · You feel anxious, frightened, and uneasy  · You have the following symptoms of low blood calcium:     ¨ Confusion    ¨ Fatigue    ¨ Muscle spasms or muscle tightening    ¨ Numbness or tingling around your face, hands, or feet    ¨ A seizure  © 2017 [address] Information is for End User's use only and may not be sold, redistributed or otherwise used for commercial purposes  All illustrations and images included in CareNotes® are the copyrighted property of [employer]  or Jonathan Pereira  The above information is an  only  It is not intended as medical advice for individual conditions or treatments  Talk to your doctor, nurse or pharmacist before following any medical regimen to see if it is safe and effective for you

## 2021-05-25 NOTE — ANESTHESIA PROCEDURE NOTES
Arterial Line Insertion  Performed by: Preeti Farias CRNA  Authorized by: Johana Miranda MD   Consent: Verbal consent obtained  Risks and benefits: risks, benefits and alternatives were discussed  Consent given by: patient  Preparation: Patient was prepped and draped in the usual sterile fashion    Indications: multiple ABGs and hemodynamic monitoring  Orientation:  Right  Location: radial artery  Procedure Details:  Marvin's test normal: yes  Needle gauge: 20  Seldinger technique: Seldinger technique used  Number of attempts: 1    Post-procedure:  Post-procedure: dressing applied  Waveform: good waveform and waveform confirmed  Post-procedure CNS: normal    Comments: Daphne placed by [facility] CRNA

## 2021-05-29 ENCOUNTER — LAB (OUTPATIENT)
Dept: LAB | Facility: HOSPITAL | Age: 56
End: 2021-05-29
Attending: INTERNAL MEDICINE
Payer: COMMERCIAL

## 2021-05-29 DIAGNOSIS — E83.52 HYPERCALCEMIA: ICD-10-CM

## 2021-05-29 DIAGNOSIS — Z85.850 HISTORY OF THYROID CANCER: ICD-10-CM

## 2021-05-29 DIAGNOSIS — D68.61 APS (ANTIPHOSPHOLIPID SYNDROME) (HCC): ICD-10-CM

## 2021-05-29 DIAGNOSIS — E55.9 VITAMIN D DEFICIENCY: ICD-10-CM

## 2021-05-29 DIAGNOSIS — E21.0 PRIMARY HYPERPARATHYROIDISM (HCC): ICD-10-CM

## 2021-05-29 DIAGNOSIS — E89.0 POSTOPERATIVE HYPOTHYROIDISM: ICD-10-CM

## 2021-05-29 LAB
25(OH)D3 SERPL-MCNC: 18.3 NG/ML (ref 30–100)
ANION GAP SERPL CALCULATED.3IONS-SCNC: 3 MMOL/L (ref 4–13)
BUN SERPL-MCNC: 28 MG/DL (ref 5–25)
CALCIUM SERPL-MCNC: 10.1 MG/DL (ref 8.3–10.1)
CHLORIDE SERPL-SCNC: 110 MMOL/L (ref 100–108)
CO2 SERPL-SCNC: 29 MMOL/L (ref 21–32)
CREAT SERPL-MCNC: 0.97 MG/DL (ref 0.6–1.3)
GFR SERPL CREATININE-BSD FRML MDRD: 66 ML/MIN/1.73SQ M
GLUCOSE SERPL-MCNC: 103 MG/DL (ref 65–140)
INR PPP: 1.07 (ref 0.84–1.19)
POTASSIUM SERPL-SCNC: 3.9 MMOL/L (ref 3.5–5.3)
PROTHROMBIN TIME: 13.9 SECONDS (ref 11.6–14.5)
PTH-INTACT SERPL-MCNC: 83.2 PG/ML (ref 18.4–80.1)
SODIUM SERPL-SCNC: 142 MMOL/L (ref 136–145)
T4 FREE SERPL-MCNC: 1.29 NG/DL (ref 0.76–1.46)
TSH SERPL DL<=0.05 MIU/L-ACNC: 3.05 UIU/ML (ref 0.36–3.74)

## 2021-05-29 PROCEDURE — 85610 PROTHROMBIN TIME: CPT

## 2021-05-29 PROCEDURE — 83970 ASSAY OF PARATHORMONE: CPT

## 2021-05-29 PROCEDURE — 84432 ASSAY OF THYROGLOBULIN: CPT

## 2021-05-29 PROCEDURE — 82306 VITAMIN D 25 HYDROXY: CPT

## 2021-05-29 PROCEDURE — 84439 ASSAY OF FREE THYROXINE: CPT

## 2021-05-29 PROCEDURE — 84443 ASSAY THYROID STIM HORMONE: CPT

## 2021-05-29 PROCEDURE — 80048 BASIC METABOLIC PNL TOTAL CA: CPT

## 2021-05-29 PROCEDURE — 36415 COLL VENOUS BLD VENIPUNCTURE: CPT

## 2021-05-29 PROCEDURE — 86800 THYROGLOBULIN ANTIBODY: CPT

## 2021-06-02 ENCOUNTER — TELEPHONE (OUTPATIENT)
Dept: HEMATOLOGY ONCOLOGY | Facility: CLINIC | Age: 56
End: 2021-06-02

## 2021-06-02 LAB
THYROGLOB AB SERPL-ACNC: <1 IU/ML (ref 0–0.9)
THYROGLOB SERPL-MCNC: 0.2 NG/ML (ref 1.5–38.5)

## 2021-06-02 NOTE — TELEPHONE ENCOUNTER
I phoned the patient and discussed with her that, per Seth Barraza [id number], based on her recent PT-INR lab the patient is to take 3 mg of Coumadin daily (including Thursday) and to have her PT-INR drawn again next week  The patient expressed understanding

## 2021-06-04 PROBLEM — E89.2 STATUS POST PARATHYROIDECTOMY (HCC): Status: ACTIVE | Noted: 2021-06-04

## 2021-06-04 PROBLEM — Z98.890 STATUS POST PARATHYROIDECTOMY: Status: ACTIVE | Noted: 2021-06-04

## 2021-06-04 PROBLEM — Z90.89 STATUS POST PARATHYROIDECTOMY: Status: ACTIVE | Noted: 2021-06-04

## 2021-06-08 ENCOUNTER — OFFICE VISIT (OUTPATIENT)
Dept: SURGICAL ONCOLOGY | Facility: CLINIC | Age: 56
End: 2021-06-08

## 2021-06-08 VITALS
WEIGHT: 209 LBS | RESPIRATION RATE: 16 BRPM | BODY MASS INDEX: 39.46 KG/M2 | HEIGHT: 61 IN | DIASTOLIC BLOOD PRESSURE: 86 MMHG | TEMPERATURE: 97.6 F | OXYGEN SATURATION: 98 % | HEART RATE: 63 BPM | SYSTOLIC BLOOD PRESSURE: 122 MMHG

## 2021-06-08 DIAGNOSIS — E89.2 STATUS POST PARATHYROIDECTOMY (HCC): Primary | ICD-10-CM

## 2021-06-08 DIAGNOSIS — R21 RASH: ICD-10-CM

## 2021-06-08 PROCEDURE — 99024 POSTOP FOLLOW-UP VISIT: CPT | Performed by: SURGERY

## 2021-06-08 RX ORDER — NYSTATIN 100000 [USP'U]/G
POWDER TOPICAL 2 TIMES DAILY
Qty: 60 G | Refills: 2 | Status: SHIPPED | OUTPATIENT
Start: 2021-06-08 | End: 2022-06-22

## 2021-06-08 RX ORDER — NIMODIPINE 30 MG/1
60 CAPSULE, LIQUID FILLED ORAL
Qty: 84 CAPSULE | Refills: 0 | Status: SHIPPED | OUTPATIENT
Start: 2021-06-08 | End: 2021-09-13

## 2021-06-08 NOTE — LETTER
June 8, 2021     Caroline Romo, [address] [address]    Patient: Lino Corrales   YOB: 1965   Date of Visit: 6/8/2021       Dear Dr Genevieve Hastings:    Thank you for referring Lino Corrales to me for evaluation  Below are my notes for this consultation  If you have questions, please do not hesitate to call me  I look forward to following your patient along with you  Sincerely,        Violeta Duffy MD        CC: MD Codi Leonard DO Robina Lynch, MD Carlyon Graves, MD Delbra Lanius, MD Renae Cutter, Wava Sacks, MD  6/8/2021 10:21 AM  Sign when Signing Visit     Surgical Oncology Follow Up       [facility] [address] [address] [phone]    Lino Corrales  1965  [id number]  [address] [facility] [address]  [place] [address] [facility]  [phone]    Chief Complaint   Patient presents with    Post-op       Assessment/Plan:    No problem-specific Assessment & Plan notes found for this encounter  Diagnoses and all orders for this visit:    Status post parathyroidectomy [facility])        Advance Care Planning/Advance Directives:  Discussed disease status, cancer treatment plans and/or cancer treatment goals with the patient  Oncology History    No history exists  History of Present Illness:   Patient is a 53-year-old woman here for postop check status post reoperative neck exploration for parathyroidectomy   -Interval History:  She has had sore throat and hoarseness since the surgery but is able to speak and breathe  She had little to no postoperative pain  Review of Systems:  Review of Systems   Constitutional: Negative  HENT: Positive for voice change  All other systems reviewed and are negative        Patient Active Problem List   Diagnosis    Chronic deep vein thrombosis (DVT) of proximal vein of both lower extremities (HCC)    History of ITP    Benign hypertensive CKD    Chronic kidney disease (CKD), stage II (mild)    Edema    Gross hematuria    Hypercalcemia    Hyperparathyroidism ([facility] )    Hypertension    Nephrolithiasis    Proteinuria    Systemic lupus erythematosus (HCC)    Vitamin D deficiency    Elevated PTHrP level    History of thyroid cancer    Upper respiratory tract infection    Rash    Left ear pain    BMI 39 0-39 9,adult    Post-menopausal bleeding    Cellulitis of right index finger    Greater trochanteric bursitis of left hip    Anti-phospholipid syndrome (HCC)    Status post parathyroidectomy ([address] [address] )     Past Medical History:   Diagnosis Date    Cancer ([place] [address] )     thyroid    Chronic kidney disease     Disease of thyroid gland     Gestational diabetes     Hyperparathyroidism ([place] [address] )     Hypertension     Idiopathic thrombocytopenic purpura (ITP) ([facility])     Kidney stone     Lupus ([facility])     Vitamin D deficiency      Past Surgical History:   Procedure Laterality Date     SECTION      3765/5316    DIAGNOSTIC FLEXIBLE LARYNGOSCOPY N/A 2021    Procedure: DIAGNOSTIC FLEXIBLE LARYNGOSCOPY;  Surgeon: Andrey Fischer MD;  [facility];  Service: Surgical Oncology    HERNIA REPAIR      HIP SURGERY      left side, bone decompression    CO EXPLORE PARATHYROID GLANDS Right 2021    Procedure: PARATHYROIDECTOMY, MINIMALLY INVASIVE, right, intraoperative PTH monitoring;  Surgeon: Andrey Fischer MD;  [facility];  Service: Surgical Oncology    THYROID SURGERY      TUBAL LIGATION       Family History   Problem Relation Age of Onset    Diabetes type II Father     Diabetes Father     Stroke Father     Diabetes type II Paternal Aunt     Diabetes type II Paternal Uncle     Diabetes type II Paternal Grandmother     Stomach cancer Paternal Grandfather      Social History     Socioeconomic History    Marital status: /Civil Union     Spouse name: Not on file    Number of children: Not on file    Years of education: Not on file    Highest education level: Not on file   Occupational History    Not on file   Social Needs    Financial resource strain: Not on file    Food insecurity     Worry: Not on file     Inability: Not on file    Transportation needs     Medical: Not on file     Non-medical: Not on file   Tobacco Use    Smoking status: Never Smoker    Smokeless tobacco: Never Used   Substance and Sexual Activity    Alcohol use: Not Currently     Comment: very rare    Drug use: No    Sexual activity: Not on file   Lifestyle    Physical activity     Days per week: Not on file     Minutes per session: Not on file    Stress: Not on file   Relationships    Social connections     Talks on phone: Not on file     Gets together: Not on file     Attends Yazidi service: Not on file     Active member of club or organization: Not on file     Attends meetings of clubs or organizations: Not on file     Relationship status: Not on file    Intimate partner violence     Fear of current or ex partner: Not on file     Emotionally abused: Not on file     Physically abused: Not on file     Forced sexual activity: Not on file   Other Topics Concern    Not on file   Social History Narrative    Not on file       Current Outpatient Medications:     apixaban (Eliquis) 5 mg, Take 5 mg by mouth 2 (two) times a day, Disp: , Rfl:     cholecalciferol (VITAMIN D3) 400 units tablet, Take 1 tablet (400 Units total) by mouth daily, Disp: 30 tablet, Rfl: 4    folic acid (FOLVITE) 1 mg tablet, Take 1 tablet by mouth daily, Disp: , Rfl:     fosinopril (MONOPRIL) 20 mg tablet, Take 1 tablet (20 mg total) by mouth 2 (two) times a day, Disp: 180 tablet, Rfl: 0    levothyroxine 125 mcg tablet, TAKE ONE TABLET BY MOUTH EVERY DAY MON-SAT , Disp: 90 tablet, Rfl: 0    NIFEdipine (PROCARDIA XL) 30 mg 24 hr tablet, Take 1 tablet (30 mg total) by mouth daily, Disp: 90 tablet, Rfl: 3    nystatin (MYCOSTATIN) powder, Apply topically 2 (two) times a day, Disp: 60 g, Rfl: 2    spironolactone (ALDACTONE) 25 mg tablet, Take 1 tablet (25 mg total) by mouth daily, Disp: 90 tablet, Rfl: 3    warfarin (COUMADIN) 3 mg tablet, TAKE 1 TABLET DAILY except on Mondays and Thursdays (Patient taking differently: TAKE 1 TABLET DAILY except Thursdays), Disp: 90 tablet, Rfl: 4    traMADol (ULTRAM) 50 mg tablet, Take 1 tablet (50 mg total) by mouth every 6 (six) hours as needed for moderate pain (Patient not taking: Reported on 6/8/2021), Disp: 10 tablet, Rfl: 0  Allergies   Allergen Reactions    Penicillins Itching     Vitals:    06/08/21 0955   BP: 122/86   Pulse: 63   Resp: 16   Temp: 97 6 °F (36 4 °C)   SpO2: 98%       Physical Exam  Neck:      Musculoskeletal: Normal range of motion and neck supple  Comments: Incision clean dry intact          Results:  Labs:  Lab Results   Component Value Date    SODIUM 142 05/29/2021    K 3 9 05/29/2021     (H) 05/29/2021    CO2 29 05/29/2021    BUN 28 (H) 05/29/2021    CREATININE 0 97 05/29/2021    GLUC 103 05/29/2021    CALCIUM 10 1 05/29/2021       Component      Latest Ref Rng & Units 5/25/2021 5/25/2021 5/25/2021 5/25/2021           9:44 AM 11:32 AM 11:37 AM 11:41 AM   PARATHYROID HORMONE      18 4 - 80 1 pg/mL 241 8 (H) 158 2 (H) 194 6 (H) 206 4 (H)     Component      Latest Ref Rng & Units 5/25/2021 5/25/2021 5/25/2021          12:13 PM 12:17 PM 12:22 PM   PARATHYROID HORMONE      18 4 - 80 1 pg/mL 47 7 64 7 71 6       Imaging  Xr Chest Pa & Lateral    Result Date: 5/16/2021  Narrative: CHEST INDICATION:   E21 3: Hyperparathyroidism, unspecified  COMPARISON:  Chest radiograph from 9/16/2010 and parathyroid CT from 3/9/2021  EXAM PERFORMED/VIEWS:  XR CHEST PA & LATERAL  FINDINGS: Cardiomediastinal silhouette normal  Lungs clear  No effusion or pneumothorax   Tiny benign peripheral scar in the right upper lung  Osseous structures normal for age  Impression: No acute cardiopulmonary disease  Workstation performed: [id number]     I reviewed the above laboratory and imaging data  Discussion/Summary:  Status post re-exploration, right parathyroidectomy  80 mg parathyroid adenoma removed, with apparent decrease in PTH levels at time of exploration  Calcium levels high normal   Will plan of follow-up in 6 months to assess for durability of operation

## 2021-06-08 NOTE — PROGRESS NOTES
Surgical Oncology Follow Up       [facility] [place]  [address] [facility] [address] [phone]    Betina Sandoval  1965  [id number]  [address] [facility] [place]  [address] [facility] [id number] [phone]    Chief Complaint   Patient presents with    Post-op       Assessment/Plan:    No problem-specific Assessment & Plan notes found for this encounter  Diagnoses and all orders for this visit:    Status post parathyroidectomy [facility])        Advance Care Planning/Advance Directives:  Discussed disease status, cancer treatment plans and/or cancer treatment goals with the patient  Oncology History    No history exists  History of Present Illness:   Patient is a 73-year-old woman here for postop check status post reoperative neck exploration for parathyroidectomy   -Interval History:  She has had sore throat and hoarseness since the surgery but is able to speak and breathe  She had little to no postoperative pain  Review of Systems:  Review of Systems   Constitutional: Negative  HENT: Positive for voice change  All other systems reviewed and are negative        Patient Active Problem List   Diagnosis    Chronic deep vein thrombosis (DVT) of proximal vein of both lower extremities (HCC)    History of ITP    Benign hypertensive CKD    Chronic kidney disease (CKD), stage II (mild)    Edema    Gross hematuria    Hypercalcemia    Hyperparathyroidism ([address] )    Hypertension    Nephrolithiasis    Proteinuria    Systemic lupus erythematosus (HCC)    Vitamin D deficiency    Elevated PTHrP level    History of thyroid cancer    Upper respiratory tract infection    Rash    Left ear pain    BMI 39 0-39 9,adult    Post-menopausal bleeding    Cellulitis of right index finger    Greater trochanteric bursitis of left hip    Anti-phospholipid syndrome ([facility] )    Status post parathyroidectomy [facility])     Past Medical History:   Diagnosis Date    Cancer ([place] [address] )     thyroid    Chronic kidney disease     Disease of thyroid gland     Gestational diabetes     Hyperparathyroidism ([address] [place] [address] )     Hypertension     Idiopathic thrombocytopenic purpura (ITP) (HCC)     Kidney stone     Lupus ([address] [address] )     Vitamin D deficiency      Past Surgical History:   Procedure Laterality Date     SECTION      [address]    DIAGNOSTIC FLEXIBLE LARYNGOSCOPY N/A 2021    Procedure: DIAGNOSTIC FLEXIBLE LARYNGOSCOPY;  Surgeon: Cami Zhao MD;  [facility];  Service: Surgical Oncology    HERNIA REPAIR      HIP SURGERY      left side, bone decompression    MS EXPLORE PARATHYROID GLANDS Right 2021    Procedure: PARATHYROIDECTOMY, MINIMALLY INVASIVE, right, intraoperative PTH monitoring;  Surgeon: Cami Zhao MD;  [facility];  Service: Surgical Oncology    THYROID SURGERY      TUBAL LIGATION       Family History   Problem Relation Age of Onset    Diabetes type II Father     Diabetes Father     Stroke Father     Diabetes type II Paternal Aunt     Diabetes type II Paternal Uncle     Diabetes type II Paternal Grandmother     Stomach cancer Paternal Grandfather      Social History     Socioeconomic History    Marital status: /Civil Union     Spouse name: Not on file    Number of children: Not on file    Years of education: Not on file    Highest education level: Not on file   Occupational History    Not on file   Social Needs    Financial resource strain: Not on file    Food insecurity     Worry: Not on file     Inability: Not on file   [employer] needs     Medical: Not on file     Non-medical: Not on file   Tobacco Use    Smoking status: Never Smoker    Smokeless tobacco: Never Used   Substance and Sexual Activity    Alcohol use: Not Currently     Comment: very rare    Drug use: No    Sexual activity: Not on file Lifestyle    Physical activity     Days per week: Not on file     Minutes per session: Not on file    Stress: Not on file   Relationships    Social connections     Talks on phone: Not on file     Gets together: Not on file     Attends Advent service: Not on file     Active member of club or organization: Not on file     Attends meetings of clubs or organizations: Not on file     Relationship status: Not on file    Intimate partner violence     Fear of current or ex partner: Not on file     Emotionally abused: Not on file     Physically abused: Not on file     Forced sexual activity: Not on file   Other Topics Concern    Not on file   Social History Narrative    Not on file       Current Outpatient Medications:     apixaban (Eliquis) 5 mg, Take 5 mg by mouth 2 (two) times a day, Disp: , Rfl:     cholecalciferol (VITAMIN D3) 400 units tablet, Take 1 tablet (400 Units total) by mouth daily, Disp: 30 tablet, Rfl: 4    folic acid (FOLVITE) 1 mg tablet, Take 1 tablet by mouth daily, Disp: , Rfl:     fosinopril (MONOPRIL) 20 mg tablet, Take 1 tablet (20 mg total) by mouth 2 (two) times a day, Disp: 180 tablet, Rfl: 0    levothyroxine 125 mcg tablet, TAKE ONE TABLET BY MOUTH EVERY DAY MON-SAT , Disp: 90 tablet, Rfl: 0    NIFEdipine (PROCARDIA XL) 30 mg 24 hr tablet, Take 1 tablet (30 mg total) by mouth daily, Disp: 90 tablet, Rfl: 3    nystatin (MYCOSTATIN) powder, Apply topically 2 (two) times a day, Disp: 60 g, Rfl: 2    spironolactone (ALDACTONE) 25 mg tablet, Take 1 tablet (25 mg total) by mouth daily, Disp: 90 tablet, Rfl: 3    warfarin (COUMADIN) 3 mg tablet, TAKE 1 TABLET DAILY except on Mondays and Thursdays (Patient taking differently: TAKE 1 TABLET DAILY except Thursdays), Disp: 90 tablet, Rfl: 4    traMADol (ULTRAM) 50 mg tablet, Take 1 tablet (50 mg total) by mouth every 6 (six) hours as needed for moderate pain (Patient not taking: Reported on 6/8/2021), Disp: 10 tablet, Rfl: 0  Allergies Allergen Reactions    Penicillins Itching     Vitals:    06/08/21 0955   BP: 122/86   Pulse: 63   Resp: 16   Temp: 97 6 °F (36 4 °C)   SpO2: 98%       Physical Exam  Neck:      Musculoskeletal: Normal range of motion and neck supple  Comments: Incision clean dry intact          Results:  Labs:  Lab Results   Component Value Date    SODIUM 142 05/29/2021    K 3 9 05/29/2021     (H) 05/29/2021    CO2 29 05/29/2021    BUN 28 (H) 05/29/2021    CREATININE 0 97 05/29/2021    GLUC 103 05/29/2021    CALCIUM 10 1 05/29/2021       Component      Latest Ref Rng & Units 5/25/2021 5/25/2021 5/25/2021 5/25/2021           9:44 AM 11:32 AM 11:37 AM 11:41 AM   PARATHYROID HORMONE      18 4 - 80 1 pg/mL 241 8 (H) 158 2 (H) 194 6 (H) 206 4 (H)     Component      Latest Ref Rng & Units 5/25/2021 5/25/2021 5/25/2021          12:13 PM 12:17 PM 12:22 PM   PARATHYROID HORMONE      18 4 - 80 1 pg/mL 47 7 64 7 71 6       Imaging  Xr Chest Pa & Lateral    Result Date: 5/16/2021  Narrative: CHEST INDICATION:   E21 3: Hyperparathyroidism, unspecified  COMPARISON:  Chest radiograph from 9/16/2010 and parathyroid CT from 3/9/2021  EXAM PERFORMED/VIEWS:  XR CHEST PA & LATERAL  FINDINGS: Cardiomediastinal silhouette normal  Lungs clear  No effusion or pneumothorax  Tiny benign peripheral scar in the right upper lung  Osseous structures normal for age  Impression: No acute cardiopulmonary disease  Workstation performed: [id number]     I reviewed the above laboratory and imaging data  Discussion/Summary:  Status post re-exploration, right parathyroidectomy  80 mg parathyroid adenoma removed, with apparent decrease in PTH levels at time of exploration  Calcium levels high normal   Will plan of follow-up in 6 months to assess for durability of operation

## 2021-06-09 ENCOUNTER — TELEPHONE (OUTPATIENT)
Dept: HEMATOLOGY ONCOLOGY | Facility: CLINIC | Age: 56
End: 2021-06-09

## 2021-06-09 DIAGNOSIS — I82.5Y3 CHRONIC DEEP VEIN THROMBOSIS (DVT) OF PROXIMAL VEIN OF BOTH LOWER EXTREMITIES (HCC): Chronic | ICD-10-CM

## 2021-06-09 RX ORDER — WARFARIN SODIUM 3 MG/1
TABLET ORAL
Qty: 90 TABLET | Refills: 0
Start: 2021-06-09 | End: 2021-06-09 | Stop reason: SDUPTHER

## 2021-06-09 NOTE — TELEPHONE ENCOUNTER
Received call from patient  Coumadin script was not received by [employer]  Was escribed by patient's nephrologist  Patient transferred to nephrologists office

## 2021-06-10 RX ORDER — WARFARIN SODIUM 3 MG/1
TABLET ORAL
Qty: 90 TABLET | Refills: 0 | Status: SHIPPED | OUTPATIENT
Start: 2021-06-10 | End: 2021-09-15

## 2021-06-12 ENCOUNTER — HOSPITAL ENCOUNTER (OUTPATIENT)
Dept: RADIOLOGY | Age: 56
Discharge: HOME | End: 2021-06-12
Payer: COMMERCIAL

## 2021-06-12 ENCOUNTER — APPOINTMENT (OUTPATIENT)
Dept: LAB | Age: 56
End: 2021-06-12
Payer: COMMERCIAL

## 2021-06-12 VITALS — HEIGHT: 61 IN | WEIGHT: 209 LBS | BODY MASS INDEX: 39.46 KG/M2

## 2021-06-12 DIAGNOSIS — D68.61 APS (ANTIPHOSPHOLIPID SYNDROME) (HCC): ICD-10-CM

## 2021-06-12 DIAGNOSIS — Z12.39 SCREENING FOR MALIGNANT NEOPLASM OF BREAST: ICD-10-CM

## 2021-06-12 DIAGNOSIS — Z12.31 ENCOUNTER FOR SCREENING MAMMOGRAM FOR MALIGNANT NEOPLASM OF BREAST: ICD-10-CM

## 2021-06-12 LAB
INR PPP: 2.98 (ref 0.84–1.19)
PROTHROMBIN TIME: 30.7 SECONDS (ref 11.6–14.5)

## 2021-06-12 PROCEDURE — 77063 BREAST TOMOSYNTHESIS BI: CPT

## 2021-06-12 PROCEDURE — 85610 PROTHROMBIN TIME: CPT

## 2021-06-12 PROCEDURE — 36415 COLL VENOUS BLD VENIPUNCTURE: CPT

## 2021-06-12 PROCEDURE — 77067 SCR MAMMO BI INCL CAD: CPT

## 2021-06-14 ENCOUNTER — OFFICE VISIT (OUTPATIENT)
Dept: ENDOCRINOLOGY | Facility: CLINIC | Age: 56
End: 2021-06-14
Payer: COMMERCIAL

## 2021-06-14 VITALS
HEART RATE: 65 BPM | SYSTOLIC BLOOD PRESSURE: 120 MMHG | DIASTOLIC BLOOD PRESSURE: 82 MMHG | HEIGHT: 61 IN | BODY MASS INDEX: 40.02 KG/M2 | TEMPERATURE: 97.7 F | WEIGHT: 212 LBS

## 2021-06-14 DIAGNOSIS — E55.9 VITAMIN D DEFICIENCY: ICD-10-CM

## 2021-06-14 DIAGNOSIS — E89.0 POSTOPERATIVE HYPOTHYROIDISM: Primary | ICD-10-CM

## 2021-06-14 DIAGNOSIS — E21.0 PRIMARY HYPERPARATHYROIDISM (HCC): ICD-10-CM

## 2021-06-14 DIAGNOSIS — Z85.850 HISTORY OF THYROID CANCER: ICD-10-CM

## 2021-06-14 PROCEDURE — 99214 OFFICE O/P EST MOD 30 MIN: CPT | Performed by: PHYSICIAN ASSISTANT

## 2021-06-14 NOTE — PROGRESS NOTES
Patient Progress Note    CC: hypothyroidism, hyperparathyroidism     Referring Provider  No referring provider defined for this encounter  History of Present Illness:   Patient is a 53-year-old female here for follow-up of postsurgical hypothyroidism, primary hyperparathyroidism, vitamin-D deficiency  She has a past medical history of stage I papillary thyroid cancer  She underwent right hemithyroidectomy and right parathyroid gland removal in 2006, followed by completion of thyroidectomy in 2010  In 2010 she also had resection of left parathyroid gland  She also had LEONARDO therapy  Patient is on levothyroxine 125 mcg 1 tablet daily Monday through Saturday and no tablet on Sunday  Patient is taking it 30 minutes before breakfast on an empty stomach and at least 4 hrs apart from supplements  Tolerating medication well  No recent Iodine loading in form of medication, biotin or kelp supplements or radiological diagnostic studies  Most recent thyroid function tests were normal, TSH at 3 050 and free T4 1 29  She admits to missing a dose of the levothyroxine due to her surgery  Thyroglobulin antibody level was less than 1 0 and thyroglobulin low at 0 2 but increased from < 0 1  Head neck ultrasound done in April 2019 did not show any evidence of recurrent or metastatic disease  Repeat US was ordered but not yet completed  Primary hyperparathyroidism: patient underwent a re-exploration parathyroidectomy in May 2021  Her parathyroid levels did improve during surgery  Most recent PTH level slightly elevated at 83  2  she is not taking calcium supplement  Calcium is normal at 10 1  Vitamin D is low at 18 3      Patient Active Problem List   Diagnosis    Chronic deep vein thrombosis (DVT) of proximal vein of both lower extremities (HCC)    History of ITP    Benign hypertensive CKD    Chronic kidney disease (CKD), stage II (mild)    Edema    Gross hematuria    Hypercalcemia    Primary hyperparathyroidism ([place] [address] )    Hypertension    Nephrolithiasis    Proteinuria    Systemic lupus erythematosus (HCC)    Vitamin D deficiency    Elevated PTHrP level    History of thyroid cancer    Upper respiratory tract infection    Rash    Left ear pain    BMI 39 0-39 9,adult    Post-menopausal bleeding    Cellulitis of right index finger    Greater trochanteric bursitis of left hip    Anti-phospholipid syndrome (HCC)    Status post parathyroidectomy ([place] [address] )    Postoperative hypothyroidism     Past Medical History:   Diagnosis Date    Cancer ([place] [address] )     thyroid    Chronic kidney disease     Disease of thyroid gland     Gestational diabetes     Hyperparathyroidism ([place] [address] )     Hypertension     Idiopathic thrombocytopen[facility] purpura [facility]P) [facility])     Kidney stone     Lupus ([facility])     Vitamin D deficiency       Past Surgical History:   Procedure Laterality Date     SECTION      133/06    DIAGNOSTIC FLEXIBLE LARYNGOSCOPY N/A 2021    Procedure: DIAGNOSTIC FLEXIBLE LARYNGOSCOPY;  Surgeon: Kelly Noel MD;  [facility]: [facility];  Service: Surgical Oncology    HERNIA REPAIR      HIP SURGERY      left side, bone decompression    CO EXPLORE PARATHYROID GLANDS Right 2021    Procedure: PARATHYROIDECTOMY, MINIMALLY INVASIVE, right, intraoperative PTH monitoring;  Surgeon: Kelly Noel MD;  [facility];  Service: Surgical Oncology    THYROID SURGERY      TUBAL LIGATION        Family History   Problem Relation Age of Onset    No Known Problems Mother     Diabetes type II Father     Diabetes Father     Stroke Father     Diabetes type II Paternal Aunt     Diabetes type II Paternal Uncle     Diabetes type II Paternal Grandmother     Stomach cancer Paternal Grandfather     No Known Problems Sister     No Known Problems Maternal Grandmother     No Known Problems Maternal Grandfather     No Known Problems Sister     No Known Problems Maternal Aunt     No Known Problems Maternal Aunt  No Known Problems Maternal Aunt     No Known Problems Paternal Aunt     No Known Problems Paternal Aunt      Social History     Tobacco Use    Smoking status: Never Smoker    Smokeless tobacco: Never Used   Substance Use Topics    Alcohol use: Not Currently     Comment: very rare     Allergies   Allergen Reactions    Penicillins Itching     Current Outpatient Medications   Medication Sig Dispense Refill    cholecalciferol (VITAMIN D3) 400 units tablet Take 1 tablet (400 Units total) by mouth daily 30 tablet 4    folic acid (FOLVITE) 1 mg tablet Take 1 tablet by mouth daily      fosinopril (MONOPRIL) 20 mg tablet Take 1 tablet (20 mg total) by mouth 2 (two) times a day 180 tablet 0    levothyroxine 125 mcg tablet TAKE ONE TABLET BY MOUTH EVERY DAY MON-SAT  90 tablet 0    NIFEdipine (PROCARDIA XL) 30 mg 24 hr tablet Take 1 tablet (30 mg total) by mouth daily 90 tablet 3    nystatin (MYCOSTATIN) powder Apply topically 2 (two) times a day 60 g 2    spironolactone (ALDACTONE) 25 mg tablet Take 1 tablet (25 mg total) by mouth daily 90 tablet 3    warfarin (COUMADIN) 3 mg tablet TAKE 1 TABLET DAILY except on Mondays and Thursdays (Patient taking differently: TAKE 1 TABLET DAILY) 90 tablet 0    apixaban (Eliquis) 5 mg Take 5 mg by mouth 2 (two) times a day      niMODipine (NIMOTOP) 30 mg capsule Take 2 capsules (60 mg total) by mouth 3 (three) times a day with meals for 14 days (Patient not taking: Reported on 6/14/2021) 84 capsule 0    traMADol (ULTRAM) 50 mg tablet Take 1 tablet (50 mg total) by mouth every 6 (six) hours as needed for moderate pain (Patient not taking: Reported on 6/14/2021) 10 tablet 0     No current facility-administered medications for this visit  Review of Systems   Constitutional: Positive for fatigue (reports its work related)  Negative for activity change, appetite change and unexpected weight change  HENT: Positive for trouble swallowing (mild, after surgery )  Eyes: Negative for visual disturbance  Respiratory: Negative for shortness of breath  Cardiovascular: Negative for chest pain and palpitations  Gastrointestinal: Negative for constipation and diarrhea  Endocrine: Negative for cold intolerance and heat intolerance  Musculoskeletal: Negative  Skin: Negative  Neurological: Negative for tremors  Psychiatric/Behavioral: Negative  Physical Exam:  Body mass index is 39 73 kg/m²  /82 (BP Location: Left arm, Patient Position: Sitting, Cuff Size: Large)   Pulse 65   Temp 97 7 °F (36 5 °C)   Ht 5' 1 25" (1 556 m)   Wt 96 2 kg (212 lb)   BMI 39 73 kg/m²    Wt Readings from Last 3 Encounters:   06/14/21 96 2 kg (212 lb)   06/12/21 94 8 kg (209 lb)   06/08/21 94 8 kg (209 lb)       Physical Exam  Vitals and nursing note reviewed  Constitutional:       Appearance: She is well-developed  HENT:      Head: Normocephalic  Eyes:      General: No scleral icterus  Pupils: Pupils are equal, round, and reactive to light  Neck:      Thyroid: No thyromegaly  Comments: thyroidectomy and parathyroidectomy scar noted  Cardiovascular:      Rate and Rhythm: Normal rate and regular rhythm  Pulses:           Radial pulses are 2+ on the right side and 2+ on the left side  Heart sounds: No murmur heard  Pulmonary:      Effort: Pulmonary effort is normal  No respiratory distress  Breath sounds: Normal breath sounds  No wheezing  Musculoskeletal:      Cervical back: Neck supple  Skin:     General: Skin is warm and dry  Neurological:      Mental Status: She is alert  Deep Tendon Reflexes: Reflexes are normal and symmetric  Patient's shoes and socks were not removed        Labs:   Component      Latest Ref Rng & Units 5/25/2021 5/25/2021 5/25/2021 5/25/2021          11:41 AM 12:13 PM 12:17 PM 12:22 PM   Sodium      136 - 145 mmol/L       Potassium      3 5 - 5 3 mmol/L       Chloride      100 - 108 mmol/L CO2      21 - 32 mmol/L       Anion Gap      4 - 13 mmol/L       BUN      5 - 25 mg/dL       Creatinine      0 60 - 1 30 mg/dL       Glucose, Random      65 - 140 mg/dL       Calcium      8 3 - 10 1 mg/dL       eGFR      ml/min/1 73sq m       PARATHYROID HORMONE      18 4 - 80 1 pg/mL 206 4 (H) 47 7 64 7 71 6   Free T4      0 76 - 1 46 ng/dL       THYROGLOBULIN AB      0 0 - 0 9 IU/mL       Vit D, 25-Hydroxy      30 0 - 100 0 ng/mL       Thyroglobulin-MIRACLE      1 5 - 38 5 ng/mL         Component      Latest Ref Rng & Units 5/25/2021 5/25/2021 5/25/2021 5/29/2021           1:22 PM  2:58 PM  5:01 PM    Sodium      136 - 145 mmol/L    142   Potassium      3 5 - 5 3 mmol/L    3 9   Chloride      100 - 108 mmol/L    110 (H)   CO2      21 - 32 mmol/L    29   Anion Gap      4 - 13 mmol/L    3 (L)   BUN      5 - 25 mg/dL    28 (H)   Creatinine      0 60 - 1 30 mg/dL    0 97   Glucose, Random      65 - 140 mg/dL    103   Calcium      8 3 - 10 1 mg/dL 10 4 (H) 10 2 (H) 10 4 (H) 10 1   eGFR      ml/min/1 73sq m    66   PARATHYROID HORMONE      18 4 - 80 1 pg/mL    83 2 (H)   Free T4      0 76 - 1 46 ng/dL    1 29   THYROGLOBULIN AB      0 0 - 0 9 IU/mL    <1 0   Vit D, 25-Hydroxy      30 0 - 100 0 ng/mL    18 3 (L)   Thyroglobulin-MIRACLE      1 5 - 38 5 ng/mL    0 2 (L)     Plan:    Shelbie Russell was seen today for follow-up  Diagnoses and all orders for this visit:    Postoperative hypothyroidism  TFTs normal, TSH 3 050 and free T4 1 29  TSH is high considering history of thyroid cancer, however she did miss a dose a few days prior to labs due to surgery   TG also trended slightly upwards  Continue current treatment  Repeat TFTs and TG in 6 weeks and prior to next visit  -     T4, free; Future  -     TSH, 3rd generation; Future  -     T4, free; Future  -     TSH, 3rd generation;  Future    History of thyroid cancer  Thyroglobulin tumor is low at 0 2 but slightly increased from prior level at < 0 1  TG ab is < 1 0  Last head neck US was done in 2019  Multiple repeat US were ordered but patient has not yet completed test  Advised to complete now  Monitor labs  -     Thyroglobulin; Future  -     Thyroglobulin; Future    Primary hyperparathyroidism ([address] )  S/p re-exploration parathyroidectomy  PTH levels improved to high normal during surgery  Calcium remains high / high normal  Continue to avoid calcium supplementation  Hydrate well  Continue to monitor labs  Also followed by Dr Hallie Coto  She states she refuses to get another parathyroid surgery if this surgery did not resolve the issue  -     Comprehensive metabolic panel; Future  -     Vitamin D 25 hydroxy; Future    Vitamin D deficiency  Vitamin D remains low  PTH slightly elevated and calcium high normal  For now continue current dose of vitamin D supplementation  -     Vitamin D 25 hydroxy; Future      Discussed with the patient and all questions fully answered  She will call me if any problems arise      Counseled patient on diagnostic results, prognosis, risk and benefit of treatment options, instruction for management, importance of treatment compliance, risk  factor reduction and impressions      Diana Jin PA-C

## 2021-06-25 ENCOUNTER — TELEPHONE (OUTPATIENT)
Dept: HEMATOLOGY ONCOLOGY | Facility: CLINIC | Age: 56
End: 2021-06-25

## 2021-06-25 NOTE — TELEPHONE ENCOUNTER
Spoke with pt and she stated that she is taking 3mg daily and nothing in Thursday  She will recheck INR Monday and call for results

## 2021-06-28 ENCOUNTER — TELEPHONE (OUTPATIENT)
Dept: HEMATOLOGY ONCOLOGY | Facility: CLINIC | Age: 56
End: 2021-06-28

## 2021-06-28 ENCOUNTER — APPOINTMENT (OUTPATIENT)
Dept: LAB | Age: 56
End: 2021-06-28
Payer: COMMERCIAL

## 2021-06-28 DIAGNOSIS — D68.61 APS (ANTIPHOSPHOLIPID SYNDROME) (HCC): ICD-10-CM

## 2021-06-28 LAB
INR PPP: 2.97 (ref 0.84–1.19)
PROTHROMBIN TIME: 30.7 SECONDS (ref 11.6–14.5)

## 2021-06-28 PROCEDURE — 36415 COLL VENOUS BLD VENIPUNCTURE: CPT

## 2021-06-28 PROCEDURE — 85610 PROTHROMBIN TIME: CPT

## 2021-06-28 NOTE — TELEPHONE ENCOUNTER
INR today 2 97    Patient is taking coumadin 3 mg daily except for Thursday  Will send to Dr Nicolasa Ramires MA    Patient aware of plan

## 2021-07-14 ENCOUNTER — TELEPHONE (OUTPATIENT)
Dept: OBGYN CLINIC | Facility: CLINIC | Age: 56
End: 2021-07-14

## 2021-07-14 ENCOUNTER — HOSPITAL ENCOUNTER (OUTPATIENT)
Dept: RADIOLOGY | Age: 56
Discharge: HOME | End: 2021-07-14
Payer: COMMERCIAL

## 2021-07-14 ENCOUNTER — APPOINTMENT (OUTPATIENT)
Dept: LAB | Age: 56
End: 2021-07-14
Payer: COMMERCIAL

## 2021-07-14 ENCOUNTER — OFFICE VISIT (OUTPATIENT)
Dept: OBGYN CLINIC | Facility: CLINIC | Age: 56
End: 2021-07-14
Payer: COMMERCIAL

## 2021-07-14 VITALS — WEIGHT: 206.6 LBS | BODY MASS INDEX: 38.72 KG/M2 | SYSTOLIC BLOOD PRESSURE: 118 MMHG | DIASTOLIC BLOOD PRESSURE: 80 MMHG

## 2021-07-14 DIAGNOSIS — N95.0 POST-MENOPAUSAL BLEEDING: ICD-10-CM

## 2021-07-14 DIAGNOSIS — D68.61 APS (ANTIPHOSPHOLIPID SYNDROME) (HCC): ICD-10-CM

## 2021-07-14 DIAGNOSIS — Z85.850 HISTORY OF THYROID CANCER: ICD-10-CM

## 2021-07-14 DIAGNOSIS — N95.0 POST-MENOPAUSAL BLEEDING: Primary | ICD-10-CM

## 2021-07-14 DIAGNOSIS — E89.0 POSTOPERATIVE HYPOTHYROIDISM: ICD-10-CM

## 2021-07-14 LAB
BASOPHILS # BLD AUTO: 0.04 THOUSANDS/ΜL (ref 0–0.1)
BASOPHILS NFR BLD AUTO: 1 % (ref 0–1)
EOSINOPHIL # BLD AUTO: 0.03 THOUSAND/ΜL (ref 0–0.61)
EOSINOPHIL NFR BLD AUTO: 1 % (ref 0–6)
ERYTHROCYTE [DISTWIDTH] IN BLOOD BY AUTOMATED COUNT: 12.4 % (ref 11.6–15.1)
HCT VFR BLD AUTO: 39.4 % (ref 34.8–46.1)
HGB BLD-MCNC: 13.3 G/DL (ref 11.5–15.4)
IMM GRANULOCYTES # BLD AUTO: 0.04 THOUSAND/UL (ref 0–0.2)
IMM GRANULOCYTES NFR BLD AUTO: 1 % (ref 0–2)
INR PPP: 2.75 (ref 0.84–1.19)
LYMPHOCYTES # BLD AUTO: 1.79 THOUSANDS/ΜL (ref 0.6–4.47)
LYMPHOCYTES NFR BLD AUTO: 29 % (ref 14–44)
MCH RBC QN AUTO: 30.2 PG (ref 26.8–34.3)
MCHC RBC AUTO-ENTMCNC: 33.8 G/DL (ref 31.4–37.4)
MCV RBC AUTO: 90 FL (ref 82–98)
MONOCYTES # BLD AUTO: 0.46 THOUSAND/ΜL (ref 0.17–1.22)
MONOCYTES NFR BLD AUTO: 8 % (ref 4–12)
NEUTROPHILS # BLD AUTO: 3.75 THOUSANDS/ΜL (ref 1.85–7.62)
NEUTS SEG NFR BLD AUTO: 60 % (ref 43–75)
NRBC BLD AUTO-RTO: 0 /100 WBCS
PLATELET # BLD AUTO: 194 THOUSANDS/UL (ref 149–390)
PMV BLD AUTO: 10.4 FL (ref 8.9–12.7)
PROTHROMBIN TIME: 28.9 SECONDS (ref 11.6–14.5)
RBC # BLD AUTO: 4.4 MILLION/UL (ref 3.81–5.12)
T4 FREE SERPL-MCNC: 1.33 NG/DL (ref 0.76–1.46)
TSH SERPL DL<=0.05 MIU/L-ACNC: 6.29 UIU/ML (ref 0.36–3.74)
WBC # BLD AUTO: 6.11 THOUSAND/UL (ref 4.31–10.16)

## 2021-07-14 PROCEDURE — 99213 OFFICE O/P EST LOW 20 MIN: CPT | Performed by: NURSE PRACTITIONER

## 2021-07-14 PROCEDURE — 76830 TRANSVAGINAL US NON-OB: CPT

## 2021-07-14 PROCEDURE — 84439 ASSAY OF FREE THYROXINE: CPT

## 2021-07-14 PROCEDURE — 85025 COMPLETE CBC W/AUTO DIFF WBC: CPT

## 2021-07-14 PROCEDURE — 84432 ASSAY OF THYROGLOBULIN: CPT

## 2021-07-14 PROCEDURE — 84443 ASSAY THYROID STIM HORMONE: CPT

## 2021-07-14 PROCEDURE — 76856 US EXAM PELVIC COMPLETE: CPT

## 2021-07-14 PROCEDURE — 85610 PROTHROMBIN TIME: CPT

## 2021-07-14 PROCEDURE — 86800 THYROGLOBULIN ANTIBODY: CPT

## 2021-07-14 PROCEDURE — 36415 COLL VENOUS BLD VENIPUNCTURE: CPT

## 2021-07-14 NOTE — TELEPHONE ENCOUNTER
Her lining is thick again  She had D&E in April with EC  She scheduled an appt  with her to discuss next steps but please call patient and let her know her lining is a little thick (not as much as before)

## 2021-07-14 NOTE — ASSESSMENT & PLAN NOTE
Had D&E with polypectomy with EC in 4/21  Started bleeding again yesterday  Reviewed with EC and recommends pelvic U/S and F/U to discuss next steps  U/S ordered stat as she is leaving for vacation on Friday  CBC ordered

## 2021-07-15 ENCOUNTER — TELEPHONE (OUTPATIENT)
Dept: HEMATOLOGY ONCOLOGY | Facility: CLINIC | Age: 56
End: 2021-07-15

## 2021-07-15 DIAGNOSIS — E21.0 PRIMARY HYPERPARATHYROIDISM (HCC): ICD-10-CM

## 2021-07-15 DIAGNOSIS — Z85.850 HISTORY OF THYROID CANCER: ICD-10-CM

## 2021-07-15 DIAGNOSIS — E89.0 POSTOPERATIVE HYPOTHYROIDISM: ICD-10-CM

## 2021-07-15 DIAGNOSIS — E83.52 HYPERCALCEMIA: ICD-10-CM

## 2021-07-15 RX ORDER — LEVOTHYROXINE SODIUM 0.12 MG/1
TABLET ORAL
Qty: 90 TABLET | Refills: 0
Start: 2021-07-15 | End: 2021-08-17

## 2021-07-15 NOTE — TELEPHONE ENCOUNTER
Increase dose of levothyroxine by taking 1 tablet Mon-Sat and 1/2 tablet on Sunday  Order repeat TSH and free T4 for 6 weeks

## 2021-07-15 NOTE — ADDENDUM NOTE
Addended by: Sandra Dobbins on: 7/15/2021 03:12 PM     Modules accepted: Orders
no indicators present

## 2021-07-15 NOTE — TELEPHONE ENCOUNTER
Spoke with patient  Discussed results  Patient has an upcoming appointment with Dr Mekhi Wen  Will advise to see if she needs to be seen sooner

## 2021-07-15 NOTE — TELEPHONE ENCOUNTER
TSH is elevated and free T4 is normal  Thyroglobulin level is still pending  Has she missed any doses of her levothyroxine 125 Mon-Sat?

## 2021-07-15 NOTE — TELEPHONE ENCOUNTER
I phoned the patient and left a voicemail message that, per Idania Quan, the patient's INR yesterday was 2 75 and that she can stay on her same dosing schedule of the Coumadin, which is 3 mg daily except for Thursday  In addition, the patient is to have her INR checked in one month  The [facility] number was provided

## 2021-07-16 LAB
THYROGLOB AB SERPL-ACNC: <1 IU/ML (ref 0–0.9)
THYROGLOB SERPL-MCNC: <0.1 NG/ML (ref 1.5–38.5)

## 2021-07-19 ENCOUNTER — TELEPHONE (OUTPATIENT)
Dept: OBGYN CLINIC | Facility: MEDICAL CENTER | Age: 56
End: 2021-07-19

## 2021-07-19 ENCOUNTER — TELEPHONE (OUTPATIENT)
Dept: ENDOCRINOLOGY | Facility: CLINIC | Age: 56
End: 2021-07-19

## 2021-07-19 NOTE — TELEPHONE ENCOUNTER
----- Message from Yaakov Valles PA-C sent at 7/19/2021  8:12 AM EDT -----  Please call the patient regarding her abnormal result  Thyroglobulin (tumor marker) is undetectable and thyroglobulin ab is less than 1 0 which is good

## 2021-08-13 DIAGNOSIS — I12.9 PARENCHYMAL RENAL HYPERTENSION, STAGE 1 THROUGH STAGE 4 OR UNSPECIFIED CHRONIC KIDNEY DISEASE: ICD-10-CM

## 2021-08-13 DIAGNOSIS — I10 ESSENTIAL HYPERTENSION, BENIGN: ICD-10-CM

## 2021-08-13 RX ORDER — FOSINOPRIL SODIUM 20 MG/1
20 TABLET ORAL 2 TIMES DAILY
Qty: 180 TABLET | Refills: 0 | Status: SHIPPED | OUTPATIENT
Start: 2021-08-13 | End: 2021-11-08 | Stop reason: SDUPTHER

## 2021-08-13 RX ORDER — SPIRONOLACTONE 25 MG/1
25 TABLET ORAL DAILY
Qty: 90 TABLET | Refills: 0 | Status: SHIPPED | OUTPATIENT
Start: 2021-08-13 | End: 2021-11-08 | Stop reason: SDUPTHER

## 2021-08-26 DIAGNOSIS — E21.0 PRIMARY HYPERPARATHYROIDISM (HCC): ICD-10-CM

## 2021-08-26 DIAGNOSIS — E83.52 HYPERCALCEMIA: ICD-10-CM

## 2021-08-26 DIAGNOSIS — E89.0 POSTOPERATIVE HYPOTHYROIDISM: ICD-10-CM

## 2021-08-26 DIAGNOSIS — Z85.850 HISTORY OF THYROID CANCER: ICD-10-CM

## 2021-08-26 RX ORDER — LEVOTHYROXINE SODIUM 0.12 MG/1
TABLET ORAL
Qty: 90 TABLET | Refills: 1 | Status: SHIPPED | OUTPATIENT
Start: 2021-08-26 | End: 2021-10-20

## 2021-09-08 ENCOUNTER — APPOINTMENT (OUTPATIENT)
Dept: LAB | Facility: CLINIC | Age: 56
End: 2021-09-08
Payer: COMMERCIAL

## 2021-09-08 ENCOUNTER — OFFICE VISIT (OUTPATIENT)
Dept: OBGYN CLINIC | Facility: CLINIC | Age: 56
End: 2021-09-08
Payer: COMMERCIAL

## 2021-09-08 VITALS — BODY MASS INDEX: 38.01 KG/M2 | SYSTOLIC BLOOD PRESSURE: 116 MMHG | WEIGHT: 202.8 LBS | DIASTOLIC BLOOD PRESSURE: 78 MMHG

## 2021-09-08 DIAGNOSIS — N95.0 POST-MENOPAUSAL BLEEDING: Primary | ICD-10-CM

## 2021-09-08 DIAGNOSIS — D68.61 APS (ANTIPHOSPHOLIPID SYNDROME) (HCC): ICD-10-CM

## 2021-09-08 DIAGNOSIS — N95.0 POST-MENOPAUSAL BLEEDING: ICD-10-CM

## 2021-09-08 LAB
ESTRADIOL SERPL-MCNC: 25 PG/ML
FSH SERPL-ACNC: 37.1 MIU/ML
INR PPP: 2.91 (ref 0.84–1.19)
LH SERPL-ACNC: 24.7 MIU/ML
PROTHROMBIN TIME: 28.9 SECONDS (ref 11.6–14.5)

## 2021-09-08 PROCEDURE — 36415 COLL VENOUS BLD VENIPUNCTURE: CPT

## 2021-09-08 PROCEDURE — 85610 PROTHROMBIN TIME: CPT

## 2021-09-08 PROCEDURE — 82670 ASSAY OF TOTAL ESTRADIOL: CPT

## 2021-09-08 PROCEDURE — 83002 ASSAY OF GONADOTROPIN (LH): CPT

## 2021-09-08 PROCEDURE — 99214 OFFICE O/P EST MOD 30 MIN: CPT | Performed by: STUDENT IN AN ORGANIZED HEALTH CARE EDUCATION/TRAINING PROGRAM

## 2021-09-08 PROCEDURE — 83001 ASSAY OF GONADOTROPIN (FSH): CPT

## 2021-09-08 PROCEDURE — 83516 IMMUNOASSAY NONANTIBODY: CPT

## 2021-09-08 NOTE — PROGRESS NOTES
Assessment/Plan:     Problem List Items Addressed This Visit        Other    Post-menopausal bleeding - Primary    Relevant Orders    Antimullerian hormone (AMH)    Follicle stimulating hormone    Luteinizing hormone    Estradiol        -given benign pathology in April, we discussed that it is essentially impossible for Adalberto Cespedes to have endometrial cancer at this point  Recommend evaluation to ensure she is in fact postmenopausal, rather than perimenopausal/oligoovulatory  Discussed options for expectant, medical/IUD, surgical management  She understands options, will await serum labs  RTO annual    Subjective:      Patient ID: Carlos Lambert is a 49 y o  female  HPI  She presents today for further discussion about persistent postmenopausal bleeding  She continues to have bleeding approximately once per month  She notes a long history of oligomenorrhea, but was amenorrheic for 3 years prior to this most recent occurrence  The following portions of the patient's history were reviewed and updated as appropriate: allergies, current medications, past family history, past medical history, past social history, past surgical history and problem list     Review of Systems  as above    Objective:  /78 (BP Location: Left arm, Patient Position: Sitting, Cuff Size: Large)   Wt 92 kg (202 lb 12 8 oz)   BMI 38 01 kg/m²      Physical Exam  Vitals reviewed  Constitutional:       Appearance: She is well-developed  HENT:      Head: Normocephalic  Pulmonary:      Effort: Pulmonary effort is normal    Musculoskeletal:         General: Normal range of motion  Cervical back: Normal range of motion  Neurological:      Mental Status: She is alert and oriented to person, place, and time     Psychiatric:         Behavior: Behavior normal            Overall MDM: moderate   Diagnosis moderate, Data moderate, Risk low

## 2021-09-13 ENCOUNTER — OFFICE VISIT (OUTPATIENT)
Dept: INTERNAL MEDICINE CLINIC | Facility: CLINIC | Age: 56
End: 2021-09-13
Payer: COMMERCIAL

## 2021-09-13 VITALS
HEIGHT: 61 IN | BODY MASS INDEX: 38.14 KG/M2 | HEART RATE: 80 BPM | SYSTOLIC BLOOD PRESSURE: 130 MMHG | DIASTOLIC BLOOD PRESSURE: 86 MMHG | TEMPERATURE: 97.3 F | OXYGEN SATURATION: 99 % | WEIGHT: 202 LBS

## 2021-09-13 DIAGNOSIS — H92.02 LEFT EAR PAIN: ICD-10-CM

## 2021-09-13 DIAGNOSIS — R21 RASH: ICD-10-CM

## 2021-09-13 DIAGNOSIS — E89.0 POSTOPERATIVE HYPOTHYROIDISM: Primary | ICD-10-CM

## 2021-09-13 DIAGNOSIS — N95.0 POST-MENOPAUSAL BLEEDING: ICD-10-CM

## 2021-09-13 LAB — MIS SERPL-MCNC: <0.015 NG/ML

## 2021-09-13 PROCEDURE — 99213 OFFICE O/P EST LOW 20 MIN: CPT | Performed by: NURSE PRACTITIONER

## 2021-09-13 NOTE — PROGRESS NOTES
Assessment/Plan:    Rash  Rash under breast and groin, refer to dermatology  Use nystatin powder for now    Post-menopausal bleeding  Follow up with gynecologist    Left ear pain  No sign of infection no redness  Try flonase nasal spray       Diagnoses and all orders for this visit:    Postoperative hypothyroidism    Post-menopausal bleeding    Rash  -     Ambulatory referral to Dermatology; Future    Left ear pain          BMI Counseling: Body mass index is 37 86 kg/m²  The BMI is above normal  Nutrition recommendations include reducing fast food intake, consuming healthier snacks and decreasing soda and/or juice intake  Exercise recommendations include exercising 3-5 times per week  Subjective:      Patient ID: Osmel Barrera is a 49 y o  female  Patient is here for a regular follow up    Blood pressure normal    Post menopausal bleeding- sees gynecologist  It may be because of her anticoagulant versus malignancy    Dark hyperpigmented rash under breasts and groin- using nystatin powder    Complains of left ear pain      The following portions of the patient's history were reviewed and updated as appropriate: allergies, current medications, past family history, past medical history, past social history, past surgical history and problem list     Review of Systems   Constitutional: Negative  HENT: Positive for ear pain  Eyes: Negative  Respiratory: Negative  Cardiovascular: Negative  Gastrointestinal: Negative  Genitourinary: Positive for vaginal bleeding  Musculoskeletal: Negative  Skin: Positive for rash  Neurological: Negative  Objective:      /86   Pulse 80   Temp (!) 97 3 °F (36 3 °C) (Temporal)   Ht 5' 1 25" (1 556 m)   Wt 91 6 kg (202 lb)   SpO2 99%   BMI 37 86 kg/m²          Physical Exam  Vitals and nursing note reviewed  Constitutional:       Appearance: Normal appearance  She is well-developed  HENT:      Head: Normocephalic and atraumatic  Right Ear: External ear normal       Left Ear: External ear normal       Nose: Nose normal    Eyes:      Conjunctiva/sclera: Conjunctivae normal       Pupils: Pupils are equal, round, and reactive to light  Cardiovascular:      Rate and Rhythm: Normal rate and regular rhythm  Pulmonary:      Effort: Pulmonary effort is normal       Breath sounds: Normal breath sounds  Abdominal:      General: Bowel sounds are normal       Palpations: Abdomen is soft  Musculoskeletal:         General: Normal range of motion  Cervical back: Normal range of motion and neck supple  Skin:     General: Skin is warm and dry  Neurological:      Mental Status: She is alert and oriented to person, place, and time

## 2021-09-14 DIAGNOSIS — I82.5Y3 CHRONIC DEEP VEIN THROMBOSIS (DVT) OF PROXIMAL VEIN OF BOTH LOWER EXTREMITIES (HCC): Chronic | ICD-10-CM

## 2021-09-15 RX ORDER — WARFARIN SODIUM 3 MG/1
TABLET ORAL
Qty: 90 TABLET | Refills: 0 | Status: SHIPPED | OUTPATIENT
Start: 2021-09-15 | End: 2021-12-06 | Stop reason: SDUPTHER

## 2021-09-16 ENCOUNTER — TELEPHONE (OUTPATIENT)
Dept: OBGYN CLINIC | Facility: CLINIC | Age: 56
End: 2021-09-16

## 2021-09-16 NOTE — TELEPHONE ENCOUNTER
----- Message from Elian Santiago sent at 9/15/2021  6:40 PM EDT -----  Regarding: RE: Results  Contact: [phone]  What were my choices again on how to proceed  ----- Message -----  From: Sharmila Brown MD  Sent: 9/15/21 5:42 PM  To: Elian Santiago  Subject: Results    Avinash Juanjo,     Not surprisingly, your labs look like someone who is in menopause  Let me know how you would like to proceed      David Dowell MD

## 2021-09-21 ENCOUNTER — TELEPHONE (OUTPATIENT)
Dept: NEPHROLOGY | Facility: CLINIC | Age: 56
End: 2021-09-21

## 2021-09-21 NOTE — TELEPHONE ENCOUNTER
Left message reminding patient of her appointment on 9/28 with Dr Mayelin [place] and there labs to be done for the appointment

## 2021-09-24 ENCOUNTER — APPOINTMENT (OUTPATIENT)
Dept: LAB | Age: 56
End: 2021-09-24
Payer: COMMERCIAL

## 2021-09-24 DIAGNOSIS — E55.9 VITAMIN D DEFICIENCY: ICD-10-CM

## 2021-09-24 DIAGNOSIS — E21.0 PRIMARY HYPERPARATHYROIDISM (HCC): ICD-10-CM

## 2021-09-24 DIAGNOSIS — E89.0 POSTOPERATIVE HYPOTHYROIDISM: ICD-10-CM

## 2021-09-24 DIAGNOSIS — Z85.850 HISTORY OF THYROID CANCER: ICD-10-CM

## 2021-09-24 DIAGNOSIS — N18.2 CHRONIC KIDNEY DISEASE (CKD), STAGE II (MILD): ICD-10-CM

## 2021-09-24 LAB
25(OH)D3 SERPL-MCNC: 18.3 NG/ML (ref 30–100)
ALBUMIN SERPL BCP-MCNC: 3.1 G/DL (ref 3.5–5)
ALP SERPL-CCNC: 76 U/L (ref 46–116)
ALT SERPL W P-5'-P-CCNC: 25 U/L (ref 12–78)
ANION GAP SERPL CALCULATED.3IONS-SCNC: 5 MMOL/L (ref 4–13)
AST SERPL W P-5'-P-CCNC: 22 U/L (ref 5–45)
BASOPHILS # BLD AUTO: 0.02 THOUSANDS/ΜL (ref 0–0.1)
BASOPHILS NFR BLD AUTO: 0 % (ref 0–1)
BILIRUB SERPL-MCNC: 0.75 MG/DL (ref 0.2–1)
BUN SERPL-MCNC: 19 MG/DL (ref 5–25)
CALCIUM ALBUM COR SERPL-MCNC: 9.9 MG/DL (ref 8.3–10.1)
CALCIUM SERPL-MCNC: 9.2 MG/DL (ref 8.3–10.1)
CHLORIDE SERPL-SCNC: 109 MMOL/L (ref 100–108)
CO2 SERPL-SCNC: 24 MMOL/L (ref 21–32)
CREAT SERPL-MCNC: 0.86 MG/DL (ref 0.6–1.3)
CREAT UR-MCNC: 109 MG/DL
EOSINOPHIL # BLD AUTO: 0.09 THOUSAND/ΜL (ref 0–0.61)
EOSINOPHIL NFR BLD AUTO: 2 % (ref 0–6)
ERYTHROCYTE [DISTWIDTH] IN BLOOD BY AUTOMATED COUNT: 12.4 % (ref 11.6–15.1)
GFR SERPL CREATININE-BSD FRML MDRD: 77 ML/MIN/1.73SQ M
GLUCOSE P FAST SERPL-MCNC: 96 MG/DL (ref 65–99)
HCT VFR BLD AUTO: 41.7 % (ref 34.8–46.1)
HGB BLD-MCNC: 13.6 G/DL (ref 11.5–15.4)
IMM GRANULOCYTES # BLD AUTO: 0.02 THOUSAND/UL (ref 0–0.2)
IMM GRANULOCYTES NFR BLD AUTO: 0 % (ref 0–2)
LYMPHOCYTES # BLD AUTO: 1.28 THOUSANDS/ΜL (ref 0.6–4.47)
LYMPHOCYTES NFR BLD AUTO: 24 % (ref 14–44)
MAGNESIUM SERPL-MCNC: 1.8 MG/DL (ref 1.6–2.6)
MCH RBC QN AUTO: 29.3 PG (ref 26.8–34.3)
MCHC RBC AUTO-ENTMCNC: 32.6 G/DL (ref 31.4–37.4)
MCV RBC AUTO: 90 FL (ref 82–98)
MONOCYTES # BLD AUTO: 0.47 THOUSAND/ΜL (ref 0.17–1.22)
MONOCYTES NFR BLD AUTO: 9 % (ref 4–12)
NEUTROPHILS # BLD AUTO: 3.54 THOUSANDS/ΜL (ref 1.85–7.62)
NEUTS SEG NFR BLD AUTO: 65 % (ref 43–75)
NRBC BLD AUTO-RTO: 0 /100 WBCS
PHOSPHATE SERPL-MCNC: 2.1 MG/DL (ref 2.7–4.5)
PLATELET # BLD AUTO: 182 THOUSANDS/UL (ref 149–390)
PMV BLD AUTO: 11.1 FL (ref 8.9–12.7)
POTASSIUM SERPL-SCNC: 3.9 MMOL/L (ref 3.5–5.3)
PROT SERPL-MCNC: 7.3 G/DL (ref 6.4–8.2)
PROT UR-MCNC: 99 MG/DL
PROT/CREAT UR: 0.91 MG/G{CREAT} (ref 0–0.1)
PTH-INTACT SERPL-MCNC: 132 PG/ML (ref 18.4–80.1)
RBC # BLD AUTO: 4.64 MILLION/UL (ref 3.81–5.12)
SODIUM SERPL-SCNC: 138 MMOL/L (ref 136–145)
T4 FREE SERPL-MCNC: 1.36 NG/DL (ref 0.76–1.46)
TSH SERPL DL<=0.05 MIU/L-ACNC: 2.74 UIU/ML (ref 0.36–3.74)
WBC # BLD AUTO: 5.42 THOUSAND/UL (ref 4.31–10.16)

## 2021-09-24 PROCEDURE — 83970 ASSAY OF PARATHORMONE: CPT

## 2021-09-24 PROCEDURE — 84443 ASSAY THYROID STIM HORMONE: CPT

## 2021-09-24 PROCEDURE — 86800 THYROGLOBULIN ANTIBODY: CPT

## 2021-09-24 PROCEDURE — 84439 ASSAY OF FREE THYROXINE: CPT

## 2021-09-24 PROCEDURE — 85025 COMPLETE CBC W/AUTO DIFF WBC: CPT

## 2021-09-24 PROCEDURE — 82306 VITAMIN D 25 HYDROXY: CPT

## 2021-09-24 PROCEDURE — 80053 COMPREHEN METABOLIC PANEL: CPT

## 2021-09-24 PROCEDURE — 84432 ASSAY OF THYROGLOBULIN: CPT

## 2021-09-24 PROCEDURE — 83735 ASSAY OF MAGNESIUM: CPT

## 2021-09-24 PROCEDURE — 84100 ASSAY OF PHOSPHORUS: CPT

## 2021-09-24 PROCEDURE — 82570 ASSAY OF URINE CREATININE: CPT

## 2021-09-24 PROCEDURE — 36415 COLL VENOUS BLD VENIPUNCTURE: CPT

## 2021-09-24 PROCEDURE — 84156 ASSAY OF PROTEIN URINE: CPT

## 2021-09-25 LAB
THYROGLOB AB SERPL-ACNC: <1 IU/ML (ref 0–0.9)
THYROGLOB SERPL-MCNC: <0.1 NG/ML (ref 1.5–38.5)

## 2021-09-28 ENCOUNTER — OFFICE VISIT (OUTPATIENT)
Dept: NEPHROLOGY | Facility: CLINIC | Age: 56
End: 2021-09-28
Payer: COMMERCIAL

## 2021-09-28 VITALS
DIASTOLIC BLOOD PRESSURE: 70 MMHG | SYSTOLIC BLOOD PRESSURE: 122 MMHG | HEIGHT: 61 IN | WEIGHT: 203 LBS | HEART RATE: 58 BPM | BODY MASS INDEX: 38.33 KG/M2

## 2021-09-28 DIAGNOSIS — N18.2 CHRONIC KIDNEY DISEASE (CKD), STAGE II (MILD): Primary | ICD-10-CM

## 2021-09-28 DIAGNOSIS — E83.52 HYPERCALCEMIA: ICD-10-CM

## 2021-09-28 DIAGNOSIS — E55.9 VITAMIN D DEFICIENCY: ICD-10-CM

## 2021-09-28 DIAGNOSIS — I10 ESSENTIAL HYPERTENSION: ICD-10-CM

## 2021-09-28 DIAGNOSIS — N20.0 NEPHROLITHIASIS: ICD-10-CM

## 2021-09-28 DIAGNOSIS — E21.0 PRIMARY HYPERPARATHYROIDISM (HCC): ICD-10-CM

## 2021-09-28 DIAGNOSIS — R80.9 PROTEINURIA, UNSPECIFIED TYPE: ICD-10-CM

## 2021-09-28 PROCEDURE — 99214 OFFICE O/P EST MOD 30 MIN: CPT | Performed by: INTERNAL MEDICINE

## 2021-09-28 NOTE — PROGRESS NOTES
NEPHROLOGY OUTPATIENT PROGRESS NOTE   Donato Salter 49 y o  female MRN: [id number]  DATE: 9/28/2021  Reason for visit:   Chief Complaint   Patient presents with    Follow-up    Chronic Kidney Disease        Patient Instructions   1  CKD stage II/history of lupus nephritis  -sCr stable in 0 7-0 8 range  Latest C3 normal  Last sCr 0 86 from 9/24/21  -continue to avoid nonsteroidals(ibuprofen, aleve, advil, motrin, celebrex, naproxen, toradol)  -latest urinalysis shows small blood, trace leukocytes, 2+ protein, 2-4 RBCs, 10-20 WBCs, occasional bacteria with 10-25 hyaline casts as of May 2021  -monitor BMP, UA with microscopy as well along with repeat UpCr    -no recent lupus flares     2  proteinuria-+residual proteinuria from prior lupus nephritis (biopsy proven in 1996), now noted to have increased weight as well as HTN    -Off HCTZ due to hypercalcemia, on fosinopril 20mg twice daily  -on aldactone 25mg daily   -latest UpCr 1159 as of August 2019 - improved, UpCr 0 91g as of 9/24/21  -repeat UpCr      3  volume status- continue spironolactone 25mg daily, potassium normal as of 5/9/20     4  hypercalcemia/hyperparathyroidism - follows with endo, +nephrolithiasis history with recent Urinalysis showing calcium oxalate crystals  -calcium has normalized on latest bloodwork  -You must drink enough water to urinate at least 2L per day to prevent stone formation but this is difficult given her edema and need for diuretics   Unfortunately, diuretics will contribute to stone formation   -avoid high salt diet both for stone reduction and to help blood pressure stay controlled  -PTH slightly elevated at 132 as of this month     5  hypertension-BP well controlled today likely d/t nonadherence with medication this morning    -continue nifedipine XL 30mg daily(at night), fosinopril 20mg twice daily, spironolactone 25mg daily (at lunch)  -avoid caffeine/tea     6  vitamin D insufficiency - Vit D level 18 3 as of Sept 2021 - off vitamin D supplementation in light of hypercalcemia     Obtain bloodwork and urine testing in Jan 2022  RTC in 4 months          Maddie Ponce was seen today for follow-up and chronic kidney disease  Diagnoses and all orders for this visit:    Chronic kidney disease (CKD), stage II (mild)  -     Basic metabolic panel; Future  -     Urinalysis with microscopic; Future  -     Protein / creatinine ratio, urine; Future    Primary hyperparathyroidism ([address] )    Essential hypertension    Nephrolithiasis    Hypercalcemia    Proteinuria, unspecified type  -     Protein / creatinine ratio, urine; Future    Vitamin D deficiency        Assessment/Plan:  1  CKD stage II/history of lupus nephritis  -sCr stable in 0 7-0 8 range  Latest C3 normal  Last sCr 0 86 from 9/24/21  -continue to avoid nonsteroidals(ibuprofen, aleve, advil, motrin, celebrex, naproxen, toradol)  -latest urinalysis shows small blood, trace leukocytes, 2+ protein, 2-4 RBCs, 10-20 WBCs, occasional bacteria with 10-25 hyaline casts as of May 2021  -monitor BMP, UA with microscopy as well along with repeat UpCr    -no recent lupus flares     2  proteinuria-+residual proteinuria from prior lupus nephritis (biopsy proven in 1996), now noted to have increased weight as well as HTN    -Off HCTZ due to hypercalcemia, on fosinopril 20mg twice daily  -on aldactone 25mg daily   -latest UpCr 1159 as of August 2019 - improved, UpCr 0 91g as of 9/24/21  -repeat UpCr      3  volume status- continue spironolactone 25mg daily, potassium normal as of 5/9/20     4  hypercalcemia/hyperparathyroidism - follows with endo, +nephrolithiasis history with recent Urinalysis showing calcium oxalate crystals  -calcium has normalized on latest bloodwork  -You must drink enough water to urinate at least 2L per day to prevent stone formation but this is difficult given her edema and need for diuretics   Unfortunately, diuretics will contribute to stone formation   -avoid high salt diet both for stone reduction and to help blood pressure stay controlled  -PTH slightly elevated at 132 as of this month     5  hypertension-BP well controlled today likely d/t nonadherence with medication this morning    -continue nifedipine XL 30mg daily(at night), fosinopril 20mg twice daily, spironolactone 25mg daily (at lunch)  -avoid caffeine/tea     6  vitamin D insufficiency - Vit D level 18 3 as of Sept 2021 - off vitamin D supplementation in light of hypercalcemia     Obtain bloodwork and urine testing in Jan 2022  RTC in 4 months        SUBJECTIVE / INTERVAL HISTORY:  49 y o  female presents in follow up of CKD  Bladimir Paul had hysteroscopy  Has had vaginal bleeding, feels like a light period monthly  To see derm for new mole on back  Still gets fungal infection under breast and stomach  Denies NSAID use  Denies recent lupus flare  HTN - BP has been well controlled    Review of Systems   Constitutional: Negative for chills and fever  HENT: Negative for sore throat and trouble swallowing  Eyes: Negative for visual disturbance  Respiratory: Negative for cough and shortness of breath  Cardiovascular: Negative for chest pain and leg swelling  Gastrointestinal: Negative for abdominal pain, constipation, diarrhea, nausea and vomiting  Endocrine: Negative for polyuria  Genitourinary: Positive for vaginal bleeding (spotting)  Negative for difficulty urinating, dysuria and hematuria  Musculoskeletal: Negative for back pain and neck pain  Skin: Negative for rash  Neurological: Negative for dizziness, light-headedness and numbness  Psychiatric/Behavioral: The patient is not nervous/anxious  OBJECTIVE:  /70 (BP Location: Left arm, Patient Position: Sitting, Cuff Size: Standard)   Pulse 58   Ht 5' 1 25" (1 556 m)   Wt 92 1 kg (203 lb)   BMI 38 04 kg/m²  Body mass index is 38 04 kg/m²  Physical exam:  Physical Exam  Vitals and nursing note reviewed  Constitutional:       General: She is not in acute distress  Appearance: Normal appearance  She is well-developed  She is obese  She is not diaphoretic  HENT:      Head: Normocephalic and atraumatic  Nose: Nose normal       Mouth/Throat:      Mouth: Mucous membranes are moist       Pharynx: No oropharyngeal exudate  Eyes:      General: No scleral icterus  Right eye: No discharge  Left eye: No discharge  Comments: eyeglasses   Neck:      Thyroid: No thyromegaly  Cardiovascular:      Rate and Rhythm: Normal rate and regular rhythm  Heart sounds: No murmur heard  Pulmonary:      Effort: Pulmonary effort is normal  No respiratory distress  Breath sounds: Normal breath sounds  No wheezing  Abdominal:      General: Bowel sounds are normal  There is no distension  Palpations: Abdomen is soft  Musculoskeletal:         General: No swelling  Normal range of motion  Cervical back: Normal range of motion and neck supple  Skin:     General: Skin is warm and dry  Findings: No rash  Neurological:      General: No focal deficit present  Mental Status: She is alert  Motor: No abnormal muscle tone  Comments: awake   Psychiatric:         Behavior: Behavior normal          Medications:    Current Outpatient Medications:     cholecalciferol (VITAMIN D3) 400 units tablet, Take 1 tablet (400 Units total) by mouth daily, Disp: 30 tablet, Rfl: 4    folic acid (FOLVITE) 1 mg tablet, Take 1 tablet by mouth daily, Disp: , Rfl:     fosinopril (MONOPRIL) 20 mg tablet, Take 1 tablet (20 mg total) by mouth 2 (two) times a day, Disp: 180 tablet, Rfl: 0    levothyroxine 125 mcg tablet, TAKE ONE TABLET BY MOUTH EVERY DAY MON-SAT   THEN TAKE 1/2 TABLET BY MOUTH SUNDAY, Disp: 90 tablet, Rfl: 1    NIFEdipine (PROCARDIA XL) 30 mg 24 hr tablet, Take 1 tablet (30 mg total) by mouth daily, Disp: 90 tablet, Rfl: 3    nystatin (MYCOSTATIN) powder, Apply topically 2 (two) times a day (Patient taking differently: Apply topically as needed ), Disp: 60 g, Rfl: 2    spironolactone (ALDACTONE) 25 mg tablet, Take 1 tablet (25 mg total) by mouth daily, Disp: 90 tablet, Rfl: 0    warfarin (COUMADIN) 3 mg tablet, TAKE ONE TABLET BY MOUTH EVERY DAY  DO NOT TAKE ON MONDAYS AND THURSDAYS, Disp: 90 tablet, Rfl: 0    Allergies: Allergies as of 09/28/2021 - Reviewed 09/28/2021   Allergen Reaction Noted    Penicillins Itching 04/25/2013       The following portions of the patient's history were reviewed and updated as appropriate: past family history, past surgical history and problem list     Laboratory Results:  Lab Results   Component Value Date    SODIUM 138 09/24/2021    K 3 9 09/24/2021     (H) 09/24/2021    CO2 24 09/24/2021    BUN 19 09/24/2021    CREATININE 0 86 09/24/2021    GLUC 103 05/29/2021    CALCIUM 9 2 09/24/2021        Lab Results   Component Value Date     0 (H) 09/24/2021    CALCIUM 9 2 09/24/2021    PHOS 2 1 (L) 09/24/2021       Portions of the record may have been created with voice recognition software   Occasional wrong word or "sound a like" substitutions may have occurred due to the inherent limitations of voice recognition software   Read the chart carefully and recognize, using context, where substitutions have occurred

## 2021-09-28 NOTE — PATIENT INSTRUCTIONS
1  CKD stage II/history of lupus nephritis  -sCr stable in 0 7-0 8 range  Latest C3 normal  Last sCr 0 86 from 9/24/21  -continue to avoid nonsteroidals(ibuprofen, aleve, advil, motrin, celebrex, naproxen, toradol)  -latest urinalysis shows small blood, trace leukocytes, 2+ protein, 2-4 RBCs, 10-20 WBCs, occasional bacteria with 10-25 hyaline casts as of May 2021  -monitor BMP, UA with microscopy as well along with repeat UpCr    -no recent lupus flares     2  proteinuria-+residual proteinuria from prior lupus nephritis (biopsy proven in 1996), now noted to have increased weight as well as HTN    -Off HCTZ due to hypercalcemia, on fosinopril 20mg twice daily  -on aldactone 25mg daily   -latest UpCr 1159 as of August 2019 - improved, UpCr 0 91g as of 9/24/21  -repeat UpCr      3  volume status- continue spironolactone 25mg daily, potassium normal as of 5/9/20     4  hypercalcemia/hyperparathyroidism - follows with endo, +nephrolithiasis history with recent Urinalysis showing calcium oxalate crystals  -calcium has normalized on latest bloodwork  -You must drink enough water to urinate at least 2L per day to prevent stone formation but this is difficult given her edema and need for diuretics  Unfortunately, diuretics will contribute to stone formation   -avoid high salt diet both for stone reduction and to help blood pressure stay controlled  -PTH slightly elevated at 132 as of this month     5  hypertension-BP well controlled today likely d/t nonadherence with medication this morning    -continue nifedipine XL 30mg daily(at night), fosinopril 20mg twice daily, spironolactone 25mg daily (at lunch)  -avoid caffeine/tea     6  vitamin D insufficiency - Vit D level 18 3 as of Sept 2021 - off vitamin D supplementation in light of hypercalcemia     Obtain bloodwork and urine testing in Jan 2022    RTC in 4 months

## 2021-10-19 ENCOUNTER — HOSPITAL ENCOUNTER (EMERGENCY)
Facility: HOSPITAL | Age: 56
Discharge: HOME | End: 2021-10-19
Attending: EMERGENCY MEDICINE | Admitting: EMERGENCY MEDICINE
Payer: OTHER MISCELLANEOUS

## 2021-10-19 ENCOUNTER — APPOINTMENT (EMERGENCY)
Dept: RADIOLOGY | Facility: HOSPITAL | Age: 56
End: 2021-10-19
Payer: OTHER MISCELLANEOUS

## 2021-10-19 DIAGNOSIS — M25.562 LEFT KNEE PAIN: ICD-10-CM
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
## 2021-10-19 DIAGNOSIS — M25.552 LEFT HIP PAIN: ICD-10-CM

## 2021-10-19 PROCEDURE — 73564 X-RAY EXAM KNEE 4 OR MORE: CPT

## 2021-10-19 PROCEDURE — 99283 EMERGENCY DEPT VISIT LOW MDM: CPT

## 2021-10-19 PROCEDURE — 99284 EMERGENCY DEPT VISIT MOD MDM: CPT | Performed by: PHYSICIAN ASSISTANT

## 2021-10-19 PROCEDURE — 73502 X-RAY EXAM HIP UNI 2-3 VIEWS: CPT

## 2021-10-19 RX ORDER — LIDOCAINE 50 MG/G
1 PATCH TOPICAL DAILY
Qty: 6 PATCH | Refills: 0 | Status: SHIPPED | OUTPATIENT
Start: 2021-10-19 | End: 2022-06-22

## 2021-10-19 RX ORDER — ACETAMINOPHEN 500 MG
1000 TABLET ORAL EVERY 6 HOURS PRN
Qty: 30 TABLET | Refills: 0 | Status: SHIPPED | OUTPATIENT
Start: 2021-10-19 | End: 2022-06-22

## 2021-10-19 RX ORDER — ACETAMINOPHEN 325 MG/1
650 TABLET ORAL ONCE
Status: COMPLETED | OUTPATIENT
Start: 2021-10-19 | End: 2021-10-19

## 2021-10-19 RX ADMIN — ACETAMINOPHEN 650 MG: 325 TABLET ORAL at 21:17

## 2021-10-20 ENCOUNTER — OFFICE VISIT (OUTPATIENT)
Dept: ENDOCRINOLOGY | Facility: CLINIC | Age: 56
End: 2021-10-20
Payer: COMMERCIAL

## 2021-10-20 VITALS
BODY MASS INDEX: 38.16 KG/M2 | SYSTOLIC BLOOD PRESSURE: 120 MMHG | HEART RATE: 66 BPM | WEIGHT: 203.6 LBS | TEMPERATURE: 97.8 F | DIASTOLIC BLOOD PRESSURE: 68 MMHG

## 2021-10-20 DIAGNOSIS — E83.52 HYPERCALCEMIA: ICD-10-CM

## 2021-10-20 DIAGNOSIS — E55.9 VITAMIN D DEFICIENCY: ICD-10-CM

## 2021-10-20 DIAGNOSIS — E21.0 PRIMARY HYPERPARATHYROIDISM (HCC): Primary | ICD-10-CM

## 2021-10-20 DIAGNOSIS — E89.0 POSTOPERATIVE HYPOTHYROIDISM: ICD-10-CM

## 2021-10-20 DIAGNOSIS — E89.2 STATUS POST PARATHYROIDECTOMY (HCC): ICD-10-CM

## 2021-10-20 DIAGNOSIS — Z85.850 HISTORY OF THYROID CANCER: ICD-10-CM

## 2021-10-20 PROCEDURE — 99214 OFFICE O/P EST MOD 30 MIN: CPT | Performed by: INTERNAL MEDICINE

## 2021-10-20 RX ORDER — LEVOTHYROXINE SODIUM 0.12 MG/1
TABLET ORAL
Qty: 90 TABLET | Refills: 1 | Status: SHIPPED | OUTPATIENT
Start: 2021-10-20 | End: 2022-02-24

## 2021-11-07 ENCOUNTER — APPOINTMENT (OUTPATIENT)
Dept: LAB | Facility: HOSPITAL | Age: 56
End: 2021-11-07
Payer: COMMERCIAL

## 2021-11-07 ENCOUNTER — TRANSCRIBE ORDERS (OUTPATIENT)
Dept: LAB | Facility: HOSPITAL | Age: 56
End: 2021-11-07

## 2021-11-07 DIAGNOSIS — Z79.899 ENCOUNTER FOR LONG-TERM (CURRENT) USE OF OTHER MEDICATIONS: ICD-10-CM

## 2021-11-07 DIAGNOSIS — I43 DILATED CARDIOMYOPATHY SECONDARY TO SYSTEMIC LUPUS ERYTHEMATOSUS (HCC): ICD-10-CM

## 2021-11-07 DIAGNOSIS — M32.19 DILATED CARDIOMYOPATHY SECONDARY TO SYSTEMIC LUPUS ERYTHEMATOSUS (HCC): ICD-10-CM

## 2021-11-07 DIAGNOSIS — M32.19 DILATED CARDIOMYOPATHY SECONDARY TO SYSTEMIC LUPUS ERYTHEMATOSUS (HCC): Primary | ICD-10-CM

## 2021-11-07 DIAGNOSIS — E55.9 VITAMIN D DEFICIENCY DISEASE: ICD-10-CM

## 2021-11-07 DIAGNOSIS — I43 DILATED CARDIOMYOPATHY SECONDARY TO SYSTEMIC LUPUS ERYTHEMATOSUS (HCC): Primary | ICD-10-CM

## 2021-11-07 LAB
25(OH)D3 SERPL-MCNC: 22.3 NG/ML (ref 30–100)
ALBUMIN SERPL BCP-MCNC: 3 G/DL (ref 3.5–5)
ALP SERPL-CCNC: 86 U/L (ref 46–116)
ALT SERPL W P-5'-P-CCNC: 26 U/L (ref 12–78)
ANION GAP SERPL CALCULATED.3IONS-SCNC: 1 MMOL/L (ref 4–13)
AST SERPL W P-5'-P-CCNC: 21 U/L (ref 5–45)
BACTERIA UR QL AUTO: ABNORMAL /HPF
BASOPHILS # BLD AUTO: 0.03 THOUSANDS/ΜL (ref 0–0.1)
BASOPHILS NFR BLD AUTO: 1 % (ref 0–1)
BILIRUB SERPL-MCNC: 0.67 MG/DL (ref 0.2–1)
BILIRUB UR QL STRIP: NEGATIVE
BUN SERPL-MCNC: 24 MG/DL (ref 5–25)
C3 SERPL-MCNC: 100 MG/DL (ref 90–180)
CALCIUM ALBUM COR SERPL-MCNC: 10.6 MG/DL (ref 8.3–10.1)
CALCIUM SERPL-MCNC: 9.8 MG/DL (ref 8.3–10.1)
CHLORIDE SERPL-SCNC: 114 MMOL/L (ref 100–108)
CLARITY UR: CLEAR
CO2 SERPL-SCNC: 25 MMOL/L (ref 21–32)
COLOR UR: YELLOW
CREAT SERPL-MCNC: 0.94 MG/DL (ref 0.6–1.3)
EOSINOPHIL # BLD AUTO: 0.06 THOUSAND/ΜL (ref 0–0.61)
EOSINOPHIL NFR BLD AUTO: 1 % (ref 0–6)
ERYTHROCYTE [DISTWIDTH] IN BLOOD BY AUTOMATED COUNT: 12.5 % (ref 11.6–15.1)
ERYTHROCYTE [SEDIMENTATION RATE] IN BLOOD: 19 MM/HOUR (ref 0–29)
GFR SERPL CREATININE-BSD FRML MDRD: 69 ML/MIN/1.73SQ M
GLUCOSE P FAST SERPL-MCNC: 108 MG/DL (ref 65–99)
GLUCOSE UR STRIP-MCNC: NEGATIVE MG/DL
HCT VFR BLD AUTO: 41.5 % (ref 34.8–46.1)
HGB BLD-MCNC: 13.5 G/DL (ref 11.5–15.4)
HGB UR QL STRIP.AUTO: ABNORMAL
HYALINE CASTS #/AREA URNS LPF: ABNORMAL /LPF
IMM GRANULOCYTES # BLD AUTO: 0.01 THOUSAND/UL (ref 0–0.2)
IMM GRANULOCYTES NFR BLD AUTO: 0 % (ref 0–2)
KETONES UR STRIP-MCNC: NEGATIVE MG/DL
LEUKOCYTE ESTERASE UR QL STRIP: NEGATIVE
LYMPHOCYTES # BLD AUTO: 1.52 THOUSANDS/ΜL (ref 0.6–4.47)
LYMPHOCYTES NFR BLD AUTO: 31 % (ref 14–44)
MCH RBC QN AUTO: 28.8 PG (ref 26.8–34.3)
MCHC RBC AUTO-ENTMCNC: 32.5 G/DL (ref 31.4–37.4)
MCV RBC AUTO: 89 FL (ref 82–98)
MONOCYTES # BLD AUTO: 0.53 THOUSAND/ΜL (ref 0.17–1.22)
MONOCYTES NFR BLD AUTO: 11 % (ref 4–12)
NEUTROPHILS # BLD AUTO: 2.77 THOUSANDS/ΜL (ref 1.85–7.62)
NEUTS SEG NFR BLD AUTO: 56 % (ref 43–75)
NITRITE UR QL STRIP: NEGATIVE
NON-SQ EPI CELLS URNS QL MICRO: ABNORMAL /HPF
NRBC BLD AUTO-RTO: 0 /100 WBCS
PH UR STRIP.AUTO: 5.5 [PH]
PLATELET # BLD AUTO: 184 THOUSANDS/UL (ref 149–390)
PMV BLD AUTO: 10.8 FL (ref 8.9–12.7)
POTASSIUM SERPL-SCNC: 4.1 MMOL/L (ref 3.5–5.3)
PROT SERPL-MCNC: 7 G/DL (ref 6.4–8.2)
PROT UR STRIP-MCNC: ABNORMAL MG/DL
RBC # BLD AUTO: 4.68 MILLION/UL (ref 3.81–5.12)
RBC #/AREA URNS AUTO: ABNORMAL /HPF
SODIUM SERPL-SCNC: 140 MMOL/L (ref 136–145)
SP GR UR STRIP.AUTO: 1.02 (ref 1–1.03)
UROBILINOGEN UR QL STRIP.AUTO: 0.2 E.U./DL
WBC # BLD AUTO: 4.92 THOUSAND/UL (ref 4.31–10.16)
WBC #/AREA URNS AUTO: ABNORMAL /HPF

## 2021-11-07 PROCEDURE — 86256 FLUORESCENT ANTIBODY TITER: CPT

## 2021-11-07 PROCEDURE — 86255 FLUORESCENT ANTIBODY SCREEN: CPT

## 2021-11-07 PROCEDURE — 86160 COMPLEMENT ANTIGEN: CPT

## 2021-11-07 PROCEDURE — 81001 URINALYSIS AUTO W/SCOPE: CPT

## 2021-11-07 PROCEDURE — 85652 RBC SED RATE AUTOMATED: CPT

## 2021-11-07 PROCEDURE — 85025 COMPLETE CBC W/AUTO DIFF WBC: CPT

## 2021-11-07 PROCEDURE — 80053 COMPREHEN METABOLIC PANEL: CPT

## 2021-11-07 PROCEDURE — 82306 VITAMIN D 25 HYDROXY: CPT

## 2021-11-07 PROCEDURE — 36415 COLL VENOUS BLD VENIPUNCTURE: CPT

## 2021-11-08 DIAGNOSIS — I12.9 PARENCHYMAL RENAL HYPERTENSION, STAGE 1 THROUGH STAGE 4 OR UNSPECIFIED CHRONIC KIDNEY DISEASE: ICD-10-CM

## 2021-11-08 DIAGNOSIS — I10 ESSENTIAL HYPERTENSION, BENIGN: ICD-10-CM

## 2021-11-08 RX ORDER — SPIRONOLACTONE 25 MG/1
25 TABLET ORAL DAILY
Qty: 90 TABLET | Refills: 0 | Status: SHIPPED | OUTPATIENT
Start: 2021-11-08 | End: 2022-02-22 | Stop reason: SDUPTHER

## 2021-11-08 RX ORDER — FOSINOPRIL SODIUM 20 MG/1
20 TABLET ORAL 2 TIMES DAILY
Qty: 180 TABLET | Refills: 1 | Status: SHIPPED | OUTPATIENT
Start: 2021-11-08 | End: 2022-02-22 | Stop reason: SDUPTHER

## 2021-11-10 PROBLEM — M35.00 SJOGREN'S SYNDROME WITHOUT EXTRAGLANDULAR INVOLVEMENT (HCC): Status: ACTIVE | Noted: 2021-11-10

## 2021-11-12 LAB — MISCELLANEOUS LAB TEST RESULT: NORMAL

## 2021-12-06 DIAGNOSIS — I82.5Y3 CHRONIC DEEP VEIN THROMBOSIS (DVT) OF PROXIMAL VEIN OF BOTH LOWER EXTREMITIES (HCC): Chronic | ICD-10-CM

## 2021-12-06 RX ORDER — WARFARIN SODIUM 3 MG/1
TABLET ORAL
Qty: 90 TABLET | Refills: 0 | Status: SHIPPED | OUTPATIENT
Start: 2021-12-06 | End: 2022-04-19 | Stop reason: SDUPTHER

## 2021-12-15 ENCOUNTER — OFFICE VISIT (OUTPATIENT)
Dept: OBGYN CLINIC | Facility: CLINIC | Age: 56
End: 2021-12-15
Payer: COMMERCIAL

## 2021-12-15 VITALS
DIASTOLIC BLOOD PRESSURE: 74 MMHG | HEIGHT: 61 IN | WEIGHT: 197.4 LBS | BODY MASS INDEX: 37.27 KG/M2 | SYSTOLIC BLOOD PRESSURE: 116 MMHG

## 2021-12-15 DIAGNOSIS — N95.0 POST-MENOPAUSAL BLEEDING: Primary | ICD-10-CM

## 2021-12-15 PROCEDURE — 99213 OFFICE O/P EST LOW 20 MIN: CPT | Performed by: STUDENT IN AN ORGANIZED HEALTH CARE EDUCATION/TRAINING PROGRAM

## 2021-12-24 ENCOUNTER — HOSPITAL ENCOUNTER (EMERGENCY)
Facility: HOSPITAL | Age: 56
Discharge: HOME | End: 2021-12-24
Attending: EMERGENCY MEDICINE
Payer: COMMERCIAL

## 2021-12-24 VITALS
TEMPERATURE: 98.3 F | OXYGEN SATURATION: 98 % | BODY MASS INDEX: 36.82 KG/M2 | DIASTOLIC BLOOD PRESSURE: 91 MMHG | SYSTOLIC BLOOD PRESSURE: 148 MMHG | RESPIRATION RATE: 16 BRPM | HEART RATE: 65 BPM | HEIGHT: 61 IN | WEIGHT: 195 LBS

## 2021-12-24 DIAGNOSIS — Z20.822 PERSON UNDER INVESTIGATION FOR COVID-19: Primary | ICD-10-CM

## 2021-12-24 LAB
FLUAV RNA RESP QL NAA+PROBE: NEGATIVE
FLUBV RNA RESP QL NAA+PROBE: NEGATIVE
RSV RNA RESP QL NAA+PROBE: NEGATIVE
SARS-COV-2 RNA RESP QL NAA+PROBE: NEGATIVE

## 2021-12-24 PROCEDURE — 0241U HB NFCT DS VIR RESP RNA 4 TRGT: CPT | Performed by: PHYSICIAN ASSISTANT

## 2021-12-24 PROCEDURE — 99283 EMERGENCY DEPT VISIT LOW MDM: CPT

## 2021-12-24 PROCEDURE — 99282 EMERGENCY DEPT VISIT SF MDM: CPT | Performed by: PHYSICIAN ASSISTANT

## 2021-12-29 ENCOUNTER — TELEPHONE (OUTPATIENT)
Dept: SURGICAL ONCOLOGY | Facility: CLINIC | Age: 56
End: 2021-12-29

## 2021-12-29 ENCOUNTER — APPOINTMENT (EMERGENCY)
Dept: RADIOLOGY | Facility: HOSPITAL | Age: 56
End: 2021-12-29
Payer: COMMERCIAL

## 2021-12-29 ENCOUNTER — HOSPITAL ENCOUNTER (EMERGENCY)
Facility: HOSPITAL | Age: 56
Discharge: HOME | End: 2021-12-29
Attending: EMERGENCY MEDICINE | Admitting: EMERGENCY MEDICINE
Payer: COMMERCIAL

## 2021-12-29 VITALS
DIASTOLIC BLOOD PRESSURE: 103 MMHG | OXYGEN SATURATION: 98 % | SYSTOLIC BLOOD PRESSURE: 195 MMHG | RESPIRATION RATE: 20 BRPM | HEART RATE: 79 BPM | TEMPERATURE: 98.7 F

## 2021-12-29 DIAGNOSIS — U07.1 COVID-19 VIRUS INFECTION: Primary | ICD-10-CM

## 2021-12-29 DIAGNOSIS — E86.0 DEHYDRATION: ICD-10-CM

## 2021-12-29 LAB
2HR DELTA HS TROPONIN: 1 NG/L
ALBUMIN SERPL BCP-MCNC: 3.1 G/DL (ref 3.5–5)
ALP SERPL-CCNC: 120 U/L (ref 46–116)
ALT SERPL W P-5'-P-CCNC: 75 U/L (ref 12–78)
ANION GAP SERPL CALCULATED.3IONS-SCNC: 9 MMOL/L (ref 4–13)
AST SERPL W P-5'-P-CCNC: 62 U/L (ref 5–45)
BASOPHILS # BLD AUTO: 0.03 THOUSANDS/ΜL (ref 0–0.1)
BASOPHILS NFR BLD AUTO: 0 % (ref 0–1)
BILIRUB SERPL-MCNC: 0.37 MG/DL (ref 0.2–1)
BUN SERPL-MCNC: 22 MG/DL (ref 5–25)
CALCIUM ALBUM COR SERPL-MCNC: 10.1 MG/DL (ref 8.3–10.1)
CALCIUM SERPL-MCNC: 9.4 MG/DL (ref 8.3–10.1)
CARDIAC TROPONIN I PNL SERPL HS: 5 NG/L
CARDIAC TROPONIN I PNL SERPL HS: 6 NG/L
CHLORIDE SERPL-SCNC: 106 MMOL/L (ref 100–108)
CO2 SERPL-SCNC: 24 MMOL/L (ref 21–32)
CREAT SERPL-MCNC: 1.34 MG/DL (ref 0.6–1.3)
D DIMER PPP FEU-MCNC: <0.27 UG/ML FEU
EOSINOPHIL # BLD AUTO: 0.03 THOUSAND/ΜL (ref 0–0.61)
EOSINOPHIL NFR BLD AUTO: 0 % (ref 0–6)
ERYTHROCYTE [DISTWIDTH] IN BLOOD BY AUTOMATED COUNT: 12 % (ref 11.6–15.1)
FLUAV RNA RESP QL NAA+PROBE: NEGATIVE
FLUBV RNA RESP QL NAA+PROBE: NEGATIVE
GFR SERPL CREATININE-BSD FRML MDRD: 44 ML/MIN/1.73SQ M
GLUCOSE SERPL-MCNC: 112 MG/DL (ref 65–140)
HCT VFR BLD AUTO: 37.5 % (ref 34.8–46.1)
HGB BLD-MCNC: 12.9 G/DL (ref 11.5–15.4)
IMM GRANULOCYTES # BLD AUTO: 0.03 THOUSAND/UL (ref 0–0.2)
IMM GRANULOCYTES NFR BLD AUTO: 0 % (ref 0–2)
LYMPHOCYTES # BLD AUTO: 1.39 THOUSANDS/ΜL (ref 0.6–4.47)
LYMPHOCYTES NFR BLD AUTO: 20 % (ref 14–44)
MCH RBC QN AUTO: 29.9 PG (ref 26.8–34.3)
MCHC RBC AUTO-ENTMCNC: 34.4 G/DL (ref 31.4–37.4)
MCV RBC AUTO: 87 FL (ref 82–98)
MONOCYTES # BLD AUTO: 0.7 THOUSAND/ΜL (ref 0.17–1.22)
MONOCYTES NFR BLD AUTO: 10 % (ref 4–12)
NEUTROPHILS # BLD AUTO: 4.81 THOUSANDS/ΜL (ref 1.85–7.62)
NEUTS SEG NFR BLD AUTO: 70 % (ref 43–75)
NRBC BLD AUTO-RTO: 0 /100 WBCS
PLATELET # BLD AUTO: 163 THOUSANDS/UL (ref 149–390)
PMV BLD AUTO: 10.1 FL (ref 8.9–12.7)
POTASSIUM SERPL-SCNC: 3.9 MMOL/L (ref 3.5–5.3)
PROT SERPL-MCNC: 7.3 G/DL (ref 6.4–8.2)
RBC # BLD AUTO: 4.32 MILLION/UL (ref 3.81–5.12)
RSV RNA RESP QL NAA+PROBE: NEGATIVE
SARS-COV-2 RNA RESP QL NAA+PROBE: POSITIVE
SODIUM SERPL-SCNC: 139 MMOL/L (ref 136–145)
WBC # BLD AUTO: 6.99 THOUSAND/UL (ref 4.31–10.16)

## 2021-12-29 PROCEDURE — 99285 EMERGENCY DEPT VISIT HI MDM: CPT | Performed by: EMERGENCY MEDICINE

## 2021-12-29 PROCEDURE — 36415 COLL VENOUS BLD VENIPUNCTURE: CPT | Performed by: EMERGENCY MEDICINE

## 2021-12-29 PROCEDURE — 80053 COMPREHEN METABOLIC PANEL: CPT | Performed by: EMERGENCY MEDICINE

## 2021-12-29 PROCEDURE — 96361 HYDRATE IV INFUSION ADD-ON: CPT

## 2021-12-29 PROCEDURE — 0241U HB NFCT DS VIR RESP RNA 4 TRGT: CPT | Performed by: EMERGENCY MEDICINE

## 2021-12-29 PROCEDURE — 96360 HYDRATION IV INFUSION INIT: CPT

## 2021-12-29 PROCEDURE — 85025 COMPLETE CBC W/AUTO DIFF WBC: CPT | Performed by: EMERGENCY MEDICINE

## 2021-12-29 PROCEDURE — 84484 ASSAY OF TROPONIN QUANT: CPT | Performed by: EMERGENCY MEDICINE

## 2021-12-29 PROCEDURE — 71045 X-RAY EXAM CHEST 1 VIEW: CPT

## 2021-12-29 PROCEDURE — 85379 FIBRIN DEGRADATION QUANT: CPT | Performed by: EMERGENCY MEDICINE

## 2021-12-29 PROCEDURE — 99284 EMERGENCY DEPT VISIT MOD MDM: CPT

## 2021-12-29 PROCEDURE — 93005 ELECTROCARDIOGRAM TRACING: CPT

## 2021-12-29 RX ORDER — BUDESONIDE 180 UG/1
2 AEROSOL, POWDER RESPIRATORY (INHALATION) 2 TIMES DAILY
Qty: 1 EACH | Refills: 0 | Status: SHIPPED | OUTPATIENT
Start: 2021-12-29 | End: 2022-07-11

## 2021-12-29 RX ORDER — MULTIVIT WITH MINERALS/LUTEIN
1000 TABLET ORAL 2 TIMES DAILY
Qty: 28 TABLET | Refills: 0 | Status: SHIPPED | OUTPATIENT
Start: 2021-12-29 | End: 2022-02-24

## 2021-12-29 RX ORDER — MELATONIN
2000 DAILY
Qty: 14 TABLET | Refills: 0 | Status: SHIPPED | OUTPATIENT
Start: 2021-12-29 | End: 2022-02-24

## 2021-12-29 RX ORDER — ACETAMINOPHEN 325 MG/1
650 TABLET ORAL ONCE
Status: COMPLETED | OUTPATIENT
Start: 2021-12-29 | End: 2021-12-29

## 2021-12-29 RX ORDER — MULTIVITAMIN
1 TABLET ORAL DAILY
Qty: 14 TABLET | Refills: 0 | Status: SHIPPED | OUTPATIENT
Start: 2021-12-29 | End: 2022-07-11

## 2021-12-29 RX ORDER — ZINC SULFATE 50(220)MG
220 CAPSULE ORAL DAILY
Qty: 14 CAPSULE | Refills: 0 | Status: SHIPPED | OUTPATIENT
Start: 2021-12-29 | End: 2022-06-22

## 2021-12-29 RX ADMIN — SODIUM CHLORIDE 1000 ML: 0.9 INJECTION, SOLUTION INTRAVENOUS at 05:54

## 2021-12-29 RX ADMIN — ACETAMINOPHEN 650 MG: 325 TABLET, FILM COATED ORAL at 05:58

## 2021-12-29 NOTE — DISCHARGE INSTRUCTIONS
Your healthcare provider and public health staff will evaluate whether you can be cared for at home  If it is determined that you do not need to be hospitalized and can be isolated at home, you will be monitored by staff from your local or state health department  You should follow the prevention steps below until a healthcare provider or local or state health department says you can return to your normal activities  Stay home except to get medical care    People who are mildly ill with COVID-19 are able to isolate at home during their illness  You should restrict activities outside your home, except for getting medical care  Do not go to work, school, or public areas  Avoid using public transportation, ride-sharing, or taxis  Separate yourself from other people and animals in your home    People: As much as possible, you should stay in a specific room and away from other people in your home  Also, you should use a separate bathroom, if available  Animals: You should restrict contact with pets and other animals while you are sick with COVID-19, just like you would around other people  Although there have not been reports of pets or other animals becoming sick with COVID-19, it is still recommended that people sick with COVID-19 limit contact with animals until more information is known about the virus  When possible, have another member of your household care for your animals while you are sick  If you are sick with COVID-19, avoid contact with your pet, including petting, snuggling, being kissed or licked, and sharing food  If you must care for your pet or be around animals while you are sick, wash your hands before and after you interact with pets and wear a facemask  See COVID-19 and Animals for more information  Call ahead before visiting your doctor    If you have a medical appointment, call the healthcare provider and tell them that you have or may have COVID-19   This will help the healthcare providers office take steps to keep other people from getting infected or exposed  Wear a facemask    You should wear a facemask when you are around other people (e g , sharing a room or vehicle) or pets and before you enter a healthcare providers office  If you are not able to wear a facemask (for example, because it causes trouble breathing), then people who live with you should not stay in the same room with you, or they should wear a facemask if they enter your room  Cover your coughs and sneezes    Cover your mouth and nose with a tissue when you cough or sneeze  Throw used tissues in a lined trash can  Immediately wash your hands with soap and water for at least 20 seconds or, if soap and water are not available, clean your hands with an alcohol-based hand  that contains at least 60% alcohol  Clean your hands often    Wash your hands often with soap and water for at least 20 seconds, especially after blowing your nose, coughing, or sneezing; going to the bathroom; and before eating or preparing food  If soap and water are not readily available, use an alcohol-based hand  with at least 60% alcohol, covering all surfaces of your hands and rubbing them together until they feel dry  Soap and water are the best option if hands are visibly dirty  Avoid touching your eyes, nose, and mouth with unwashed hands  Avoid sharing personal household items    You should not share dishes, drinking glasses, cups, eating utensils, towels, or bedding with other people or pets in your home  After using these items, they should be washed thoroughly with soap and water  Clean all high-touch surfaces everyday    High touch surfaces include counters, tabletops, doorknobs, bathroom fixtures, toilets, phones, keyboards, tablets, and bedside tables  Also, clean any surfaces that may have blood, stool, or body fluids on them  Use a household cleaning spray or wipe, according to the label instructions  Labels contain instructions for safe and effective use of the cleaning product including precautions you should take when applying the product, such as wearing gloves and making sure you have good ventilation during use of the product  Monitor your symptoms    Seek prompt medical attention if your illness is worsening (e g , difficulty breathing)  Before seeking care, call your healthcare provider and tell them that you have, or are being evaluated for, COVID-19  Put on a facemask before you enter the facility  These steps will help the healthcare providers office to keep other people in the office or waiting room from getting infected or exposed  Ask your healthcare provider to call the local or state health department  Persons who are placed under active monitoring or facilitated self-monitoring should follow instructions provided by their local health department or occupational health professionals, as appropriate  If you have a medical emergency and need to call 911, notify the dispatch personnel that you have, or are being evaluated for COVID-19  If possible, put on a facemask before emergency medical services arrive  Discontinuing home isolation    Patients with confirmed COVID-19 should remain under home isolation precautions until the risk of secondary transmission to others is thought to be low  The decision to discontinue home isolation precautions should be made on a case-by-case basis, in consultation with healthcare providers and state and local health departments      Source: [employer]

## 2021-12-29 NOTE — Clinical Note
Sánchez Lanes was seen and treated in our emergency department on 12/29/2021  Diagnosis:     Destiny Gibbs    She may return on this date:     No work for seven days  If you have any questions or concerns, please don't hesitate to call        Hemant Rizvi MD    ______________________________           _______________          _______________  Hospital Representative                              Date                                Time

## 2021-12-29 NOTE — ED PROVIDER NOTES
History  Chief Complaint   Patient presents with    Flu Symptoms     pt states son tested positive for covid, pt reports losing taste, slight sob, cough, nasal congestion, headache, chills; pt has hx lupus     Patient is a 49year old with a few days of cough, congestion, headache, dysgeusia  No fever  No N/V  No travel  Son tested positive for COVID recently  No covid booster  Was last seen in this ED on 12/24/21 for person under investigation for  COVID 19  [facility] website checked on this patient and last Rx filled was on 4/26/21 for tramadol for 2 day supply  Mild sob from congestion  History provided by:  Patient   used: No    Flu Symptoms  Presenting symptoms: cough, headache and shortness of breath    Presenting symptoms: no fever, no nausea and no vomiting    Associated symptoms: nasal congestion        Prior to Admission Medications   Prescriptions Last Dose Informant Patient Reported? Taking?    Cholecalciferol (Vitamin D) 50 MCG (2000 UT) tablet  Self Yes No   Sig: Take 2,000 Units by mouth daily   NIFEdipine (PROCARDIA XL) 30 mg 24 hr tablet  Self No No   Sig: Take 1 tablet (30 mg total) by mouth daily   acetaminophen (TYLENOL) 500 mg tablet  Self No No   Sig: Take 2 tablets (1,000 mg total) by mouth every 6 (six) hours as needed for mild pain   Patient not taking: Reported on 11/10/2021    cholecalciferol (VITAMIN D3) 400 units tablet  Self No No   Sig: Take 1 tablet (400 Units total) by mouth daily   Patient not taking: Reported on 11/10/2021    ciclopirox (LOPROX) 0 77 % cream  Self Yes No   ergocalciferol (VITAMIN D2) 50,000 units  Self No No   Sig: Take 1 capsule (50,000 Units total) by mouth once a week for 8 doses   folic acid (FOLVITE) 1 mg tablet  Self Yes No   Sig: Take 1 tablet by mouth daily   fosinopril (MONOPRIL) 20 mg tablet  Self No No   Sig: Take 1 tablet (20 mg total) by mouth 2 (two) times a day   hydroxychloroquine (PLAQUENIL) 200 mg tablet  Self No No Sig: Take 1 tablet (200 mg total) by mouth 2 (two) times a day with meals   levothyroxine 125 mcg tablet  Self No No   Sig: TAKE ONE TABLET BY MOUTH EVERY DAY   lidocaine (LIDODERM) 5 %  Self No No   Sig: Apply 1 patch topically daily Remove & Discard patch within 12 hours or as directed by MD   Patient not taking: Reported on 10/20/2021   nystatin (MYCOSTATIN) powder  Self No No   Sig: Apply topically 2 (two) times a day   Patient taking differently: Apply topically as needed    spironolactone (ALDACTONE) 25 mg tablet  Self No No   Sig: Take 1 tablet (25 mg total) by mouth daily   warfarin (COUMADIN) 3 mg tablet  Self No No   Sig: TAKE ONE TABLET BY MOUTH EVERY DAY   DO NOT TAKE ON  AND       Facility-Administered Medications: None       Past Medical History:   Diagnosis Date    Anemia     Not sure    Cancer [facility])     thyroid    Chronic kidney disease     Clotting disorder (HCC)     Lung embolism,  ITP    Diabetes mellitus [facility]     Disease of thyroid gland     Gestational diabetes     Hyperparathyroidism ([facility] )     Hypertension     Idiopathic thrombocytopenic purpura (ITP) (HCC)     Kidney stone     Lupus ([facility] )     Osteoarthritis     Sjogren's syndrome ([facility] )     Vitamin D deficiency        Past Surgical History:   Procedure Laterality Date     SECTION      [id number]    DIAGNOSTIC FLEXIBLE LARYNGOSCOPY N/A 2021    Procedure: DIAGNOSTIC FLEXIBLE LARYNGOSCOPY;  Surgeon: Deven Sims MD;  [facility]: [facility];  Service: Surgical Oncology    HERNIA REPAIR      HIP SURGERY      left side, bone decompression    HYSTEROSCOPY      IL EXPLORE PARATHYROID GLANDS Right 2021    Procedure: PARATHYROIDECTOMY, MINIMALLY INVASIVE, right, intraoperative PTH monitoring;  Surgeon: Deven Sims MD;  [facility];  Service: Surgical Oncology    RENAL BIOPSY      Not sure    THYROID SURGERY      TUBAL LIGATION         Family History   Problem Relation Age of Onset  No Known Problems Mother     Diabetes type II Father     Diabetes Father     Stroke Father     Diabetes type II Paternal Aunt     Diabetes type II Paternal Uncle     Diabetes type II Paternal Grandmother     Stomach cancer Paternal Grandfather     No Known Problems Sister     No Known Problems Maternal Grandmother     No Known Problems Maternal Grandfather     No Known Problems Sister     No Known Problems Maternal Aunt     No Known Problems Maternal Aunt     No Known Problems Maternal Aunt     No Known Problems Paternal Aunt     No Known Problems Paternal Aunt      I have reviewed and agree with the history as documented  E-Cigarette/Vaping    E-Cigarette Use Never User      E-Cigarette/Vaping Substances     Social History     Tobacco Use    Smoking status: Never Smoker    Smokeless tobacco: Never Used   Vaping Use    Vaping Use: Never used   Substance Use Topics    Alcohol use: Not Currently     Alcohol/week: 0 0 standard drinks     Comment: Twice a year    Drug use: No       Review of Systems   Constitutional: Negative for fever  HENT: Positive for congestion  Respiratory: Positive for cough and shortness of breath  Gastrointestinal: Negative for nausea and vomiting  Neurological: Positive for headaches  All other systems reviewed and are negative  Physical Exam  Physical Exam  Vitals and nursing note reviewed  Constitutional:       General: She is in acute distress (mild)  HENT:      Head: Normocephalic and atraumatic  Mouth/Throat:      Comments: Mask in place  Eyes:      General: No scleral icterus  Cardiovascular:      Rate and Rhythm: Normal rate and regular rhythm  Heart sounds: Normal heart sounds  No murmur heard  Pulmonary:      Effort: Pulmonary effort is normal  No respiratory distress  Breath sounds: Normal breath sounds  No stridor  No wheezing, rhonchi or rales     Abdominal:      General: Bowel sounds are normal       Palpations: Abdomen is soft  Tenderness: There is no abdominal tenderness  Musculoskeletal:         General: No deformity  Cervical back: Normal range of motion and neck supple  Right lower leg: No edema  Left lower leg: No edema  Skin:     General: Skin is warm and dry  Findings: No erythema or rash  Neurological:      General: No focal deficit present  Mental Status: She is alert and oriented to person, place, and time     Psychiatric:         Mood and Affect: Mood normal          Vital Signs  ED Triage Vitals   Temperature Pulse Respirations Blood Pressure SpO2   12/29/21 0058 12/29/21 0058 12/29/21 0058 12/29/21 0058 12/29/21 0058   98 7 °F (37 1 °C) 79 20 (!) 195/103 98 %      Temp Source Heart Rate Source Patient Position - Orthostatic VS BP Location FiO2 (%)   12/29/21 0058 12/29/21 0058 12/29/21 0058 12/29/21 0058 --   Oral Monitor Sitting Left arm       Pain Score       12/29/21 0558       7           Vitals:    12/29/21 0058   BP: (!) 195/103   Pulse: 79   Patient Position - Orthostatic VS: Sitting         Visual Acuity      ED Medications  Medications   sodium chloride 0 9 % bolus 1,000 mL (1,000 mL Intravenous New Bag 12/29/21 0554)   acetaminophen (TYLENOL) tablet 650 mg (650 mg Oral Given 12/29/21 0558)       Diagnostic Studies  Results Reviewed     Procedure Component Value Units Date/Time    HS Troponin I 2hr [[id number]] Collected: 12/29/21 0554    Lab Status: Final result Specimen: Blood from Arm, Left Updated: 12/29/21 0640     hs TnI 2hr 6 ng/L      Delta 2hr hsTnI 1 ng/L     D-Dimer [[id number]]  (Normal) Collected: 12/29/21 0106    Lab Status: Final result Specimen: Blood from Arm, Left Updated: 12/29/21 0627     D-Dimer, Quant <0 27 ug/ml FEU     COVID/FLU/RSV [[id number]]  (Abnormal) Collected: 12/29/21 0106    Lab Status: Final result Specimen: Nares from Nasopharyngeal Swab Updated: 12/29/21 0151     SARS-CoV-2 Positive     INFLUENZA A PCR Negative     INFLUENZA B PCR Negative     RSV PCR Negative    Narrative:      FOR PEDIATRIC PATIENTS - copy/paste COVID Guidelines URL to browser: [email or web address]  [employer]     This test has been authorized by FDA under an EUA (Emergency Use Assay) for use by authorized laboratories  Clinical caution and judgement should be used with the interpretation of these results with consideration of the clinical impression and other laboratory testing  Testing reported as "Positive" or "Negative" has been proven to be accurate according to standard laboratory validation requirements  All testing is performed with control materials showing appropriate reactivity at standard intervals      HS Troponin 0hr (reflex protocol) [[id number]]  (Normal) Collected: 12/29/21 0106    Lab Status: Final result Specimen: Blood from Arm, Right Updated: 12/29/21 0143     hs TnI 0hr 5 ng/L     Comprehensive metabolic panel [[id number]]  (Abnormal) Collected: 12/29/21 0106    Lab Status: Final result Specimen: Blood from Arm, Right Updated: 12/29/21 0135     Sodium 139 mmol/L      Potassium 3 9 mmol/L      Chloride 106 mmol/L      CO2 24 mmol/L      ANION GAP 9 mmol/L      BUN 22 mg/dL      Creatinine 1 34 mg/dL      Glucose 112 mg/dL      Calcium 9 4 mg/dL      Corrected Calcium 10 1 mg/dL      AST 62 U/L      ALT 75 U/L      Alkaline Phosphatase 120 U/L      Total Protein 7 3 g/dL      Albumin 3 1 g/dL      Total Bilirubin 0 37 mg/dL      eGFR 44 ml/min/1 73sq m     Narrative:      [place] guidelines for Chronic Kidney Disease (CKD):     Stage 1 with normal or high GFR (GFR > 90 mL/min/1 73 square meters)    Stage 2 Mild CKD (GFR = 60-89 mL/min/1 73 square meters)    Stage 3A Moderate CKD (GFR = 45-59 mL/min/1 73 square meters)    Stage 3B Moderate CKD (GFR = 30-44 mL/min/1 73 square meters)    Stage 4 Severe CKD (GFR = 15-29 mL/min/1 73 square meters)    Stage 5 End Stage CKD (GFR <15 mL/min/1 73 square meters)  Note: GFR calculation is accurate only with a steady state creatinine    CBC and differential [[id number]] Collected: 12/29/21 0106    Lab Status: Final result Specimen: Blood from Arm, Right Updated: 12/29/21 0118     WBC 6 99 Thousand/uL      RBC 4 32 Million/uL      Hemoglobin 12 9 g/dL      Hematocrit 37 5 %      MCV 87 fL      MCH 29 9 pg      MCHC 34 4 g/dL      RDW 12 0 %      MPV 10 1 fL      Platelets 352 Thousands/uL      nRBC 0 /100 WBCs      Neutrophils Relative 70 %      Immat GRANS % 0 %      Lymphocytes Relative 20 %      Monocytes Relative 10 %      Eosinophils Relative 0 %      Basophils Relative 0 %      Neutrophils Absolute 4 81 Thousands/µL      Immature Grans Absolute 0 03 Thousand/uL      Lymphocytes Absolute 1 39 Thousands/µL      Monocytes Absolute 0 70 Thousand/µL      Eosinophils Absolute 0 03 Thousand/µL      Basophils Absolute 0 03 Thousands/µL                  XR chest 1 view portable   ED Interpretation by Marino Otero MD (12/29 0982)   No infiltrate read by me                    Procedures  ECG 12 Lead Documentation Only    Date/Time: 12/29/2021 1:11 AM  Performed by: Marino Otero MD  Authorized by: Marino Otero MD     Indications / Diagnosis:  Sob  ECG reviewed by me, the ED Provider: yes    Patient location:  ED  Previous ECG:     Previous ECG:  Compared to current    Comparison ECG info:  5/14/21    Similarity:  Changes noted (not s  juan luis now)  Rate:     ECG rate:  72    ECG rate assessment: normal    Rhythm:     Rhythm: sinus rhythm    Ectopy:     Ectopy: none    QRS:     QRS axis:  Normal    QRS intervals:  Normal  Conduction:     Conduction: normal    ST segments:     ST segments:  Normal  T waves:     T waves: normal    Other findings:     Other findings: poor R wave progression               ED Course                                             MDM  Number of Diagnoses or Management Options  Diagnosis management comments: DDX including but not limited to: pneumonia, pleural effusion, CHF, PE, PTX, ACS, MI, asthma exacerbation, COPD exacerbation, COVID 19, anemia, metabolic abnormality, renal failure  Amount and/or Complexity of Data Reviewed  Clinical lab tests: ordered and reviewed  Tests in the radiology section of CPT®: ordered and reviewed  Decide to obtain previous medical records or to obtain history from someone other than the patient: yes  Review and summarize past medical records: yes  Independent visualization of images, tracings, or specimens: yes        Disposition  Final diagnoses:   COVID-19 virus infection   Dehydration     Time reflects when diagnosis was documented in both MDM as applicable and the Disposition within this note     Time User Action Codes Description Comment    12/29/2021  7:41 AM Althia Belts Add [U07 1] COVID-19 virus infection     12/29/2021  7:41 AM Althia Belts Add [E86 0] Dehydration       ED Disposition     ED Disposition Condition Date/Time Comment    Discharge Stable Wed Dec 29, 2021  7:44 AM Meryle Peacemaker discharge to home/self care  Follow-up Information     Follow up With Specialties Details Why Contact Info    Candis Heller, [address] [facility], Nurse Practitioner Call in 1 day for repeat blood pressure within 1-2 weeks  Self quarantine  Return sooner if fever, vomiting, difficulty breathing, pain, diarrhea, lethargy, rash  Drink fluids  [address]            Patient's Medications   Discharge Prescriptions    ASCORBIC ACID (VITAMIN C) 1000 MG TABLET    Take 1 tablet (1,000 mg total) by mouth 2 (two) times a day for 14 days       Start Date: 12/29/2021End Date: 1/12/2022       Order Dose: 1,000 mg       Quantity: 28 tablet    Refills: 0    BUDESONIDE (PULMICORT FLEXHALER) 180 MCG/ACT INHALER    Inhale 2 puffs 2 (two) times a day for 10 days Rinse mouth after use         Start Date: 12/29/2021End Date: 1/8/2022       Order Dose: 2 puffs       Quantity: 1 each    Refills: 0    CHOLECALCIFEROL (VITAMIN D3) 1,000 UNITS TABLET    Take 2 tablets (2,000 Units total) by mouth daily for 14 days       Start Date: 12/29/2021End Date: 1/12/2022       Order Dose: 2,000 Units       Quantity: 14 tablet    Refills: 0    MULTIPLE VITAMIN (MULTIVITAMIN) TABLET    Take 1 tablet by mouth daily for 14 days       Start Date: 12/29/2021End Date: 1/12/2022       Order Dose: 1 tablet       Quantity: 14 tablet    Refills: 0    ZINC SULFATE (ZINCATE) 220 MG CAPSULE    Take 1 capsule (220 mg total) by mouth daily for 14 days       Start Date: 12/29/2021End Date: 1/12/2022       Order Dose: 220 mg       Quantity: 14 capsule    Refills: 0       No discharge procedures on file      PDMP Review       Value Time User    PDMP Reviewed  Yes 12/29/2021  5:01 AM Scott Colorado MD          ED Provider  Electronically Signed by           Scott Colorado MD  12/29/21 1980

## 2021-12-30 LAB
ATRIAL RATE: 75 BPM
P AXIS: 40 DEGREES
PR INTERVAL: 136 MS
QRS AXIS: -20 DEGREES
QRSD INTERVAL: 76 MS
QT INTERVAL: 370 MS
QTC INTERVAL: 405 MS
T WAVE AXIS: -4 DEGREES
VENTRICULAR RATE: 72 BPM

## 2021-12-30 PROCEDURE — 93010 ELECTROCARDIOGRAM REPORT: CPT | Performed by: INTERNAL MEDICINE

## 2021-12-30 NOTE — RESULT ENCOUNTER NOTE
Please ask patient to quarantine for 10 days since onset of symptoms   If she is not feeling well or having a bad cough/sob schedule a virtual this afternoon

## 2022-01-03 DIAGNOSIS — I10 ESSENTIAL HYPERTENSION: ICD-10-CM

## 2022-01-03 RX ORDER — NIFEDIPINE 30 MG/1
30 TABLET, EXTENDED RELEASE ORAL DAILY
Qty: 90 TABLET | Refills: 0 | Status: SHIPPED | OUTPATIENT
Start: 2022-01-03 | End: 2022-04-19 | Stop reason: SDUPTHER

## 2022-02-11 ENCOUNTER — APPOINTMENT (OUTPATIENT)
Dept: LAB | Facility: HOSPITAL | Age: 57
End: 2022-02-11
Attending: INTERNAL MEDICINE
Payer: COMMERCIAL

## 2022-02-11 ENCOUNTER — NURSE TRIAGE (OUTPATIENT)
Dept: OTHER | Facility: OTHER | Age: 57
End: 2022-02-11

## 2022-02-11 DIAGNOSIS — E55.9 VITAMIN D DEFICIENCY: ICD-10-CM

## 2022-02-11 DIAGNOSIS — M32.14 SYSTEMIC LUPUS ERYTHEMATOSUS WITH GLOMERULAR DISEASE, UNSPECIFIED SLE TYPE (HCC): Primary | ICD-10-CM

## 2022-02-11 LAB
25(OH)D3 SERPL-MCNC: 41.1 NG/ML (ref 30–100)
ALBUMIN SERPL BCP-MCNC: 3.4 G/DL (ref 3.5–5)
ALP SERPL-CCNC: 90 U/L (ref 46–116)
ALT SERPL W P-5'-P-CCNC: 25 U/L (ref 12–78)
ANION GAP SERPL CALCULATED.3IONS-SCNC: 4 MMOL/L (ref 4–13)
AST SERPL W P-5'-P-CCNC: 23 U/L (ref 5–45)
BASOPHILS # BLD AUTO: 0.02 THOUSANDS/ΜL (ref 0–0.1)
BASOPHILS NFR BLD AUTO: 0 % (ref 0–1)
BILIRUB SERPL-MCNC: 0.64 MG/DL (ref 0.2–1)
BUN SERPL-MCNC: 21 MG/DL (ref 5–25)
C3 SERPL-MCNC: 111 MG/DL (ref 90–180)
C4 SERPL-MCNC: 36 MG/DL (ref 10–40)
CALCIUM ALBUM COR SERPL-MCNC: 11.1 MG/DL (ref 8.3–10.1)
CALCIUM SERPL-MCNC: 10.6 MG/DL (ref 8.3–10.1)
CHLORIDE SERPL-SCNC: 106 MMOL/L (ref 100–108)
CO2 SERPL-SCNC: 26 MMOL/L (ref 21–32)
CREAT SERPL-MCNC: 0.95 MG/DL (ref 0.6–1.3)
CRP SERPL QL: 3.2 MG/L
EOSINOPHIL # BLD AUTO: 0.06 THOUSAND/ΜL (ref 0–0.61)
EOSINOPHIL NFR BLD AUTO: 1 % (ref 0–6)
ERYTHROCYTE [DISTWIDTH] IN BLOOD BY AUTOMATED COUNT: 12.1 % (ref 11.6–15.1)
GFR SERPL CREATININE-BSD FRML MDRD: 68 ML/MIN/1.73SQ M
GLUCOSE P FAST SERPL-MCNC: 97 MG/DL (ref 65–99)
HCT VFR BLD AUTO: 42.7 % (ref 34.8–46.1)
HGB BLD-MCNC: 14 G/DL (ref 11.5–15.4)
IMM GRANULOCYTES # BLD AUTO: 0.02 THOUSAND/UL (ref 0–0.2)
IMM GRANULOCYTES NFR BLD AUTO: 0 % (ref 0–2)
LYMPHOCYTES # BLD AUTO: 1.42 THOUSANDS/ΜL (ref 0.6–4.47)
LYMPHOCYTES NFR BLD AUTO: 23 % (ref 14–44)
MCH RBC QN AUTO: 29 PG (ref 26.8–34.3)
MCHC RBC AUTO-ENTMCNC: 32.8 G/DL (ref 31.4–37.4)
MCV RBC AUTO: 88 FL (ref 82–98)
MONOCYTES # BLD AUTO: 0.36 THOUSAND/ΜL (ref 0.17–1.22)
MONOCYTES NFR BLD AUTO: 6 % (ref 4–12)
NEUTROPHILS # BLD AUTO: 4.21 THOUSANDS/ΜL (ref 1.85–7.62)
NEUTS SEG NFR BLD AUTO: 70 % (ref 43–75)
NRBC BLD AUTO-RTO: 0 /100 WBCS
PLATELET # BLD AUTO: 119 THOUSANDS/UL (ref 149–390)
PMV BLD AUTO: 11.3 FL (ref 8.9–12.7)
POTASSIUM SERPL-SCNC: 4.4 MMOL/L (ref 3.5–5.3)
PROT SERPL-MCNC: 7.6 G/DL (ref 6.4–8.2)
RBC # BLD AUTO: 4.83 MILLION/UL (ref 3.81–5.12)
SODIUM SERPL-SCNC: 136 MMOL/L (ref 136–145)
WBC # BLD AUTO: 6.09 THOUSAND/UL (ref 4.31–10.16)

## 2022-02-11 PROCEDURE — 82306 VITAMIN D 25 HYDROXY: CPT

## 2022-02-11 PROCEDURE — 86140 C-REACTIVE PROTEIN: CPT

## 2022-02-11 PROCEDURE — 85025 COMPLETE CBC W/AUTO DIFF WBC: CPT

## 2022-02-11 PROCEDURE — 80053 COMPREHEN METABOLIC PANEL: CPT

## 2022-02-11 PROCEDURE — 86225 DNA ANTIBODY NATIVE: CPT

## 2022-02-11 PROCEDURE — 86160 COMPLEMENT ANTIGEN: CPT

## 2022-02-11 NOTE — TELEPHONE ENCOUNTER
Regarding: rt eye bloodshot red; denies loss of va, itch or pain  ----- Message from [employer] sent at 2/11/2022 12:28 PM EST -----  "I woke up with a bloodshot right eye; it does not hurt or itch nor have I lost vision "

## 2022-02-11 NOTE — TELEPHONE ENCOUNTER
Most likely a small blood vessel that broke  This is very common  She is on warfarin which can cause increased bleeding  Can she upload a picture of her eye on mychart?

## 2022-02-11 NOTE — TELEPHONE ENCOUNTER
Reason for Disposition   Red eye and no complications    Answer Assessment - Initial Assessment Questions  1  LOCATION: Location: "What's red, the eyeball or the outer eyelids?" (Note: when callers say the eye is red, they usually mean the sclera is red)        Eyeball   2  REDNESS OF SCLERA: "Is the redness in one or both eyes?" "When did the redness start?"       Redness in sclera in right eye    3  ONSET: "When did the eye become red?" (e g , hours, days)       In a morning today   4  EYELIDS: "Are the eyelids red or swollen?" If Yes, ask: "How much?"       Eyelid looks normal   5  VISION: "Is there any difficulty seeing clearly?"        No change in a vision  6  ITCHING: "Does it feel itchy?" If so ask: "How bad is it" (e g , Scale 1-10; or mild, moderate, severe)       No itching   7  PAIN: "Is there any pain? If Yes, ask: "How bad is it?" (e g , Scale 1-10; or mild, moderate, severe)       No pain   8  CONTACT LENS: "Do you wear contacts?"       No   9  CAUSE: "What do you think is causing the redness?"      Patient doesn't know   10   OTHER SYMPTOMS: "Do you have any other symptoms?" (e g , fever, runny nose, cough, vomiting)        No    Protocols used: EYE - RED WITHOUT PUS-ADULT-OH

## 2022-02-12 LAB — DSDNA AB SER-ACNC: 16 IU/ML (ref 0–9)

## 2022-02-14 ENCOUNTER — TELEPHONE (OUTPATIENT)
Dept: HEMATOLOGY ONCOLOGY | Facility: CLINIC | Age: 57
End: 2022-02-14

## 2022-02-14 DIAGNOSIS — D68.61 APS (ANTIPHOSPHOLIPID SYNDROME) (HCC): Primary | ICD-10-CM

## 2022-02-14 NOTE — TELEPHONE ENCOUNTER
I spoke with the patient she stated that her eye is still red but it appears to be improving  Patient will send a picture of her eye through my chart

## 2022-02-14 NOTE — TELEPHONE ENCOUNTER
Spoke with patient regarding her issue with her eye being blood shot  She had already contacted her PCP and it sounds like a subconjunctival hemorrhage  The pt was then told to contact us regarding the INR and plt count  I informed the pt that it looks like her nephrologist filled her coumadin last, but that should be refilled by our office  I also let her know that her INR is 2 25, so she can stay on the same dosage of coumadin (3mg daily except Thursday) and I informed her that I put in orders for her to get her next INR drawn and a CBC as well to monitor  She has an appointment coming up in the middle of March to discuss further  I gave her the hopeline number if she has any questions or concerns and she verbalized understanding

## 2022-02-16 ENCOUNTER — APPOINTMENT (OUTPATIENT)
Dept: LAB | Facility: HOSPITAL | Age: 57
End: 2022-02-16
Payer: COMMERCIAL

## 2022-02-16 DIAGNOSIS — E83.52 HYPERCALCEMIA: ICD-10-CM

## 2022-02-16 DIAGNOSIS — D69.6 THROMBOCYTOPENIA (HCC): ICD-10-CM

## 2022-02-16 LAB
25(OH)D3 SERPL-MCNC: 43 NG/ML (ref 30–100)
BASOPHILS # BLD AUTO: 0.03 THOUSANDS/ΜL (ref 0–0.1)
BASOPHILS NFR BLD AUTO: 1 % (ref 0–1)
CA-I BLD-SCNC: 1.3 MMOL/L (ref 1.12–1.32)
CALCIUM SERPL-MCNC: 9.7 MG/DL (ref 8.3–10.1)
EOSINOPHIL # BLD AUTO: 0.05 THOUSAND/ΜL (ref 0–0.61)
EOSINOPHIL NFR BLD AUTO: 1 % (ref 0–6)
ERYTHROCYTE [DISTWIDTH] IN BLOOD BY AUTOMATED COUNT: 12.2 % (ref 11.6–15.1)
HCT VFR BLD AUTO: 41.1 % (ref 34.8–46.1)
HGB BLD-MCNC: 13.7 G/DL (ref 11.5–15.4)
IMM GRANULOCYTES # BLD AUTO: 0.02 THOUSAND/UL (ref 0–0.2)
IMM GRANULOCYTES NFR BLD AUTO: 0 % (ref 0–2)
LYMPHOCYTES # BLD AUTO: 1.6 THOUSANDS/ΜL (ref 0.6–4.47)
LYMPHOCYTES NFR BLD AUTO: 26 % (ref 14–44)
MCH RBC QN AUTO: 30.1 PG (ref 26.8–34.3)
MCHC RBC AUTO-ENTMCNC: 33.3 G/DL (ref 31.4–37.4)
MCV RBC AUTO: 90 FL (ref 82–98)
MONOCYTES # BLD AUTO: 0.55 THOUSAND/ΜL (ref 0.17–1.22)
MONOCYTES NFR BLD AUTO: 9 % (ref 4–12)
NEUTROPHILS # BLD AUTO: 3.82 THOUSANDS/ΜL (ref 1.85–7.62)
NEUTS SEG NFR BLD AUTO: 63 % (ref 43–75)
NRBC BLD AUTO-RTO: 0 /100 WBCS
PLATELET # BLD AUTO: 176 THOUSANDS/UL (ref 149–390)
PMV BLD AUTO: 10.4 FL (ref 8.9–12.7)
PTH-INTACT SERPL-MCNC: 175.6 PG/ML (ref 18.4–80.1)
RBC # BLD AUTO: 4.55 MILLION/UL (ref 3.81–5.12)
WBC # BLD AUTO: 6.07 THOUSAND/UL (ref 4.31–10.16)

## 2022-02-16 PROCEDURE — 82330 ASSAY OF CALCIUM: CPT

## 2022-02-16 PROCEDURE — 82310 ASSAY OF CALCIUM: CPT

## 2022-02-16 PROCEDURE — 85025 COMPLETE CBC W/AUTO DIFF WBC: CPT

## 2022-02-16 PROCEDURE — 83970 ASSAY OF PARATHORMONE: CPT

## 2022-02-16 PROCEDURE — 36415 COLL VENOUS BLD VENIPUNCTURE: CPT

## 2022-02-16 PROCEDURE — 82306 VITAMIN D 25 HYDROXY: CPT

## 2022-02-21 ENCOUNTER — APPOINTMENT (OUTPATIENT)
Dept: LAB | Facility: CLINIC | Age: 57
End: 2022-02-21
Payer: COMMERCIAL

## 2022-02-21 DIAGNOSIS — E21.0 PRIMARY HYPERPARATHYROIDISM (HCC): ICD-10-CM

## 2022-02-21 DIAGNOSIS — R80.9 PROTEINURIA, UNSPECIFIED TYPE: ICD-10-CM

## 2022-02-21 DIAGNOSIS — N18.2 CHRONIC KIDNEY DISEASE (CKD), STAGE II (MILD): ICD-10-CM

## 2022-02-21 DIAGNOSIS — E89.0 POSTOPERATIVE HYPOTHYROIDISM: ICD-10-CM

## 2022-02-21 DIAGNOSIS — Z85.850 HISTORY OF THYROID CANCER: ICD-10-CM

## 2022-02-21 DIAGNOSIS — E89.2 STATUS POST PARATHYROIDECTOMY (HCC): ICD-10-CM

## 2022-02-21 DIAGNOSIS — E55.9 VITAMIN D DEFICIENCY: ICD-10-CM

## 2022-02-21 LAB
25(OH)D3 SERPL-MCNC: 44.3 NG/ML (ref 30–100)
ALBUMIN SERPL BCP-MCNC: 3.5 G/DL (ref 3.5–5)
ANION GAP SERPL CALCULATED.3IONS-SCNC: 9 MMOL/L (ref 4–13)
BACTERIA UR QL AUTO: ABNORMAL /HPF
BILIRUB UR QL STRIP: NEGATIVE
BUN SERPL-MCNC: 25 MG/DL (ref 5–25)
CALCIUM SERPL-MCNC: 10 MG/DL (ref 8.3–10.1)
CHLORIDE SERPL-SCNC: 104 MMOL/L (ref 100–108)
CLARITY UR: CLEAR
CO2 SERPL-SCNC: 25 MMOL/L (ref 21–32)
COLOR UR: YELLOW
CREAT SERPL-MCNC: 1.04 MG/DL (ref 0.6–1.3)
CREAT UR-MCNC: 178 MG/DL
GFR SERPL CREATININE-BSD FRML MDRD: 61 ML/MIN/1.73SQ M
GLUCOSE P FAST SERPL-MCNC: 84 MG/DL (ref 65–99)
GLUCOSE UR STRIP-MCNC: NEGATIVE MG/DL
HGB UR QL STRIP.AUTO: NEGATIVE
HYALINE CASTS #/AREA URNS LPF: ABNORMAL /LPF
KETONES UR STRIP-MCNC: NEGATIVE MG/DL
LEUKOCYTE ESTERASE UR QL STRIP: NEGATIVE
NITRITE UR QL STRIP: NEGATIVE
NON-SQ EPI CELLS URNS QL MICRO: ABNORMAL /HPF
PH UR STRIP.AUTO: 6 [PH]
POTASSIUM SERPL-SCNC: 4.3 MMOL/L (ref 3.5–5.3)
PROT UR STRIP-MCNC: ABNORMAL MG/DL
PROT UR-MCNC: 89 MG/DL
PROT/CREAT UR: 0.5 MG/G{CREAT} (ref 0–0.1)
PTH-INTACT SERPL-MCNC: 155.2 PG/ML (ref 18.4–80.1)
RBC #/AREA URNS AUTO: ABNORMAL /HPF
SODIUM SERPL-SCNC: 138 MMOL/L (ref 136–145)
SP GR UR STRIP.AUTO: 1.02 (ref 1–1.03)
T4 FREE SERPL-MCNC: 1.91 NG/DL (ref 0.76–1.46)
TSH SERPL DL<=0.05 MIU/L-ACNC: 0.1 UIU/ML (ref 0.36–3.74)
UROBILINOGEN UR QL STRIP.AUTO: 0.2 E.U./DL
WBC #/AREA URNS AUTO: ABNORMAL /HPF

## 2022-02-21 PROCEDURE — 84443 ASSAY THYROID STIM HORMONE: CPT

## 2022-02-21 PROCEDURE — 81001 URINALYSIS AUTO W/SCOPE: CPT

## 2022-02-21 PROCEDURE — 82040 ASSAY OF SERUM ALBUMIN: CPT

## 2022-02-21 PROCEDURE — 84156 ASSAY OF PROTEIN URINE: CPT

## 2022-02-21 PROCEDURE — 82306 VITAMIN D 25 HYDROXY: CPT

## 2022-02-21 PROCEDURE — 36415 COLL VENOUS BLD VENIPUNCTURE: CPT

## 2022-02-21 PROCEDURE — 80048 BASIC METABOLIC PNL TOTAL CA: CPT

## 2022-02-21 PROCEDURE — 83970 ASSAY OF PARATHORMONE: CPT

## 2022-02-21 PROCEDURE — 82570 ASSAY OF URINE CREATININE: CPT

## 2022-02-21 PROCEDURE — 84439 ASSAY OF FREE THYROXINE: CPT

## 2022-02-22 DIAGNOSIS — I12.9 PARENCHYMAL RENAL HYPERTENSION, STAGE 1 THROUGH STAGE 4 OR UNSPECIFIED CHRONIC KIDNEY DISEASE: ICD-10-CM

## 2022-02-22 DIAGNOSIS — I10 ESSENTIAL HYPERTENSION, BENIGN: ICD-10-CM

## 2022-02-22 RX ORDER — FOSINOPRIL SODIUM 20 MG/1
20 TABLET ORAL 2 TIMES DAILY
Qty: 180 TABLET | Refills: 0 | Status: SHIPPED | OUTPATIENT
Start: 2022-02-22 | End: 2022-05-31 | Stop reason: SDUPTHER

## 2022-02-22 RX ORDER — SPIRONOLACTONE 25 MG/1
25 TABLET ORAL DAILY
Qty: 90 TABLET | Refills: 0 | Status: SHIPPED | OUTPATIENT
Start: 2022-02-22 | End: 2022-05-10 | Stop reason: SDUPTHER

## 2022-02-24 ENCOUNTER — OFFICE VISIT (OUTPATIENT)
Dept: ENDOCRINOLOGY | Facility: CLINIC | Age: 57
End: 2022-02-24
Payer: COMMERCIAL

## 2022-02-24 VITALS
HEIGHT: 61 IN | HEART RATE: 58 BPM | BODY MASS INDEX: 37 KG/M2 | TEMPERATURE: 98.3 F | WEIGHT: 196 LBS | SYSTOLIC BLOOD PRESSURE: 120 MMHG | DIASTOLIC BLOOD PRESSURE: 88 MMHG

## 2022-02-24 DIAGNOSIS — E21.0 PRIMARY HYPERPARATHYROIDISM (HCC): ICD-10-CM

## 2022-02-24 DIAGNOSIS — Z85.850 HISTORY OF THYROID CANCER: ICD-10-CM

## 2022-02-24 DIAGNOSIS — E55.9 VITAMIN D DEFICIENCY: ICD-10-CM

## 2022-02-24 DIAGNOSIS — E89.0 POSTOPERATIVE HYPOTHYROIDISM: Primary | ICD-10-CM

## 2022-02-24 PROCEDURE — 99214 OFFICE O/P EST MOD 30 MIN: CPT | Performed by: PHYSICIAN ASSISTANT

## 2022-02-24 RX ORDER — LEVOTHYROXINE SODIUM 112 UG/1
112 TABLET ORAL DAILY
Qty: 90 TABLET | Refills: 1 | Status: SHIPPED | OUTPATIENT
Start: 2022-02-24

## 2022-02-24 NOTE — PROGRESS NOTES
Patient Progress Note    CC: hypothyroidism, hyperparathyroidism     Referring Provider  Danita Salas, [address] [facility] [address]  [facility] [place],  [address]     History of Present Illness:     Patient is a 28-year-old female here for follow-up of postsurgical hypothyroidism, primary hyperparathyroidism, vitamin-D deficiency  She has a past medical history of stage I papillary thyroid cancer  She underwent right hemithyroidectomy and right parathyroid gland removal in 2006, followed by completion of thyroidectomy in 2010  In 2010 she also had resection of left parathyroid gland  She also had LEONARDO therapy  Patient is on levothyroxine 125 mcg 1 tablet daily  Patient is taking it 60 minutes before breakfast on an empty stomach and at least 4 hrs apart from supplements  Tolerating medication well  No recent Iodine loading in form of medication, biotin or kelp supplements or radiological diagnostic studies  Most recent thyroid function tests were abnormal, TSH 0 105 and free T4 1 91  Thyroglobulin antibody level was previously less than 1 0 and thyroglobulin < 0 1  Head neck ultrasound done in April 2019 did not show any evidence of recurrent or metastatic disease  Repeat US was ordered but not yet completed  Primary hyperparathyroidism: patient underwent a re-exploration parathyroidectomy in May 2021  Her parathyroid levels did improve during surgery  Most recent PTH level elevated at 155 2  She is not taking calcium supplement  Corrected calcium is elevated at 10 4  Vitamin D is normal at 44 3  She is taking vitamin D3 2000 IU daily            Patient Active Problem List   Diagnosis    Chronic deep vein thrombosis (DVT) of proximal vein of both lower extremities (HCC)    History of ITP    Benign hypertensive CKD    Chronic kidney disease (CKD), stage II (mild)    Edema    Gross hematuria    Hypercalcemia    Primary hyperparathyroidism ([address] )    Hypertension    Nephrolithiasis    Proteinuria    Systemic lupus erythematosus ([place] [address] )    Vitamin D deficiency    Elevated PTHrP level    History of thyroid cancer    Upper respiratory tract infection    Rash    Left ear pain    BMI 39 0-39 9,adult    Post-menopausal bleeding    Cellulitis of right index finger    Greater trochanteric bursitis of left hip    Anti-phospholipid syndrome ([place] [address] )    Status post parathyroidectomy ([place] [address] )    Postoperative hypothyroidism    Sjogren's syndrome without extraglandular involvement ([place] [address] )     Past Medical History:   Diagnosis Date    Anemia     Not sure    Cancer ([place] [address] )     thyroid    Chronic kidney disease     Clotting disorder ([place] [address] )     Lung embolism,  ITP    Diabetes mellitus ([place] [address] )     Disease of thyroid gland     Gestational diabetes     Hyperparathyroidism ([place] [address] )     Hypertension     Idiopathic thrombocytopenic purpura (ITP) ([place] [address] )     Kidney stone     Lupus ([place] [address] )     Osteoarthritis     Sjogren's syndrome ([place] [address] )     Vitamin D deficiency       Past Surgical History:   Procedure Laterality Date     SECTION      [address]    DIAGNOSTIC FLEXIBLE LARYNGOSCOPY N/A 2021    Procedure: DIAGNOSTIC FLEXIBLE LARYNGOSCOPY;  Surgeon: Joe Olivares MD;  [facility];  Service: Surgical Oncology    HERNIA REPAIR      HIP SURGERY      left side, bone decompression    HYSTEROSCOPY      DC EXPLORE PARATHYROID GLANDS Right 2021    Procedure: PARATHYROIDECTOMY, MINIMALLY INVASIVE, right, intraoperative PTH monitoring;  Surgeon: Joe Olivares MD;  [facility];  Service: Surgical Oncology    RENAL BIOPSY      Not sure    THYROID SURGERY      TUBAL LIGATION        Family History   Problem Relation Age of Onset    No Known Problems Mother     Diabetes type II Father     Diabetes Father     Stroke Father     Diabetes type II Paternal Aunt     Diabetes type II Paternal Uncle     Diabetes type II Paternal Grandmother     Stomach cancer Paternal Grandfather     No Known Problems Sister  No Known Problems Maternal Grandmother     No Known Problems Maternal Grandfather     No Known Problems Sister     No Known Problems Maternal Aunt     No Known Problems Maternal Aunt     No Known Problems Maternal Aunt     No Known Problems Paternal Aunt     No Known Problems Paternal Aunt      Social History     Tobacco Use    Smoking status: Never Smoker    Smokeless tobacco: Never Used   Substance Use Topics    Alcohol use: Not Currently     Alcohol/week: 0 0 standard drinks     Comment: Twice a year     Allergies   Allergen Reactions    Penicillins Itching     Current Outpatient Medications   Medication Sig Dispense Refill    acetaminophen (TYLENOL) 500 mg tablet Take 2 tablets (1,000 mg total) by mouth every 6 (six) hours as needed for mild pain 30 tablet 0    Cholecalciferol (Vitamin D) 50 MCG (2000 UT) tablet Take 2,000 Units by mouth every other day       ciclopirox (LOPROX) 0 77 % cream       folic acid (FOLVITE) 1 mg tablet Take 1 tablet by mouth daily      fosinopril (MONOPRIL) 20 mg tablet Take 1 tablet (20 mg total) by mouth 2 (two) times a day 180 tablet 0    NIFEdipine (PROCARDIA XL) 30 mg 24 hr tablet Take 1 tablet (30 mg total) by mouth daily 90 tablet 0    spironolactone (ALDACTONE) 25 mg tablet Take 1 tablet (25 mg total) by mouth daily 90 tablet 0    warfarin (COUMADIN) 3 mg tablet TAKE ONE TABLET BY MOUTH EVERY DAY  DO NOT TAKE ON MONDAYS AND THURSDAYS 90 tablet 0    budesonide (Pulmicort Flexhaler) 180 MCG/ACT inhaler Inhale 2 puffs 2 (two) times a day for 10 days Rinse mouth after use   1 each 0    cholecalciferol (VITAMIN D3) 400 units tablet Take 1 tablet (400 Units total) by mouth daily (Patient not taking: Reported on 11/10/2021 ) 30 tablet 4    hydroxychloroquine (PLAQUENIL) 200 mg tablet Take 1 tablet (200 mg total) by mouth 2 (two) times a day with meals (Patient not taking: Reported on 2/24/2022 ) 60 tablet 3    levothyroxine (Synthroid) 112 mcg tablet Take 1 tablet (112 mcg total) by mouth daily 90 tablet 1    lidocaine (LIDODERM) 5 % Apply 1 patch topically daily Remove & Discard patch within 12 hours or as directed by MD (Patient not taking: Reported on 10/20/2021) 6 patch 0    Multiple Vitamin (multivitamin) tablet Take 1 tablet by mouth daily for 14 days 14 tablet 0    nystatin (MYCOSTATIN) powder Apply topically 2 (two) times a day (Patient not taking: Reported on 2/17/2022 ) 60 g 2    zinc sulfate (ZINCATE) 220 mg capsule Take 1 capsule (220 mg total) by mouth daily for 14 days 14 capsule 0     No current facility-administered medications for this visit  Review of Systems   Constitutional: Negative for activity change, appetite change, fatigue and unexpected weight change  HENT: Negative for trouble swallowing  Eyes: Negative for visual disturbance  Respiratory: Negative for shortness of breath  Cardiovascular: Negative for chest pain and palpitations  Gastrointestinal: Negative for constipation and diarrhea  Endocrine: Negative for polydipsia and polyuria  Musculoskeletal: Negative  Skin: Negative for wound  Neurological: Negative for numbness  Psychiatric/Behavioral: Negative  Physical Exam:  Body mass index is 37 03 kg/m²  /88   Pulse 58   Temp 98 3 °F (36 8 °C) (Tympanic)   Ht 5' 1" (1 549 m)   Wt 88 9 kg (196 lb)   BMI 37 03 kg/m²    Wt Readings from Last 3 Encounters:   02/24/22 88 9 kg (196 lb)   02/17/22 88 5 kg (195 lb 3 2 oz)   12/24/21 88 5 kg (195 lb)       Physical Exam  Vitals and nursing note reviewed  Constitutional:       Appearance: She is well-developed  HENT:      Head: Normocephalic  Eyes:      General: No scleral icterus  Pupils: Pupils are equal, round, and reactive to light  Neck:      Thyroid: No thyromegaly  Cardiovascular:      Rate and Rhythm: Normal rate and regular rhythm  Pulses:           Radial pulses are 2+ on the right side and 2+ on the left side  Heart sounds: No murmur heard  Pulmonary:      Effort: Pulmonary effort is normal  No respiratory distress  Breath sounds: Normal breath sounds  No wheezing  Musculoskeletal:      Cervical back: Neck supple  Skin:     General: Skin is warm and dry  Neurological:      Mental Status: She is alert  Deep Tendon Reflexes: Reflexes are normal and symmetric  Patient's shoes and socks were not removed  Labs:   Lab Results   Component Value Date    HGBA1C 5 4 04/01/2017       No results found for: CHOL, HDL, TRIG, CHOLHDL    Lab Results   Component Value Date    GLUCOSE 105 (H) 11/18/2017    CALCIUM 10 0 02/21/2022     11/18/2017    K 4 3 02/21/2022    CO2 25 02/21/2022     02/21/2022    BUN 25 02/21/2022    CREATININE 1 04 02/21/2022        eGFR   Date Value Ref Range Status   02/21/2022 61 ml/min/1 73sq m Final       Lab Results   Component Value Date    ALT 25 02/11/2022    AST 23 02/11/2022    ALKPHOS 90 02/11/2022    BILITOT 0 5 07/01/2017       Lab Results   Component Value Date    ZMO9SVDOGGDA 0 105 (L) 02/21/2022    TSH 0 354 (L) 09/30/2019           Plan:    Diagnoses and all orders for this visit:    Postoperative hypothyroidism / History of thyroid cancer  Thyroid function tests were abnormal, TSH 0 105 and free T4 1 91  Reduce dose of levothyroxine to 112 mcg daily  Repeat TFTs in 6 week  Thyroglobulin antibody level was previously less than 1 0 and thyroglobulin < 0 1  Head neck ultrasound done in April 2019 did not show any evidence of recurrent or metastatic disease  Advised to complete US  Monitor TFTs and TG and TG ab   -     T4, free; Future  -     TSH, 3rd generation; Future  -     levothyroxine (Synthroid) 112 mcg tablet; Take 1 tablet (112 mcg total) by mouth daily  -     T4, free; Future  -     TSH, 3rd generation; Future  -     Thyroglobulin; Future  -     US head neck lymph node mapping;  Future    Primary hyperparathyroidism ([facility] [address] )  History of parathyroid adenoma resection x3  Corrected calcium elevated at 10 4  PTH elevated at 155 2  Patient is adamant about not getting another surgery as she states the problem does not seem to get solved  Continue to avoid calcium supplementation  Hydrate well  Check 24 hour urine calcium and creatinine   -     Basic metabolic panel; Future  -     PTH, intact; Future  -     Albumin; Future  -     Calcium, urine, 24 hour Lab Collect; Future  -     Creatinine, urine, 24 hour Lab Collect; Future    Vitamin D deficiency  Vitamin D normal at 44 3  Decrease vitamin D3 1000 IU daily or 2000 IU every other day  -     Vitamin D 25 hydroxy; Future      Discussed with the patient and all questions fully answered  She will call me if any problems arise      Counseled patient on diagnostic results, prognosis, risk and benefit of treatment options, instruction for management, importance of treatment compliance, risk  factor reduction and impressions      Diana Jin PA-C

## 2022-03-07 ENCOUNTER — IMMUNIZATIONS (OUTPATIENT)
Dept: FAMILY MEDICINE CLINIC | Facility: HOSPITAL | Age: 57
End: 2022-03-07

## 2022-03-07 DIAGNOSIS — Z23 ENCOUNTER FOR IMMUNIZATION: Primary | ICD-10-CM

## 2022-03-07 PROCEDURE — 0011A COVID-19 MODERNA VACC 0.5 ML: CPT

## 2022-03-07 PROCEDURE — 91301 COVID-19 MODERNA VACC 0.5 ML: CPT

## 2022-03-11 ENCOUNTER — NEW REFERRAL (OUTPATIENT)
Dept: URBAN - METROPOLITAN AREA CLINIC 6 | Facility: CLINIC | Age: 57
End: 2022-03-11

## 2022-03-11 DIAGNOSIS — H25.043: ICD-10-CM

## 2022-03-11 DIAGNOSIS — M32.10: ICD-10-CM

## 2022-03-11 DIAGNOSIS — Z79.899: ICD-10-CM

## 2022-03-11 PROCEDURE — 92134 CPTRZ OPH DX IMG PST SGM RTA: CPT | Mod: NC

## 2022-03-11 PROCEDURE — 92083 EXTENDED VISUAL FIELD XM: CPT

## 2022-03-11 PROCEDURE — 92250 FUNDUS PHOTOGRAPHY W/I&R: CPT

## 2022-03-11 PROCEDURE — 99204 OFFICE O/P NEW MOD 45 MIN: CPT

## 2022-03-11 ASSESSMENT — VISUAL ACUITY
OS_CC: 20/25-1
OD_CC: 20/25-2
OU_CC: J1+

## 2022-03-11 ASSESSMENT — TONOMETRY
OS_IOP_MMHG: 18
OD_IOP_MMHG: 17

## 2022-03-17 ENCOUNTER — OFFICE VISIT (OUTPATIENT)
Dept: HEMATOLOGY ONCOLOGY | Facility: CLINIC | Age: 57
End: 2022-03-17
Payer: COMMERCIAL

## 2022-03-17 VITALS
BODY MASS INDEX: 36.82 KG/M2 | OXYGEN SATURATION: 97 % | WEIGHT: 195 LBS | DIASTOLIC BLOOD PRESSURE: 80 MMHG | HEART RATE: 61 BPM | TEMPERATURE: 97.2 F | RESPIRATION RATE: 18 BRPM | HEIGHT: 61 IN | SYSTOLIC BLOOD PRESSURE: 124 MMHG

## 2022-03-17 DIAGNOSIS — E83.52 HYPERCALCEMIA: ICD-10-CM

## 2022-03-17 DIAGNOSIS — E21.3 HYPERPARATHYROIDISM (HCC): ICD-10-CM

## 2022-03-17 DIAGNOSIS — D68.61 APS (ANTIPHOSPHOLIPID SYNDROME) (HCC): Primary | ICD-10-CM

## 2022-03-17 DIAGNOSIS — D68.61 ANTI-PHOSPHOLIPID SYNDROME (HCC): ICD-10-CM

## 2022-03-17 DIAGNOSIS — I82.5Y3 CHRONIC DEEP VEIN THROMBOSIS (DVT) OF PROXIMAL VEIN OF BOTH LOWER EXTREMITIES (HCC): Chronic | ICD-10-CM

## 2022-03-17 DIAGNOSIS — E21.0 PRIMARY HYPERPARATHYROIDISM (HCC): ICD-10-CM

## 2022-03-17 DIAGNOSIS — Z86.2 HISTORY OF ITP: ICD-10-CM

## 2022-03-17 PROCEDURE — 99214 OFFICE O/P EST MOD 30 MIN: CPT | Performed by: INTERNAL MEDICINE

## 2022-03-17 NOTE — PROGRESS NOTES
[facility]  [address] [facility] [address]  [facility] [address]  [address] [facility] [address] [phone]    Dionne Taylor,1965, [id number]  03/17/22    Discussion:   In summary, this is a 28-year-old female history of phospholipid antibody syndrome  She is currently on Coumadin  She is in target range 80% of the time  She had hyperparathyroid surgery  Calcium has normalized  She had some voice changes for about 6 weeks which have now resolved  Otherwise she is relatively stable  No bleeding or thrombotic symptoms  She had a platelet count of 691 a few weeks ago  This has since normalized  Possible minor flare of underlying ITP  Observation  I discussed the above with the patient  The patient  voiced understanding and agreement   ______________________________________________________________________    Chief Complaint   Patient presents with    Follow-up       HPI:  Oncology History    No history exists  Interval History:  Clinically stable  ECOG-  1 - Symptomatic but completely ambulatory    Review of Systems   Constitutional: Positive for fatigue  Negative for appetite change, diaphoresis and fever  HENT: Negative for sinus pain  Eyes: Negative for discharge  Respiratory: Negative for cough and shortness of breath  Cardiovascular: Negative for chest pain  Gastrointestinal: Negative for abdominal pain, constipation and diarrhea  Endocrine: Negative for cold intolerance  Genitourinary: Negative for difficulty urinating and hematuria  Musculoskeletal: Positive for arthralgias  Negative for joint swelling  Skin: Negative for rash  Allergic/Immunologic: Negative for environmental allergies  Neurological: Negative for dizziness and headaches  Hematological: Negative for adenopathy  Psychiatric/Behavioral: Negative for agitation         Past Medical History:   Diagnosis Date    Anemia     Not sure    Anti-phospholipid antibody syndrome ([address] )  Cancer ([place] [address] )     thyroid    Chronic kidney disease     Clotting disorder ([place] [address] )     Lung embolism,  ITP    Diabetes mellitus ([place] [address] )     Disease of thyroid gland     Gestational diabetes     Hyperparathyroidism ([place] [address] )     Hypertension     Idiopathic thrombocytopenic purpura (ITP) (HCC)     Kidney stone     Lupus ([place] [address] )     Osteoarthritis     Sjogren's syndrome ([place] [address] )     Vitamin D deficiency      Patient Active Problem List   Diagnosis    Chronic deep vein thrombosis (DVT) of proximal vein of both lower extremities (HCC)    History of ITP    Benign hypertensive CKD    Chronic kidney disease (CKD), stage II (mild)    Edema    Gross hematuria    Hypercalcemia    Primary hyperparathyroidism ([place] [address] )    Hypertension    Nephrolithiasis    Proteinuria    Systemic lupus erythematosus ([place] [address] )    Vitamin D deficiency    Elevated PTHrP level    History of thyroid cancer    Upper respiratory tract infection    Rash    Left ear pain    BMI 39 0-39 9,adult    Post-menopausal bleeding    Cellulitis of right index finger    Greater trochanteric bursitis of left hip    Anti-phospholipid syndrome (HCC)    Status post parathyroidectomy ([place] [address] )    Postoperative hypothyroidism    Sjogren's syndrome without extraglandular involvement ([facility])       Current Outpatient Medications:     ciclopirox (LOPROX) 0 77 % cream, , Disp: , Rfl:     folic acid (FOLVITE) 1 mg tablet, Take 1 tablet by mouth daily, Disp: , Rfl:     fosinopril (MONOPRIL) 20 mg tablet, Take 1 tablet (20 mg total) by mouth 2 (two) times a day, Disp: 180 tablet, Rfl: 0    levothyroxine (Synthroid) 112 mcg tablet, Take 1 tablet (112 mcg total) by mouth daily, Disp: 90 tablet, Rfl: 1    NIFEdipine (PROCARDIA XL) 30 mg 24 hr tablet, Take 1 tablet (30 mg total) by mouth daily, Disp: 90 tablet, Rfl: 0    spironolactone (ALDACTONE) 25 mg tablet, Take 1 tablet (25 mg total) by mouth daily, Disp: 90 tablet, Rfl: 0    warfarin (COUMADIN) 3 mg tablet, TAKE ONE TABLET BY MOUTH EVERY DAY  DO NOT TAKE ON  AND , Disp: 90 tablet, Rfl: 0    acetaminophen (TYLENOL) 500 mg tablet, Take 2 tablets (1,000 mg total) by mouth every 6 (six) hours as needed for mild pain, Disp: 30 tablet, Rfl: 0    budesonide (Pulmicort Flexhaler) 180 MCG/ACT inhaler, Inhale 2 puffs 2 (two) times a day for 10 days Rinse mouth after use   (Patient not taking: Reported on 3/17/2022 ), Disp: 1 each, Rfl: 0    Cholecalciferol (Vitamin D) 50 MCG (2000 UT) tablet, Take 2,000 Units by mouth every other day , Disp: , Rfl:     cholecalciferol (VITAMIN D3) 400 units tablet, Take 1 tablet (400 Units total) by mouth daily (Patient not taking: Reported on 11/10/2021 ), Disp: 30 tablet, Rfl: 4    hydroxychloroquine (PLAQUENIL) 200 mg tablet, Take 1 tablet (200 mg total) by mouth 2 (two) times a day with meals (Patient not taking: Reported on 2022 ), Disp: 60 tablet, Rfl: 3    lidocaine (LIDODERM) 5 %, Apply 1 patch topically daily Remove & Discard patch within 12 hours or as directed by MD (Patient not taking: Reported on 10/20/2021), Disp: 6 patch, Rfl: 0    Multiple Vitamin (multivitamin) tablet, Take 1 tablet by mouth daily for 14 days (Patient not taking: Reported on 3/17/2022 ), Disp: 14 tablet, Rfl: 0    nystatin (MYCOSTATIN) powder, Apply topically 2 (two) times a day (Patient not taking: Reported on 2022 ), Disp: 60 g, Rfl: 2    zinc sulfate (ZINCATE) 220 mg capsule, Take 1 capsule (220 mg total) by mouth daily for 14 days (Patient not taking: Reported on 3/17/2022 ), Disp: 14 capsule, Rfl: 0  Allergies   Allergen Reactions    Penicillins Itching     Past Surgical History:   Procedure Laterality Date     SECTION      4420/5095    DIAGNOSTIC FLEXIBLE LARYNGOSCOPY N/A 2021    Procedure: DIAGNOSTIC FLEXIBLE LARYNGOSCOPY;  Surgeon: Annamarie Hernandez MD;  [facility];  Service: Surgical Oncology    HERNIA REPAIR      HIP SURGERY      left side, bone decompression    HYSTEROSCOPY      [place] EXPLORE PARATHYROID GLANDS Right 5/25/2021    Procedure: PARATHYROIDECTOMY, MINIMALLY INVASIVE, right, intraoperative PTH monitoring;  Surgeon: Annamarie Hernandez MD;  [facility];  Service: Surgical Oncology    RENAL BIOPSY      Not sure    THYROID SURGERY      TUBAL LIGATION       Social History     Objective:  Vitals:    03/17/22 1101   BP: 124/80   BP Location: Left arm   Patient Position: Sitting   Cuff Size: Adult   Pulse: 61   Resp: 18   Temp: (!) 97 2 °F (36 2 °C)   SpO2: 97%   Weight: 88 5 kg (195 lb)   Height: 5' 1" (1 549 m)     Physical Exam  Constitutional:       Appearance: She is well-developed  HENT:      Head: Normocephalic and atraumatic  Eyes:      Pupils: Pupils are equal, round, and reactive to light  Cardiovascular:      Rate and Rhythm: Normal rate  Heart sounds: No murmur heard  Pulmonary:      Effort: No respiratory distress  Breath sounds: No wheezing or rales  Abdominal:      General: There is no distension  Palpations: Abdomen is soft  Tenderness: There is no abdominal tenderness  There is no rebound  Musculoskeletal:         General: No tenderness  Cervical back: Neck supple  Lymphadenopathy:      Cervical: No cervical adenopathy  Skin:     General: Skin is warm  Findings: No rash  Neurological:      Mental Status: She is alert and oriented to person, place, and time  Deep Tendon Reflexes: Reflexes normal    Psychiatric:         Thought Content: Thought content normal            Labs: I personally reviewed the labs and imaging pertinent to this patient care

## 2022-04-19 ENCOUNTER — TELEPHONE (OUTPATIENT)
Dept: HEMATOLOGY ONCOLOGY | Facility: CLINIC | Age: 57
End: 2022-04-19

## 2022-04-19 DIAGNOSIS — I82.5Y3 CHRONIC DEEP VEIN THROMBOSIS (DVT) OF PROXIMAL VEIN OF BOTH LOWER EXTREMITIES (HCC): Primary | Chronic | ICD-10-CM

## 2022-04-19 DIAGNOSIS — I10 ESSENTIAL HYPERTENSION: ICD-10-CM

## 2022-04-19 RX ORDER — WARFARIN SODIUM 3 MG/1
TABLET ORAL
Qty: 90 TABLET | Refills: 0 | Status: SHIPPED | OUTPATIENT
Start: 2022-04-19

## 2022-04-19 NOTE — TELEPHONE ENCOUNTER
Medication Refill     Who is Calling  Patient   Medication warfarin (COUMADIN) 3 mg tablet   How many pills left  7   Preferred Pharmacy / Address  [facility]    Who is your Physician?  Eunice Long   Call back number  [phone]   Relevant Information

## 2022-04-20 RX ORDER — NIFEDIPINE 30 MG/1
30 TABLET, EXTENDED RELEASE ORAL DAILY
Qty: 90 TABLET | Refills: 0 | Status: SHIPPED | OUTPATIENT
Start: 2022-04-20 | End: 2022-07-13 | Stop reason: SDUPTHER

## 2022-05-10 DIAGNOSIS — I10 ESSENTIAL HYPERTENSION, BENIGN: ICD-10-CM

## 2022-05-10 RX ORDER — SPIRONOLACTONE 25 MG/1
25 TABLET ORAL DAILY
Qty: 90 TABLET | Refills: 0 | Status: SHIPPED | OUTPATIENT
Start: 2022-05-10

## 2022-05-11 ENCOUNTER — OFFICE VISIT (OUTPATIENT)
Dept: INTERNAL MEDICINE CLINIC | Facility: CLINIC | Age: 57
End: 2022-05-11
Payer: COMMERCIAL

## 2022-05-11 ENCOUNTER — NURSE TRIAGE (OUTPATIENT)
Dept: OTHER | Facility: OTHER | Age: 57
End: 2022-05-11

## 2022-05-11 VITALS
OXYGEN SATURATION: 99 % | DIASTOLIC BLOOD PRESSURE: 90 MMHG | WEIGHT: 192 LBS | HEART RATE: 54 BPM | BODY MASS INDEX: 36.25 KG/M2 | SYSTOLIC BLOOD PRESSURE: 120 MMHG | HEIGHT: 61 IN

## 2022-05-11 DIAGNOSIS — H00.015 HORDEOLUM EXTERNUM OF LEFT LOWER EYELID: Primary | ICD-10-CM

## 2022-05-11 DIAGNOSIS — H00.019 HORDEOLUM EXTERNUM, UNSPECIFIED LATERALITY: Primary | ICD-10-CM

## 2022-05-11 PROBLEM — H00.012 HORDEOLUM EXTERNUM OF RIGHT LOWER EYELID: Status: ACTIVE | Noted: 2022-05-11

## 2022-05-11 PROCEDURE — 99213 OFFICE O/P EST LOW 20 MIN: CPT | Performed by: INTERNAL MEDICINE

## 2022-05-11 RX ORDER — POLYMYXIN B SULFATE AND TRIMETHOPRIM 1; 10000 MG/ML; [USP'U]/ML
1 SOLUTION OPHTHALMIC EVERY 4 HOURS
Qty: 10 ML | Refills: 0 | Status: SHIPPED | OUTPATIENT
Start: 2022-05-11 | End: 2022-06-22

## 2022-05-11 NOTE — PATIENT INSTRUCTIONS
Problem List Items Addressed This Visit          Other    Hordeolum externum of left lower eyelid - Primary     Continue with warm compresses, will also add eyedrops, this may need to be lanced by an eye doctor, I recommend she contact her eye doctor if not improving           Relevant Medications    tobramycin-dexamethasone (TOBRADEX) ophthalmic ointment

## 2022-05-11 NOTE — TELEPHONE ENCOUNTER
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

## 2022-05-11 NOTE — PROGRESS NOTES
Assessment/Plan:    Hordeolum externum of left lower eyelid  Continue with warm compresses, will also add eyedrops, this may need to be lanced by an eye doctor, I recommend she contact her eye doctor if not improving       Diagnoses and all orders for this visit:    Hordeolum externum of left lower eyelid  -     tobramycin-dexamethasone (TOBRADEX) ophthalmic ointment; Administer 0 5 inches into the left eye in the morning and 0 5 inches in the evening and 0 5 inches before bedtime  Subjective:      Patient ID: Eve Weeks is a 56 y o  female  Patient started with the stye on her left lower lid 2 days ago  She has been using warm compresses  The following portions of the patient's history were reviewed and updated as appropriate: allergies, current medications, past family history, past medical history, past social history, past surgical history and problem list     Review of Systems   Constitutional: Negative for chills and fever  Eyes: Negative for discharge and visual disturbance           Objective:      /90 (BP Location: Left arm, Patient Position: Sitting, Cuff Size: Standard)   Pulse (!) 54   Ht 5' 1" (1 549 m)   Wt 87 1 kg (192 lb)   SpO2 99%   BMI 36 28 kg/m²          Physical Exam  Eyes:      Comments: Left lower lid line has stye, sclera are OK, extraocular eye movements intact, no signs of facial cellulitis

## 2022-05-11 NOTE — ASSESSMENT & PLAN NOTE
Continue with warm compresses, will also add eyedrops, this may need to be lanced by an eye doctor, I recommend she contact her eye doctor if not improving

## 2022-05-11 NOTE — TELEPHONE ENCOUNTER
Patient has a stye on her left lower eyelid with moderate swelling  She would like to be seen in the office today  Appointment made for [phone]  Reason for Disposition   [1] Eyelid is very swollen AND [2] no fever    Answer Assessment - Initial Assessment Questions  1  LOCATION: "Which eye has the sty?" "Upper or lower eyelid?"      Left lower   2  SIZE: "How big is it?" (Note: standard pencil eraser is 6 mm)      2-3 mm  3  EYELID: "Is the eyelid swollen?" If Yes, ask: "How much?"      Yes, moderate  4  REDNESS: "Has the redness spread onto the eyelid?"      Just around stye  5  ONSET: "When did you notice the sty?"      5/9/22  6  VISION: "Do you have blurred vision?"       Denies  7  PAIN: "Is it painful?" If Yes, ask: "How bad is the pain?"  (Scale 1-10; or mild, moderate, severe)      Yes, moderate  8  CONTACTS: "Do you wear contacts?"      Denies   9  OTHER SYMPTOMS: "Do you have any other symptoms?" (e g , fever)      Denies  10   PREGNANCY: "Is there any chance you are pregnant?" "When was your last menstrual period?"        N/A    Protocols used: STY-ADULT-

## 2022-05-11 NOTE — TELEPHONE ENCOUNTER
Regarding: stye eye-Appointment Request  ----- Message from Annmarie Dillon sent at 5/11/2022  8:40 AM EDT -----  " I believe I have a stye in my eye and I would like to schedule an appointment '

## 2022-05-31 DIAGNOSIS — I12.9 PARENCHYMAL RENAL HYPERTENSION, STAGE 1 THROUGH STAGE 4 OR UNSPECIFIED CHRONIC KIDNEY DISEASE: ICD-10-CM

## 2022-05-31 RX ORDER — FOSINOPRIL SODIUM 20 MG/1
20 TABLET ORAL 2 TIMES DAILY
Qty: 180 TABLET | Refills: 0 | Status: SHIPPED | OUTPATIENT
Start: 2022-05-31

## 2022-06-22 ENCOUNTER — ANNUAL EXAM (OUTPATIENT)
Dept: OBGYN CLINIC | Facility: CLINIC | Age: 57
End: 2022-06-22
Payer: COMMERCIAL

## 2022-06-22 ENCOUNTER — LAB (OUTPATIENT)
Dept: LAB | Age: 57
End: 2022-06-22
Payer: COMMERCIAL

## 2022-06-22 VITALS
HEIGHT: 61 IN | WEIGHT: 190.2 LBS | DIASTOLIC BLOOD PRESSURE: 80 MMHG | BODY MASS INDEX: 35.91 KG/M2 | SYSTOLIC BLOOD PRESSURE: 122 MMHG

## 2022-06-22 DIAGNOSIS — E89.0 POSTOPERATIVE HYPOTHYROIDISM: ICD-10-CM

## 2022-06-22 DIAGNOSIS — M32.14 SYSTEMIC LUPUS ERYTHEMATOSUS WITH GLOMERULAR DISEASE, UNSPECIFIED SLE TYPE (HCC): ICD-10-CM

## 2022-06-22 DIAGNOSIS — N95.0 POST-MENOPAUSAL BLEEDING: ICD-10-CM

## 2022-06-22 DIAGNOSIS — Z01.419 ENCOUNTER FOR GYNECOLOGICAL EXAMINATION (GENERAL) (ROUTINE) WITHOUT ABNORMAL FINDINGS: Primary | ICD-10-CM

## 2022-06-22 DIAGNOSIS — E55.9 VITAMIN D DEFICIENCY: ICD-10-CM

## 2022-06-22 DIAGNOSIS — Z85.850 HISTORY OF THYROID CANCER: ICD-10-CM

## 2022-06-22 DIAGNOSIS — Z12.31 ENCOUNTER FOR SCREENING MAMMOGRAM FOR MALIGNANT NEOPLASM OF BREAST: ICD-10-CM

## 2022-06-22 DIAGNOSIS — E21.0 PRIMARY HYPERPARATHYROIDISM (HCC): ICD-10-CM

## 2022-06-22 LAB
25(OH)D3 SERPL-MCNC: 30.5 NG/ML (ref 30–100)
ALBUMIN SERPL BCP-MCNC: 3.3 G/DL (ref 3.5–5)
ANION GAP SERPL CALCULATED.3IONS-SCNC: 4 MMOL/L (ref 4–13)
BASOPHILS # BLD AUTO: 0.03 THOUSANDS/ΜL (ref 0–0.1)
BASOPHILS NFR BLD AUTO: 1 % (ref 0–1)
BUN SERPL-MCNC: 26 MG/DL (ref 5–25)
CALCIUM SERPL-MCNC: 11.3 MG/DL (ref 8.3–10.1)
CHLORIDE SERPL-SCNC: 112 MMOL/L (ref 100–108)
CO2 SERPL-SCNC: 24 MMOL/L (ref 21–32)
CREAT SERPL-MCNC: 0.87 MG/DL (ref 0.6–1.3)
EOSINOPHIL # BLD AUTO: 0.06 THOUSAND/ΜL (ref 0–0.61)
EOSINOPHIL NFR BLD AUTO: 1 % (ref 0–6)
ERYTHROCYTE [DISTWIDTH] IN BLOOD BY AUTOMATED COUNT: 12.5 % (ref 11.6–15.1)
GFR SERPL CREATININE-BSD FRML MDRD: 75 ML/MIN/1.73SQ M
GLUCOSE P FAST SERPL-MCNC: 110 MG/DL (ref 65–99)
HCT VFR BLD AUTO: 41.2 % (ref 34.8–46.1)
HGB BLD-MCNC: 13.9 G/DL (ref 11.5–15.4)
IMM GRANULOCYTES # BLD AUTO: 0.03 THOUSAND/UL (ref 0–0.2)
IMM GRANULOCYTES NFR BLD AUTO: 1 % (ref 0–2)
LYMPHOCYTES # BLD AUTO: 1.42 THOUSANDS/ΜL (ref 0.6–4.47)
LYMPHOCYTES NFR BLD AUTO: 26 % (ref 14–44)
MCH RBC QN AUTO: 29.8 PG (ref 26.8–34.3)
MCHC RBC AUTO-ENTMCNC: 33.7 G/DL (ref 31.4–37.4)
MCV RBC AUTO: 88 FL (ref 82–98)
MONOCYTES # BLD AUTO: 0.55 THOUSAND/ΜL (ref 0.17–1.22)
MONOCYTES NFR BLD AUTO: 10 % (ref 4–12)
NEUTROPHILS # BLD AUTO: 3.31 THOUSANDS/ΜL (ref 1.85–7.62)
NEUTS SEG NFR BLD AUTO: 61 % (ref 43–75)
NRBC BLD AUTO-RTO: 0 /100 WBCS
PLATELET # BLD AUTO: 187 THOUSANDS/UL (ref 149–390)
PMV BLD AUTO: 10.7 FL (ref 8.9–12.7)
POTASSIUM SERPL-SCNC: 4.3 MMOL/L (ref 3.5–5.3)
PTH-INTACT SERPL-MCNC: 127.8 PG/ML (ref 18.4–80.1)
RBC # BLD AUTO: 4.66 MILLION/UL (ref 3.81–5.12)
SODIUM SERPL-SCNC: 140 MMOL/L (ref 136–145)
T4 FREE SERPL-MCNC: 1.48 NG/DL (ref 0.76–1.46)
TSH SERPL DL<=0.05 MIU/L-ACNC: 0.46 UIU/ML (ref 0.45–4.5)
WBC # BLD AUTO: 5.4 THOUSAND/UL (ref 4.31–10.16)

## 2022-06-22 PROCEDURE — 84443 ASSAY THYROID STIM HORMONE: CPT

## 2022-06-22 PROCEDURE — 82040 ASSAY OF SERUM ALBUMIN: CPT

## 2022-06-22 PROCEDURE — 84432 ASSAY OF THYROGLOBULIN: CPT

## 2022-06-22 PROCEDURE — 86800 THYROGLOBULIN ANTIBODY: CPT

## 2022-06-22 PROCEDURE — 84439 ASSAY OF FREE THYROXINE: CPT

## 2022-06-22 PROCEDURE — 80048 BASIC METABOLIC PNL TOTAL CA: CPT

## 2022-06-22 PROCEDURE — G0476 HPV COMBO ASSAY CA SCREEN: HCPCS | Performed by: STUDENT IN AN ORGANIZED HEALTH CARE EDUCATION/TRAINING PROGRAM

## 2022-06-22 PROCEDURE — 83970 ASSAY OF PARATHORMONE: CPT

## 2022-06-22 PROCEDURE — S0612 ANNUAL GYNECOLOGICAL EXAMINA: HCPCS | Performed by: STUDENT IN AN ORGANIZED HEALTH CARE EDUCATION/TRAINING PROGRAM

## 2022-06-22 PROCEDURE — 82306 VITAMIN D 25 HYDROXY: CPT

## 2022-06-22 PROCEDURE — 85025 COMPLETE CBC W/AUTO DIFF WBC: CPT

## 2022-06-22 PROCEDURE — G0145 SCR C/V CYTO,THINLAYER,RESCR: HCPCS | Performed by: STUDENT IN AN ORGANIZED HEALTH CARE EDUCATION/TRAINING PROGRAM

## 2022-06-22 NOTE — PROGRESS NOTES
Lew Samanotocks   1965    CC:  Yearly exam    A:  Yearly exam      Problem List Items Addressed This Visit        Other    Post-menopausal bleeding    Relevant Orders    US pelvis complete w transvaginal      Other Visit Diagnoses     Encounter for gynecological examination (general) (routine) without abnormal findings    -  Primary    Relevant Orders    Liquid-based pap, screening    Encounter for screening mammogram for malignant neoplasm of breast        Relevant Orders    Mammo screening bilateral w 3d & cad          P:   Pap collected today  We reviewed ASCCP guidelines for Pap testing  Mammo slip given   Colon cancer screening reviewed   TVUS ordered for postmenopausal bleeding  RTO one year for yearly exam or sooner as needed  S:  56 y o  female here for yearly exam      She is postmenopausal and continues to have a small amount of vaginal bleeding 1-2 times per month  She has an appx 5cm, thin, stripe, on her pad/underwear when this happens  We have previously reviewed options for management - she has thought about this further since our last discussion, more open to idea of surgical management, but notes that money is tight at home and her  is having surgery      She denies vaginal discharge, itching, odor or dryness  Sexual activity: She is sexually active without pain, bleeding or dryness  STD testing: She does not want STD testing today       Menopausal     Last Pap: 2015 NILM/HPV-  Last Mammo: 21 BIRADS 2    Last Colonoscopy: Declined   Last DEXA: 3/31/21     Non-smoker, social drinker  Exercises irregularly    Family hx of breast cancer: denies   Family hx of ovarian cancer: denies  Family hx of colon cancer:denies        Current Outpatient Medications:     ciclopirox (LOPROX) 0 77 % cream, , Disp: , Rfl:     folic acid (FOLVITE) 1 mg tablet, Take 1 tablet by mouth daily, Disp: , Rfl:     fosinopril (MONOPRIL) 20 mg tablet, Take 1 tablet (20 mg total) by mouth 2 (two) times a day, Disp: 180 tablet, Rfl: 0    levothyroxine (Synthroid) 112 mcg tablet, Take 1 tablet (112 mcg total) by mouth daily, Disp: 90 tablet, Rfl: 1    NIFEdipine (PROCARDIA XL) 30 mg 24 hr tablet, Take 1 tablet (30 mg total) by mouth daily, Disp: 90 tablet, Rfl: 0    spironolactone (ALDACTONE) 25 mg tablet, Take 1 tablet (25 mg total) by mouth daily, Disp: 90 tablet, Rfl: 0    sulfacetamide-prednisoLONE (BLEPHAMIDE S O P ) ophthalmic ointment, Administer into the left eye 3 (three) times a day, Disp: 3 5 g, Rfl: 0    warfarin (COUMADIN) 3 mg tablet, TAKE ONE TABLET BY MOUTH EVERY DAY  DO NOT TAKE ON MONDAYS AND THURSDAYS, Disp: 90 tablet, Rfl: 0    bacitracin-polymyxin b (POLYSPORIN) ophthalmic ointment, Administer into the left eye 3 (three) times a day, Disp: 3 5 g, Rfl: 0    budesonide (Pulmicort Flexhaler) 180 MCG/ACT inhaler, Inhale 2 puffs 2 (two) times a day for 10 days Rinse mouth after use   (Patient not taking: Reported on 3/17/2022 ), Disp: 1 each, Rfl: 0    hydroxychloroquine (PLAQUENIL) 200 mg tablet, Take 1 tablet (200 mg total) by mouth 2 (two) times a day with meals (Patient not taking: Reported on 2/24/2022 ), Disp: 60 tablet, Rfl: 3    Multiple Vitamin (multivitamin) tablet, Take 1 tablet by mouth daily for 14 days (Patient not taking: Reported on 3/17/2022 ), Disp: 14 tablet, Rfl: 0    zinc sulfate (ZINCATE) 220 mg capsule, Take 1 capsule (220 mg total) by mouth daily for 14 days (Patient not taking: Reported on 3/17/2022 ), Disp: 14 capsule, Rfl: 0  Social History     Socioeconomic History    Marital status: /Civil Union     Spouse name: Not on file    Number of children: Not on file    Years of education: Not on file    Highest education level: Not on file   Occupational History    Not on file   Tobacco Use    Smoking status: Never Smoker    Smokeless tobacco: Never Used   Vaping Use    Vaping Use: Never used   Substance and Sexual Activity    Alcohol use: Not Currently     Comment: Twice a year    Drug use: No    Sexual activity: Yes     Partners: Male     Birth control/protection: None   Other Topics Concern    Not on file   Social History Narrative    Not on file     Social Determinants of Health     Financial Resource Strain: Not on file   Food Insecurity: Not on file   Transportation Needs: Not on file   Physical Activity: Not on file   Stress: Not on file   Social Connections: Not on file   Intimate Partner Violence: Not on file   Housing Stability: Not on file     Family History   Problem Relation Age of Onset    No Known Problems Mother     Diabetes type II Father     Diabetes Father     Stroke Father     Diabetes type II Paternal Aunt     Diabetes type II Paternal Uncle     Diabetes type II Paternal Grandmother     Stomach cancer Paternal Grandfather     No Known Problems Sister     No Known Problems Maternal Grandmother     No Known Problems Maternal Grandfather     No Known Problems Sister     No Known Problems Maternal Aunt     No Known Problems Maternal Aunt     No Known Problems Maternal Aunt     No Known Problems Paternal Aunt     No Known Problems Paternal Aunt      Past Medical History:   Diagnosis Date    Anemia     Not sure    Anti-phospholipid antibody syndrome (HCC)     Cancer [facility]     thyroid    Chronic kidney disease     Clotting disorder ([facility]     Lung embolism,  ITP    Diabetes mellitus ([facility]     Disease of thyroid gland     Fibroid 1998    Gestational diabetes     Hyperparathyroidism ([facility]     Hypertension     Idiopathic thrombocytopenic purpura (ITP) ([facility] [facility] )     Kidney stone     Lupus ([facility] [facility]     Miscarriage 56    Lost twin girls 10-    Osteoarthritis     Pulmonary embolism ([facility] [facility] ) 1987    Sjogren's syndrome ([facility] [facility] )     Vitamin D deficiency         Review of Systems   Respiratory: Negative  Cardiovascular: Negative  Gastrointestinal: Negative for constipation and diarrhea     Genitourinary: Negative for difficulty urinating, pelvic pain, vaginal bleeding, vaginal discharge, itching or odor  O:  Blood pressure 122/80, height 5' 1" (1 549 m), weight 86 3 kg (190 lb 3 2 oz)  Patient appears well and is not in distress  Neck is supple without masses  Breasts are symmetrical without mass, tenderness, nipple discharge, skin changes or adenopathy  Abdomen is soft and nontender without masses  Vulva without lesions or rashes  Urethral meatus and urethra are normal  Bladder is normal to palpation  Vagina is normal without discharge or bleeding  Cervix is normal without discharge or lesion  Blood noted at cervical os  Uterus and adnexa are normal, mobile, nontender without palpable mass    Rectovaginal exam without nodularity/masses/visible blood on glove

## 2022-06-23 ENCOUNTER — TELEPHONE (OUTPATIENT)
Dept: HEMATOLOGY ONCOLOGY | Facility: CLINIC | Age: 57
End: 2022-06-23

## 2022-06-23 LAB
HPV HR 12 DNA CVX QL NAA+PROBE: NEGATIVE
HPV16 DNA CVX QL NAA+PROBE: NEGATIVE
HPV18 DNA CVX QL NAA+PROBE: NEGATIVE
THYROGLOB AB SERPL-ACNC: <1 IU/ML (ref 0–0.9)
THYROGLOB SERPL-MCNC: <0.1 NG/ML (ref 1.5–38.5)

## 2022-06-23 NOTE — TELEPHONE ENCOUNTER
Left VM for patient to let her know that she can stay at the same dose of coumadin and get her INR re-checked in a couple of weeks  I left my direct teams number for her to call back

## 2022-06-24 DIAGNOSIS — E83.52 HYPERCALCEMIA: Primary | ICD-10-CM

## 2022-06-24 RX ORDER — CINACALCET 30 MG/1
30 TABLET, FILM COATED ORAL DAILY
Qty: 90 TABLET | Refills: 1 | Status: SHIPPED | OUTPATIENT
Start: 2022-06-24

## 2022-06-24 NOTE — TELEPHONE ENCOUNTER
----- Message from Mortimer Lyell, PA-C sent at 6/23/2022  4:27 PM EDT -----  LM for patient to call back ASAP regarding very high calcium level  Will need to share message below: Will need to start Sensipar 30 mg daily and repeat non-fasting BMP and albumin in 2 weeks  Avoid calcium supplementation, hydrate well! If experiencing symptoms of severe hypercalcemia such as confusion, extreme fatigue, increased thirst/urination, abdominal pain, please go to the ER

## 2022-06-28 LAB
LAB AP GYN PRIMARY INTERPRETATION: NORMAL
Lab: NORMAL

## 2022-06-29 ENCOUNTER — OFFICE VISIT (OUTPATIENT)
Dept: ENDOCRINOLOGY | Facility: CLINIC | Age: 57
End: 2022-06-29
Payer: COMMERCIAL

## 2022-06-29 VITALS
HEIGHT: 61 IN | HEART RATE: 67 BPM | DIASTOLIC BLOOD PRESSURE: 80 MMHG | WEIGHT: 190 LBS | SYSTOLIC BLOOD PRESSURE: 120 MMHG | BODY MASS INDEX: 35.87 KG/M2

## 2022-06-29 DIAGNOSIS — E89.0 POSTOPERATIVE HYPOTHYROIDISM: Primary | ICD-10-CM

## 2022-06-29 DIAGNOSIS — E55.9 VITAMIN D DEFICIENCY: ICD-10-CM

## 2022-06-29 DIAGNOSIS — Z85.850 HISTORY OF THYROID CANCER: ICD-10-CM

## 2022-06-29 DIAGNOSIS — E83.52 HYPERCALCEMIA: ICD-10-CM

## 2022-06-29 DIAGNOSIS — E21.0 PRIMARY HYPERPARATHYROIDISM (HCC): ICD-10-CM

## 2022-06-29 PROCEDURE — 99214 OFFICE O/P EST MOD 30 MIN: CPT | Performed by: PHYSICIAN ASSISTANT

## 2022-06-29 NOTE — PROGRESS NOTES
Patient Progress Note    CC: hyperparathyroidism, hypothyroidism, thyroid cancer    Referring Provider  No referring provider defined for this encounter  History of Present Illness:     Patient is a 72-year-old female here for follow-up of postsurgical hypothyroidism, primary hyperparathyroidism, vitamin-D deficiency  She has a past medical history of stage I papillary thyroid cancer  She underwent right hemithyroidectomy and right parathyroid gland removal in 2006, followed by completion of thyroidectomy in 2010  In 2010 she also had resection of left parathyroid gland  She also had LEONARDO therapy  Patient is on levothyroxine 112 mcg 1 tablet daily  Patient is taking it 60 minutes before breakfast on an empty stomach and at least 4 hrs apart from supplements  Tolerating medication well  No recent Iodine loading in form of medication, biotin or kelp supplements or radiological diagnostic studies  Most recent thyroid function tests were abnormal, TSH 0 456 and free T4 very mildly elevated at 1 48  Thyroglobulin antibody level was less than 1 0 and thyroglobulin < 0 1  Head neck ultrasound done in April 2019 did not show any evidence of recurrent or metastatic disease  Repeat US was ordered twice but not yet completed  Primary hyperparathyroidism: patient underwent a re-exploration parathyroidectomy in May 2021  Her parathyroid levels did improve during surgery  Most recent PTH level elevated at 127 8  She is not taking calcium supplement  Corrected calcium is elevated at 11 3  Vitamin D is normal at 30 5  She is taking vitamin D3 2000 IU every other day  Due to elevated calcium she was started on Sensipar 30 mg daily just a few days ago  Her last DEXA scan in March 2021 showed normal bone density      Patient Active Problem List   Diagnosis    Chronic deep vein thrombosis (DVT) of proximal vein of both lower extremities (HCC)    History of ITP    Benign hypertensive CKD    Chronic kidney disease (CKD), stage II (mild)    Edema    Gross hematuria    Hypercalcemia    Primary hyperparathyroidism (HCC)    Hypertension    Nephrolithiasis    Proteinuria    Systemic lupus erythematosus ([address] )    Vitamin D deficiency    Elevated PTHrP level    History of thyroid cancer    Upper respiratory tract infection    Rash    Left ear pain    BMI 39 0-39 9,adult    Post-menopausal bleeding    Cellulitis of right index finger    Greater trochanteric bursitis of left hip    Anti-phospholipid syndrome (HCC)    Status post parathyroidectomy (HCC)    Postoperative hypothyroidism    Sjogren's syndrome without extraglandular involvement (HCC)    Hordeolum externum of left lower eyelid     Past Medical History:   Diagnosis Date    Anemia     Not sure    Anti-phospholipid antibody syndrome (HCC)     Cancer (HCC)     thyroid    Chronic kidney disease     Clotting disorder ([address]     Lung embolism,  ITP    Diabetes mellitus ([address]     Disease of thyroid gland     Fibroid     Gestational diabetes     Hyperparathyroidism ([address]     Hypertension     [facility]) [facility]     Kidney stone     Lupus ([address]     Miscarriage 56    Lost twin girls 10-    Osteoarthritis     Pulmonary embolism ([facility] [facility] ) 1987    Sjogren's syndrome ([facility] [facility] )     Vitamin D deficiency       Past Surgical History:   Procedure Laterality Date     SECTION          DIAGNOSTIC FLEXIBLE LARYNGOSCOPY N/A 2021    Procedure: DIAGNOSTIC FLEXIBLE LARYNGOSCOPY;  Surgeon: Liu Comse MD;  Location: [facility];  Service: Surgical Oncology    HERNIA REPAIR      HIP SURGERY      left side, bone decompression    HYSTEROSCOPY      NV EXPLORE PARATHYROID GLANDS Right 2021    Procedure: PARATHYROIDECTOMY, MINIMALLY INVASIVE, right, intraoperative PTH monitoring;  Surgeon: Liu Cosme MD;  Location: [facility];  Service: Surgical Oncology    RENAL BIOPSY      Not sure    THYROID SURGERY      TUBAL LIGATION        Family History   Problem Relation Age of Onset    No Known Problems Mother     Diabetes type II Father     Diabetes Father     Stroke Father     Diabetes type II Paternal Aunt     Diabetes type II Paternal Uncle     Diabetes type II Paternal Grandmother     Stomach cancer Paternal Grandfather     No Known Problems Sister     No Known Problems Maternal Grandmother     No Known Problems Maternal Grandfather     No Known Problems Sister     No Known Problems Maternal Aunt     No Known Problems Maternal Aunt     No Known Problems Maternal Aunt     No Known Problems Paternal Aunt     No Known Problems Paternal Aunt      Social History     Tobacco Use    Smoking status: Never Smoker    Smokeless tobacco: Never Used   Substance Use Topics    Alcohol use: Not Currently     Comment: Twice a year     Allergies   Allergen Reactions    Penicillins Itching     Current Outpatient Medications   Medication Sig Dispense Refill    ciclopirox (LOPROX) 0 77 % cream       cinacalcet (SENSIPAR) 30 mg tablet Take 1 tablet (30 mg total) by mouth daily 90 tablet 1    folic acid (FOLVITE) 1 mg tablet Take 1 tablet by mouth daily      fosinopril (MONOPRIL) 20 mg tablet Take 1 tablet (20 mg total) by mouth 2 (two) times a day 180 tablet 0    levothyroxine (Synthroid) 112 mcg tablet Take 1 tablet (112 mcg total) by mouth daily 90 tablet 1    NIFEdipine (PROCARDIA XL) 30 mg 24 hr tablet Take 1 tablet (30 mg total) by mouth daily 90 tablet 0    spironolactone (ALDACTONE) 25 mg tablet Take 1 tablet (25 mg total) by mouth daily 90 tablet 0    sulfacetamide-prednisoLONE (BLEPHAMIDE S O P ) ophthalmic ointment Administer into the left eye 3 (three) times a day 3 5 g 0    warfarin (COUMADIN) 3 mg tablet TAKE ONE TABLET BY MOUTH EVERY DAY   DO NOT TAKE ON MONDAYS AND THURSDAYS 90 tablet 0    bacitracin-polymyxin b (POLYSPORIN) ophthalmic ointment Administer into the left eye 3 (three) times a day 3 5 g 0    budesonide (Pulmicort Flexhaler) 180 MCG/ACT inhaler Inhale 2 puffs 2 (two) times a day for 10 days Rinse mouth after use  (Patient not taking: Reported on 3/17/2022 ) 1 each 0    hydroxychloroquine (PLAQUENIL) 200 mg tablet Take 1 tablet (200 mg total) by mouth 2 (two) times a day with meals (Patient not taking: Reported on 2/24/2022 ) 60 tablet 3    Multiple Vitamin (multivitamin) tablet Take 1 tablet by mouth daily for 14 days (Patient not taking: Reported on 3/17/2022 ) 14 tablet 0     No current facility-administered medications for this visit  Review of Systems   Constitutional: Negative for activity change, appetite change, fatigue and unexpected weight change  HENT: Negative for trouble swallowing  Eyes: Negative for visual disturbance  Respiratory: Negative for shortness of breath  Cardiovascular: Negative for chest pain and palpitations  Gastrointestinal: Positive for constipation (mild)  Negative for diarrhea  Endocrine: Negative for polydipsia and polyuria  Musculoskeletal: Negative  Skin: Negative  Neurological: Negative for tremors and numbness  Psychiatric/Behavioral: Negative  Physical Exam:  Body mass index is 35 9 kg/m²  /80   Pulse 67   Ht 5' 1" (1 549 m)   Wt 86 2 kg (190 lb)   BMI 35 90 kg/m²    Wt Readings from Last 3 Encounters:   06/29/22 86 2 kg (190 lb)   06/22/22 86 3 kg (190 lb 3 2 oz)   05/11/22 87 1 kg (192 lb)       Physical Exam  Vitals and nursing note reviewed  Constitutional:       Appearance: She is well-developed  HENT:      Head: Normocephalic  Eyes:      General: No scleral icterus  Pupils: Pupils are equal, round, and reactive to light  Neck:      Thyroid: No thyromegaly  Cardiovascular:      Rate and Rhythm: Normal rate and regular rhythm  Pulses:           Radial pulses are 2+ on the right side and 2+ on the left side  Heart sounds: No murmur heard    Pulmonary:      Effort: Pulmonary effort is normal  No respiratory distress  Breath sounds: Normal breath sounds  No wheezing  Musculoskeletal:      Cervical back: Neck supple  Skin:     General: Skin is warm and dry  Neurological:      Mental Status: She is alert  Deep Tendon Reflexes: Reflexes are normal and symmetric  Patient's shoes and socks were not removed  Labs:   Lab Results   Component Value Date    HGBA1C 5 4 04/01/2017       No results found for: CHOL, HDL, TRIG, CHOLHDL    Lab Results   Component Value Date    GLUCOSE 105 (H) 11/18/2017    CALCIUM 11 3 (H) 06/22/2022     11/18/2017    K 4 3 06/22/2022    CO2 24 06/22/2022     (H) 06/22/2022    BUN 26 (H) 06/22/2022    CREATININE 0 87 06/22/2022        eGFR   Date Value Ref Range Status   06/22/2022 75 ml/min/1 73sq m Final       Lab Results   Component Value Date    ALT 25 02/11/2022    AST 23 02/11/2022    ALKPHOS 90 02/11/2022    BILITOT 0 5 07/01/2017       Lab Results   Component Value Date    IHV6SHSAYBNX 0 456 06/22/2022    TSH 0 354 (L) 09/30/2019         Plan:    Diagnoses and all orders for this visit:    Postoperative hypothyroidism / History of thyroid cancer  TFTs in acceptable range considering history of thyroid cancer  TSH 0 456 and free T4 very mildly elevated at 1 48  TG ab < 1 0 and TG < 0 1  Continue current dose of levothyroxine  Monitor labs   Complete head neck US; reordered imaging   -     T4, free; Future  -     TSH, 3rd generation; Future      -     US head neck lymph node mapping; Future    Primary hyperparathyroidism (HCC) / Hypercalcemia  Calcium 11 3,  8  S/p 3 parathyroid surgeries  Started Sensipar 2 days ago; tolerating it well  Repeat BMP and albumin in 1 week  Continue current treatment, avoid calcium supplementation  Hydrate well   -     Basic metabolic panel; Future  -     Albumin; Future  -     PTH, intact;  Future    Vitamin D deficiency  Vitamin D 30 5  Continue current supplementation  - Vitamin D 25 hydroxy; Future          Discussed with the patient and all questions fully answered  She will call me if any problems arise      Counseled patient on diagnostic results, prognosis, risk and benefit of treatment options, instruction for management, importance of treatment compliance, risk  factor reduction and impressions      Diana Jin PA-C

## 2022-07-11 ENCOUNTER — TELEPHONE (OUTPATIENT)
Dept: INTERNAL MEDICINE CLINIC | Facility: CLINIC | Age: 57
End: 2022-07-11

## 2022-07-11 ENCOUNTER — OFFICE VISIT (OUTPATIENT)
Dept: INTERNAL MEDICINE CLINIC | Facility: CLINIC | Age: 57
End: 2022-07-11
Payer: COMMERCIAL

## 2022-07-11 ENCOUNTER — NURSE TRIAGE (OUTPATIENT)
Dept: OTHER | Facility: OTHER | Age: 57
End: 2022-07-11

## 2022-07-11 ENCOUNTER — HOSPITAL ENCOUNTER (OUTPATIENT)
Dept: RADIOLOGY | Facility: HOSPITAL | Age: 57
Discharge: HOME | End: 2022-07-11
Payer: COMMERCIAL

## 2022-07-11 VITALS
DIASTOLIC BLOOD PRESSURE: 102 MMHG | BODY MASS INDEX: 36.17 KG/M2 | WEIGHT: 191.6 LBS | OXYGEN SATURATION: 99 % | HEIGHT: 61 IN | SYSTOLIC BLOOD PRESSURE: 142 MMHG | HEART RATE: 61 BPM

## 2022-07-11 DIAGNOSIS — Z23 ENCOUNTER FOR IMMUNIZATION: ICD-10-CM

## 2022-07-11 DIAGNOSIS — M79.672 LEFT FOOT PAIN: Primary | ICD-10-CM

## 2022-07-11 DIAGNOSIS — S82.892A CLOSED FRACTURE OF LEFT ANKLE, INITIAL ENCOUNTER: Primary | ICD-10-CM

## 2022-07-11 DIAGNOSIS — M79.672 LEFT FOOT PAIN: ICD-10-CM

## 2022-07-11 PROCEDURE — 73630 X-RAY EXAM OF FOOT: CPT

## 2022-07-11 PROCEDURE — 73610 X-RAY EXAM OF ANKLE: CPT

## 2022-07-11 PROCEDURE — 99213 OFFICE O/P EST LOW 20 MIN: CPT | Performed by: NURSE PRACTITIONER

## 2022-07-11 RX ORDER — METHYLPREDNISOLONE 4 MG/1
TABLET ORAL
Qty: 21 EACH | Refills: 0 | Status: SHIPPED | OUTPATIENT
Start: 2022-07-11

## 2022-07-11 NOTE — TELEPHONE ENCOUNTER
Regarding: Swollen left leg  ----- Message from Ebony Jimenez sent at 7/11/2022  9:30 AM EDT -----  "My left leg is swollen, and I am having some difficulty walking on it "

## 2022-07-11 NOTE — TELEPHONE ENCOUNTER
----- Message from [address] [place] [address]  sent at 7/11/2022  1:00 PM EDT -----  Patient has an acute fracture in her ankle  Can you please get her in with dr Makenna Rdz orthopedics? Her  is actually seeing him this Wednesday I don't know if they can squeeze her in? Thank you!

## 2022-07-11 NOTE — LETTER
July 11, 2022     Patient: Tim Peña  YOB: 1965  Date of Visit: 7/11/2022      To Whom it May Concern:    Tim Peña is under my professional care  Juancarlos Giorgi was seen in my office on 7/11/2022  Juancarlosestefania Torreso may return to work on 7/14/22               Sincerely,          PEACE Crabtree        CC: No Recipients

## 2022-07-11 NOTE — PROGRESS NOTES
Assessment/Plan:    Left foot pain  X-ray of foot and ankle rule out arthritis versus gout  Start prednisone for pain and inflammation follow-up with podiatrist if not improving  Note written for work try to return Thursday       Diagnoses and all orders for this visit:    Left foot pain  -     methylPREDNISolone 4 MG tablet therapy pack; Use as directed on package  -     XR ankle 3+ vw left; Future  -     XR foot 3+ vw left; Future    Encounter for immunization          Subjective:      Patient ID: Yolette January is a 56 y o  female  Sunday started with left foot pain redness and swelling  No injuries she did not fall twist her ankle  She has no history of gout  She does have lupus and is on medication for that denies rheumatoid arthritis  She is on warfarin currently for blood clots  She denies calf tenderness the leg is not swollen only the foot and ankle  There is pain on weight-bearing and walking she is walking with a cane today      The following portions of the patient's history were reviewed and updated as appropriate: allergies, current medications, past family history, past medical history, past social history, past surgical history and problem list     Review of Systems   Constitutional: Negative  HENT: Negative  Eyes: Negative  Respiratory: Negative  Cardiovascular: Negative  Gastrointestinal: Negative  Musculoskeletal: Positive for arthralgias and joint swelling  Neurological: Negative  Objective:      BP (!) 142/102   Pulse 61   Ht 5' 1" (1 549 m)   Wt 86 9 kg (191 lb 9 6 oz)   SpO2 99%   BMI 36 20 kg/m²          Physical Exam  Vitals reviewed  Constitutional:       Appearance: Normal appearance  Musculoskeletal:        Feet:    Neurological:      Mental Status: She is alert

## 2022-07-11 NOTE — TELEPHONE ENCOUNTER
Patient upset that no appointments are available in the office to be seen today  Patient stated yesterday she woke up with left foot and ankle swelling; has been using a cane to ambulate and pain is severe if she doesn't use it as a mobility aid  Stated it was red yesterday, but is lessened today  While triaging patient, saw message from PCP stating patient could be seen now and to come to the office; called backline and spoke with office staff to verify and said it was ok for patient to come to office now  Spoke with patient and informed her she could be seen now at the office; patient on her way

## 2022-07-11 NOTE — TELEPHONE ENCOUNTER
Reason for Disposition   Can't walk or can barely stand (new-onset)    Answer Assessment - Initial Assessment Questions  1  ONSET: "When did the swelling start?" (e g , minutes, hours, days)      Yesterday morning  2  LOCATION: "What part of the leg is swollen?"  "Are both legs swollen or just one leg?"      Left food and ankle  3  SEVERITY: "How bad is the swelling?" (e g , localized; mild, moderate, severe)   - Localized - small area of swelling localized to one leg   - MILD pedal edema - swelling limited to foot and ankle, pitting edema < 1/4 inch (6 mm) deep, rest and elevation eliminate most or all swelling   - MODERATE edema - swelling of lower leg to knee, pitting edema > 1/4 inch (6 mm) deep, rest and elevation only partially reduce swelling   - SEVERE edema - swelling extends above knee, facial or hand swelling present       Patient stated she has had her foot up and is still swollen  4  REDNESS: "Does the swelling look red or infected?"     Was red yesterday, but not as red today  5  PAIN: "Is the swelling painful to touch?" If Yes, ask: "How painful is it?"   (Scale 1-10; mild, moderate or severe)      With a cane 5-6, without mobility aid 7-8  6  FEVER: "Do you have a fever?" If Yes, ask: "What is it, how was it measured, and when did it start?"       Unsure  7  CAUSE: "What do you think is causing the leg swelling?"      Denies  8  MEDICAL HISTORY: "Do you have a history of heart failure, kidney disease, liver failure, or cancer?"      Lupus; was started on new medication and unsure it is related  9  RECURRENT SYMPTOM: "Have you had leg swelling before?" If Yes, ask: "When was the last time?" "What happened that time?"  Never happened before      10   OTHER SYMPTOMS: "Do you have any other symptoms?" (e g , chest pain, difficulty breathing)        Muscle cramps in that leg, usually will get better with drinking water  Denies any SOB or chest pain    Protocols used: LEG SWELLING AND EDEMA-ADULT-OH

## 2022-07-11 NOTE — ASSESSMENT & PLAN NOTE
X-ray of foot and ankle rule out arthritis versus gout  Start prednisone for pain and inflammation follow-up with podiatrist if not improving    Note written for work try to return Thursday

## 2022-07-11 NOTE — TELEPHONE ENCOUNTER
Patient called with c/o left foot pain  She states she started with symptoms of pressure and swelling on inside of her right foot since yesterday  She would like an appointment for evaluation  She states that she did call earlier and was waited 77 minutes before speaking with someone  I do not see documentation of a prior call  Please advise

## 2022-07-11 NOTE — TELEPHONE ENCOUNTER
Patient is having an x ray done now  She asked if you could change the prescription to [phone]     Thank Rigo Khan

## 2022-07-11 NOTE — TELEPHONE ENCOUNTER
Em Cai from [address] [place] [address], Dr Mariusz Kline is asking if this patient can be added to Wednesday schedule 07/13/2022, she has a Closed Fracture of the left ankle, her  is there on Wednesday and she wants to go with him  They are asking for a call back      CB # Em Cai [phone]

## 2022-07-11 NOTE — TELEPHONE ENCOUNTER
----- Message from [address] [place] [address]  sent at 7/11/2022  1:00 PM EDT -----  Patient has an acute fracture in her ankle  Can you please get her in with dr Michelle Christopher orthopedics? Her  is actually seeing him this Wednesday I don't know if they can squeeze her in? Thank you!

## 2022-07-11 NOTE — TELEPHONE ENCOUNTER
I left a message on voice mail asking office to return the call to see if they can fit patient in on Wednesday

## 2022-07-13 ENCOUNTER — OFFICE VISIT (OUTPATIENT)
Dept: OBGYN CLINIC | Facility: CLINIC | Age: 57
End: 2022-07-13
Payer: COMMERCIAL

## 2022-07-13 VITALS
WEIGHT: 191 LBS | SYSTOLIC BLOOD PRESSURE: 135 MMHG | HEIGHT: 61 IN | DIASTOLIC BLOOD PRESSURE: 90 MMHG | BODY MASS INDEX: 36.06 KG/M2 | HEART RATE: 60 BPM

## 2022-07-13 DIAGNOSIS — S82.839A AVULSION FRACTURE OF DISTAL FIBULA: Primary | ICD-10-CM

## 2022-07-13 DIAGNOSIS — I10 ESSENTIAL HYPERTENSION: ICD-10-CM

## 2022-07-13 DIAGNOSIS — S82.892A CLOSED FRACTURE OF LEFT ANKLE, INITIAL ENCOUNTER: ICD-10-CM

## 2022-07-13 PROCEDURE — 99243 OFF/OP CNSLTJ NEW/EST LOW 30: CPT | Performed by: ORTHOPAEDIC SURGERY

## 2022-07-13 RX ORDER — NIFEDIPINE 30 MG/1
30 TABLET, EXTENDED RELEASE ORAL DAILY
Qty: 90 TABLET | Refills: 3 | Status: SHIPPED | OUTPATIENT
Start: 2022-07-13 | End: 2022-10-11 | Stop reason: SDUPTHER

## 2022-07-13 NOTE — LETTER
July 13, 2022     PEACE Ledesma    Patient: Shayy Alicia   YOB: 1965   Date of Visit: 7/13/2022       Dear Dr Alphonse Cee:    Thank you for referring Shayy Alicia to me for evaluation  Below are my notes for this consultation  If you have questions, please do not hesitate to call me  I look forward to following your patient along with you  Sincerely,        Bret Dickerson MD        CC: No Recipients  Bret Dickerson MD  7/13/2022 12:49 PM  Signed        KIERSTEN Arguello  Attending, Orthopaedic Surgery  Foot and [facility] [address] [facility]      ORTHOPAEDIC FOOT AND ANKLE CLINIC VISIT     Assessment:     Encounter Diagnoses   Name Primary?  Avulsion fracture of distal fibula Yes    Closed fracture of left ankle, initial encounter             Plan:   · The patient verbalized understanding of exam findings and treatment plan  We engaged in the shared decision-making process and treatment options were discussed at length with the patient  Surgical and conservative management discussed today along with risks and benefits  · Paula Joseph has an avulsion fracture to the tip of the lateral malleolus and ankle sprain  · She has already weaned herself into a sandal, no pain  · She may progress activity as tolerated in a supportive shoe  · We did order PT for her for ankle strengthening  · Work restrictions given  Return in about 7 weeks (around 8/31/2022)  History of Present Illness:   Chief Complaint:   Chief Complaint   Patient presents with    Left Ankle - Pain, Fracture     Shayy Alicia is a 56 y o  female who is being seen for left ankle injury  She rolled her ankle on 7/3, had an XR from her PCP and was told she had a fracture  Pain is localized at lateral malleolus with minimal radiating and described as sharp and severe  Patient denies numbness, tingling or radicular pain  Denies history of neuropathy    Patient does not smoke, does not have diabetes and does take blood thinners  Patient denies family history of anesthesia complications and has not had any complications with anesthesia  Pain/symptom timing:  Worse during the day when active  Pain/symptom context:  Worse with activites and work  Pain/symptom modifying factors:  Rest makes better, activities make worse  Pain/symptom associated signs/symptoms: none    Prior treatment   · NSAIDsYes   · Injections No   · Bracing/Orthotics No    · Physical Therapy No     Orthopedic Surgical History:   See below    Past Medical, Surgical and Social History:  Past Medical History:  has a past medical history of Anemia, Anti-phospholipid antibody syndrome ([facility] ), Cancer ([facility] ), Chronic kidney disease, Clotting disorder ([facility] ), Diabetes mellitus ([facility] ), Disease of thyroid gland, Fibroid (), Gestational diabetes, Hyperparathyroidism ([facility] ), Hypertension, Idiopathic thrombocytopenic purpura (ITP) ([facility] ), Kidney stone, Lupus ([facility] ), Miscarriage (), Osteoarthritis, Pulmonary embolism ([facility] ) (), Sjogren's syndrome ([facility] ), and Vitamin D deficiency  Problem List: does not have any pertinent problems on file  Past Surgical History:  has a past surgical history that includes Hernia repair;  section; Thyroid surgery; Hip surgery; Tubal ligation; pr explore parathyroid glands (Right, 2021); Diangostic Flexible Laryngoscopy (N/A, 2021); Renal biopsy; and Hysteroscopy  Family History: family history includes Diabetes in her father; Diabetes type II in her father, paternal aunt, paternal grandmother, and paternal uncle; No Known Problems in her maternal aunt, maternal aunt, maternal aunt, maternal grandfather, maternal grandmother, mother, paternal aunt, paternal aunt, sister, and sister; Stomach cancer in her paternal grandfather; Stroke in her father  Social History:  reports that she has never smoked  She has never used smokeless tobacco  She reports previous alcohol use   She reports that she does not use drugs   Current Medications: has a current medication list which includes the following prescription(s): ciclopirox, cinacalcet, folic acid, fosinopril, levothyroxine, methylprednisolone, nifedipine, spironolactone, and warfarin  Allergies: is allergic to penicillins  Review of Systems:  General- denies fever/chills  HEENT- denies hearing loss or sore throat  Eyes- denies eye pain or visual disturbances, denies red eyes  Respiratory- denies cough or SOB  Cardio- denies chest pain or palpitations  GI- denies abdominal pain  Endocrine- denies urinary frequency  Urinary- denies pain with urination  Musculoskeletal- Negative except noted above  Skin- denies rashes or wounds  Neurological- denies dizziness or headache  Psychiatric- denies anxiety or difficulty concentrating    Physical Exam:   /90 (BP Location: Left arm, Patient Position: Sitting, Cuff Size: Adult)   Pulse 60   Ht 5' 1" (1 549 m)   Wt 86 6 kg (191 lb)   BMI 36 09 kg/m²   General/Constitutional: No apparent distress: well-nourished and well developed  Eyes: normal ocular motion  Cardio: RRR, Normal S1S2, No m/r/g  Lymphatic: No appreciable lymphadenopathy  Respiratory: Non-labored breathing, CTA b/l no w/c/r  Vascular: No edema, swelling or tenderness, except as noted in detailed exam   Integumentary: No impressive skin lesions present, except as noted in detailed exam   Neuro: No ataxia or tremors noted  Psych: Normal mood and affect, oriented to person, place and time  Appropriate affect  Musculoskeletal: Normal, except as noted in detailed exam and in HPI      Examination    Left    Gait Normal   Musculoskeletal Tender to palpation ATFL    Skin Normal       Nails Normal    Range of Motion  10 degrees dorsiflexion, 30 degrees plantarflexion  Subtalar motion: normal    Stability Stable    Muscle Strength 5/5 tibialis anterior  5/5 gastrocnemius-soleus  4/5 posterior tibialis  4/5 peroneal/eversion strength  5/5 EHL  5/5 FHL    Neurologic Normal    Sensation Intact to light touch throughout sural, saphenous, superficial peroneal, deep peroneal and medial/lateral plantar nerve distributions  [place]-Kateryna 5 07 filament (10g) testing  deferred  Cardiovascular Brisk capillary refill < 2 seconds,intact DP and PT pulses    Special Tests None      Imaging Studies:   3 views of the left ankle were available, reviewed and interpreted independently that demonstrate avulsion distal tip lateral malleolus  Reviewed by me personally  Scribe Attestation    I,:  Eulalia Fuentes PA-C am acting as a scribe while in the presence of the attending physician :       I,:  Verito Mayen MD personally performed the services described in this documentation    as scribed in my presence :             Authur Distel Lachman, MD  Foot & Ankle Surgery   Department of [address] [place] [address]      I personally performed the service  Authur Distel Lachman, MD

## 2022-07-13 NOTE — PROGRESS NOTES
KIERSTEN Brito  Attending, Orthopaedic Surgery  Foot and [facility] [address] [facility]      ORTHOPAEDIC FOOT AND ANKLE CLINIC VISIT     Assessment:     Encounter Diagnoses   Name Primary?  Avulsion fracture of distal fibula Yes    Closed fracture of left ankle, initial encounter             Plan:   · The patient verbalized understanding of exam findings and treatment plan  We engaged in the shared decision-making process and treatment options were discussed at length with the patient  Surgical and conservative management discussed today along with risks and benefits  · Nuala How has an avulsion fracture to the tip of the lateral malleolus and ankle sprain  · She has already weaned herself into a sandal, no pain  · She may progress activity as tolerated in a supportive shoe  · We did order PT for her for ankle strengthening  · Work restrictions given  Return in about 7 weeks (around 8/31/2022)  History of Present Illness:   Chief Complaint:   Chief Complaint   Patient presents with    Left Ankle - Pain, Fracture     Yolette Reid is a 56 y o  female who is being seen for left ankle injury  She rolled her ankle on 7/3, had an XR from her PCP and was told she had a fracture  Pain is localized at lateral malleolus with minimal radiating and described as sharp and severe  Patient denies numbness, tingling or radicular pain  Denies history of neuropathy  Patient does not smoke, does not have diabetes and does take blood thinners  Patient denies family history of anesthesia complications and has not had any complications with anesthesia       Pain/symptom timing:  Worse during the day when active  Pain/symptom context:  Worse with activites and work  Pain/symptom modifying factors:  Rest makes better, activities make worse  Pain/symptom associated signs/symptoms: none    Prior treatment   · NSAIDsYes   · Injections No   · Bracing/Orthotics No    · Physical Therapy No     Orthopedic Surgical History:   See below    Past Medical, Surgical and Social History:  Past Medical History:  has a past medical history of Anemia, Anti-phospholipid antibody syndrome ([facility] ), Cancer ([facility] ), Chronic kidney disease, Clotting disorder ([facility] ), Diabetes mellitus ([facility] ), Disease of thyroid gland, Fibroid (), Gestational diabetes, Hyperparathyroidism ([facility] ), Hypertension, Idiopathic thrombocytopenic purpura (ITP) ([facility] ), Kidney stone, Lupus ([facility] ), Miscarriage (), Osteoarthritis, Pulmonary embolism ([facility] ) (), Sjogren's syndrome ([facility] ), and Vitamin D deficiency  Problem List: does not have any pertinent problems on file  Past Surgical History:  has a past surgical history that includes Hernia repair;  section; Thyroid surgery; Hip surgery; Tubal ligation; pr explore parathyroid glands (Right, 2021); Diangostic Flexible Laryngoscopy (N/A, 2021); Renal biopsy; and Hysteroscopy  Family History: family history includes Diabetes in her father; Diabetes type II in her father, paternal aunt, paternal grandmother, and paternal uncle; No Known Problems in her maternal aunt, maternal aunt, maternal aunt, maternal grandfather, maternal grandmother, mother, paternal aunt, paternal aunt, sister, and sister; Stomach cancer in her paternal grandfather; Stroke in her father  Social History:  reports that she has never smoked  She has never used smokeless tobacco  She reports previous alcohol use  She reports that she does not use drugs  Current Medications: has a current medication list which includes the following prescription(s): ciclopirox, cinacalcet, folic acid, fosinopril, levothyroxine, methylprednisolone, nifedipine, spironolactone, and warfarin  Allergies: is allergic to penicillins       Review of Systems:  General- denies fever/chills  HEENT- denies hearing loss or sore throat  Eyes- denies eye pain or visual disturbances, denies red eyes  Respiratory- denies cough or SOB  Cardio- denies chest pain or palpitations  GI- denies abdominal pain  Endocrine- denies urinary frequency  Urinary- denies pain with urination  Musculoskeletal- Negative except noted above  Skin- denies rashes or wounds  Neurological- denies dizziness or headache  Psychiatric- denies anxiety or difficulty concentrating    Physical Exam:   /90 (BP Location: Left arm, Patient Position: Sitting, Cuff Size: Adult)   Pulse 60   Ht 5' 1" (1 549 m)   Wt 86 6 kg (191 lb)   BMI 36 09 kg/m²   General/Constitutional: No apparent distress: well-nourished and well developed  Eyes: normal ocular motion  Cardio: RRR, Normal S1S2, No m/r/g  Lymphatic: No appreciable lymphadenopathy  Respiratory: Non-labored breathing, CTA b/l no w/c/r  Vascular: No edema, swelling or tenderness, except as noted in detailed exam   Integumentary: No impressive skin lesions present, except as noted in detailed exam   Neuro: No ataxia or tremors noted  Psych: Normal mood and affect, oriented to person, place and time  Appropriate affect  Musculoskeletal: Normal, except as noted in detailed exam and in HPI  Examination    Left    Gait Normal   Musculoskeletal Tender to palpation ATFL    Skin Normal       Nails Normal    Range of Motion  10 degrees dorsiflexion, 30 degrees plantarflexion  Subtalar motion: normal    Stability Stable    Muscle Strength 5/5 tibialis anterior  5/5 gastrocnemius-soleus  4/5 posterior tibialis  4/5 peroneal/eversion strength  5/5 EHL  5/5 FHL    Neurologic Normal    Sensation Intact to light touch throughout sural, saphenous, superficial peroneal, deep peroneal and medial/lateral plantar nerve distributions  [place]-Kateryna 5 07 filament (10g) testing  deferred  Cardiovascular Brisk capillary refill < 2 seconds,intact DP and PT pulses    Special Tests None      Imaging Studies:   3 views of the left ankle were available, reviewed and interpreted independently that demonstrate avulsion distal tip lateral malleolus   Reviewed by me personally  Scribe Attestation    I,:  Mine Win PA-C am acting as a scribe while in the presence of the attending physician :       I,:  Veronica Baxter MD personally performed the services described in this documentation    as scribed in my presence :             Celedonio Lemmings Lachman, MD  Foot & Ankle Surgery   Department of [address] [place] [address]      I personally performed the service  Celedonio Lemmings Lachman, MD

## 2022-07-13 NOTE — LETTER
July 13, 2022     Patient: Eleanor Patel  YOB: 1965  Date of Visit: 7/13/2022      To Whom it May Concern:    Eleanor Patel is under my professional care  Paige Tovar was seen in my office on 7/13/2022  Paige Tovar may return to work on 7/14/22 without restrictions       If you have any questions or concerns, please don't hesitate to call           Sincerely,          Js Godoy MD        CC: No Recipients

## 2022-07-13 NOTE — LETTER
July 13, 2022     Patient: Monique Henriquez  YOB: 1965  Date of Visit: 7/13/2022      To Whom it May Concern:    Monique Henriquez is under my professional care  Akhil Nail was seen in my office on 7/13/2022  Maninderrol Nail may return to work with limitations of sedentary duty/desk duty       If you have any questions or concerns, please don't hesitate to call           Sincerely,          John Muro MD        CC: No Recipients

## 2022-07-19 ENCOUNTER — EVALUATION (OUTPATIENT)
Dept: PHYSICAL THERAPY | Facility: REHABILITATION | Age: 57
End: 2022-07-19
Payer: COMMERCIAL

## 2022-07-19 DIAGNOSIS — S82.839A AVULSION FRACTURE OF DISTAL FIBULA: Primary | ICD-10-CM

## 2022-07-19 DIAGNOSIS — M25.572 ACUTE LEFT ANKLE PAIN: ICD-10-CM

## 2022-07-19 PROCEDURE — 97110 THERAPEUTIC EXERCISES: CPT | Performed by: PHYSICAL THERAPIST

## 2022-07-19 PROCEDURE — 97162 PT EVAL MOD COMPLEX 30 MIN: CPT | Performed by: PHYSICAL THERAPIST

## 2022-07-19 NOTE — PROGRESS NOTES
PT Evaluation     Today's date: 2022  Patient name: Eleanor Patel  : 1965  MRN: [id number]  Referring provider: Sobeida Llamas PA-C  Dx:   Encounter Diagnosis     ICD-10-CM    1  Avulsion fracture of distal fibula  S82 839A Ambulatory referral to Physical Therapy   2  Acute left ankle pain  M25 579                   Assessment  Assessment details: Patient presents with pain, decreased ankle ROM, decreased ankle/LE strength, balance deficits and decreased function secondary to avulsion fracture L distal fibula  Patient would benefit from skilled PT intervention to address these issues and to maximize function  Thank you for the referral   Impairments: abnormal gait, abnormal or restricted ROM, activity intolerance, impaired balance, impaired physical strength, lacks appropriate home exercise program and pain with function  Understanding of Dx/Px/POC: good   Prognosis: good    Goals  Short Term:  Pt will report decreased levels of pain by at least 2 subjective ratings in 4 weeks  Pt will demonstrate improved AROM by at least 10 degrees in 4 weeks  Pt will demonstrate improved strength by 1/2 grade MMT in 4 weeks  Long Term:   Pt will be independent in their HEP in 8 weeks  Pt will demonstrate improved FOTO, > predicted level  Pt will be independent with all ADL's  Pt will be able to ambulate community distances at [facility]  Pt will perform work duties at [employer] details: Patient was educated in [facility]  All questions were answered to pt's satisfaction      Patient would benefit from: skilled physical therapy  Planned therapy interventions: manual therapy, balance/weight bearing training, neuromuscular re-education, patient education, flexibility, gait training, graded exercise, home exercise program, therapeutic exercise, therapeutic activities, stretching and strengthening  Frequency: 2x week  Duration in weeks: 6  Plan of Care beginning date: 2022  Plan of Care expiration date: 2022  Treatment plan discussed with: patient        Subjective Evaluation    History of Present Illness  Mechanism of injury: Pt reports she was ambulating on an slope on 7/3 and rolled her ankles  Pt notes she applied ice, but a week later her symptoms worsened in L ankle, noting increased edema and pain  Pt saw her PCP and hard radiographs, which showed avulsion fx of distal fibula  Pt f/u with Ortho and was referred for PT at this time  As per MD, pt can resume physical activity as tolerated in a supportive shoe  Pt reports pain/difficulty with prolonged standing/ambulating at work, quick movement while ambulating, ambulation on uneven terrain, steps (step to with railing and has been performing this way prior to this injury)  Pt notes utilizing SPC in the morning, as she feels unsteady first thing in the morning  Pt would like to be able to improve her ambulatory tolerance      Pain  At best pain ratin  At worst pain ratin  Location: L ankle  Relieving factors: rest      Diagnostic Tests  X-ray: abnormal  Treatments  No previous or current treatments  Patient Goals  Patient goals for therapy: decreased pain, increased strength, independence with ADLs/IADLs and return to sport/leisure activities          Objective     Observations     Additional Observation Details  Mild edema R ankle    Tenderness     Additional Tenderness Details  No TTP noted    Neurological Testing     Additional Neurological Details  Pt denies n/t    Active Range of Motion   Left Ankle/Foot   Dorsiflexion (ke): 0 degrees   Plantar flexion: 35 degrees   Inversion: 12 degrees   Eversion: 6 degrees   Great toe flexion: WFL  Great toe extension: WFL  Lesser toes: WFL    Passive Range of Motion   Left Ankle/Foot    Dorsiflexion (ke): 10 degrees   Plantar flexion: 45 degrees   Inversion: 30 degrees with pain  Eversion: 24 degrees     Strength/Myotome Testing     Left Hip   Planes of Motion   Flexion: 4  Abduction: 3+  External rotation: 4+    Left Knee   Flexion: 5  Extension: 5    Left Ankle/Foot   Dorsiflexion: 4+  Plantar flexion: 4+ (seated)  Inversion: 4-  Eversion: 3+ (pain)    Additional Strength Details  Single LE HR:  R=6, L=1    Tests   Left Ankle/Foot   Negative for anterior drawer, eversion talar tilt, Homans' sign and inversion talar tilt  General Comments: Ankle/Foot Comments   Pt ambulates with L antalgic gait, L LE externally rotated      SLS=1" b/l  Balance FTEO=1 min with min sway             Precautions: hx of thyroid cancer, CKD, HTN, diabetes, OA, PE, Sjogrens       Manuals 7/19                                                                Neuro Re-Ed             SLS             Rockerboard a/p, m/l WS only                                                                              Ther Ex             Nu-step              gastroc towel stretch             Ankle tb x4             Seated wobbleboard a/p, m/l, cw/ccw             Toe curls w/towel             Ankle inv/ev w/slideboard             Standing HR/TR             Prostretch             Standing SLR x3 b/l             Pt education+ HEP instruction TP 8 mins            Ther Activity             Sidestepping             Sit to stand             Gait Training                                       Modalities

## 2022-07-25 ENCOUNTER — HOSPITAL ENCOUNTER (OUTPATIENT)
Dept: RADIOLOGY | Age: 57
Discharge: HOME | End: 2022-07-25
Payer: COMMERCIAL

## 2022-07-25 ENCOUNTER — APPOINTMENT (OUTPATIENT)
Dept: LAB | Age: 57
End: 2022-07-25
Payer: COMMERCIAL

## 2022-07-25 DIAGNOSIS — E83.52 HYPERCALCEMIA: ICD-10-CM

## 2022-07-25 DIAGNOSIS — N95.0 POST-MENOPAUSAL BLEEDING: ICD-10-CM

## 2022-07-25 DIAGNOSIS — Z85.850 HISTORY OF THYROID CANCER: ICD-10-CM

## 2022-07-25 LAB
ALBUMIN SERPL BCP-MCNC: 3.2 G/DL (ref 3.5–5)
ANION GAP SERPL CALCULATED.3IONS-SCNC: 4 MMOL/L (ref 4–13)
BUN SERPL-MCNC: 26 MG/DL (ref 5–25)
CALCIUM SERPL-MCNC: 8.8 MG/DL (ref 8.3–10.1)
CHLORIDE SERPL-SCNC: 111 MMOL/L (ref 96–108)
CO2 SERPL-SCNC: 24 MMOL/L (ref 21–32)
CREAT SERPL-MCNC: 1.25 MG/DL (ref 0.6–1.3)
GFR SERPL CREATININE-BSD FRML MDRD: 48 ML/MIN/1.73SQ M
GLUCOSE P FAST SERPL-MCNC: 95 MG/DL (ref 65–99)
INR PPP: 2.18 (ref 0.84–1.19)
POTASSIUM SERPL-SCNC: 4.8 MMOL/L (ref 3.5–5.3)
PROTHROMBIN TIME: 24.5 SECONDS (ref 11.6–14.5)
SODIUM SERPL-SCNC: 139 MMOL/L (ref 135–147)

## 2022-07-25 PROCEDURE — 76856 US EXAM PELVIC COMPLETE: CPT

## 2022-07-25 PROCEDURE — 82040 ASSAY OF SERUM ALBUMIN: CPT

## 2022-07-25 PROCEDURE — 80048 BASIC METABOLIC PNL TOTAL CA: CPT

## 2022-07-25 PROCEDURE — 76830 TRANSVAGINAL US NON-OB: CPT

## 2022-07-25 PROCEDURE — 85610 PROTHROMBIN TIME: CPT | Performed by: INTERNAL MEDICINE

## 2022-07-25 PROCEDURE — 36415 COLL VENOUS BLD VENIPUNCTURE: CPT | Performed by: INTERNAL MEDICINE

## 2022-07-25 PROCEDURE — 76536 US EXAM OF HEAD AND NECK: CPT

## 2022-07-26 ENCOUNTER — TELEPHONE (OUTPATIENT)
Dept: HEMATOLOGY ONCOLOGY | Facility: CLINIC | Age: 57
End: 2022-07-26

## 2022-07-26 NOTE — TELEPHONE ENCOUNTER
Reviewed pt's INR with Dr Tai Raya  Pt to remain on the same dose of coumadin  Called and spoke with Darrin Wong to confirm

## 2022-07-29 ENCOUNTER — OFFICE VISIT (OUTPATIENT)
Dept: PHYSICAL THERAPY | Facility: REHABILITATION | Age: 57
End: 2022-07-29
Payer: COMMERCIAL

## 2022-07-29 DIAGNOSIS — M25.572 ACUTE LEFT ANKLE PAIN: ICD-10-CM

## 2022-07-29 DIAGNOSIS — S82.839A AVULSION FRACTURE OF DISTAL FIBULA: Primary | ICD-10-CM

## 2022-07-29 PROCEDURE — 97110 THERAPEUTIC EXERCISES: CPT

## 2022-07-29 NOTE — PROGRESS NOTES
Daily Note     Today's date: 2022  Patient name: Skyler Cosme  : 1965  MRN: [id number]  Referring provider: Carol Gastelum PA-C  Dx:   Encounter Diagnosis     ICD-10-CM    1  Avulsion fracture of distal fibula  S82 839A    2  Acute left ankle pain  M25 572                   Subjective: Pt reported feeling pain and discomfort on L ankle  She also noted some discomfort in R ankle recently  Feels weak  Objective: See treatment diary below      Assessment: Tolerated treatment fair  Patient demonstrated fatigue post treatment and exhibited good technique with therapeutic exercises  VC's needed for correct technique throughout session  Discomfort noted during visit but able to perform to her tolerance  Challenged with wobbleboard  Noted discomfort with SLR x3 when WB on L side  Continue to progress as tolerated  Plan: Continue per plan of care  Precautions: hx of thyroid cancer, CKD, HTN, diabetes, OA, PE, Sjogrens       Manuals                                                                Neuro Re-Ed             SLS             Rockerboard a/p, m/l WS only                                                                              Ther Ex             Nu-step   L3x10'           gastroc towel stretch  5"x10           Ankle tb x4  rtb x10 ea  Seated wobbleboard a/p, m/l, cw/ccw  10 ea  Toe curls w/towel  2'           Ankle inv/ev w/slideboard  5 ea  Standing HR/TR  10 ea  Prostretch             Standing SLR x3 b/l  10 ea             Pt education+ HEP instruction TP 8 mins            Ther Activity             Sidestepping             Sit to stand             Gait Training                                       Modalities

## 2022-08-01 ENCOUNTER — TELEPHONE (OUTPATIENT)
Dept: ENDOCRINOLOGY | Facility: CLINIC | Age: 57
End: 2022-08-01

## 2022-08-01 NOTE — TELEPHONE ENCOUNTER
----- Message from Julieann Bumpers, PA-C sent at 8/1/2022 12:59 PM EDT -----  Call patient  Head neck US does not show evidence of recurrent or metastatic disease

## 2022-08-03 ENCOUNTER — OFFICE VISIT (OUTPATIENT)
Dept: PHYSICAL THERAPY | Facility: REHABILITATION | Age: 57
End: 2022-08-03
Payer: COMMERCIAL

## 2022-08-03 DIAGNOSIS — S82.839A AVULSION FRACTURE OF DISTAL FIBULA: Primary | ICD-10-CM

## 2022-08-03 DIAGNOSIS — M25.572 ACUTE LEFT ANKLE PAIN: ICD-10-CM

## 2022-08-03 PROCEDURE — 97112 NEUROMUSCULAR REEDUCATION: CPT

## 2022-08-03 PROCEDURE — 97110 THERAPEUTIC EXERCISES: CPT

## 2022-08-03 NOTE — PROGRESS NOTES
Daily Note     Today's date: 8/3/2022  Patient name: Kelley Pena  : 1965  MRN: [id number]  Referring provider: Romina Wilson PA-C  Dx:   Encounter Diagnosis     ICD-10-CM    1  Avulsion fracture of distal fibula  S82 839A    2  Acute left ankle pain  M25 572                   Subjective: Pt reported increased soreness for a few days after LV  Objective: See treatment diary below      Assessment: Tolerated treatment fair  Patient demonstrated fatigue post treatment and exhibited good technique with therapeutic exercises  VC's needed for correct technique throughout session  Added rockerboard and prostretch to good tolerance  Monitor symptoms NV  Plan: Continue per plan of care  Precautions: hx of thyroid cancer, CKD, HTN, diabetes, OA, PE, Sjogrens       Manuals 7/19 7/29 8/3                                                              Neuro Re-Ed             SLS             Rockerboard a/p, m/l WS only   10 ea  Ther Ex             Nu-step   L3x10' L4x10'          gastroc towel stretch  5"x10 5"x10          Ankle tb x4  rtb x10 ea  rtb x15 ea  Seated wobbleboard a/p, m/l, cw/ccw  10 ea  10 ea  Toe curls w/towel  2' 2'          Ankle inv/ev w/slideboard  5 ea  5 ea  Standing HR/TR  10 ea  10 ea  Prostretch   5"x5          Standing SLR x3 b/l  10 ea   10 ea          Pt education+ HEP instruction TP 8 mins            Ther Activity             Sidestepping             Sit to stand             Gait Training                                       Modalities

## 2022-08-10 ENCOUNTER — APPOINTMENT (OUTPATIENT)
Dept: PHYSICAL THERAPY | Facility: REHABILITATION | Age: 57
End: 2022-08-10
Payer: COMMERCIAL

## 2022-08-12 DIAGNOSIS — I10 ESSENTIAL HYPERTENSION, BENIGN: ICD-10-CM

## 2022-08-12 DIAGNOSIS — I82.5Y3 CHRONIC DEEP VEIN THROMBOSIS (DVT) OF PROXIMAL VEIN OF BOTH LOWER EXTREMITIES (HCC): Primary | Chronic | ICD-10-CM

## 2022-08-12 DIAGNOSIS — E89.0 POSTOPERATIVE HYPOTHYROIDISM: ICD-10-CM

## 2022-08-12 RX ORDER — LEVOTHYROXINE SODIUM 112 UG/1
112 TABLET ORAL DAILY
Qty: 90 TABLET | Refills: 0 | Status: SHIPPED | OUTPATIENT
Start: 2022-08-12

## 2022-08-12 RX ORDER — SPIRONOLACTONE 25 MG/1
25 TABLET ORAL DAILY
Qty: 90 TABLET | Refills: 0 | Status: SHIPPED | OUTPATIENT
Start: 2022-08-12

## 2022-08-12 RX ORDER — WARFARIN SODIUM 3 MG/1
TABLET ORAL
Qty: 90 TABLET | Refills: 0 | Status: SHIPPED | OUTPATIENT
Start: 2022-08-12 | End: 2022-10-11 | Stop reason: SDUPTHER

## 2022-08-17 ENCOUNTER — OFFICE VISIT (OUTPATIENT)
Dept: PHYSICAL THERAPY | Facility: REHABILITATION | Age: 57
End: 2022-08-17
Payer: COMMERCIAL

## 2022-08-17 DIAGNOSIS — M25.572 ACUTE LEFT ANKLE PAIN: ICD-10-CM

## 2022-08-17 DIAGNOSIS — S82.839A AVULSION FRACTURE OF DISTAL FIBULA: Primary | ICD-10-CM

## 2022-08-17 PROCEDURE — 97110 THERAPEUTIC EXERCISES: CPT

## 2022-08-17 NOTE — PROGRESS NOTES
Daily Note     Today's date: 2022  Patient name: Tim Peña  : 1965  MRN: [id number]  Referring provider: Deepthi Olsen PA-C  Dx:   Encounter Diagnosis     ICD-10-CM    1  Avulsion fracture of distal fibula  S82 839A    2  Acute left ankle pain  M25 572                   Subjective: Pt reported having good and bad days  She is slow moving, especially in the morning  Objective: See treatment diary below      Assessment: Tolerated treatment well  Patient demonstrated fatigue post treatment and exhibited good technique with therapeutic exercises  VC's needed for correct technique throughout session  Some discomfort noted but able to perform to her tolerance  Plan: Continue per plan of care   on  with PTA and PTA     Precautions: hx of thyroid cancer, CKD, HTN, diabetes, OA, PE, Sjogrens       Manuals 7/19 7/29 8/3 8/17                                                             Neuro Re-Ed             SLS             Rockerboard a/p, m/l WS only   10 ea  15 ea  Ther Ex             Nu-step   L3x10' L4x10' L4x10'         gastroc towel stretch  5"x10 5"x10 5"x10         Ankle tb x4  rtb x10 ea  rtb x15 ea  rtb x15 ea  Seated wobbleboard a/p, m/l, cw/ccw  10 ea  10 ea  10 ea  Toe curls w/towel  2' 2' 2'         Ankle inv/ev w/slideboard  5 ea  5 ea  5 ea  Standing HR/TR  10 ea  10 ea  15 ea  Prostretch   5"x5 5"x15         Standing SLR x3 b/l  10 ea  10 ea 10 ea           Pt education+ HEP instruction TP 8 mins            Ther Activity             Sidestepping             Sit to stand             Gait Training                                       Modalities

## 2022-08-22 ENCOUNTER — APPOINTMENT (OUTPATIENT)
Dept: PHYSICAL THERAPY | Facility: REHABILITATION | Age: 57
End: 2022-08-22
Payer: COMMERCIAL

## 2022-08-24 ENCOUNTER — HOSPITAL ENCOUNTER (OUTPATIENT)
Dept: RADIOLOGY | Age: 57
Discharge: HOME | End: 2022-08-24
Payer: COMMERCIAL

## 2022-08-24 VITALS — HEIGHT: 61 IN | BODY MASS INDEX: 36.06 KG/M2 | WEIGHT: 191 LBS

## 2022-08-24 DIAGNOSIS — Z12.31 ENCOUNTER FOR SCREENING MAMMOGRAM FOR MALIGNANT NEOPLASM OF BREAST: ICD-10-CM

## 2022-08-24 PROCEDURE — 77063 BREAST TOMOSYNTHESIS BI: CPT

## 2022-08-24 PROCEDURE — 77067 SCR MAMMO BI INCL CAD: CPT

## 2022-08-29 ENCOUNTER — EVALUATION (OUTPATIENT)
Dept: PHYSICAL THERAPY | Facility: REHABILITATION | Age: 57
End: 2022-08-29
Payer: COMMERCIAL

## 2022-08-29 DIAGNOSIS — M25.572 ACUTE LEFT ANKLE PAIN: ICD-10-CM

## 2022-08-29 DIAGNOSIS — S82.839A AVULSION FRACTURE OF DISTAL FIBULA: Primary | ICD-10-CM

## 2022-08-29 PROCEDURE — 97110 THERAPEUTIC EXERCISES: CPT | Performed by: PHYSICAL THERAPIST

## 2022-08-29 NOTE — PROGRESS NOTES
PT Re-Evaluation     Today's date: 2022  Patient name: Bozena Chavez  : 1965  MRN: [id number]  Referring provider: Bud German PA-C  Dx:   Encounter Diagnosis     ICD-10-CM    1  Avulsion fracture of distal fibula  S82 839A    2  Acute left ankle pain  M25 572            Updated measurements and functional status taken this session  Assessment  Assessment details: Pt has progressed well, demonstrating improvement in ankle ROM, strength, and function with a decrease in ankle pain  Pt feels comfortable transitioning to an ongoing HEP at this time  Updated HEP was reviewed and issued to pt and pt verbalizes an understanding  Thank you for he referral     Impairments: abnormal or restricted ROM, activity intolerance and impaired physical strength  Understanding of Dx/Px/POC: good   Prognosis: good    Goals  Short Term:  Pt will report decreased levels of pain by at least 2 subjective ratings in 4 weeks-met  Pt will demonstrate improved AROM by at least 10 degrees in 4 weeks-partially met  Pt will demonstrate improved strength by 1/2 grade MMT in 4 weeks-met  Long Term:   Pt will be independent in their HEP in 8 weeks-met  Pt will demonstrate improved FOTO, > predicted level-met (69 w/goal of 68)  Pt will be independent with all ADL's-met  Pt will be able to ambulate community distances at [employer]  -met   Pt will perform work duties at [employer] details: Patient was educated in [employer]  All questions were answered to pt's satisfaction  Planned therapy interventions: home exercise program  Treatment plan discussed with: patient        Subjective Evaluation    History of Present Illness  Mechanism of injury: Pt reports overall improvement with her ankle since starting therapy  Pt notes she feels her ankle is stronger and is no longer hurting her  Pt notes improved tolerance to standing and walking for longer periods, as well as quick movements with ambulation    Pt notes total body fatigue after her PT sessions  Pt is not taking any pain meds for her ankle  Pain  No pain reported    Treatments  Current treatment: physical therapy  Patient Goals  Patient goals for therapy: decreased pain, increased motion, increased strength, independence with ADLs/IADLs, return to sport/leisure activities and improved balance          Objective     Active Range of Motion   Left Ankle/Foot   Dorsiflexion (ke): 13 degrees   Plantar flexion: 40 degrees   Inversion: 20 degrees   Eversion: 12 degrees     Passive Range of Motion   Left Ankle/Foot    Dorsiflexion (ke): 15 degrees   Plantar flexion: 45 degrees   Inversion: 30 degrees   Eversion: WFL    Strength/Myotome Testing     Left Hip   Planes of Motion   Flexion: 5  Abduction: 3+  External rotation: 4+    Left Ankle/Foot   Dorsiflexion: 5  Plantar flexion: 5 (seated)  Inversion: 4+  Eversion: 5             Precautions: hx of thyroid cancer, CKD, HTN, diabetes, OA, PE, Sjogrens         Manuals 7/19 7/29 8/3 8/17  8/29                                                                                                             Neuro Re-Ed                       SLS                       Rockerboard a/p, m/l WS only     10 ea  15 ea   np                                                                                                                                     Ther Ex                       Nu-step    L3x10' L4x10' L4x10'  L4x10'             gastroc towel stretch   5"x10 5"x10 5"x10  reviewed             Ankle tb x4   rtb x10 ea  rtb x15 ea  rtb x15 ea   reviewed             Seated wobbleboard a/p, m/l, cw/ccw   10 ea  10 ea  10 ea   np             Toe curls w/towel   2' 2' 2'  reviewed             Ankle inv/ev w/slideboard   5 ea  5 ea  5 ea   reviewed             Standing HR/TR   10 ea  10 ea  15 ea   reviewed             Prostretch     5"x5 5"x15  reviewed standing gastroc stretch             Standing SLR x3 b/l   10 ea  10 ea 10 ea   reviewed             Re-assessment     TP        Pt education+ HEP instruction TP 8 mins                     Ther Activity                       Sidestepping          reviewed             Sit to stand          reviewed             Gait Training                                                                       Modalities

## 2022-08-31 ENCOUNTER — OFFICE VISIT (OUTPATIENT)
Dept: OBGYN CLINIC | Facility: CLINIC | Age: 57
End: 2022-08-31
Payer: COMMERCIAL

## 2022-08-31 VITALS — HEART RATE: 63 BPM | SYSTOLIC BLOOD PRESSURE: 120 MMHG | DIASTOLIC BLOOD PRESSURE: 82 MMHG

## 2022-08-31 DIAGNOSIS — S82.839A AVULSION FRACTURE OF DISTAL FIBULA: Primary | ICD-10-CM

## 2022-08-31 DIAGNOSIS — S82.892D CLOSED FRACTURE OF LEFT ANKLE WITH ROUTINE HEALING, SUBSEQUENT ENCOUNTER: ICD-10-CM

## 2022-08-31 PROCEDURE — 99213 OFFICE O/P EST LOW 20 MIN: CPT | Performed by: ORTHOPAEDIC SURGERY

## 2022-08-31 NOTE — PROGRESS NOTES
KIERSTEN Williamson  Attending, Orthopaedic Surgery  [facility] [address] [facility]      ORTHOPAEDIC FOOT AND ANKLE CLINIC VISIT     Assessment:     Encounter Diagnoses   Name Primary?  Avulsion fracture of distal fibula Yes    Closed fracture of left ankle with routine healing, subsequent encounter             Plan:   · The patient verbalized understanding of exam findings and treatment plan  We engaged in the shared decision-making process and treatment options were discussed at length with the patient  Surgical and conservative management discussed today along with risks and benefits  · Mary Naqvi is now 2 months s/p left ankle, avulsion fracture to the tip of the lateral malleolus and ankle sprain with progress  · Patient was instructed to continue home exercise program on a daily basis to avoid a setback  · Continue ice and elevation for swelling control  · May gradually progress back to normal activities as tolerated  · Work note provided today for full duty  Return if symptoms worsen or fail to improve  History of Present Illness:   Chief Complaint:   Chief Complaint   Patient presents with    Left Ankle - Follow-up     Lachelle Godinez is a 56 y o  female who is being seen in follow-up for left ankle injury sustained on 7/3  When we last saw she we recommended physical therapy/HEP  Pain and strength has improved  Residual pain is localized at lateral ankle with minimal radiating and described as sharp and severe        Pain/symptom timing:  Worse during the day when active  Pain/symptom context:  Worse with activites and work  Pain/symptom modifying factors:  Rest makes better, activities make worse  Pain/symptom associated signs/symptoms: none    Prior treatment   · NSAIDsYes   · Injections No   · Bracing/Orthotics No    · Physical Therapy Yes     Orthopedic Surgical History:   See below    Past Medical, Surgical and Social History:  Past Medical History:  has a past medical history of Anemia, Anti-phospholipid antibody syndrome ([facility] ), Cancer ([facility] ), Chronic kidney disease, Clotting disorder ([facility] ), Diabetes mellitus ([facility] ), Disease of thyroid gland, Fibroid (), Gestational diabetes, History of chemotherapy, Hyperparathyroidism ([facility] ), Hypertension, Idiopathic thrombocytopenic purpura (ITP) ([facility] ), Kidney stone, Lupus ([facility] ), Miscarriage (), Osteoarthritis, Pulmonary embolism ([facility] ) (), Sjogren's syndrome ([facility] ), and Vitamin D deficiency  Problem List: does not have any pertinent problems on file  Past Surgical History:  has a past surgical history that includes Hernia repair;  section; Thyroid surgery; Hip surgery; Tubal ligation; pr explore parathyroid glands (Right, 2021); Diangostic Flexible Laryngoscopy (N/A, 2021); Renal biopsy; and Hysteroscopy  Family History: family history includes Diabetes in her father; Diabetes type II in her father, paternal aunt, paternal grandmother, and paternal uncle; No Known Problems in her maternal aunt, maternal aunt, maternal aunt, maternal grandfather, maternal grandmother, mother, paternal aunt, paternal aunt, sister, and sister; Stomach cancer in her paternal grandfather; Stroke in her father  Social History:  reports that she has never smoked  She has never used smokeless tobacco  She reports previous alcohol use  She reports that she does not use drugs  Current Medications: has a current medication list which includes the following prescription(s): ciclopirox, cinacalcet, folic acid, fosinopril, levothyroxine, methylprednisolone, nifedipine, spironolactone, and warfarin  Allergies: is allergic to penicillins       Review of Systems:  General- denies fever/chills  HEENT- denies hearing loss or sore throat  Eyes- denies eye pain or visual disturbances, denies red eyes  Respiratory- denies cough or SOB  Cardio- denies chest pain or palpitations  GI- denies abdominal pain  Endocrine- denies urinary frequency  Urinary- denies pain with urination  Musculoskeletal- Negative except noted above  Skin- denies rashes or wounds  Neurological- denies dizziness or headache  Psychiatric- denies anxiety or difficulty concentrating    Physical Exam:   /82   Pulse 63   General/Constitutional: No apparent distress: well-nourished and well developed  Eyes: normal ocular motion  Lymphatic: No appreciable lymphadenopathy  Respiratory: Non-labored breathing  Vascular: No edema, swelling or tenderness, except as noted in detailed exam   Integumentary: No impressive skin lesions present, except as noted in detailed exam   Neuro: No ataxia or tremors noted  Psych: Normal mood and affect, oriented to person, place and time  Appropriate affect  Musculoskeletal: Normal, except as noted in detailed exam and in HPI  Examination    Left    Gait Normal   Musculoskeletal Non tender to palpation    Skin Normal     Nails Normal    Range of Motion  15degrees dorsiflexion, 45 degrees plantarflexion  Subtalar motion: normal    Stability Stable    Muscle Strength 5/5 tibialis anterior  5/5 gastrocnemius-soleus  5/5 posterior tibialis  5/5 peroneal/eversion strength  5/5 EHL  5/5 FHL    Neurologic Normal    Sensation Intact to light touch throughout sural, saphenous, superficial peroneal, deep peroneal and medial/lateral plantar nerve distributions  [place] 5 07 filament (10g) testing deferred  Cardiovascular Brisk capillary refill < 2 seconds,intact DP and PT pulses    Special Tests None      Imaging Studies:   No new imaging    James R Lachman, MD  Foot & Ankle Surgery   Department [address]      I personally performed the service  Jay Rower Lachman, MD    Scribe Attestation    I,:  Sea Hercules am acting as a scribe while in the presence of the attending physician :       I,:  Amari Marin MD personally performed the services described in this documentation as scribed in my presence :

## 2022-08-31 NOTE — LETTER
August 31, 2022     Patient: Shayy Alicia  YOB: 1965  Date of Visit: 8/31/2022      To Whom it May Concern:    Shayy Alicia is under my professional care  Paula Joseph was seen in my office on 8/31/2022  Paula Joseph may return to work without restrictions or limitations    If you have any questions or concerns, please don't hesitate to call           Sincerely,          Bret Dickerson MD        CC: No Recipients

## 2022-09-05 ENCOUNTER — HOSPITAL ENCOUNTER (EMERGENCY)
Facility: HOSPITAL | Age: 57
Discharge: HOME | End: 2022-09-05
Attending: EMERGENCY MEDICINE
Payer: COMMERCIAL

## 2022-09-05 ENCOUNTER — APPOINTMENT (OUTPATIENT)
Dept: RADIOLOGY | Facility: HOSPITAL | Age: 57
End: 2022-09-05
Payer: COMMERCIAL

## 2022-09-05 VITALS
RESPIRATION RATE: 14 BRPM | DIASTOLIC BLOOD PRESSURE: 103 MMHG | HEART RATE: 78 BPM | SYSTOLIC BLOOD PRESSURE: 154 MMHG | OXYGEN SATURATION: 98 % | TEMPERATURE: 97.3 F

## 2022-09-05 DIAGNOSIS — M25.571 RIGHT ANKLE PAIN: Primary | ICD-10-CM

## 2022-09-05 PROCEDURE — 99284 EMERGENCY DEPT VISIT MOD MDM: CPT | Performed by: EMERGENCY MEDICINE

## 2022-09-05 PROCEDURE — 99283 EMERGENCY DEPT VISIT LOW MDM: CPT

## 2022-09-05 PROCEDURE — 73610 X-RAY EXAM OF ANKLE: CPT

## 2022-09-05 RX ORDER — ACETAMINOPHEN 325 MG/1
975 TABLET ORAL ONCE
Status: COMPLETED | OUTPATIENT
Start: 2022-09-05 | End: 2022-09-05

## 2022-09-05 RX ADMIN — ACETAMINOPHEN 975 MG: 325 TABLET ORAL at 07:00

## 2022-09-05 NOTE — Clinical Note
Edda Claude was seen and treated in our emergency department on 9/5/2022  Diagnosis:     Hoa Sands    She may return on this date: 09/07/2022         If you have any questions or concerns, please don't hesitate to call        Cynthia Otero MD    ______________________________           _______________          _______________  [facility] Representative                              Date                                Time

## 2022-09-05 NOTE — DISCHARGE INSTRUCTIONS
We will call you regarding the final x-ray read of your right ankle  On my read, I did not find see any fractures or dislocation  Use Tylenol and ice for pain relief  Avoid activities and sports that may cause further injury, stress or pain       If pain continues or worsens or fails to improve, follow up with Podiatry/Orthopedics, PCP or in the ER

## 2022-09-05 NOTE — ED ATTENDING ATTESTATION
9/5/2022  I, Mayer Schirmer, DO, saw and evaluated the patient  I have discussed the patient with the resident/non-physician practitioner and agree with the resident's/non-physician practitioner's findings, Plan of Care, and MDM as documented in the resident's/non-physician practitioner's note, except where noted  All available labs and Radiology studies were reviewed  I was present for key portions of any procedure(s) performed by the resident/non-physician practitioner and I was immediately available to provide assistance  At this point I agree with the current assessment done in the Emergency Department  I have conducted an independent evaluation of this patient a history and physical is as follows:    Rachael NYE DO, saw and evaluated the patient  I have discussed the patient with the resident and agree with the resident's findings, Plan of Care, and MDM as documented in the resident's note, except where noted  All available labs and Radiology studies were reviewed  I was present for key portions of any procedure(s) performed by the resident and I was immediately available to provide assistance  At this point I agree with the current assessment done in the Emergency Department  I have conducted an independent evaluation of this patient a history and physical is as follows:    Lino Corrales is an 56y o  year old female with R ankle pain worse for the last one day  No inciting trauma or specific injury within the last 1-2 days  Difficulty bearing weight due to pain  No other symptoms  General: NAD   HEENT: normocephalic, atraumatic   MMM   CV: RRR   Pulm: normal work of breathing,   GI: abdomen non-distended   : deferred   MSK: no LE edema, no deformity, swelling R medial ankle without overlying skin changes or deformity  Skin: warm and dry   Neuro: awake and alert, moves all extremities with good strength, face symmetric, speech normal   Psych: appropriate mood and affect MDM  Likely soft tissue vs MSK injury, x-ray, likely d/c with supportive care      I have personally reviewed the laboratory studies and imaging studies as ordered by the resident/ESTEVAN  I have personally examined the patient              ED Course         Critical Care Time  Procedures

## 2022-09-05 NOTE — ED PROVIDER NOTES
History  Chief Complaint   Patient presents with    Ankle Injury     Fx L ankle in July and last night began with R ankle and L medial knee pain  Diff walking   +sensation/mvmt     HPI  56-year-old female presents to ED for evaluation of right ankle pain  She reports pain since yesterday  She reports pain that is worse when she walks  She denies any injuries or falls  She also reports increased swelling without any redness  She denies any overlying skin changes  She has tried icing the ankle without any relief of symptoms  She has not tried any medications  She reports she had a left ankle fracture in July  She denies any other physical complaints or concerns at this time  Patient denies headache, visual changes, dizziness, fevers, chills, chest pain, palpitations, abdominal pain, dysuria, new skin rashes or numbness or tingling of the extremities  Prior to Admission Medications   Prescriptions Last Dose Informant Patient Reported? Taking? NIFEdipine (PROCARDIA XL) 30 mg 24 hr tablet   No No   Sig: Take 1 tablet (30 mg total) by mouth daily   ciclopirox (LOPROX) 0 77 % cream  Self Yes No   cinacalcet (SENSIPAR) 30 mg tablet  Self No No   Sig: Take 1 tablet (30 mg total) by mouth daily   folic acid (FOLVITE) 1 mg tablet  Self Yes No   Sig: Take 1 tablet by mouth daily   fosinopril (MONOPRIL) 20 mg tablet  Self No No   Sig: Take 1 tablet (20 mg total) by mouth 2 (two) times a day   levothyroxine (Synthroid) 112 mcg tablet   No No   Sig: Take 1 tablet (112 mcg total) by mouth daily   methylPREDNISolone 4 MG tablet therapy pack  Self No No   Sig: Use as directed on package   spironolactone (ALDACTONE) 25 mg tablet   No No   Sig: Take 1 tablet (25 mg total) by mouth daily   warfarin (COUMADIN) 3 mg tablet   No No   Sig: TAKE ONE TABLET BY MOUTH EVERY DAY   DO NOT TAKE ON MONDAYS AND THURSDAYS      Facility-Administered Medications: None       Past Medical History:   Diagnosis Date    Anemia     Not sure    Anti-phospholipid antibody syndrome (HCC)     Cancer (HCC)     thyroid    Chronic kidney disease     [facility] disorder ([facility] )     Lung embolism,  ITP    Diabetes mellitus ([facility] )     Disease of thyroid gland     Fibroid     Gestational diabetes     History of chemotherapy     late  for lupus    Hyperparathyroidism ([facility] )     Hypertension     Idiopathic thrombocytopenic purpura (ITP) (HCC)     Kidney stone     Lupus ([facility] )     Miscarriage 58    Lost twin girls 10-    Osteoarthritis     [facility]     [facility]'s [facility]  )     Vitamin D deficiency        Past Surgical History:   Procedure Laterality Date     SECTION      [id number]    DIAGNOSTIC FLEXIBLE LARYNGOSCOPY N/A 2021    Procedure: DIAGNOSTIC FLEXIBLE LARYNGOSCOPY;  Surgeon: Magi Humphrey MD;  [facility]: [facility];  Service: Surgical Oncology    HERNIA REPAIR      HIP SURGERY      left side, bone decompression    HYSTEROSCOPY      MN EXPLORE PARATHYROID GLANDS Right 2021    Procedure: PARATHYROIDECTOMY, MINIMALLY INVASIVE, right, intraoperative PTH monitoring;  Surgeon: Magi Humphrey MD;  [facility]: [facility];  Service: Surgical Oncology    RENAL BIOPSY      Not sure    THYROID SURGERY      TUBAL LIGATION         Family History   Problem Relation Age of Onset    No Known Problems Mother     Diabetes type II Father     Diabetes Father     Stroke Father     Diabetes type II Paternal Aunt     Diabetes type II Paternal Uncle     Diabetes type II Paternal Grandmother     Stomach cancer Paternal Grandfather     No Known Problems Sister     No Known Problems Maternal Grandmother     No Known Problems Maternal Grandfather     No Known Problems Sister     No Known Problems Maternal Aunt     No Known Problems Maternal Aunt     No Known Problems Maternal Aunt     No Known Problems Paternal Aunt     No Known Problems Paternal Aunt      I have reviewed and agree with the history as documented  E-Cigarette/Vaping    E-Cigarette Use Never User      E-Cigarette/Vaping Substances    Nicotine No     THC No     CBD No     Flavoring No     Other No     Unknown No      Social History     Tobacco Use    Smoking status: Never Smoker    Smokeless tobacco: Never Used   Vaping Use    Vaping Use: Never used   Substance Use Topics    Alcohol use: Not Currently     Comment: Twice a year    Drug use: No        Review of Systems   All other systems reviewed and are negative        Physical Exam  ED Triage Vitals   Temperature Pulse Respirations Blood Pressure SpO2   09/05/22 0617 09/05/22 0617 09/05/22 0617 09/05/22 0617 09/05/22 0617   (!) 97 3 °F (36 3 °C) 78 14 (!) 154/103 98 %      Temp Source Heart Rate Source Patient Position - Orthostatic VS BP Location FiO2 (%)   09/05/22 0617 09/05/22 0617 09/05/22 0617 09/05/22 0617 --   Temporal Monitor Sitting Left arm       Pain Score       09/05/22 0700       9             Orthostatic Vital Signs  Vitals:    09/05/22 0617   BP: (!) 154/103   Pulse: 78   Patient Position - Orthostatic VS: Sitting       Physical Exam   PHYSICAL EXAM    Constitutional:  Well developed, no acute distress  HEENT:  Conjunctiva normal  Oropharynx moist  Respiratory:  No respiratory distress  Cardiovascular:  Normal rate  GI:  Soft, nondistended, nontender  :  No costovertebral angle tenderness   Musculoskeletal:  Right ankle with tenderness to palpation and edema of the medial malleolus, no overlying skin changes, no cellulitis, no bleeding no drainage  Integument:  Well hydrated, no rash   Lymphatic:  No lymphadenopathy noted   Neurologic:  Alert & oriented x 3, normal motor function, no focal deficits noted   Psychiatric:  Speech and behavior appropriate       ED Medications  Medications   acetaminophen (TYLENOL) tablet 975 mg (975 mg Oral Given 9/5/22 0700)       Diagnostic Studies  Results Reviewed     None                 XR ankle 3+ views RIGHT ED Interpretation by Mercedes Liu MD (09/05 0276)   No acute osseous abnormalities            Procedures  Procedures      ED Course                                       MDM  Number of Diagnoses or Management Options  Right ankle pain  Diagnosis management comments: Pt instructions - We will call you regarding the final x-ray read of your right ankle  On my read, I did not find see any fractures or dislocation  Use Tylenol and ice for pain relief  Avoid activities and sports that may cause further injury, stress or pain  If pain continues or worsens or fails to improve, follow up with Podiatry/Orthopedics, PCP or in the ER          Disposition  Final diagnoses:   Right ankle pain     Time reflects when diagnosis was documented in both MDM as applicable and the Disposition within this note     Time User Action Codes Description Comment    9/5/2022  7:50 AM Davey Patel Add [M24 351] Right ankle pain       ED Disposition     ED Disposition   Discharge    Condition   Stable    Date/Time   Mon Sep 5, 2022  7:49 AM    Comment   Keyla Cosme discharge to home/self care                 Follow-up Information    None         Discharge Medication List as of 9/5/2022  7:52 AM      CONTINUE these medications which have NOT CHANGED    Details   ciclopirox (LOPROX) 0 77 % cream Starting Tue 10/19/2021, Historical Med      cinacalcet (SENSIPAR) 30 mg tablet Take 1 tablet (30 mg total) by mouth daily, Starting Fri 5/78/8459, Normal      folic acid (FOLVITE) 1 mg tablet Take 1 tablet by mouth daily, Starting Wed 5/18/2011, Historical Med      fosinopril (MONOPRIL) 20 mg tablet Take 1 tablet (20 mg total) by mouth 2 (two) times a day, Starting Tue 5/31/2022, Normal      levothyroxine (Synthroid) 112 mcg tablet Take 1 tablet (112 mcg total) by mouth daily, Starting Fri 8/12/2022, Normal      methylPREDNISolone 4 MG tablet therapy pack Use as directed on package, Normal      NIFEdipine (PROCARDIA XL) 30 mg 24 hr tablet Take 1 tablet (30 mg total) by mouth daily, Starting Wed 7/13/2022, Normal      spironolactone (ALDACTONE) 25 mg tablet Take 1 tablet (25 mg total) by mouth daily, Starting Fri 8/12/2022, Normal      warfarin (COUMADIN) 3 mg tablet TAKE ONE TABLET BY MOUTH EVERY DAY  DO NOT TAKE ON MONDAYS AND THURSDAYS, Normal               PDMP Review       Value Time User    PDMP Reviewed  Yes 12/29/2021  5:01 AM Dav Castañeda MD           ED Provider  Attending physically available and evaluated Shayy Leaks  I managed the patient along with the ED Attending      Electronically Signed by         Cooper Michael MD  09/05/22 9950

## 2022-09-06 DIAGNOSIS — E83.52 HYPERCALCEMIA: ICD-10-CM

## 2022-09-06 DIAGNOSIS — I12.9 PARENCHYMAL RENAL HYPERTENSION, STAGE 1 THROUGH STAGE 4 OR UNSPECIFIED CHRONIC KIDNEY DISEASE: ICD-10-CM

## 2022-09-06 RX ORDER — FOSINOPRIL SODIUM 20 MG/1
20 TABLET ORAL 2 TIMES DAILY
Qty: 180 TABLET | Refills: 0 | Status: SHIPPED | OUTPATIENT
Start: 2022-09-06

## 2022-09-06 RX ORDER — CINACALCET 30 MG/1
30 TABLET, FILM COATED ORAL DAILY
Qty: 90 TABLET | Refills: 0 | Status: SHIPPED | OUTPATIENT
Start: 2022-09-06

## 2022-09-12 ENCOUNTER — OFFICE VISIT (OUTPATIENT)
Dept: PODIATRY | Facility: CLINIC | Age: 57
End: 2022-09-12
Payer: COMMERCIAL

## 2022-09-12 VITALS
WEIGHT: 194.4 LBS | BODY MASS INDEX: 36.7 KG/M2 | SYSTOLIC BLOOD PRESSURE: 127 MMHG | DIASTOLIC BLOOD PRESSURE: 85 MMHG | HEIGHT: 61 IN | HEART RATE: 60 BPM

## 2022-09-12 DIAGNOSIS — S93.421A SPRAIN OF DELTOID LIGAMENT OF RIGHT ANKLE, INITIAL ENCOUNTER: Primary | ICD-10-CM

## 2022-09-12 DIAGNOSIS — M25.571 RIGHT ANKLE PAIN: ICD-10-CM

## 2022-09-12 PROCEDURE — 99242 OFF/OP CONSLTJ NEW/EST SF 20: CPT | Performed by: PODIATRIST

## 2022-09-12 RX ORDER — METHYLPREDNISOLONE 4 MG/1
TABLET ORAL
Qty: 21 TABLET | Refills: 0 | Status: SHIPPED | OUTPATIENT
Start: 2022-09-12

## 2022-09-12 NOTE — LETTER
September 12, 2022     Patient: Meryle Peacemaker  YOB: 1965  Date of Visit: 9/12/2022      To Whom it May Concern:    Meryle Peacemaker is under my professional care  Bill Morton was seen in my office on 9/12/2022  Bill Morton may return to work on 9/12/22  No work limitations    If you have any questions or concerns, please don't hesitate to call           Sincerely,          Ansley Ho DPM        CC: Meryle Peacemaker

## 2022-09-12 NOTE — PROGRESS NOTES
Assessment/Plan:    Explained the patient that her symptoms are most consistent with a sprain of her deltoid ligament right ankle  Patient placed on a Medrol Dosepak  If pain persists, Voltaren gel recommended  Reappoint p r n  No problem-specific Assessment & Plan notes found for this encounter  Diagnoses and all orders for this visit:    Right ankle pain  -     Ambulatory Referral to Podiatry  -     methylPREDNISolone 4 MG tablet therapy pack; Use as directed on package          Subjective:      Patient ID: Tim Peña is a 56 y o  female  HPI     Patient, a 53-year-old female presents for assessment of her right ankle  Patient relates that approximately 2 months ago she was diagnosed with a fracture of her left ankle  She states that this injury occurred after she slipped while walking on wet grass  She related that both ankles turned peculiar yearly at the time of this injury  She was treated for this injury by Dr Jo Ann Bah in currently has no left ankle pain  Patient notes though that 8 days ago she developed severe pain along the medial aspect of the right ankle  No specific trauma was noted  She states that she could not bear weight on the ankle  She went to the ED and had x-rays which were read as negative  The pain that she had been experiencing is for the most part gone today  Only mild discomfort related  Reviewed medical history of patient  It is positive for lupus and history of DVT  She had gestational diabetes  The following portions of the patient's history were reviewed and updated as appropriate: allergies, current medications, past family history, past medical history, past social history, past surgical history and problem list     Review of Systems   Cardiovascular: Positive for leg swelling  Musculoskeletal: Positive for arthralgias  Psychiatric/Behavioral: Negative              Objective:      /85   Pulse 60   Ht 5' 1" (1 549 m) Comment: verbal Wt 88 2 kg (194 lb 6 4 oz)   BMI 36 73 kg/m²          Physical Exam  Constitutional:       Appearance: Normal appearance  Cardiovascular:      Pulses: Normal pulses  Musculoskeletal:         General: Tenderness present  Comments: Mild pain with palpation medial aspect right ankle at the deltoid ligament  Mild edema present  Patient can stand and ambulate without limping  She can also rise on her tip toes  Skin:     General: Skin is warm  Neurological:      General: No focal deficit present  Mental Status: She is oriented to person, place, and time

## 2022-09-30 ENCOUNTER — TELEPHONE (OUTPATIENT)
Dept: PODIATRY | Facility: CLINIC | Age: 57
End: 2022-09-30

## 2022-09-30 DIAGNOSIS — M25.571 RIGHT ANKLE PAIN, UNSPECIFIED CHRONICITY: Primary | ICD-10-CM

## 2022-10-11 DIAGNOSIS — I82.5Y3 CHRONIC DEEP VEIN THROMBOSIS (DVT) OF PROXIMAL VEIN OF BOTH LOWER EXTREMITIES (HCC): Chronic | ICD-10-CM

## 2022-10-11 DIAGNOSIS — I10 ESSENTIAL HYPERTENSION: ICD-10-CM

## 2022-10-11 RX ORDER — NIFEDIPINE 30 MG/1
30 TABLET, EXTENDED RELEASE ORAL DAILY
Qty: 90 TABLET | Refills: 3 | Status: SHIPPED | OUTPATIENT
Start: 2022-10-11

## 2022-10-11 RX ORDER — WARFARIN SODIUM 3 MG/1
TABLET ORAL
Qty: 90 TABLET | Refills: 0 | Status: SHIPPED | OUTPATIENT
Start: 2022-10-11

## 2022-10-23 ENCOUNTER — HOSPITAL ENCOUNTER (OUTPATIENT)
Dept: MRI IMAGING | Facility: HOSPITAL | Age: 57
Discharge: HOME | End: 2022-10-23
Attending: PODIATRIST
Payer: COMMERCIAL

## 2022-10-23 DIAGNOSIS — M25.571 RIGHT ANKLE PAIN, UNSPECIFIED CHRONICITY: ICD-10-CM

## 2022-10-23 PROCEDURE — 73721 MRI JNT OF LWR EXTRE W/O DYE: CPT

## 2022-10-23 PROCEDURE — G1004 CDSM NDSC: HCPCS

## 2022-10-27 ENCOUNTER — TELEPHONE (OUTPATIENT)
Dept: PODIATRY | Facility: CLINIC | Age: 57
End: 2022-10-27

## 2022-10-27 NOTE — TELEPHONE ENCOUNTER
Spoke to patient and per verbal instruction from Dr Javier Guerra informed her that the MRI shoes arthritis and posterior tibial tendonitis  His recommendations are a referral to PT if pt is still symptomatic or bring her in for a f/u to discuss further  Pt states that she went to PT years ago for her other foot, so she will resume those exercises on her own at home and call the office should anything change or fail to improve

## 2022-11-15 DIAGNOSIS — E89.0 POSTOPERATIVE HYPOTHYROIDISM: ICD-10-CM

## 2022-11-15 RX ORDER — LEVOTHYROXINE SODIUM 112 UG/1
112 TABLET ORAL DAILY
Qty: 90 TABLET | Refills: 0 | Status: SHIPPED | OUTPATIENT
Start: 2022-11-15

## 2022-12-01 ENCOUNTER — TELEPHONE (OUTPATIENT)
Dept: ENDOCRINOLOGY | Facility: CLINIC | Age: 57
End: 2022-12-01

## 2022-12-06 DIAGNOSIS — E83.52 HYPERCALCEMIA: ICD-10-CM

## 2022-12-06 DIAGNOSIS — I10 ESSENTIAL HYPERTENSION, BENIGN: ICD-10-CM

## 2022-12-06 DIAGNOSIS — I12.9 PARENCHYMAL RENAL HYPERTENSION, STAGE 1 THROUGH STAGE 4 OR UNSPECIFIED CHRONIC KIDNEY DISEASE: ICD-10-CM

## 2022-12-06 RX ORDER — FOSINOPRIL SODIUM 20 MG/1
20 TABLET ORAL 2 TIMES DAILY
Qty: 180 TABLET | Refills: 0 | Status: SHIPPED | OUTPATIENT
Start: 2022-12-06

## 2022-12-06 RX ORDER — SPIRONOLACTONE 25 MG/1
25 TABLET ORAL DAILY
Qty: 90 TABLET | Refills: 0 | Status: SHIPPED | OUTPATIENT
Start: 2022-12-06

## 2022-12-06 RX ORDER — CINACALCET 30 MG/1
30 TABLET, FILM COATED ORAL DAILY
Qty: 90 TABLET | Refills: 0 | Status: SHIPPED | OUTPATIENT
Start: 2022-12-06

## 2023-01-17 DIAGNOSIS — I10 ESSENTIAL HYPERTENSION: ICD-10-CM

## 2023-01-17 RX ORDER — NIFEDIPINE 30 MG/1
30 TABLET, EXTENDED RELEASE ORAL DAILY
Qty: 90 TABLET | Refills: 0 | Status: SHIPPED | OUTPATIENT
Start: 2023-01-17

## 2023-01-25 ENCOUNTER — OFFICE VISIT (OUTPATIENT)
Dept: OBGYN CLINIC | Facility: CLINIC | Age: 58
End: 2023-01-25

## 2023-01-25 VITALS — DIASTOLIC BLOOD PRESSURE: 84 MMHG | WEIGHT: 195 LBS | SYSTOLIC BLOOD PRESSURE: 122 MMHG | BODY MASS INDEX: 36.84 KG/M2

## 2023-01-25 DIAGNOSIS — N95.0 POST-MENOPAUSAL BLEEDING: Primary | ICD-10-CM

## 2023-01-25 DIAGNOSIS — R10.2 PELVIC PRESSURE IN FEMALE: ICD-10-CM

## 2023-01-25 LAB
BACTERIA UR QL AUTO: ABNORMAL /HPF
BILIRUB UR QL STRIP: NEGATIVE
CLARITY UR: CLEAR
COLOR UR: YELLOW
GLUCOSE UR STRIP-MCNC: NEGATIVE MG/DL
HGB UR QL STRIP.AUTO: ABNORMAL
HYALINE CASTS #/AREA URNS LPF: ABNORMAL /LPF
KETONES UR STRIP-MCNC: NEGATIVE MG/DL
LEUKOCYTE ESTERASE UR QL STRIP: NEGATIVE
MUCOUS THREADS UR QL AUTO: ABNORMAL
NITRITE UR QL STRIP: NEGATIVE
NON-SQ EPI CELLS URNS QL MICRO: ABNORMAL /HPF
PH UR STRIP.AUTO: 6 [PH]
PROT UR STRIP-MCNC: ABNORMAL MG/DL
RBC #/AREA URNS AUTO: ABNORMAL /HPF
SP GR UR STRIP.AUTO: 1.02 (ref 1–1.03)
UROBILINOGEN UR STRIP-ACNC: <2 MG/DL
WBC #/AREA URNS AUTO: ABNORMAL /HPF

## 2023-01-25 NOTE — PROGRESS NOTES
Shala Jasso  1965    S:   56 y o  female with c/o postmenopausal bleeding  She is s/p [facility]  4/2021 for the same  D&C showed polyp, possibly regrown vs incompletely removed  Reported light spotting a few times per month at her annual appt 6/2022  Was sent for US, which showed thickened lining 8mm  She was counseled at that time of visits on options for tx / management and declined f/u  Reports light spotting beginning 1 month ago  Initially occurring every few days, then began to have daily spotting 2 weeks ago  Last night began with passage of quarter sized clots and began with light period like flow  States she would not have called but had sharp midline cramping pain last night that caused concern  She has had many episodes of postmenopausal bleeding in the past, but reports the last time she had pelvic pain like this she needed to go to ER  Reports bleeding is still slow, changing pad q 3-4 hours for comfort rather than saturation   since age 52  Review of Systems   Constitutional: Negative F/C/S  Respiratory: Negative  Cardiovascular: Negative  Gastrointestinal: Negative for abd pain, N/V  Genitourinary: + vaginal bleeding, pelvic pain  Negative for difficulty urinating, urgency, frequency, vaginal discharge, itching or odor  O:  /84 (BP Location: Left arm, Patient Position: Sitting, Cuff Size: Large)   Wt 88 5 kg (195 lb)   BMI 36 84 kg/m²      Tissue pathology 4/5/2021   A  Endometrium, EMC:  - Fragments of endometrial polyp in a background of proliferative endometrium with eosinophilic metaplasia  - Negative for atypia and malignancy  Pelvic US 6/2022  IMPRESSION:  1  Endometrial echo complex measures 8 mm in AP caliber, this is thickened for a postmenopausal female and similar to prior exam of July 2021  Patient was noted to have had a previous negative endometrial biopsy in April 2021  Repeat endometrial   biopsy can be considered as clinically warranted    2  Anterior fundal fibroid, similar in size to prior ultrasound  3  Faint Doppler flow is noted within the right ovary  No definite Doppler flow within the left ovary  Findings are favored to be technical in etiology given the positioning of the ovaries within the pelvis  No asymmetric ovarian enlargement  She appears well and is in no distress  Normocephalic, atraumatic  Normal respiratory effort  Abdomen is soft and nontender  External genitals are normal without lesions or rashes  Vagina is without discharge or bleeding  Cervix is without lesions or discharge  + dark red blood coming from cervical os  Uterus is nontender, no masses  Adnexa are nontender, no pelvic masses appreciated  No focal neurological deficits  Normal mood, affect, and behavior  A/P:  Diagnoses and all orders for this visit:    Post-menopausal bleeding  -     Endometrial biopsy  -     US pelvis complete w transvaginal; Future  -     Tissue Exam      Discussed etiology (atrophy, structural, hyperplasia, malignancy), evaluation, management of postmenopausal bleeding  Discussed risks and benefits of endometrial biopsy vs  Transvaginal ultrasound for evaluation of endometrial lining  She is open to EMB today and will go for US as ordered  EMB tolerated well however very scant tissue collected  Discussed we will send this but doubt it will be sufficient  Recommended to obtain pelvic US  She is to schedule f/u visit at discharge with MD to review management options  Discussed may repeat EMB at that time pending today's results  To premedicate  Stressed importance of follow up  To ER with any heavy bleeding or increasing pain as reviewed  All questions answered  Pelvic pressure in female  -     Urine culture  -     Urinalysis with microscopic    Likely secondary to bleeding, but can check UA/UC to r/o alternate urinary cause

## 2023-01-25 NOTE — PROGRESS NOTES
Endometrial biopsy    Date/Time: 1/25/2023 10:09 AM  Performed by: Alesia Jones PA-C  Authorized by: Alesia Jones PA-C   Universal Protocol:  Consent: Verbal consent obtained  Risks and benefits: risks, benefits and alternatives were discussed  Consent given by: patient  Patient understanding: patient states understanding of the procedure being performed  Patient consent: the patient's understanding of the procedure matches consent given  Procedure consent: procedure consent matches procedure scheduled  Relevant documents: relevant documents present and verified  Test results: test results available and properly labeled  Required items: required blood products, implants, devices, and special equipment available  Patient identity confirmed: verbally with patient      Indication:     Indications: Post-menopausal bleeding      Chronicity of post-menopausal bleeding:  Recurrent    Progression of post-menopausal bleeding:  Worsening  Procedure:     Procedure: endometrial biopsy with Pipelle      A bivalve speculum was placed in the vagina: yes      Cervix cleaned and prepped: yes      The cervix was dilated: no      Uterus sound depth (cm):  8 5    Specimen collected: insufficient sample size      Patient tolerated procedure well with no complications: yes    Findings:     Uterus size:  Non-gravid    Cervix: normal      Adnexa: normal    Comments:     Procedure comments:  EMB completed w/o difficulty  Pt aware we will call or send [employer] message with results  She was instructed on post care and written instructions given     nothing Per vagina for the next 2-3 days   OTC analgesics PRN for discomfort  To call if heavy vaginal bleeding, severe pelvic pain/cramping or fever

## 2023-01-27 ENCOUNTER — HOSPITAL ENCOUNTER (OUTPATIENT)
Dept: RADIOLOGY | Age: 58
Discharge: HOME | End: 2023-01-27

## 2023-01-27 DIAGNOSIS — N95.0 POST-MENOPAUSAL BLEEDING: ICD-10-CM

## 2023-01-27 LAB — BACTERIA UR CULT: NORMAL

## 2023-01-30 ENCOUNTER — TELEPHONE (OUTPATIENT)
Dept: OBGYN CLINIC | Facility: CLINIC | Age: 58
End: 2023-01-30

## 2023-01-30 NOTE — TELEPHONE ENCOUNTER
----- Message from Andreea Geiger PA-C sent at 1/30/2023  2:50 PM EST -----  Please let Xena Dominique know her biopsy returned with an insufficient amount of tissue to sample  We did discuss this possibility in the office  It looks like she had her US done last week but results not yet available  Would like there to RTO for her appt with Dr Alejandrina Patel on 2/23/23 to review the ultrasound and management of  bleeding at that time  Please let her know they may want to repeat the biopsy, so would like her to premedicate just in case       Thanks,  Gume Carlin

## 2023-02-03 ENCOUNTER — APPOINTMENT (EMERGENCY)
Dept: RADIOLOGY | Facility: HOSPITAL | Age: 58
End: 2023-02-03

## 2023-02-03 ENCOUNTER — HOSPITAL ENCOUNTER (INPATIENT)
Facility: HOSPITAL | Age: 58
LOS: 3 days | Discharge: HOME | End: 2023-02-06
Attending: EMERGENCY MEDICINE | Admitting: SURGERY

## 2023-02-03 DIAGNOSIS — D68.61 ANTI-PHOSPHOLIPID SYNDROME (HCC): ICD-10-CM

## 2023-02-03 DIAGNOSIS — R11.0 NAUSEA: ICD-10-CM

## 2023-02-03 DIAGNOSIS — I10 HYPERTENSION: ICD-10-CM

## 2023-02-03 DIAGNOSIS — M32.9 SYSTEMIC LUPUS ERYTHEMATOSUS (HCC): ICD-10-CM

## 2023-02-03 DIAGNOSIS — R07.9 CHEST PAIN: ICD-10-CM

## 2023-02-03 DIAGNOSIS — Z85.850 HISTORY OF THYROID CANCER: ICD-10-CM

## 2023-02-03 DIAGNOSIS — K81.0 ACUTE CHOLECYSTITIS: Primary | ICD-10-CM

## 2023-02-03 LAB
2HR DELTA HS TROPONIN: 1 NG/L
4HR DELTA HS TROPONIN: 0 NG/L
ALBUMIN SERPL BCP-MCNC: 3 G/DL (ref 3.5–5)
ALP SERPL-CCNC: 158 U/L (ref 46–116)
ALT SERPL W P-5'-P-CCNC: 203 U/L (ref 12–78)
ANION GAP SERPL CALCULATED.3IONS-SCNC: 4 MMOL/L (ref 4–13)
APTT PPP: 25 SECONDS (ref 23–37)
AST SERPL W P-5'-P-CCNC: 268 U/L (ref 5–45)
ATRIAL RATE: 68 BPM
BASOPHILS # BLD AUTO: 0.02 THOUSANDS/ÂΜL (ref 0–0.1)
BASOPHILS NFR BLD AUTO: 0 % (ref 0–1)
BILIRUB DIRECT SERPL-MCNC: 0.14 MG/DL (ref 0–0.2)
BILIRUB SERPL-MCNC: 0.87 MG/DL (ref 0.2–1)
BUN SERPL-MCNC: 26 MG/DL (ref 5–25)
CALCIUM SERPL-MCNC: 8.7 MG/DL (ref 8.3–10.1)
CARDIAC TROPONIN I PNL SERPL HS: 4 NG/L
CARDIAC TROPONIN I PNL SERPL HS: 4 NG/L
CARDIAC TROPONIN I PNL SERPL HS: 5 NG/L
CHLORIDE SERPL-SCNC: 111 MMOL/L (ref 96–108)
CO2 SERPL-SCNC: 25 MMOL/L (ref 21–32)
CREAT SERPL-MCNC: 0.97 MG/DL (ref 0.6–1.3)
D DIMER PPP FEU-MCNC: <0.27 UG/ML FEU
EOSINOPHIL # BLD AUTO: 0.08 THOUSAND/ÂΜL (ref 0–0.61)
EOSINOPHIL NFR BLD AUTO: 1 % (ref 0–6)
ERYTHROCYTE [DISTWIDTH] IN BLOOD BY AUTOMATED COUNT: 12.6 % (ref 11.6–15.1)
GFR SERPL CREATININE-BSD FRML MDRD: 65 ML/MIN/1.73SQ M
GLUCOSE SERPL-MCNC: 124 MG/DL (ref 65–140)
HCT VFR BLD AUTO: 39 % (ref 34.8–46.1)
HGB BLD-MCNC: 13 G/DL (ref 11.5–15.4)
IMM GRANULOCYTES # BLD AUTO: 0.02 THOUSAND/UL (ref 0–0.2)
IMM GRANULOCYTES NFR BLD AUTO: 0 % (ref 0–2)
INR PPP: 1.89 (ref 0.84–1.19)
LYMPHOCYTES # BLD AUTO: 1.78 THOUSANDS/ÂΜL (ref 0.6–4.47)
LYMPHOCYTES NFR BLD AUTO: 25 % (ref 14–44)
MCH RBC QN AUTO: 29.2 PG (ref 26.8–34.3)
MCHC RBC AUTO-ENTMCNC: 33.3 G/DL (ref 31.4–37.4)
MCV RBC AUTO: 88 FL (ref 82–98)
MONOCYTES # BLD AUTO: 0.65 THOUSAND/ÂΜL (ref 0.17–1.22)
MONOCYTES NFR BLD AUTO: 9 % (ref 4–12)
NEUTROPHILS # BLD AUTO: 4.72 THOUSANDS/ÂΜL (ref 1.85–7.62)
NEUTS SEG NFR BLD AUTO: 65 % (ref 43–75)
NRBC BLD AUTO-RTO: 0 /100 WBCS
P AXIS: 58 DEGREES
PLATELET # BLD AUTO: 229 THOUSANDS/UL (ref 149–390)
PMV BLD AUTO: 11.2 FL (ref 8.9–12.7)
POTASSIUM SERPL-SCNC: 4.8 MMOL/L (ref 3.5–5.3)
PR INTERVAL: 144 MS
PROT SERPL-MCNC: 7 G/DL (ref 6.4–8.4)
PROTHROMBIN TIME: 21.9 SECONDS (ref 11.6–14.5)
QRS AXIS: -30 DEGREES
QRSD INTERVAL: 78 MS
QT INTERVAL: 374 MS
QTC INTERVAL: 397 MS
RBC # BLD AUTO: 4.45 MILLION/UL (ref 3.81–5.12)
SODIUM SERPL-SCNC: 140 MMOL/L (ref 135–147)
T WAVE AXIS: 34 DEGREES
VENTRICULAR RATE: 68 BPM
WBC # BLD AUTO: 7.27 THOUSAND/UL (ref 4.31–10.16)

## 2023-02-03 RX ORDER — ONDANSETRON 2 MG/ML
4 INJECTION INTRAMUSCULAR; INTRAVENOUS ONCE
Status: COMPLETED | OUTPATIENT
Start: 2023-02-03 | End: 2023-02-03

## 2023-02-03 RX ORDER — HYDROMORPHONE HCL/PF 1 MG/ML
0.5 SYRINGE (ML) INJECTION EVERY 4 HOURS PRN
Status: DISCONTINUED | OUTPATIENT
Start: 2023-02-03 | End: 2023-02-06 | Stop reason: HOSPADM

## 2023-02-03 RX ORDER — SODIUM CHLORIDE 9 MG/ML
125 INJECTION, SOLUTION INTRAVENOUS CONTINUOUS
Status: DISCONTINUED | OUTPATIENT
Start: 2023-02-03 | End: 2023-02-04

## 2023-02-03 RX ORDER — OXYCODONE HYDROCHLORIDE 5 MG/1
5 TABLET ORAL EVERY 4 HOURS PRN
Status: DISCONTINUED | OUTPATIENT
Start: 2023-02-03 | End: 2023-02-06 | Stop reason: HOSPADM

## 2023-02-03 RX ORDER — ACETAMINOPHEN 325 MG/1
650 TABLET ORAL EVERY 6 HOURS PRN
Status: DISCONTINUED | OUTPATIENT
Start: 2023-02-03 | End: 2023-02-06 | Stop reason: HOSPADM

## 2023-02-03 RX ORDER — HYDROMORPHONE HCL/PF 1 MG/ML
0.5 SYRINGE (ML) INJECTION ONCE
Status: COMPLETED | OUTPATIENT
Start: 2023-02-03 | End: 2023-02-03

## 2023-02-03 RX ORDER — LEVOTHYROXINE SODIUM 112 UG/1
112 TABLET ORAL DAILY
Status: DISCONTINUED | OUTPATIENT
Start: 2023-02-04 | End: 2023-02-06 | Stop reason: HOSPADM

## 2023-02-03 RX ORDER — CEFAZOLIN SODIUM 2 G/50ML
2000 SOLUTION INTRAVENOUS EVERY 8 HOURS
Status: DISCONTINUED | OUTPATIENT
Start: 2023-02-03 | End: 2023-02-05

## 2023-02-03 RX ORDER — OXYCODONE HYDROCHLORIDE 10 MG/1
10 TABLET ORAL EVERY 4 HOURS PRN
Status: DISCONTINUED | OUTPATIENT
Start: 2023-02-03 | End: 2023-02-06 | Stop reason: HOSPADM

## 2023-02-03 RX ORDER — NIFEDIPINE 30 MG/1
30 TABLET, EXTENDED RELEASE ORAL DAILY
Status: DISCONTINUED | OUTPATIENT
Start: 2023-02-03 | End: 2023-02-06 | Stop reason: HOSPADM

## 2023-02-03 RX ORDER — METRONIDAZOLE 500 MG/100ML
500 INJECTION, SOLUTION INTRAVENOUS EVERY 8 HOURS
Status: DISCONTINUED | OUTPATIENT
Start: 2023-02-03 | End: 2023-02-05

## 2023-02-03 RX ORDER — DIPHENHYDRAMINE HYDROCHLORIDE 50 MG/ML
25 INJECTION INTRAMUSCULAR; INTRAVENOUS EVERY 6 HOURS PRN
Status: DISCONTINUED | OUTPATIENT
Start: 2023-02-03 | End: 2023-02-06 | Stop reason: HOSPADM

## 2023-02-03 RX ORDER — ENOXAPARIN SODIUM 100 MG/ML
40 INJECTION SUBCUTANEOUS
Status: DISCONTINUED | OUTPATIENT
Start: 2023-02-04 | End: 2023-02-06

## 2023-02-03 RX ORDER — ONDANSETRON 2 MG/ML
4 INJECTION INTRAMUSCULAR; INTRAVENOUS EVERY 6 HOURS PRN
Status: DISCONTINUED | OUTPATIENT
Start: 2023-02-03 | End: 2023-02-06 | Stop reason: HOSPADM

## 2023-02-03 RX ORDER — CINACALCET 30 MG/1
30 TABLET, FILM COATED ORAL DAILY
Status: DISCONTINUED | OUTPATIENT
Start: 2023-02-03 | End: 2023-02-06 | Stop reason: HOSPADM

## 2023-02-03 RX ADMIN — METRONIDAZOLE 500 MG: 500 INJECTION, SOLUTION INTRAVENOUS at 13:08

## 2023-02-03 RX ADMIN — METRONIDAZOLE 500 MG: 500 INJECTION, SOLUTION INTRAVENOUS at 22:27

## 2023-02-03 RX ADMIN — IOHEXOL 100 ML: 350 INJECTION, SOLUTION INTRAVENOUS at 08:58

## 2023-02-03 RX ADMIN — CEFAZOLIN SODIUM 2000 MG: 2 SOLUTION INTRAVENOUS at 13:58

## 2023-02-03 RX ADMIN — ONDANSETRON 4 MG: 2 INJECTION INTRAMUSCULAR; INTRAVENOUS at 08:16

## 2023-02-03 RX ADMIN — NIFEDIPINE 30 MG: 30 TABLET, FILM COATED, EXTENDED RELEASE ORAL at 21:34

## 2023-02-03 RX ADMIN — SODIUM CHLORIDE 125 ML/HR: 0.9 INJECTION, SOLUTION INTRAVENOUS at 15:24

## 2023-02-03 RX ADMIN — HYDROMORPHONE HYDROCHLORIDE 0.5 MG: 1 INJECTION, SOLUTION INTRAMUSCULAR; INTRAVENOUS; SUBCUTANEOUS at 11:07

## 2023-02-03 RX ADMIN — CEFAZOLIN SODIUM 2000 MG: 2 SOLUTION INTRAVENOUS at 21:21

## 2023-02-03 RX ADMIN — CINACALCET 30 MG: 30 TABLET, FILM COATED ORAL at 22:26

## 2023-02-03 NOTE — UTILIZATION REVIEW
NOTIFICATION OF INPATIENT ADMISSION   AUTHORIZATION REQUEST   SERVICING FACILITY:   [facility]  Address: [address] [place] [address], [address] [place] [address]  Tax ID: [id number]  NPI: [id number] ATTENDING PROVIDER:  Attending Name and NPI#: Silke Lynn [[id number]]  Address: [address] [place] [address], [address] [place] [address]  Phone: [phone]   ADMISSION INFORMATION:  Place of Service: [facility]patient [facility]  [address]  Place of Service Code: 21  Inpatient Admission Date/Time: 2/3/23 12:31 PM  Discharge Date/Time: No discharge date for patient encounter  Admitting Diagnosis Code/Description:  Chest pain [R07 9]     UTILIZATION REVIEW CONTACT:  Justina Monsalve Utilization   Network Utilization Review Department  Phone: [phone]  Fax: [phone]  Email: [email or web address]  Contact for approvals/pending authorizations, clinical reviews, and discharge  PHYSICIAN ADVISORY SERVICES:  Medical Necessity Denial & [place] Review  Phone: [phone]  Fax: [phone]  Email: [email or web address]

## 2023-02-03 NOTE — PLAN OF CARE
Problem: PAIN - ADULT  Goal: Verbalizes/displays adequate comfort level or baseline comfort level  Description: Interventions:  - Encourage patient to monitor pain and request assistance  - Assess pain using appropriate pain scale  - Administer analgesics based on type and severity of pain and evaluate response  - Implement non-pharmacological measures as appropriate and evaluate response  - Consider cultural and social influences on pain and pain management  - Notify physician/advanced practitioner if interventions unsuccessful or patient reports new pain  Outcome: Progressing     Problem: INFECTION - ADULT  Goal: Absence or prevention of progression during hospitalization  Description: INTERVENTIONS:  - Assess and monitor for signs and symptoms of infection  - Monitor lab/diagnostic results  - Monitor all insertion sites, i e  indwelling lines, tubes, and drains  - Monitor endotracheal if appropriate and nasal secretions for changes in amount and color  - Sterling appropriate cooling/warming therapies per order  - Administer medications as ordered  - Instruct and encourage patient and family to use good hand hygiene technique  - Identify and instruct in appropriate isolation precautions for identified infection/condition  Outcome: Progressing  Goal: Absence of fever/infection during neutropenic period  Description: INTERVENTIONS:  - Monitor WBC    Outcome: Progressing        Problem: DISCHARGE PLANNING  Goal: Discharge to home or other facility with appropriate resources  Description: INTERVENTIONS:  - Identify barriers to discharge w/patient and caregiver  - Arrange for needed discharge resources and transportation as appropriate  - Identify discharge learning needs (meds, wound care, etc )  - Arrange for interpretive services to assist at discharge as needed  - Refer to Case Management Department for coordinating discharge planning if the patient needs post-hospital services based on physician/advanced practitioner order or complex needs related to functional status, cognitive ability, or social support system  Outcome: Progressing

## 2023-02-03 NOTE — H&P
Consultation - Surgery  : [facility] Surgery Resident role on Dariel Taylor 56 y o  female MRN: [id number]  Unit/Bed#: ED 16 Encounter: [id number]        Assessment:  56y o  year old female with physical exam and CT findings consistent with acute cholecystitis  Plan:  Plan for laparoscopic cholecystectomy tomorrow  Start on rocephin/flagyl  Correct INR  Prn control for nausea and vomiting  Start on IV fluids for maintenance  NPO at midnight  DVT ppx    Dispo: Admit overnight for INR monitoring and laparoscopic cholecystectomy tomorrow    HPI:  Carolina Dobson is a 56 y o  female with a history of SLE, , and hyperparathyroidism with 3 subsequent parathyroid surgeries and thyroid cancer with subsequent thyroidectomy who presented to the ED with 1 day of central chest pain which occurred at bedtime last night  She describes the pain as unlike anything she had previously experienced, and took 2 Pepcid  She woke this morning with nausea and vomiting which prompted her to come to the ED  She notes that she is on coumadin due to her history of SLE  Physical Exam  Constitutional:       General: She is in acute distress  HENT:      Head: Normocephalic and atraumatic  Right Ear: External ear normal       Left Ear: External ear normal       Nose: Nose normal    Eyes:      Conjunctiva/sclera: Conjunctivae normal    Abdominal:      Tenderness: There is abdominal tenderness (RUQ)  Skin:     General: Skin is warm and dry  Neurological:      Mental Status: She is alert and oriented to person, place, and time  Psychiatric:         Mood and Affect: Mood normal             Review of Systems   Constitutional: Positive for appetite change  HENT: Negative for rhinorrhea  Eyes: Negative for discharge and redness  Respiratory: Negative for shortness of breath  Cardiovascular: Negative  Gastrointestinal: Positive for abdominal pain, nausea and vomiting     Musculoskeletal: Negative  Skin: Negative  Allergic/Immunologic: Negative  Psychiatric/Behavioral: Negative  Objective       No intake or output data in the 24 hours ending 02/03/23 1133    First Vitals:   Blood Pressure: (!) 179/145 (02/03/23 0731)  Pulse: 87 (02/03/23 0729)  Temperature: 97 9 °F (36 6 °C) (02/03/23 0729)  Temp Source: Oral (02/03/23 0729)  Respirations: 18 (02/03/23 0729)  SpO2: 100 % (02/03/23 0729)    Current Vitals:   Blood Pressure: (!) 174/92 (02/03/23 1110)  Pulse: 68 (02/03/23 1110)  Temperature: 97 9 °F (36 6 °C) (02/03/23 0729)  Temp Source: Oral (02/03/23 0729)  Respirations: 17 (02/03/23 1110)  SpO2: 98 % (02/03/23 1110)    Invasive Devices     Peripheral Intravenous Line  Duration           Peripheral IV 02/03/23 Proximal;Right;Ventral (anterior) Forearm <1 day                Imaging: I have personally reviewed pertinent reports  CTA dissection protocol chest/abdomen/pelvis    Result Date: 2/3/2023  Impression: There is no thoracic aortic dissection There is no pulmonary embolism Gallbladder wall thickening with edema, evaluate for acute cholecystitis  Consider ultrasound on clinical correlation Incidentally noted is a 1 0 cm focal polypoidal area in the posterior aspect of the urinary bladder, concerning for bladder lesion  This can be assessed with a bladder ultrasound, cystoscopy, performed on nonemergent basis and correlated with the urinary evaluation Few areas of groundglass density in the lungs  Based on current Fleischner Society 2017 Guidelines on incidental pulmonary nodule, followup noncontrast CT is recommended at 6 months from the initial examination to confirm persistence; if stable at that time, additional followup CT is recommended for every 2 years until 5 years of stability is demonstrated  Subchondral sclerosis within the both femoral head, concerning for evolving avascular necrosis The study was marked in EPIC for immediate notification   Workstation performed: [id number]       EKG, Pathology, and Other Studies: I have personally reviewed pertinent reports      VTE Pharmacologic Prophylaxis: Heparin  VTE Mechanical Prophylaxis: sequential compression device    Historical Information   Past Medical History:   Diagnosis Date   • Anemia     Not sure   • Anti-phospholipid antibody syndrome (HCC)    • Cancer (HCC)     thyroid   • Chronic kidney disease    • Clotting disorder (HCC)     Lung embolism,  ITP   • Diabetes mellitus ([facility] )    • Disease of thyroid gland    • Fibroid    • Gestational diabetes    • History of chemotherapy     late  for lupus   • Hyperparathyroidism ([facility] )    • Hypertension    • Idiopathic thrombocytopenic purpura (ITP) (HCC)    • Kidney stone    • Lupus ([facility] )    • Miscarriage 58    Lost twin girls 10-   • Osteoarthritis    • Pulmonary embolism ([facility] )    • Sjogren's syndrome ([facility] )    • Vitamin D deficiency      Past Surgical History:   Procedure Laterality Date   •  SECTION      2422/7233   • DIAGNOSTIC FLEXIBLE LARYNGOSCOPY N/A 2021    Procedure: DIAGNOSTIC FLEXIBLE LARYNGOSCOPY;  Surgeon: Shane Carlin MD;  [facility]: [facility];  Service: Surgical Oncology   • HERNIA REPAIR     • HIP SURGERY      left side, bone decompression   • HYSTEROSCOPY     • [place] PARATHYROIDECTOMY/EXPLORATION PARATHYROIDS Right 2021    Procedure: PARATHYROIDECTOMY, MINIMALLY INVASIVE, right, intraoperative PTH monitoring;  Surgeon: Shane Carlin MD;  Location: [facility];  Service: Surgical Oncology   • RENAL BIOPSY      Not sure   • THYROID SURGERY     • TUBAL LIGATION       Social History   Social History     Substance and Sexual Activity   Alcohol Use Not Currently    Comment: Twice a year     Social History     Substance and Sexual Activity   Drug Use No     Social History     Tobacco Use   Smoking Status Never   Smokeless Tobacco Never     Family History   Problem Relation Age of Onset   • No Known Problems Mother    • Diabetes type II Father    • Diabetes Father    • Stroke Father    • Diabetes type II Paternal Aunt    • Diabetes type II Paternal Uncle    • Diabetes type II Paternal Grandmother    • Stomach cancer Paternal Grandfather    • No Known Problems Sister    • No Known Problems Maternal Grandmother    • No Known Problems Maternal Grandfather    • No Known Problems Sister    • No Known Problems Maternal Aunt    • No Known Problems Maternal Aunt    • No Known Problems Maternal Aunt    • No Known Problems Paternal Aunt    • No Known Problems Paternal Aunt        Meds/Allergies   all current active meds have been reviewed, current meds:   No current facility-administered medications for this encounter  and PTA meds:   Prior to Admission Medications   Prescriptions Last Dose Informant Patient Reported? Taking? NIFEdipine (PROCARDIA XL) 30 mg 24 hr tablet   No No   Sig: Take 1 tablet (30 mg total) by mouth daily   ciclopirox (LOPROX) 0 77 % cream   Yes No   cinacalcet (SENSIPAR) 30 mg tablet   No No   Sig: Take 1 tablet (30 mg total) by mouth daily   folic acid (FOLVITE) 1 mg tablet   Yes No   Sig: Take 1 tablet by mouth daily   fosinopril (MONOPRIL) 20 mg tablet   No No   Sig: Take 1 tablet (20 mg total) by mouth 2 (two) times a day   hydrocortisone 2 5 % cream   Yes No   levothyroxine (Synthroid) 112 mcg tablet   No No   Sig: Take 1 tablet (112 mcg total) by mouth daily   methylPREDNISolone 4 MG tablet therapy pack   No No   Sig: Use as directed on package   Patient not taking: Reported on 1/25/2023   methylPREDNISolone 4 MG tablet therapy pack   No No   Sig: Use as directed on package   Patient not taking: Reported on 1/25/2023   spironolactone (ALDACTONE) 25 mg tablet   No No   Sig: Take 1 tablet (25 mg total) by mouth daily   warfarin (COUMADIN) 3 mg tablet   No No   Sig: TAKE ONE TABLET BY MOUTH EVERY DAY   DO NOT TAKE ON MONDAYS AND THURSDAYS      Facility-Administered Medications: None     Allergies   Allergen Reactions   • Penicillins Itching       Lab Results: I have personally reviewed pertinent lab results  , CBC:   Lab Results   Component Value Date    WBC 7 27 02/03/2023    HGB 13 0 02/03/2023    HCT 39 0 02/03/2023    MCV 88 02/03/2023     02/03/2023    MCH 29 2 02/03/2023    MCHC 33 3 02/03/2023    RDW 12 6 02/03/2023    MPV 11 2 02/03/2023    NRBC 0 02/03/2023   , CMP:   Lab Results   Component Value Date    SODIUM 140 02/03/2023    K 4 8 02/03/2023     (H) 02/03/2023    CO2 25 02/03/2023    BUN 26 (H) 02/03/2023    CREATININE 0 97 02/03/2023    CALCIUM 8 7 02/03/2023    EGFR 65 02/03/2023       Counseling / Coordination of Care  Total floor / unit time spent today 25 minutes  Greater than 50% of total time was spent with the patient and / or family counseling and / or coordination of care      Consults    Shayy Salinas  2/3/2023 11:33 AM

## 2023-02-03 NOTE — ED PROCEDURE NOTE
Procedure  POC Biliary US    Date/Time: 2/3/2023 10:19 AM  Performed by: Kevin Collazo MD  Authorized by: Kevin Collazo MD     Patient location:  ED  Performed by:  Resident  Other Assisting Provider: Yes (comment) (Dr Andi Lopes, Dr Mamadou Sales)    Procedure details:     Exam Type:  Diagnostic    Indications: upper right quadrant abdominal pain and epigastric pain      Assessment for:  Cholecystitis and cholelithiasis    Views obtained: gallbladder (transverse and longitudinal), common bile duct and portal triad      Image quality: diagnostic      Image availability:  Images available in PACS  Findings:     Cholelithiasis: identified      Common bile duct:  Normal    Common bile duct diameter (mm):  4    Gallbladder wall:  Abnormal    Gallbladder wall thickened (>3 mm): yes      Gallbladder wall thickness (mm):  8    Pericholecystic fluid: not identified      Sonographic Garnett's sign: negative    Interpretation:     Biliary ultrasound impressions: cholelithiasis      Biliary ultrasound impressions comment:   Thickened GB wall                     Kevin Collazo MD  02/03/23 1020

## 2023-02-03 NOTE — LETTER
[address] [facility]  [address] [address]  Dept: [phone]    February 6, 2023     Patient: Cata Lynn   YOB: 1965   Date of Visit: 2/3/2023       To Whom it May Concern:    Cata Lynn is under my professional care  She was seen in the hospital from 2/3/2023 to 02/06/23  She may return to work on 2/13 with the following limitations no lifting greater than 20lb x 1 week  If you have any questions or concerns, please don't hesitate to call           Sincerely,          Robert Garcia PA-C

## 2023-02-03 NOTE — ED ATTENDING ATTESTATION
2/3/2023  Angie NYE MD, saw and evaluated the patient  I have discussed the patient with the resident/non-physician practitioner and agree with the resident's/non-physician practitioner's findings, Plan of Care, and MDM as documented in the resident's/non-physician practitioner's note, except where noted  All available labs and Radiology studies were reviewed  I was present for key portions of any procedure(s) performed by the resident/non-physician practitioner and I was immediately available to provide assistance  At this point I agree with the current assessment done in the Emergency Department  I have conducted an independent evaluation of this patient a history and physical is as follows: This is a 51-year-old woman who presents to the emergency department with pain in her central chest since 2 AM   Patient states that she was getting ready to go to bed when the pain started  The pain is pressure, nonradiating, and fairly severe  The patient took 2 Pepcid, but states that she woke up with ongoing pain as well as nausea  The patient had an episode of vomiting as well  The pain does not go to her back  She has never had pain like this before  She has had no fevers or chills  She has no history of intra-abdominal surgery  The patient states that she has lupus, and has renal disease from that  She states that she is on Coumadin for prior pulmonary embolus  She has hypertension which has been difficult to control on 3 medications which is secondary to her lupus  The patient denies lower abdominal pain  She has no limb pain or swelling  She has not had her INR checked in months  Her review of systems is otherwise negative and 12 systems reviewed  On exam the patient is awake, alert, and interactive  She appears uncomfortable and a little pale  On HEENT exam, she is anicteric  Her conjunctiva are pink    Mucous membranes are moist   Neck is supple and nontender with no adenopathy  Heart is regular without murmurs, rubs, or gallops  Her lungs are clear with good air movement  The blood pressure in her arms differ by 20 mmHg, right greater than left  The patient's abdomen is soft  I do not appreciate any pulsatile masses  She does have some mild epigastric and upper abdominal tenderness without rebound or guarding  Her extremities are intact without lateralizing edema  She has no rashes  She is neurologically intact with normal skin and back exam   Medical decision making: Patient here with rapid onset, severe chest pain and epigastric pain  Differential is broad and includes pancreatitis, gallbladder disease, cardiac disease, and aortic disease  Patient has history of poorly controlled hypertension as well as lupus with prior PE  Therefore PE is also on the differential   We will plan to do CT to rule out dissection as well as to evaluate for surgical pathology  Will check labs  Offered patient pain medications, she declines at this time  Reassessment: CT independently visualized by me, I do not see evidence of dissection or central PE on the scan  Radiology reviewed, note that patient has thickened gallbladder wall with some edema  Surgery consulted to evaluate gallbladder  Reassessment: Surgery into see patient, labs reviewed  Patient's labs are nonremarkable    We will plan to admit the patient for cholecystectomy with diagnosis of 1 symptomatic of bladder disease 2 Lupus 3 severe hypertension 4 precordial chest pain  ED Course         Critical Care Time  CriticalCare Time  Performed by: Nikko Jara MD  Authorized by: Nikko Jara MD     Critical care provider statement:     Critical care time (minutes):  33    Critical care time was exclusive of:  Separately billable procedures and treating other patients and teaching time    Critical care was time spent personally by me on the following activities:  Blood draw for specimens, obtaining history from patient or surrogate, development of treatment plan with patient or surrogate, discussions with consultants, discussions with primary provider, evaluation of patient's response to treatment, examination of patient, review of old charts, re-evaluation of patient's condition, ordering and review of radiographic studies, ordering and review of laboratory studies and ordering and performing treatments and interventions    Patient required critical care time for concern for dissection, markedly elevated blood pressures, surgical evaluation

## 2023-02-03 NOTE — ED PROVIDER NOTES
History  Chief Complaint   Patient presents with   • Chest Pain     Pt reports "severe" chest pain/heartburn beginning around 2am  Took two Pepcid without relief  Pt woke this morning vomiting      HPI     79-year-old female presents to the ED for evaluation of sudden onset central chest pain since 2 AM today  States she was getting ready to go to bed when the pain onset  Localizes the pain to her central chest, describes as a pressure in character, nonradiating, no modifying factors  Took 2 Pepcid with some improvement and was able to go to bed  States she woke up around 6 AM this morning with nausea and vomiting  Has never had pain like this before denies any previous cardiac history  Reports history of PE in the 1980s on Coumadin  Also reports medical history of hypertension, lupus, thyroid cancer with last cancer treatment at least 5 years ago  Denies family history of early cardiac disease  Denies any recent illness  Denies fever, chills, cough, sore throat, shortness of breath, abdominal pain, headache, dizziness  She states she does not take any medicine for her lupus  Prior to Admission Medications   Prescriptions Last Dose Informant Patient Reported? Taking?    NIFEdipine (PROCARDIA XL) 30 mg 24 hr tablet   No No   Sig: Take 1 tablet (30 mg total) by mouth daily   ciclopirox (LOPROX) 0 77 % cream   Yes No   cinacalcet (SENSIPAR) 30 mg tablet   No No   Sig: Take 1 tablet (30 mg total) by mouth daily   folic acid (FOLVITE) 1 mg tablet   Yes No   Sig: Take 1 tablet by mouth daily   fosinopril (MONOPRIL) 20 mg tablet   No No   Sig: Take 1 tablet (20 mg total) by mouth 2 (two) times a day   hydrocortisone 2 5 % cream   Yes No   levothyroxine (Synthroid) 112 mcg tablet   No No   Sig: Take 1 tablet (112 mcg total) by mouth daily   methylPREDNISolone 4 MG tablet therapy pack   No No   Sig: Use as directed on package   Patient not taking: Reported on 1/25/2023   methylPREDNISolone 4 MG tablet therapy pack   No No   Sig: Use as directed on package   Patient not taking: Reported on 2023   spironolactone (ALDACTONE) 25 mg tablet   No No   Sig: Take 1 tablet (25 mg total) by mouth daily   warfarin (COUMADIN) 3 mg tablet   No No   Sig: TAKE ONE TABLET BY MOUTH EVERY DAY   DO NOT TAKE ON  AND       Facility-Administered Medications: None       Past Medical History:   Diagnosis Date   • Anemia     Not sure   • Anti-phospholipid antibody syndrome (HCC)    • Cancer (HCC)     thyroid   • Chronic kidney disease    • Clotting disorder ([facility] [address] )     Lung embolism,  ITP   • Diabetes mellitus ([facility] [address] )    • Disease of thyroid gland    • Fibroid    • Gestational diabetes    • History of chemotherapy     late  for lupus   • Hyperparathyroidism ([facility] [address] )    • Hypertension    • Idiopathic thrombocytopenic purpura (ITP) (HCC)    • Kidney stone    • Lupus ([facility] [address] )    • Miscarriage 58    Lost twin girls 10-   • Osteoarthritis    • [facility]ism ([facility] )    • Sjogren's syndrome ([place] [address] )    • Vitamin D deficiency        Past Surgical History:   Procedure Laterality Date   •  SECTION      4052/1213   • DIAGNOSTIC FLEXIBLE LARYNGOSCOPY N/A 2021    Procedure: DIAGNOSTIC FLEXIBLE LARYNGOSCOPY;  Surgeon: Singh López MD;  Location: [facility];  Service: Surgical Oncology   • HERNIA REPAIR     • HIP SURGERY      left side, bone decompression   • HYSTEROSCOPY     • MD PARATHYROIDECTOMY/EXPLORATION PARATHYROIDS Right 2021    Procedure: PARATHYROIDECTOMY, MINIMALLY INVASIVE, right, intraoperative PTH monitoring;  Surgeon: Singh López MD;  Location: [facility];  Service: Surgical Oncology   • RENAL BIOPSY      Not sure   • THYROID SURGERY     • TUBAL LIGATION         Family History   Problem Relation Age of Onset   • No Known Problems Mother    • Diabetes type II Father    • Diabetes Father    • Stroke Father    • Diabetes type II Paternal Aunt    • Diabetes type II Paternal Uncle    • Diabetes type II Paternal Grandmother    • Stomach cancer Paternal Grandfather    • No Known Problems Sister    • No Known Problems Maternal Grandmother    • No Known Problems Maternal Grandfather    • No Known Problems Sister    • No Known Problems Maternal Aunt    • No Known Problems Maternal Aunt    • No Known Problems Maternal Aunt    • No Known Problems Paternal Aunt    • No Known Problems Paternal Aunt      I have reviewed and agree with the history as documented  E-Cigarette/Vaping   • E-Cigarette Use Never User      E-Cigarette/Vaping Substances   • Nicotine No    • THC No    • CBD No    • Flavoring No    • Other No    • Unknown No      Social History     Tobacco Use   • Smoking status: Never   • Smokeless tobacco: Never   Vaping Use   • Vaping Use: Never used   Substance Use Topics   • Alcohol use: Not Currently     Comment: Twice a year   • Drug use: No        Review of Systems   Constitutional: Negative for chills and fever  HENT: Negative for ear pain and sore throat  Eyes: Negative for pain and visual disturbance  Respiratory: Negative for cough and shortness of breath  Cardiovascular: Positive for chest pain  Negative for palpitations  Gastrointestinal: Positive for nausea and vomiting  Negative for abdominal pain and diarrhea  Genitourinary: Negative for dysuria and hematuria  Musculoskeletal: Negative for arthralgias and back pain  Skin: Negative for color change and rash  Neurological: Negative for dizziness, seizures, syncope, light-headedness and headaches  All other systems reviewed and are negative        Physical Exam  ED Triage Vitals   Temperature Pulse Respirations Blood Pressure SpO2   02/03/23 0729 02/03/23 0729 02/03/23 0729 02/03/23 0731 02/03/23 0729   97 9 °F (36 6 °C) 87 18 (!) 179/145 100 %      Temp Source Heart Rate Source Patient Position - Orthostatic VS BP Location FiO2 (%)   02/03/23 0729 02/03/23 0729 02/03/23 0808 02/03/23 0729 --   Oral Monitor Lying Left arm       Pain Score       02/03/23 0729       7             Orthostatic Vital Signs  Vitals:    02/03/23 0808 02/03/23 0932 02/03/23 1110 02/03/23 1400   BP: 167/88  (!) 174/92 138/89   Pulse: 60 58 68 67   Patient Position - Orthostatic VS: Lying          Physical Exam  Vitals and nursing note reviewed  Constitutional:       General: She is not in acute distress  Appearance: Normal appearance  She is well-developed  She is not ill-appearing, toxic-appearing or diaphoretic  HENT:      Head: Normocephalic and atraumatic  Mouth/Throat:      Mouth: Mucous membranes are moist       Pharynx: Oropharynx is clear  Eyes:      Conjunctiva/sclera: Conjunctivae normal    Cardiovascular:      Rate and Rhythm: Normal rate and regular rhythm  Pulses: Normal pulses  Heart sounds: No murmur heard  Pulmonary:      Effort: Pulmonary effort is normal  No respiratory distress  Breath sounds: Normal breath sounds  No stridor  No wheezing, rhonchi or rales  Chest:      Chest wall: No tenderness  Abdominal:      Palpations: Abdomen is soft  Tenderness: There is no abdominal tenderness  There is no guarding or rebound  Musculoskeletal:         General: No swelling  Cervical back: Neck supple  Skin:     General: Skin is warm and dry  Capillary Refill: Capillary refill takes less than 2 seconds  Neurological:      General: No focal deficit present  Mental Status: She is alert and oriented to person, place, and time     Psychiatric:         Mood and Affect: Mood normal          ED Medications  Medications   sodium chloride 0 9 % infusion (has no administration in time range)   acetaminophen (TYLENOL) tablet 650 mg (has no administration in time range)   ondansetron (ZOFRAN) injection 4 mg (has no administration in time range)   metroNIDAZOLE (FLAGYL) IVPB (premix) 500 mg 100 mL (0 mg Intravenous Stopped 2/3/23 1338)   ceFAZolin (ANCEF) IVPB (premix in dextrose) 2,000 mg 50 mL (2,000 mg Intravenous New Bag 2/3/23 1358)   diphenhydrAMINE (BENADRYL) injection 25 mg (has no administration in time range)   oxyCODONE (ROXICODONE) IR tablet 5 mg (has no administration in time range)   oxyCODONE (ROXICODONE) immediate release tablet 10 mg (has no administration in time range)   HYDROmorphone (DILAUDID) injection 0 5 mg (has no administration in time range)   ondansetron (ZOFRAN) injection 4 mg (4 mg Intravenous Given 2/3/23 0816)   iohexol (OMNIPAQUE) 350 MG/ML injection (SINGLE-DOSE) 100 mL (100 mL Intravenous Given 2/3/23 0858)   HYDROmorphone (DILAUDID) injection 0 5 mg (0 5 mg Intravenous Given 2/3/23 1107)       Diagnostic Studies  Results Reviewed     Procedure Component Value Units Date/Time    HS Troponin I 4hr [[id number]]  (Normal) Collected: 02/03/23 1256    Lab Status: Final result Specimen: Blood from Arm, Right Updated: 02/03/23 1344     hs TnI 4hr 4 ng/L      Delta 4hr hsTnI 0 ng/L     Hepatic function panel [[id number]]  (Abnormal) Collected: 02/03/23 1256    Lab Status: Final result Specimen: Blood from Arm, Right Updated: 02/03/23 1335     Total Bilirubin 0 87 mg/dL      Bilirubin, Direct 0 14 mg/dL      Alkaline Phosphatase 158 U/L       U/L       U/L      Total Protein 7 0 g/dL      Albumin 3 0 g/dL     HS Troponin I 2hr [[id number]]  (Normal) Collected: 02/03/23 1033    Lab Status: Final result Specimen: Blood from Arm, Right Updated: 02/03/23 1106     hs TnI 2hr 5 ng/L      Delta 2hr hsTnI 1 ng/L     HS Troponin 0hr (reflex protocol) [[id number]]  (Normal) Collected: 02/03/23 0815    Lab Status: Final result Specimen: Blood from Arm, Right Updated: 02/03/23 0855     hs TnI 0hr 4 ng/L     D-dimer, quantitative [[id number]]  (Normal) Collected: 02/03/23 0815    Lab Status: Final result Specimen: Blood from Arm, Right Updated: 02/03/23 0847     D-Dimer, Quant <0 27 ug/ml FEU     Narrative:       In the evaluation for possible pulmonary embolism, in the appropriate (Well's Score of 4 or less) patient, the age adjusted d-dimer cutoff for this patient can be calculated as:    Age x 0 01 (in ug/mL) for Age-adjusted D-dimer exclusion threshold for a patient over 50 years      Protime-INR [[id number]]  (Abnormal) Collected: 02/03/23 0815    Lab Status: Final result Specimen: Blood from Arm, Right Updated: 02/03/23 0843     Protime 21 9 seconds      INR 1 89    APTT [[id number]]  (Normal) Collected: 02/03/23 0815    Lab Status: Final result Specimen: Blood from Arm, Right Updated: 02/03/23 0843     PTT 25 seconds     Basic metabolic panel [[id number]]  (Abnormal) Collected: 02/03/23 0815    Lab Status: Final result Specimen: Blood from Arm, Right Updated: 02/03/23 0841     Sodium 140 mmol/L      Potassium 4 8 mmol/L      Chloride 111 mmol/L      CO2 25 mmol/L      ANION GAP 4 mmol/L      BUN 26 mg/dL      Creatinine 0 97 mg/dL      Glucose 124 mg/dL      Calcium 8 7 mg/dL      eGFR 65 ml/min/1 73sq m     Narrative:      [facility] guidelines for Chronic Kidney Disease (CKD):   •  Stage 1 with normal or high GFR (GFR > 90 mL/min/1 73 square meters)  •  Stage 2 Mild CKD (GFR = 60-89 mL/min/1 73 square meters)  •  Stage 3A Moderate CKD (GFR = 45-59 mL/min/1 73 square meters)  •  Stage 3B Moderate CKD (GFR = 30-44 mL/min/1 73 square meters)  •  Stage 4 Severe CKD (GFR = 15-29 mL/min/1 73 square meters)  •  Stage 5 End Stage CKD (GFR <15 mL/min/1 73 square meters)  Note: GFR calculation is accurate only with a steady state creatinine    CBC and differential [[id number]] Collected: 02/03/23 0815    Lab Status: Final result Specimen: Blood from Arm, Right Updated: 02/03/23 0827     WBC 7 27 Thousand/uL      RBC 4 45 Million/uL      Hemoglobin 13 0 g/dL      Hematocrit 39 0 %      MCV 88 fL      MCH 29 2 pg      MCHC 33 3 g/dL      RDW 12 6 %      MPV 11 2 fL      Platelets 013 Thousands/uL      nRBC 0 /100 WBCs      Neutrophils Relative 65 %      Immat GRANS % 0 % Lymphocytes Relative 25 %      Monocytes Relative 9 %      Eosinophils Relative 1 %      Basophils Relative 0 %      Neutrophils Absolute 4 72 Thousands/µL      Immature Grans Absolute 0 02 Thousand/uL      Lymphocytes Absolute 1 78 Thousands/µL      Monocytes Absolute 0 65 Thousand/µL      Eosinophils Absolute 0 08 Thousand/µL      Basophils Absolute 0 02 Thousands/µL                  US right upper quadrant   Final Result by Katelyn Franco MD (02/03 5158)      Cholelithiasis  Nonspecific mild gallbladder wall thickening  No sonographic Garnett's sign or biliary ductal dilatation  Right renal cyst       Workstation performed: [id number]         CTA dissection protocol chest/abdomen/pelvis   Final Result by Albert Lema MD (02/03 7211)      There is no thoracic aortic dissection      There is no pulmonary embolism      Gallbladder wall thickening with edema, evaluate for acute cholecystitis  Consider ultrasound on clinical correlation      Incidentally noted is a 1 0 cm focal polypoidal area in the posterior aspect of the urinary bladder, concerning for bladder lesion  This can be assessed with a bladder ultrasound, cystoscopy, performed on nonemergent basis and correlated with the    urinary evaluation      Few areas of groundglass density in the lungs  Based on current Fleischner Society 2017 Guidelines on incidental pulmonary nodule, followup noncontrast CT is recommended at 6 months from the initial examination to confirm persistence; if stable at that    time, additional followup CT is recommended for every 2 years until 5 years of stability is demonstrated  Subchondral sclerosis within the both femoral head, concerning for evolving avascular necrosis      The study was marked in EPIC for immediate notification        Workstation performed: [id number]         XR chest 2 views    (Results Pending)         Procedures  ECG 12 Lead Documentation Only    Date/Time: 2/3/2023 8:12 AM  Performed by: Estefania Duarte MD  Authorized by: Estefania Duarte MD     Indications / Diagnosis:  Cp  ECG reviewed by me, the ED Provider: yes    Patient location:  ED  Previous ECG:     Previous ECG:  Compared to current    Comparison ECG info:  2/3/2023 0738  Rate:     ECG rate:  68    ECG rate assessment: normal    Rhythm:     Rhythm: sinus rhythm    QRS:     QRS axis:  Left    QRS intervals:  Normal  ST segments:     ST segments:  Normal  T waves:     T waves: normal            ED Course  ED Course as of 02/03/23 1455   [facility] Feb 03, 2023   7073 Blood Pressure(!): 179/145   0748 Temperature: 97 9 °F (36 6 °C)   0748 Temp Source: Oral   0748 Pulse: 87   0748 Respirations: 18   0748 SpO2: 100 %   0809 Blood Pressure: 167/88   0918 WBC: 7 27  wnl   0918 Hemoglobin: 13 0  wnl   0918 Platelet Count: 896  wnl   0919 POCT INR(!): 1 89   0919 D-Dimer, Quant: <0 27  wnl   0919 hs TnI 0hr: 4   1000 CTA dissection protocol chest/abdomen/pelvis  There is no thoracic aortic dissection     There is no pulmonary embolism     Gallbladder wall thickening with edema, evaluate for acute cholecystitis  Consider ultrasound on clinical correlation     Incidentally noted is a 1 0 cm focal polypoidal area in the posterior aspect of the urinary bladder, concerning for bladder lesion  This can be assessed with a bladder ultrasound, cystoscopy, performed on nonemergent basis and correlated with the   urinary evaluation     Few areas of groundglass density in the lungs   Based on current Fleischner Society 2017 Guidelines on incidental pulmonary nodule, followup noncontrast CT is recommended at 6 months from the initial examination to confirm persistence; if stable at that   time, additional followup CT is recommended for every 2 years until 5 years of stability is demonstrated      Subchondral sclerosis within the both femoral head, concerning for evolving avascular necrosis   1044 RUQ US ordered   1046 Reached out to red surgery    1051 Pain starting to worsen, dilaudid ordered    1052 Updated pt on plan   1226 TT surgery for updates    1228 Awaiting LFTs and RUQ US    1241 Admitted to surgery                             SBIRT 20yo+    Flowsheet Row Most Recent Value   SBIRT (23 yo +)    In order to provide better care to our patients, we are screening all of our patients for alcohol and drug use  Would it be okay to ask you these screening questions? No Filed at: 02/03/2023 6083          Wells' Criteria for PE    Flowsheet Row Most Recent Value   Wells' Criteria for PE    Clinical signs and symptoms of DVT 0 Filed at: 02/03/2023 0808   PE is primary diagnosis or equally likely 0 Filed at: 02/03/2023 0808   HR >100 0 Filed at: 02/03/2023 1810   Immobilization at least 3 days or Surgery in the previous 4 weeks 0 Filed at: 02/03/2023 7779   Previous, objectively diagnosed PE or DVT 1 5 Filed at: 02/03/2023 3275   Hemoptysis 0 Filed at: 02/03/2023 5777   Malignancy with treatment within 6 months or palliative 0 Filed at: 02/03/2023 6203   Wells' Criteria Total 1 5 Filed at: 02/03/2023 0808            Medical Decision Making  Amount and/or Complexity of Data Reviewed  Labs: ordered  Radiology: ordered  Risk  Prescription drug management  61-year-old female with medical history significant for lupus, hypertension, thyroid cancer with last treatment at least 5 years ago, PE in the 1980s currently on Coumadin presents the ED for evaluation of sudden onset central chest pressure since 2 AM today  Reports some nausea and vomiting since 6 AM this morning  Has never had pain like this before  Hemodynamically stable  Afebrile  Appears uncomfortable secondary to pain  Benign physical exam  2+ radial pulses  DDx includes but not limited to: ACS, aortic dissection, pulmonary pathology, PE, gastritis, biliary pathology  CBC, BMP within normal limits  Negative D-dimer  Initial troponin was 4, delta was 1  Subtherapeutic INR at 1 89    CTA dissection study showed no thoracic aortic dissection, no PE, gallbladder wall thickening with edema  Right upper quadrant ultrasound showed gallbladder wall thickening and cholelithiasis  Patient was admitted to general surgery  Disposition  Final diagnoses:   Acute cholecystitis   Systemic lupus erythematosus ([facility] [address] )   Hypertension   History of thyroid cancer   Nausea   Chest pain     Time reflects when diagnosis was documented in both MDM as applicable and the Disposition within this note     Time User Action Codes Description Comment    2/3/2023 12:33 PM Lenny Alejandrot Add [K81 0] Acute cholecystitis     2/3/2023 12:38 PM Zari Umair T Modify [K81 0] Acute cholecystitis     2/3/2023 12:39 PM Zari Umair T Add [M32 9] Systemic lupus erythematosus ([facility] [address] )     2/3/2023 12:40 PM Zari Umair T Add [I10] Hypertension     2/3/2023 12:40 PM Zari Umair T Add [Z85 850] History of thyroid cancer     2/3/2023 12:40 PM Zari Umair T Add [R11 0] Nausea     2/3/2023 12:41 PM Zari Umair T Add [R07 9] Chest pain       ED Disposition     ED Disposition   Admit    Condition   Stable    Date/Time   Fri Feb 3, 2023 12:38 PM    Comment   Case was discussed with Paramjit David and the patient's admission status was agreed to be Admission Status: inpatient status to the service of Dr Florinda Wellington  Follow-up Information    None         Patient's Medications   Discharge Prescriptions    No medications on file     No discharge procedures on file  PDMP Review       Value Time User    PDMP Reviewed  Yes 12/29/2021  5:01 AM Edgar Barajas MD           ED Provider  Attending physically available and evaluated Georgiana Jade I managed the patient along with the ED Attending      Electronically Signed by         Mitchell Fonseca MD  02/03/23 8210

## 2023-02-04 ENCOUNTER — ANESTHESIA (INPATIENT)
Dept: PERIOP | Facility: HOSPITAL | Age: 58
End: 2023-02-04

## 2023-02-04 ENCOUNTER — ANESTHESIA EVENT (INPATIENT)
Dept: PERIOP | Facility: HOSPITAL | Age: 58
End: 2023-02-04

## 2023-02-04 LAB
ALBUMIN SERPL BCP-MCNC: 2.8 G/DL (ref 3.5–5)
ALP SERPL-CCNC: 126 U/L (ref 46–116)
ALT SERPL W P-5'-P-CCNC: 137 U/L (ref 12–78)
ANION GAP SERPL CALCULATED.3IONS-SCNC: 4 MMOL/L (ref 4–13)
AST SERPL W P-5'-P-CCNC: 110 U/L (ref 5–45)
BILIRUB SERPL-MCNC: 0.96 MG/DL (ref 0.2–1)
BUN SERPL-MCNC: 15 MG/DL (ref 5–25)
CALCIUM ALBUM COR SERPL-MCNC: 8.6 MG/DL (ref 8.3–10.1)
CALCIUM SERPL-MCNC: 7.6 MG/DL (ref 8.3–10.1)
CHLORIDE SERPL-SCNC: 112 MMOL/L (ref 96–108)
CO2 SERPL-SCNC: 25 MMOL/L (ref 21–32)
CREAT SERPL-MCNC: 0.84 MG/DL (ref 0.6–1.3)
ERYTHROCYTE [DISTWIDTH] IN BLOOD BY AUTOMATED COUNT: 12.6 % (ref 11.6–15.1)
GFR SERPL CREATININE-BSD FRML MDRD: 78 ML/MIN/1.73SQ M
GLUCOSE SERPL-MCNC: 82 MG/DL (ref 65–140)
HCT VFR BLD AUTO: 33.4 % (ref 34.8–46.1)
HGB BLD-MCNC: 11.3 G/DL (ref 11.5–15.4)
INR PPP: 1.3 (ref 0.84–1.19)
INR PPP: 2.47 (ref 0.84–1.19)
MAGNESIUM SERPL-MCNC: 1.6 MG/DL (ref 1.6–2.6)
MCH RBC QN AUTO: 29.9 PG (ref 26.8–34.3)
MCHC RBC AUTO-ENTMCNC: 33.8 G/DL (ref 31.4–37.4)
MCV RBC AUTO: 88 FL (ref 82–98)
PLATELET # BLD AUTO: 205 THOUSANDS/UL (ref 149–390)
PMV BLD AUTO: 10.4 FL (ref 8.9–12.7)
POTASSIUM SERPL-SCNC: 4.1 MMOL/L (ref 3.5–5.3)
PROT SERPL-MCNC: 6.2 G/DL (ref 6.4–8.4)
PROTHROMBIN TIME: 16.4 SECONDS (ref 11.6–14.5)
PROTHROMBIN TIME: 27 SECONDS (ref 11.6–14.5)
RBC # BLD AUTO: 3.78 MILLION/UL (ref 3.81–5.12)
SODIUM SERPL-SCNC: 141 MMOL/L (ref 135–147)
WBC # BLD AUTO: 5.64 THOUSAND/UL (ref 4.31–10.16)

## 2023-02-04 PROCEDURE — 30233K1 TRANSFUSION OF NONAUTOLOGOUS FROZEN PLASMA INTO PERIPHERAL VEIN, PERCUTANEOUS APPROACH: ICD-10-PCS | Performed by: SURGERY

## 2023-02-04 RX ORDER — SODIUM CHLORIDE 9 MG/ML
125 INJECTION, SOLUTION INTRAVENOUS CONTINUOUS
Status: DISCONTINUED | OUTPATIENT
Start: 2023-02-05 | End: 2023-02-05

## 2023-02-04 RX ADMIN — CEFAZOLIN SODIUM 2000 MG: 2 SOLUTION INTRAVENOUS at 20:36

## 2023-02-04 RX ADMIN — CEFAZOLIN SODIUM 2000 MG: 2 SOLUTION INTRAVENOUS at 04:02

## 2023-02-04 RX ADMIN — METRONIDAZOLE 500 MG: 500 INJECTION, SOLUTION INTRAVENOUS at 22:19

## 2023-02-04 RX ADMIN — CEFAZOLIN SODIUM 2000 MG: 2 SOLUTION INTRAVENOUS at 12:54

## 2023-02-04 RX ADMIN — CINACALCET 30 MG: 30 TABLET, FILM COATED ORAL at 22:19

## 2023-02-04 RX ADMIN — METRONIDAZOLE 500 MG: 500 INJECTION, SOLUTION INTRAVENOUS at 05:39

## 2023-02-04 RX ADMIN — NIFEDIPINE 30 MG: 30 TABLET, FILM COATED, EXTENDED RELEASE ORAL at 22:19

## 2023-02-04 RX ADMIN — LEVOTHYROXINE SODIUM 112 MCG: 112 TABLET ORAL at 08:57

## 2023-02-04 RX ADMIN — PHYTONADIONE 10 MG: 10 INJECTION, EMULSION INTRAMUSCULAR; INTRAVENOUS; SUBCUTANEOUS at 09:53

## 2023-02-04 RX ADMIN — METRONIDAZOLE 500 MG: 500 INJECTION, SOLUTION INTRAVENOUS at 13:47

## 2023-02-04 NOTE — PROGRESS NOTES
Progress Note - General Surgery   Josseline Beltran 56 y o  female MRN: [id number]  Unit/Bed#: [facility] 811-01 Encounter: [id number]      Assessment:  53yo F admitted for management of acute cholecystitis  Afebrile, vitals WNL, and saturating adequately on room air    Plan:  - Continue NPO / IVFs  - Plan for laparoscopic cholecystectomy, possible open today  - Multimodal analgesia  - Ancef / Flagyl  - OOB / ambulation as tolerated  - Incentive spirometer use  - Lovenox for DVT prophylaxis    Subjective/Objective     Subjective:   No acute events overnight  This AM the patient reported complete absence of abdominal pain w/ no fevers/chills, nausea/emesis, or dyspnea  OOB/ambulating  NPO after midnight  Objective:     Blood pressure 129/85, pulse 59, temperature 97 7 °F (36 5 °C), resp  rate 16, height 5' 1" (1 549 m), weight 86 6 kg (191 lb), SpO2 97 %  ,Body mass index is 36 09 kg/m²  Gen: Awake, alert, and in no acute distress  Head: Normocephalic, atraumatic  Neck: No obvious JVD, trachea midline  Cardiovascular: Regular rate  Respiratory: In no acute respiratory distress w/ no use of accessory muscles of respiration  Abd: Soft, non-distended, and completely non-TTP w/ no guarding/rigidity or signs of peritonitis  Extremities: No visible wounds/ulcerations to the B/L upper/lower extremities  Skin: Warm/dry  Psychiatric: Appropriate mood/affect    Intake/Output Summary (Last 24 hours) at 2/4/2023 0851  Last data filed at 2/4/2023 0601  Gross per 24 hour   Intake 1300 ml   Output --   Net 1300 ml       Invasive Devices     Peripheral Intravenous Line  Duration           Peripheral IV 02/03/23 Proximal;Right;Ventral (anterior) Forearm 1 day                I have personally reviewed pertinent lab results    , CBC:   Lab Results   Component Value Date    WBC 5 64 02/04/2023    HGB 11 3 (L) 02/04/2023    HCT 33 4 (L) 02/04/2023    MCV 88 02/04/2023     02/04/2023    MCH 29 9 02/04/2023    MCHC 33 8 02/04/2023 RDW 12 6 02/04/2023    MPV 10 4 02/04/2023   , CMP:   Lab Results   Component Value Date    SODIUM 141 02/04/2023    K 4 1 02/04/2023     (H) 02/04/2023    CO2 25 02/04/2023    BUN 15 02/04/2023    CREATININE 0 84 02/04/2023    CALCIUM 7 6 (L) 02/04/2023     (H) 02/04/2023     (H) 02/04/2023    ALKPHOS 126 (H) 02/04/2023    EGFR 78 02/04/2023   , Coagulation:   Lab Results   Component Value Date    INR 2 47 (H) 02/04/2023   , Urinalysis: No results found for: Krystin Gastelum, SPECGRAV, PHUR, LEUKOCYTESUR, NITRITE, PROTEINUA, GLUCOSEU, KETONESU, BILIRUBINUR, BLOODU, Amylase: No results found for: AMYLASE, Lipase: No results found for: LIPASE

## 2023-02-04 NOTE — PLAN OF CARE
Problem: PAIN - ADULT  Goal: Verbalizes/displays adequate comfort level or baseline comfort level  Description: Interventions:  - Encourage patient to monitor pain and request assistance  - Assess pain using appropriate pain scale  - Administer analgesics based on type and severity of pain and evaluate response  - Implement non-pharmacological measures as appropriate and evaluate response  - Consider cultural and social influences on pain and pain management  - Notify physician/advanced practitioner if interventions unsuccessful or patient reports new pain  Outcome: Progressing     Problem: INFECTION - ADULT  Goal: Absence or prevention of progression during hospitalization  Description: INTERVENTIONS:  - Assess and monitor for signs and symptoms of infection  - Monitor lab/diagnostic results  - Monitor all insertion sites, i e  indwelling lines, tubes, and drains  - Monitor endotracheal if appropriate and nasal secretions for changes in amount and color  - Butler appropriate cooling/warming therapies per order  - Administer medications as ordered  - Instruct and encourage patient and family to use good hand hygiene technique  - Identify and instruct in appropriate isolation precautions for identified infection/condition  Outcome: Progressing  Goal: Absence of fever/infection during neutropenic period  Description: INTERVENTIONS:  - Monitor WBC    Outcome: Progressing

## 2023-02-04 NOTE — UTILIZATION REVIEW
Initial Clinical Review    Admission: Date/Time/Statement:   Admission Orders (From admission, onward)     Ordered        02/03/23 1231  Inpatient Admission  Once                      Orders Placed This Encounter   Procedures   • Inpatient Admission     Standing Status:   Standing     Number of Occurrences:   1     Order Specific Question:   Level of Care     Answer:   Med Surg [16]     Order Specific Question:   Estimated length of stay     Answer:   More than 2 Midnights     Order Specific Question:   Certification     Answer:   I certify that inpatient services are medically necessary for this patient for a duration of greater than two midnights  See H&P and MD Progress Notes for additional information about the patient's course of treatment  ED Arrival Information     Expected   2/3/2023     Arrival   2/3/2023 07:23    Acuity   Emergent            Means of arrival   Walk-In    Escorted by   Self    Service   Surgery-General    Admission type   Emergency            Arrival complaint   cp,vomiting           Chief Complaint   Patient presents with   • Chest Pain     Pt reports "severe" chest pain/heartburn beginning around 2am  Took two Pepcid without relief  Pt woke this morning vomiting        Initial Presentation: 56 y o  female who presented to [address] [place] [address] ED  Admitted Inpatient status med surg dt acute cholecystitis  PMHx: thyroid CA, anemia, CKD, PE, DM, HTN, Lupus, Sjogren's syndrome, Vit D deficiency  Presented w/ one day of central chest pain last evening  No relief with Pepcid  She woke this morning with nausea and vomiting which prompted her to come to the ED  She notes that she is on coumadin due to her history of SLE  On exam, RUQ abd tenderness noted  Imaging consistent with acute cholecystitis    Plan:  lap dakotah on 2/4, start IV ABXs, correct INR, nausea control, start IVF, NPO after MN      Date: 2/4   Day 2: General Sx   Pt denies abd pain, nausea and vomiting today   Abd soft ND, non TTP  Has been NPO after MN for lap dakotah with possible open today, continue pain control and ABXs, OOB and ambulation, inc spirometry use  ED Triage Vitals   Temperature Pulse Respirations Blood Pressure SpO2   02/03/23 0729 02/03/23 0729 02/03/23 0729 02/03/23 0731 02/03/23 0729   97 9 °F (36 6 °C) 87 18 (!) 179/145 100 %      Temp Source Heart Rate Source Patient Position - Orthostatic VS BP Location FiO2 (%)   02/03/23 0729 02/03/23 0729 02/03/23 0808 02/03/23 0729 --   Oral Monitor Lying Left arm       Pain Score       02/03/23 0729       7          Wt Readings from Last 1 Encounters:   02/03/23 86 6 kg (191 lb)     Additional Vital Signs:   Date/Time Temp Pulse Resp BP MAP (mmHg) SpO2 O2 Device Patient Position - Orthostatic VS   02/04/23 07:24:31 97 7 °F (36 5 °C) 59 16 129/85 100 97 % -- --   02/03/23 22:21:42 97 3 °F (36 3 °C) Abnormal  -- 16 134/87 103 -- -- --   02/03/23 21:31:01 -- 57 -- 134/89 104 96 % -- --   02/03/23 2100 -- -- -- -- -- -- None (Room air) --   02/03/23 1627 -- -- -- -- -- -- None (Room air) --   02/03/23 15:48:22 97 3 °F (36 3 °C) Abnormal  75 20 148/95 113 97 % -- --   02/03/23 1526 -- 72 18 -- -- 97 % -- --   02/03/23 1515 -- 62 15 -- -- 97 % -- --   02/03/23 1400 -- 67 17 138/89 108 98 % None (Room air) --   02/03/23 1110 -- 68 17 174/92 Abnormal  126 98 % None (Room air) --   02/03/23 0932 -- 58 14 -- -- 96 % -- --   02/03/23 0808 -- 60 15 167/88 122 99 % -- Lying   02/03/23 0802 -- 62 17 -- -- 99 % -- --   02/03/23 0731 -- -- -- 179/145 Abnormal  -- -- -- --   02/03/23 0729 97 9 °F (36 6 °C) 87 18 -- -- 100 % None (Room air) --     Pertinent Labs/Diagnostic Test Results:  2/3 EKG: Normal sinus rhythm  Left axis deviation  Abnormal ECG  When compared with ECG of 29-DEC-2021 01:06,  Nonspecific T wave abnormality has replaced inverted T waves in Inferior leads    US right upper quadrant   Final Result by Shereen Espinosa MD (02/03 2436)      Cholelithiasis  Nonspecific mild gallbladder wall thickening  No sonographic Garnett's sign or biliary ductal dilatation  Right renal cyst       Workstation performed: [id number]         CTA dissection protocol chest/abdomen/pelvis   Final Result by Andres Guerra MD (02/03 7019)      There is no thoracic aortic dissection      There is no pulmonary embolism      Gallbladder wall thickening with edema, evaluate for acute cholecystitis  Consider ultrasound on clinical correlation      Incidentally noted is a 1 0 cm focal polypoidal area in the posterior aspect of the urinary bladder, concerning for bladder lesion  This can be assessed with a bladder ultrasound, cystoscopy, performed on nonemergent basis and correlated with the    urinary evaluation      Few areas of groundglass density in the lungs  Based on current Fleischner Society 2017 Guidelines on incidental pulmonary nodule, followup noncontrast CT is recommended at 6 months from the initial examination to confirm persistence; if stable at that    time, additional followup CT is recommended for every 2 years until 5 years of stability is demonstrated  Subchondral sclerosis within the both femoral head, concerning for evolving avascular necrosis      The study was marked in EPIC for immediate notification  Workstation performed: [id number]         XR chest 2 views   Final Result by Noe Watts MD (02/03 1642)      No acute cardiopulmonary disease                    Workstation performed: [id number]               Results from last 7 days   Lab Units 02/04/23  0414 02/03/23  0815   WBC Thousand/uL 5 64 7 27   HEMOGLOBIN g/dL 11 3* 13 0   HEMATOCRIT % 33 4* 39 0   PLATELETS Thousands/uL 205 229   NEUTROS ABS Thousands/µL  --  4 72         Results from last 7 days   Lab Units 02/04/23  0414 02/03/23  0815   SODIUM mmol/L 141 140   POTASSIUM mmol/L 4 1 4 8   CHLORIDE mmol/L 112* 111*   CO2 mmol/L 25 25   ANION GAP mmol/L 4 4   BUN mg/dL 15 26*   CREATININE mg/dL 0 84 0 97   EGFR ml/min/1 73sq m 78 65   CALCIUM mg/dL 7 6* 8 7   MAGNESIUM mg/dL 1 6  --      Results from last 7 days   Lab Units 02/04/23  0414 02/03/23  1256   AST U/L 110* 268*   ALT U/L 137* 203*   ALK PHOS U/L 126* 158*   TOTAL PROTEIN g/dL 6 2* 7 0   ALBUMIN g/dL 2 8* 3 0*   TOTAL BILIRUBIN mg/dL 0 96 0 87   BILIRUBIN DIRECT mg/dL  --  0 14         Results from last 7 days   Lab Units 02/04/23  0414 02/03/23  0815   GLUCOSE RANDOM mg/dL 82 124     Results from last 7 days   Lab Units 02/03/23  1256 02/03/23  1033 02/03/23  0815   HS TNI 0HR ng/L  --   --  4   HS TNI 2HR ng/L  --  5  --    HSTNI D2 ng/L  --  1  --    HS TNI 4HR ng/L 4  --   --    HSTNI D4 ng/L 0  --   --      Results from last 7 days   Lab Units 02/03/23  0815   D-DIMER QUANTITATIVE ug/ml FEU <0 27     Results from last 7 days   Lab Units 02/04/23  0414 02/03/23  0815   PROTIME seconds 27 0* 21 9*   INR  2 47* 1 89*   PTT seconds  --  25     ED Treatment:   Medication Administration from 02/03/2023 0723 to 02/03/2023 1536       Date/Time Order Dose Route Action     02/03/2023 0816 EST ondansetron (ZOFRAN) injection 4 mg 4 mg Intravenous Given     02/03/2023 0858 EST iohexol (OMNIPAQUE) 350 MG/ML injection (SINGLE-DOSE) 100 mL 100 mL Intravenous Given     02/03/2023 1107 EST HYDROmorphone (DILAUDID) injection 0 5 mg 0 5 mg Intravenous Given     02/03/2023 1524 EST sodium chloride 0 9 % infusion 125 mL/hr Intravenous New Bag     02/03/2023 1308 EST metroNIDAZOLE (FLAGYL) IVPB (premix) 500 mg 100 mL 500 mg Intravenous New Bag     02/03/2023 1358 EST ceFAZolin (ANCEF) IVPB (premix in dextrose) 2,000 mg 50 mL 2,000 mg Intravenous New Bag        Past Medical History:   Diagnosis Date   • Anemia     Not sure   • Anti-phospholipid antibody syndrome (HCC)    • Cancer (HCC)     thyroid   • Chronic kidney disease    • Clotting disorder ([facility] [address] )     Lung embolism,  ITP   • Diabetes mellitus ([facility] [address] )    • Disease of thyroid gland    • Fibroid 1998   • Gestational diabetes    • History of chemotherapy     late 1980s for lupus   • Hyperparathyroidism ([place] [address] )    • Hypertension    • Idiopathic thrombocytopenic purpura (ITP) (HCC)    • Kidney stone    • Lupus ([place] [address] )    • Miscarriage 58    Lost twin girls 12-   • Osteoarthritis    • Pulmonary embolism ([place] [address] ) 1987   • Sjogren's syndrome ([place] [address] )    • Vitamin D deficiency      Present on Admission:  • Acute cholecystitis  • Chronic deep vein thrombosis (DVT) of proximal vein of both lower extremities (HCC)  • Benign hypertensive CKD  • Chronic kidney disease (CKD), stage II (mild)  • Hypertension  • Systemic lupus erythematosus ([place] [address] )  • Anti-phospholipid syndrome (HCC)  • Status post parathyroidectomy ([place] [address] )  • Sjogren's syndrome without extraglandular involvement ([place] [address] )      Admitting Diagnosis: Acute cholecystitis [K81 0]  Systemic lupus erythematosus ([place] [address] ) [M32 9]  Nausea [R11 0]  Chest pain [R07 9]  Hypertension [I10]  History of thyroid cancer [Z85 850]  Age/Sex: 56 y o  female  Admission Orders:  Scheduled Medications:  cefazolin, 2,000 mg, Intravenous, Q8H  cinacalcet, 30 mg, Oral, Daily  enoxaparin, 40 mg, Subcutaneous, Q24H [address]  levothyroxine, 112 mcg, Oral, Daily  metroNIDAZOLE, 500 mg, Intravenous, Q8H  NIFEdipine, 30 mg, Oral, Daily      Continuous IV Infusions:  sodium chloride, 125 mL/hr, Intravenous, Continuous      PRN Meds:  acetaminophen, 650 mg, Oral, Q6H PRN  diphenhydrAMINE, 25 mg, Intravenous, Q6H PRN  HYDROmorphone, 0 5 mg, Intravenous, Q4H PRN  ondansetron, 4 mg, Intravenous, Q6H PRN  oxyCODONE, 10 mg, Oral, Q4H PRN  oxyCODONE, 5 mg, Oral, Q4H PRN      scd  IO    Network Utilization Review Department  ATTENTION: Please call with any questions or concerns to [phone] and carefully listen to the prompts so that you are directed to the right person   All voicemails are confidential   Neldon Brain all requests for admission clinical reviews, approved or denied determinations and any other requests to dedicated fax number below belonging to the campus where the patient is receiving treatment   List of dedicated fax numbers for the Facilities:  [address] [place] [address] DENIALS (Administrative/Medical Necessity) [phone]   [address] (Maternity/NICU/Pediatrics) [phone]   [address] [phone]   Patricio Blake [address] [phone]   [address] [address] Jyoti Scott [place] [address] [phone]   [address] [facility] [address] 815 [facility] [phone]

## 2023-02-05 LAB
ABO GROUP BLD BPU: NORMAL
ABO GROUP BLD BPU: NORMAL
ALBUMIN SERPL BCP-MCNC: 3 G/DL (ref 3.5–5)
ALP SERPL-CCNC: 119 U/L (ref 46–116)
ALT SERPL W P-5'-P-CCNC: 77 U/L (ref 12–78)
ANION GAP SERPL CALCULATED.3IONS-SCNC: 7 MMOL/L (ref 4–13)
AST SERPL W P-5'-P-CCNC: 60 U/L (ref 5–45)
BASOPHILS # BLD AUTO: 0.03 THOUSANDS/ÂΜL (ref 0–0.1)
BASOPHILS NFR BLD AUTO: 1 % (ref 0–1)
BILIRUB SERPL-MCNC: 0.75 MG/DL (ref 0.2–1)
BPU ID: NORMAL
BPU ID: NORMAL
BUN SERPL-MCNC: 10 MG/DL (ref 5–25)
CALCIUM ALBUM COR SERPL-MCNC: 9 MG/DL (ref 8.3–10.1)
CALCIUM SERPL-MCNC: 8.2 MG/DL (ref 8.3–10.1)
CHLORIDE SERPL-SCNC: 110 MMOL/L (ref 96–108)
CO2 SERPL-SCNC: 23 MMOL/L (ref 21–32)
CREAT SERPL-MCNC: 0.76 MG/DL (ref 0.6–1.3)
EOSINOPHIL # BLD AUTO: 0.08 THOUSAND/ÂΜL (ref 0–0.61)
EOSINOPHIL NFR BLD AUTO: 2 % (ref 0–6)
ERYTHROCYTE [DISTWIDTH] IN BLOOD BY AUTOMATED COUNT: 12.3 % (ref 11.6–15.1)
GFR SERPL CREATININE-BSD FRML MDRD: 88 ML/MIN/1.73SQ M
GLUCOSE SERPL-MCNC: 151 MG/DL (ref 65–140)
GLUCOSE SERPL-MCNC: 77 MG/DL (ref 65–140)
HCT VFR BLD AUTO: 35.2 % (ref 34.8–46.1)
HGB BLD-MCNC: 11.7 G/DL (ref 11.5–15.4)
IMM GRANULOCYTES # BLD AUTO: 0.02 THOUSAND/UL (ref 0–0.2)
IMM GRANULOCYTES NFR BLD AUTO: 0 % (ref 0–2)
INR PPP: 1.1 (ref 0.84–1.19)
LYMPHOCYTES # BLD AUTO: 1.63 THOUSANDS/ÂΜL (ref 0.6–4.47)
LYMPHOCYTES NFR BLD AUTO: 31 % (ref 14–44)
MCH RBC QN AUTO: 29.5 PG (ref 26.8–34.3)
MCHC RBC AUTO-ENTMCNC: 33.2 G/DL (ref 31.4–37.4)
MCV RBC AUTO: 89 FL (ref 82–98)
MONOCYTES # BLD AUTO: 0.59 THOUSAND/ÂΜL (ref 0.17–1.22)
MONOCYTES NFR BLD AUTO: 11 % (ref 4–12)
NEUTROPHILS # BLD AUTO: 2.93 THOUSANDS/ÂΜL (ref 1.85–7.62)
NEUTS SEG NFR BLD AUTO: 55 % (ref 43–75)
NRBC BLD AUTO-RTO: 0 /100 WBCS
PLATELET # BLD AUTO: 193 THOUSANDS/UL (ref 149–390)
PMV BLD AUTO: 10.3 FL (ref 8.9–12.7)
POTASSIUM SERPL-SCNC: 4 MMOL/L (ref 3.5–5.3)
PROT SERPL-MCNC: 6.8 G/DL (ref 6.4–8.4)
PROTHROMBIN TIME: 14.4 SECONDS (ref 11.6–14.5)
RBC # BLD AUTO: 3.96 MILLION/UL (ref 3.81–5.12)
SODIUM SERPL-SCNC: 140 MMOL/L (ref 135–147)
UNIT DISPENSE STATUS: NORMAL
UNIT DISPENSE STATUS: NORMAL
UNIT PRODUCT CODE: NORMAL
UNIT PRODUCT CODE: NORMAL
UNIT PRODUCT VOLUME: 280 ML
UNIT PRODUCT VOLUME: 280 ML
UNIT RH: NORMAL
UNIT RH: NORMAL
WBC # BLD AUTO: 5.28 THOUSAND/UL (ref 4.31–10.16)

## 2023-02-05 PROCEDURE — 0FT44ZZ RESECTION OF GALLBLADDER, PERCUTANEOUS ENDOSCOPIC APPROACH: ICD-10-PCS | Performed by: SURGERY

## 2023-02-05 RX ORDER — SPIRONOLACTONE 25 MG/1
25 TABLET ORAL DAILY
Status: DISCONTINUED | OUTPATIENT
Start: 2023-02-06 | End: 2023-02-06 | Stop reason: HOSPADM

## 2023-02-05 RX ORDER — SODIUM CHLORIDE 9 MG/ML
INJECTION, SOLUTION INTRAVENOUS CONTINUOUS PRN
Status: DISCONTINUED | OUTPATIENT
Start: 2023-02-05 | End: 2023-02-05

## 2023-02-05 RX ORDER — WARFARIN SODIUM 5 MG/1
5 TABLET ORAL
Status: COMPLETED | OUTPATIENT
Start: 2023-02-05 | End: 2023-02-05

## 2023-02-05 RX ORDER — ONDANSETRON 2 MG/ML
INJECTION INTRAMUSCULAR; INTRAVENOUS AS NEEDED
Status: DISCONTINUED | OUTPATIENT
Start: 2023-02-05 | End: 2023-02-05

## 2023-02-05 RX ORDER — KETOROLAC TROMETHAMINE 30 MG/ML
INJECTION, SOLUTION INTRAMUSCULAR; INTRAVENOUS AS NEEDED
Status: DISCONTINUED | OUTPATIENT
Start: 2023-02-05 | End: 2023-02-05

## 2023-02-05 RX ORDER — ONDANSETRON 2 MG/ML
4 INJECTION INTRAMUSCULAR; INTRAVENOUS EVERY 4 HOURS PRN
Status: DISCONTINUED | OUTPATIENT
Start: 2023-02-05 | End: 2023-02-05 | Stop reason: HOSPADM

## 2023-02-05 RX ORDER — ENOXAPARIN SODIUM 100 MG/ML
1 INJECTION SUBCUTANEOUS EVERY 12 HOURS SCHEDULED
Status: DISCONTINUED | OUTPATIENT
Start: 2023-02-05 | End: 2023-02-06 | Stop reason: HOSPADM

## 2023-02-05 RX ORDER — DEXAMETHASONE SODIUM PHOSPHATE 10 MG/ML
INJECTION, SOLUTION INTRAMUSCULAR; INTRAVENOUS AS NEEDED
Status: DISCONTINUED | OUTPATIENT
Start: 2023-02-05 | End: 2023-02-05

## 2023-02-05 RX ORDER — MAGNESIUM HYDROXIDE 1200 MG/15ML
LIQUID ORAL AS NEEDED
Status: DISCONTINUED | OUTPATIENT
Start: 2023-02-05 | End: 2023-02-05 | Stop reason: HOSPADM

## 2023-02-05 RX ORDER — SUCCINYLCHOLINE/SOD CL,ISO/PF 100 MG/5ML
SYRINGE (ML) INTRAVENOUS AS NEEDED
Status: DISCONTINUED | OUTPATIENT
Start: 2023-02-05 | End: 2023-02-05

## 2023-02-05 RX ORDER — LISINOPRIL 10 MG/1
10 TABLET ORAL DAILY
Status: DISCONTINUED | OUTPATIENT
Start: 2023-02-05 | End: 2023-02-06 | Stop reason: HOSPADM

## 2023-02-05 RX ORDER — CEFAZOLIN SODIUM 2 G/50ML
SOLUTION INTRAVENOUS AS NEEDED
Status: DISCONTINUED | OUTPATIENT
Start: 2023-02-05 | End: 2023-02-05

## 2023-02-05 RX ORDER — ROCURONIUM BROMIDE 10 MG/ML
INJECTION, SOLUTION INTRAVENOUS AS NEEDED
Status: DISCONTINUED | OUTPATIENT
Start: 2023-02-05 | End: 2023-02-05

## 2023-02-05 RX ORDER — PROPOFOL 10 MG/ML
INJECTION, EMULSION INTRAVENOUS AS NEEDED
Status: DISCONTINUED | OUTPATIENT
Start: 2023-02-05 | End: 2023-02-05

## 2023-02-05 RX ORDER — FENTANYL CITRATE/PF 50 MCG/ML
25 SYRINGE (ML) INJECTION
Status: DISCONTINUED | OUTPATIENT
Start: 2023-02-05 | End: 2023-02-05 | Stop reason: HOSPADM

## 2023-02-05 RX ORDER — FENTANYL CITRATE 50 UG/ML
INJECTION, SOLUTION INTRAMUSCULAR; INTRAVENOUS AS NEEDED
Status: DISCONTINUED | OUTPATIENT
Start: 2023-02-05 | End: 2023-02-05

## 2023-02-05 RX ORDER — BUPIVACAINE HYDROCHLORIDE AND EPINEPHRINE 5; 5 MG/ML; UG/ML
INJECTION, SOLUTION EPIDURAL; INTRACAUDAL; PERINEURAL AS NEEDED
Status: DISCONTINUED | OUTPATIENT
Start: 2023-02-05 | End: 2023-02-05 | Stop reason: HOSPADM

## 2023-02-05 RX ORDER — PHENYLEPHRINE HCL IN 0.9% NACL 1 MG/10 ML
SYRINGE (ML) INTRAVENOUS AS NEEDED
Status: DISCONTINUED | OUTPATIENT
Start: 2023-02-05 | End: 2023-02-05

## 2023-02-05 RX ADMIN — KETOROLAC TROMETHAMINE 15 MG: 30 INJECTION, SOLUTION INTRAMUSCULAR at 10:05

## 2023-02-05 RX ADMIN — LIDOCAINE HYDROCHLORIDE 60 MG: 20 INJECTION INTRAVENOUS at 09:08

## 2023-02-05 RX ADMIN — CEFAZOLIN SODIUM 2000 MG: 2 SOLUTION INTRAVENOUS at 05:07

## 2023-02-05 RX ADMIN — CEFAZOLIN SODIUM 2000 MG: 2 SOLUTION INTRAVENOUS at 10:08

## 2023-02-05 RX ADMIN — FENTANYL CITRATE 25 MCG: 50 INJECTION, SOLUTION INTRAMUSCULAR; INTRAVENOUS at 10:53

## 2023-02-05 RX ADMIN — Medication 100 MG: at 09:08

## 2023-02-05 RX ADMIN — ONDANSETRON 4 MG: 2 INJECTION INTRAMUSCULAR; INTRAVENOUS at 10:05

## 2023-02-05 RX ADMIN — LISINOPRIL 10 MG: 10 TABLET ORAL at 18:43

## 2023-02-05 RX ADMIN — Medication 100 MCG: at 09:21

## 2023-02-05 RX ADMIN — Medication 200 MCG: at 09:23

## 2023-02-05 RX ADMIN — SODIUM CHLORIDE 125 ML/HR: 0.9 INJECTION, SOLUTION INTRAVENOUS at 00:00

## 2023-02-05 RX ADMIN — FENTANYL CITRATE 25 MCG: 50 INJECTION, SOLUTION INTRAMUSCULAR; INTRAVENOUS at 10:42

## 2023-02-05 RX ADMIN — DEXAMETHASONE SODIUM PHOSPHATE 10 MG: 10 INJECTION, SOLUTION INTRAMUSCULAR; INTRAVENOUS at 09:08

## 2023-02-05 RX ADMIN — ROCURONIUM BROMIDE 30 MG: 50 INJECTION INTRAVENOUS at 09:15

## 2023-02-05 RX ADMIN — ROCURONIUM BROMIDE 10 MG: 50 INJECTION INTRAVENOUS at 09:08

## 2023-02-05 RX ADMIN — SUGAMMADEX 200 MG: 100 INJECTION, SOLUTION INTRAVENOUS at 10:10

## 2023-02-05 RX ADMIN — WARFARIN SODIUM 5 MG: 5 TABLET ORAL at 17:27

## 2023-02-05 RX ADMIN — NIFEDIPINE 30 MG: 30 TABLET, FILM COATED, EXTENDED RELEASE ORAL at 21:52

## 2023-02-05 RX ADMIN — FENTANYL CITRATE 25 MCG: 50 INJECTION, SOLUTION INTRAMUSCULAR; INTRAVENOUS at 10:47

## 2023-02-05 RX ADMIN — CINACALCET 30 MG: 30 TABLET, FILM COATED ORAL at 21:52

## 2023-02-05 RX ADMIN — FENTANYL CITRATE 100 MCG: 50 INJECTION, SOLUTION INTRAMUSCULAR; INTRAVENOUS at 09:08

## 2023-02-05 RX ADMIN — METRONIDAZOLE 500 MG: 500 INJECTION, SOLUTION INTRAVENOUS at 04:02

## 2023-02-05 RX ADMIN — SODIUM CHLORIDE: 0.9 INJECTION, SOLUTION INTRAVENOUS at 09:00

## 2023-02-05 RX ADMIN — ENOXAPARIN SODIUM 90 MG: 100 INJECTION SUBCUTANEOUS at 21:52

## 2023-02-05 RX ADMIN — DEXAMETHASONE SODIUM PHOSPHATE 10 MG: 10 INJECTION, SOLUTION INTRAMUSCULAR; INTRAVENOUS at 09:10

## 2023-02-05 RX ADMIN — PROPOFOL 150 MG: 10 INJECTION, EMULSION INTRAVENOUS at 09:08

## 2023-02-05 NOTE — PROGRESS NOTES
Progress Note -general surgery  Christy Marroquin 56 y o  female MRN: [id number]  Unit/Bed#: [facility] 811-01 Encounter: [id number]    Assessment:  56 y o  female admitted for management of acute cholecystitis  Plan:  - Diet NPO; Sips with meds  - Pain and Nausea control PRN  -Plan for laparoscopic cholecystectomy this morning  -Continue to hold warfarin  - Monitor INR    Subjective/Objective     Subjective: Denies any abdominal pain nausea or vomiting  Objective:   Vitals: Blood pressure 140/89, pulse 57, temperature 97 7 °F (36 5 °C), resp  rate 16, height 5' 1" (1 549 m), weight 86 6 kg (191 lb), SpO2 93 %  ,Body mass index is 36 09 kg/m²  I/O       02/03 0701 02/04 0700 02/04 0701 02/05 0700 02/05 0701 02/06 0700    P  O   400     I V  (mL/kg) 1000 (11 5) 750 (8 7)     Blood  560     IV Piggyback 300 150     Total Intake(mL/kg) 1300 (15) 1860 (21 5)     Urine (mL/kg/hr)  1 (0)     Total Output  1     Net +1300 +1859            Unmeasured Urine Occurrence  3 x           Physical Exam:  Gen: NAD, Aox3, Comfortable in bed  Chest: Normal work of breathing, no respiratory distress  Abd: Soft, ND, NT  Ext: No Edema  Skin: Warm, Dry, Intact      Lab, Imaging and other studies:   I have personally reviewed pertinent reports    , CBC with diff:   Lab Results   Component Value Date    WBC 5 28 02/05/2023    HGB 11 7 02/05/2023    HCT 35 2 02/05/2023    MCV 89 02/05/2023     02/05/2023    MCH 29 5 02/05/2023    MCHC 33 2 02/05/2023    RDW 12 3 02/05/2023    MPV 10 3 02/05/2023    NRBC 0 02/05/2023   , BMP/CMP:   Lab Results   Component Value Date    SODIUM 140 02/05/2023    K 4 0 02/05/2023     (H) 02/05/2023    CO2 23 02/05/2023    BUN 10 02/05/2023    CREATININE 0 76 02/05/2023    CALCIUM 8 2 (L) 02/05/2023    AST 60 (H) 02/05/2023    ALT 77 02/05/2023    ALKPHOS 119 (H) 02/05/2023    EGFR 88 02/05/2023     VTE Pharmacologic Prophylaxis: Enoxaparin (Lovenox)  VTE Mechanical Prophylaxis: sequential compression device        Dorine Skiff, MD  2/5/2023  8:08 AM

## 2023-02-05 NOTE — QUICK NOTE
Post-op check s/p laparoscopic cholecystectomy  No events post-op  At bedside the patient reported only mild incisional pain w/ no nausea/emesis, fevers, or chills  She has been OOB/voiding without issue and tolerated her lunch  Vitals:    02/05/23 1417   BP: 151/99   Pulse: 75   Resp: 19   Temp: 97 5 °F (36 4 °C)   SpO2: 95%     Abdomen soft w/ incisions healing well closed w/ skin glue  No signs of complication  Plan: Bridging Lovenox, restart warfarin, and repeat INR tomorrow AM  Low-fat diet, discontinue IVFs, analgesics PRN, plan for discharge once INR therapeutic      Flaquito Hassan MD  PGY4, Surgery  02/05/23

## 2023-02-05 NOTE — OP NOTE
OPERATIVE REPORT  PATIENT NAME: Star Larson    :  1965  MRN: [id number]  Pt Location: BE OR ROOM 10    SURGERY DATE: 2023    Surgeon(s) and Role:     * Manju Mckeon MD - Primary     * Rosa Rodriguez MD - Assisting     * Lore Mancuso MD - Assisting    Preop Diagnosis:  Acute cholecystitis [K81 0]    Post-Op Diagnosis Codes:     * Acute cholecystitis [K81 0]    Procedure(s):  LAPAROSCOPIC CHOLECYSTECTOMY    Specimen(s):  ID Type Source Tests Collected by Time Destination   1 : Gallbladder Tissue Gallbladder TISSUE EXAM Manju Mckeon MD 2023 1000        Estimated Blood Loss:   Minimal    Drains:  * No LDAs found *    Anesthesia Type:   General    Operative Indications:  Acute cholecystitis [K81 0]    Operative Findings:  - acute calculous cholecystitis    Complications:   None    Procedure and Technique: This is a 56year old female who presents with acute cholecystitis in need of laparoscopic cholecystectomy  All risks, benefits, and alternatives were discussed with the patient agreed with the plan of care as stated  Patient was identified by armband and verbal communication and brought back to the operating room  She was induced by general anesthesia via endotracheal intubation  Her abdomen was clipped and cleansed then subsequently prepped with ChloraPrep and draped in usual sterile fashion  A time-out was called to review the procedure and its details  Antibiotics were administered  A supraumbilical midline incision was made and carried down to the fascia which was grasped, elevated, and divided to expose the peritoneal cavity  An 11 mm port was then inserted into the abdomen under direct vision  The laparoscope was inserted into the abdomen and the peritoneal cavity was inspected  There were no abnormalities  The gallbladder was inspected and did appear to be edematous and thickened    Subsequently the following ports were inserted under direct visualization along with local anesthetic in the typical fashion: a 5 mm epigastric port and two 5 mm ports along the right costal margin  The patient was placed in reverse Trendelenburg position with right side up  Omental attachments to the gallbladder were gently swept away until an atraumatic grasper could be used to retract the fundus of the gallbladder superiorly over the dome of the liver  Filmy adhesions between the gallbladder and omentum were also lysed with a combination of blunt dissection and electrocautery  The infundibulum was identified and subsequently retracted laterally towards the right lower quadrant using another grasper to expose Calot's triangle  The anterior and posterior peritoneum were incised with electrocautery  The triangle was dissected to expose the cystic duct lymph node, cystic artery, cystic duct, and cystic plate  Once these structures were carefully identified, the cystic duct was doubly clipped and divided  Following this, the cystic artery was likewise doubly clipped and divided  Electrocautery was then used to separate the peritoneal attachments between the gallbladder and its bed in the liver  The gallbladder fossa and cystic artery were inspected to ensure no bleeding  Hemostasis was achieved with electrocautery and was excellent  The gallbladder, once freed, was placed in endoscopic retrieval bag and removed from the abdomen through the umbilical port  Specimen was sent to pathology  The fascia at the epigastric port was reapproximated using 0 Vicryl suture in a figure-of-eight fashion  All incisions were closed using 4-0 Monocryl sutures in a subcuticular fashion  Sterile skin glue was applied  Patient was then woken from general anesthesia and brought to the PACU in stable condition  All instrument, needle, and sponge counts were correct at the conclusion of the case  Radiofrequency scanning was negative      Dr Yaniv Montiel was present for all critical portions of the procedure        Patient Disposition:  PACU         SIGNATURE: Nathan Mccarthy MD  DATE: February 5, 2023  TIME: 10:22 AM

## 2023-02-05 NOTE — ANESTHESIA PREPROCEDURE EVALUATION
Procedure:  LAPAROSCOPIC CHOLECYSTECTOMY, POSSIBLE OPEN (Abdomen)    INR 2 47 --> 1 1 s/p FFP    Relevant Problems   CARDIO   (+) Chronic deep vein thrombosis (DVT) of proximal vein of both lower extremities (HCC)   (+) Hypertension      ENDO   (+) Postoperative hypothyroidism   (+) Primary hyperparathyroidism (HCC)      /RENAL   (+) Benign hypertensive CKD   (+) Chronic kidney disease (CKD), stage II (mild)   (+) Nephrolithiasis      NEURO/PSYCH   (+) History of ITP   (+) History of thyroid cancer      PULMONARY   (+) Upper respiratory tract infection        Physical Exam    Airway    Mallampati score: II  TM Distance: >3 FB  Neck ROM: full     Dental   No notable dental hx     Cardiovascular  Rhythm: regular, Rate: normal, Cardiovascular exam normal    Pulmonary  Pulmonary exam normal Breath sounds clear to auscultation,     Other Findings        Anesthesia Plan  ASA Score- 2     Anesthesia Type- general with ASA Monitors  Additional Monitors:   Airway Plan: ETT  Comment: Risks/benefits and alternatives discussed including likely possibility of PONV and sore throat, as well as the rare possibilities of aspiration, dental/oropharyngeal/ocular injuries, or grave/life threatening anesthetic and surgical emergencies          Plan Factors-Exercise tolerance (METS): >4 METS  Patient summary reviewed  Patient instructed to abstain from smoking on day of procedure  Patient did not smoke on day of surgery  Induction- intravenous  Postoperative Plan- Plan for postoperative opioid use  Planned trial extubation    Informed Consent- Anesthetic plan and risks discussed with patient  I personally reviewed this patient with the CRNA  Discussed and agreed on the Anesthesia Plan with the CRNA  Antonio Pinto

## 2023-02-05 NOTE — PLAN OF CARE
Problem: PAIN - ADULT  Goal: Verbalizes/displays adequate comfort level or baseline comfort level  Description: Interventions:  - Encourage patient to monitor pain and request assistance  - Assess pain using appropriate pain scale  - Administer analgesics based on type and severity of pain and evaluate response  - Implement non-pharmacological measures as appropriate and evaluate response  - Consider cultural and social influences on pain and pain management  - Notify physician/advanced practitioner if interventions unsuccessful or patient reports new pain  Outcome: Progressing     Problem: INFECTION - ADULT  Goal: Absence or prevention of progression during hospitalization  Description: INTERVENTIONS:  - Assess and monitor for signs and symptoms of infection  - Monitor lab/diagnostic results  - Monitor all insertion sites, i e  indwelling lines, tubes, and drains  - Monitor endotracheal if appropriate and nasal secretions for changes in amount and color  - [place] appropriate cooling/warming therapies per order  - Administer medications as ordered  - Instruct and encourage patient and family to use good hand hygiene technique  - Identify and instruct in appropriate isolation precautions for identified infection/condition  Outcome: Progressing  Goal: Absence of fever/infection during neutropenic period  Description: INTERVENTIONS:  - Monitor WBC    Outcome: Progressing

## 2023-02-05 NOTE — ANESTHESIA POSTPROCEDURE EVALUATION
Post-Op Assessment Note    CV Status:  Stable    Pain management: adequate     Mental Status:  Alert and awake   Hydration Status:  Euvolemic   PONV Controlled:  Controlled   Airway Patency:  Patent      Post Op Vitals Reviewed: Yes      Staff: CRNA, Anesthesiologist         No notable events documented      BP   131/79   Temp 97 8   Pulse 64   Resp 15   SpO2 100

## 2023-02-06 ENCOUNTER — TRANSITIONAL CARE MANAGEMENT (OUTPATIENT)
Dept: INTERNAL MEDICINE CLINIC | Facility: CLINIC | Age: 58
End: 2023-02-06

## 2023-02-06 VITALS
HEIGHT: 61 IN | WEIGHT: 191 LBS | RESPIRATION RATE: 18 BRPM | HEART RATE: 71 BPM | BODY MASS INDEX: 36.06 KG/M2 | DIASTOLIC BLOOD PRESSURE: 69 MMHG | TEMPERATURE: 97.5 F | SYSTOLIC BLOOD PRESSURE: 120 MMHG | OXYGEN SATURATION: 95 %

## 2023-02-06 LAB
INR PPP: 1.13 (ref 0.84–1.19)
PROTHROMBIN TIME: 14.8 SECONDS (ref 11.6–14.5)

## 2023-02-06 RX ORDER — ENOXAPARIN SODIUM 100 MG/ML
1 INJECTION SUBCUTANEOUS EVERY 12 HOURS SCHEDULED
Qty: 20 ML | Refills: 0 | Status: SHIPPED | OUTPATIENT
Start: 2023-02-06 | End: 2023-02-18

## 2023-02-06 RX ORDER — OXYCODONE HYDROCHLORIDE 5 MG/1
5 TABLET ORAL EVERY 4 HOURS PRN
Qty: 30 TABLET | Refills: 0 | Status: SHIPPED | OUTPATIENT
Start: 2023-02-06 | End: 2023-02-06 | Stop reason: SDUPTHER

## 2023-02-06 RX ORDER — OXYCODONE HYDROCHLORIDE 5 MG/1
5 TABLET ORAL EVERY 4 HOURS PRN
Qty: 30 TABLET | Refills: 0 | Status: SHIPPED | OUTPATIENT
Start: 2023-02-06 | End: 2023-02-18

## 2023-02-06 RX ORDER — ENOXAPARIN SODIUM 100 MG/ML
1 INJECTION SUBCUTANEOUS EVERY 12 HOURS SCHEDULED
Qty: 20 ML | Refills: 0 | Status: SHIPPED | OUTPATIENT
Start: 2023-02-06 | End: 2023-02-06 | Stop reason: SDUPTHER

## 2023-02-06 RX ORDER — ACETAMINOPHEN 325 MG/1
975 TABLET ORAL EVERY 6 HOURS PRN
Refills: 0
Start: 2023-02-06 | End: 2023-02-18

## 2023-02-06 RX ADMIN — OXYCODONE HYDROCHLORIDE 10 MG: 10 TABLET ORAL at 06:57

## 2023-02-06 RX ADMIN — LEVOTHYROXINE SODIUM 112 MCG: 112 TABLET ORAL at 10:22

## 2023-02-06 RX ADMIN — ENOXAPARIN SODIUM 90 MG: 100 INJECTION SUBCUTANEOUS at 08:50

## 2023-02-06 NOTE — DISCHARGE INSTR - AVS FIRST PAGE
Acute Care Surgery Discharge Instructions    Please follow-up as instructed  If you do not already have a follow-up appointment, please call the office when you leave to schedule an appointment to be seen in 2-3 weeks for post-operative re-evaluation  Activity:  - No lifting greater than 20 pounds or strenuous physical activity or exercise for 2 weeks  - Walking and normal light activities are encouraged  - Normal daily activities including climbing steps are okay  - No driving until no longer using pain medications  Return to work:    - You may return to work in 2 weeks or sooner if you are feeling well enough  Diet:    - You may resume your normal diet  Wound Care:  - May shower daily  No tub baths or swimming until cleared by your surgeon   - Wash incision gently with soap and water and pat dry  - Do not apply any creams or ointments unless instructed to do so by your surgeon   - Maria L Knock may apply ice as needed (no longer than 20 minutes at a time) for the first 48 hours  - Bruising is not unusual and will go away with a little time  You may apply a warm, moist compress that may help the bruising resolve quicker  - You may remove the dressings the day after surgery (unless otherwise instructed)  Leave any skin tapes (steri-strips) on the incision(s) in place until they fall off on their own  Any new dressings are optional     Medications:    - You may resume all of your regular medications after discharge unless otherwise instructed  Please refer to your discharge medication list for further details  - Please take the pain medications as directed  - You are encouraged to use non-narcotic pain medications first and whenever possible  Reserve the use of narcotic pain medication for moderate to severe pain not controlled by non-narcotic medications   - No driving while taking narcotic pain medications  - You may become constipated, especially if taking pain medications   You may take any over the counter stool softeners or laxatives as needed  Examples: Milk of Magnesia, Colace, Senna  Additional Instructions:  - If you have any questions or concerns after discharge please call the office   - Call office or return to ER if fever greater than 101, chills, persistent nausea/vomiting, worsening/uncontrollable pain, and/or increasing redness or purulent/foul smelling drainage from incision(s)

## 2023-02-06 NOTE — PLAN OF CARE
Problem: PAIN - ADULT  Goal: Verbalizes/displays adequate comfort level or baseline comfort level  Description: Interventions:  - Encourage patient to monitor pain and request assistance  - Assess pain using appropriate pain scale  - Administer analgesics based on type and severity of pain and evaluate response  - Implement non-pharmacological measures as appropriate and evaluate response  - Consider cultural and social influences on pain and pain management  - Notify physician/advanced practitioner if interventions unsuccessful or patient reports new pain  2/6/2023 1127 by Wilfrid Dawkins RN  Outcome: Progressing  2/6/2023 1127 by Wilfrid Dawkins RN  Outcome: Progressing  2/6/2023 1122 by Wilfrid Dawkins RN  Outcome: Progressing     Problem: INFECTION - ADULT  Goal: Absence or prevention of progression during hospitalization  Description: INTERVENTIONS:  - Assess and monitor for signs and symptoms of infection  - Monitor lab/diagnostic results  - Monitor all insertion sites, i e  indwelling lines, tubes, and drains  - Monitor endotracheal if appropriate and nasal secretions for changes in amount and color  - [place] appropriate cooling/warming therapies per order  - Administer medications as ordered  - Instruct and encourage patient and family to use good hand hygiene technique  - Identify and instruct in appropriate isolation precautions for identified infection/condition  2/6/2023 1127 by Wilfrid Dawkins RN  Outcome: Progressing  2/6/2023 1127 by Wilfrid Dawkins RN  Outcome: Progressing  2/6/2023 1122 by Wilfrid Dawkins RN  Outcome: Progressing  Goal: Absence of fever/infection during neutropenic period  Description: INTERVENTIONS:  - Monitor WBC    2/6/2023 1127 by Wilfrid Dawkins RN  Outcome: Progressing  2/6/2023 1127 by Wilfrid Dawkins RN  Outcome: Progressing  2/6/2023 1122 by Wilfrid Dawkins RN  Outcome: Progressing     Problem: SAFETY ADULT  Goal: Maintains/Returns to pre admission functional level  Description: INTERVENTIONS:  - Perform BMAT or MOVE assessment daily    - Set and communicate daily mobility goal to care team and patient/family/caregiver  - Collaborate with rehabilitation services on mobility goals if consulted  - Out of bed for toileting  - Record patient progress and toleration of activity level   2/6/2023 1127 by Severo Aris, RN  Outcome: Progressing  2/6/2023 1127 by Severo Aris, RN  Outcome: Progressing  2/6/2023 1122 by Severo Aris, RN  Outcome: Progressing     Problem: DISCHARGE PLANNING  Goal: Discharge to home or other facility with appropriate resources  Description: INTERVENTIONS:  - Identify barriers to discharge w/patient and caregiver  - Arrange for needed discharge resources and transportation as appropriate  - Identify discharge learning needs (meds, wound care, etc )  - Arrange for interpretive services to assist at discharge as needed  - Refer to Case Management Department for coordinating discharge planning if the patient needs post-hospital services based on physician/advanced practitioner order or complex needs related to functional status, cognitive ability, or social support system  2/6/2023 1127 by Severo Aris, RN  Outcome: Progressing  2/6/2023 1127 by Severo Aris, RN  Outcome: Progressing  2/6/2023 1122 by Severo Aris, RN  Outcome: Progressing     Problem: Nutrition/Hydration-ADULT  Goal: Nutrient/Hydration intake appropriate for improving, restoring or maintaining nutritional needs  Description: Monitor and assess patient's nutrition/hydration status for malnutrition  Collaborate with interdisciplinary team and initiate plan and interventions as ordered  Monitor patient's weight and dietary intake as ordered or per policy  Utilize nutrition screening tool and intervene as necessary  Determine patient's food preferences and provide high-protein, high-caloric foods as appropriate       INTERVENTIONS:  - Monitor oral intake, urinary output, labs, and treatment plans  - Assess nutrition and hydration status and recommend course of action  - Evaluate amount of meals eaten  - Assist patient with eating if necessary   - Allow adequate time for meals  - Recommend/ encourage appropriate diets, oral nutritional supplements, and vitamin/mineral supplements  - Order, calculate, and assess calorie counts as needed  - Recommend, monitor, and adjust tube feedings and TPN/PPN based on assessed needs  - Assess need for intravenous fluids  - Provide specific nutrition/hydration education as appropriate  - Include patient/family/caregiver in decisions related to nutrition  Outcome: Progressing

## 2023-02-06 NOTE — INCIDENTAL FINDINGS
The following findings require follow up:  Radiographic finding   Findings: 1) 1 0 cm focal polypoidal area in the posterior aspect of the urinary bladder, concerning for bladder lesion  Follow up required: bladder US in the next 1 month       Findings: 2)  Few areas of groundglass density in the lungs  Follow up required: noncontrast CT is recommended at 6 months from the initial examination to confirm persistence; if stable at that  time, additional followup CT is recommended for every 2 years until 5 years of stability is demonstrated  Please contact your PCP Homar Moore to help you coordinate scheduling of these tests and follow up for these findings  I personally discussed these findings with the patient and explained that, while it is not clear if these findings are clinically significant, they could represent malignant (cancer) lesions in the bladder and lung  I explained delay of further follow up could result in worsening prognosis and possibly death  The patient expressed understanding and was able to teach back these findings

## 2023-02-06 NOTE — PROGRESS NOTES
Progress Note - General Surgery   Christy Marroquin 56 y o  female MRN: [id number]  Unit/Bed#: [facility] 811-01 Encounter: [id number]      Assessment:  53yo F POD1 s/p laparoscopic cholecystectomy  Afebrile, vitals WNL, and saturating adequately on room air  UOP: one occurrence documented    Plan:  - Continue Lovenox bridge to therapeutic INR levels, otherwise patient stable for discharge  - AM INR pending, will redose Warfarin as appropriate and plan for discharge today with a Lovenox prescription   - Low-fat diet as tolerated  - Multimodal analgesia  - OOB / ambulation as tolerated  - Incentive spirometer use    Subjective/Objective     Subjective:   No acute events overnight  This AM the patient reported only mild incisional abdominal pain w/ no fevers/chills, nausea/emesis, or dyspnea  OOB/ambulating  Passing flatus and non-bloody bowel movements  Tolerating her diet  Objective:     Blood pressure 131/82, pulse 60, temperature 97 9 °F (36 6 °C), resp  rate 14, height 5' 1" (1 549 m), weight 86 6 kg (191 lb), SpO2 93 %  ,Body mass index is 36 09 kg/m²  Gen: Awake, alert, and in no acute distress  Head: Normocephalic, atraumatic  Neck: No obvious JVD, trachea midline  Cardiovascular: Regular rate  Respiratory:  In no acute respiratory distress w/ no use of accessory muscles of respiration  Abd: Soft, non-distended, and appropriately tender to palpation over laparoscopic incisions closed w/ skin glue w/ no guarding/rigidity or signs of peritonitis  Extremities: No visible wounds/ulcerations to the B/L upper/lower extremities  Skin: Warm/dry  Psychiatric: Appropriate mood/affect    Intake/Output Summary (Last 24 hours) at 2/6/2023 2975  Last data filed at 2/5/2023 1747  Gross per 24 hour   Intake 700 ml   Output --   Net 700 ml       Invasive Devices     Peripheral Intravenous Line  Duration           Peripheral IV 02/03/23 Proximal;Right;Ventral (anterior) Forearm 2 days                I have personally reviewed pertinent lab results    , CBC:   No results found for: WBC, HGB, HCT, MCV, PLT, ADJUSTEDWBC, MCH, MCHC, RDW, MPV, NRBC, CMP:   No results found for: SODIUM, K, CL, CO2, ANIONGAP, BUN, CREATININE, GLUCOSE, CALCIUM, AST, ALT, ALKPHOS, PROT, BILITOT, EGFR, Coagulation:   No results found for: PT, INR, APTT, Urinalysis: No results found for: COLORU, CLARITYU, SPECGRAV, PHUR, LEUKOCYTESUR, NITRITE, PROTEINUA, GLUCOSEU, KETONESU, BILIRUBINUR, BLOODU, Amylase: No results found for: AMYLASE, Lipase: No results found for: LIPASE

## 2023-02-06 NOTE — DISCHARGE SUMMARY
Discharge Summary - Cata Lynn 56 y o  female MRN: [id number]    Unit/Bed#: [facility] 214-72 Encounter: [id number]    Admission Date:   Admission Orders (From admission, onward)     Ordered        23 1231  Inpatient Admission  Once                        Admitting Diagnosis: Acute cholecystitis [K81 0]  Systemic lupus erythematosus ([address] ) [M32 9]  Nausea [R11 0]  Chest pain [R07 9]  Hypertension [I10]  History of thyroid cancer [Z85 850]    HPI: From H&P by Ariane Bending: "Cata Lynn is a 56 y o  female with a history of SLE, , and hyperparathyroidism with 3 subsequent parathyroid surgeries and thyroid cancer with subsequent thyroidectomy who presented to the ED with 1 day of central chest pain which occurred at bedtime last night  She describes the pain as unlike anything she had previously experienced, and took 2 Pepcid  She woke this morning with nausea and vomiting which prompted her to come to the ED  She notes that she is on coumadin due to her history of SLE "    Procedures Performed:   Orders Placed This Encounter   Procedures   • Critical Care   • ED ECG Documentation Only   • [address] Course: Patient is a 55 yo female who presented to the hospital with complaints of 1 day of central chest pain which occurred the evening prior to arrival  Upon exam in the ED she was found to have acute calculous cholecystis  Her coumadin was held and when her INR was in an acceptable range she was taken to the operating room for laparoscopic cholecystectomy  She tolerated the procedure well and was restarted on her coumadin and therapeutic lovenox post operatively  On POD 1 she was tolerating a low fat diet and able to continue therapeutic lovenox at home  She was medically stable for discharge and incidental findings were discussed with patient       Significant Findings, Care, Treatment and Services Provided: Acute calculous cholecystitis     Complications: None    Discharge Diagnosis: Acute calculous cholecystitis    Medical Problems     Resolved Problems  Date Reviewed: 8/31/2022   None         Condition at Discharge: good         Discharge instructions/Information to patient and family:   See after visit summary for information provided to patient and family  Provisions for Follow-Up Care:  See after visit summary for information related to follow-up care and any pertinent home health orders  PCP: PEACE Kaur    Disposition: See After Visit Summary for discharge disposition information  Planned Readmission: No      Discharge Statement   I spent 25 minutes discharging the patient  This time was spent on the day of discharge  I had direct contact with the patient on the day of discharge  Additional documentation is required if more than 30 minutes were spent on discharge  Discharge Medications:  See after visit summary for reconciled discharge medications provided to patient and family

## 2023-02-06 NOTE — UTILIZATION REVIEW
Continued Stay Review    Date: 02/04/2023 02/05/2023                   Current Patient Class: Inpatient  Current Level of Care: Med/Surg    HPI:57 y o  female initially admitted on 02/03/2023     Assessment/Plan: Acute Cholecystitis  02/04/2023   Pt denies abd pain, nausea and vomiting today  Abd soft ND, non TTP  Has been NPO after MN for lap dakotah with possible open today,  IV flds, continue pain control and ABXs, OOB and ambulation, inc spirometry use    Lovenox for DVT prophylaxis    02/05/2023  SURGERY DATE: 2/5/2023  Procedure(s):  LAPAROSCOPIC CHOLECYSTECTOMY  Anesthesia Type:   General  Operative Findings:   acute calculous cholecystitis    Bridging Lovenox, restart warfarin, and repeat INR tomorrow AM  Low-fat diet, discontinue IVFs, analgesics PRN, plan for discharge once INR therapeutic  VTE Pharmacologic Prophylaxis: Enoxaparin (Lovenox)  VTE Mechanical Prophylaxis: sequential compression device    02/26/2023   POD1 s/p laparoscopic cholecystectomy  Continue Lovenox bridge to therapeutic INR levels  AM INR pending, will redose Warfarin as appropriate and plan for discharge today with a Lovenox prescription  Low-fat diet as tolerated  Multimodal analgesia  OOB / ambulation as tolerated  Incentive spirometer use        Vital Signs:   02/05/23 21:27:02 97 9 °F (36 6 °C) 60 14 131/82 98 93 % -- -- -- --   02/05/23 18:43:41 -- 68 -- 135/82 100 94 % -- -- -- --   02/05/23 1843 -- -- -- 135/82 -- -- -- -- -- --   02/05/23 14:17:42 97 5 °F (36 4 °C) 75 19 151/99 116 95 % -- -- -- --   02/05/23 13:24:53 97 3 °F (36 3 °C) Abnormal  58 16 140/93 109 97 % -- -- -- --   02/05/23 12:16:40 97 7 °F (36 5 °C) 57 -- 139/93 108 96 % -- -- -- --   02/05/23 11:14:34 97 5 °F (36 4 °C) 56 18 144/87 106 96 % -- -- -- --   02/05/23 1045 98 °F (36 7 °C) 66 18 132/67 96 99 % 28 2 L/min Nasal cannula WDL   02/05/23 1030 -- 70 18 133/77 97 99 % -- -- -- --   02/05/23 1025 97 5 °F (36 4 °C) 70 18 150/62 94 100 % 28 2 L/min Nasal cannula WDL   02/05/23 07:36:41 97 7 °F (36 5 °C) 57 16 140/89 106 93 % -- -- -- --   02/04/23 21:50:22 97 5 °F (36 4 °C) 54 Abnormal  -- 146/90 109 95 % -- -- -- --   02/04/23 15:25:55 97 7 °F (36 5 °C) 64 18 147/86 106 97 % -- -- -- --   02/04/23 12:30:09 97 3 °F (36 3 °C) Abnormal  57 16 144/86 105 97 % -- -- -- --   02/04/23 12:24:21 97 5 °F (36 4 °C) 55 16 147/83 104 97 % -- -- -- --   02/04/23 12:10:59 97 5 °F (36 4 °C) 55 16 147/85 106 98 % -- -- -- --   02/04/23 11:39:17 97 5 °F (36 4 °C) 57 16 152/87 109 95 % -- -- -- --   02/04/23 1139 97 5 °F (36 4 °C) 58 16 152/87 -- -- -- -- -- --   02/04/23 1124 -- -- 16 -- -- -- -- -- -- --   02/04/23 11:23:54 97 3 °F (36 3 °C) Abnormal  62 16 151/87 108 95 % -- -- -- --   02/04/23 11:16:18 97 5 °F (36 4 °C) 50 Abnormal  20 -- -- 98 % -- -- -- --   02/04/23 1110 97 5 °F (36 4 °C) 51 Abnormal  18 136/85 -- -- -- -- -- --   02/04/23 07:24:31 97 7 °F (36 5 °C) 59 16 129/85 100 97 % -- -- -- --           Pertinent Labs/Diagnostic Results:     Results from last 7 days   Lab Units 02/05/23  0411 02/04/23  0414 02/03/23  0815   WBC Thousand/uL 5 28 5 64 7 27   HEMOGLOBIN g/dL 11 7 11 3* 13 0   HEMATOCRIT % 35 2 33 4* 39 0   PLATELETS Thousands/uL 193 205 229   NEUTROS ABS Thousands/µL 2 93  --  4 72     Results from last 7 days   Lab Units 02/05/23  0411 02/04/23  0414 02/03/23  0815   SODIUM mmol/L 140 141 140   POTASSIUM mmol/L 4 0 4 1 4 8   CHLORIDE mmol/L 110* 112* 111*   CO2 mmol/L 23 25 25   ANION GAP mmol/L 7 4 4   BUN mg/dL 10 15 26*   CREATININE mg/dL 0 76 0 84 0 97   EGFR ml/min/1 73sq m 88 78 65   CALCIUM mg/dL 8 2* 7 6* 8 7   MAGNESIUM mg/dL  --  1 6  --      Results from last 7 days   Lab Units 02/05/23  0411 02/04/23  0414 02/03/23  1256   AST U/L 60* 110* 268*   ALT U/L 77 137* 203*   ALK PHOS U/L 119* 126* 158*   TOTAL PROTEIN g/dL 6 8 6 2* 7 0   ALBUMIN g/dL 3 0* 2 8* 3 0*   TOTAL BILIRUBIN mg/dL 0 75 0 96 0 87   BILIRUBIN DIRECT mg/dL  -- --  0 14     Results from last 7 days   Lab Units 02/05/23  1030   POC GLUCOSE mg/dl 151*     Results from last 7 days   Lab Units 02/05/23  0411 02/04/23  0414 02/03/23  0815   GLUCOSE RANDOM mg/dL 77 82 124     Results from last 7 days   Lab Units 02/03/23  1256 02/03/23  1033 02/03/23  0815   HS TNI 0HR ng/L  --   --  4   HS TNI 2HR ng/L  --  5  --    HSTNI D2 ng/L  --  1  --    HS TNI 4HR ng/L 4  --   --    HSTNI D4 ng/L 0  --   --      Results from last 7 days   Lab Units 02/03/23  0815   D-DIMER QUANTITATIVE ug/ml FEU <0 27     Results from last 7 days   Lab Units 02/06/23  0538 02/05/23  0533 02/04/23  1822 02/04/23  0414 02/03/23  0815   PROTIME seconds 14 8* 14 4 16 4*   < > 21 9*   INR  1 13 1 10 1 30*   < > 1 89*   PTT seconds  --   --   --   --  25    < > = values in this interval not displayed  Medications:   Legend:                          Inactive     Active     Other Encounter     Linked               Medications 02/03 02/04 02/05 02/06   ceFAZolin (ANCEF) IVPB (premix in dextrose) 2,000 mg 50 mL  Dose: 2,000 mg  Freq: Every 8 hours Route: IV  Last Dose: 2,000 mg (02/05/23 0507)  Start: 02/03/23 1245 End: 02/05/23 1117   Admin Instructions:   Look alike sound alike  Order specific questions:   Indication: intra-abdominal infection    1358     1428     2121      0402     1254     2036      0507     0838     1117     1117-D/C'd       cinacalcet (SENSIPAR) tablet 30 mg  Dose: 30 mg  Freq: Daily Route: PO  Start: 02/03/23 2215   Admin Instructions:   Swallow whole; do not crush, chew or split  7306      9610      1536     1117     2152      2100        enoxaparin (LOVENOX) subcutaneous injection 40 mg  Dose: 40 mg  Freq: Every 24 hours scheduled Route: SC  Start: 02/04/23 0900 End: 02/06/23 6304   Admin Instructions:   High Alert Medication, Check for allergies to pork or pork derivatives/dietary restrictions before administration   LOOK ALIKE [facility] MED     (8596) [C]      1256 (0900)     1112      0852-D/C'd      enoxaparin (LOVENOX) subcutaneous injection 90 mg  Dose: 1 mg/kg  Weight Dosing Info: 86 6 kg  Freq: Every 12 hours scheduled Route: SC  Start: 02/05/23 2100   Admin Instructions:   Please note dose is 90 mg  Only give pt 0 9 mL  High alert medication  Check for allergies to pork or pork derivatives/dietary restrictions before administration  LOOK ALIKE SOUND ALIKE MED      8215 3715     2100        HYDROmorphone (DILAUDID) injection 0 5 mg  Dose: 0 5 mg  Freq: Once Route: IV  Start: 02/03/23 1100 End: 02/03/23 1107   Admin Instructions:   High alert medication  [facility]    1107           levothyroxine tablet 112 mcg  Dose: 112 mcg  Freq: Daily Route: PO  Start: 02/04/23 0900   Admin Instructions:   Administer in the morning on an empty stomach, at least 30 to 60 minutes before food  Tablets may be crushed and suspended in 5-10mls of water for administration  [facility]      257      0836     (0900)     1115      1023        lisinopril (ZESTRIL) tablet 10 mg  Dose: 10 mg  Freq: Daily Route: PO  Start: 02/05/23 1800   Admin Instructions:   LOOK ALIKE S[facility] ALI[facility] MED   Order specific questions:   Hold for systolic blood pressure less than (mmHg) 125      1843      (9298)        metroNIDAZOLE (FLAGYL) IVPB (premix) 500 mg 100 mL  Dose: 500 mg  Freq: Every 8 hours Route: IV  Last Dose: 500 mg (02/05/23 0402)  Start: 02/03/23 1245 End: 02/05/23 1117   Admin Instructions:   Avoid ethanol during therapy and for 3 days after discontinuation  Protect from light  Do NOT refrigerate  LOOK [facility]   Order specific questions:   Indication: intra-abdominal infection    5553     7623 4500 7197 1390 7700 3172     0802     111     1117-D/C'd       NIFEdipine (PROCARDIA XL) 24 hr tablet 30 mg  Dose: 30 mg  Freq: Daily Route: PO  Start: 02/03/23 2130   Admin Instructions:   Swallow whole; do not crush, chew or split  LOOK ALIKE S[facility] ALI[facility] MED   Order specific questions:   Hold for systolic blood pressure less than (mmHg) 110    2134      2219      0838     1117     2152      2100        ondansetron (ZOFRAN) injection 4 mg  Dose: 4 mg  Freq: Once Route: IV  Start: 02/03/23 0815 End: 02/03/23 0816   Admin Instructions:   Push over 2 minutes  6688           phytonadione (AQUA-MEPHYTON) 10 mg/mL 10 mg in sodium chloride 0 9 % 50 mL IVPB  Dose: 10 mg  Freq: Once Route: IV  Last Dose: 10 mg (02/04/23 0953)  Start: 02/04/23 0900 End: 02/04/23 1023   Admin Instructions:   Protect from light  LOOK [facility] MED     8293          spironolactone (ALDACTONE) tablet 25 mg  Dose: 25 mg  Freq: Daily Route: PO  Start: 02/06/23 0900   Order specific questions:   Hold for systolic blood pressure less than (mmHg) 125       (3518)        warfarin (COUMADIN) tablet 5 mg  Dose: 5 mg  Freq: Once (warfarin) Route: PO  Start: 02/05/23 1800 End: 02/05/23 1727   Admin Instructions:   High alert medication  Food-drug interaction teaching and documentation must be done   **DISPOSE EMPTY PACKAGING AND LEFTOVER MEDICATION IN 8 GALLON BLACK CONTAINER**   Order specific questions:   Indication: INR goal: 2-3      1727         Medications 02/03 02/04 02/05 02/06         Continuous Meds Sorted by Name  Medications 02/03 02/04 02/05 02/06   sodium chloride 0 9 % infusion  Freq: Continuous PRN Route: IV  Start: 02/05/23 0900 End: 02/05/23 1023      0900     1016     1023-D/C'd       sodium chloride 0 9 % infusion  Rate: 125 mL/hr Dose: 125 mL/hr  Freq: Continuous Route: IV  Last Dose: Stopped (02/05/23 1118)  Start: 02/05/23 0001 End: 02/05/23 1117      0000     1117-D/C'd  1118         sodium chloride 0 9 % infusion  Rate: 125 mL/hr Dose: 125 mL/hr  Freq: Continuous Route: IV  Last Dose: Stopped (02/04/23 1255)  Start: 02/03/23 1245 End: 02/04/23 1230    1524      1230-D/C'd  1255                PRN Meds Sorted by Name  Medications 02/03 02/04 02/05 02/06   acetaminophen (TYLENOL) tablet 650 mg  Dose: 650 mg  Freq: Every 6 hours PRN Route: PO  PRN Reason: mild pain  Indications of Use: FEVER,HEADACHE,MILD PAIN  Start: 02/03/23 1230      0838     1117         bupivacaine-epinephrine (PF) (MARCAINE/EPINEPHRINE PF) 0 5 %-1:[id number] injection  Freq: As needed  Start: 02/05/23 1013 End: 02/05/23 1023      1013     1023-D/C'd       ceFAZolin (ANCEF) IVPB (premix in dextrose)  Freq: As needed Route: IV  Start: 02/05/23 1008 End: 02/05/23 1023      1008     1023-D/C'd       dexamethasone (PF) (DECADRON) injection  Freq: As needed Route: IV  Start: 02/05/23 0910 End: 02/05/23 1023      0910     1023-D/C'd       dexamethasone (PF) (DECADRON) injection  Freq: As needed Route: IV  Start: 02/05/23 0908 End: 02/05/23 1023      0908     1023-D/C'd       diphenhydrAMINE (BENADRYL) injection 25 mg  Dose: 25 mg  Freq: Every 6 hours PRN Route: IV  PRN Reason: itching  Start: 02/03/23 1231   Admin Instructions: For I V  administration, inject at a rate less than or equal to 25 mg/minute      0838     1117         fentaNYL (SUBLIMAZE) injection 25 mcg  Dose: 25 mcg  Freq: Every 3 minutes PRN Route: IV  PRN Reason: Pain - PACU  PRN Comment: Breakthrough - first line  Start: 02/05/23 1021 End: 02/05/23 1118   Admin Instructions:   ANALGESICS Select a single agent  If more than one agent selected, indicate sequence to follow  NOTE: If pain score greater than 3 after at least 2 doses of medication, the nurse may administer the next sequenced medication  Moving to the next sequenced medication discontinues the previous medication in the sequence  High Alert medication   LOOK [facility] MED      1974     8988 6292     1118-D/C'd       fentanyl citrate (PF) 100 MCG/2ML  Freq: As needed Route: IV  Start: 02/05/23 0908 End: 02/05/23 1023      0908     1023-D/C'd       HYDROmorphone (DILAUDID) injection 0 5 mg  Dose: 0 5 mg  Freq: Every 4 hours PRN Route: IV  PRN Reason: breakthrough pain  Start: 02/03/23 1247   Admin Instructions:   High alert medication  LOOK [facility] MED      8354     1117         iohexol (OMNIPAQUE) 350 MG/ML injection (SINGLE-DOSE) 100 mL  Dose: 100 mL  Freq: Once in imaging Route: IV  PRN Reason: contrast  Start: 02/03/23 0858 End: 02/03/23 0858    0858           ketorolac (TORADOL) injection  Freq: As needed Route: IV  Start: 02/05/23 1005 End: 02/05/23 1023      1005     1023-D/C'd       lidocaine (cardiac) injection  Freq: As needed Route: IV  Start: 02/05/23 0908 End: 02/05/23 1023      0908     1023-D/C'd       ondansetron (ZOFRAN) injection  Freq: As needed Route: IV  Start: 02/05/23 1005 End: 02/05/23 1023      1005     1023-D/C'd       ondansetron (ZOFRAN) injection 4 mg  Dose: 4 mg  Freq: Every 4 hours PRN Route: IV  PRN Reason: nausea  PRN Comment: First Line For Nausea  Start: 02/05/23 1021 End: 02/05/23 1118   Admin Instructions:   Push over 2 minutes  1118-D/C'd       ondansetron (ZOFRAN) injection 4 mg  Dose: 4 mg  Freq: Every 6 hours PRN Route: IV  PRN Reasons: nausea,vomiting  Start: 02/03/23 1230   Admin Instructions:   Push over 2 minutes  3535     1117         oxyCODONE (ROXICODONE) immediate release tablet 10 mg  Dose: 10 mg  Freq: Every 4 hours PRN Route: PO  PRN Reason: severe pain  Start: 02/03/23 1246   Admin Instructions:   [facility]      0935     0909      0657        oxyCODONE (ROXICODONE) IR tablet 5 mg  Dose: 5 mg  Freq: Every 4 hours PRN Route: PO  PRN Reason: moderate pain  Start: 02/03/23 1245   Admin Instructions:   High alert medication   LOOK [facility] MED      [id number]     1117         phenylephrine 1,000 mcg/10 mL prefilled syringe  Freq: As needed Route: IV  Start: 02/05/23 0921 End: 02/05/23 1023      0921     0923     1023-D/C'd       propofol (DIPRIVAN) 200 MG/20ML bolus injection  Freq: As needed Route: IV  Start: 02/05/23 0908 End: 02/05/23 1023      0908     1023-D/C'd ROCuronium (ZEMURON) injection  Freq: As needed Route: IV  Start: 02/05/23 0908 End: 02/05/23 1023      0908     0915     1023-D/C'd       sodium chloride 0 9 % infusion  Freq: Continuous PRN Route: IV  Start: 02/05/23 0900 End: 02/05/23 1023      0900     1016     1023-D/C'd       sodium chloride 0 9 % irrigation solution  Freq: As needed  Start: 02/05/23 0928 End: 02/05/23 1023           1000     1023-D/C'd       sterile water irrigation solution  Freq: As needed  Start: 02/05/23 2216 End: 02/05/23 1023      0928     1023-D/C'd       Succinylcholine Chloride 100 mg/5 mL syringe  Freq: As needed Route: IV  Start: 02/05/23 0908 End: 02/05/23 1023      0908     1023-D/C'd       Sugammadex Sodium (BRIDION)  Freq: As needed Route: IV  Start: 02/05/23 1010 End: 02/05/23 1023      1010     1023-D/C'd       Medications 02/03 02/04 02/05 02/06         Discharge Plan: TBD    Network Utilization Review Department  ATTENTION: Please call with any questions or concerns to [phone] and carefully listen to the prompts so that you are directed to the right person  All voicemails are confidential   Eleanor Short all requests for admission clinical reviews, approved or denied determinations and any other requests to dedicated fax number below belonging to the campus where the patient is receiving treatment   List of dedicated fax numbers for the Facilities:  [address] DENIALS (Administrative/Medical Necessity) [phone]   [address] (Maternity/NICU/Pediatrics) [phone]   [address] Judie [address] [phone]   Patricio Blake  [phone]   [facility] [place] [address] Medical [address] [place] [address] [facility] 28 [phone] [address] [place] [phone]   [address] [phone]

## 2023-02-06 NOTE — PLAN OF CARE
Problem: PAIN - ADULT  Goal: Verbalizes/displays adequate comfort level or baseline comfort level  Description: Interventions:  - Encourage patient to monitor pain and request assistance  - Assess pain using appropriate pain scale  - Administer analgesics based on type and severity of pain and evaluate response  - Implement non-pharmacological measures as appropriate and evaluate response  - Consider cultural and social influences on pain and pain management  - Notify physician/advanced practitioner if interventions unsuccessful or patient reports new pain  Outcome: Progressing     Problem: INFECTION - ADULT  Goal: Absence or prevention of progression during hospitalization  Description: INTERVENTIONS:  - Assess and monitor for signs and symptoms of infection  - Monitor lab/diagnostic results  - Monitor all insertion sites, i e  indwelling lines, tubes, and drains  - Monitor endotracheal if appropriate and nasal secretions for changes in amount and color  - [place] appropriate cooling/warming therapies per order  - Administer medications as ordered  - Instruct and encourage patient and family to use good hand hygiene technique  - Identify and instruct in appropriate isolation precautions for identified infection/condition  Outcome: Progressing  Goal: Absence of fever/infection during neutropenic period  Description: INTERVENTIONS:  - Monitor WBC    Outcome: Progressing     Problem: SAFETY ADULT  Goal: Maintains/Returns to pre admission functional level  Description: INTERVENTIONS:  - Perform BMAT or MOVE assessment daily    - Set and communicate daily mobility goal to care team and patient/family/caregiver     - Collaborate with rehabilitation services on mobility goals if consulted  - Out of bed for toileting  - Record patient progress and toleration of activity level   Outcome: Progressing     Problem: DISCHARGE PLANNING  Goal: Discharge to home or other facility with appropriate resources  Description: INTERVENTIONS:  - Identify barriers to discharge w/patient and caregiver  - Arrange for needed discharge resources and transportation as appropriate  - Identify discharge learning needs (meds, wound care, etc )  - Arrange for interpretive services to assist at discharge as needed  - Refer to Case Management Department for coordinating discharge planning if the patient needs post-hospital services based on physician/advanced practitioner order or complex needs related to functional status, cognitive ability, or social support system  Outcome: Progressing

## 2023-02-08 ENCOUNTER — LAB (OUTPATIENT)
Dept: LAB | Age: 58
End: 2023-02-08

## 2023-02-08 DIAGNOSIS — E83.52 HYPERCALCEMIA: ICD-10-CM

## 2023-02-08 DIAGNOSIS — E55.9 VITAMIN D DEFICIENCY: ICD-10-CM

## 2023-02-08 DIAGNOSIS — E21.0 PRIMARY HYPERPARATHYROIDISM (HCC): ICD-10-CM

## 2023-02-08 DIAGNOSIS — E89.0 POSTOPERATIVE HYPOTHYROIDISM: ICD-10-CM

## 2023-02-08 DIAGNOSIS — K81.0 ACUTE CHOLECYSTITIS: ICD-10-CM

## 2023-02-08 LAB
INR PPP: 1.21 (ref 0.84–1.19)
PROTHROMBIN TIME: 15.5 SECONDS (ref 11.6–14.5)

## 2023-02-08 NOTE — UTILIZATION REVIEW
NOTIFICATION OF ADMISSION DISCHARGE   This is a Notification of Discharge from [address]  Please be advised that this patient has been discharge from our facility  Below you will find the admission and discharge date and time including the patient’s disposition  UTILIZATION REVIEW CONTACT:  Adore Madden  Utilization   Network Utilization Review Department  Phone: [phone] x carefully listen to the prompts  All voicemails are confidential   Email: [email or web address]     ADMISSION INFORMATION  PRESENTATION DATE: 2/3/2023  7:31 AM  OBERVATION ADMISSION DATE:   INPATIENT ADMISSION DATE: 2/3/23 12:31 PM   DISCHARGE DATE: 2/6/2023 11:29 AM   DISPOSITION:Home/Self Care    IMPORTANT INFORMATION:  Send all requests for admission clinical reviews, approved or denied determinations and any other requests to dedicated fax number below belonging to the campus where the patient is receiving treatment   List of dedicated fax numbers:  [address] DENIALS (Administrative/Medical Necessity) [phone]   [address] (Maternity/NICU/Pediatrics) [phone]   [facility] [phone]   [facility] 87 [phone]   [address] [phone]   220 [facility] [phone] 90 [facility] [phone]   [address] [place], [place] [address] 119 [phone]   [facility]  [phone]   40[address] [facility] [phone] 412 [facility] [address] [facility] [phone]

## 2023-02-09 ENCOUNTER — APPOINTMENT (OUTPATIENT)
Dept: RADIOLOGY | Facility: HOSPITAL | Age: 58
End: 2023-02-09

## 2023-02-09 ENCOUNTER — TELEPHONE (OUTPATIENT)
Dept: ENDOCRINOLOGY | Facility: CLINIC | Age: 58
End: 2023-02-09

## 2023-02-09 ENCOUNTER — APPOINTMENT (EMERGENCY)
Dept: RADIOLOGY | Facility: HOSPITAL | Age: 58
End: 2023-02-09

## 2023-02-09 ENCOUNTER — TELEPHONE (OUTPATIENT)
Dept: NEPHROLOGY | Facility: HOSPITAL | Age: 58
End: 2023-02-09

## 2023-02-09 ENCOUNTER — HOSPITAL ENCOUNTER (INPATIENT)
Facility: HOSPITAL | Age: 58
LOS: 6 days | Discharge: HOME | End: 2023-02-18
Attending: EMERGENCY MEDICINE | Admitting: SURGERY

## 2023-02-09 DIAGNOSIS — E89.0 POSTOPERATIVE HYPOTHYROIDISM: Primary | ICD-10-CM

## 2023-02-09 DIAGNOSIS — R55 SYNCOPE: ICD-10-CM

## 2023-02-09 DIAGNOSIS — N18.2 CHRONIC KIDNEY DISEASE (CKD), STAGE II (MILD): ICD-10-CM

## 2023-02-09 DIAGNOSIS — N17.9 AKI (ACUTE KIDNEY INJURY) (HCC): Primary | ICD-10-CM

## 2023-02-09 PROBLEM — D64.9 ANEMIA: Status: ACTIVE | Noted: 2023-02-09

## 2023-02-09 PROBLEM — E86.1 HYPOTENSION DUE TO HYPOVOLEMIA: Status: ACTIVE | Noted: 2023-02-09

## 2023-02-09 PROBLEM — I95.89 HYPOTENSION DUE TO HYPOVOLEMIA: Status: ACTIVE | Noted: 2023-02-09

## 2023-02-09 LAB
25(OH)D3 SERPL-MCNC: 27.2 NG/ML (ref 30–100)
2HR DELTA HS TROPONIN: 2 NG/L
4HR DELTA HS TROPONIN: 1 NG/L
ABO GROUP BLD: NORMAL
ABO GROUP BLD: NORMAL
ALBUMIN SERPL BCP-MCNC: 2.3 G/DL (ref 3.5–5)
ALBUMIN SERPL BCP-MCNC: 3.2 G/DL (ref 3.5–5)
ALP SERPL-CCNC: 77 U/L (ref 46–116)
ALT SERPL W P-5'-P-CCNC: 80 U/L (ref 12–78)
ANION GAP SERPL CALCULATED.3IONS-SCNC: 11 MMOL/L (ref 4–13)
ANION GAP SERPL CALCULATED.3IONS-SCNC: 3 MMOL/L (ref 4–13)
APTT PPP: 40 SECONDS (ref 23–37)
AST SERPL W P-5'-P-CCNC: 86 U/L (ref 5–45)
BACTERIA UR QL AUTO: ABNORMAL /HPF
BASOPHILS # BLD AUTO: 0.02 THOUSANDS/ÂΜL (ref 0–0.1)
BASOPHILS NFR BLD AUTO: 0 % (ref 0–1)
BILIRUB SERPL-MCNC: 0.41 MG/DL (ref 0.2–1)
BILIRUB UR QL STRIP: NEGATIVE
BLD GP AB SCN SERPL QL: POSITIVE
BLOOD GROUP ANTIBODIES SERPL: NORMAL
BUN SERPL-MCNC: 24 MG/DL (ref 5–25)
BUN SERPL-MCNC: 37 MG/DL (ref 5–25)
CALCIUM ALBUM COR SERPL-MCNC: 9 MG/DL (ref 8.3–10.1)
CALCIUM SERPL-MCNC: 7.6 MG/DL (ref 8.3–10.1)
CALCIUM SERPL-MCNC: 8.3 MG/DL (ref 8.3–10.1)
CARDIAC TROPONIN I PNL SERPL HS: 4 NG/L
CARDIAC TROPONIN I PNL SERPL HS: 5 NG/L
CARDIAC TROPONIN I PNL SERPL HS: 6 NG/L
CHLORIDE SERPL-SCNC: 108 MMOL/L (ref 96–108)
CHLORIDE SERPL-SCNC: 110 MMOL/L (ref 96–108)
CLARITY UR: ABNORMAL
CO2 SERPL-SCNC: 22 MMOL/L (ref 21–32)
CO2 SERPL-SCNC: 24 MMOL/L (ref 21–32)
COLOR UR: YELLOW
CREAT SERPL-MCNC: 1.38 MG/DL (ref 0.6–1.3)
CREAT SERPL-MCNC: 2.1 MG/DL (ref 0.6–1.3)
EOSINOPHIL # BLD AUTO: 0.02 THOUSAND/ÂΜL (ref 0–0.61)
EOSINOPHIL NFR BLD AUTO: 0 % (ref 0–6)
ERYTHROCYTE [DISTWIDTH] IN BLOOD BY AUTOMATED COUNT: 13 % (ref 11.6–15.1)
ERYTHROCYTE [DISTWIDTH] IN BLOOD BY AUTOMATED COUNT: 13.2 % (ref 11.6–15.1)
FLUAV RNA RESP QL NAA+PROBE: NEGATIVE
FLUBV RNA RESP QL NAA+PROBE: NEGATIVE
GFR SERPL CREATININE-BSD FRML MDRD: 25 ML/MIN/1.73SQ M
GFR SERPL CREATININE-BSD FRML MDRD: 43 ML/MIN/1.73SQ M
GLUCOSE P FAST SERPL-MCNC: 99 MG/DL (ref 65–99)
GLUCOSE SERPL-MCNC: 142 MG/DL (ref 65–140)
GLUCOSE UR STRIP-MCNC: NEGATIVE MG/DL
HCT VFR BLD AUTO: 20 % (ref 34.8–46.1)
HCT VFR BLD AUTO: 20.8 % (ref 34.8–46.1)
HCT VFR BLD AUTO: 25 % (ref 34.8–46.1)
HGB BLD-MCNC: 6.6 G/DL (ref 11.5–15.4)
HGB BLD-MCNC: 6.9 G/DL (ref 11.5–15.4)
HGB BLD-MCNC: 8.1 G/DL (ref 11.5–15.4)
HGB UR QL STRIP.AUTO: ABNORMAL
HYALINE CASTS #/AREA URNS LPF: ABNORMAL /LPF
IMM GRANULOCYTES # BLD AUTO: 0.1 THOUSAND/UL (ref 0–0.2)
IMM GRANULOCYTES NFR BLD AUTO: 1 % (ref 0–2)
INR PPP: 1.38 (ref 0.84–1.19)
KETONES UR STRIP-MCNC: NEGATIVE MG/DL
LEUKOCYTE ESTERASE UR QL STRIP: NEGATIVE
LIPASE SERPL-CCNC: 132 U/L (ref 73–393)
LYMPHOCYTES # BLD AUTO: 0.95 THOUSANDS/ÂΜL (ref 0.6–4.47)
LYMPHOCYTES NFR BLD AUTO: 13 % (ref 14–44)
MAGNESIUM SERPL-MCNC: 1.6 MG/DL (ref 1.6–2.6)
MCH RBC QN AUTO: 29.3 PG (ref 26.8–34.3)
MCH RBC QN AUTO: 30.3 PG (ref 26.8–34.3)
MCHC RBC AUTO-ENTMCNC: 32.4 G/DL (ref 31.4–37.4)
MCHC RBC AUTO-ENTMCNC: 33 G/DL (ref 31.4–37.4)
MCV RBC AUTO: 91 FL (ref 82–98)
MCV RBC AUTO: 92 FL (ref 82–98)
MONOCYTES # BLD AUTO: 0.69 THOUSAND/ÂΜL (ref 0.17–1.22)
MONOCYTES NFR BLD AUTO: 10 % (ref 4–12)
MUCOUS THREADS UR QL AUTO: ABNORMAL
NEUTROPHILS # BLD AUTO: 5.51 THOUSANDS/ÂΜL (ref 1.85–7.62)
NEUTS SEG NFR BLD AUTO: 76 % (ref 43–75)
NITRITE UR QL STRIP: NEGATIVE
NON-SQ EPI CELLS URNS QL MICRO: ABNORMAL /HPF
NRBC BLD AUTO-RTO: 0 /100 WBCS
PH UR STRIP.AUTO: 5.5 [PH] (ref 4.5–8)
PLATELET # BLD AUTO: 134 THOUSANDS/UL (ref 149–390)
PLATELET # BLD AUTO: 164 THOUSANDS/UL (ref 149–390)
PMV BLD AUTO: 10.5 FL (ref 8.9–12.7)
PMV BLD AUTO: 10.6 FL (ref 8.9–12.7)
POTASSIUM SERPL-SCNC: 3.6 MMOL/L (ref 3.5–5.3)
POTASSIUM SERPL-SCNC: 4 MMOL/L (ref 3.5–5.3)
PROT SERPL-MCNC: 5.4 G/DL (ref 6.4–8.4)
PROT UR STRIP-MCNC: NEGATIVE MG/DL
PROTHROMBIN TIME: 17.2 SECONDS (ref 11.6–14.5)
PTH-INTACT SERPL-MCNC: 146.6 PG/ML (ref 18.4–80.1)
RBC # BLD AUTO: 2.18 MILLION/UL (ref 3.81–5.12)
RBC # BLD AUTO: 2.76 MILLION/UL (ref 3.81–5.12)
RBC #/AREA URNS AUTO: ABNORMAL /HPF
RH BLD: POSITIVE
RH BLD: POSITIVE
RSV RNA RESP QL NAA+PROBE: NEGATIVE
SARS-COV-2 RNA RESP QL NAA+PROBE: NEGATIVE
SODIUM SERPL-SCNC: 137 MMOL/L (ref 135–147)
SODIUM SERPL-SCNC: 141 MMOL/L (ref 135–147)
SP GR UR STRIP.AUTO: 1.02 (ref 1–1.03)
SPECIMEN EXPIRATION DATE: NORMAL
T4 FREE SERPL-MCNC: 1.71 NG/DL (ref 0.76–1.46)
TSH SERPL DL<=0.05 MIU/L-ACNC: 2.27 UIU/ML (ref 0.45–4.5)
UROBILINOGEN UR QL STRIP.AUTO: 0.2 E.U./DL
WBC # BLD AUTO: 10.53 THOUSAND/UL (ref 4.31–10.16)
WBC # BLD AUTO: 7.29 THOUSAND/UL (ref 4.31–10.16)
WBC #/AREA URNS AUTO: ABNORMAL /HPF

## 2023-02-09 PROCEDURE — 30233N1 TRANSFUSION OF NONAUTOLOGOUS RED BLOOD CELLS INTO PERIPHERAL VEIN, PERCUTANEOUS APPROACH: ICD-10-PCS | Performed by: STUDENT IN AN ORGANIZED HEALTH CARE EDUCATION/TRAINING PROGRAM

## 2023-02-09 RX ORDER — WARFARIN SODIUM 3 MG/1
3 TABLET ORAL
Status: DISCONTINUED | OUTPATIENT
Start: 2023-02-09 | End: 2023-02-09

## 2023-02-09 RX ORDER — ACETAMINOPHEN 325 MG/1
650 TABLET ORAL EVERY 6 HOURS SCHEDULED
Status: DISCONTINUED | OUTPATIENT
Start: 2023-02-10 | End: 2023-02-18 | Stop reason: HOSPADM

## 2023-02-09 RX ORDER — SODIUM CHLORIDE 9 MG/ML
125 INJECTION, SOLUTION INTRAVENOUS CONTINUOUS
Status: DISCONTINUED | OUTPATIENT
Start: 2023-02-09 | End: 2023-02-10

## 2023-02-09 RX ORDER — NIFEDIPINE 30 MG/1
30 TABLET, EXTENDED RELEASE ORAL DAILY
Status: DISCONTINUED | OUTPATIENT
Start: 2023-02-09 | End: 2023-02-09

## 2023-02-09 RX ORDER — OXYCODONE HYDROCHLORIDE 5 MG/1
5 TABLET ORAL EVERY 6 HOURS PRN
Status: DISPENSED | OUTPATIENT
Start: 2023-02-09 | End: 2023-02-16

## 2023-02-09 RX ORDER — IODIXANOL 320 MG/ML
100 INJECTION, SOLUTION INTRAVASCULAR
Status: COMPLETED | OUTPATIENT
Start: 2023-02-09 | End: 2023-02-09

## 2023-02-09 RX ORDER — SODIUM CHLORIDE, SODIUM GLUCONATE, SODIUM ACETATE, POTASSIUM CHLORIDE, MAGNESIUM CHLORIDE, SODIUM PHOSPHATE, DIBASIC, AND POTASSIUM PHOSPHATE .53; .5; .37; .037; .03; .012; .00082 G/100ML; G/100ML; G/100ML; G/100ML; G/100ML; G/100ML; G/100ML
1000 INJECTION, SOLUTION INTRAVENOUS ONCE
Status: COMPLETED | OUTPATIENT
Start: 2023-02-09 | End: 2023-02-09

## 2023-02-09 RX ORDER — LEVOTHYROXINE SODIUM 112 UG/1
112 TABLET ORAL DAILY
Status: DISCONTINUED | OUTPATIENT
Start: 2023-02-10 | End: 2023-02-18 | Stop reason: HOSPADM

## 2023-02-09 RX ORDER — ONDANSETRON 2 MG/ML
4 INJECTION INTRAMUSCULAR; INTRAVENOUS EVERY 6 HOURS PRN
Status: DISCONTINUED | OUTPATIENT
Start: 2023-02-09 | End: 2023-02-18 | Stop reason: HOSPADM

## 2023-02-09 RX ORDER — ENOXAPARIN SODIUM 100 MG/ML
1 INJECTION SUBCUTANEOUS EVERY 12 HOURS SCHEDULED
Status: DISCONTINUED | OUTPATIENT
Start: 2023-02-09 | End: 2023-02-09

## 2023-02-09 RX ADMIN — SODIUM CHLORIDE 125 ML/HR: 0.9 INJECTION, SOLUTION INTRAVENOUS at 12:55

## 2023-02-09 RX ADMIN — SODIUM CHLORIDE, SODIUM GLUCONATE, SODIUM ACETATE, POTASSIUM CHLORIDE, MAGNESIUM CHLORIDE, SODIUM PHOSPHATE, DIBASIC, AND POTASSIUM PHOSPHATE 1000 ML: .53; .5; .37; .037; .03; .012; .00082 INJECTION, SOLUTION INTRAVENOUS at 10:32

## 2023-02-09 RX ADMIN — OXYCODONE HYDROCHLORIDE 5 MG: 5 TABLET ORAL at 16:46

## 2023-02-09 RX ADMIN — IODIXANOL 100 ML: 320 INJECTION, SOLUTION INTRAVASCULAR at 22:08

## 2023-02-09 RX ADMIN — OXYCODONE HYDROCHLORIDE 5 MG: 5 TABLET ORAL at 22:18

## 2023-02-09 NOTE — TELEPHONE ENCOUNTER
Called and spoke to patient  this Patient's sCr has more than double from her baseline, I recommend she come to the ER  Patient stated she is at the ER Right now

## 2023-02-09 NOTE — TELEPHONE ENCOUNTER
Spoke to patient regarding results  She admits to taking levothyroxine dose right prior to blood work which may result in elevated free T4 despite normal TSH  We will repeat TSH and free T4  For now continue current dose of levothyroxine  Creatinine is elevated  She admits to not hydrating as well as she was in the hospital   She states she generally feels well, no nausea, no vomiting, no decreased urine output  Advised to increase hydration  Repeat BMP  Will send message to nephrologist   Patient states she does need to make follow-up with her nephrologist   PTH and vitamin D level are still pending

## 2023-02-09 NOTE — ED PROCEDURE NOTE
PROCEDURE  ECG 12 Lead Documentation Only    Date/Time: 2/9/2023 10:29 AM  Performed by: Romina Watson MD  Authorized by: Romina Watson MD     Patient location:  ED  Rate:     ECG rate assessment: normal    Rhythm:     Rhythm: sinus rhythm    Ectopy:     Ectopy: none    QRS:     QRS axis:  Normal  Conduction:     Conduction: normal    ST segments:     ST segments:  Normal  T waves:     T waves: normal           Romina Watson MD  02/09/23 1030

## 2023-02-09 NOTE — ED PROVIDER NOTES
Emergency Department Note- Bernarda Baumann 56 y o  female MRN: [id number]    Unit/Bed#: [facility] 833-01 Encounter: [id number]        History of Present Illness   HPI:  Bernarda Baumann is a 56 y o  female who presents with syncope and lightheadedness    The patient was in her bathroom became very lightheaded she stood up and fell to the ground she awoke on the ground  Patient is on Coumadin she is actually bridging with Lovenox back to Coumadin after having gallbladder surgery on Saturday  The patient states that she has no complaints of abdominal pain she has no vomiting or diarrhea she denies fever or chills denies urinary symptoms she denies chest pain or shortness of breath  Patient has a history of pulmonary embolism years ago and has been on Coumadin ever since  The patient has had no melena or bright red blood per rectum  She denies headache at the present time  He complains of mild pain in her right elbow however she has full range of motion of the elbow and no bony tenderness or deformity  Review of Systems  REVIEW OF SYSTEMS  Constitutional:  denies fever, chills, no weight loss   Eyes:  Denies visual changes, denies eye pain    HENT:  Denies nasal congestion or sore throat   Respiratory:  Denies cough or shortness of breath, denies hemoptysis    Cardiovascular:  Denies chest pain, palpitations, or leg edema    GI:  Denies abdominal pain, nausea, vomiting, bloody stools, melena, or diarrhea   :  Denies dysuria, hematuria, polyuria   Musculoskeletal:  Denies back pain or joint pain   Integument:  Denies rash, denies color change    Neurologic:  Denies headache, focal weakness or sensory changes   Endocrine:  Denies polyuria or polydipsia   Lymphatic:  Denies swollen glands   Psychiatric:  Denies depression or anxiety     All system reviewed and negative except as noted above or in HPI    Historical Information   Past Medical History:   Diagnosis Date   • Anemia     Not sure   • Anti-phospholipid antibody syndrome [facility])    • Cancer ([facility] )     thyroid   • Chronic kidney disease    • Clotting disorder ([place] [address] )     Lung embolism,  ITP   • Diabetes mellitus ([place] [address] )    • Disease of thyroid gland    • Fibroid    • Gestational diabetes    • History of chemotherapy     late  for lupus   • [facility] )    • Hypertension    • Idiopathic thrombocytopenic purpura (ITP) ([facility])    • Kidney stone    • Lupus ([place] [address] )    • Miscarriage 58    Lost twin girls 10-   • Osteoarthritis    • Pulmonary embolism ([place] [address] )    • Sjogren's syndrome ([place] [address] )    • Vitamin D deficiency      Past Surgical History:   Procedure Laterality Date   •  SECTION      5824/1639   • CHOLECYSTECTOMY LAPAROSCOPIC N/A 2023    Procedure: LAPAROSCOPIC CHOLECYSTECTOMY;  Surgeon: Caridad Barry MD;  Location: [facility];  Service: General   • DIAGNOSTIC FLEXIBLE LARYNGOSCOPY N/A 2021    Procedure: Yesenia Quiros;  Surgeon: Miryam Cardozo MD;  Location: [facility];  Service: Surgical Oncology   • HERNIA REPAIR     • HIP SURGERY      left side, bone decompression   • HYSTEROSCOPY     • OR PARATHYROIDECTOMY/EXPLORATION PARATHYROIDS Right 2021    Procedure: PARATHYROIDECTOMY, MINIMALLY INVASIVE, right, intraoperative PTH monitoring;  Surgeon: Miryam Cardozo MD;  Location: [facility];  Service: Surgical Oncology   • RENAL BIOPSY      Not sure   • THYROID SURGERY     • TUBAL LIGATION       Social History   Social History     Substance and Sexual Activity   Alcohol Use Not Currently    Comment: Twice a year     Social History     Substance and Sexual Activity   Drug Use No     Social History     Tobacco Use   Smoking Status Never   Smokeless Tobacco Never     Family History:   Family History   Problem Relation Age of Onset   • No Known Problems Mother    • Diabetes type II Father    • Diabetes Father    • Stroke Father    • Diabetes type II Paternal Aunt    • Diabetes type II Paternal Uncle    • Diabetes type II Paternal Grandmother    • Stomach cancer Paternal Grandfather    • No Known Problems Sister    • No Known Problems Maternal Grandmother    • No Known Problems Maternal Grandfather    • No Known Problems Sister    • No Known Problems Maternal Aunt    • No Known Problems Maternal Aunt    • No Known Problems Maternal Aunt    • No Known Problems Paternal Aunt    • No Known Problems Paternal Aunt        Meds/Allergies   Medications Prior to Admission   Medication   • acetaminophen (TYLENOL) 325 mg tablet   • cinacalcet (SENSIPAR) 30 mg tablet   • enoxaparin (LOVENOX) 946 mg/mL   • folic acid (FOLVITE) 1 mg tablet   • fosinopril (MONOPRIL) 20 mg tablet   • hydrocortisone 2 5 % cream   • levothyroxine (Synthroid) 112 mcg tablet   • NIFEdipine (PROCARDIA XL) 30 mg 24 hr tablet   • oxyCODONE (ROXICODONE) 5 immediate release tablet   • spironolactone (ALDACTONE) 25 mg tablet   • warfarin (COUMADIN) 3 mg tablet   • ciclopirox (LOPROX) 0 77 % cream     Allergies   Allergen Reactions   • Penicillins Itching       Objective   Vitals: Blood pressure 116/69, pulse 73, temperature 97 7 °F (36 5 °C), resp  rate 16, height 5' 1" (1 549 m), weight 86 6 kg (191 lb), SpO2 95 %  PHYSICAL EXAM  Constitutional:  Well developed, well nourished, no acute distress, non toxic appearance   Eyes:   PERRL, EOMI, conjunctiva normal, sclera anicteric, no proptosis   HENT:  Atraumatic, external ears normal, nose normal, oropharynx moist, no pharyngeal exudates   Neck  no JVD, no bruits,  normal range of motion, no tenderness, supple, thyroid normal    Respiratory:  No respiratory distress, normal breath sounds B/L, no rales, no wheezing     Cardiovascular:  NSR, no M/G/R  GI:  Soft,  Normal BS,  ND,  NT,  No mass or bruits surgical scar is intact there is no drainage there is no redness there is some ecchymosis over the abdominal wall the abdomen is nontender  :  No costovertebral angle tenderness   Extremities: No edema, no tenderness, no deformities  Normal pulses   Musculoskeletal:   Back - no midline tenderness,  FROM  Upper and lower extremities small ecchymosis noted over her right elbow area however this is old she states this was from blood work that was drawn from her fall  SKIN:   no rash, warm and dry    Neurologic:  Alert & oriented x 3, CN 2-12 intact, normal motor function, normal sensory function, no focal deficits noted, gait normal   Psychiatric:  Speech and behavior appropriate normal judgement and insight      Lab Results: Lab Results: I have personally reviewed pertinent lab results    Labs Reviewed   CBC AND DIFFERENTIAL - Abnormal       Result Value Ref Range Status    WBC 7 29  4 31 - 10 16 Thousand/uL Final    RBC 2 76 (*) 3 81 - 5 12 Million/uL Final    Hemoglobin 8 1 (*) 11 5 - 15 4 g/dL Final    Hematocrit 25 0 (*) 34 8 - 46 1 % Final    MCV 91  82 - 98 fL Final    MCH 29 3  26 8 - 34 3 pg Final    MCHC 32 4  31 4 - 37 4 g/dL Final    RDW 13 0  11 6 - 15 1 % Final    MPV 10 5  8 9 - 12 7 fL Final    Platelets 266 (*) 344 - 390 Thousands/uL Final    nRBC 0  /100 WBCs Final    Neutrophils Relative 76 (*) 43 - 75 % Final    Immat GRANS % 1  0 - 2 % Final    Lymphocytes Relative 13 (*) 14 - 44 % Final    Monocytes Relative 10  4 - 12 % Final    Eosinophils Relative 0  0 - 6 % Final    Basophils Relative 0  0 - 1 % Final    Neutrophils Absolute 5 51  1 85 - 7 62 Thousands/µL Final    Immature Grans Absolute 0 10  0 00 - 0 20 Thousand/uL Final    Lymphocytes Absolute 0 95  0 60 - 4 47 Thousands/µL Final    Monocytes Absolute 0 69  0 17 - 1 22 Thousand/µL Final    Eosinophils Absolute 0 02  0 00 - 0 61 Thousand/µL Final    Basophils Absolute 0 02  0 00 - 0 10 Thousands/µL Final   PROTIME-INR - Abnormal    Protime 17 2 (*) 11 6 - 14 5 seconds Final    INR 1 38 (*) 0 84 - 1 19 Final   APTT - Abnormal    PTT 40 (*) 23 - 37 seconds Final    Comment: Therapeutic Heparin Range =  60-90 seconds   COMPREHENSIVE METABOLIC PANEL - Abnormal Sodium 137  135 - 147 mmol/L Final    Potassium 3 6  3 5 - 5 3 mmol/L Final    Chloride 110 (*) 96 - 108 mmol/L Final    CO2 24  21 - 32 mmol/L Final    ANION GAP 3 (*) 4 - 13 mmol/L Final    BUN 37 (*) 5 - 25 mg/dL Final    Creatinine 2 10 (*) 0 60 - 1 30 mg/dL Final    Comment: Standardized to IDMS reference method    Glucose 142 (*) 65 - 140 mg/dL Final    Comment: If the patient is fasting, the ADA then defines impaired fasting glucose as > 100 mg/dL and diabetes as > or equal to 123 mg/dL  Specimen collection should occur prior to Sulfasalazine administration due to the potential for falsely depressed results  Specimen collection should occur prior to Sulfapyridine administration due to the potential for falsely elevated results  Calcium 7 6 (*) 8 3 - 10 1 mg/dL Final    Corrected Calcium 9 0  8 3 - 10 1 mg/dL Final    AST 86 (*) 5 - 45 U/L Final    Comment: Specimen collection should occur prior to Sulfasalazine administration due to the potential for falsely depressed results  ALT 80 (*) 12 - 78 U/L Final    Comment: Specimen collection should occur prior to Sulfasalazine and/or Sulfapyridine administration due to the potential for falsely depressed results  Alkaline Phosphatase 77  46 - 116 U/L Final    Total Protein 5 4 (*) 6 4 - 8 4 g/dL Final    Albumin 2 3 (*) 3 5 - 5 0 g/dL Final    Total Bilirubin 0 41  0 20 - 1 00 mg/dL Final    Comment: Use of this assay is not recommended for patients undergoing treatment with eltrombopag due to the potential for falsely elevated results      eGFR 25  ml/min/1 73sq m Final    Narrative:     [place] guidelines for Chronic Kidney Disease (CKD):   •  Stage 1 with normal or high GFR (GFR > 90 mL/min/1 73 square meters)  •  Stage 2 Mild CKD (GFR = 60-89 mL/min/1 73 square meters)  •  Stage 3A Moderate CKD (GFR = 45-59 mL/min/1 73 square meters)  •  Stage 3B Moderate CKD (GFR = 30-44 mL/min/1 73 square meters)  •  Stage 4 Severe CKD (GFR = 15-29 mL/min/1 73 square meters)  •  Stage 5 End Stage CKD (GFR <15 mL/min/1 73 square meters)  Note: GFR calculation is accurate only with a steady state creatinine   URINE MICROSCOPIC - Abnormal    RBC, UA Innumerable (*) None Seen, 1-2 /hpf Final    WBC, UA 4-10 (*) None Seen, 1-2 /hpf Final    Epithelial Cells Moderate (*) None Seen, Occasional /hpf Final    Bacteria, UA Occasional  None Seen, Occasional /hpf Final    MUCUS THREADS Occasional (*) None Seen Final    Hyaline Casts, UA 5-10 (*) None Seen /lpf Final   URINE MACROSCOPIC, POC - Abnormal    Color, UA Yellow   Final    Clarity, UA Cloudy   Final    pH, UA 5 5  4 5 - 8 0 Final    Leukocytes, UA Negative  Negative Final    Nitrite, UA Negative  Negative Final    Protein, UA Negative  Negative mg/dl Final    Glucose, UA Negative  Negative mg/dl Final    Ketones, UA Negative  Negative mg/dl Final    Urobilinogen, UA 0 2  0 2, 1 0 E U /dl E U /dl Final    Bilirubin, UA Negative  Negative Final    Occult Blood, UA Large (*) Negative Final    Specific Gravity, UA 1 020  1 003 - 1 030 Final    Narrative:     CLINITEK RESULT   COVID19, INFLUENZA A/B, RSV PCR, [facility] - Normal    SARS-CoV-2 Negative  Negative Final    Comment:      INFLUENZA A PCR Negative  Negative Final    Comment:      INFLUENZA B PCR Negative  Negative Final    Comment:      RSV PCR Negative  Negative Final    Comment:      Narrative:     FOR PEDIATRIC PATIENTS - copy/paste COVID Guidelines URL to browser: [email or web address]    SARS-CoV-2 assay is a Nucleic Acid Amplification assay intended for the  qualitative detection of nucleic acid from SARS-CoV-2 in nasopharyngeal  swabs  Results are for the presumptive identification of SARS-CoV-2 RNA  Positive results are indicative of infection with SARS-CoV-2, the virus  causing COVID-19, but do not rule out bacterial infection or co-infection  with other viruses   Laboratories within the [facility] and its  territories are required to report all positive results to the appropriate  public health authorities  Negative results do not preclude SARS-CoV-2  infection and should not be used as the sole basis for treatment or other  patient management decisions  Negative results must be combined with  clinical observations, patient history, and epidemiological information  This test has not been FDA cleared or approved  This test has been authorized by FDA under an Emergency Use Authorization  (EUA)  This test is only authorized for the duration of time the  declaration that circumstances exist justifying the authorization of the  emergency use of an in vitro diagnostic tests for detection of SARS-CoV-2  virus and/or diagnosis of COVID-19 infection under section 564(b)(1) of  the Act, 21 U  S C  862LRU-7(C)(6), unless the authorization is terminated  or revoked sooner  The test has been validated but independent review by FDA  and CLIA is pending  Test performed using [employer] GeneXpert: This RT-PCR assay targets N2,  a region unique to SARS-CoV-2  A conserved region in the E-gene was chosen  for pan-Sarbecovirus detection which includes SARS-CoV-2  According to CMS-2020-01-R, this platform meets the definition of high-UBmatrix technology  LIPASE - Normal    Lipase 132  73 - 393 u/L Final   MAGNESIUM - Normal    Magnesium 1 6  1 6 - 2 6 mg/dL Final   HS TROPONIN I 0HR - Normal    hs TnI 0hr 4  "Refer to ACS Flowchart"- see link ng/L Final    Comment:                                              Initial (time 0) result  If >=50 ng/L, Myocardial injury suggested ;  Type of myocardial injury and treatment strategy  to be determined  If 5-49 ng/L, a delta result at 2 hours and or 4 hours will be needed to further evaluate  If <4 ng/L, and chest pain has been >3 hours since onset, patient may qualify for discharge based on the HEART score in the ED    If <5 ng/L and <3hours since onset of chest pain, a delta result at 2 hours will be needed to further evaluate  HS Troponin 99th Percentile URL of a Health Population=12 ng/L with a 95% Confidence Interval of 8-18 ng/L  Second Troponin (time 2 hours)  If calculated delta >= 20 ng/L,  Myocardial injury suggested ; Type of myocardial injury and treatment strategy to be determined  If 5-49 ng/L and the calculated delta is 5-19 ng/L, consult medical service for evaluation  Continue evaluation for ischemia on ecg and other possible etiology and repeat hs troponin at 4 hours  If delta is <5 ng/L at 2 hours, consider discharge based on risk stratification via the HEART score (if in ED), or TILA risk score in IP/Observation  HS Troponin 99th Percentile URL of a Health Population=12 ng/L with a 95% Confidence Interval of 8-18 ng/L    HS TROPONIN I 2HR - Normal    hs TnI 2hr 6  "Refer to ACS Flowchart"- see link ng/L Final    Comment:                                              Initial (time 0) result  If >=50 ng/L, Myocardial injury suggested ;  Type of myocardial injury and treatment strategy  to be determined  If 5-49 ng/L, a delta result at 2 hours and or 4 hours will be needed to further evaluate  If <4 ng/L, and chest pain has been >3 hours since onset, patient may qualify for discharge based on the HEART score in the ED  If <5 ng/L and <3hours since onset of chest pain, a delta result at 2 hours will be needed to further evaluate  HS Troponin 99th Percentile URL of a Health Population=12 ng/L with a 95% Confidence Interval of 8-18 ng/L  Second Troponin (time 2 hours)  If calculated delta >= 20 ng/L,  Myocardial injury suggested ; Type of myocardial injury and treatment strategy to be determined  If 5-49 ng/L and the calculated delta is 5-19 ng/L, consult medical service for evaluation  Continue evaluation for ischemia on ecg and other possible etiology and repeat hs troponin at 4 hours    If delta is <5 ng/L at 2 hours, consider discharge based on risk stratification via the HEART score (if in ED), or TILA risk score in IP/Observation  HS Troponin 99th Percentile URL of a Health Population=12 ng/L with a 95% Confidence Interval of 8-18 ng/L  Delta 2hr hsTnI 2  <20 ng/L Final   TYPE AND SCREEN    ABO Grouping B   Final    Rh Factor Positive   Final    Antibody Screen Positive   Final    Specimen Expiration Date [id number]   Final     Imaging: I have personally reviewed pertinent reports  VAS lower limb venous duplex study, complete bilateral   Final Result      CTA abdomen pelvis w wo contrast   Final Result      Status post cholecystectomy with large complex hematoma tracking from the surgical bed  No definite evidence of active extravasation of contrast       Free fluid in the abdomen and pelvis  Inflammatory changes in the anterior abdominal wall which is likely related to postsurgical changes  I personally discussed this study with Woody Pimentel on 2/9/2023 at 11:07 PM                   Workstation performed: [id number]         CT head without contrast   Final Result      1  No acute intracranial CT abnormality  2   Periapical disease of partially imaged right maxillary 3rd molar tooth communicating with mildly diseased overlying maxillary sinus  Workstation performed: [id number]         US kidney and bladder    (Results Pending)     EKG, Pathology, and Other Studies: I have personally reviewed pertinent films in PACS  ED Course as of 02/11/23 0910   u Feb 09, 2023   1030 This EKG was interpreted by me  The EKG demonstrates Normal sinus rhythm, normal intervals and axis, normal QRS, no acute ST changes present          Assessment/Plan     ED Medical Decision Making:  Syncope     Likely secondary to volume depletion after her recent surgery however arrhythmia needs to be entertained  Less likely ACS I do not feel that the patient needs evaluation for pulmonary embolism or DVT as the patient is anticoagulated and has no complaints of shortness of breath or chest pain  I will perform CT of head since she passed out and is on anticoagulation  We will have an EKG and cardiac work-up  She will be given a bolus of IV fluids  And will be reevaluated  1  ADA (acute kidney injury) ([facility] )    2   Syncope           Martha Salazar MD  02/11/23 3293

## 2023-02-09 NOTE — ED NOTES
Covid swab requested by [facility] for bed placement     Alondra Grigsby RN  02/09/23 9117

## 2023-02-09 NOTE — CONSULTS
Consultation - Nephrology   Bozena Chavez 56 y o  female MRN: [id number]  Unit/Bed#: ED 21 Encounter: [id number]    ASSESSMENT/PLAN:   1  ADA, POA: suspect prerenal vs ATN in the setting of hemodynamic changes, ACE-I and spironolactone   · UA: large blood, no protein   · Renal ultrasound pending   · Hold fosinopril and spironolactone   · Agree with IVF -- on NS at 125cc/h   · Check am BMP   2  CKD II: baseline creatinine 0 7-1 and follows with Dr Samuel Funez   · Hx Lupus Nephritis biopsy proven in 1996   3  S/p recent laparoscopic cholecystectomy on 2/5/2023  4  Hx Hypertension, now with Hypotension: monitor on IVF   · Hold fosinopril and spironolactone   · Nifedipine 30mg with hold for SBP <130  5  Anemia: hgb 8 1 today from 11 7 at discharge -- will d/w primary team as possible concern for bleed given recent surgery     HISTORY OF PRESENT ILLNESS:  Requesting Physician: Igor Rolle DO  Reason for Consult: ADA Chavez is a 56y o  year old female who was admitted to [facility] with dizziness  Recently had laparoscopic cholecystectomy on 2/5/2023  Reports feeling ok until this am when she was feeling lightheaded/dizzy, went to the bathroom and had what sounds like a syncopal episode  She thinks she was eating and drinking well at home and denies any chest pain, shortness of breath, nausea, vomiting or diarrhea  She does note her BP prior to surgery was high (179/145 per ER note prior to surgery), dropped down to the 521T systolic at discharge, and now in the low 100s in the ER         PAST MEDICAL HISTORY:  Past Medical History:   Diagnosis Date   • Anemia     Not sure   • Anti-phospholipid antibody syndrome (HCC)    • Cancer (HCC)     thyroid   • Chronic kidney disease    • Clotting disorder ([facility] [address] )     Lung embolism,  ITP   • Diabetes mellitus ([facility] [address] )    • Disease of thyroid gland    • Fibroid 1998   • Gestational diabetes    • History of chemotherapy     late 1980s for lupus   • Hyperparathyroidism ([facility] [address] )    • Hypertension    • Idiopathic thrombocytopenic purpura (ITP) ([facility])    • Kidney stone    • Lupus ([facility] [address] )    • Miscarriage 58    Lost twin girls 10-   • Osteoarthritis    • Pulmonary embolism ([facility] [address] )    • Sjogren's syndrome ([facility] [address] )    • Vitamin D deficiency        PAST SURGICAL HISTORY:  Past Surgical History:   Procedure Laterality Date   •  SECTION      4232/3324   • CHOLECYSTECTOMY LAPAROSCOPIC N/A 2023    Procedure: LAPAROSCOPIC CHOLECYSTECTOMY;  Surgeon: Caridad Barry MD;  [facility];  Service: General   • DIAGNOSTIC FLEXIBLE LARYNGOSCOPY N/A 2021    Procedure: DIAGNOSTIC FLEXIBLE LARYNGOSCOPY;  Surgeon: Miryam Cardozo MD;  Location: [facility];  Service: Surgical Oncology   • HERNIA REPAIR     • HIP SURGERY      left side, bone decompression   • HYSTEROSCOPY     • NE PARATHYROIDECTOMY/EXPLORATION PARATHYROIDS Right 2021    Procedure: PARATHYROIDECTOMY, MINIMALLY INVASIVE, right, intraoperative PTH monitoring;  Surgeon: Miryam Cardozo MD;  Location: [facility];  Service: Surgical Oncology   • RENAL BIOPSY      Not sure   • THYROID SURGERY     • TUBAL LIGATION         ALLERGIES:  Allergies   Allergen Reactions   • Penicillins Itching       SOCIAL HISTORY:  Social History     Substance and Sexual Activity   Alcohol Use Not Currently    Comment: Twice a year     Social History     Substance and Sexual Activity   Drug Use No     Social History     Tobacco Use   Smoking Status Never   Smokeless Tobacco Never       FAMILY HISTORY:  Family History   Problem Relation Age of Onset   • No Known Problems Mother    • Diabetes type II Father    • Diabetes Father    • Stroke Father    • Diabetes type II Paternal Aunt    • Diabetes type II Paternal Uncle    • Diabetes type II Paternal Grandmother    • Stomach cancer Paternal Grandfather    • No Known Problems Sister    • No Known Problems Maternal Grandmother    • No Known Problems Maternal Grandfather    • No Known Problems Sister    • No Known Problems Maternal Aunt    • No Known Problems Maternal Aunt    • No Known Problems Maternal Aunt    • No Known Problems Paternal Aunt    • No Known Problems Paternal Aunt        MEDICATIONS:  Scheduled Meds:  Current Facility-Administered Medications   Medication Dose Route Frequency Provider Last Rate   • enoxaparin  1 mg/kg (Adjusted) Subcutaneous Q12H [address] Dayan Tan MD     • levothyroxine  112 mcg Oral Daily Dayan Tan MD     • NIFEdipine  30 mg Oral Daily Dayan Tan MD     • ondansetron  4 mg Intravenous Q6H PRN Dayan Tan MD     • oxyCODONE  5 mg Oral Q6H PRN Dayan Tan MD     • sodium chloride  125 mL/hr Intravenous Continuous Dayan Tan  mL/hr (02/09/23 1255)   • warfarin  3 mg Oral Daily (warfarin) Dayan Tan MD         PRN Meds: •  ondansetron  •  oxyCODONE    Continuous Infusions:sodium chloride, 125 mL/hr, Last Rate: 125 mL/hr (02/09/23 1255)        REVIEW OF SYSTEMS:  A complete review of systems was done  Pertinent positives and negatives noted in the HPI but otherwise the review of systems is negative      PHYSICAL EXAM:  Current Weight:    First Weight:    Vitals:    02/09/23 1315   BP: 102/63   Pulse: 78   Resp: 20   Temp:    SpO2: 98%       Intake/Output Summary (Last 24 hours) at 2/9/2023 1409  Last data filed at 2/9/2023 1222  Gross per 24 hour   Intake 1500 ml   Output --   Net 1500 ml     General:  appears comfortable and in no acute distress   Skin:  No rash  Eyes:  Sclerae anicteric, no periorbital edema   ENT:  Moist mucous membranes  Neck:  Trachea midline, symmetric   Chest:  Clear to auscultation bilaterally with no wheezes, rales or rhonchi  CVS:  Regular rate and rhythm  Abdomen:  Soft, post op tenderness   Neuro:  Awake and alert  Psych:  Appropriate affect  Extremities: no lower extremity edema     Lab Results:   Results from last 7 days   Lab Units 02/09/23  1026 02/08/23  1108 02/05/23  1289 02/04/23  0414 02/03/23  0815   WBC Thousand/uL 7 29  --  5 28 5 64 7 27   HEMOGLOBIN g/dL 8 1*  --  11 7 11 3* 13 0   HEMATOCRIT % 25 0*  --  35 2 33 4* 39 0   PLATELETS Thousands/uL 134*  --  193 205 229   SODIUM mmol/L 137 141 140 141 140   POTASSIUM mmol/L 3 6 4 0 4 0 4 1 4 8   CHLORIDE mmol/L 110* 108 110* 112* 111*   CO2 mmol/L 24 22 23 25 25   BUN mg/dL 37* 24 10 15 26*   CREATININE mg/dL 2 10* 1 38* 0 76 0 84 0 97   CALCIUM mg/dL 7 6* 8 3 8 2* 7 6* 8 7   MAGNESIUM mg/dL 1 6  --   --  1 6  --        Radiology Results:   CT head without contrast   Final Result by Valentina Williamson MD (02/09 1106)      1  No acute intracranial CT abnormality  2   Periapical disease of partially imaged right maxillary 3rd molar tooth communicating with mildly diseased overlying maxillary sinus                    Workstation performed: [id number]          kidney and bladder with pvr    (Results Pending)

## 2023-02-09 NOTE — TELEPHONE ENCOUNTER
----- Message from Jefferson Almonte DO sent at 2/9/2023 12:00 PM EST -----  As this patient's sCr has more than double from her baseline, I recommend she come to the ER  She likely will need IVF  Thanks   ----- Message -----  From: Tena Andrews PA-C  Sent: 2/9/2023   8:48 AM EST  To: Jefferson Almonte DO    Hi Dr Sean Valerio,    I received a routine BMP result on our mutual patient, Sierra Graves  Her most recent creatinine level has increased to 1 38 and just a few days ago it was 0 76  It looks like she was just discharged from the hospital a few days ago for acute cholecystitis and underwent cholecystectomy  I did speak with her and she admits to not hydrating as well as she was in the hospital  She denies nausea/vomiting, decreased urine output and states she feels well  I asked her to increase hydration  She stated she will make a f/u with you  She does have a history of lupus nephritis  If you have any other suggestions, please let me know  Thanks!     Ebenezer Maldonado   ----- Message -----  From: Lab, Background User  Sent: 2/9/2023   4:13 AM EST  To: Tena Andrews PA-C

## 2023-02-09 NOTE — H&P
H&P Exam - General Surgery   Keenan Coyne 56 y o  female MRN: [id number]  Unit/Bed#: ED 21 Encounter: [id number]    Assessment/Plan     Assessment:  51yo F APS/ITP/PE s/p laparoscopic cholecystectomy on 2/5/23 presenting due to lightheadedness/syncope found to have an acute kidney injury w/ a creatinine of 2 10  Plan:  - Admit to general surgery for observation  - Nephrology consult  - OK for a diet as tolerated + IVFs  - Continue Lovenox bridge + Warfarin pending nephrology recs  - OOB w/ assistance  - Repeat labs tomorrow AM    History of Present Illness   History, ROS and PFSH unobtainable from any source due to N/A     HPI:  Keenan Coyne is a 56 y o  female presenting due to abdominal pain presenting for evaluation of lightheadedness/syncope  The patient is s/p elective laparoscopic cholecystectomy on 2/5/2023  She had a relatively uneventful postoperative course, was admitted overnight immediately post-op for initiation of bridging Lovenox to warfarin due to a history of pulmonary embolism, and was discharged 1 day postoperatively  She reported that she has been doing relatively well until today when she was in her bathroom, stood up, and suddenly fell to the ground  She does not remember the event in its entirety but denied any abdominal pain, nausea, emesis, dyspnea, chest pain, or intolerance to p o  intake  Upon presentation to the emergency department the patient underwent routine laboratory studies revealing acute kidney injury with a creatinine of 2 1 for which the general surgery team was consulted given her recent surgical procedure  Review of Systems: A comprehensive 10 point review of systems was performed; pertinent positives/negatives are as in the HPI and unless otherwise listed, all other systems were negative        Historical Information   Past Medical History:   Diagnosis Date   • Anemia     Not sure   • Anti-phospholipid antibody syndrome (HCC)    • Cancer (HCC)     thyroid • Chronic kidney disease    • Clotting disorder ([facility] )     Lung embolism,  ITP   • Diabetes mellitus ([place] [address] )    • Disease of thyroid gland    • Fibroid    • Gestational diabetes    • History of chemotherapy     late  for lupus   • Hyperparathyroidism ([facility] )    • Hypertension    • Idiopathic thrombocytopenic purpura (ITP) ([facility])    • Kidney stone    • Lupus ([facility] )    • Miscarriage 58    Lost twin girls 10-   • Osteoarthritis    • Pulmonary embolism ([place] [address] )    • Sjogren's syndrome ([place] [address] )    • Vitamin D deficiency      Past Surgical History:   Procedure Laterality Date   •  SECTION      /   • CHOLECYSTECTOMY LAPAROSCOPIC N/A 2023    Procedure: LAPAROSCOPIC CHOLECYSTECTOMY;  Surgeon: Adriana Gonsalez MD;  Location: [facility];  Service: General   • DIAGNOSTIC FLEXIBLE LARYNGOSCOPY N/A 2021    Procedure: DIAGNOSTIC FLEXIBLE LARYNGOSCOPY;  Surgeon: Juancarlos Cooney MD;  [facility];  Service: Surgical Oncology   • HERNIA REPAIR     • HIP SURGERY      left side, bone decompression   • HYSTEROSCOPY     • NM PARATHYROIDECTOMY/EXPLORATION PARATHYROIDS Right 2021    Procedure: PARATHYROIDECTOMY, MINIMALLY INVASIVE, right, intraoperative PTH monitoring;  Surgeon: Juancarlos Cooney MD;  Location: [facility];  Service: Surgical Oncology   • RENAL BIOPSY      Not sure   • THYROID SURGERY     • TUBAL LIGATION       Social History   Social History     Substance and Sexual Activity   Alcohol Use Not Currently    Comment: Twice a year     Social History     Substance and Sexual Activity   Drug Use No     Social History     Tobacco Use   Smoking Status Never   Smokeless Tobacco Never     E-Cigarette/Vaping   • E-Cigarette Use Never User      E-Cigarette/Vaping Substances   • Nicotine No    • THC No    • CBD No    • Flavoring No    • Other No    • Unknown No      Family History: non-contributory    Meds/Allergies   all medications and allergies reviewed  Allergies   Allergen Reactions   • Penicillins Itching       Objective   First Vitals:   Blood Pressure: 108/53 (02/09/23 1015)  Pulse: 87 (02/09/23 1015)  Temperature: 98 7 °F (37 1 °C) (02/09/23 1015)  Temp Source: Oral (02/09/23 1015)  Respirations: 16 (02/09/23 1015)  SpO2: 95 % (02/09/23 1015)    Current Vitals:   Blood Pressure: 108/53 (02/09/23 1015)  Pulse: 87 (02/09/23 1015)  Temperature: 98 7 °F (37 1 °C) (02/09/23 1015)  Temp Source: Oral (02/09/23 1015)  Respirations: 16 (02/09/23 1015)  SpO2: 95 % (02/09/23 1015)      Intake/Output Summary (Last 24 hours) at 2/9/2023 1225  Last data filed at 2/9/2023 1222  Gross per 24 hour   Intake 1500 ml   Output --   Net 1500 ml       Invasive Devices     Peripheral Intravenous Line  Duration           Peripheral IV 02/09/23 Left;Ventral (anterior) Hand <1 day              General: Awake, alert, and in no acute distress  HEENT: Normocephalic, atraumatic  Cardiovascular: Regular rate  Respiratory: In no acute respiratory distress with no use of accessory muscles of respiration, symmetrical rise/fall of the chest  Abdomen: Soft, non-TTP overlying ecchymotic laparoscopic incisions w/ no evidence of infection  Extremities: No visible wounds/ulcerations to the bilateral upper extremities  Skin: Warm and well-perfused  Neurological: No obvious focal deficits  Psychiatric: Appropriate mood/affect    Lab Results:   I have personally reviewed pertinent lab results    , CBC:   Lab Results   Component Value Date    WBC 7 29 02/09/2023    HGB 8 1 (L) 02/09/2023    HCT 25 0 (L) 02/09/2023    MCV 91 02/09/2023     (L) 02/09/2023    MCH 29 3 02/09/2023    MCHC 32 4 02/09/2023    RDW 13 0 02/09/2023    MPV 10 5 02/09/2023    NRBC 0 02/09/2023   , CMP:   Lab Results   Component Value Date    SODIUM 137 02/09/2023    K 3 6 02/09/2023     (H) 02/09/2023    CO2 24 02/09/2023    BUN 37 (H) 02/09/2023    CREATININE 2 10 (H) 02/09/2023    CALCIUM 7 6 (L) 02/09/2023    AST 86 (H) 02/09/2023    ALT 80 (H) 02/09/2023    ALKPHOS 77 02/09/2023    EGFR 25 02/09/2023   , Coagulation:   Lab Results   Component Value Date    INR 1 38 (H) 02/09/2023   , Urinalysis: No results found for: [place], SPECGRAV, PHUR, LEUKOCYTESUR, NITRITE, PROTEINUA, GLUCOSEU, KETONESU, BILIRUBINUR, BLOODU, Amylase: No results found for: AMYLASE, Lipase:   Lab Results   Component Value Date    LIPASE 132 02/09/2023     Imaging: I have personally reviewed pertinent reports  EKG, Pathology, and Other Studies: I have personally reviewed pertinent reports        Code Status: Level 1 - Full Code

## 2023-02-10 ENCOUNTER — APPOINTMENT (OUTPATIENT)
Dept: NON INVASIVE DIAGNOSTICS | Facility: HOSPITAL | Age: 58
End: 2023-02-10

## 2023-02-10 LAB
ABO GROUP BLD BPU: NORMAL
ABO GROUP BLD BPU: NORMAL
ALBUMIN SERPL BCP-MCNC: 2.4 G/DL (ref 3.5–5)
ALP SERPL-CCNC: 86 U/L (ref 46–116)
ALT SERPL W P-5'-P-CCNC: 107 U/L (ref 12–78)
ANION GAP SERPL CALCULATED.3IONS-SCNC: 8 MMOL/L (ref 4–13)
AST SERPL W P-5'-P-CCNC: 79 U/L (ref 5–45)
BILIRUB SERPL-MCNC: 1.37 MG/DL (ref 0.2–1)
BPU ID: NORMAL
BPU ID: NORMAL
BUN SERPL-MCNC: 34 MG/DL (ref 5–25)
CALCIUM ALBUM COR SERPL-MCNC: 8.7 MG/DL (ref 8.3–10.1)
CALCIUM SERPL-MCNC: 7.4 MG/DL (ref 8.3–10.1)
CHLORIDE SERPL-SCNC: 111 MMOL/L (ref 96–108)
CO2 SERPL-SCNC: 22 MMOL/L (ref 21–32)
CREAT SERPL-MCNC: 1.28 MG/DL (ref 0.6–1.3)
CROSSMATCH: NORMAL
CROSSMATCH: NORMAL
ERYTHROCYTE [DISTWIDTH] IN BLOOD BY AUTOMATED COUNT: 14.5 % (ref 11.6–15.1)
GFR SERPL CREATININE-BSD FRML MDRD: 47 ML/MIN/1.73SQ M
GLUCOSE P FAST SERPL-MCNC: 99 MG/DL (ref 65–99)
GLUCOSE SERPL-MCNC: 99 MG/DL (ref 65–140)
HCT VFR BLD AUTO: 21.7 % (ref 34.8–46.1)
HCT VFR BLD AUTO: 24.7 % (ref 34.8–46.1)
HCT VFR BLD AUTO: 24.9 % (ref 34.8–46.1)
HCT VFR BLD AUTO: 26.7 % (ref 34.8–46.1)
HCT VFR BLD AUTO: 26.9 % (ref 34.8–46.1)
HGB BLD-MCNC: 7.1 G/DL (ref 11.5–15.4)
HGB BLD-MCNC: 8.3 G/DL (ref 11.5–15.4)
HGB BLD-MCNC: 8.5 G/DL (ref 11.5–15.4)
HGB BLD-MCNC: 8.7 G/DL (ref 11.5–15.4)
HGB BLD-MCNC: 8.9 G/DL (ref 11.5–15.4)
INR PPP: 1.32 (ref 0.84–1.19)
MAGNESIUM SERPL-MCNC: 1.8 MG/DL (ref 1.6–2.6)
MCH RBC QN AUTO: 29.9 PG (ref 26.8–34.3)
MCHC RBC AUTO-ENTMCNC: 34.1 G/DL (ref 31.4–37.4)
MCV RBC AUTO: 88 FL (ref 82–98)
PLATELET # BLD AUTO: 142 THOUSANDS/UL (ref 149–390)
PMV BLD AUTO: 10.5 FL (ref 8.9–12.7)
POTASSIUM SERPL-SCNC: 4.1 MMOL/L (ref 3.5–5.3)
PROT SERPL-MCNC: 5.8 G/DL (ref 6.4–8.4)
PROTHROMBIN TIME: 16.6 SECONDS (ref 11.6–14.5)
RBC # BLD AUTO: 2.84 MILLION/UL (ref 3.81–5.12)
SODIUM SERPL-SCNC: 141 MMOL/L (ref 135–147)
UNIT DISPENSE STATUS: NORMAL
UNIT DISPENSE STATUS: NORMAL
UNIT PRODUCT CODE: NORMAL
UNIT PRODUCT CODE: NORMAL
UNIT PRODUCT VOLUME: 300 ML
UNIT PRODUCT VOLUME: 350 ML
UNIT RH: NORMAL
UNIT RH: NORMAL
WBC # BLD AUTO: 9.29 THOUSAND/UL (ref 4.31–10.16)

## 2023-02-10 RX ORDER — SODIUM CHLORIDE, SODIUM GLUCONATE, SODIUM ACETATE, POTASSIUM CHLORIDE, MAGNESIUM CHLORIDE, SODIUM PHOSPHATE, DIBASIC, AND POTASSIUM PHOSPHATE .53; .5; .37; .037; .03; .012; .00082 G/100ML; G/100ML; G/100ML; G/100ML; G/100ML; G/100ML; G/100ML
50 INJECTION, SOLUTION INTRAVENOUS CONTINUOUS
Status: DISPENSED | OUTPATIENT
Start: 2023-02-10 | End: 2023-02-10

## 2023-02-10 RX ADMIN — ACETAMINOPHEN 650 MG: 325 TABLET, FILM COATED ORAL at 23:07

## 2023-02-10 RX ADMIN — LEVOTHYROXINE SODIUM 112 MCG: 112 TABLET ORAL at 06:11

## 2023-02-10 RX ADMIN — OXYCODONE HYDROCHLORIDE 5 MG: 5 TABLET ORAL at 11:38

## 2023-02-10 RX ADMIN — ACETAMINOPHEN 650 MG: 325 TABLET, FILM COATED ORAL at 11:38

## 2023-02-10 RX ADMIN — OXYCODONE HYDROCHLORIDE 5 MG: 5 TABLET ORAL at 18:34

## 2023-02-10 RX ADMIN — SODIUM CHLORIDE 125 ML/HR: 0.9 INJECTION, SOLUTION INTRAVENOUS at 02:57

## 2023-02-10 RX ADMIN — ACETAMINOPHEN 650 MG: 325 TABLET, FILM COATED ORAL at 18:34

## 2023-02-10 RX ADMIN — SODIUM CHLORIDE, SODIUM GLUCONATE, SODIUM ACETATE, POTASSIUM CHLORIDE, MAGNESIUM CHLORIDE, SODIUM PHOSPHATE, DIBASIC, AND POTASSIUM PHOSPHATE 50 ML/HR: .53; .5; .37; .037; .03; .012; .00082 INJECTION, SOLUTION INTRAVENOUS at 11:38

## 2023-02-10 RX ADMIN — ACETAMINOPHEN 650 MG: 325 TABLET, FILM COATED ORAL at 00:22

## 2023-02-10 NOTE — QUICK NOTE
Patient evaluated at bedside  She reported minimal RUQ discomfort after taking her PO analgesic  She denied any lightheadedness/dizziness but did report feeling more weak than usual  She tolerated her PO dinner without issue  Vitals:    02/09/23 1559   BP: 117/74   Pulse: 95   Resp:    Temp: 99 2 °F (37 3 °C)   SpO2: 97%     Abdomen soft, non-distended, and slightly TTP in the RUQ w/ no guarding/rigidity or signs of peritonitis  Patient is stable but her hgb repeat was 6 9 from 8 1  This was reportedly drawn off of her LUE IV therefor we will plan to repeat the hemoglobin to determine the need for possible transfusion  Nilsa Smith MD  PGY4, Surgery  02/09/23     8:31pm addendum: Repeat Hgb 6 6  Will hold PM Lovenox, administer 2u pRBC, and obtain a stat CT angiography abd/pelvis  Relayed to nursing  10:30pm addendum: CT reviewed, no active extrav but w/ large hematoma in RUQ  First unit of pRBCs is now hanging  Patient was febrile just prior to the initiation of transfusion prompting Tylenol and blood cultures to be drawn  Transfer to stepdown, continue w/ 2u pRBC transfusion, and plan for q2h H&H checks

## 2023-02-10 NOTE — PLAN OF CARE
Problem: Potential for Falls  Goal: Patient will remain free of falls  Description: INTERVENTIONS:  - Educate patient/family on patient safety including physical limitations  - Instruct patient to call for assistance with activity   - Consult OT/PT to assist with strengthening/mobility   - Keep Call bell within reach  - Keep bed low and locked with side rails adjusted as appropriate  - Keep care items and personal belongings within reach  - Initiate and maintain comfort rounds  - Make Fall Risk Sign visible to staff  - Offer Toileting every 2 Hours, in advance of need  - Apply yellow socks and bracelet for high fall risk patients  - Consider moving patient to room near nurses station  Outcome: Progressing     Problem: MOBILITY - ADULT  Goal: Maintain or return to baseline ADL function  Description: INTERVENTIONS:  -  Assess patient's ability to carry out ADLs; assess patient's baseline for ADL function and identify physical deficits which impact ability to perform ADLs (bathing, care of mouth/teeth, toileting, grooming, dressing, etc )  - Assess/evaluate cause of self-care deficits   - Assess range of motion  - Assess patient's mobility; develop plan if impaired  - Assess patient's need for assistive devices and provide as appropriate  - Encourage maximum independence but intervene and supervise when necessary  - Involve family in performance of ADLs  - Assess for home care needs following discharge   - Consider OT consult to assist with ADL evaluation and planning for discharge  - Provide patient education as appropriate  Outcome: Progressing  Goal: Maintains/Returns to pre admission functional level  Description: INTERVENTIONS:  - Perform BMAT or MOVE assessment daily    - Set and communicate daily mobility goal to care team and patient/family/caregiver     - Collaborate with rehabilitation services on mobility goals if consulted  - Stand patient 4 times a day  - Ambulate patient 4 times a day  - Out of bed to chair 3 times a day   - Out of bed for meals 3 times a day  - Out of bed for toileting  - Record patient progress and toleration of activity level   Outcome: Progressing     Problem: GASTROINTESTINAL - ADULT  Goal: Minimal or absence of nausea and/or vomiting  Description: INTERVENTIONS:  - Administer IV fluids if ordered to ensure adequate hydration  - Maintain NPO status until nausea and vomiting are resolved  - Nasogastric tube if ordered  - Administer ordered antiemetic medications as needed  - Provide nonpharmacologic comfort measures as appropriate  - Advance diet as tolerated, if ordered  - Consider nutrition services referral to assist patient with adequate nutrition and appropriate food choices  Outcome: Progressing     Problem: HEMATOLOGIC - ADULT  Goal: Maintains hematologic stability  Description: INTERVENTIONS  - Assess for signs and symptoms of bleeding or hemorrhage  - Monitor labs  - Administer supportive blood products/factors as ordered and appropriate  Outcome: Progressing

## 2023-02-10 NOTE — PROGRESS NOTES
[address] [facility] NOTE   Maria Luz Farrar 56 y o  female MRN: [id number]  Unit/Bed#: [facility] 833-01 Encounter: [id number]  Reason for Consult: Acute kidney injury    ASSESSMENT and PLAN:  61-year-old female who recently had laparoscopic cholecystectomy presented with dizziness  We are consulted for management of acute kidney injury  # Acute kidney injury  Baseline creatinine appears to be around 0 7-1 0  Urinalysis revealed large blood, innumerable RBCs, 4-10 WBC, 5-10 hyaline casts, no protein  Status post iodinated contrast administration on 02/09/2023  Imaging did not show hydronephrosis or urinary tract calculus  Etiology was thought to be prerenal ADA in setting of lisinopril and Aldactone use +/- ATN in setting of anemia, now at risk of contrast associated nephropathy  She has history of biopsy-proven lupus nephritis from 58  Serum creatinine was 2 10 on admission that has improved to 1 28 with IV fluids  Decrease rate of IV fluids to 50 cc/h and aim to discontinue once tolerating p o  diet  # History of lupus nephritis from 1996  If renal function worsens again, can check complement levels, HECTOR and double-stranded DNA to rule out flareup of lupus nephritis  Currently she has no extrarenal manifestations of lupus also  We have already identified different etiologies that can be the culprits for current episode of acute kidney injury  # Blood pressure  Keep all antihypertensives on hold including fosinopril and Aldactone  Discussed with primary surgical team   After discussion we decreased IV fluid rate to 50 cc/h with plan to discontinue IV fluids when she is tolerating p o  diet  SUBJECTIVE / 24H INTERVAL HISTORY:  Patient seen and examined this morning  She feels okay  She denies abdominal pain  She denies dyspnea  Review of Systems   Constitutional: Negative for chills and fever  HENT: Negative for ear pain and sore throat      Eyes: Negative for pain and visual disturbance  Respiratory: Negative for cough and shortness of breath  Cardiovascular: Negative for chest pain and palpitations  Gastrointestinal: Negative for abdominal pain and vomiting  Genitourinary: Negative for dysuria and hematuria  Musculoskeletal: Negative for arthralgias and back pain  Skin: Negative for color change and rash  Neurological: Negative for seizures and syncope  All other systems reviewed and are negative      OBJECTIVE:  Current Weight: Weight - Scale: 86 6 kg (191 lb)  Vitals:    02/10/23 0100 02/10/23 0113 02/10/23 0256 02/10/23 0720   BP: 111/67 108/66 107/65 119/68   Pulse: 94 92 81 75   Resp: 20 20 20 19   Temp: 100 4 °F (38 °C) 100 °F (37 8 °C) 99 6 °F (37 6 °C) 97 9 °F (36 6 °C)   TempSrc:       SpO2:  93% 95% 94%   Weight:       Height:           Intake/Output Summary (Last 24 hours) at 2/10/2023 1490  Last data filed at 2/10/2023 9009  Gross per 24 hour   Intake 3281 25 ml   Output 225 ml   Net 3056 25 ml     General: Awake and alert   Eyes: Conjunctivae pink, sclera anicteric  ENT: Lips and mucous membranes moist  Neck: Supple   Chest: Clear to auscultation bilaterally   CVS: S1 & S2 present, normal rate, regular rhythm, no murmur  Abdomen: Soft, non-tender, non-distended, BS +  Extremities: No edema bilaterally   Skin: No rash  Neuro: Awake, alert and oriented  Psych: Mood and affect appropriate     Medications:    Current Facility-Administered Medications:   •  acetaminophen (TYLENOL) tablet 650 mg, 650 mg, Oral, Q6H [address], Cordell Onofre MD, 650 mg at 02/10/23 0022  •  levothyroxine tablet 112 mcg, 112 mcg, Oral, Daily, Cordell Onofre MD, 112 mcg at 02/10/23 1034  •  ondansetron (ZOFRAN) injection 4 mg, 4 mg, Intravenous, Q6H PRN, Cordell Onofre MD  •  oxyCODONE (ROXICODONE) IR tablet 5 mg, 5 mg, Oral, Q6H PRN, Cordell Onofre MD, 5 mg at 02/09/23 8276    Laboratory Results:  Results from last 7 days   Lab Units 02/10/23  0622 02/10/23  0416 02/10/23  0112 02/09/23  1959 02/09/23  1718 02/09/23  1026 02/08/23  1108 02/05/23  0411 02/04/23  0414   WBC Thousand/uL 9 29  --   --  10 53*  --  7 29  --  5 28 5 64   HEMOGLOBIN g/dL 8 5* 8 9* 7 1* 6 6* 6 9* 8 1*  --  11 7 11 3*   HEMATOCRIT % 24 9* 26 7* 21 7* 20 0* 20 8* 25 0*  --  35 2 33 4*   PLATELETS Thousands/uL 142*  --   --  164  --  134*  --  193 205   POTASSIUM mmol/L  --   --   --   --   --  3 6 4 0 4 0 4 1   CHLORIDE mmol/L  --   --   --   --   --  110* 108 110* 112*   CO2 mmol/L  --   --   --   --   --  24 22 23 25   BUN mg/dL  --   --   --   --   --  37* 24 10 15   CREATININE mg/dL  --   --   --   --   --  2 10* 1 38* 0 76 0 84   CALCIUM mg/dL  --   --   --   --   --  7 6* 8 3 8 2* 7 6*   MAGNESIUM mg/dL  --   --   --   --   --  1 6  --   --  1 6       Portions of the record may have been created with voice recognition software  Occasional wrong word or "sound a like" substitutions may have occurred due to the inherent limitations of voice recognition software  Read the chart carefully and recognize, using context, where substitutions have occurred  If you have any questions, please contact the dictating provider

## 2023-02-10 NOTE — UTILIZATION REVIEW
Initial Clinical Review    Admission: Date/Time/Statement:   Admission Orders (From admission, onward)     Ordered        02/09/23 1224  Place in Observation  Once                      Orders Placed This Encounter   Procedures   • Place in Observation     Standing Status:   Standing     Number of Occurrences:   1     Order Specific Question:   Level of Care     Answer:   Med Surg [16]     ED Arrival Information     Expected   -    Arrival   2/9/2023 10:09    Acuity   Urgent            Means of arrival   Ambulance    Escorted by   [facility]    Service   Surgery-General    Admission type   Emergency            Arrival complaint   -           Chief Complaint   Patient presents with   • Dizziness     Per pt recent surgery on Sunday to remove gall bladder, felt dizzy and fell onto the ground hitting her right elbow  - head strike -LOC  Currently taking lovenox and coumadin  Initial Presentation: 56 y o  female who presented by EMS to [address] [place] [address] ED  Admitted Observation status med surg dt syncope, light-headedness  The patient is s/p elective laparoscopic cholecystectomy on 2/5/2023  She reported that she has been doing relatively well until today when she was in her bathroom, stood up, and suddenly fell to the ground  PMHx: anemia, thyroid CA, Anti-phospholipid antibody syndrome, clotting disorder, DM, HTN, Lupus, Osteoarthritis, PE, Sjogren's syndrome, Vit D deficiency  Labs in ED revealed ADA and hgb dropping to 6 6  General surgery team was consulted given her recent surgical procedure  Plan:  2 units PRBCs given, trend hgb, consider TEG/reversal if drop in hgb continues, nephrology consult, possible IR drainage if hematoma  2/9 Nephrology Consult:  ADA, present on admission, likely prerenal in setting of fosinopril/aldactone use at home as well as hypotension vs ATN    UA positive for blood, fu on renal imaging to ro obstruction, consider dry CT to avoid potential worsening ADA, monitor serial H&H  Hold home dose nifedipine, fosinopril and spironolactone  ED Triage Vitals [02/09/23 1015]   Temperature Pulse Respirations Blood Pressure SpO2   98 7 °F (37 1 °C) 87 16 108/53 95 %      Temp Source Heart Rate Source Patient Position - Orthostatic VS BP Location FiO2 (%)   Oral Monitor Lying Right arm --      Pain Score       2          Wt Readings from Last 1 Encounters:   02/09/23 86 6 kg (191 lb)     Additional Vital Signs:   Date/Time Temp Pulse Resp BP MAP (mmHg) SpO2 O2 Device Patient Position - Orthostatic VS   02/10/23 07:20:43 97 9 °F (36 6 °C) 75 19 119/68 85 94 % -- --   02/10/23 02:56:22 99 6 °F (37 6 °C) 81 20 107/65 79 95 % -- --   02/10/23 01:13:58 100 °F (37 8 °C) 92 20 108/66 80 93 % -- --   02/10/23 0100 100 4 °F (38 °C) 94 20 111/67 -- -- -- --   02/10/23 00:41:57 98 9 °F (37 2 °C) 97 18 113/68 83 92 % -- --   02/10/23 0023 100 4 °F (38 °C) -- -- -- -- -- -- --   02/10/23 00:19:26 98 2 °F (36 8 °C) 99 20 117/69 85 93 % -- --   02/09/23 2255 -- 115 Abnormal  -- 116/72 87 93 % -- --   02/09/23 2250 -- 106 Abnormal  -- 119/74 89 91 % -- --   02/09/23 22:44:10 -- -- -- 118/74 89 -- -- --   02/09/23 22:34:45 102 4 °F (39 1 °C) Abnormal  -- -- 125/76 92 -- -- --   02/09/23 2234 102 4 °F (39 1 °C) Abnormal  106 Abnormal  20 125/76 -- -- -- --   02/09/23 15:59:14 99 2 °F (37 3 °C) 95 -- 117/74 88 97 % None (Room air) --   02/09/23 1315 -- 78 20 102/63 75 98 % None (Room air) Lying   02/09/23 1018 -- -- -- -- -- -- None (Room air) --   02/09/23 1015 98 7 °F (37 1 °C) 87 16 108/53 76 95 % None (Room air) Lying     Pertinent Labs/Diagnostic Test Results:   2/9 EKG: NSR    CTA abdomen pelvis w wo contrast   Final Result by Merly Canseco DO (02/09 2307)      Status post cholecystectomy with large complex hematoma tracking from the surgical bed  No definite evidence of active extravasation of contrast       Free fluid in the abdomen and pelvis        Inflammatory changes in the anterior abdominal wall which is likely related to postsurgical changes  I personally discussed this study with Sindy Edwards on 2/9/2023 at 11:07 PM                   Workstation performed: [id number]         CT head without contrast   Final Result by Chris Verde MD (02/09 1106)      1  No acute intracranial CT abnormality  2   Periapical disease of partially imaged right maxillary 3rd molar tooth communicating with mildly diseased overlying maxillary sinus                    Workstation performed: [id number]         US kidney and bladder    (Results Pending)   VAS lower limb venous duplex study, complete bilateral    (Results Pending)     Results from last 7 days   Lab Units 02/09/23  1322   SARS-COV-2  Negative     Results from last 7 days   Lab Units 02/10/23  0622 02/10/23  0416 02/10/23  0112 02/09/23  1959 02/09/23  1718 02/09/23  1026 02/05/23  0411   WBC Thousand/uL 9 29  --   --  10 53*  --  7 29 5 28   HEMOGLOBIN g/dL 8 5* 8 9* 7 1* 6 6* 6 9* 8 1* 11 7   HEMATOCRIT % 24 9* 26 7* 21 7* 20 0* 20 8* 25 0* 35 2   PLATELETS Thousands/uL 142*  --   --  164  --  134* 193   NEUTROS ABS Thousands/µL  --   --   --   --   --  5 51 2 93         Results from last 7 days   Lab Units 02/09/23  1026 02/08/23  1108 02/05/23  0411 02/04/23  0414   SODIUM mmol/L 137 141 140 141   POTASSIUM mmol/L 3 6 4 0 4 0 4 1   CHLORIDE mmol/L 110* 108 110* 112*   CO2 mmol/L 24 22 23 25   ANION GAP mmol/L 3* 11 7 4   BUN mg/dL 37* 24 10 15   CREATININE mg/dL 2 10* 1 38* 0 76 0 84   EGFR ml/min/1 73sq m 25 43 88 78   CALCIUM mg/dL 7 6* 8 3 8 2* 7 6*   MAGNESIUM mg/dL 1 6  --   --  1 6     Results from last 7 days   Lab Units 02/09/23  1026 02/08/23  1108 02/05/23  0411 02/04/23  0414 02/03/23  1256   AST U/L 86*  --  60* 110* 268*   ALT U/L 80*  --  77 137* 203*   ALK PHOS U/L 77  --  119* 126* 158*   TOTAL PROTEIN g/dL 5 4*  --  6 8 6 2* 7 0   ALBUMIN g/dL 2 3* 3 2* 3 0* 2 8* 3 0*   TOTAL BILIRUBIN mg/dL 0 41  --  0 75 0  96 0 87   BILIRUBIN DIRECT mg/dL  --   --   --   --  0 14     Results from last 7 days   Lab Units 02/05/23  1030   POC GLUCOSE mg/dl 151*     Results from last 7 days   Lab Units 02/09/23  1026 02/05/23  0411 02/04/23  0414   GLUCOSE RANDOM mg/dL 142* 77 82             No results found for: BETA-HYDROXYBUTYRATE                   Results from last 7 days   Lab Units 02/09/23  1421 02/09/23  1221 02/09/23  1026 02/03/23  1256 02/03/23  1033   HS TNI 0HR ng/L  --   --  4  --   --    HS TNI 2HR ng/L  --  6  --   --  5   HSTNI D2 ng/L  --  2  --   --  1   HS TNI 4HR ng/L 5  --   --  4  --    HSTNI D4 ng/L 1  --   --  0  --          Results from last 7 days   Lab Units 02/10/23  0622 02/09/23  1026 02/08/23  1108   PROTIME seconds 16 6* 17 2* 15 5*   INR  1 32* 1 38* 1 21*   PTT seconds  --  40*  --      Results from last 7 days   Lab Units 02/08/23  1108   TSH 3RD GENERATON uIU/mL 2 270                                 Results from last 7 days   Lab Units 02/09/23  1026   LIPASE u/L 132                 Results from last 7 days   Lab Units 02/09/23  1259   CLARITY UA  Cloudy   COLOR UA  Yellow   SPEC GRAV UA  1 020   PH UA  5 5   GLUCOSE UA mg/dl Negative   KETONES UA mg/dl Negative   BLOOD UA  Large*   PROTEIN UA mg/dl Negative   NITRITE UA  Negative   BILIRUBIN UA  Negative   UROBILINOGEN UA E U /dl 0 2   LEUKOCYTES UA  Negative   WBC UA /hpf 4-10*   RBC UA /hpf Innumerable*   BACTERIA UA /hpf Occasional   EPITHELIAL CELLS WET PREP /hpf Moderate*   MUCUS THREADS  Occasional*     Results from last 7 days   Lab Units 02/09/23  1322   INFLUENZA A PCR  Negative   INFLUENZA B PCR  Negative   RSV PCR  Negative                             Results from last 7 days   Lab Units 02/10/23  0346   BLOOD CULTURE  Received in Microbiology Lab  Culture in Progress                 ED Treatment:   Medication Administration from 02/09/2023 1009 to 02/09/2023 1513       Date/Time Order Dose Route Action     02/09/2023 1032 EST multi-electrolyte (ISOLYTE-S PH 7 4) bolus 1,000 mL 1,000 mL Intravenous New Bag     02/09/2023 1255 EST sodium chloride 0 9 % infusion 125 mL/hr Intravenous New Bag        Past Medical History:   Diagnosis Date   • Anemia     Not sure   • Anti-phospholipid antibody syndrome (HCC)    • Cancer (HCC)     thyroid   • Chronic kidney disease    • Clotting disorder (HCC)     Lung embolism,  ITP   • Diabetes mellitus ([place] [address] )    • Disease of thyroid gland    • Fibroid 1998   • Gestational diabetes    • History of chemotherapy     late 1980s for lupus   • Hyperparathyroidism ([place] [address] )    • Hypertension    • Idiopathic thrombocytopenic purpura (ITP) ([facility])    • Kidney stone    • Lupus ([place] [address] )    • Miscarriage 58    Lost twin girls 12-   • Osteoarthritis    • Pulmonary embolism ([place] [address] ) 1987   • Sjogren's syndrome ([place] [address] )    • Vitamin D deficiency      Present on Admission:  • Chronic kidney disease (CKD), stage II (mild)  • Systemic lupus erythematosus ([place] [address] )      Admitting Diagnosis: Dizziness [R42]  Syncope [R55]  ADA (acute kidney injury) ([place] [address] ) [N17 9]  Age/Sex: 56 y o  female  Admission Orders:  Scheduled Medications:  acetaminophen, 650 mg, Oral, Q6H [address]  levothyroxine, 112 mcg, Oral, Daily      Continuous IV Infusions:     PRN Meds:  ondansetron, 4 mg, Intravenous, Q6H PRN  oxyCODONE, 5 mg, Oral, Q6H PRN        IP CONSULT TO NEPHROLOGY    Network Utilization Review Department  ATTENTION: Please call with any questions or concerns to [phone] and carefully listen to the prompts so that you are directed to the right person  All voicemails are confidential   Saumya Grand all requests for admission clinical reviews, approved or denied determinations and any other requests to dedicated fax number below belonging to the campus where the patient is receiving treatment   List of dedicated fax numbers for the Facilities:  [address] [place] [address] DENIALS (Administrative/Medical Necessity) [phone]   [address] [place] [address] (Maternity/NICU/Pediatrics) Shavonne Uribe [address] [place]  [phone]   [address] [place] [address] [facility] [address] [facility] [address] [facility] [address] [place] [phone]

## 2023-02-10 NOTE — PROGRESS NOTES
Progress Note - General Surgery   Bernarda Baumann 56 y o  female MRN: [id number]  Unit/Bed#: [facility] 833-01 Encounter: [id number]      Assessment:  53yo F APS/ITP/PE s/p laparoscopic cholecystectomy on 2/5/23 presenting due to lightheadedness/syncope found to have an acute kidney injury w/ a creatinine of 2 10 and acute blood loss anemia prompting 2u pRBCs overnight  Afebrile, vitals WNL, and saturating adequately on room air  UOP: ?225cc/24hrs   Passing flatus and bowel movements    Plan:  - Hgb stabilized at 8 9 this AM from 6 9 prior to 2u pRBC overnight (see separate documentation)  - OK for a low fat diet  - Continue IVFs for ADA, nephrology following  - Will wait 48hrs prior to starting a heparin gtt, no bolus  - Patient needs 24hrs post-initiation in the hospital to ensure no recurrent bleed  - Will obtain lower ext duplex to evaluate for possible persistent DVT (would need IVC filter in place situation)  - OOB / ambulation as tolerated  - Incentive spirometer use    Subjective/Objective     Subjective:   See overnight documentation for events  This AM the patient reported significantly improved dizziness/lightheadedness w/ only mild-moderate LUQ abdominal pain and no fevers/chills, nausea/emesis, or dyspnea  OOB/ambulating  Objective:     Blood pressure 119/68, pulse 75, temperature 97 9 °F (36 6 °C), resp  rate 19, height 5' 1" (1 549 m), weight 86 6 kg (191 lb), SpO2 94 %  ,Body mass index is 36 09 kg/m²  Gen: Awake, alert, and in no acute distress  Head: Normocephalic, atraumatic  Neck: No obvious JVD, trachea midline  Cardiovascular: Regular rate  Respiratory:  In no acute respiratory distress w/ no use of accessory muscles of respiration  Abd: Soft, non-distended, and appropriately TTP overlying ecchymotic incisions healing well no guarding/rigidity or signs of peritonitis  Extremities: No visible wounds/ulcerations to the B/L upper/lower extremities  Skin: Warm/dry  Psychiatric: Appropriate mood/affect    Intake/Output Summary (Last 24 hours) at 2/10/2023 0741  Last data filed at 2/10/2023 0349  Gross per 24 hour   Intake 3281 25 ml   Output 225 ml   Net 3056 25 ml       Invasive Devices     Peripheral Intravenous Line  Duration           Peripheral IV 02/09/23 Left;Ventral (anterior) Hand <1 day                I have personally reviewed pertinent lab results    , CBC:   Lab Results   Component Value Date    WBC 9 29 02/10/2023    HGB 8 5 (L) 02/10/2023    HCT 24 9 (L) 02/10/2023    MCV 88 02/10/2023     (L) 02/10/2023    MCH 29 9 02/10/2023    MCHC 34 1 02/10/2023    RDW 14 5 02/10/2023    MPV 10 5 02/10/2023    NRBC 0 02/09/2023   , CMP:   Lab Results   Component Value Date    SODIUM 137 02/09/2023    K 3 6 02/09/2023     (H) 02/09/2023    CO2 24 02/09/2023    BUN 37 (H) 02/09/2023    CREATININE 2 10 (H) 02/09/2023    CALCIUM 7 6 (L) 02/09/2023    AST 86 (H) 02/09/2023    ALT 80 (H) 02/09/2023    ALKPHOS 77 02/09/2023    EGFR 25 02/09/2023   , Coagulation:   Lab Results   Component Value Date    INR 1 32 (H) 02/10/2023   , Urinalysis:   Lab Results   Component Value Date    COLORU Yellow 02/09/2023    CLARITYU Cloudy 02/09/2023    SPECGRAV 1 020 02/09/2023    PHUR 5 5 02/09/2023    LEUKOCYTESUR Negative 02/09/2023    NITRITE Negative 02/09/2023    GLUCOSEU Negative 02/09/2023    KETONESU Negative 02/09/2023    BILIRUBINUR Negative 02/09/2023    BLOODU Large (A) 02/09/2023   , Amylase: No results found for: AMYLASE, Lipase:   Lab Results   Component Value Date    LIPASE 132 02/09/2023

## 2023-02-11 LAB
ANION GAP SERPL CALCULATED.3IONS-SCNC: 6 MMOL/L (ref 4–13)
ANISOCYTOSIS BLD QL SMEAR: PRESENT
APTT PPP: 32 SECONDS (ref 23–37)
APTT PPP: 59 SECONDS (ref 23–37)
ATRIAL RATE: 83 BPM
BASOPHILS # BLD MANUAL: 0 THOUSAND/UL (ref 0–0.1)
BASOPHILS NFR MAR MANUAL: 0 % (ref 0–1)
BUN SERPL-MCNC: 21 MG/DL (ref 5–25)
CALCIUM SERPL-MCNC: 8.5 MG/DL (ref 8.3–10.1)
CHLORIDE SERPL-SCNC: 111 MMOL/L (ref 96–108)
CO2 SERPL-SCNC: 23 MMOL/L (ref 21–32)
CREAT SERPL-MCNC: 0.84 MG/DL (ref 0.6–1.3)
EOSINOPHIL # BLD MANUAL: 0.17 THOUSAND/UL (ref 0–0.4)
EOSINOPHIL NFR BLD MANUAL: 2 % (ref 0–6)
ERYTHROCYTE [DISTWIDTH] IN BLOOD BY AUTOMATED COUNT: 14.7 % (ref 11.6–15.1)
GFR SERPL CREATININE-BSD FRML MDRD: 78 ML/MIN/1.73SQ M
GLUCOSE SERPL-MCNC: 95 MG/DL (ref 65–140)
HCT VFR BLD AUTO: 26.4 % (ref 34.8–46.1)
HGB BLD-MCNC: 8.9 G/DL (ref 11.5–15.4)
INR PPP: 1.12 (ref 0.84–1.19)
LYMPHOCYTES # BLD AUTO: 0.59 THOUSAND/UL (ref 0.6–4.47)
LYMPHOCYTES # BLD AUTO: 7 % (ref 14–44)
MCH RBC QN AUTO: 30.1 PG (ref 26.8–34.3)
MCHC RBC AUTO-ENTMCNC: 33.7 G/DL (ref 31.4–37.4)
MCV RBC AUTO: 89 FL (ref 82–98)
MONOCYTES # BLD AUTO: 0.42 THOUSAND/UL (ref 0–1.22)
MONOCYTES NFR BLD: 5 % (ref 4–12)
NEUTROPHILS # BLD MANUAL: 6.95 THOUSAND/UL (ref 1.85–7.62)
NEUTS SEG NFR BLD AUTO: 82 % (ref 43–75)
P AXIS: 45 DEGREES
PLATELET # BLD AUTO: 159 THOUSANDS/UL (ref 149–390)
PLATELET BLD QL SMEAR: ADEQUATE
PMV BLD AUTO: 10.7 FL (ref 8.9–12.7)
POLYCHROMASIA BLD QL SMEAR: PRESENT
POTASSIUM SERPL-SCNC: 3.7 MMOL/L (ref 3.5–5.3)
PR INTERVAL: 132 MS
PROTHROMBIN TIME: 14.6 SECONDS (ref 11.6–14.5)
QRS AXIS: 52 DEGREES
QRSD INTERVAL: 76 MS
QT INTERVAL: 354 MS
QTC INTERVAL: 415 MS
RBC # BLD AUTO: 2.96 MILLION/UL (ref 3.81–5.12)
RBC MORPH BLD: PRESENT
SODIUM SERPL-SCNC: 140 MMOL/L (ref 135–147)
T WAVE AXIS: -17 DEGREES
VARIANT LYMPHS # BLD AUTO: 4 %
VENTRICULAR RATE: 83 BPM
WBC # BLD AUTO: 8.48 THOUSAND/UL (ref 4.31–10.16)

## 2023-02-11 RX ORDER — HEPARIN SODIUM 10000 [USP'U]/100ML
3-30 INJECTION, SOLUTION INTRAVENOUS
Status: DISCONTINUED | OUTPATIENT
Start: 2023-02-11 | End: 2023-02-18

## 2023-02-11 RX ADMIN — HEPARIN SODIUM 18 UNITS/KG/HR: 10000 INJECTION, SOLUTION INTRAVENOUS at 10:37

## 2023-02-11 RX ADMIN — ACETAMINOPHEN 650 MG: 325 TABLET, FILM COATED ORAL at 23:28

## 2023-02-11 RX ADMIN — OXYCODONE HYDROCHLORIDE 5 MG: 5 TABLET ORAL at 06:32

## 2023-02-11 RX ADMIN — LEVOTHYROXINE SODIUM 112 MCG: 112 TABLET ORAL at 06:32

## 2023-02-11 RX ADMIN — OXYCODONE HYDROCHLORIDE 5 MG: 5 TABLET ORAL at 20:32

## 2023-02-11 RX ADMIN — ACETAMINOPHEN 650 MG: 325 TABLET, FILM COATED ORAL at 06:32

## 2023-02-11 NOTE — UTILIZATION REVIEW
Continued Stay Review - INPATIENT CONVERSION REVIEW  Date: 2/11/23 Saturday - 2/12/23 Sunday                Current Patient Class: OBSERVATION 2/9/23 AT 1224 - CONVERTED TO INPATIENT 2/12/23 AT 0810 2ND NEAR SYNCOPE WITH ACUTE BLOOD LOSS ANEMIA + ADA REQUIRING CONTINUED INPATIENT  MANAGEMENT AFTER > 67 HOURS OBSERVATION - CONTINUED IV HEPARIN GTT, RESUMPTION COUMADIN, INCREASE DIET AS TOLERATED  Current Level of Care: Med Surg     02/12/23 0810  Inpatient Admission  Once        Transfer Service: Surgery-General       Question Answer Comment   Level of Care Med Surg    Estimated length of stay More than 2 Midnights    Certification I certify that inpatient services are medically necessary for this patient for a duration of greater than two midnights  See H&P and MD Progress Notes for additional information about the patient's course of treatment  02/12/23 0810   02/09/23 2227  Update level of care  Once        Transfer Service: Surgery-General       Question: Level of Care Answer: Level 2 Stepdown / HOT    02/09/23 2226   02/09/23 1221  Place in Observation  (Place in Observation)  Once        Transfer Service: Surgery-General       Question: Level of Care Answer: Med Surg    02/09/23 1224     HPI:  55 y/o female with PMHx  HTN, DM, PE, thyroid CA, Anti-phospholipid antibody syndrome, clotting disorder, SLE, Osteoarthritis,  Sjogren's syndrome, Vit D deficiency, s/p elective laparoscopic cholecystectomy on 2/5/2023 - initially presented by [facility]  on 2/9/23 2nd Syncope, light-headedness reporting while in her bathroom, stood up, and suddenly fell to the ground  Labs showed ADA and hgb dropping to 6 6  Placed in Observation 2/9/23 at 1224 2nd Near Syncope with Acute Blood Loss Anemia + ADA     Tx Plan:  2 units PRBCs given, trend hgb, consider TEG/ reversal if drop in hgb continues, Nephrology consult, possible IR drainage if hematoma      2/9 Nephrology Consult:  ADA, present on admission, likely prerenal in setting of fosinopril/aldactone use at home as well as hypotension vs ATN  UA positive for blood, fu on renal imaging to ro obstruction, consider dry CT to avoid potential worsening ADA, monitor serial H&H  Hold home dose nifedipine, fosinopril and spironolactone  2/10/23:  Afebrile, vitals WNL, and saturating adequately on room air  mild-moderate LUQ abdominal pain, passing flatus   UOP: ?225cc/24hrs   -  Hgb stabilized at 8 9 this AM from 6 9 prior to 2u pRBC overnight   - OK for a low fat diet  - Continue IVFs for ADA, nephrology following  - Will wait 48hrs prior to starting a heparin gtt, no bolus - needs 24hrs post-initiation in the hospital to ensure no recurrent bleed      2/11/23:   Assessment / Plan:  1  ADA, present on admission, likely prerenal in setting of fosinopril/aldactone use at home as well as hypotension vs ATN  -UA +blood, without protein  -b/l sCr 0 7-1, follows outpatient with me, Dr Williams Flowers for history of biopsy proven LN  -sCr on admission 2 1, ADA resolved status post IV fluids  -monitor BMP  -as renal function back to baseline, nephrology will sign off  -should continue to hold diuretics at this time  -avoid further IV fluid  2  Anemia in the setting of right upper quadrant hematoma noted status post lap dakotah performed February 5, 2023 - Hgb 8 9, monitor CBC  3  Hypotension - hold home dose nifedipine, fosinopril and spironolactone  BP low normal range currently  Would continue to hold diuretics upon discharge    4  S/p lap dakotah 2/5/23 - per surgery       Assessment:  57 y/o female APS/ITP/PE s/p laparoscopic cholecystectomy on 2/5/23 presenting due to lightheadedness/syncope found to have an acute kidney injury w/ a creatinine of 2 10 and acute blood loss anemia prompting 2u pRBCs on 2/10      Plan:  • Diet as tolerated  • Restart heparin gtt  • Follow hgb  • OOB/ ambulate    2/12/23:  Assessment:  57 y/o female APS/ITP/PE s/p laparoscopic cholecystectomy on 2/5/23 presenting due to lightheadedness/syncope found to have an acute kidney injury w/ a creatinine of 2 10 and acute blood loss anemia prompting 2u pRBCs on 2/10    c/o feels bloated  Having bowel function  Tolerating low fat diet  AVSS/ afebrile  Urine Output: 1 2 L  Hb: 8 1 (8 9)  - follow Hmglb  Cr: 0 8      PLAN: - continue IV Heparin gtt  resume warfarin today  daily INR  - oral diet as tolerated  - continue hep gtt      CONVERTED TO INPATIENT 2/12/23 AT 0810 2ND NEAR SYNCOPE WITH ACUTE BLOOD LOSS ANEMIA + ADA REQUIRING CONTINUED INPATIENT  MANAGEMENT AFTER > 67 HOURS OBSERVATION - CONTINUED IV HEPARIN GTT, RESUMPTION  COUMADIN STARTING TODAy, INCREASE DIET AS TOLERATED    Vital Signs:   Date/Time Temp Pulse Resp BP MAP (mmHg) SpO2 O2 Device Patient Position - Orthostatic VS   02/11/23 10:49:32 -- 68 -- 117/72 87 95 % -- --   02/11/23 07:47:10 97 7 °F (36 5 °C) -- 16 116/69 85 -- -- --   02/11/23 02:54:54 97 7 °F (36 5 °C) 73 26 Abnormal  146/92 110 95 % -- --   02/11/23 0254 -- -- -- -- -- -- None (Room air) --   02/10/23 22:59:15 97 9 °F (36 6 °C) 76 18 120/74 89 97 % -- --   02/10/23 2029 -- -- -- -- -- -- None (Room air) --   02/10/23 15:41:42 97 5 °F (36 4 °C) 70 20 135/81 99 95 % -- --   02/10/23 11:42:13 98 2 °F (36 8 °C) 75 18 133/83 100 96 % None (Room air) Lying   02/10/23 11:41:55 -- 76 -- 133/83 100 96 % -- --   02/10/23 07:20:43 97 9 °F (36 6 °C) 75 19 119/68 85 94 % -- --   02/10/23 02:56:22 99 6 °F (37 6 °C) 81 20 107/65 79 95 % -- --   02/10/23 01:13:58 100 °F (37 8 °C) 92 20 108/66 80 93 % -- --   02/10/23 0100 100 4 °F (38 °C) 94 20 111/67 -- -- -- --   02/10/23 00:41:57 98 9 °F (37 2 °C) 97 18 113/68 83 92 % -- --   02/10/23 0023 100 4 °F (38 °C) -- -- -- -- -- -- --   02/10/23 00:19:26 98 2 °F (36 8 °C) 99 20 117/69 85 93 % -- --   02/09/23 2255 -- 115 Abnormal  -- 116/72 87 93 % -- --   02/09/23 2250 -- 106 Abnormal  -- 119/74 89 91 % -- --   02/09/23 22:44:10 -- -- -- 118/74 89 -- -- --   02/09/23 22:34:45 102 4 °F (39 1 °C) Abnormal  -- -- 125/76 92 -- -- --   02/09/23 2234 102 4 °F (39 1 °C) Abnormal  106 Abnormal  20 125/76 -- -- -- --   02/09/23 15:59:14 99 2 °F (37 3 °C) 95 -- 117/74 88 97 % None (Room air) --   02/09/23 1315 -- 78 20 102/63 75 98 % None (Room air) Lying   02/09/23 1018 -- -- -- -- -- -- None (Room air) --   02/09/23 1015 98 7 °F (37 1 °C) 87 16 108/53 76 95 % None (Room air) Lying      02/09 0701   02/10 0700 02/10 0701 02/11 0700   P  O  120 200   I V  (mL/kg) 2500 (28 9) 1121 3 (12 9)   Blood 661 3    Total Intake(mL/kg) 3281 3 (37 9) 1321 3 (15 3)   Urine (mL/kg/hr) 225 1150 (0 6)   Stool  0   Total Output 225 1150   Net +3056 3 +171 3        Unmeasured Stool Occurrence  2 x     Pertinent Labs/Diagnostic Results:   Results from last 7 days   Lab Units 02/09/23  1322   SARS-COV-2  Negative     Results from last 7 days   Lab Units 02/11/23  0629 02/10/23  1831 02/10/23  0907 02/10/23  0622 02/10/23  0416 02/10/23  0112 02/09/23  1959 02/09/23  1718 02/09/23  1026 02/05/23  0411   WBC Thousand/uL 8 48  --   --  9 29  --   --  10 53*  --  7 29 5 28   HEMOGLOBIN g/dL 8 9* 8 7* 8 3* 8 5* 8 9*   < > 6 6*   < > 8 1* 11 7   HEMATOCRIT % 26 4* 26 9* 24 7* 24 9* 26 7*   < > 20 0*   < > 25 0* 35 2   PLATELETS Thousands/uL 159  --   --  142*  --   --  164  --  134* 193   NEUTROS ABS Thousands/µL  --   --   --   --   --   --   --   --  5 51 2 93     Results from last 7 days   Lab Units 02/12/23  0323 02/11/23  0629 02/10/23  0622 02/09/23  1026 02/08/23  1108   SODIUM mmol/L 137 140 141 137 141   POTASSIUM mmol/L 3 5 3 7 4 1 3 6 4 0   CHLORIDE mmol/L 109* 111* 111* 110* 108   CO2 mmol/L 25 23 22 24 22   ANION GAP mmol/L 3* 6 8 3* 11   BUN mg/dL 18 21 34* 37* 24   CREATININE mg/dL 0 83 0 84 1 28 2 10* 1 38*   EGFR ml/min/1 73sq m 79 78 47 25 43   CALCIUM mg/dL 8 3 8 5 7 4* 7 6* 8 3   MAGNESIUM mg/dL  --   --  1 8 1 6  --      Results from last 7 days   Lab Units 02/10/23  0622 02/09/23  1026 02/08/23  1108   AST U/L 79* 86*  --    ALT U/L 107* 80*  --    ALK PHOS U/L 86 77  --    TOTAL PROTEIN g/dL 5 8* 5 4*  --    ALBUMIN g/dL 2 4* 2 3* 3 2*   TOTAL BILIRUBIN mg/dL 1 37* 0 41  --          Results from last 7 days   Lab Units 02/12/23  0323 02/11/23  0629 02/10/23  0622 02/09/23  1026   GLUCOSE RANDOM mg/dL 90 95 99 142*     Results from last 7 days   Lab Units 02/09/23  1421 02/09/23  1221 02/09/23  1026   HS TNI 0HR ng/L  --   --  4   HS TNI 2HR ng/L  --  6  --    HSTNI D2 ng/L  --  2  --    HS TNI 4HR ng/L 5  --   --    HSTNI D4 ng/L 1  --   --      Results from last 7 days   Lab Units 02/12/23  1245 02/12/23  0323 02/11/23 2028 02/11/23  1025 02/10/23  0622 02/09/23  1026   PROTIME seconds  --   --   --  14 6* 16 6* 17 2*   INR   --   --   --  1 12 1 32* 1 38*   PTT seconds 106* 161* 59* 32  --  40*     Results from last 7 days   Lab Units 02/08/23  1108   TSH 3RD GENERATON uIU/mL 2 270     Results from last 7 days   Lab Units 02/09/23  1026   LIPASE u/L 132     Results from last 7 days   Lab Units 02/09/23  1259   CLARITY UA  Cloudy   COLOR UA  Yellow   SPEC GRAV UA  1 020   PH UA  5 5   GLUCOSE UA mg/dl Negative   KETONES UA mg/dl Negative   BLOOD UA  Large*   PROTEIN UA mg/dl Negative   NITRITE UA  Negative   BILIRUBIN UA  Negative   UROBILINOGEN UA E U /dl 0 2   LEUKOCYTES UA  Negative   WBC UA /hpf 4-10*   RBC UA /hpf Innumerable*   BACTERIA UA /hpf Occasional   EPITHELIAL CELLS WET PREP /hpf Moderate*   MUCUS THREADS  Occasional*     Results from last 7 days   Lab Units 02/09/23  1322   INFLUENZA A PCR  Negative   INFLUENZA B PCR  Negative   RSV PCR  Negative     Results from last 7 days   Lab Units 02/10/23  0621 02/10/23  0346   BLOOD CULTURE  No Growth at 48 hrs  No Growth at 48 hrs       Diet Surgical Soft/Lite Meal; Lo Fat    Scheduled Medications:      acetaminophen, 650 mg, Oral, Q6H SERAFIN  levothyroxine, 112 mcg, Oral, Daily  warfarin (COUMADIN) tablet 3 mg, Oral Daily - START 2/12/23       Continuous IV Infusions:  IVF heparin (porcine), 3-30 Units/kg/hr (Order-Specific), Intravenous, Titrated    PRN Meds:  ondansetron, 4 mg, Intravenous, Q6H PRN  oxyCODONE, 5 mg, Oral, Q6H PRN - 2/9 X 2, 2/10 X 2, 2/11 X 2    Discharge Plan: To be determined   Inpatient Case Management following for all discharge needs    Network Utilization Review Department  ATTENTION: Please call with any questions or concerns to [phone] and carefully listen to the prompts so that you are directed to the right person  All voicemails are confidential   Saumya Grand all requests for admission clinical reviews, approved or denied determinations and any other requests to dedicated fax number below belonging to the campus where the patient is receiving treatment   List of dedicated fax numbers for the Facilities:  [address] DENIALS (Administrative/Medical Necessity) [phone]   [address] (Maternity/NICU/Pediatrics) [phone]   [address] Judie Corrales [phone]   [facility] 77 [phone]   [address] [address] Medical [address] [place] [address] Jyoti Villegas 28 [phone]   [address] [place] [address] [place] [address] 815 [facility] [phone]

## 2023-02-11 NOTE — PROGRESS NOTES
Progress Note - Nephrology   Lino Corrales 56 y o  female MRN: [id number]  Unit/Bed#: [facility] 814-01 Encounter: [id number]      Assessment / Plan:  1  ADA, present on admission, likely prerenal in setting of fosinopril/aldactone use at home as well as hypotension vs ATN  -UA +blood, without protein  -b/l sCr 0 7-1, follows outpatient with me, Dr Nadiya Rubio for history of biopsy proven LN  -sCr on admission 2 1, ADA resolved status post IV fluids  -monitor BMP  -as renal function back to baseline, nephrology will sign off  -should continue to hold diuretics at this time  -avoid further IV fluid  2  Anemia in the setting of right upper quadrant hematoma noted status post lap dakotah performed 2023 - Hgb 8 9, monitor CBC  3  Hypotension - hold home dose nifedipine, fosinopril and spironolactone  BP low normal range currently  Would continue to hold diuretics upon discharge  4  S/p lap dakotah 23 - per surgery    Message sent to office for coordinating follow-up appointment with nephrology  Patient should have BMP prior to office visit which has been ordered  Subjective:   Patient is on heparin drip  She denies chest pain or shortness of breath  She is urinating well  Now off IV fluid  Objective:     Vitals: Blood pressure 123/73, pulse 66, temperature 97 9 °F (36 6 °C), resp  rate 18, height 5' 1" (1 549 m), weight 86 6 kg (191 lb), SpO2 97 %  ,Body mass index is 36 09 kg/m²  Temp (24hrs), Av 8 °F (36 6 °C), Min:97 7 °F (36 5 °C), Max:97 9 °F (36 6 °C)      Weight (last 2 days)     Date/Time Weight    23 15:59:14 86 6 (191)            Intake/Output Summary (Last 24 hours) at 2023 1633  Last data filed at 2/10/2023 1801  Gross per 24 hour   Intake 319 17 ml   Output 500 ml   Net -180 83 ml     I/O last 24 hours: In: 1321 3 [P O :200; I V :1121 3]  Out: 1150 [Urine:1150]        Physical Exam:   Physical Exam  Vitals and nursing note reviewed     Constitutional:       General: She is not in acute distress  Appearance: She is well-developed  She is obese  She is not diaphoretic  HENT:      Head: Normocephalic and atraumatic  Nose: Nose normal       Mouth/Throat:      Mouth: Mucous membranes are moist       Pharynx: No oropharyngeal exudate  Eyes:      General: No scleral icterus  Right eye: No discharge  Left eye: No discharge  Comments: eyeglasses   Neck:      Thyroid: No thyromegaly  Cardiovascular:      Rate and Rhythm: Normal rate and regular rhythm  Heart sounds: No murmur heard  Comments: B/l LE edema  Pulmonary:      Effort: Pulmonary effort is normal  No respiratory distress  Breath sounds: Normal breath sounds  No wheezing  Abdominal:      General: Bowel sounds are normal  There is no distension  Palpations: Abdomen is soft  Musculoskeletal:         General: Swelling (b/l LE) present  Cervical back: Normal range of motion and neck supple  Skin:     General: Skin is warm and dry  Findings: No rash  Neurological:      General: No focal deficit present  Mental Status: She is alert  Motor: No abnormal muscle tone        Comments: awake   Psychiatric:         Mood and Affect: Mood normal          Behavior: Behavior normal          Invasive Devices     Peripheral Intravenous Line  Duration           Peripheral IV 02/11/23 Right Forearm <1 day                Medications:    Scheduled Meds:  Current Facility-Administered Medications   Medication Dose Route Frequency Provider Last Rate   • acetaminophen  650 mg Oral Q6H Timothy Barnes MD     • heparin (porcine)  3-30 Units/kg/hr (Order-Specific) Intravenous Titrated Clovis Metz MD 18 Units/kg/hr (02/11/23 1037)   • levothyroxine  112 mcg Oral Daily Amarilys Chan MD     • ondansetron  4 mg Intravenous Q6H PRN Amarilys Chan MD     • oxyCODONE  5 mg Oral Q6H PRN Amarilys Chan MD         PRN Meds: •  ondansetron  •  oxyCODONE    Continuous Infusions:heparin (porcine), 3-30 Units/kg/hr (Order-Specific), Last Rate: 18 Units/kg/hr (02/11/23 1037)            LAB RESULTS:      Results from last 7 days   Lab Units 02/11/23  0629 02/10/23  1831 02/10/23  0907 02/10/23  0622 02/10/23  0416 02/10/23  0112 02/09/23  1959 02/09/23  1718 02/09/23  1026 02/08/23  1108 02/05/23  0411   WBC Thousand/uL 8 48  --   --  9 29  --   --  10 53*  --  7 29  --  5 28   HEMOGLOBIN g/dL 8 9* 8 7* 8 3* 8 5* 8 9* 7 1* 6 6*   < > 8 1*  --  11 7   HEMATOCRIT % 26 4* 26 9* 24 7* 24 9* 26 7* 21 7* 20 0*   < > 25 0*  --  35 2   PLATELETS Thousands/uL 159  --   --  142*  --   --  164  --  134*  --  193   NEUTROS PCT %  --   --   --   --   --   --   --   --  76*  --  55   LYMPHS PCT %  --   --   --   --   --   --   --   --  13*  --  31   LYMPHO PCT % 7*  --   --   --   --   --   --   --   --   --   --    MONOS PCT %  --   --   --   --   --   --   --   --  10  --  11   MONO PCT % 5  --   --   --   --   --   --   --   --   --   --    EOS PCT % 2  --   --   --   --   --   --   --  0  --  2   POTASSIUM mmol/L 3 7  --   --  4 1  --   --   --   --  3 6 4 0 4 0   CHLORIDE mmol/L 111*  --   --  111*  --   --   --   --  110* 108 110*   CO2 mmol/L 23  --   --  22  --   --   --   --  24 22 23   BUN mg/dL 21  --   --  34*  --   --   --   --  37* 24 10   CREATININE mg/dL 0 84  --   --  1 28  --   --   --   --  2 10* 1 38* 0 76   CALCIUM mg/dL 8 5  --   --  7 4*  --   --   --   --  7 6* 8 3 8 2*   ALK PHOS U/L  --   --   --  86  --   --   --   --  77  --  119*   ALT U/L  --   --   --  107*  --   --   --   --  80*  --  77   AST U/L  --   --   --  79*  --   --   --   --  86*  --  60*   MAGNESIUM mg/dL  --   --   --  1 8  --   --   --   --  1 6  --   --     < > = values in this interval not displayed         CUTURES:  Lab Results   Component Value Date    BLOODCX No Growth at 24 hrs  02/10/2023    BLOODCX No Growth at 24 hrs  02/10/2023    URINECX <10,000 cfu/ml 01/25/2023    URINECX 50,000-59,000 cfu/ml 02/02/2021 Portions of the record may have been created with voice recognition software  Occasional wrong word or "sound a like" substitutions may have occurred due to the inherent limitations of voice recognition software  Read the chart carefully and recognize, using context, where substitutions have occurred  If you have any questions, please contact the dictating provider

## 2023-02-11 NOTE — PROGRESS NOTES
Progress Note - General Surgery   Skyler Cosme 56 y o  female MRN: [id number]  Unit/Bed#: [facility] 833-01 Encounter: [id number]    Assessment:  51yo F APS/ITP/PE s/p laparoscopic cholecystectomy on 2/5/23 presenting due to lightheadedness/syncope found to have an acute kidney injury w/ a creatinine of 2 10 and acute blood loss anemia prompting 2u pRBCs on 2/10  Plan:  • Diet as tolerated  • Restart heparin gtt  • Follow hgb  • OOB/ ambulate  • Please TigerText on call Red Surgery or Acute Care Surgery Floor Call with any questions     Subjective/Objective     Subjective:   No acute events overnight  With discomfort, stable  No nausea or vomiting  Pertinent review of systems as above  All other review of systems negative  Objective:    Blood pressure 116/69, pulse 73, temperature 97 7 °F (36 5 °C), resp  rate 16, height 5' 1" (1 549 m), weight 86 6 kg (191 lb), SpO2 95 %  ,Body mass index is 36 09 kg/m²  Intake/Output Summary (Last 24 hours) at 2/11/2023 0830  Last data filed at 2/10/2023 1801  Gross per 24 hour   Intake 1321 25 ml   Output 1150 ml   Net 171 25 ml       Invasive Devices     Peripheral Intravenous Line  Duration           Peripheral IV 02/09/23 Left;Ventral (anterior) Hand 1 day                Physical Exam:   Gen:  NAD  HEENT: NCAT  MMM  CV: well perfused  Lungs: Normal respiratory effort  Abd: soft, nt/nd  Skin: warm/ dry  Extremities: no peripheral edema, no clubbing or cyanosis  Neuro: AxO x3      Results from last 7 days   Lab Units 02/11/23  0629 02/10/23  1831 02/10/23  0907 02/10/23  0622 02/10/23  0112 02/09/23  1959   WBC Thousand/uL 8 48  --   --  9 29  --  10 53*   HEMOGLOBIN g/dL 8 9* 8 7* 8 3* 8 5*   < > 6 6*   HEMATOCRIT % 26 4* 26 9* 24 7* 24 9*   < > 20 0*   PLATELETS Thousands/uL 159  --   --  142*  --  164    < > = values in this interval not displayed       Results from last 7 days   Lab Units 02/11/23  0629 02/10/23  0622 02/09/23  1026   POTASSIUM mmol/L 3 7 4 1 3 6 CHLORIDE mmol/L 111* 111* 110*   CO2 mmol/L 23 22 24   BUN mg/dL 21 34* 37*   CREATININE mg/dL 0 84 1 28 2 10*   CALCIUM mg/dL 8 5 7 4* 7 6*     Results from last 7 days   Lab Units 02/10/23  0622 02/09/23  1026 02/08/23  1108   INR  1 32* 1 38* 1 21*   PTT seconds  --  40*  --         I have personally reviewed pertinent films in PACS      Medications:   Scheduled Meds:  Current Facility-Administered Medications   Medication Dose Route Frequency Provider Last Rate   • acetaminophen  650 mg Oral Q6H Moi Thacker MD     • levothyroxine  112 mcg Oral Daily Rowdy Langston MD     • ondansetron  4 mg Intravenous Q6H PRN Rowdy Langston MD     • oxyCODONE  5 mg Oral Q6H PRN Rowdy Langston MD       Continuous Infusions:   PRN Meds:  ondansetron, 4 mg, Q6H PRN  oxyCODONE, 5 mg, Q6H PRN      VTE Pharmacologic Prophylaxis: Heparin  VTE Mechanical Prophylaxis: sequential compression device

## 2023-02-12 LAB
ANION GAP SERPL CALCULATED.3IONS-SCNC: 3 MMOL/L (ref 4–13)
APTT PPP: 106 SECONDS (ref 23–37)
APTT PPP: 161 SECONDS (ref 23–37)
APTT PPP: 60 SECONDS (ref 23–37)
BUN SERPL-MCNC: 18 MG/DL (ref 5–25)
CALCIUM SERPL-MCNC: 8.3 MG/DL (ref 8.3–10.1)
CHLORIDE SERPL-SCNC: 109 MMOL/L (ref 96–108)
CO2 SERPL-SCNC: 25 MMOL/L (ref 21–32)
CREAT SERPL-MCNC: 0.83 MG/DL (ref 0.6–1.3)
ERYTHROCYTE [DISTWIDTH] IN BLOOD BY AUTOMATED COUNT: 14.6 % (ref 11.6–15.1)
GFR SERPL CREATININE-BSD FRML MDRD: 79 ML/MIN/1.73SQ M
GLUCOSE SERPL-MCNC: 90 MG/DL (ref 65–140)
HCT VFR BLD AUTO: 24.5 % (ref 34.8–46.1)
HGB BLD-MCNC: 8.1 G/DL (ref 11.5–15.4)
MCH RBC QN AUTO: 29.5 PG (ref 26.8–34.3)
MCHC RBC AUTO-ENTMCNC: 33.1 G/DL (ref 31.4–37.4)
MCV RBC AUTO: 89 FL (ref 82–98)
PLATELET # BLD AUTO: 156 THOUSANDS/UL (ref 149–390)
PMV BLD AUTO: 10.1 FL (ref 8.9–12.7)
POTASSIUM SERPL-SCNC: 3.5 MMOL/L (ref 3.5–5.3)
RBC # BLD AUTO: 2.75 MILLION/UL (ref 3.81–5.12)
SODIUM SERPL-SCNC: 137 MMOL/L (ref 135–147)
WBC # BLD AUTO: 7.23 THOUSAND/UL (ref 4.31–10.16)

## 2023-02-12 RX ORDER — POTASSIUM CHLORIDE 20 MEQ/1
40 TABLET, EXTENDED RELEASE ORAL ONCE
Status: COMPLETED | OUTPATIENT
Start: 2023-02-12 | End: 2023-02-12

## 2023-02-12 RX ORDER — WARFARIN SODIUM 3 MG/1
3 TABLET ORAL
Status: DISCONTINUED | OUTPATIENT
Start: 2023-02-12 | End: 2023-02-13

## 2023-02-12 RX ADMIN — POTASSIUM CHLORIDE 40 MEQ: 1500 TABLET, EXTENDED RELEASE ORAL at 09:48

## 2023-02-12 RX ADMIN — ACETAMINOPHEN 650 MG: 325 TABLET, FILM COATED ORAL at 05:57

## 2023-02-12 RX ADMIN — HEPARIN SODIUM 15 UNITS/KG/HR: 10000 INJECTION, SOLUTION INTRAVENOUS at 23:46

## 2023-02-12 RX ADMIN — HEPARIN SODIUM 20 UNITS/KG/HR: 10000 INJECTION, SOLUTION INTRAVENOUS at 03:11

## 2023-02-12 RX ADMIN — WARFARIN SODIUM 3 MG: 3 TABLET ORAL at 17:20

## 2023-02-12 RX ADMIN — LEVOTHYROXINE SODIUM 112 MCG: 112 TABLET ORAL at 05:57

## 2023-02-12 RX ADMIN — ACETAMINOPHEN 650 MG: 325 TABLET, FILM COATED ORAL at 23:46

## 2023-02-12 RX ADMIN — ACETAMINOPHEN 650 MG: 325 TABLET, FILM COATED ORAL at 17:20

## 2023-02-12 RX ADMIN — OXYCODONE HYDROCHLORIDE 5 MG: 5 TABLET ORAL at 09:48

## 2023-02-12 RX ADMIN — OXYCODONE HYDROCHLORIDE 5 MG: 5 TABLET ORAL at 22:01

## 2023-02-12 NOTE — PROGRESS NOTES
Progress Note    Chloe Goodpasture 56 y o  female MRN: [id number]  Unit/Bed#: [facility] 411-10 Encounter: [id number]    Assessment:  51yo F APS/ITP/PE s/p laparoscopic cholecystectomy on 2/5/23 presenting due to lightheadedness/syncope found to have an acute kidney injury w/ a creatinine of 2 10 and acute blood loss anemia prompting 2u pRBCs on 2/10  AVSS/afebrile  Tolerating low fat diet  Having bowel function  UO: 1 2 L  Hb: 8 1 (8 9)  Cr: 0 8  PLAN:  - resume warfarin today  - daily INR  - oral diet as tolerated  - continue hep gtt   - OOB, ambulate  Subjective:   - c/o: feels bloated  Objective:     Vitals: Temp:  [97 9 °F (36 6 °C)-98 1 °F (36 7 °C)] 97 9 °F (36 6 °C)  HR:  [61-70] 61  Resp:  [18-26] 22  BP: (123-142)/(73-82) 142/82  Body mass index is 36 09 kg/m²  I/O       02/10 0701  02/11 0700 02/11 0701  02/12 0700 02/12 0701  02/13 0700    P  O  200      I V  (mL/kg) 1121 3 (12 9)      Blood       Total Intake(mL/kg) 1321 3 (15 3)      Urine (mL/kg/hr) 1150 (0 6)      Stool 0      Total Output 1150      Net +171 3             Unmeasured Stool Occurrence 2 x            Physical Exam:  GEN: NAD  HEENT: atraumatic  CV: RRR  Lung: Normal effort  Ab: Soft, NT/ND  Extrem: No CCE  Neuro: A+Ox3    Lab, Imaging and other studies:   I have personally reviewed pertinent reports  , CBC with diff:   Lab Results   Component Value Date    WBC 7 23 02/12/2023    HGB 8 1 (L) 02/12/2023    HCT 24 5 (L) 02/12/2023    MCV 89 02/12/2023     02/12/2023    MCH 29 5 02/12/2023    MCHC 33 1 02/12/2023    RDW 14 6 02/12/2023    MPV 10 1 02/12/2023   , BMP/CMP:   Lab Results   Component Value Date    SODIUM 137 02/12/2023    K 3 5 02/12/2023     (H) 02/12/2023    CO2 25 02/12/2023    BUN 18 02/12/2023    CREATININE 0 83 02/12/2023    CALCIUM 8 3 02/12/2023    EGFR 79 02/12/2023     VTE Pharmacologic Prophylaxis: Heparin gtt    VTE Mechanical Prophylaxis: sequential compression device

## 2023-02-12 NOTE — NURSING NOTE
Patient upset about multiple unsuccessful blood draw sticks  Lab draw achieved but patient requesting no blood pressure rechecks at this time  Blood draw sent immediately to lab with notification call from RN to ensure no hemolyzation of blood sample

## 2023-02-12 NOTE — PLAN OF CARE
Problem: GASTROINTESTINAL - ADULT  Goal: Minimal or absence of nausea and/or vomiting  Description: INTERVENTIONS:  - Administer IV fluids if ordered to ensure adequate hydration  - Maintain NPO status until nausea and vomiting are resolved  - Nasogastric tube if ordered  - Administer ordered antiemetic medications as needed  - Provide nonpharmacologic comfort measures as appropriate  - Advance diet as tolerated, if ordered  - Consider nutrition services referral to assist patient with adequate nutrition and appropriate food choices  Outcome: Progressing     Problem: HEMATOLOGIC - ADULT  Goal: Maintains hematologic stability  Description: INTERVENTIONS  - Assess for signs and symptoms of bleeding or hemorrhage  - Monitor labs  - Administer supportive blood products/factors as ordered and appropriate  Outcome: Progressing

## 2023-02-13 LAB
ANION GAP SERPL CALCULATED.3IONS-SCNC: 3 MMOL/L (ref 4–13)
APTT PPP: 79 SECONDS (ref 23–37)
BUN SERPL-MCNC: 18 MG/DL (ref 5–25)
CALCIUM SERPL-MCNC: 8.6 MG/DL (ref 8.3–10.1)
CHLORIDE SERPL-SCNC: 110 MMOL/L (ref 96–108)
CO2 SERPL-SCNC: 22 MMOL/L (ref 21–32)
CREAT SERPL-MCNC: 0.91 MG/DL (ref 0.6–1.3)
ERYTHROCYTE [DISTWIDTH] IN BLOOD BY AUTOMATED COUNT: 14.7 % (ref 11.6–15.1)
GFR SERPL CREATININE-BSD FRML MDRD: 71 ML/MIN/1.73SQ M
GLUCOSE SERPL-MCNC: 144 MG/DL (ref 65–140)
HCT VFR BLD AUTO: 27.7 % (ref 34.8–46.1)
HGB BLD-MCNC: 8.9 G/DL (ref 11.5–15.4)
INR PPP: 0.95 (ref 0.84–1.19)
MAGNESIUM SERPL-MCNC: 1.7 MG/DL (ref 1.6–2.6)
MCH RBC QN AUTO: 29.7 PG (ref 26.8–34.3)
MCHC RBC AUTO-ENTMCNC: 32.1 G/DL (ref 31.4–37.4)
MCV RBC AUTO: 92 FL (ref 82–98)
PHOSPHATE SERPL-MCNC: 2.1 MG/DL (ref 2.7–4.5)
PLATELET # BLD AUTO: 191 THOUSANDS/UL (ref 149–390)
PMV BLD AUTO: 10.1 FL (ref 8.9–12.7)
POTASSIUM SERPL-SCNC: 4.1 MMOL/L (ref 3.5–5.3)
PROTHROMBIN TIME: 12.9 SECONDS (ref 11.6–14.5)
RBC # BLD AUTO: 3 MILLION/UL (ref 3.81–5.12)
SODIUM SERPL-SCNC: 135 MMOL/L (ref 135–147)
WBC # BLD AUTO: 6.76 THOUSAND/UL (ref 4.31–10.16)

## 2023-02-13 RX ORDER — WARFARIN SODIUM 5 MG/1
5 TABLET ORAL
Status: DISCONTINUED | OUTPATIENT
Start: 2023-02-13 | End: 2023-02-18 | Stop reason: HOSPADM

## 2023-02-13 RX ADMIN — LEVOTHYROXINE SODIUM 112 MCG: 112 TABLET ORAL at 05:44

## 2023-02-13 RX ADMIN — HEPARIN SODIUM 15 UNITS/KG/HR: 10000 INJECTION, SOLUTION INTRAVENOUS at 18:26

## 2023-02-13 RX ADMIN — WARFARIN SODIUM 5 MG: 5 TABLET ORAL at 17:37

## 2023-02-13 RX ADMIN — ACETAMINOPHEN 650 MG: 325 TABLET, FILM COATED ORAL at 17:37

## 2023-02-13 RX ADMIN — ACETAMINOPHEN 650 MG: 325 TABLET, FILM COATED ORAL at 05:44

## 2023-02-13 RX ADMIN — ACETAMINOPHEN 650 MG: 325 TABLET, FILM COATED ORAL at 11:14

## 2023-02-13 RX ADMIN — ACETAMINOPHEN 650 MG: 325 TABLET, FILM COATED ORAL at 23:47

## 2023-02-13 NOTE — UTILIZATION REVIEW
Continued Stay Review    Date: 2/13/23    Day 2                          Current Patient Class: inpatient  Current Level of Care: med surg    HPI:57 y o  female initially admitted on 2/12/23  Near Syncope with ABLA  s/p laparoscopic cholecystectomy on 2/5/23 presenting due to lightheadedness/syncope found to have an acute kidney injury w/ a creatinine of 2 10 and acute blood loss anemia prompting 2u pRBCs on 2/10    Assessment/Plan:  Pt does not like low fat diet, general sx explained may experience diarrhea with fatty foods dt recent cholecystectomy  Diet changed to regular at pt's request, continue warfarin, daily INR,  continue heparin drip, OOB and ambulation      Vital Signs:   Date/Time Temp Pulse Resp BP MAP (mmHg) SpO2 O2 Device Patient Position - Orthostatic VS   02/13/23 07:36:48 97 9 °F (36 6 °C) 60 20 127/73 91 97 % -- --   02/12/23 2346 -- -- -- -- -- -- None (Room air) --   02/12/23 22:03:36 98 2 °F (36 8 °C) 65 -- 142/90 107 93 % -- --   02/12/23 14:15:57 97 7 °F (36 5 °C) 68 20 139/82 102 98 % -- Lying   02/12/23 0948 -- -- -- -- -- 97 % None (Room air) --   02/12/23 0351 -- -- -- -- -- -- None (Room air) --   02/12/23 03:14:35 -- 61 22 -- -- 97 % -- --   02/11/23 23:29:25 97 9 °F (36 6 °C) 70 26 Abnormal  -- -- 97 % -- --   02/11/23 2100 -- -- -- -- -- -- None (Room air) --   02/11/23 19:31:18 98 1 °F (36 7 °C) 70 26 Abnormal  142/82 102 96 % -- --   02/11/23 14:54:26 97 9 °F (36 6 °C) 66 18 123/73 90 97 % -- --   02/11/23 10:49:32 -- 68 -- 117/72 87 95 % -- --   02/11/23 07:47:10 97 7 °F (36 5 °C) -- 16 116/69 85 -- -- --   02/11/23 02:54:54 97 7 °F (36 5 °C) 73 26 Abnormal  146/92 110 95 % -- --   02/11/23 0254 -- -- -- -- -- -- None (Room air) --     Pertinent Labs/Diagnostic Results:   Results from last 7 days   Lab Units 02/09/23  1322   SARS-COV-2  Negative     Results from last 7 days   Lab Units 02/13/23  0403 02/12/23  0323 02/11/23  0629 02/10/23  1831 02/10/23  0907 02/09/23  1718 02/09/23  1026   WBC Thousand/uL 6 76 7 23 8 48  --   --    < > 7 29   HEMOGLOBIN g/dL 8 9* 8 1* 8 9* 8 7* 8 3*   < > 8 1*   HEMATOCRIT % 27 7* 24 5* 26 4* 26 9* 24 7*   < > 25 0*   PLATELETS Thousands/uL 191 156 159  --   --    < > 134*   NEUTROS ABS Thousands/µL  --   --   --   --   --   --  5 51    < > = values in this interval not displayed  Results from last 7 days   Lab Units 02/13/23  0349 02/12/23  0323 02/11/23  0629 02/10/23  0622 02/09/23  1026   SODIUM mmol/L 135 137 140 141 137   POTASSIUM mmol/L 4 1 3 5 3 7 4 1 3 6   CHLORIDE mmol/L 110* 109* 111* 111* 110*   CO2 mmol/L 22 25 23 22 24   ANION GAP mmol/L 3* 3* 6 8 3*   BUN mg/dL 18 18 21 34* 37*   CREATININE mg/dL 0 91 0 83 0 84 1 28 2 10*   EGFR ml/min/1 73sq m 71 79 78 47 25   CALCIUM mg/dL 8 6 8 3 8 5 7 4* 7 6*   MAGNESIUM mg/dL 1 7  --   --  1 8 1 6   PHOSPHORUS mg/dL 2 1*  --   --   --   --      Results from last 7 days   Lab Units 02/10/23  0622 02/09/23  1026 02/08/23  1108   AST U/L 79* 86*  --    ALT U/L 107* 80*  --    ALK PHOS U/L 86 77  --    TOTAL PROTEIN g/dL 5 8* 5 4*  --    ALBUMIN g/dL 2 4* 2 3* 3 2*   TOTAL BILIRUBIN mg/dL 1 37* 0 41  --      Results from last 7 days   Lab Units 02/13/23 0349 02/12/23 0323 02/11/23  0629 02/10/23  0622 02/09/23  1026   GLUCOSE RANDOM mg/dL 144* 90 95 99 142*     Results from last 7 days   Lab Units 02/09/23  1421 02/09/23  1221 02/09/23  1026   HS TNI 0HR ng/L  --   --  4   HS TNI 2HR ng/L  --  6  --    HSTNI D2 ng/L  --  2  --    HS TNI 4HR ng/L 5  --   --    HSTNI D4 ng/L 1  --   --          Results from last 7 days   Lab Units 02/13/23  0000 02/12/23 2056 02/12/23  1245 02/11/23 2028 02/11/23  1025 02/10/23  0622   PROTIME seconds 12 9  --   --   --  14 6* 16 6*   INR  0 95  --   --   --  1 12 1 32*   PTT seconds 79* 60* 106*   < > 32  --     < > = values in this interval not displayed       Results from last 7 days   Lab Units 02/08/23  1108   TSH 3RD GENERATON uIU/mL 2 270     Results from last 7 days   Lab Units 02/09/23  1026   LIPASE u/L 132     Results from last 7 days   Lab Units 02/09/23  1259   CLARITY UA  Cloudy   COLOR UA  Yellow   SPEC GRAV UA  1 020   PH UA  5 5   GLUCOSE UA mg/dl Negative   KETONES UA mg/dl Negative   BLOOD UA  Large*   PROTEIN UA mg/dl Negative   NITRITE UA  Negative   BILIRUBIN UA  Negative   UROBILINOGEN UA E U /dl 0 2   LEUKOCYTES UA  Negative   WBC UA /hpf 4-10*   RBC UA /hpf Innumerable*   BACTERIA UA /hpf Occasional   EPITHELIAL CELLS WET PREP /hpf Moderate*   MUCUS THREADS  Occasional*     Results from last 7 days   Lab Units 02/09/23  1322   INFLUENZA A PCR  Negative   INFLUENZA B PCR  Negative   RSV PCR  Negative     Results from last 7 days   Lab Units 02/10/23  0621 02/10/23  0346   BLOOD CULTURE  No Growth at 72 hrs  No Growth at 72 hrs  Medications:   Scheduled Medications:  acetaminophen, 650 mg, Oral, Q6H [address]  levothyroxine, 112 mcg, Oral, Daily  warfarin, 5 mg, Oral, Daily (warfarin)      Continuous IV Infusions:  heparin (porcine), 3-30 Units/kg/hr (Order-Specific), Intravenous, Titrated      PRN Meds:  ondansetron, 4 mg, Intravenous, Q6H PRN  oxyCODONE, 5 mg, Oral, Q6H PRN        Discharge Plan: d    Network Utilization Review Department  ATTENTION: Please call with any questions or concerns to [phone] and carefully listen to the prompts so that you are directed to the right person  All voicemails are confidential   Ana Maria Caldera all requests for admission clinical reviews, approved or denied determinations and any other requests to dedicated fax number below belonging to the campus where the patient is receiving treatment   List of dedicated fax numbers for the Facilities:  [address] DENIALS (Administrative/Medical Necessity) [phone]   [address] (M[facility]/NICU/Pediatrics) Shavonne Uribe [facility] [phone]   [facility] [phone]   [facility] [address] [address] Medical [place] [address] [place] [address] [place] [id number] [place] [address] 28 [address] [facility] 310 [facility] [address] [phone]

## 2023-02-13 NOTE — UTILIZATION REVIEW
NOTIFICATION OF INPATIENT ADMISSION   AUTHORIZATION REQUEST   SERVICING FACILITY:   [facility]  Address: [address] [place] [address]  Tax ID: [id number]  NPI: [id number] ATTENDING PROVIDER:  Attending Name and NPI#: Greg Shen [[id number]]  Address: [address] [place] [address]  Phone: [phone]   ADMISSION INFORMATION:  Place of Service: [place] [address]  Place of Service Code: 21  Inpatient Admission Date/Time: 2/12/23  8:10 AM  Discharge Date/Time: No discharge date for patient encounter  Admitting Diagnosis Code/Description:  Dizziness [R42]  Syncope [R55]  ADA (acute kidney injury) ([facility] ) [N17 9]     UTILIZATION REVIEW CONTACT:  David Cardoso Utilization   Network Utilization Review Department  Phone: [phone]  Fax: [phone]  Email: [email or web address]  Contact for approvals/pending authorizations, clinical reviews, and discharge  PHYSICIAN ADVISORY SERVICES:  Medical Necessity Denial & [facility] Review  Phone: [phone]  Fax: [phone]  Email: [email or web address]

## 2023-02-13 NOTE — QUICK NOTE
Nurse-Patient-Provider rounds were completed  We discussed the plan is to continue diet as tolerated  Continue oral analgesic regimen as needed  Continue anticoagulation with heparin drip and Coumadin until INR therapeutic  Monitor INR daily  We reviewed all of the invasive devices/lines/telemetry orders   - None  DVT Prophylaxis:  - Anticoagulated with heparin  Pain Assessment / Plan:  - Continue current analgesic regimen  Mobility Assessment / Plan:  - Activity as tolerated  Goals / Barriers for discharge:  - Not yet appropriate for discharge while awaiting therapeutic INR  All questions and concerns were addressed  I spent greater than 20 minutes reviewing the plan with the patient and the nurse, and coordinating care for the day      Rashida Rosales PA-C  2/13/2023 11:32 AM

## 2023-02-13 NOTE — PLAN OF CARE
Problem: GASTROINTESTINAL - ADULT  Goal: Minimal or absence of nausea and/or vomiting  Description: INTERVENTIONS:  - Administer IV fluids if ordered to ensure adequate hydration  - Maintain NPO status until nausea and vomiting are resolved  - Nasogastric tube if ordered  - Administer ordered antiemetic medications as needed  - Provide nonpharmacologic comfort measures as appropriate  - Advance diet as tolerated, if ordered  - Consider nutrition services referral to assist patient with adequate nutrition and appropriate food choices  Outcome: Progressing     Problem: HEMATOLOGIC - ADULT  Goal: Maintains hematologic stability  Description: INTERVENTIONS  - Assess for signs and symptoms of bleeding or hemorrhage  - Monitor labs  - Administer supportive blood products/factors as ordered and appropriate  Outcome: Progressing     Problem: CARDIOVASCULAR - ADULT  Goal: Maintains optimal cardiac output and hemodynamic stability  Description: INTERVENTIONS:  - Monitor I/O, vital signs and rhythm  - Monitor for S/S and trends of decreased cardiac output  - Administer and titrate ordered vasoactive medications to optimize hemodynamic stability  - Assess quality of pulses, skin color and temperature  - Assess for signs of decreased coronary artery perfusion  - Instruct patient to report change in severity of symptoms  Outcome: Progressing  Goal: Absence of cardiac dysrhythmias or at baseline rhythm  Description: INTERVENTIONS:  - Continuous cardiac monitoring, vital signs, obtain 12 lead EKG if ordered  - Administer antiarrhythmic and heart rate control medications as ordered  - Monitor electrolytes and administer replacement therapy as ordered  Outcome: Progressing     Problem: RESPIRATORY - ADULT  Goal: Achieves optimal ventilation and oxygenation  Description: INTERVENTIONS:  - Assess for changes in respiratory status  - Assess for changes in mentation and behavior  - Position to facilitate oxygenation and minimize respiratory effort  - Oxygen administered by appropriate delivery if ordered  - Initiate smoking cessation education as indicated  - Encourage broncho-pulmonary hygiene including cough, deep breathe, Incentive Spirometry  - Assess the need for suctioning and aspirate as needed  - Assess and instruct to report SOB or any respiratory difficulty  - Respiratory Therapy support as indicated  Outcome: Progressing     Problem: METABOLIC, FLUID AND ELECTROLYTES - ADULT  Goal: Electrolytes maintained within normal limits  Description: INTERVENTIONS:  - Monitor labs and assess patient for signs and symptoms of electrolyte imbalances  - Administer electrolyte replacement as ordered  - Monitor response to electrolyte replacements, including repeat lab results as appropriate  - Instruct patient on fluid and nutrition as appropriate  Outcome: Progressing     Problem: SKIN/TISSUE INTEGRITY - ADULT  Goal: Incision(s), wounds(s) or drain site(s) healing without S/S of infection  Description: INTERVENTIONS  - Assess and document dressing, incision, wound bed, drain sites and surrounding tissue  - Provide patient and family education  Outcome: Progressing

## 2023-02-14 LAB
ANISOCYTOSIS BLD QL SMEAR: PRESENT
APTT PPP: 82 SECONDS (ref 23–37)
APTT PPP: 93 SECONDS (ref 23–37)
APTT PPP: 99 SECONDS (ref 23–37)
BASOPHILS # BLD MANUAL: 0 THOUSAND/UL (ref 0–0.1)
BASOPHILS NFR MAR MANUAL: 0 % (ref 0–1)
EOSINOPHIL # BLD MANUAL: 0.26 THOUSAND/UL (ref 0–0.4)
EOSINOPHIL NFR BLD MANUAL: 4 % (ref 0–6)
ERYTHROCYTE [DISTWIDTH] IN BLOOD BY AUTOMATED COUNT: 14.6 % (ref 11.6–15.1)
GIANT PLATELETS BLD QL SMEAR: PRESENT
HCT VFR BLD AUTO: 28.1 % (ref 34.8–46.1)
HGB BLD-MCNC: 9.1 G/DL (ref 11.5–15.4)
INR PPP: 0.98 (ref 0.84–1.19)
LYMPHOCYTES # BLD AUTO: 1.42 THOUSAND/UL (ref 0.6–4.47)
LYMPHOCYTES # BLD AUTO: 22 % (ref 14–44)
MCH RBC QN AUTO: 29.3 PG (ref 26.8–34.3)
MCHC RBC AUTO-ENTMCNC: 32.4 G/DL (ref 31.4–37.4)
MCV RBC AUTO: 90 FL (ref 82–98)
METAMYELOCYTES NFR BLD MANUAL: 1 % (ref 0–1)
MONOCYTES # BLD AUTO: 0.26 THOUSAND/UL (ref 0–1.22)
MONOCYTES NFR BLD: 4 % (ref 4–12)
NEUTROPHILS # BLD MANUAL: 4.14 THOUSAND/UL (ref 1.85–7.62)
NEUTS BAND NFR BLD MANUAL: 3 % (ref 0–8)
NEUTS SEG NFR BLD AUTO: 61 % (ref 43–75)
PLATELET # BLD AUTO: 212 THOUSANDS/UL (ref 149–390)
PLATELET BLD QL SMEAR: ADEQUATE
PMV BLD AUTO: 10.3 FL (ref 8.9–12.7)
POLYCHROMASIA BLD QL SMEAR: PRESENT
PROTHROMBIN TIME: 13.2 SECONDS (ref 11.6–14.5)
RBC # BLD AUTO: 3.11 MILLION/UL (ref 3.81–5.12)
RBC MORPH BLD: PRESENT
VARIANT LYMPHS # BLD AUTO: 5 %
WBC # BLD AUTO: 6.47 THOUSAND/UL (ref 4.31–10.16)

## 2023-02-14 RX ADMIN — OXYCODONE HYDROCHLORIDE 5 MG: 5 TABLET ORAL at 10:57

## 2023-02-14 RX ADMIN — LEVOTHYROXINE SODIUM 112 MCG: 112 TABLET ORAL at 05:04

## 2023-02-14 RX ADMIN — ACETAMINOPHEN 650 MG: 325 TABLET, FILM COATED ORAL at 23:06

## 2023-02-14 RX ADMIN — ACETAMINOPHEN 650 MG: 325 TABLET, FILM COATED ORAL at 10:57

## 2023-02-14 RX ADMIN — ACETAMINOPHEN 650 MG: 325 TABLET, FILM COATED ORAL at 05:04

## 2023-02-14 RX ADMIN — HEPARIN SODIUM 13 UNITS/KG/HR: 10000 INJECTION, SOLUTION INTRAVENOUS at 11:00

## 2023-02-14 RX ADMIN — ACETAMINOPHEN 650 MG: 325 TABLET, FILM COATED ORAL at 16:48

## 2023-02-14 RX ADMIN — WARFARIN SODIUM 5 MG: 5 TABLET ORAL at 16:48

## 2023-02-14 NOTE — PLAN OF CARE
Problem: GASTROINTESTINAL - ADULT  Goal: Minimal or absence of nausea and/or vomiting  Description: INTERVENTIONS:  - Administer IV fluids if ordered to ensure adequate hydration  - Maintain NPO status until nausea and vomiting are resolved  - Nasogastric tube if ordered  - Administer ordered antiemetic medications as needed  - Provide nonpharmacologic comfort measures as appropriate  - Advance diet as tolerated, if ordered  - Consider nutrition services referral to assist patient with adequate nutrition and appropriate food choices  Outcome: Progressing     Problem: HEMATOLOGIC - ADULT  Goal: Maintains hematologic stability  Description: INTERVENTIONS  - Assess for signs and symptoms of bleeding or hemorrhage  - Monitor labs  - Administer supportive blood products/factors as ordered and appropriate  Outcome: Progressing     Problem: CARDIOVASCULAR - ADULT  Goal: Maintains optimal cardiac output and hemodynamic stability  Description: INTERVENTIONS:  - Monitor I/O, vital signs and rhythm  - Monitor for S/S and trends of decreased cardiac output  - Administer and titrate ordered vasoactive medications to optimize hemodynamic stability  - Assess quality of pulses, skin color and temperature  - Assess for signs of decreased coronary artery perfusion  - Instruct patient to report change in severity of symptoms  Outcome: Progressing  Goal: Absence of cardiac dysrhythmias or at baseline rhythm  Description: INTERVENTIONS:  - Continuous cardiac monitoring, vital signs, obtain 12 lead EKG if ordered  - Administer antiarrhythmic and heart rate control medications as ordered  - Monitor electrolytes and administer replacement therapy as ordered  Outcome: Progressing     Problem: RESPIRATORY - ADULT  Goal: Achieves optimal ventilation and oxygenation  Description: INTERVENTIONS:  - Assess for changes in respiratory status  - Assess for changes in mentation and behavior  - Position to facilitate oxygenation and minimize respiratory effort  - Oxygen administered by appropriate delivery if ordered  - Initiate smoking cessation education as indicated  - Encourage broncho-pulmonary hygiene including cough, deep breathe, Incentive Spirometry  - Assess the need for suctioning and aspirate as needed  - Assess and instruct to report SOB or any respiratory difficulty  - Respiratory Therapy support as indicated  Outcome: Progressing     Problem: METABOLIC, FLUID AND ELECTROLYTES - ADULT  Goal: Electrolytes maintained within normal limits  Description: INTERVENTIONS:  - Monitor labs and assess patient for signs and symptoms of electrolyte imbalances  - Administer electrolyte replacement as ordered  - Monitor response to electrolyte replacements, including repeat lab results as appropriate  - Instruct patient on fluid and nutrition as appropriate  Outcome: Progressing     Problem: SKIN/TISSUE INTEGRITY - ADULT  Goal: Incision(s), wounds(s) or drain site(s) healing without S/S of infection  Description: INTERVENTIONS  - Assess and document dressing, incision, wound bed, drain sites and surrounding tissue  - Provide patient and family education  Outcome: Progressing     Problem: PAIN - ADULT  Goal: Verbalizes/displays adequate comfort level or baseline comfort level  Description: Interventions:  - Encourage patient to monitor pain and request assistance  - Assess pain using appropriate pain scale  - Administer analgesics based on type and severity of pain and evaluate response  - Implement non-pharmacological measures as appropriate and evaluate response  - Consider cultural and social influences on pain and pain management  - Notify physician/advanced practitioner if interventions unsuccessful or patient reports new pain  Outcome: Progressing     Problem: INFECTION - ADULT  Goal: Absence or prevention of progression during hospitalization  Description: INTERVENTIONS:  - Assess and monitor for signs and symptoms of infection  - Monitor lab/diagnostic results  - Monitor all insertion sites, i e  indwelling lines, tubes, and drains  - Monitor endotracheal if appropriate and nasal secretions for changes in amount and color  - [place] appropriate cooling/warming therapies per order  - Administer medications as ordered  - Instruct and encourage patient and family to use good hand hygiene technique  - Identify and instruct in appropriate isolation precautions for identified infection/condition  Outcome: Progressing     Problem: DISCHARGE PLANNING  Goal: Discharge to home or other facility with appropriate resources  Description: INTERVENTIONS:  - Identify barriers to discharge w/patient and caregiver  - Arrange for needed discharge resources and transportation as appropriate  - Identify discharge learning needs (meds, wound care, etc )  - Arrange for interpretive services to assist at discharge as needed  - Refer to Case Management Department for coordinating discharge planning if the patient needs post-hospital services based on physician/advanced practitioner order or complex needs related to functional status, cognitive ability, or social support system  Outcome: Progressing     Problem: Knowledge Deficit  Goal: Patient/family/caregiver demonstrates understanding of disease process, treatment plan, medications, and discharge instructions  Description: Complete learning assessment and assess knowledge base    Interventions:  - Provide teaching at level of understanding  - Provide teaching via preferred learning methods  Outcome: Progressing

## 2023-02-14 NOTE — PROGRESS NOTES
ptt 99; decreased heparin gtt by 2 units/kg/hr; heparin gtt now infusing at 13units/kg/hr=11 1ml/hr; ptt in 6hrs

## 2023-02-14 NOTE — CASE MANAGEMENT
Case Management Assessment & Discharge Planning Note    Patient name Mila Stover  Location [address] [place] [address] [facility] MRN [id number]  : 1965 Date 2023       Current Admission Date: 2023  Current Admission Diagnosis:ADA (acute kidney injury) [facility])   Patient Active Problem List    Diagnosis Date Noted   • ADA (acute kidney injury) ([facility] [address] ) 2023   • Anemia 2023   • Hypotension due to hypovolemia 2023   • Acute cholecystitis 2023   • Left foot pain 2022   • Hordeolum externum of left lower eyelid 2022   • Sjogren's syndrome without extraglandular involvement ([facility] [address] ) 11/10/2021   • Postoperative hypothyroidism 2021   • Status post parathyroidectomy ([facility] [address] ) 2021   • Anti-phospholipid syndrome ([facility] [address] ) 2021   • Greater trochanteric bursitis of left hip 2021   • Cellulitis of right index finger 2021   • Post-menopausal bleeding 2021   • BMI 39 0-39 9,adult 09/15/2020   • Left ear pain 2020   • Rash 2020   • Upper respiratory tract infection 2019   • Elevated PTHrP level 2019   • History of thyroid cancer 2019   • Hypertension 2018   • Chronic deep vein thrombosis (DVT) of proximal vein of both lower extremities ([facility] [address] ) 2018   • History of ITP 2018   • Gross hematuria 2016   • Benign hypertensive CKD 2013   • Chronic kidney disease (CKD), stage II (mild) 2013   • Edema 2013   • Hypercalcemia 2013   • Primary hyperparathyroidism ([address] ) 2013   • Nephrolithiasis 2013   • Proteinuria 2013   • Vitamin D deficiency 2013   • Systemic lupus erythematosus ([address] ) 2013      LOS (days): 2  Geometric Mean LOS (GMLOS) (days):   Days to GMLOS:     OBJECTIVE:  PATIENT READMITTED TO HOSPITAL  Risk of Unplanned Readmission Score: 17 28         Current admission status: Inpatient       Preferred Pharmacy:   [facility] , [address] [facility] [address],  [address] [address]  Phone: [phone] Fax: [phone] [place] [address] [facility] [place] [address] [facility] 77 [address]  Phone: [phone] Fax: [phone]    Primary Care Provider: PEACE Sibley    Primary Insurance: CAPITAL  Secondary Insurance:     ASSESSMENT:  Victorina Dennis Proxies    There are no active Health Care Proxies on file  Advance Directives  Does patient have a [address]?: No  Was patient offered paperwork?: Yes (refused)  Does patient currently have a Health Care decision maker?: No  Does patient have Advance Directives?: No  Was patient offered paperwork?: Yes (refused)  Primary Contact:  Araceli Renee [phone]         Readmission Root Cause  30 Day Readmission: Yes  Who directed you to return to the hospital?: Self  Did you understand whom to contact if you had questions or problems?: Yes  Did you get your prescriptions before you left the hospital?: Yes  Were you able to get your prescriptions filled when you left the hospital?: Yes  Did you take your medications as prescribed?: Yes  Were you able to get to your follow-up appointments?: No  Reason[de-identified] Readmitted prior to appointment  During previous admission, was a post-acute recommendation made?: No  Patient was readmitted due to: ADA  Action Plan: waiting for rec  Support was provided    Patient Information  Admitted from[de-identified] Home  Mental Status: Alert  Assessment information provided by[de-identified] Patient  Primary Caregiver: Self  Support Systems: Self, Spouse/significant other, Family members  [place] of Residence: [address] [facility] [address] [facility] you [facility]   Select all that apply : Stairs  Number of steps to enter home : 1  Type of Current Residence: 2 story home  Upon entering residence, is there a bedroom on the main floor (no further steps)?: No (sleeps on couch)  A bedroom is located on the following floor levels of residence (select all that apply):: 2nd Floor  Upon entering residence, is there a bathroom on the main floor (no further steps)?: No  Indicate which floors of current residence have a bathroom (select all the apply):: 2nd Floor  Number of steps to 2nd floor from main floor: One Flight  In the last 12 months, was there a time when you were not able to pay the mortgage or rent on time?: No  In the last 12 months, how many places have you lived?: 1  In the last 12 months, was there a time when you did not have a steady place to sleep or slept in a shelter (including now)?: No  Homeless/housing insecurity resource given?: N/A  Living Arrangements: Lives w/ Spouse/significant other, Lives w/ Extended Family  Is patient a ?: No    Activities of Daily Living Prior to Admission  Functional Status: Independent  Completes ADLs independently?: Yes  Ambulates independently?: Yes  Does patient use assisted devices?: No  Does patient currently own DME?: No  Does patient have a history of Outpatient Therapy (PT/OT)?: Yes ([facility])  Does the patient have a history of Short-Term Rehab?: No  Does patient have a history of HHC?: No  Does patient currently have [facility]?: No         Patient Information Continued  Income Source: Employed ([address] [place] [address])  Does patient have prescription coverage?: Yes ([employer])  Within the past 12 months, you worried that your food would run out before you got the money to buy more : Never true  Within the past 12 months, the food you bought just didn't last and you didn't have money to get more : Never true  Food insecurity resource given?: N/A  Does patient receive dialysis treatments?: No  Does patient have a history of substance abuse?: No  Does patient have a history of Mental Health Diagnosis?: No         Means of Transportation  Means of Transport to Appts[de-identified] Drives Self  In the past 12 months, has lack of transportation kept you from medical appointments or from getting medications?: No  In the past 12 months, has lack of transportation kept you from meetings, work, or from getting things needed for daily living?: No  Was application for public transport provided?: N/A        DISCHARGE DETAILS:    Discharge planning discussed with[de-identified] patient  Freedom of Choice: Yes     CM contacted family/caregiver?: No- see comments (refused)  Were Treatment Team discharge recommendations reviewed with patient/caregiver?: Yes  Did patient/caregiver verbalize understanding of patient care needs?: Yes  Were patient/caregiver advised of the risks associated with not following Treatment Team discharge recommendations?: Yes         [address]         Is the patient interested in [facility] at discharge?: No    DME Referral Provided  Referral made for DME?: No    Other Referral/Resources/Interventions Provided:  Referral Comments: FANNY paperwork was pronted    Would you like to participate in our [address] service program?  : Yes         Additional Comments: Patient became tearful talking about having to be hospitalized again  CM printed her FANNY paperwork so she can start the process of submitting it  Support was provided  P8 CM Saúl Torres was made aware of CM involvement for further DC needs      CM reviewed d/c planning process including the following: identifying help at home, patient preference for d/c planning needs, Discharge Lounge, [employer] Meds to Bed program, availability of treatment team to discuss questions or concerns patient and/or family may have regarding understanding medications and recognizing signs and symptoms once discharged  CM also encouraged patient to follow up with all recommended appointments after discharge  Patient advised of importance for patient and family to participate in managing patient’s medical well being

## 2023-02-14 NOTE — PLAN OF CARE
Problem: PAIN - ADULT  Goal: Verbalizes/displays adequate comfort level or baseline comfort level  Description: Interventions:  - Encourage patient to monitor pain and request assistance  - Assess pain using appropriate pain scale  - Administer analgesics based on type and severity of pain and evaluate response  - Implement non-pharmacological measures as appropriate and evaluate response  - Consider cultural and social influences on pain and pain management  - Notify physician/advanced practitioner if interventions unsuccessful or patient reports new pain  Outcome: Progressing     Problem: INFECTION - ADULT  Goal: Absence or prevention of progression during hospitalization  Description: INTERVENTIONS:  - Assess and monitor for signs and symptoms of infection  - Monitor lab/diagnostic results  - Monitor all insertion sites, i e  indwelling lines, tubes, and drains  - Monitor endotracheal if appropriate and nasal secretions for changes in amount and color  - [place] appropriate cooling/warming therapies per order  - Administer medications as ordered  - Instruct and encourage patient and family to use good hand hygiene technique  - Identify and instruct in appropriate isolation precautions for identified infection/condition  Outcome: Progressing

## 2023-02-14 NOTE — PROGRESS NOTES
Progress Note    Eleanor Fell 56 y o  female MRN: [id number]  [facility] Encounter: [id number]    Assessment:  53yo F APS/ITP/PE s/p laparoscopic cholecystectomy on 2/5/23 presenting due to lightheadedness/syncope found to have an acute kidney injury w/ a creatinine of 2 10 and acute blood loss anemia prompting 2u pRBCs on 2/10  AVSS, afebrile  INR - ordered but was not drawn with morning labs, 0 95 yesterday    PLAN:  - continue warfarin 5mg   - daily INR until therapeutic (2-3)  - continue regular diet  - continue hep gtt until therapeutic on warfarin bridge  - OOB, ambulate    Subjective:   NAEO  No new concerns this morning  Objective:     Vitals: Temp:  [97 7 °F (36 5 °C)-97 9 °F (36 6 °C)] 97 9 °F (36 6 °C)  HR:  [57-60] 57  Resp:  [16-20] 16  BP: (127-141)/(73-77) 141/77  Body mass index is 36 09 kg/m²  I/O       02/10 0701  02/11 0700 02/11 0701  02/12 0700 02/12 0701  02/13 0700    P  O  200      I V  (mL/kg) 1121 3 (12 9)      Blood       Total Intake(mL/kg) 1321 3 (15 3)      Urine (mL/kg/hr) 1150 (0 6)      Stool 0      Total Output 1150      Net +171 3             Unmeasured Stool Occurrence 2 x          Physical Exam:  Gen: Lying comfortably in bed, no acute distress  CV: Regular rate and rhythm  Pulm: Breathing comfortably, no respiratory distress  Abd: Soft, nondistended, ecchymosis over abdomen, appropriately TTP in RUQ  Ext: Warm and dry, no edema  Neuro: Alert and oriented    Lab, Imaging and other studies:   I have personally reviewed pertinent reports    , CBC with diff:   Lab Results   Component Value Date    WBC 6 76 02/13/2023    HGB 8 9 (L) 02/13/2023    HCT 27 7 (L) 02/13/2023    MCV 92 02/13/2023     02/13/2023    MCH 29 7 02/13/2023    MCHC 32 1 02/13/2023    RDW 14 7 02/13/2023    MPV 10 1 02/13/2023   , BMP/CMP:   Lab Results   Component Value Date    SODIUM 135 02/13/2023    K 4 1 02/13/2023     (H) 02/13/2023    CO2 22 02/13/2023    BUN 18 02/13/2023 CREATININE 0 91 02/13/2023    CALCIUM 8 6 02/13/2023    EGFR 71 02/13/2023     VTE Pharmacologic Prophylaxis: Heparin gtt    VTE Mechanical Prophylaxis: sequential compression device

## 2023-02-15 ENCOUNTER — APPOINTMENT (INPATIENT)
Dept: RADIOLOGY | Facility: HOSPITAL | Age: 58
End: 2023-02-15

## 2023-02-15 LAB
ALBUMIN SERPL BCP-MCNC: 2.7 G/DL (ref 3.5–5)
ALP SERPL-CCNC: 90 U/L (ref 46–116)
ALT SERPL W P-5'-P-CCNC: 36 U/L (ref 12–78)
ANION GAP SERPL CALCULATED.3IONS-SCNC: 1 MMOL/L (ref 4–13)
APTT PPP: 58 SECONDS (ref 23–37)
APTT PPP: 69 SECONDS (ref 23–37)
APTT PPP: 73 SECONDS (ref 23–37)
AST SERPL W P-5'-P-CCNC: 34 U/L (ref 5–45)
BACTERIA BLD CULT: NORMAL
BACTERIA BLD CULT: NORMAL
BASOPHILS # BLD MANUAL: 0 THOUSAND/UL (ref 0–0.1)
BASOPHILS NFR MAR MANUAL: 0 % (ref 0–1)
BILIRUB SERPL-MCNC: 0.76 MG/DL (ref 0.2–1)
BUN SERPL-MCNC: 14 MG/DL (ref 5–25)
CALCIUM ALBUM COR SERPL-MCNC: 11 MG/DL (ref 8.3–10.1)
CALCIUM SERPL-MCNC: 10 MG/DL (ref 8.3–10.1)
CHLORIDE SERPL-SCNC: 111 MMOL/L (ref 96–108)
CO2 SERPL-SCNC: 26 MMOL/L (ref 21–32)
CREAT SERPL-MCNC: 0.85 MG/DL (ref 0.6–1.3)
EOSINOPHIL # BLD MANUAL: 0.07 THOUSAND/UL (ref 0–0.4)
EOSINOPHIL NFR BLD MANUAL: 1 % (ref 0–6)
ERYTHROCYTE [DISTWIDTH] IN BLOOD BY AUTOMATED COUNT: 14.7 % (ref 11.6–15.1)
GFR SERPL CREATININE-BSD FRML MDRD: 77 ML/MIN/1.73SQ M
GIANT PLATELETS BLD QL SMEAR: PRESENT
GLUCOSE SERPL-MCNC: 91 MG/DL (ref 65–140)
HCT VFR BLD AUTO: 30.3 % (ref 34.8–46.1)
HGB BLD-MCNC: 9.8 G/DL (ref 11.5–15.4)
INR PPP: 1.14 (ref 0.84–1.19)
LYMPHOCYTES # BLD AUTO: 0.73 THOUSAND/UL (ref 0.6–4.47)
LYMPHOCYTES # BLD AUTO: 11 % (ref 14–44)
MCH RBC QN AUTO: 29.6 PG (ref 26.8–34.3)
MCHC RBC AUTO-ENTMCNC: 32.3 G/DL (ref 31.4–37.4)
MCV RBC AUTO: 92 FL (ref 82–98)
METAMYELOCYTES NFR BLD MANUAL: 2 % (ref 0–1)
MONOCYTES # BLD AUTO: 0.4 THOUSAND/UL (ref 0–1.22)
MONOCYTES NFR BLD: 6 % (ref 4–12)
MYELOCYTES NFR BLD MANUAL: 2 % (ref 0–1)
NEUTROPHILS # BLD MANUAL: 4.88 THOUSAND/UL (ref 1.85–7.62)
NEUTS BAND NFR BLD MANUAL: 1 % (ref 0–8)
NEUTS SEG NFR BLD AUTO: 72 % (ref 43–75)
PLATELET # BLD AUTO: 237 THOUSANDS/UL (ref 149–390)
PLATELET BLD QL SMEAR: ADEQUATE
PMV BLD AUTO: 10.2 FL (ref 8.9–12.7)
POLYCHROMASIA BLD QL SMEAR: PRESENT
POTASSIUM SERPL-SCNC: 4.3 MMOL/L (ref 3.5–5.3)
PROT SERPL-MCNC: 6.9 G/DL (ref 6.4–8.4)
PROTHROMBIN TIME: 14.8 SECONDS (ref 11.6–14.5)
RBC # BLD AUTO: 3.31 MILLION/UL (ref 3.81–5.12)
RBC MORPH BLD: PRESENT
SODIUM SERPL-SCNC: 138 MMOL/L (ref 135–147)
VARIANT LYMPHS # BLD AUTO: 5 %
WBC # BLD AUTO: 6.68 THOUSAND/UL (ref 4.31–10.16)

## 2023-02-15 RX ORDER — NIFEDIPINE 30 MG/1
30 TABLET, EXTENDED RELEASE ORAL DAILY
Status: DISCONTINUED | OUTPATIENT
Start: 2023-02-15 | End: 2023-02-15

## 2023-02-15 RX ORDER — LISINOPRIL 20 MG/1
20 TABLET ORAL DAILY
Status: DISCONTINUED | OUTPATIENT
Start: 2023-02-15 | End: 2023-02-18 | Stop reason: HOSPADM

## 2023-02-15 RX ORDER — CINACALCET 30 MG/1
30 TABLET, FILM COATED ORAL DAILY
Status: DISCONTINUED | OUTPATIENT
Start: 2023-02-15 | End: 2023-02-18 | Stop reason: HOSPADM

## 2023-02-15 RX ORDER — FOLIC ACID 1 MG/1
1000 TABLET ORAL DAILY
Status: DISCONTINUED | OUTPATIENT
Start: 2023-02-15 | End: 2023-02-18 | Stop reason: HOSPADM

## 2023-02-15 RX ORDER — SPIRONOLACTONE 25 MG/1
25 TABLET ORAL DAILY
Status: DISCONTINUED | OUTPATIENT
Start: 2023-02-15 | End: 2023-02-18 | Stop reason: HOSPADM

## 2023-02-15 RX ADMIN — WARFARIN SODIUM 5 MG: 5 TABLET ORAL at 17:23

## 2023-02-15 RX ADMIN — HEPARIN SODIUM 11.1 UNITS/KG/HR: 10000 INJECTION, SOLUTION INTRAVENOUS at 12:13

## 2023-02-15 RX ADMIN — FOLIC ACID 1000 MCG: 1 TABLET ORAL at 11:10

## 2023-02-15 RX ADMIN — SPIRONOLACTONE 25 MG: 25 TABLET ORAL at 11:10

## 2023-02-15 RX ADMIN — CINACALCET 30 MG: 30 TABLET, FILM COATED ORAL at 11:10

## 2023-02-15 RX ADMIN — ACETAMINOPHEN 650 MG: 325 TABLET, FILM COATED ORAL at 06:43

## 2023-02-15 RX ADMIN — LISINOPRIL 20 MG: 20 TABLET ORAL at 11:10

## 2023-02-15 RX ADMIN — LEVOTHYROXINE SODIUM 112 MCG: 112 TABLET ORAL at 06:43

## 2023-02-15 RX ADMIN — ACETAMINOPHEN 650 MG: 325 TABLET, FILM COATED ORAL at 17:23

## 2023-02-15 NOTE — PROGRESS NOTES
Progress Note    Lachelle Godinez 56 y o  female MRN: [id number]  [facility] Encounter: [id number]    Assessment:  51yo F APS/ITP/PE s/p laparoscopic cholecystectomy on 2/5/23 presenting due to lightheadedness/syncope found to have an acute kidney injury w/ a creatinine of 2 10 and acute blood loss anemia prompting 2u pRBCs on 2/10  AVSS  INR - pending, 0 98 yesterday    PLAN:  - continue warfarin 5mg   - daily INR until therapeutic (2-3)  - continue regular diet  - continue hep gtt until therapeutic on warfarin bridge  - OOB, ambulate  - CM/SW: will discuss getting help with paperwork for work    Subjective:   JOCY  Feels some pulling pain when moving  Eating and drinking okay, voiding and stooling  Objective:     Vitals: Temp:  [97 7 °F (36 5 °C)-97 9 °F (36 6 °C)] 97 9 °F (36 6 °C)  HR:  [57-61] 58  Resp:  [16-20] 16  BP: (134-167)/(73-89) 167/85  Body mass index is 36 09 kg/m²  I/O       02/10 0701  02/11 0700 02/11 0701  02/12 0700 02/12 0701  02/13 0700    P  O  200      I V  (mL/kg) 1121 3 (12 9)      Blood       Total Intake(mL/kg) 1321 3 (15 3)      Urine (mL/kg/hr) 1150 (0 6)      Stool 0      Total Output 1150      Net +171 3             Unmeasured Stool Occurrence 2 x          Physical Exam:  Gen: Lying comfortably in bed, no acute distress  CV: Regular rate and rhythm  Pulm: Breathing comfortably on RA, no respiratory distress  Abd: Soft, nondistended, appropriately tender, ecchymosis across abdomen  Ext: Warm and dry, no edema  Neuro: Alert and oriented      Lab, Imaging and other studies:   I have personally reviewed pertinent reports  , CBC with diff:   No results found for: WBC, HGB, HCT, MCV, PLT, ADJUSTEDWBC, MCH, MCHC, RDW, MPV, NRBC, BMP/CMP:   No results found for: SODIUM, K, CL, CO2, ANIONGAP, BUN, CREATININE, GLUCOSE, CALCIUM, AST, ALT, ALKPHOS, PROT, BILITOT, EGFR  VTE Pharmacologic Prophylaxis: Heparin gtt    VTE Mechanical Prophylaxis: sequential compression device

## 2023-02-15 NOTE — PLAN OF CARE
Problem: GASTROINTESTINAL - ADULT  Goal: Minimal or absence of nausea and/or vomiting  Description: INTERVENTIONS:  - Administer IV fluids if ordered to ensure adequate hydration  - Maintain NPO status until nausea and vomiting are resolved  - Nasogastric tube if ordered  - Administer ordered antiemetic medications as needed  - Provide nonpharmacologic comfort measures as appropriate  - Advance diet as tolerated, if ordered  - Consider nutrition services referral to assist patient with adequate nutrition and appropriate food choices  Outcome: Progressing     Problem: HEMATOLOGIC - ADULT  Goal: Maintains hematologic stability  Description: INTERVENTIONS  - Assess for signs and symptoms of bleeding or hemorrhage  - Monitor labs  - Administer supportive blood products/factors as ordered and appropriate  Outcome: Progressing     Problem: CARDIOVASCULAR - ADULT  Goal: Maintains optimal cardiac output and hemodynamic stability  Description: INTERVENTIONS:  - Monitor I/O, vital signs and rhythm  - Monitor for S/S and trends of decreased cardiac output  - Administer and titrate ordered vasoactive medications to optimize hemodynamic stability  - Assess quality of pulses, skin color and temperature  - Assess for signs of decreased coronary artery perfusion  - Instruct patient to report change in severity of symptoms  Outcome: Progressing  Goal: Absence of cardiac dysrhythmias or at baseline rhythm  Description: INTERVENTIONS:  - Continuous cardiac monitoring, vital signs, obtain 12 lead EKG if ordered  - Administer antiarrhythmic and heart rate control medications as ordered  - Monitor electrolytes and administer replacement therapy as ordered  Outcome: Progressing     Problem: RESPIRATORY - ADULT  Goal: Achieves optimal ventilation and oxygenation  Description: INTERVENTIONS:  - Assess for changes in respiratory status  - Assess for changes in mentation and behavior  - Position to facilitate oxygenation and minimize respiratory effort  - Oxygen administered by appropriate delivery if ordered  - Initiate smoking cessation education as indicated  - Encourage broncho-pulmonary hygiene including cough, deep breathe, Incentive Spirometry  - Assess the need for suctioning and aspirate as needed  - Assess and instruct to report SOB or any respiratory difficulty  - Respiratory Therapy support as indicated  Outcome: Progressing     Problem: METABOLIC, FLUID AND ELECTROLYTES - ADULT  Goal: Electrolytes maintained within normal limits  Description: INTERVENTIONS:  - Monitor labs and assess patient for signs and symptoms of electrolyte imbalances  - Administer electrolyte replacement as ordered  - Monitor response to electrolyte replacements, including repeat lab results as appropriate  - Instruct patient on fluid and nutrition as appropriate  Outcome: Progressing     Problem: SKIN/TISSUE INTEGRITY - ADULT  Goal: Incision(s), wounds(s) or drain site(s) healing without S/S of infection  Description: INTERVENTIONS  - Assess and document dressing, incision, wound bed, drain sites and surrounding tissue  - Provide patient and family education  - Perform skin care/dressing changes every hour  Outcome: Progressing     Problem: PAIN - ADULT  Goal: Verbalizes/displays adequate comfort level or baseline comfort level  Description: Interventions:  - Encourage patient to monitor pain and request assistance  - Assess pain using appropriate pain scale  - Administer analgesics based on type and severity of pain and evaluate response  - Implement non-pharmacological measures as appropriate and evaluate response  - Consider cultural and social influences on pain and pain management  - Notify physician/advanced practitioner if interventions unsuccessful or patient reports new pain  Outcome: Progressing     Problem: INFECTION - ADULT  Goal: Absence or prevention of progression during hospitalization  Description: INTERVENTIONS:  - Assess and monitor for signs and symptoms of infection  - Monitor lab/diagnostic results  - Monitor all insertion sites, i e  indwelling lines, tubes, and drains  - Monitor endotracheal if appropriate and nasal secretions for changes in amount and color  - [place] appropriate cooling/warming therapies per order  - Administer medications as ordered  - Instruct and encourage patient and family to use good hand hygiene technique  - Identify and instruct in appropriate isolation precautions for identified infection/condition  Outcome: Progressing     Problem: DISCHARGE PLANNING  Goal: Discharge to home or other facility with appropriate resources  Description: INTERVENTIONS:  - Identify barriers to discharge w/patient and caregiver  - Arrange for needed discharge resources and transportation as appropriate  - Identify discharge learning needs (meds, wound care, etc )  - Arrange for interpretive services to assist at discharge as needed  - Refer to Case Management Department for coordinating discharge planning if the patient needs post-hospital services based on physician/advanced practitioner order or complex needs related to functional status, cognitive ability, or social support system  Outcome: Progressing     Problem: Knowledge Deficit  Goal: Patient/family/caregiver demonstrates understanding of disease process, treatment plan, medications, and discharge instructions  Description: Complete learning assessment and assess knowledge base    Interventions:  - Provide teaching at level of understanding  - Provide teaching via preferred learning methods  Outcome: Progressing

## 2023-02-15 NOTE — QUICK NOTE
Nurse-Patient-Provider rounds were completed with the patient's nurse today, Bri Nieto  We discussed the plan is to obtain a CXR  Discuss with Nephro ( Dr Dm Sumner) restarting home B/P meds  PLan is half dose Lisinopril and Spirnolactone,  If need more will re-oeder the Nif    We reviewed all of the invasive devices/lines/telemetry orders   - None  DVT Prophylaxis:  Coumadin    Pain Assessment / Plan:  - Continue current analgesic regimen  Mobility Assessment / Plan:  - Activity as tolerated  Goals / Barriers for discharge:  Needs to be therapeutic on Coumadin  Case management following; case and discharge needs discussed  All questions and concerns were addressed  I spent greater than 15 minutes reviewing the plan with the patient and the nurse, and coordinating care for the day      PEACE Lama  2/15/2023

## 2023-02-15 NOTE — PLAN OF CARE
Problem: GASTROINTESTINAL - ADULT  Goal: Minimal or absence of nausea and/or vomiting  Description: INTERVENTIONS:  - Administer IV fluids if ordered to ensure adequate hydration  - Maintain NPO status until nausea and vomiting are resolved  - Nasogastric tube if ordered  - Administer ordered antiemetic medications as needed  - Provide nonpharmacologic comfort measures as appropriate  - Advance diet as tolerated, if ordered  - Consider nutrition services referral to assist patient with adequate nutrition and appropriate food choices  Outcome: Progressing     Problem: HEMATOLOGIC - ADULT  Goal: Maintains hematologic stability  Description: INTERVENTIONS  - Assess for signs and symptoms of bleeding or hemorrhage  - Monitor labs  - Administer supportive blood products/factors as ordered and appropriate  Outcome: Progressing     Problem: CARDIOVASCULAR - ADULT  Goal: Maintains optimal cardiac output and hemodynamic stability  Description: INTERVENTIONS:  - Monitor I/O, vital signs and rhythm  - Monitor for S/S and trends of decreased cardiac output  - Administer and titrate ordered vasoactive medications to optimize hemodynamic stability  - Assess quality of pulses, skin color and temperature  - Assess for signs of decreased coronary artery perfusion  - Instruct patient to report change in severity of symptoms  Outcome: Progressing  Goal: Absence of cardiac dysrhythmias or at baseline rhythm  Description: INTERVENTIONS:  - Continuous cardiac monitoring, vital signs, obtain 12 lead EKG if ordered  - Administer antiarrhythmic and heart rate control medications as ordered  - Monitor electrolytes and administer replacement therapy as ordered  Outcome: Progressing     Problem: RESPIRATORY - ADULT  Goal: Achieves optimal ventilation and oxygenation  Description: INTERVENTIONS:  - Assess for changes in respiratory status  - Assess for changes in mentation and behavior  - Position to facilitate oxygenation and minimize respiratory effort  - Oxygen administered by appropriate delivery if ordered  - Initiate smoking cessation education as indicated  - Encourage broncho-pulmonary hygiene including cough, deep breathe, Incentive Spirometry  - Assess the need for suctioning and aspirate as needed  - Assess and instruct to report SOB or any respiratory difficulty  - Respiratory Therapy support as indicated  Outcome: Progressing     Problem: METABOLIC, FLUID AND ELECTROLYTES - ADULT  Goal: Electrolytes maintained within normal limits  Description: INTERVENTIONS:  - Monitor labs and assess patient for signs and symptoms of electrolyte imbalances  - Administer electrolyte replacement as ordered  - Monitor response to electrolyte replacements, including repeat lab results as appropriate  - Instruct patient on fluid and nutrition as appropriate  Outcome: Progressing     Problem: SKIN/TISSUE INTEGRITY - ADULT  Goal: Incision(s), wounds(s) or drain site(s) healing without S/S of infection  Description: INTERVENTIONS  - Assess and document dressing, incision, wound bed, drain sites and surrounding tissue  - Provide patient and family education  Outcome: Progressing     Problem: PAIN - ADULT  Goal: Verbalizes/displays adequate comfort level or baseline comfort level  Description: Interventions:  - Encourage patient to monitor pain and request assistance  - Assess pain using appropriate pain scale  - Administer analgesics based on type and severity of pain and evaluate response  - Implement non-pharmacological measures as appropriate and evaluate response  - Consider cultural and social influences on pain and pain management  - Notify physician/advanced practitioner if interventions unsuccessful or patient reports new pain  Outcome: Progressing     Problem: INFECTION - ADULT  Goal: Absence or prevention of progression during hospitalization  Description: INTERVENTIONS:  - Assess and monitor for signs and symptoms of infection  - Monitor lab/diagnostic results  - Monitor all insertion sites, i e  indwelling lines, tubes, and drains  - Monitor endotracheal if appropriate and nasal secretions for changes in amount and color  - [place] appropriate cooling/warming therapies per order  - Administer medications as ordered  - Instruct and encourage patient and family to use good hand hygiene technique  - Identify and instruct in appropriate isolation precautions for identified infection/condition  Outcome: Progressing     Problem: DISCHARGE PLANNING  Goal: Discharge to home or other facility with appropriate resources  Description: INTERVENTIONS:  - Identify barriers to discharge w/patient and caregiver  - Arrange for needed discharge resources and transportation as appropriate  - Identify discharge learning needs (meds, wound care, etc )  - Arrange for interpretive services to assist at discharge as needed  - Refer to Case Management Department for coordinating discharge planning if the patient needs post-hospital services based on physician/advanced practitioner order or complex needs related to functional status, cognitive ability, or social support system  Outcome: Progressing     Problem: Knowledge Deficit  Goal: Patient/family/caregiver demonstrates understanding of disease process, treatment plan, medications, and discharge instructions  Description: Complete learning assessment and assess knowledge base    Interventions:  - Provide teaching at level of understanding  - Provide teaching via preferred learning methods  Outcome: Progressing

## 2023-02-16 LAB
APTT PPP: 83 SECONDS (ref 23–37)
INR PPP: 1.35 (ref 0.84–1.19)
PROTHROMBIN TIME: 16.9 SECONDS (ref 11.6–14.5)

## 2023-02-16 RX ORDER — WARFARIN SODIUM 5 MG/1
5 TABLET ORAL
Status: COMPLETED | OUTPATIENT
Start: 2023-02-16 | End: 2023-02-16

## 2023-02-16 RX ADMIN — LEVOTHYROXINE SODIUM 112 MCG: 112 TABLET ORAL at 06:17

## 2023-02-16 RX ADMIN — CINACALCET 30 MG: 30 TABLET, FILM COATED ORAL at 09:24

## 2023-02-16 RX ADMIN — WARFARIN SODIUM 5 MG: 5 TABLET ORAL at 18:23

## 2023-02-16 RX ADMIN — HEPARIN SODIUM 13.1 UNITS/KG/HR: 10000 INJECTION, SOLUTION INTRAVENOUS at 09:22

## 2023-02-16 RX ADMIN — ACETAMINOPHEN 650 MG: 325 TABLET, FILM COATED ORAL at 13:15

## 2023-02-16 RX ADMIN — ACETAMINOPHEN 650 MG: 325 TABLET, FILM COATED ORAL at 18:23

## 2023-02-16 RX ADMIN — OXYCODONE HYDROCHLORIDE 5 MG: 5 TABLET ORAL at 09:27

## 2023-02-16 RX ADMIN — SPIRONOLACTONE 25 MG: 25 TABLET ORAL at 09:24

## 2023-02-16 RX ADMIN — FOLIC ACID 1000 MCG: 1 TABLET ORAL at 09:24

## 2023-02-16 RX ADMIN — WARFARIN SODIUM 5 MG: 5 TABLET ORAL at 23:37

## 2023-02-16 RX ADMIN — LISINOPRIL 20 MG: 20 TABLET ORAL at 09:24

## 2023-02-16 RX ADMIN — ACETAMINOPHEN 650 MG: 325 TABLET, FILM COATED ORAL at 06:17

## 2023-02-16 NOTE — PROGRESS NOTES
Progress Note    Maria Luz Farrar 56 y o  female MRN: [id number]  Unit/Bed#: [facility] 902-68 Encounter: [id number]    Assessment:  51yo F APS/ITP/PE s/p laparoscopic cholecystectomy on 2/5/23 presenting due to lightheadedness/syncope found to have an acute kidney injury w/ a creatinine of 2 10 and acute blood loss anemia prompting 2u pRBCs on 2/10  AVSS  INR 1 35 from 1 14    PLAN:  - continue warfarin 5mg, will give one additional one time dose of 5mg this morning  - daily INR until therapeutic (2-3)  - continue regular diet  - continue hep gtt until therapeutic on warfarin bridge  - OOB, ambulate  - incentive spirometer, flutter valve - resp protocol  - CM/SW: will discuss getting help with paperwork for work    Subjective:   No acute events past 24 hours  Notes some dryness in irritating cough  Chest x-ray performed yesterday with mild atelectasis, final read pending  No other new concerns this morning    Objective:     Vitals: Temp:  [97 7 °F (36 5 °C)-97 9 °F (36 6 °C)] 97 7 °F (36 5 °C)  HR:  [51-61] 55  Resp:  [16-20] 16  BP: (142-164)/(69-83) 152/72  Body mass index is 36 09 kg/m²  I/O       02/10 0701  02/11 0700 02/11 0701  02/12 0700 02/12 0701  02/13 0700    P  O  200      I V  (mL/kg) 1121 3 (12 9)      Blood       Total Intake(mL/kg) 1321 3 (15 3)      Urine (mL/kg/hr) 1150 (0 6)      Stool 0      Total Output 1150      Net +171 3             Unmeasured Stool Occurrence 2 x          Physical Exam:  Gen: Lying comfortably in bed, no acute distress  CV: Regular rate and rhythm  Pulm: Breathing comfortably on room air, no respiratory distress  Abd: Soft, nondistended, appropriately tender to palpation, incisions clean dry and intact  Ext: Warm and dry, no edema  Neuro: Alert and oriented        Lab, Imaging and other studies:   I have personally reviewed pertinent reports    , CBC with diff:   Lab Results   Component Value Date    WBC 6 68 02/15/2023    HGB 9 8 (L) 02/15/2023    HCT 30 3 (L) 02/15/2023 MCV 92 02/15/2023     02/15/2023    MCH 29 6 02/15/2023    MCHC 32 3 02/15/2023    RDW 14 7 02/15/2023    MPV 10 2 02/15/2023   , BMP/CMP:   Lab Results   Component Value Date    SODIUM 138 02/15/2023    K 4 3 02/15/2023     (H) 02/15/2023    CO2 26 02/15/2023    BUN 14 02/15/2023    CREATININE 0 85 02/15/2023    CALCIUM 10 0 02/15/2023    AST 34 02/15/2023    ALT 36 02/15/2023    ALKPHOS 90 02/15/2023    EGFR 77 02/15/2023     VTE Pharmacologic Prophylaxis: Heparin gtt    VTE Mechanical Prophylaxis: sequential compression device

## 2023-02-16 NOTE — PLAN OF CARE
Problem: GASTROINTESTINAL - ADULT  Goal: Minimal or absence of nausea and/or vomiting  Description: INTERVENTIONS:  - Administer IV fluids if ordered to ensure adequate hydration  - Maintain NPO status until nausea and vomiting are resolved  - Nasogastric tube if ordered  - Administer ordered antiemetic medications as needed  - Provide nonpharmacologic comfort measures as appropriate  - Advance diet as tolerated, if ordered  - Consider nutrition services referral to assist patient with adequate nutrition and appropriate food choices  Outcome: Progressing     Problem: HEMATOLOGIC - ADULT  Goal: Maintains hematologic stability  Description: INTERVENTIONS  - Assess for signs and symptoms of bleeding or hemorrhage  - Monitor labs  - Administer supportive blood products/factors as ordered and appropriate  Outcome: Progressing     Problem: CARDIOVASCULAR - ADULT  Goal: Maintains optimal cardiac output and hemodynamic stability  Description: INTERVENTIONS:  - Monitor I/O, vital signs and rhythm  - Monitor for S/S and trends of decreased cardiac output  - Administer and titrate ordered vasoactive medications to optimize hemodynamic stability  - Assess quality of pulses, skin color and temperature  - Assess for signs of decreased coronary artery perfusion  - Instruct patient to report change in severity of symptoms  Outcome: Progressing  Goal: Absence of cardiac dysrhythmias or at baseline rhythm  Description: INTERVENTIONS:  - Continuous cardiac monitoring, vital signs, obtain 12 lead EKG if ordered  - Administer antiarrhythmic and heart rate control medications as ordered  - Monitor electrolytes and administer replacement therapy as ordered  Outcome: Progressing     Problem: RESPIRATORY - ADULT  Goal: Achieves optimal ventilation and oxygenation  Description: INTERVENTIONS:  - Assess for changes in respiratory status  - Assess for changes in mentation and behavior  - Position to facilitate oxygenation and minimize respiratory effort  - Oxygen administered by appropriate delivery if ordered  - Initiate smoking cessation education as indicated  - Encourage broncho-pulmonary hygiene including cough, deep breathe, Incentive Spirometry  - Assess the need for suctioning and aspirate as needed  - Assess and instruct to report SOB or any respiratory difficulty  - Respiratory Therapy support as indicated  Outcome: Progressing     Problem: METABOLIC, FLUID AND ELECTROLYTES - ADULT  Goal: Electrolytes maintained within normal limits  Description: INTERVENTIONS:  - Monitor labs and assess patient for signs and symptoms of electrolyte imbalances  - Administer electrolyte replacement as ordered  - Monitor response to electrolyte replacements, including repeat lab results as appropriate  - Instruct patient on fluid and nutrition as appropriate  Outcome: Progressing     Problem: SKIN/TISSUE INTEGRITY - ADULT  Goal: Incision(s), wounds(s) or drain site(s) healing without S/S of infection  Description: INTERVENTIONS  - Assess and document dressing, incision, wound bed, drain sites and surrounding tissue  - Provide patient and family education  Outcome: Progressing     Problem: PAIN - ADULT  Goal: Verbalizes/displays adequate comfort level or baseline comfort level  Description: Interventions:  - Encourage patient to monitor pain and request assistance  - Assess pain using appropriate pain scale  - Administer analgesics based on type and severity of pain and evaluate response  - Implement non-pharmacological measures as appropriate and evaluate response  - Consider cultural and social influences on pain and pain management  - Notify physician/advanced practitioner if interventions unsuccessful or patient reports new pain  Outcome: Progressing     Problem: INFECTION - ADULT  Goal: Absence or prevention of progression during hospitalization  Description: INTERVENTIONS:  - Assess and monitor for signs and symptoms of infection  - Monitor lab/diagnostic results  - Monitor all insertion sites, i e  indwelling lines, tubes, and drains  - Monitor endotracheal if appropriate and nasal secretions for changes in amount and color  - [place] appropriate cooling/warming therapies per order  - Administer medications as ordered  - Instruct and encourage patient and family to use good hand hygiene technique  - Identify and instruct in appropriate isolation precautions for identified infection/condition  Outcome: Progressing     Problem: DISCHARGE PLANNING  Goal: Discharge to home or other facility with appropriate resources  Description: INTERVENTIONS:  - Identify barriers to discharge w/patient and caregiver  - Arrange for needed discharge resources and transportation as appropriate  - Identify discharge learning needs (meds, wound care, etc )  - Arrange for interpretive services to assist at discharge as needed  - Refer to Case Management Department for coordinating discharge planning if the patient needs post-hospital services based on physician/advanced practitioner order or complex needs related to functional status, cognitive ability, or social support system  Outcome: Progressing     Problem: Knowledge Deficit  Goal: Patient/family/caregiver demonstrates understanding of disease process, treatment plan, medications, and discharge instructions  Description: Complete learning assessment and assess knowledge base    Interventions:  - Provide teaching at level of understanding  - Provide teaching via preferred learning methods  Outcome: Progressing

## 2023-02-16 NOTE — UTILIZATION REVIEW
Continued Stay Review    Date: 2/16/23                          Current Patient Class: inpatient  Current Level of Care: med surg    HPI:57 y o  female initially admitted on 2/12/23  Near Syncope with ABLA  s/p laparoscopic cholecystectomy on 2/5/23 presenting due to lightheadedness/syncope found to have an acute kidney injury w/ a creatinine of 2 10 and acute blood loss anemia prompting 2u pRBCs on 2/10     Assessment/Plan:  No acute events past 24 hours  Notes some dryness in irritating cough  CXR done yesterday with mild atelectasis, final read pending  Continue warfarin 5 mg, daily INR until therapeutic, continue heparin drip until therapeutic on warfarin bridge, continue regular diet, OOB and ambulation, inc spirometry, CM following      Vital Signs:   Date/Time Temp Pulse Resp BP MAP (mmHg) SpO2 O2 Device   02/16/23 0900 -- -- -- -- -- 98 % None (Room air)   02/16/23 08:09:16 98 2 °F (36 8 °C) 52 Abnormal  16 151/85 107 93 % --   02/15/23 23:22:08 97 7 °F (36 5 °C) 55 16 152/72 99 96 % --   02/15/23 2000 -- -- -- -- -- -- None (Room air)   02/15/23 16:05:22 97 9 °F (36 6 °C) 59 18 143/69 94 96 % --   02/15/23 11:09:19 -- 61 -- 142/83 103 96 % --   02/15/23 0920 -- -- -- -- -- -- None (Room air)   02/15/23 07:23:05 97 9 °F (36 6 °C) 51 Abnormal  20 164/83 110 96 % --   02/14/23 23:00:58 97 9 °F (36 6 °C) 58 16 167/85 112 94 % --   02/14/23 2100 -- -- -- -- -- -- None (Room air)   02/14/23 14:58:32 97 7 °F (36 5 °C) 57 20 134/73 93 96 % --   02/14/23 07:15:11 97 7 °F (36 5 °C) 61 18 146/89 108 96 % --     Pertinent Labs/Diagnostic Results:   Results from last 7 days   Lab Units 02/09/23  1322   SARS-COV-2  Negative     Results from last 7 days   Lab Units 02/15/23  0657 02/14/23  0611 02/13/23  0403 02/12/23  0323 02/11/23  0629   WBC Thousand/uL 6 68 6 47 6 76 7 23 8 48   HEMOGLOBIN g/dL 9 8* 9 1* 8 9* 8 1* 8 9*   HEMATOCRIT % 30 3* 28 1* 27 7* 24 5* 26 4*   PLATELETS Thousands/uL 237 212 191 156 159   BANDS PCT % 1 3  --   --   --          Results from last 7 days   Lab Units 02/15/23  0657 02/13/23  0349 02/12/23  0323 02/11/23  0629 02/10/23  0622   SODIUM mmol/L 138 135 137 140 141   POTASSIUM mmol/L 4 3 4 1 3 5 3 7 4 1   CHLORIDE mmol/L 111* 110* 109* 111* 111*   CO2 mmol/L 26 22 25 23 22   ANION GAP mmol/L 1* 3* 3* 6 8   BUN mg/dL 14 18 18 21 34*   CREATININE mg/dL 0 85 0 91 0 83 0 84 1 28   EGFR ml/min/1 73sq m 77 71 79 78 47   CALCIUM mg/dL 10 0 8 6 8 3 8 5 7 4*   MAGNESIUM mg/dL  --  1 7  --   --  1 8   PHOSPHORUS mg/dL  --  2 1*  --   --   --      Results from last 7 days   Lab Units 02/15/23  0657 02/10/23  0622   AST U/L 34 79*   ALT U/L 36 107*   ALK PHOS U/L 90 86   TOTAL PROTEIN g/dL 6 9 5 8*   ALBUMIN g/dL 2 7* 2 4*   TOTAL BILIRUBIN mg/dL 0 76 1 37*         Results from last 7 days   Lab Units 02/15/23  0657 02/13/23  0349 02/12/23  0323 02/11/23  0629 02/10/23  0622   GLUCOSE RANDOM mg/dL 91 144* 90 95 99     Results from last 7 days   Lab Units 02/09/23  1421 02/09/23  1221   HS TNI 2HR ng/L  --  6   HSTNI D2 ng/L  --  2   HS TNI 4HR ng/L 5  --    HSTNI D4 ng/L 1  --          Results from last 7 days   Lab Units 02/16/23  0250 02/15/23  2023 02/15/23  1310 02/15/23  0657 02/14/23  1546 02/14/23  0853   PROTIME seconds 16 9*  --   --  14 8*  --  13 2   INR  1 35*  --   --  1 14  --  0 98   PTT seconds 83* 69* 58* 73*   < > 99*    < > = values in this interval not displayed       Results from last 7 days   Lab Units 02/09/23  1259   CLARITY UA  Cloudy   COLOR UA  Yellow   SPEC GRAV UA  1 020   PH UA  5 5   GLUCOSE UA mg/dl Negative   KETONES UA mg/dl Negative   BLOOD UA  Large*   PROTEIN UA mg/dl Negative   NITRITE UA  Negative   BILIRUBIN UA  Negative   UROBILINOGEN UA E U /dl 0 2   LEUKOCYTES UA  Negative   WBC UA /hpf 4-10*   RBC UA /hpf Innumerable*   BACTERIA UA /hpf Occasional   EPITHELIAL CELLS WET PREP /hpf Moderate*   MUCUS THREADS  Occasional*     Results from last 7 days   Lab Units 02/09/23  1322   INFLUENZA A PCR  Negative   INFLUENZA B PCR  Negative   RSV PCR  Negative     Results from last 7 days   Lab Units 02/10/23  0621 02/10/23  0346   BLOOD CULTURE  No Growth After 5 Days  No Growth After 5 Days  Medications:   Scheduled Medications:  acetaminophen, 650 mg, Oral, Q6H SERAFIN  cinacalcet, 30 mg, Oral, Daily  folic acid, 0,360 mcg, Oral, Daily  levothyroxine, 112 mcg, Oral, Daily  lisinopril, 20 mg, Oral, Daily  spironolactone, 25 mg, Oral, Daily  warfarin, 5 mg, Oral, Daily (warfarin)      Continuous IV Infusions:  heparin (porcine), 3-30 Units/kg/hr (Order-Specific), Intravenous, Titrated      PRN Meds:  ondansetron, 4 mg, Intravenous, Q6H PRN  oxyCODONE, 5 mg, Oral, Q6H PRN        Discharge Plan: tbd    Network Utilization Review Department  ATTENTION: Please call with any questions or concerns to [phone] and carefully listen to the prompts so that you are directed to the right person  All voicemails are confidential   Dorothy Giron all requests for admission clinical reviews, approved or denied determinations and any other requests to dedicated fax number below belonging to the campus where the patient is receiving treatment   List of dedicated fax numbers for the Facilities:  [address] DENIALS (Administrative/Medical Necessity) [phone]   [address] (Maternity/NICU/Pediatrics) [phone]   [address] [phone]   [facility] [phone]   [facility] [phone]   [address] [place] [address] [id number] [place] [address] [place] [address] [phone]   [address] [address] [place] [phone]

## 2023-02-16 NOTE — QUICK NOTE
Nurse-Patient-Provider rounds were completed with the patient's nurse today, Jonny Brown  We discussed the plan is to increase coumadin today    We reviewed all of the invasive devices/lines/telemetry orders   - Tomas De Oliveira DVT Prophylaxis:  Heparin drip / coumadin    Pain Assessment / Plan:  - Continue current analgesic regimen  Mobility Assessment / Plan:  - Activity as tolerated  Goals / Barriers for discharge:  Not yet therapeutic for coumadin  Case management following; case and discharge needs discussed  All questions and concerns were addressed  I spent greater than 5 minutes reviewing the plan with the patient and the nurse, and coordinating care for the day      PEACE Lama  2/16/2023

## 2023-02-17 LAB
APTT PPP: 80 SECONDS (ref 23–37)
APTT PPP: 84 SECONDS (ref 23–37)
APTT PPP: 97 SECONDS (ref 23–37)
FLUAV RNA RESP QL NAA+PROBE: NEGATIVE
FLUBV RNA RESP QL NAA+PROBE: NEGATIVE
INR PPP: 1.68 (ref 0.84–1.19)
PROTHROMBIN TIME: 20.1 SECONDS (ref 11.6–14.5)
RSV RNA RESP QL NAA+PROBE: NEGATIVE
SARS-COV-2 RNA RESP QL NAA+PROBE: NEGATIVE

## 2023-02-17 RX ADMIN — WARFARIN SODIUM 5 MG: 5 TABLET ORAL at 17:10

## 2023-02-17 RX ADMIN — HEPARIN SODIUM 13.1 UNITS/KG/HR: 10000 INJECTION, SOLUTION INTRAVENOUS at 06:17

## 2023-02-17 RX ADMIN — ACETAMINOPHEN 650 MG: 325 TABLET, FILM COATED ORAL at 17:10

## 2023-02-17 RX ADMIN — LEVOTHYROXINE SODIUM 112 MCG: 112 TABLET ORAL at 05:11

## 2023-02-17 RX ADMIN — ACETAMINOPHEN 650 MG: 325 TABLET, FILM COATED ORAL at 05:11

## 2023-02-17 RX ADMIN — FOLIC ACID 1000 MCG: 1 TABLET ORAL at 08:10

## 2023-02-17 RX ADMIN — ACETAMINOPHEN 650 MG: 325 TABLET, FILM COATED ORAL at 11:39

## 2023-02-17 RX ADMIN — LISINOPRIL 20 MG: 20 TABLET ORAL at 08:10

## 2023-02-17 RX ADMIN — CINACALCET 30 MG: 30 TABLET, FILM COATED ORAL at 08:10

## 2023-02-17 RX ADMIN — SPIRONOLACTONE 25 MG: 25 TABLET ORAL at 08:10

## 2023-02-17 NOTE — PROGRESS NOTES
Progress Note    Kelley Pena 56 y o  female MRN: [id number]  [facility]: [id number]    Assessment:  53yo F APS/ITP/PE s/p laparoscopic cholecystectomy on 2/5/23 presenting due to lightheadedness/syncope found to have an acute kidney injury w/ a creatinine of 2 10 and acute blood loss anemia prompting 2u pRBCs on 2/10  Currently awaiting therapeutic INR on heparin to warfarin bridge  AVSS  INR 1 68 from 1 35    PLAN:  - continue warfarin 5mg daily  - daily INR until therapeutic (2-3)  - continue regular diet  - continue hep gtt until therapeutic on warfarin bridge  - OOB, ambulate  - incentive spirometer, flutter valve - resp protocol  - CM/SW: will discuss getting help with paperwork for work    Subjective:   No new events in the past 24 hours  No new medical concerns this morning  Objective:     Vitals: Temp:  [97 7 °F (36 5 °C)-98 2 °F (36 8 °C)] 98 1 °F (36 7 °C)  HR:  [48-55] 48  Resp:  [14-20] 20  BP: (147-151)/(82-85) 150/82  Body mass index is 36 09 kg/m²  I/O       02/10 0701  02/11 0700 02/11 0701  02/12 0700 02/12 0701  02/13 0700    P  O  200      I V  (mL/kg) 1121 3 (12 9)      Blood       Total Intake(mL/kg) 1321 3 (15 3)      Urine (mL/kg/hr) 1150 (0 6)      Stool 0      Total Output 1150      Net +171 3             Unmeasured Stool Occurrence 2 x          Physical Exam:  Gen: Lying comfortably in bed, no acute distress  CV: Regular rate and rhythm  Pulm: Breathing comfortably on RA, no respiratory distress  Abd: Soft, nondistended, appropriately tender to palpation, incisions clean dry and intact  Ext: Warm and dry, no edema  Neuro: Alert and oriented    Lab, Imaging and other studies:   I have personally reviewed pertinent reports    , CBC with diff:   No results found for: WBC, HGB, HCT, MCV, PLT, ADJUSTEDWBC, MCH, MCHC, RDW, MPV, NRBC, BMP/CMP:   No results found for: SODIUM, K, CL, CO2, ANIONGAP, BUN, CREATININE, GLUCOSE, CALCIUM, AST, ALT, ALKPHOS, PROT, BILITOT, EGFR  VTE Pharmacologic Prophylaxis: Heparin gtt    VTE Mechanical Prophylaxis: sequential compression device

## 2023-02-17 NOTE — PLAN OF CARE
Problem: GASTROINTESTINAL - ADULT  Goal: Minimal or absence of nausea and/or vomiting  Description: INTERVENTIONS:  - Administer IV fluids if ordered to ensure adequate hydration  - Maintain NPO status until nausea and vomiting are resolved  - Nasogastric tube if ordered  - Administer ordered antiemetic medications as needed  - Provide nonpharmacologic comfort measures as appropriate  - Advance diet as tolerated, if ordered  - Consider nutrition services referral to assist patient with adequate nutrition and appropriate food choices  Outcome: Progressing     Problem: HEMATOLOGIC - ADULT  Goal: Maintains hematologic stability  Description: INTERVENTIONS  - Assess for signs and symptoms of bleeding or hemorrhage  - Monitor labs  - Administer supportive blood products/factors as ordered and appropriate  Outcome: Progressing     Problem: CARDIOVASCULAR - ADULT  Goal: Maintains optimal cardiac output and hemodynamic stability  Description: INTERVENTIONS:  - Monitor I/O, vital signs and rhythm  - Monitor for S/S and trends of decreased cardiac output  - Administer and titrate ordered vasoactive medications to optimize hemodynamic stability  - Assess quality of pulses, skin color and temperature  - Assess for signs of decreased coronary artery perfusion  - Instruct patient to report change in severity of symptoms  Outcome: Progressing  Goal: Absence of cardiac dysrhythmias or at baseline rhythm  Description: INTERVENTIONS:  - Continuous cardiac monitoring, vital signs, obtain 12 lead EKG if ordered  - Administer antiarrhythmic and heart rate control medications as ordered  - Monitor electrolytes and administer replacement therapy as ordered  Outcome: Progressing     Problem: RESPIRATORY - ADULT  Goal: Achieves optimal ventilation and oxygenation  Description: INTERVENTIONS:  - Assess for changes in respiratory status  - Assess for changes in mentation and behavior  - Position to facilitate oxygenation and minimize respiratory effort  - Oxygen administered by appropriate delivery if ordered  - Initiate smoking cessation education as indicated  - Encourage broncho-pulmonary hygiene including cough, deep breathe, Incentive Spirometry  - Assess the need for suctioning and aspirate as needed  - Assess and instruct to report SOB or any respiratory difficulty  - Respiratory Therapy support as indicated  Outcome: Progressing     Problem: METABOLIC, FLUID AND ELECTROLYTES - ADULT  Goal: Electrolytes maintained within normal limits  Description: INTERVENTIONS:  - Monitor labs and assess patient for signs and symptoms of electrolyte imbalances  - Administer electrolyte replacement as ordered  - Monitor response to electrolyte replacements, including repeat lab results as appropriate  - Instruct patient on fluid and nutrition as appropriate  Outcome: Progressing     Problem: SKIN/TISSUE INTEGRITY - ADULT  Goal: Incision(s), wounds(s) or drain site(s) healing without S/S of infection  Description: INTERVENTIONS  - Assess and document dressing, incision, wound bed, drain sites and surrounding tissue  - Provide patient and family education  Problem: PAIN - ADULT  Goal: Verbalizes/displays adequate comfort level or baseline comfort level  Description: Interventions:  - Encourage patient to monitor pain and request assistance  - Assess pain using appropriate pain scale  - Administer analgesics based on type and severity of pain and evaluate response  - Implement non-pharmacological measures as appropriate and evaluate response  - Consider cultural and social influences on pain and pain management  - Notify physician/advanced practitioner if interventions unsuccessful or patient reports new pain  Outcome: Progressing     Problem: INFECTION - ADULT  Goal: Absence or prevention of progression during hospitalization  Description: INTERVENTIONS:  - Assess and monitor for signs and symptoms of infection  - Monitor lab/diagnostic results  - Monitor all insertion sites, i e  indwelling lines, tubes, and drains  - Monitor endotracheal if appropriate and nasal secretions for changes in amount and color  - [place] appropriate cooling/warming therapies per order  - Administer medications as ordered  - Instruct and encourage patient and family to use good hand hygiene technique  - Identify and instruct in appropriate isolation precautions for identified infection/condition  Outcome: Progressing     Problem: DISCHARGE PLANNING  Goal: Discharge to home or other facility with appropriate resources  Description: INTERVENTIONS:  - Identify barriers to discharge w/patient and caregiver  - Arrange for needed discharge resources and transportation as appropriate  - Identify discharge learning needs (meds, wound care, etc )  - Arrange for interpretive services to assist at discharge as needed  - Refer to Case Management Department for coordinating discharge planning if the patient needs post-hospital services based on physician/advanced practitioner order or complex needs related to functional status, cognitive ability, or social support system  Outcome: Progressing     Problem: Knowledge Deficit  Goal: Patient/family/caregiver demonstrates understanding of disease process, treatment plan, medications, and discharge instructions  Description: Complete learning assessment and assess knowledge base    Interventions:  - Provide teaching at level of understanding  - Provide teaching via preferred learning methods  Outcome: Progressing     Outcome: Progressing

## 2023-02-17 NOTE — PLAN OF CARE
Problem: GASTROINTESTINAL - ADULT  Goal: Minimal or absence of nausea and/or vomiting  Description: INTERVENTIONS:  - Administer IV fluids if ordered to ensure adequate hydration  - Maintain NPO status until nausea and vomiting are resolved  - Nasogastric tube if ordered  - Administer ordered antiemetic medications as needed  - Provide nonpharmacologic comfort measures as appropriate  - Advance diet as tolerated, if ordered  - Consider nutrition services referral to assist patient with adequate nutrition and appropriate food choices  Outcome: Progressing     Problem: CARDIOVASCULAR - ADULT  Goal: Maintains optimal cardiac output and hemodynamic stability  Description: INTERVENTIONS:  - Monitor I/O, vital signs and rhythm  - Monitor for S/S and trends of decreased cardiac output  - Administer and titrate ordered vasoactive medications to optimize hemodynamic stability  - Assess quality of pulses, skin color and temperature  - Assess for signs of decreased coronary artery perfusion  - Instruct patient to report change in severity of symptoms  Outcome: Progressing  Goal: Absence of cardiac dysrhythmias or at baseline rhythm  Description: INTERVENTIONS:  - Continuous cardiac monitoring, vital signs, obtain 12 lead EKG if ordered  - Administer antiarrhythmic and heart rate control medications as ordered  - Monitor electrolytes and administer replacement therapy as ordered  Outcome: Progressing     Problem: METABOLIC, FLUID AND ELECTROLYTES - ADULT  Goal: Electrolytes maintained within normal limits  Description: INTERVENTIONS:  - Monitor labs and assess patient for signs and symptoms of electrolyte imbalances  - Administer electrolyte replacement as ordered  - Monitor response to electrolyte replacements, including repeat lab results as appropriate  - Instruct patient on fluid and nutrition as appropriate  Outcome: Progressing

## 2023-02-18 VITALS
BODY MASS INDEX: 36.06 KG/M2 | HEIGHT: 61 IN | SYSTOLIC BLOOD PRESSURE: 108 MMHG | RESPIRATION RATE: 16 BRPM | WEIGHT: 191 LBS | DIASTOLIC BLOOD PRESSURE: 70 MMHG | HEART RATE: 62 BPM | TEMPERATURE: 97.9 F | OXYGEN SATURATION: 96 %

## 2023-02-18 LAB
INR PPP: 2.4 (ref 0.84–1.19)
PROTHROMBIN TIME: 26.4 SECONDS (ref 11.6–14.5)

## 2023-02-18 RX ORDER — ACETAMINOPHEN 325 MG/1
650 TABLET ORAL EVERY 6 HOURS SCHEDULED
Refills: 0
Start: 2023-02-18

## 2023-02-18 RX ORDER — ACETAMINOPHEN 325 MG/1
650 TABLET ORAL EVERY 6 HOURS SCHEDULED
Refills: 0 | Status: CANCELLED
Start: 2023-02-18

## 2023-02-18 RX ORDER — WARFARIN SODIUM 5 MG/1
5 TABLET ORAL
Qty: 30 TABLET | Refills: 0 | Status: CANCELLED | OUTPATIENT
Start: 2023-02-18

## 2023-02-18 RX ADMIN — LEVOTHYROXINE SODIUM 112 MCG: 112 TABLET ORAL at 05:44

## 2023-02-18 RX ADMIN — FOLIC ACID 1000 MCG: 1 TABLET ORAL at 08:58

## 2023-02-18 RX ADMIN — HEPARIN SODIUM 11.1 UNITS/KG/HR: 10000 INJECTION, SOLUTION INTRAVENOUS at 06:02

## 2023-02-18 RX ADMIN — ACETAMINOPHEN 650 MG: 325 TABLET, FILM COATED ORAL at 05:44

## 2023-02-18 RX ADMIN — SPIRONOLACTONE 25 MG: 25 TABLET ORAL at 08:58

## 2023-02-18 RX ADMIN — CINACALCET 30 MG: 30 TABLET, FILM COATED ORAL at 08:58

## 2023-02-18 NOTE — PROGRESS NOTES
Progress Note    Pop Nielsen 56 y o  female MRN: [id number]  Unit/Bed#: [facility] 196-68 Encounter: [id number]    Assessment:  53yo F APS/ITP/PE s/p laparoscopic cholecystectomy on 2/5/23 presenting due to lightheadedness/syncope found to have an acute kidney injury w/ a creatinine of 2 10 and acute blood loss anemia prompting 2u pRBCs on 2/10  Currently awaiting therapeutic INR on heparin to warfarin bridge  AVSS  INR 2 4 from 1 68    PLAN:  - continue warfarin 5mg daily  - stop heparin drip  - pain control and antiemetics PRN  - daily INR until therapeutic (2-3)  - continue regular diet  - continue hep gtt until therapeutic on warfarin bridge  - OOB, ambulate  - incentive spirometer, flutter valve - resp protocol    Subjective:   No new events  No new concerns this morning  INR now therapeutic  Objective:     Vitals: Temp:  [97 2 °F (36 2 °C)-98 1 °F (36 7 °C)] 98 1 °F (36 7 °C)  HR:  [53-54] 54  Resp:  [17-24] 24  BP: (144-150)/(80-82) 144/80  Body mass index is 36 09 kg/m²  I/O       02/10 0701  02/11 0700 02/11 0701  02/12 0700 02/12 0701  02/13 0700    P  O  200      I V  (mL/kg) 1121 3 (12 9)      Blood       Total Intake(mL/kg) 1321 3 (15 3)      Urine (mL/kg/hr) 1150 (0 6)      Stool 0      Total Output 1150      Net +171 3             Unmeasured Stool Occurrence 2 x          Physical Exam:  Gen: Lying comfortably in bed, no acute distress  CV: Regular rate and rhythm  Pulm: Breathing comfortably on RA, no respiratory distress  Abd: Soft, nondistended, appropriately tender to palpation  Ext: Warm and dry, no edema  Neuro: Alert and oriented    Lab, Imaging and other studies:   I have personally reviewed pertinent reports    , CBC with diff:   No results found for: WBC, HGB, HCT, MCV, PLT, ADJUSTEDWBC, MCH, MCHC, RDW, MPV, NRBC, BMP/CMP:   No results found for: SODIUM, K, CL, CO2, ANIONGAP, BUN, CREATININE, GLUCOSE, CALCIUM, AST, ALT, ALKPHOS, PROT, BILITOT, EGFR  VTE Pharmacologic Prophylaxis: Heparin gtt   VTE Mechanical Prophylaxis: sequential compression device

## 2023-02-18 NOTE — DISCHARGE INSTR - AVS FIRST PAGE
- Please follow up with your PCP within 1-2 weeks to discuss your hospitalization, warfarin prescription, and INR checks  - Please have your INR drawn in 1 week to check your level  - Follow up on 2/27 with [facility]  - Continue 15 pound lifting restriction through 4 weeks post-op

## 2023-02-18 NOTE — DISCHARGE SUMMARY
Discharge Summary - Edda Claude 56 y o  female MRN: [id number]    Unit/Bed#: [facility] 633-74 Encounter: [id number]    Admission Date:   Admission Orders (From admission, onward)     Ordered        02/12/23 0810  Inpatient Admission  Once            02/09/23 1224  Place in Observation  Once                        Admitting Diagnosis: Dizziness [R42]  Syncope [R55]  ADA (acute kidney injury) ([facility] ) [N17 9]    HPI: Edda Claude is a 56 y o  female presenting due to abdominal pain presenting for evaluation of lightheadedness/syncope      The patient is s/p elective laparoscopic cholecystectomy on 2/5/2023  She had a relatively uneventful postoperative course, was admitted overnight immediately post-op for initiation of bridging Lovenox to warfarin due to a history of pulmonary embolism, and was discharged 1 day postoperatively  She reported that she has been doing relatively well until today when she was in her bathroom, stood up, and suddenly fell to the ground  She does not remember the event in its entirety but denied any abdominal pain, nausea, emesis, dyspnea, chest pain, or intolerance to p o  intake       Upon presentation to the emergency department the patient underwent routine laboratory studies revealing acute kidney injury with a creatinine of 2 1 for which the general surgery team was consulted given her recent surgical procedure  Procedures Performed:   Orders Placed This Encounter   Procedures   • ED ECG Documentation Only       Summary of Hospital Course: Ms Janeth Phelps was admitted on 2/9 after a near syncopal event with acute blood loss anemia and an ADA  She was noted to have a history of Anti-phospholipid syndrome and PE for which she was was on Lovenox bridging to Coumadin (subtherapeutic at time of admission)  She received 2 units of packed red blood cells with an appropriate response  Nephrology was consulted at admission  Her ADA was noted to resolve on 2/11   A heparin infusion was started on 2/11  Her warfarin was resumed on 2/12 and daily checks of INR were continued until she reached a therapeutic level on warfarin  On the morning of 2/18 she had reached an INR of 2 4 and was medically stable for discharge home  Significant Findings, Care, Treatment and Services Provided: ADA - Nephrology consult, ABLA - 2 units PRBCs    Complications: post-op ADA and ABLA    Discharge Diagnosis: ADA (resoled), ABLA (resolved), s/p laparoscopic cholecystectomy    Medical Problems     Resolved Problems  Date Reviewed: 2/11/2023   None         Condition at Discharge: good         Discharge instructions/Information to patient and family:   See after visit summary for information provided to patient and family  Provisions for Follow-Up Care:  See after visit summary for information related to follow-up care and any pertinent home health orders  PCP: PEACE Thakkar    Disposition: See After Visit Summary for discharge disposition information  Planned Readmission: No      Discharge Statement   I spent 30 minutes discharging the patient  This time was spent on the day of discharge  I had direct contact with the patient on the day of discharge  Additional documentation is required if more than 30 minutes were spent on discharge  Discharge Medications:  See after visit summary for reconciled discharge medications provided to patient and family

## 2023-02-18 NOTE — PLAN OF CARE
Problem: GASTROINTESTINAL - ADULT  Goal: Minimal or absence of nausea and/or vomiting  Description: INTERVENTIONS:  - Administer IV fluids if ordered to ensure adequate hydration  - Maintain NPO status until nausea and vomiting are resolved  - Nasogastric tube if ordered  - Administer ordered antiemetic medications as needed  - Provide nonpharmacologic comfort measures as appropriate  - Advance diet as tolerated, if ordered  - Consider nutrition services referral to assist patient with adequate nutrition and appropriate food choices  Outcome: Progressing     Problem: HEMATOLOGIC - ADULT  Goal: Maintains hematologic stability  Description: INTERVENTIONS  - Assess for signs and symptoms of bleeding or hemorrhage  - Monitor labs  - Administer supportive blood products/factors as ordered and appropriate  Outcome: Progressing     Problem: CARDIOVASCULAR - ADULT  Goal: Maintains optimal cardiac output and hemodynamic stability  Description: INTERVENTIONS:  - Monitor I/O, vital signs and rhythm  - Monitor for S/S and trends of decreased cardiac output  - Administer and titrate ordered vasoactive medications to optimize hemodynamic stability  - Assess quality of pulses, skin color and temperature  - Assess for signs of decreased coronary artery perfusion  - Instruct patient to report change in severity of symptoms  Outcome: Progressing  Goal: Absence of cardiac dysrhythmias or at baseline rhythm  Description: INTERVENTIONS:  - Continuous cardiac monitoring, vital signs, obtain 12 lead EKG if ordered  - Administer antiarrhythmic and heart rate control medications as ordered  - Monitor electrolytes and administer replacement therapy as ordered  Outcome: Progressing     Problem: RESPIRATORY - ADULT  Goal: Achieves optimal ventilation and oxygenation  Description: INTERVENTIONS:  - Assess for changes in respiratory status  - Assess for changes in mentation and behavior  - Position to facilitate oxygenation and minimize respiratory effort  - Oxygen administered by appropriate delivery if ordered  - Initiate smoking cessation education as indicated  - Encourage broncho-pulmonary hygiene including cough, deep breathe, Incentive Spirometry  - Assess the need for suctioning and aspirate as needed  - Assess and instruct to report SOB or any respiratory difficulty  - Respiratory Therapy support as indicated  Outcome: Progressing     Problem: METABOLIC, FLUID AND ELECTROLYTES - ADULT  Goal: Electrolytes maintained within normal limits  Description: INTERVENTIONS:  - Monitor labs and assess patient for signs and symptoms of electrolyte imbalances  - Administer electrolyte replacement as ordered  - Monitor response to electrolyte replacements, including repeat lab results as appropriate  - Instruct patient on fluid and nutrition as appropriate  Outcome: Progressing     Problem: SKIN/TISSUE INTEGRITY - ADULT  Goal: Incision(s), wounds(s) or drain site(s) healing without S/S of infection  Description: INTERVENTIONS  - Assess and document dressing, incision, wound bed, drain sites and surrounding tissue  - Provide patient and family education    Problem: INFECTION - ADULT  Goal: Absence or prevention of progression during hospitalization  Description: INTERVENTIONS:  - Assess and monitor for signs and symptoms of infection  - Monitor lab/diagnostic results  - Monitor all insertion sites, i e  indwelling lines, tubes, and drains  - Monitor endotracheal if appropriate and nasal secretions for changes in amount and color  - [place] appropriate cooling/warming therapies per order  - Administer medications as ordered  - Instruct and encourage patient and family to use good hand hygiene technique  - Identify and instruct in appropriate isolation precautions for identified infection/condition  Outcome: Progressing     Problem: PAIN - ADULT  Goal: Verbalizes/displays adequate comfort level or baseline comfort level  Description: Interventions:  - Encourage patient to monitor pain and request assistance  - Assess pain using appropriate pain scale  - Administer analgesics based on type and severity of pain and evaluate response  - Implement non-pharmacological measures as appropriate and evaluate response  - Consider cultural and social influences on pain and pain management  - Notify physician/advanced practitioner if interventions unsuccessful or patient reports new pain  Outcome: Progressing     Problem: DISCHARGE PLANNING  Goal: Discharge to home or other facility with appropriate resources  Description: INTERVENTIONS:  - Identify barriers to discharge w/patient and caregiver  - Arrange for needed discharge resources and transportation as appropriate  - Identify discharge learning needs (meds, wound care, etc )  - Arrange for interpretive services to assist at discharge as needed  - Refer to Case Management Department for coordinating discharge planning if the patient needs post-hospital services based on physician/advanced practitioner order or complex needs related to functional status, cognitive ability, or social support system  Outcome: Progressing     Problem: Knowledge Deficit  Goal: Patient/family/caregiver demonstrates understanding of disease process, treatment plan, medications, and discharge instructions  Description: Complete learning assessment and assess knowledge base    Interventions:  - Provide teaching at level of understanding  - Provide teaching via preferred learning methods  Outcome: Progressing     Outcome: Progressing

## 2023-02-20 ENCOUNTER — TRANSITIONAL CARE MANAGEMENT (OUTPATIENT)
Dept: INTERNAL MEDICINE CLINIC | Facility: CLINIC | Age: 58
End: 2023-02-20

## 2023-02-20 ENCOUNTER — TELEPHONE (OUTPATIENT)
Dept: NEPHROLOGY | Facility: HOSPITAL | Age: 58
End: 2023-02-20

## 2023-02-20 NOTE — UTILIZATION REVIEW
NOTIFICATION OF ADMISSION DISCHARGE   This is a Notification of Discharge from [facility]  Please be advised that this patient has been discharge from our facility  Below you will find the admission and discharge date and time including the patient’s disposition  UTILIZATION REVIEW CONTACT:  Maria Del Rosario [place]  Utilization   Network Utilization Review Department  Phone: [phone] x carefully listen to the prompts  All voicemails are confidential   Email: [email or web address]     ADMISSION INFORMATION  PRESENTATION DATE: 2/9/2023 10:12 AM  OBERVATION ADMISSION DATE:   INPATIENT ADMISSION DATE: 2/12/23  8:10 AM   DISCHARGE DATE: 2/18/2023 11:03 AM   DISPOSITION:Home/Self Care    IMPORTANT INFORMATION:  Send all requests for admission clinical reviews, approved or denied determinations and any other requests to dedicated fax number below belonging to the campus where the patient is receiving treatment   List of dedicated fax numbers:  [address] DENIALS (Administrative/Medical Necessity) [phone]   [address] (Maternity/NICU/Pediatrics) [phone]   [facility] [phone]   [facility] [phone]   [address] [phone]   [address] [facility] [phone]   [address] [place] [address] [phone]   [address] [place] [address] 119 [phone]   [facility]  [phone]   [facility] [phone]   [facility] [phone]

## 2023-02-20 NOTE — TELEPHONE ENCOUNTER
Called and spoke with patient  Patient schedule for ADA hospital follow up on 2/28 at [facility] location

## 2023-02-20 NOTE — TELEPHONE ENCOUNTER
----- Message from Jairo Tomas sent at 2/20/2023  9:35 AM EST -----  Regarding: FW: ADA hospital f/u appt    ----- Message -----  From: Homero Baez  Sent: 2/20/2023   9:17 AM EST  To: Jairo Tomas  Subject: FW: ADA hospital f/u appt                        Good morning im triyng to schedule patient couldn't see an appoitnment for her 1 week ada follow up  Can you please look in to it thanks   ----- Message -----  From: Cira Barragan DO  Sent: 2/11/2023   4:38 PM EST  To: [facility] [place] [facility]  Subject: ADA hospital f/u appt                            Patient admitted to [facility] and placed on Epic ADA list  Please schedule hospital follow up for ADA in 1 week with first available provider  Patient should have BMP prior to office visit which has already been ordered  Thanks

## 2023-02-23 ENCOUNTER — OFFICE VISIT (OUTPATIENT)
Dept: OBGYN CLINIC | Facility: CLINIC | Age: 58
End: 2023-02-23

## 2023-02-23 ENCOUNTER — APPOINTMENT (OUTPATIENT)
Dept: LAB | Age: 58
End: 2023-02-23

## 2023-02-23 VITALS
SYSTOLIC BLOOD PRESSURE: 120 MMHG | BODY MASS INDEX: 37.5 KG/M2 | DIASTOLIC BLOOD PRESSURE: 78 MMHG | HEIGHT: 60 IN | WEIGHT: 191 LBS

## 2023-02-23 DIAGNOSIS — N17.9 AKI (ACUTE KIDNEY INJURY) (HCC): ICD-10-CM

## 2023-02-23 DIAGNOSIS — N95.0 POST-MENOPAUSAL BLEEDING: Primary | ICD-10-CM

## 2023-02-23 LAB
ANION GAP SERPL CALCULATED.3IONS-SCNC: 4 MMOL/L (ref 4–13)
BUN SERPL-MCNC: 22 MG/DL (ref 5–25)
CALCIUM SERPL-MCNC: 10.3 MG/DL (ref 8.3–10.1)
CHLORIDE SERPL-SCNC: 107 MMOL/L (ref 96–108)
CO2 SERPL-SCNC: 26 MMOL/L (ref 21–32)
CREAT SERPL-MCNC: 1.1 MG/DL (ref 0.6–1.3)
GFR SERPL CREATININE-BSD FRML MDRD: 56 ML/MIN/1.73SQ M
GLUCOSE P FAST SERPL-MCNC: 96 MG/DL (ref 65–99)
POTASSIUM SERPL-SCNC: 4.3 MMOL/L (ref 3.5–5.3)
SODIUM SERPL-SCNC: 137 MMOL/L (ref 135–147)

## 2023-02-23 NOTE — PROGRESS NOTES
Assessment/Plan:      Diagnoses and all orders for this visit:    Post-menopausal bleeding  -     US pelvis complete w transvaginal; Future      Patient will return in three months for follow up visit with Dr Dylan Boland, her usual provider  Will discuss EB vs hysteroscopy/D+C vs observation at that time  Aware to call with worsening PMB  Subjective:     Patient ID: Monique Henriquez is a 56 y o  female  Patient presents today for problem visit  She has been experiencing PM bleeding for two years and reports that she has previously had a work-up with Dr Dylan Boland  She had worsening PM bleeding recently for which she called and was sent for an ultrasound  The ultrasound showed a 5mm endometrium, so she is aware that we would normally do an EB for this  However, she refuses any evaluation today as she had a recent bout of acute cholecystitis for which she had a cholecystectomy  Her post-operative course was very complicated including a syncopal episode at home and readmission  She has bruises on her arms from blood draws and IVs and extensive bruising over her entire abdomen today  She therefore feels she cannot tolerate a biopsy  We discussed that 5mm is technically a result that requires more follow up but that I would never force her to have a biopsy given her mental state today which is understandable given the above situation  She would consider biopsy in the future if her bleeding is worsening or if her lining is thickening given the risk for endometrial cancer  All questions answered  She would like to follow up with Dr Dylan Boland but was told that there were not appointments now  Aware that I can schedule her 3 month follow up with Dr Dylan Boland and requests this          Review of Systems      Objective:     Physical Exam

## 2023-02-26 DIAGNOSIS — I82.5Y3 CHRONIC DEEP VEIN THROMBOSIS (DVT) OF PROXIMAL VEIN OF BOTH LOWER EXTREMITIES (HCC): Chronic | ICD-10-CM

## 2023-02-26 DIAGNOSIS — E89.0 POSTOPERATIVE HYPOTHYROIDISM: ICD-10-CM

## 2023-02-27 ENCOUNTER — OFFICE VISIT (OUTPATIENT)
Dept: INTERNAL MEDICINE CLINIC | Facility: CLINIC | Age: 58
End: 2023-02-27

## 2023-02-27 ENCOUNTER — OFFICE VISIT (OUTPATIENT)
Dept: SURGERY | Facility: CLINIC | Age: 58
End: 2023-02-27

## 2023-02-27 VITALS
BODY MASS INDEX: 38.05 KG/M2 | WEIGHT: 193.8 LBS | SYSTOLIC BLOOD PRESSURE: 144 MMHG | OXYGEN SATURATION: 99 % | DIASTOLIC BLOOD PRESSURE: 84 MMHG | HEIGHT: 60 IN | HEART RATE: 69 BPM

## 2023-02-27 VITALS — TEMPERATURE: 97.8 F | WEIGHT: 192.3 LBS | HEIGHT: 60 IN | BODY MASS INDEX: 37.76 KG/M2

## 2023-02-27 DIAGNOSIS — R93.89 ABNORMAL CT OF THE CHEST: ICD-10-CM

## 2023-02-27 DIAGNOSIS — I95.89 HYPOTENSION DUE TO HYPOVOLEMIA: ICD-10-CM

## 2023-02-27 DIAGNOSIS — K80.20 CHOLELITHIASIS: Primary | ICD-10-CM

## 2023-02-27 DIAGNOSIS — D68.61 ANTI-PHOSPHOLIPID SYNDROME (HCC): ICD-10-CM

## 2023-02-27 DIAGNOSIS — N95.0 POST-MENOPAUSAL BLEEDING: ICD-10-CM

## 2023-02-27 DIAGNOSIS — I82.5Y3 CHRONIC DEEP VEIN THROMBOSIS (DVT) OF PROXIMAL VEIN OF BOTH LOWER EXTREMITIES (HCC): Chronic | ICD-10-CM

## 2023-02-27 DIAGNOSIS — I12.9 BENIGN HYPERTENSIVE KIDNEY DISEASE WITH CHRONIC KIDNEY DISEASE STAGE I THROUGH STAGE IV, OR UNSPECIFIED: ICD-10-CM

## 2023-02-27 DIAGNOSIS — E21.0 PRIMARY HYPERPARATHYROIDISM (HCC): ICD-10-CM

## 2023-02-27 DIAGNOSIS — E86.1 HYPOTENSION DUE TO HYPOVOLEMIA: ICD-10-CM

## 2023-02-27 DIAGNOSIS — N32.9 LESION OF URINARY BLADDER: ICD-10-CM

## 2023-02-27 DIAGNOSIS — E89.0 POSTOPERATIVE HYPOTHYROIDISM: ICD-10-CM

## 2023-02-27 DIAGNOSIS — K81.0 ACUTE CHOLECYSTITIS: Primary | ICD-10-CM

## 2023-02-27 DIAGNOSIS — D62 ACUTE BLOOD LOSS ANEMIA: ICD-10-CM

## 2023-02-27 DIAGNOSIS — I10 PRIMARY HYPERTENSION: ICD-10-CM

## 2023-02-27 PROBLEM — N17.9 AKI (ACUTE KIDNEY INJURY) (HCC): Status: RESOLVED | Noted: 2023-02-09 | Resolved: 2023-02-27

## 2023-02-27 PROBLEM — H00.015 HORDEOLUM EXTERNUM OF LEFT LOWER EYELID: Status: RESOLVED | Noted: 2022-05-11 | Resolved: 2023-02-27

## 2023-02-27 PROBLEM — L03.011 CELLULITIS OF RIGHT INDEX FINGER: Status: RESOLVED | Noted: 2021-03-04 | Resolved: 2023-02-27

## 2023-02-27 PROBLEM — M79.672 LEFT FOOT PAIN: Status: RESOLVED | Noted: 2022-07-11 | Resolved: 2023-02-27

## 2023-02-27 PROBLEM — J06.9 UPPER RESPIRATORY TRACT INFECTION: Status: RESOLVED | Noted: 2019-11-17 | Resolved: 2023-02-27

## 2023-02-27 PROBLEM — R21 RASH: Status: RESOLVED | Noted: 2020-03-11 | Resolved: 2023-02-27

## 2023-02-27 PROBLEM — H92.02 LEFT EAR PAIN: Status: RESOLVED | Noted: 2020-03-23 | Resolved: 2023-02-27

## 2023-02-27 RX ORDER — LEVOTHYROXINE SODIUM 112 UG/1
112 TABLET ORAL DAILY
Qty: 90 TABLET | Refills: 0 | Status: SHIPPED | OUTPATIENT
Start: 2023-02-27

## 2023-02-27 RX ORDER — WARFARIN SODIUM 3 MG/1
TABLET ORAL
Qty: 90 TABLET | Refills: 0 | Status: SHIPPED | OUTPATIENT
Start: 2023-02-27

## 2023-02-27 NOTE — PROGRESS NOTES
Office Visit - General Surgery  Eriberto Desouza MRN: [id number]  Encounter: [id number]    Assessment and Plan    Eriberto Desouza is a 56 y o  female s/p  lap dakotah for cholecystitis (discharched on ) readmitted on  with ADA and syncopal event (discharged on , prolonged stagy due to coumadin bridging) who presents for post op check    Afebrile  PE: bruising on abdomen improving, non tender    Plan:  Follow up as needed  Pathology reviewed with the patient and discussed    Chief Complaint:  Eriberto Desouza is a 56 y o  female with above PMH who presents for follow up  Patient has been doing well since being discharged on the   Has intermittent RUQ pain but manageable  Denies nausea, vomiting  Having occasional diarrhea  Denies lightheadedness or shortness of breath  Subjective  Eriberto Desouza is a 56 y o  female with above PMH who presents for follow up  Patient has been doing well since being discharged on the   Has intermittent RUQ pain but manageable  Denies nausea, vomiting  Having occasional diarrhea  Denies lightheadedness or shortness of breath       Past Medical History:   Diagnosis Date   • Anemia     Not sure   • Anti-phospholipid antibody syndrome (HCC)    • Cancer (HCC)     thyroid   • Chronic kidney disease    • Clotting disorder (HCC)     Lung embolism,  ITP   • Diabetes mellitus ([address] )    • Disease of thyroid gland    • Fibroid    • Gestational diabetes    • History of chemotherapy     late  for lupus   • Hyperparathyroidism ([address] )    • Hypertension    • Idiopathic thrombocytopenic purpura (ITP) (HCC)    • Kidney stone    • Lupus ([address] )    • Miscarriage 58    Lost twin girls 10-   • Osteoarthritis    • Pulmonary embolism ([address] )    • Sjogren's syndrome ([address] )    • Vitamin D deficiency        Past Surgical History:   Procedure Laterality Date   •  SECTION      4065/9007   • CHOLECYSTECTOMY LAPAROSCOPIC N/A 2023    Procedure: LAPAROSCOPIC CHOLECYSTECTOMY; Surgeon: Maame Galeas MD;  [facility];  Service: General   • DIAGNOSTIC FLEXIBLE LARYNGOSCOPY N/A 5/25/2021    Procedure: DIAGNOSTIC FLEXIBLE LARYNGOSCOPY;  Surgeon: Jaclyn Jimenez MD;  [facility];  Service: Surgical Oncology   • HERNIA REPAIR     • HIP SURGERY      left side, bone decompression   • HYSTEROSCOPY     • CO PARATHYROIDECTOMY/EXPLORATION PARATHYROIDS Right 5/25/2021    Procedure: PARATHYROIDECTOMY, MINIMALLY INVASIVE, right, intraoperative PTH monitoring;  Surgeon: Jaclyn Jimenez MD;  [facility];  Service: Surgical Oncology   • RENAL BIOPSY      Not sure   • THYROID SURGERY     • TUBAL LIGATION         Family History   Problem Relation Age of Onset   • No Known Problems Mother    • Diabetes type II Father    • Diabetes Father    • Stroke Father    • Diabetes type II Paternal Aunt    • Diabetes type II Paternal Uncle    • Diabetes type II Paternal Grandmother    • Stomach cancer Paternal Grandfather    • No Known Problems Sister    • No Known Problems Maternal Grandmother    • No Known Problems Maternal Grandfather    • No Known Problems Sister    • No Known Problems Maternal Aunt    • No Known Problems Maternal Aunt    • No Known Problems Maternal Aunt    • No Known Problems Paternal Aunt    • No Known Problems Paternal Aunt        Social History     Tobacco Use   • Smoking status: Never   • Smokeless tobacco: Never   Vaping Use   • Vaping Use: Never used   Substance Use Topics   • Alcohol use: Not Currently     Comment: Twice a year   • Drug use: No        Medications  Current Outpatient Medications on File Prior to Visit   Medication Sig Dispense Refill   • acetaminophen (TYLENOL) 325 mg tablet Take 2 tablets (650 mg total) by mouth every 6 (six) hours  0   • cinacalcet (SENSIPAR) 30 mg tablet Take 1 tablet (30 mg total) by mouth daily 90 tablet 0   • folic acid (FOLVITE) 1 mg tablet Take 1 tablet by mouth daily     • fosinopril (MONOPRIL) 20 mg tablet Take 1 tablet (20 mg total) by mouth 2 (two) times a day 180 tablet 0   • hydrocortisone 2 5 % cream      • levothyroxine (Synthroid) 112 mcg tablet Take 1 tablet (112 mcg total) by mouth daily 90 tablet 0   • NIFEdipine (PROCARDIA XL) 30 mg 24 hr tablet Take 1 tablet (30 mg total) by mouth daily 90 tablet 0   • spironolactone (ALDACTONE) 25 mg tablet Take 1 tablet (25 mg total) by mouth daily 90 tablet 0   • warfarin (COUMADIN) 3 mg tablet TAKE ONE TABLET BY MOUTH EVERY DAY  DO NOT TAKE ON MONDAYS AND THURSDAYS 90 tablet 0   • ciclopirox (LOPROX) 0 77 % cream  (Patient not taking: Reported on 2/9/2023)       No current facility-administered medications on file prior to visit  Allergies  Allergies   Allergen Reactions   • Penicillins Itching       Review of Systems   Constitutional: Negative for chills and fever  HENT: Negative for ear pain and sore throat  Eyes: Negative for pain and visual disturbance  Respiratory: Negative for cough and shortness of breath  Cardiovascular: Negative for chest pain and palpitations  Gastrointestinal: Positive for abdominal pain  Negative for vomiting  Genitourinary: Negative for dysuria and hematuria  Musculoskeletal: Negative for arthralgias and back pain  Skin: Negative for color change and rash  Neurological: Negative for seizures and syncope  All other systems reviewed and are negative  Objective  Vitals:    02/27/23 0949   Temp: 97 8 °F (36 6 °C)       Physical Exam  Constitutional:       General: She is not in acute distress  Appearance: She is not ill-appearing or toxic-appearing  HENT:      Head: Normocephalic  Right Ear: External ear normal       Left Ear: External ear normal       Nose: Nose normal       Mouth/Throat:      Mouth: Mucous membranes are moist    Eyes:      Pupils: Pupils are equal, round, and reactive to light  Cardiovascular:      Rate and Rhythm: Normal rate and regular rhythm  Pulses: Normal pulses     Pulmonary: Effort: Pulmonary effort is normal  No respiratory distress  Breath sounds: No stridor  Abdominal:      General: Abdomen is flat  There is no distension  Tenderness: There is no abdominal tenderness  There is no guarding or rebound  Comments: Non tender, bruising on abdomen improved, incisions intact   Musculoskeletal:         General: No swelling or tenderness  Normal range of motion  Cervical back: Normal range of motion  No rigidity or tenderness  Skin:     General: Skin is warm and dry  Neurological:      General: No focal deficit present  Mental Status: She is alert and oriented to person, place, and time     Psychiatric:         Mood and Affect: Mood normal

## 2023-02-27 NOTE — PROGRESS NOTES
Assessment and Plan:    Brenda Solis was seen today for hospital follow-up  Diagnoses and all orders for this visit:    Elevated serum creatinine  -     Basic metabolic panel; Future    Chronic kidney disease (CKD), stage II (mild)  -     Basic metabolic panel; Future  -     Magnesium; Future  -     Phosphorus; Future  -     Protein / creatinine ratio, urine; Future  -     CBC; Future    Benign hypertensive kidney disease with chronic kidney disease stage I through stage IV, or unspecified    Other proteinuria  -     Protein / creatinine ratio, urine; Future    Vitamin D deficiency    Primary hyperparathyroidism ([facility] )      Acute Kidney Injury- improved  Continue to monitor  Electrolytes within normal limits  Repeat BMP in 1 month  Chronic Kidney Disease stage 2- secondary to biopsy proven lupus nephritis (1996)  Baseline creatinine <1 usually  Creatinine slightly above usual baseline at 1 1 most recently  Continue to monitor  Proteinuria- Continue ACEI  Check UPC ratio prior to next visit  Hypertension- Avoid salt  Avoid NSAIDs  Stay active  Antihypertensive regimen includes spironolactone 25mg daily noon, nifedipine 30mg daily at night, and fosinopril 20mg daily noon  Check blood pressure at home and record twice a day  Usual range is around 125-949T systolic  Would recommend changing spironolactone to AM dosing for better coverage  Bring your blood pressure cuff with you and log  Vitamin D Insufficiency- She takes cholecalciferol 1000 units daily  Hyperparathyroidism- Continue to follow up with endocrinology  Complicated by hyperclacemia and nephrolithiasis  Anemia- Recheck  SLE- per rheumatology, she will make an appointment soon  Ill defined bladder lesion- family doctor recommended cystoscopy  Follow up with Dr Tobey Jeans in 4 months  Please call the office with any questions or concerns  Reason for Visit: Hospital Follow-up    HPI: Yony Rowe is a 56 y  o  female who is here for follow up after hospitalization at [place] [address] in mid February for cholecystectomy and surgery  Since discharge, she has been feeling better but not back to normal yet  She states she has been eating better  Denies urinary complaints  BPs at home have ranged from 499-746 systolic  She denies lightheadedness/dizziness or headaches  She has chronic left LE edema  ROS: A complete review of systems was performed and was negative unless otherwise noted in the history of present illness  Allergies:   Penicillins    Medications:     Current Outpatient Medications:   •  acetaminophen (TYLENOL) 325 mg tablet, Take 2 tablets (650 mg total) by mouth every 6 (six) hours, Disp: , Rfl: 0  •  Cholecalciferol 25 MCG (1000 UT) TBDP, Take 1,000 Units by mouth in the morning, Disp: , Rfl:   •  cinacalcet (SENSIPAR) 30 mg tablet, Take 1 tablet (30 mg total) by mouth daily, Disp: 90 tablet, Rfl: 0  •  folic acid (FOLVITE) 1 mg tablet, Take 1 tablet by mouth daily, Disp: , Rfl:   •  fosinopril (MONOPRIL) 20 mg tablet, Take 1 tablet (20 mg total) by mouth 2 (two) times a day (Patient taking differently: Take 20 mg by mouth 2 (two) times a day 1 tablet daily per hospital), Disp: 180 tablet, Rfl: 0  •  hydrocortisone 2 5 % cream, , Disp: , Rfl:   •  levothyroxine (Synthroid) 112 mcg tablet, Take 1 tablet (112 mcg total) by mouth daily, Disp: 90 tablet, Rfl: 0  •  NIFEdipine (PROCARDIA XL) 30 mg 24 hr tablet, Take 1 tablet (30 mg total) by mouth daily, Disp: 90 tablet, Rfl: 0  •  spironolactone (ALDACTONE) 25 mg tablet, Take 1 tablet (25 mg total) by mouth daily, Disp: 90 tablet, Rfl: 0  •  warfarin (COUMADIN) 3 mg tablet, TAKE ONE TABLET BY MOUTH EVERY DAY   DO NOT TAKE ON MONDAYS AND THURSDAYS, Disp: 90 tablet, Rfl: 0    Past Medical History:   Diagnosis Date   • Acute cholecystitis 2/3/2023   • ADA (acute kidney injury) ([facility] ) 2/9/2023   • Anemia     Not sure   • Anti-phospholipid antibody syndrome ([facility] )    • Cancer ([place] [address] )     thyroid   • Cellulitis of right index finger 3/4/2021   • Chronic kidney disease    • Clotting disorder ([facility] )     Lung embolism,  ITP   • Diabetes mellitus ([facility] )    • Disease of thyroid gland    • Fibroid    • Gestational diabetes    • Gross hematuria 2016   • History of chemotherapy     late  for lupus   • Hordeolum externum of left lower eyelid 2022   • Hyperparathyroidism ([facility]  )    • Hypertension    • Hypotension due to hypovolemia 2023   • Idiopathic thrombocytopenic purpura (ITP) ([facility])    • Kidney stone    • Left ear pain 3/23/2020   • Left foot pain 2022   • Lupus ([facility]  )    • Miscarriage     Lost twin girls 10-   • Osteoarthritis    • Pulmonary embolism ([place] [address] )    • Rash 3/11/2020   • Sjogren's syndrome ([place] [address] )    • Upper respiratory tract infection 2019   • Vitamin D deficiency      Past Surgical History:   Procedure Laterality Date   •  SECTION         • CHOLECYSTECTOMY  23   • CHOLECYSTECTOMY LAPAROSCOPIC N/A 2023    Procedure: LAPAROSCOPIC CHOLECYSTECTOMY;  Surgeon: Manju Mckeon MD;  [facility];  Service: General   • DIAGNOSTIC FLEXIBLE LARYNGOSCOPY N/A 2021    Procedure: DIAGNOSTIC FLEXIBLE LARYNGOSCOPY;  Surgeon: Gabbie Childers MD;  [facility];  Service: Surgical Oncology   • HERNIA REPAIR     • HIP SURGERY      left side, bone decompression   • HYSTEROSCOPY     • VT PARATHYROIDECTOMY/EXPLORATION PARATHYROIDS Right 2021    Procedure: PARATHYROIDECTOMY, MINIMALLY INVASIVE, right, intraoperative PTH monitoring;  Surgeon: Gabbie Childers MD;  [facility];  Service: Surgical Oncology   • RENAL BIOPSY      Not sure   • THYROID SURGERY     • TUBAL LIGATION       Family History   Problem Relation Age of Onset   • No Known Problems Mother    • Diabetes type II Father    • Diabetes Father    • Stroke Father    • Diabetes type II Paternal Aunt    • Diabetes type II Paternal Uncle    • Diabetes type II Paternal Grandmother    • Stomach cancer Paternal Grandfather    • No Known Problems Sister    • No Known Problems Maternal Grandmother    • No Known Problems Maternal Grandfather    • No Known Problems Sister    • No Known Problems Maternal Aunt    • No Known Problems Maternal Aunt    • No Known Problems Maternal Aunt    • No Known Problems Paternal Aunt    • No Known Problems Paternal Aunt       reports that she has never smoked  She has never used smokeless tobacco  She reports that she does not currently use alcohol  She reports that she does not use drugs  Physical Exam:   Vitals:    02/28/23 0905 02/28/23 0922   BP:  138/90   BP Location:  Left arm   Patient Position:  Sitting   Cuff Size:  Standard   Pulse: 63 64   SpO2: 96%    Weight: 87 1 kg (192 lb)    Height: 5' (1 524 m)      Body mass index is 37 5 kg/m²  General: NAD  Neuro: AAO  Skin: no rash  Eyes: anicteric  ENMT: mm moist  Neck: no masses  Respiratory: CTAB  Cardiovascular: RRR  Extremities: mild LLE edema  Gastrointestinal: soft nt nd    Procedure:  No results found for this or any previous visit  Lab Results   Component Value Date    GLUCOSE 105 (H) 11/18/2017    CALCIUM 10 3 (H) 02/23/2023     11/18/2017    K 4 3 02/23/2023    CO2 26 02/23/2023     02/23/2023    BUN 22 02/23/2023    CREATININE 1 10 02/23/2023       Results from last 7 days   Lab Units 02/23/23  1140   POTASSIUM mmol/L 4 3   CHLORIDE mmol/L 107   CO2 mmol/L 26   BUN mg/dL 22   CREATININE mg/dL 1 10   CALCIUM mg/dL 10 3*     I have personally reviewed the blood work as stated above and in my note  I have personally reviewed hospital notes

## 2023-02-27 NOTE — PROGRESS NOTES
Assessment & Plan     1  Acute cholecystitis    2  Postoperative hypothyroidism  Assessment & Plan:  She is on levothyroxine managed by endocrine      3  Primary hyperparathyroidism ([address] )  Assessment & Plan:  Follows up with endocrine      4  Chronic deep vein thrombosis (DVT) of proximal vein of both lower extremities (HCC)  Assessment & Plan:  PE/DVT on warfarin managed by hematology  Eliquis is not affordable      5  Primary hypertension  Assessment & Plan:  She is on fosinopril 20mg BID but only taking once a day for now  She is on spironolactone and nifedipine as well      6  Hypotension due to hypovolemia    7  Anti-phospholipid syndrome (HCC)  Assessment & Plan:  PE/DVT history on warfarin      8  Benign hypertensive kidney disease with chronic kidney disease stage I through stage IV, or unspecified  Assessment & Plan:  Lab Results   Component Value Date    EGFR 56 02/23/2023    EGFR 77 02/15/2023    EGFR 71 02/13/2023    CREATININE 1 10 02/23/2023    CREATININE 0 85 02/15/2023    CREATININE 0 91 02/13/2023   Creatinine improved  F/U with nephrology      9  Post-menopausal bleeding  Assessment & Plan:  F/u with gynecology      10  Acute blood loss anemia  Assessment & Plan:  Post op anemia with hematoma on the abdominal wall  Hgb improved with 2 units prbc      11  Abnormal CT of the chest  Assessment & Plan:  Obtain f/u CT in 6months    Orders:  -     CT chest wo contrast; Future; Expected date: 08/02/2023    12  Lesion of urinary bladder  Assessment & Plan:  Suboptimal US due to body habitus  Discussed cystoscopy which she does not want to pursue at this time  Will revisit issue at next visit           Subjective     Transitional Care Management Review:   Kilo Woodard is a 56 y o  female here for TCM follow up       During the TCM phone call patient stated:  TCM Call     Date and time call was made  2/20/2023  9:10 AM    Hospital care reviewed  Records not available    Patient was hospitialized at  [address]  [place]    Date of Admission  02/09/23    Date of discharge  02/18/23    Diagnosis  ADA (acute kidney injury) ([facility] )    Disposition  Home    Were the patients medications reviewed and updated  No    Current Symptoms  Shortness of breath    Shortness of breath severity  Mild      TCM Call     Post hospital issues  None    Should patient be enrolled in anticoag monitoring? Yes    Scheduled for follow up? Yes    Did you obtain your prescribed medications  No    Do you need help managing your prescriptions or medications  No    Is transportation to your appointment needed  No    I have advised the patient to call PCP with any new or worsening symptoms  Shadi Verduzco MR/MA    Are you recieving any outpatient services  No    Are you recieving home care services  No    Have you fallen in the last 12 months  Yes    How many times  1    Interperter language line needed  No        Went to the ER 2/3 for chest pain, vomiting  Dx with acute calculous cholecystitis but had to wait for surgery bec she was on warfarin  Lap dakotah on 2/5  Readmitted on 2/9 after syncopal episode and dx with anemia,  ADA creatinine up to 2 1  Extensive hematoma in the abdomen caused anemia  Hgb was down to 6 1 and she received 2 units PRBC  Last hgb was 9 8 and creatinine last week 1 1  Incidental findings: bladder lesion and f/u US was suboptimal  Left lung groundglass opacity, f/u CT recommended  Ongoing post menopausal bleeding and is seeing gyn  US scheduled        Review of Systems   Constitutional: Negative for chills and fever  Respiratory: Positive for shortness of breath  Cardiovascular: Positive for leg swelling (chronic)  Negative for chest pain and palpitations  Gastrointestinal: Negative for abdominal pain, constipation and diarrhea  Genitourinary: Negative for difficulty urinating, dysuria and hematuria  Hematological: Bruises/bleeds easily         Objective     /84 (BP Location: Left arm, Patient Position: Sitting, Cuff Size: Standard)   Pulse 69   Ht 5' (1 524 m)   Wt 87 9 kg (193 lb 12 8 oz)   SpO2 99%   BMI 37 85 kg/m²      Physical Exam  Constitutional:       General: She is not in acute distress  Appearance: She is well-developed  She is not ill-appearing, toxic-appearing or diaphoretic  HENT:      Head: Normocephalic and atraumatic  Right Ear: External ear normal  There is no impacted cerumen  Left Ear: External ear normal  There is no impacted cerumen  Eyes:      Conjunctiva/sclera: Conjunctivae normal    Cardiovascular:      Rate and Rhythm: Normal rate and regular rhythm  Heart sounds: Normal heart sounds  No murmur heard  Pulmonary:      Effort: Pulmonary effort is normal  No respiratory distress  Breath sounds: Normal breath sounds  No stridor  No wheezing or rales  Abdominal:      General: There is no distension  Palpations: Abdomen is soft  There is no mass  Tenderness: There is no abdominal tenderness  There is no guarding or rebound  Musculoskeletal:      Cervical back: Neck supple  Right lower leg: Edema (trace) present  Left lower leg: Edema (trace) present  Skin:     Findings: Bruising (abdomen, arms) present  Neurological:      Mental Status: She is alert and oriented to person, place, and time  Psychiatric:         Behavior: Behavior normal          Thought Content:  Thought content normal          Judgment: Judgment normal        Medications have been reviewed by provider in current encounter    Fidel Sousa MD

## 2023-02-27 NOTE — LETTER
February 27, 2023     Patient: Ronal Gonzales  YOB: 1965  Date of Visit: 2/27/2023      To Whom it May Concern:    Ronal Gonzales is under my professional care  Nadja Morocho was seen in my office on 2/27/2023  Nadja Morocho may return to work on 3/6/23  If you have any questions or concerns, please don't hesitate to call           Sincerely,          [facility]        CC: No Recipients

## 2023-02-27 NOTE — LETTER
February 27, 2023     Patient: Ronal Gonzales  YOB: 1965  Date of Visit: 2/27/2023      To Whom it May Concern:    Ronal Gonzales is under my professional care  Nadja Morocho was seen in my office on 2/27/2023  Nadja Morocho may return to work on 3/6/23  Work restrictions: lifting restriction of 20 pounds       If you have any questions or concerns, please don't hesitate to call           Sincerely,          [facility]        CC: No Recipients

## 2023-02-28 ENCOUNTER — OFFICE VISIT (OUTPATIENT)
Dept: NEPHROLOGY | Facility: CLINIC | Age: 58
End: 2023-02-28

## 2023-02-28 VITALS
WEIGHT: 192 LBS | HEIGHT: 60 IN | OXYGEN SATURATION: 96 % | HEART RATE: 64 BPM | BODY MASS INDEX: 37.69 KG/M2 | SYSTOLIC BLOOD PRESSURE: 138 MMHG | DIASTOLIC BLOOD PRESSURE: 90 MMHG

## 2023-02-28 DIAGNOSIS — R80.8 OTHER PROTEINURIA: ICD-10-CM

## 2023-02-28 DIAGNOSIS — E21.0 PRIMARY HYPERPARATHYROIDISM (HCC): ICD-10-CM

## 2023-02-28 DIAGNOSIS — N18.2 CHRONIC KIDNEY DISEASE (CKD), STAGE II (MILD): ICD-10-CM

## 2023-02-28 DIAGNOSIS — I12.9 BENIGN HYPERTENSIVE KIDNEY DISEASE WITH CHRONIC KIDNEY DISEASE STAGE I THROUGH STAGE IV, OR UNSPECIFIED: ICD-10-CM

## 2023-02-28 DIAGNOSIS — R79.89 ELEVATED SERUM CREATININE: Primary | ICD-10-CM

## 2023-02-28 DIAGNOSIS — E55.9 VITAMIN D DEFICIENCY: ICD-10-CM

## 2023-02-28 NOTE — ASSESSMENT & PLAN NOTE
Suboptimal US due to body habitus  Discussed cystoscopy which she does not want to pursue at this time  Will revisit issue at next visit

## 2023-02-28 NOTE — ASSESSMENT & PLAN NOTE
Lab Results   Component Value Date    EGFR 56 02/23/2023    EGFR 77 02/15/2023    EGFR 71 02/13/2023    CREATININE 1 10 02/23/2023    CREATININE 0 85 02/15/2023    CREATININE 0 91 02/13/2023   Creatinine improved  F/U with nephrology

## 2023-02-28 NOTE — PATIENT INSTRUCTIONS
Acute Kidney Injury- improved  Continue to monitor  Electrolytes within normal limits  Repeat BMP in 1 month  Chronic Kidney Disease stage 2- secondary to biopsy proven lupus nephritis (1996)  Baseline creatinine <1 usually  Creatinine slightly above usual baseline at 1 1 most recently  Proteinuria- Continue ACEI  Hypertension- Avoid salt  Avoid NSAIDs  Stay active  Antihypertensive regimen includes spironolactone 25mg daily noon, nifedipine 30mg daily at night, and fosinopril 20mg daily noon  Check blood pressure at home and record twice a day  Usual range is around 982-258Q systolic  Would recommend changing spironolactone to AM dosing for better coverage  Bring your blood pressure cuff with you and log  Vitamin D Insufficiency- She takes cholecalciferol 1000 units daily  Hyperparathyroidism- Continue to follow up with endocrinology  Complicated by hyperclacemia and nephrolithiasis  Anemia- Recheck    SLE- per rheumatology, she will make an appointment soon  Ill defined bladder lesion    Follow up with Dr Yanick Sanchez in 4 months  Please call the office with any questions or concerns

## 2023-02-28 NOTE — ASSESSMENT & PLAN NOTE
She is on fosinopril 20mg BID but only taking once a day for now  She is on spironolactone and nifedipine as well

## 2023-03-01 ENCOUNTER — TELEPHONE (OUTPATIENT)
Dept: NEPHROLOGY | Facility: HOSPITAL | Age: 58
End: 2023-03-01

## 2023-03-01 DIAGNOSIS — N28.9 RENAL LESION: Primary | ICD-10-CM

## 2023-03-01 NOTE — TELEPHONE ENCOUNTER
----- Message from [place], [place] sent at 2/28/2023  3:53 PM EST -----  Hi,  Can you please let patient know that I spoke with Dr Diane Cerda and she recommended and agreed with urology referral and follow up for her bladder lesion  Please let me know if she would like a referral placed  Thank you!

## 2023-03-01 NOTE — TELEPHONE ENCOUNTER
Called and spoke with patient  Patient aware Dr Estee Pina  recommended and agreed with urology referral and follow up for her bladder lesion  Patient stated she would like a referral to be placed

## 2023-03-02 ENCOUNTER — TELEPHONE (OUTPATIENT)
Dept: UROLOGY | Facility: AMBULATORY SURGERY CENTER | Age: 58
End: 2023-03-02

## 2023-03-02 NOTE — TELEPHONE ENCOUNTER
LM for patient that we do not currently have NP appts until April in [place]  We do have an opening 3/14 at 7:30 at the [address] [facility] office  Left office address and advised to call and let us know if this will or will not work

## 2023-03-02 NOTE — TELEPHONE ENCOUNTER
New Patient    What is the reason for the patient’s appointment? Patient is being referred by nephrology for renal lesion that was found on recent imaging from her hospital visit on 2/9/23  Patient is not having current symptoms  What office location does the patient prefer? [place]      Imaging/Lab Results: CT Abdomen pelvis    IMPRESSION:     Status post cholecystectomy with large complex hematoma tracking from the surgical bed  No definite evidence of active extravasation of contrast      Free fluid in the abdomen and pelvis      Inflammatory changes in the anterior abdominal wall which is likely related to postsurgical changes  Do we accept the patient's insurance or is the patient Self-Pay? Yes, [address] Provider:  Plan Type/Number:  Member ID#: Has the patient had any previous Urologist(s)? Dr Andre Up over 5 years ago  Have patient records been requested? If not are records showing in Epic:     Has the patient had any outside testing done? Labs done during hospital visit  Does the patient have a personal history of cancer? Thyroid cancer    Please advise appropriate time frame for scheduling since there are no upcoming NP appts and if this should be scheduled with MD or AP  Patient can be reached at [phone] and gives permission to leave a VM   Patient will need an appt before 12 pm

## 2023-03-17 ENCOUNTER — TELEPHONE (OUTPATIENT)
Dept: HEMATOLOGY ONCOLOGY | Facility: MEDICAL CENTER | Age: 58
End: 2023-03-17

## 2023-03-18 ENCOUNTER — TELEPHONE (OUTPATIENT)
Dept: OTHER | Facility: OTHER | Age: 58
End: 2023-03-18

## 2023-03-18 ENCOUNTER — APPOINTMENT (OUTPATIENT)
Dept: LAB | Age: 58
End: 2023-03-18

## 2023-03-18 ENCOUNTER — TELEPHONE (OUTPATIENT)
Dept: HEMATOLOGY ONCOLOGY | Facility: CLINIC | Age: 58
End: 2023-03-18

## 2023-03-18 DIAGNOSIS — I82.5Y3 CHRONIC DEEP VEIN THROMBOSIS (DVT) OF PROXIMAL VEIN OF BOTH LOWER EXTREMITIES (HCC): Primary | ICD-10-CM

## 2023-03-18 DIAGNOSIS — I82.5Y3 CHRONIC DEEP VEIN THROMBOSIS (DVT) OF PROXIMAL VEIN OF BOTH LOWER EXTREMITIES (HCC): ICD-10-CM

## 2023-03-18 LAB
INR PPP: 1.63 (ref 0.84–1.19)
PROTHROMBIN TIME: 19.5 SECONDS (ref 11.6–14.5)

## 2023-03-18 NOTE — TELEPHONE ENCOUNTER
Patient has appointment on Monday 3/20/23 with Dr Iva Carlson  PT/INR not in chart       Paged on call provider to enter orders

## 2023-03-18 NOTE — TELEPHONE ENCOUNTER
I called the patient and left message on her cell phone to please call me back to increase dose of her Coumadin  I left my name and office phone number

## 2023-03-18 NOTE — PROGRESS NOTES
Phone call from lab that patient is there for PT/INR but order is not there and lab asked me to put in the order because patient has appointment with her hematologist on Monday

## 2023-03-20 ENCOUNTER — OFFICE VISIT (OUTPATIENT)
Dept: HEMATOLOGY ONCOLOGY | Facility: CLINIC | Age: 58
End: 2023-03-20

## 2023-03-20 VITALS
TEMPERATURE: 97.5 F | HEART RATE: 69 BPM | BODY MASS INDEX: 36.06 KG/M2 | HEIGHT: 61 IN | SYSTOLIC BLOOD PRESSURE: 130 MMHG | WEIGHT: 191 LBS | OXYGEN SATURATION: 97 % | DIASTOLIC BLOOD PRESSURE: 80 MMHG

## 2023-03-20 DIAGNOSIS — I82.5Y3 CHRONIC DEEP VEIN THROMBOSIS (DVT) OF PROXIMAL VEIN OF BOTH LOWER EXTREMITIES (HCC): Primary | ICD-10-CM

## 2023-03-20 RX ORDER — WARFARIN SODIUM 1 MG/1
TABLET ORAL
Qty: 30 TABLET | Refills: 3 | Status: SHIPPED | OUTPATIENT
Start: 2023-03-20

## 2023-03-20 NOTE — PROGRESS NOTES
[facility]  [address] [address]  [phone]    Kadeem Galeana ,1965, [id number]  03/20/23    Discussion:   In summary, this is a 65-year-old female with history of phospholipid antibody syndrome  She is currently on Coumadin  She had recent cholecystectomy  Pathology indicates cholecystitis  She had a postop hematoma in the gallbladder bed extending to the right lower quadrant  Hemoglobin decreased to 6 6 requiring red blood cell transfusions  She had been on Lovenox, transitioning to Coumadin at the time  INR today 1 6  Most recent priors had been in the target range, 2 0-3 0  I asked her to continue on her current dose of 4 mg p o  daily  Recheck INR in 1 week and adjust further  I discussed the above with the patient  The patient  voiced understanding and agreement   ______________________________________________________________________    Chief Complaint   Patient presents with   • Follow-up       HPI:  Oncology History    No history exists  Interval History: Clinically stable  ECOG-  0 - Asymptomatic    Review of Systems   Constitutional: Negative for appetite change, diaphoresis, fatigue and fever  HENT: Negative for sinus pain  Eyes: Negative for discharge  Respiratory: Negative for cough and shortness of breath  Cardiovascular: Negative for chest pain  Gastrointestinal: Negative for abdominal pain, constipation and diarrhea  Endocrine: Negative for cold intolerance  Genitourinary: Negative for difficulty urinating and hematuria  Musculoskeletal: Negative for joint swelling  Skin: Negative for rash  Allergic/Immunologic: Negative for environmental allergies  Neurological: Negative for dizziness and headaches  Hematological: Negative for adenopathy  Psychiatric/Behavioral: Negative for agitation         Past Medical History:   Diagnosis Date   • Acute cholecystitis 2/3/2023 • [facility] 2/9/2023   • Anemia     Not sure   • Anti-phospholipid antibody syndrome (HCC)    • Cancer (HCC)     thyroid   • Cellulitis of right index finger 3/4/2021   • Chronic kidney disease    • Clotting disorder ([facility] )     Lung embolism,  ITP   • Diabetes mellitus ([facility] )    • Disease of thyroid gland    • Fibroid 1998   • Gestational diabetes    • Gross hematuria 12/12/2016   • History of chemotherapy     late 1980s for lupus   • Hordeolum externum of left lower eyelid 5/11/2022   • Hyperparathyroidism ([facility]  )    • Hypertension    • Hypotension due to hypovolemia 2/9/2023   • Idiopathic thrombocytopenic purpura (ITP) ([facility])    • Kidney stone    • Left ear pain 3/23/2020   • Left foot pain 7/11/2022   • Lupus ([facility] )    • Miscarriage 1996    Lost twin girls 10-   • Osteoarthritis    • Pulmonary embolism ([place] [address] ) 1987   • Rash 3/11/2020   • Sjogren's syndrome ([place] [address] )    • Upper respiratory tract infection 11/17/2019   • Vitamin D deficiency      Patient Active Problem List   Diagnosis   • Chronic deep vein thrombosis (DVT) of proximal vein of both lower extremities ([facility])   • History of ITP   • Benign hypertensive CKD   • Chronic kidney disease (CKD), stage II (mild)   • Edema   • Hypercalcemia   • Primary hyperparathyroidism ([facility] )   • Hypertension   • Nephrolithiasis   • Proteinuria   • Systemic lupus erythematosus ([facility] )   • Vitamin D deficiency   • Elevated PTHrP level   • History of thyroid cancer   • BMI 39 0-39 9,adult   • Post-menopausal bleeding   • Greater trochanteric bursitis of left hip   • Anti-phospholipid syndrome (HCC)   • Status post parathyroidectomy ([facility] )   • Postoperative hypothyroidism   • Sjogren's syndrome without extraglandular involvement ([facility] )   • Acute blood loss anemia   • Abnormal CT of the chest   • Lesion of urinary bladder   • Elevated serum creatinine       Current Outpatient Medications:   •  acetaminophen (TYLENOL) 325 mg tablet, Take 2 tablets (650 mg total) by mouth every 6 (six) hours, Disp: , Rfl: 0  •  Cholecalciferol 25 MCG (1000 UT) TBDP, Take 1,000 Units by mouth in the morning, Disp: , Rfl:   •  cinacalcet (SENSIPAR) 30 mg tablet, Take 1 tablet (30 mg total) by mouth daily, Disp: 90 tablet, Rfl: 0  •  folic acid (FOLVITE) 1 mg tablet, Take 1 tablet by mouth daily, Disp: , Rfl:   •  fosinopril (MONOPRIL) 20 mg tablet, Take 1 tablet (20 mg total) by mouth 2 (two) times a day (Patient taking differently: Take 20 mg by mouth 2 (two) times a day 1 tablet daily per hospital), Disp: 180 tablet, Rfl: 0  •  hydrocortisone 2 5 % cream, , Disp: , Rfl:   •  levothyroxine (Synthroid) 112 mcg tablet, Take 1 tablet (112 mcg total) by mouth daily, Disp: 90 tablet, Rfl: 0  •  NIFEdipine (PROCARDIA XL) 30 mg 24 hr tablet, Take 1 tablet (30 mg total) by mouth daily, Disp: 90 tablet, Rfl: 0  •  spironolactone (ALDACTONE) 25 mg tablet, Take 1 tablet (25 mg total) by mouth daily, Disp: 90 tablet, Rfl: 0  •  warfarin (COUMADIN) 3 mg tablet, TAKE ONE TABLET BY MOUTH EVERY DAY   DO NOT TAKE ON  AND , Disp: 90 tablet, Rfl: 0  Allergies   Allergen Reactions   • Penicillins Itching     Past Surgical History:   Procedure Laterality Date   •  SECTION         • CHOLECYSTECTOMY  23   • CHOLECYSTECTOMY LAPAROSCOPIC N/A 2023    Procedure: LAPAROSCOPIC CHOLECYSTECTOMY;  Surgeon: Codie Turner MD;  [facility];  Service: General   • DIAGNOSTIC FLEXIBLE LARYNGOSCOPY N/A 2021    Procedure: DIAGNOSTIC FLEXIBLE LARYNGOSCOPY;  Surgeon: Cami Zhao MD;  [facility];  Service: Surgical Oncology   • HERNIA REPAIR     • HIP SURGERY      left side, bone decompression   • HYSTEROSCOPY     • [place] PARATHYROIDECTOMY/EXPLORATION PARATHYROIDS Right 2021    Procedure: PARATHYROIDECTOMY, MINIMALLY INVASIVE, right, intraoperative PTH monitoring;  Surgeon: Cami Zhao MD;  [facility];  Service: Surgical Oncology   • RENAL BIOPSY      Not sure   • THYROID SURGERY     • TUBAL LIGATION       Social History     Objective:  Vitals:    03/20/23 1114   BP: 130/80   BP Location: Left arm   Patient Position: Sitting   Cuff Size: Large   Pulse: 69   Temp: 97 5 °F (36 4 °C)   TempSrc: Temporal   SpO2: 97%   Weight: 86 6 kg (191 lb)   Height: 5' 1" (1 549 m)     Physical Exam  Constitutional:       Appearance: She is well-developed  HENT:      Head: Normocephalic and atraumatic  Eyes:      Pupils: Pupils are equal, round, and reactive to light  Cardiovascular:      Rate and Rhythm: Normal rate  Heart sounds: No murmur heard  Pulmonary:      Effort: No respiratory distress  Breath sounds: No wheezing or rales  Abdominal:      General: There is no distension  Palpations: Abdomen is soft  Tenderness: There is no abdominal tenderness  There is no rebound  Musculoskeletal:         General: No tenderness  Cervical back: Neck supple  Lymphadenopathy:      Cervical: No cervical adenopathy  Skin:     General: Skin is warm  Findings: No rash  Neurological:      Mental Status: She is alert and oriented to person, place, and time  Deep Tendon Reflexes: Reflexes normal    Psychiatric:         Thought Content: Thought content normal            Labs: I personally reviewed the labs and imaging pertinent to this patient care

## 2023-04-02 DIAGNOSIS — E83.52 HYPERCALCEMIA: ICD-10-CM

## 2023-04-02 DIAGNOSIS — I10 ESSENTIAL HYPERTENSION, BENIGN: ICD-10-CM

## 2023-04-03 RX ORDER — CINACALCET 30 MG/1
30 TABLET, FILM COATED ORAL DAILY
Qty: 90 TABLET | Refills: 0 | Status: SHIPPED | OUTPATIENT
Start: 2023-04-03

## 2023-04-03 RX ORDER — SPIRONOLACTONE 25 MG/1
25 TABLET ORAL DAILY
Qty: 90 TABLET | Refills: 0 | Status: SHIPPED | OUTPATIENT
Start: 2023-04-03

## 2023-04-26 ENCOUNTER — FOLLOW UP (OUTPATIENT)
Dept: URBAN - METROPOLITAN AREA CLINIC 6 | Facility: CLINIC | Age: 58
End: 2023-04-26

## 2023-04-26 DIAGNOSIS — H25.043: ICD-10-CM

## 2023-04-26 DIAGNOSIS — Z79.61: ICD-10-CM

## 2023-04-26 DIAGNOSIS — M32.10: ICD-10-CM

## 2023-04-26 PROCEDURE — 92015 DETERMINE REFRACTIVE STATE: CPT

## 2023-04-26 PROCEDURE — 92014 COMPRE OPH EXAM EST PT 1/>: CPT

## 2023-04-26 PROCEDURE — 92083 EXTENDED VISUAL FIELD XM: CPT

## 2023-04-26 ASSESSMENT — KERATOMETRY
OD_K1POWER_DIOPTERS: 43.25
OD_AXISANGLE_DEGREES: 112
OD_K2POWER_DIOPTERS: 43.50
OD_AXISANGLE2_DEGREES: 22
OS_K2POWER_DIOPTERS: 42.75
OS_AXISANGLE_DEGREES: 129
OS_K1POWER_DIOPTERS: 42.25
OS_AXISANGLE2_DEGREES: 39

## 2023-04-26 ASSESSMENT — TONOMETRY
OS_IOP_MMHG: 14
OD_IOP_MMHG: 12

## 2023-04-26 ASSESSMENT — VISUAL ACUITY
OD_CC: 20/40-2
OS_CC: 20/30-1

## 2023-04-29 ENCOUNTER — APPOINTMENT (OUTPATIENT)
Dept: LAB | Age: 58
End: 2023-04-29

## 2023-04-29 DIAGNOSIS — M32.14 SYSTEMIC LUPUS ERYTHEMATOSUS WITH GLOMERULAR DISEASE, UNSPECIFIED SLE TYPE (HCC): ICD-10-CM

## 2023-04-29 DIAGNOSIS — R79.89 ELEVATED SERUM CREATININE: ICD-10-CM

## 2023-04-29 DIAGNOSIS — Z79.899 ENCOUNTER FOR LONG-TERM (CURRENT) USE OF OTHER MEDICATIONS: ICD-10-CM

## 2023-04-29 LAB
ALBUMIN SERPL BCP-MCNC: 3.4 G/DL (ref 3.5–5)
ALP SERPL-CCNC: 109 U/L (ref 46–116)
ALT SERPL W P-5'-P-CCNC: 28 U/L (ref 12–78)
ANION GAP SERPL CALCULATED.3IONS-SCNC: 0 MMOL/L (ref 4–13)
AST SERPL W P-5'-P-CCNC: 22 U/L (ref 5–45)
BASOPHILS # BLD AUTO: 0.03 THOUSANDS/ΜL (ref 0–0.1)
BASOPHILS NFR BLD AUTO: 0 % (ref 0–1)
BILIRUB SERPL-MCNC: 0.48 MG/DL (ref 0.2–1)
BUN SERPL-MCNC: 22 MG/DL (ref 5–25)
CALCIUM ALBUM COR SERPL-MCNC: 9.1 MG/DL (ref 8.3–10.1)
CALCIUM SERPL-MCNC: 8.6 MG/DL (ref 8.3–10.1)
CHLORIDE SERPL-SCNC: 111 MMOL/L (ref 96–108)
CO2 SERPL-SCNC: 25 MMOL/L (ref 21–32)
CREAT SERPL-MCNC: 0.99 MG/DL (ref 0.6–1.3)
CRP SERPL QL: 4.5 MG/L
EOSINOPHIL # BLD AUTO: 0.08 THOUSAND/ΜL (ref 0–0.61)
EOSINOPHIL NFR BLD AUTO: 1 % (ref 0–6)
ERYTHROCYTE [DISTWIDTH] IN BLOOD BY AUTOMATED COUNT: 13 % (ref 11.6–15.1)
GFR SERPL CREATININE-BSD FRML MDRD: 63 ML/MIN/1.73SQ M
GLUCOSE P FAST SERPL-MCNC: 104 MG/DL (ref 65–99)
HCT VFR BLD AUTO: 44.5 % (ref 34.8–46.1)
HGB BLD-MCNC: 14.6 G/DL (ref 11.5–15.4)
IMM GRANULOCYTES # BLD AUTO: 0.01 THOUSAND/UL (ref 0–0.2)
IMM GRANULOCYTES NFR BLD AUTO: 0 % (ref 0–2)
LYMPHOCYTES # BLD AUTO: 1.72 THOUSANDS/ΜL (ref 0.6–4.47)
LYMPHOCYTES NFR BLD AUTO: 25 % (ref 14–44)
MCH RBC QN AUTO: 28.9 PG (ref 26.8–34.3)
MCHC RBC AUTO-ENTMCNC: 32.8 G/DL (ref 31.4–37.4)
MCV RBC AUTO: 88 FL (ref 82–98)
MONOCYTES # BLD AUTO: 0.46 THOUSAND/ΜL (ref 0.17–1.22)
MONOCYTES NFR BLD AUTO: 7 % (ref 4–12)
NEUTROPHILS # BLD AUTO: 4.48 THOUSANDS/ΜL (ref 1.85–7.62)
NEUTS SEG NFR BLD AUTO: 67 % (ref 43–75)
NRBC BLD AUTO-RTO: 0 /100 WBCS
PLATELET # BLD AUTO: 205 THOUSANDS/UL (ref 149–390)
PMV BLD AUTO: 10.7 FL (ref 8.9–12.7)
POTASSIUM SERPL-SCNC: 4.3 MMOL/L (ref 3.5–5.3)
PROT SERPL-MCNC: 7.6 G/DL (ref 6.4–8.4)
RBC # BLD AUTO: 5.06 MILLION/UL (ref 3.81–5.12)
SODIUM SERPL-SCNC: 136 MMOL/L (ref 135–147)
WBC # BLD AUTO: 6.78 THOUSAND/UL (ref 4.31–10.16)

## 2023-04-30 LAB
C3 SERPL-MCNC: 114 MG/DL (ref 90–180)
C4 SERPL-MCNC: 40 MG/DL (ref 10–40)

## 2023-05-01 LAB — DSDNA AB SER QL CLIF: NEGATIVE

## 2023-05-02 ENCOUNTER — APPOINTMENT (OUTPATIENT)
Dept: LAB | Facility: CLINIC | Age: 58
End: 2023-05-02

## 2023-05-02 ENCOUNTER — OFFICE VISIT (OUTPATIENT)
Dept: ENDOCRINOLOGY | Facility: CLINIC | Age: 58
End: 2023-05-02

## 2023-05-02 VITALS
HEIGHT: 61 IN | DIASTOLIC BLOOD PRESSURE: 88 MMHG | WEIGHT: 191 LBS | BODY MASS INDEX: 36.06 KG/M2 | SYSTOLIC BLOOD PRESSURE: 132 MMHG

## 2023-05-02 DIAGNOSIS — R82.71 BACTERIA IN URINE: ICD-10-CM

## 2023-05-02 DIAGNOSIS — E55.9 VITAMIN D DEFICIENCY: ICD-10-CM

## 2023-05-02 DIAGNOSIS — E21.0 PRIMARY HYPERPARATHYROIDISM (HCC): ICD-10-CM

## 2023-05-02 DIAGNOSIS — E89.0 POSTOPERATIVE HYPOTHYROIDISM: ICD-10-CM

## 2023-05-02 DIAGNOSIS — Z85.850 HISTORY OF THYROID CANCER: ICD-10-CM

## 2023-05-02 NOTE — PROGRESS NOTES
Patient Progress Note    CC: hyperparathyroidism, hypothyroidism, thyroid cancer    Referring Provider  No referring provider defined for this encounter  History of Present Illness:     Patient is a 61-year-old female here for follow-up of postsurgical hypothyroidism, primary hyperparathyroidism, vitamin-D deficiency  She has a past medical history of stage I papillary thyroid cancer  She underwent right hemithyroidectomy and right parathyroid gland removal in 2006, followed by completion of thyroidectomy in 2010  In 2010 she also had resection of left parathyroid gland  She also had LEONARDO therapy  Patient is on levothyroxine 112 mcg 1 tablet daily  Patient is taking it 60 minutes before breakfast on an empty stomach and at least 4 hrs apart from supplements  Tolerating medication well  No recent Iodine loading in form of medication, biotin or kelp supplements or radiological diagnostic studies  Most recent thyroid function tests were abnormal, TSH 2 270 and free T4  elevated at 1 71  Thyroglobulin antibody level was previously less than 1 0 and thyroglobulin < 0 1  Head neck ultrasound done in July 2022 did not show any evidence of recurrent or metastatic disease  Primary hyperparathyroidism: patient underwent a re-exploration parathyroidectomy in May 2021  Her parathyroid levels did improve during surgery  Most recent PTH level elevated at 146 6  She is not taking calcium supplement  Corrected calcium is 9 1  Vitamin D is slihgtly low at 27 2  She is not taking vitamin D3 1000 IU a day  She also takes Sensipar 30 mg daily  Her last DEXA scan in March 2021 showed normal bone density        Patient Active Problem List   Diagnosis    Chronic deep vein thrombosis (DVT) of proximal vein of both lower extremities (HCC)    History of ITP    Benign hypertensive CKD    Chronic kidney disease (CKD), stage II (mild)    Edema    Hypercalcemia    Primary [facility]par[facility] )    Hypertension    Nephrolithiasis    Proteinuria    Systemic lupus erythematosus (HCC)    Vitamin D deficiency    Elevated PTHrP level    History of thyroid cancer    BMI 39 0-39 9,adult    Post-menopausal bleeding    Greater trochanteric bursitis of left hip    Anti-phospholipid syndrome (HCC)    Status post parathyroidectomy ([address] )    Postoperative hypothyroidism    Sjogren's syndrome without extraglandular involvement ([address] )    Acute blood loss anemia    Abnormal CT of the chest    Lesion of urinary bladder    Elevated serum creatinine     Past Medical History:   Diagnosis Date    Acute cholecystitis 2/3/2023    ADA (acute kidney injury) ([address] ) 2023    Anemia     Not sure    Anti-phospholipid antibody syndrome (HCC)     Cancer (HCC)     thyroid    Cellulitis of right index finger 3/4/2021    Chronic kidney disease     Clotting disorder ([address] )     Lung embolism,  ITP    Diabetes mellitus ([address] )     Disease of thyroid gland     Fibroid     Gestational diabetes     Gross hematuria 2016    History of chemotherapy     late  for lupus    Hordeolum externum of left lower eyelid 2022    Hyperparathyroidism ([address] )     Hypertension     Hypotension due to hypovolemia 2023    Idiopathic thrombocytopenic purpura (ITP) (HCC)     Kidney stone     Left ear pain 3/23/2020    Left foot pain 2022    Lupus ([address] )     Miscarriage 1996    Lost twin girls 10-    Osteoarthritis     Pulmonary embolism ([address] ) 1987    Rash 3/11/2020    Sjogren's syndrome ([address] )     Upper respiratory tract infection 2019    Vitamin D deficiency       Past Surgical History:   Procedure Laterality Date     SECTION      [id number]    CHOLECYSTECTOMY  23    CHOLECYSTECTOMY LAPAROSCOPIC N/A 2023    Procedure: LAPAROSCOPIC CHOLECYSTECTOMY;  Surgeon: Andrew Diaz MD;  [facility];  Service: General    DIAGNOSTIC FLEXIBLE LARYNGOSCOPY N/A 2021    Procedure: DIAGNOSTIC FLEXIBLE LARYNGOSCOPY;  Surgeon: Westley Melendez MD;  [facility];  Service: Surgical Oncology    HERNIA REPAIR      HIP SURGERY      left side, bone decompression    HYSTEROSCOPY      [place] PARATHYROIDECTOMY/EXPLORATION PARATHYROIDS Right 05/25/2021    Procedure: PARATHYROIDECTOMY, MINIMALLY INVASIVE, right, intraoperative PTH monitoring;  Surgeon: Westley Melendez MD;  [facility];  Service: Surgical Oncology    RENAL BIOPSY      Not sure    THYROID SURGERY      TUBAL LIGATION        Family History   Problem Relation Age of Onset    No Known Problems Mother     Diabetes type II Father     Diabetes Father     Stroke Father     Diabetes type II Paternal Aunt     Diabetes type II Paternal Uncle     Diabetes type II Paternal Grandmother     Stomach cancer Paternal Grandfather     No Known Problems Sister     No Known Problems Maternal Grandmother     No Known Problems Maternal Grandfather     No Known Problems Sister     No Known Problems Maternal Aunt     No Known Problems Maternal Aunt     No Known Problems Maternal Aunt     No Known Problems Paternal Aunt     No Known Problems Paternal Aunt      Social History     Tobacco Use    Smoking status: Never    Smokeless tobacco: Never   Substance Use Topics    Alcohol use: Not Currently     Comment: Twice a year     Allergies   Allergen Reactions    Penicillins Itching     Current Outpatient Medications   Medication Sig Dispense Refill    acetaminophen (TYLENOL) 325 mg tablet Take 2 tablets (650 mg total) by mouth every 6 (six) hours  0    Cholecalciferol 25 MCG (1000 UT) TBDP Take 1,000 Units by mouth in the morning      cinacalcet (SENSIPAR) 30 mg tablet Take 1 tablet (30 mg total) by mouth daily 90 tablet 0    folic acid (FOLVITE) 1 mg tablet Take 1 tablet by mouth daily      fosinopril (MONOPRIL) 20 mg tablet Take 1 tablet (20 mg total) by mouth 2 (two) times a day (Patient taking differently: Take 20 mg by "mouth 2 (two) times a day 1 tablet daily per hospital) 180 tablet 0    hydrocortisone 2 5 % cream       levothyroxine (Synthroid) 112 mcg tablet Take 1 tablet (112 mcg total) by mouth daily 90 tablet 0    NIFEdipine (PROCARDIA XL) 30 mg 24 hr tablet Take 1 tablet (30 mg total) by mouth daily 90 tablet 0    spironolactone (ALDACTONE) 25 mg tablet Take 1 tablet (25 mg total) by mouth daily 90 tablet 0    warfarin (COUMADIN) 4 mg tablet Take by mouth daily Every day except Thursday      warfarin (Coumadin) 1 mg tablet As directed  (Patient not taking: Reported on 4/18/2023) 30 tablet 3    warfarin (COUMADIN) 3 mg tablet TAKE ONE TABLET BY MOUTH EVERY DAY  DO NOT TAKE ON MONDAYS AND THURSDAYS (Patient not taking: Reported on 4/18/2023) 90 tablet 0     No current facility-administered medications for this visit  Review of Systems   Constitutional: Negative for activity change, appetite change, fatigue and unexpected weight change  HENT: Negative for trouble swallowing  Eyes: Negative for visual disturbance  Respiratory: Negative for shortness of breath  Cardiovascular: Negative for chest pain and palpitations  Gastrointestinal: Positive for diarrhea (occasional loose stools; diet dependent)  Negative for constipation  Endocrine: Positive for cold intolerance  Negative for heat intolerance, polydipsia and polyuria  Musculoskeletal: Negative  Skin: Negative  Neurological: Negative for tremors and numbness  Psychiatric/Behavioral: Negative  Physical Exam:  Body mass index is 36 09 kg/m²  /88 (BP Location: Left arm, Patient Position: Sitting, Cuff Size: Adult)   Ht 5' 1\" (1 549 m)   Wt 86 6 kg (191 lb)   BMI 36 09 kg/m²    Wt Readings from Last 3 Encounters:   05/02/23 86 6 kg (191 lb)   04/18/23 85 7 kg (189 lb)   03/20/23 86 6 kg (191 lb)       Physical Exam  Vitals and nursing note reviewed  Constitutional:       Appearance: She is well-developed     HENT:      " Head: Normocephalic  Eyes:      General: No scleral icterus  Pupils: Pupils are equal, round, and reactive to light  Neck:      Thyroid: No thyromegaly  Cardiovascular:      Rate and Rhythm: Normal rate and regular rhythm  Pulses:           Radial pulses are 2+ on the right side and 2+ on the left side  Heart sounds: No murmur heard  Pulmonary:      Effort: Pulmonary effort is normal  No respiratory distress  Breath sounds: Normal breath sounds  No wheezing  Musculoskeletal:      Cervical back: Neck supple  Skin:     General: Skin is warm and dry  Neurological:      Mental Status: She is alert  Deep Tendon Reflexes: Reflexes are normal and symmetric  Patient's shoes and socks were not removed  Labs:   Lab Results   Component Value Date    HGBA1C 5 4 04/01/2017       No results found for: CHOL, HDL, TRIG, CHOLHDL    Lab Results   Component Value Date    GLUCOSE 105 (H) 11/18/2017    CALCIUM 8 6 04/29/2023     11/18/2017    K 4 3 04/29/2023    CO2 25 04/29/2023     (H) 04/29/2023    BUN 22 04/29/2023    CREATININE 0 99 04/29/2023        eGFR   Date Value Ref Range Status   04/29/2023 63 ml/min/1 73sq m Final       Lab Results   Component Value Date    ALT 28 04/29/2023    AST 22 04/29/2023    ALKPHOS 109 04/29/2023    BILITOT 0 5 07/01/2017       Lab Results   Component Value Date    URJ1MCVWMDOF 2 270 02/08/2023    TSH 0 354 (L) 09/30/2019         Plan:    Adamaris Sharp was seen today for hypothyroidism  Diagnoses and all orders for this visit:    Postoperative hypothyroidism / History of thyroid cancer  Labs previously discordant: TSH 2 270 and free T4 1 71  Repeat TFTS now  Continue current dose of levothyroxine  If free T4 remains elevated despite normal TSH, will check free T4 by dialysis   Previously TG ab < 1 0 and TG < 0 1  Repeat TG level now  Check head neck US in July 2023  -     T4, free; Future  -     TSH, 3rd generation;  Future  -     T4, free; Future  -     TSH, 3rd generation; Future  -     Thyroglobulin; Future  -     Thyroglobulin; Future  -     US head neck lymph node mapping; Future    Primary hyperparathyroidism (HCC)  S/p 3 parathyroid surgeries  On sensipar 30 mg; continue current treatment  Correct calcium is 9 1  Avoid calcium supplementation   Monitor labs  -     Basic metabolic panel; Future  -     Albumin; Future  -     PTH, intact; Future    Vitamin D deficiency  Vitamin D low at 27 2  Recently has not used vitamin D3  Take vitamin D3 1000 IU daily  -     Vitamin D 25 hydroxy; Future          Discussed with the patient and all questions fully answered  She will call me if any problems arise      Counseled patient on diagnostic results, prognosis, risk and benefit of treatment options, instruction for management, importance of treatment compliance, risk  factor reduction and impressions      Diana Jin PA-C

## 2023-05-04 LAB — BACTERIA UR CULT: NORMAL

## 2023-05-15 DIAGNOSIS — I12.9 PARENCHYMAL RENAL HYPERTENSION, STAGE 1 THROUGH STAGE 4 OR UNSPECIFIED CHRONIC KIDNEY DISEASE: ICD-10-CM

## 2023-05-15 DIAGNOSIS — I10 ESSENTIAL HYPERTENSION: ICD-10-CM

## 2023-05-15 RX ORDER — FOSINOPRIL SODIUM 20 MG/1
20 TABLET ORAL 2 TIMES DAILY
Qty: 180 TABLET | Refills: 0 | Status: SHIPPED | OUTPATIENT
Start: 2023-05-15

## 2023-05-15 RX ORDER — NIFEDIPINE 30 MG/1
30 TABLET, EXTENDED RELEASE ORAL DAILY
Qty: 90 TABLET | Refills: 0 | Status: SHIPPED | OUTPATIENT
Start: 2023-05-15

## 2023-05-17 ENCOUNTER — HOSPITAL ENCOUNTER (OUTPATIENT)
Dept: RADIOLOGY | Age: 58
Discharge: HOME | End: 2023-05-17

## 2023-05-17 DIAGNOSIS — N95.0 POST-MENOPAUSAL BLEEDING: ICD-10-CM

## 2023-05-24 ENCOUNTER — OFFICE VISIT (OUTPATIENT)
Dept: OBGYN CLINIC | Facility: CLINIC | Age: 58
End: 2023-05-24

## 2023-05-24 VITALS
WEIGHT: 193 LBS | BODY MASS INDEX: 36.44 KG/M2 | HEIGHT: 61 IN | DIASTOLIC BLOOD PRESSURE: 84 MMHG | SYSTOLIC BLOOD PRESSURE: 120 MMHG

## 2023-05-24 DIAGNOSIS — N95.0 POST-MENOPAUSAL BLEEDING: Primary | ICD-10-CM

## 2023-05-24 DIAGNOSIS — R93.89 THICKENED ENDOMETRIUM: ICD-10-CM

## 2023-05-24 NOTE — PROGRESS NOTES
"Assessment/Plan:       Problem List Items Addressed This Visit        Other    Post-menopausal bleeding - Primary   Other Visit Diagnoses     Thickened endometrium              - reviewed that my formal recommendation, with thickened and thickening endometrium, would be for hysteroscopy, D&C  This would be only way to visualize and remove lesion causing thickened endometrium, ensure no hyperplasia  However, if this is something that she wishes to avoid, we could discuss EMB or expectant management  Discussed risks, benefits of all of these options  Rivera Adams would like to move forwards with hysteroscopy, D&C, but would like to discuss with her  and return to the office for preop with him  She would like to do this at the time of her annual  Acceptable to do both at one time, given previously discussed    RTO annual with preop    Subjective:      Patient ID: Vince Queen is a 66 y o  female  HPI  She presents today for continued discussion regarding postmenopausal bleeding  She had an episode of heavy bleeding, was seen in the office and declined tissue sampling at that time  Bleeding is improved at this time, though she did have some bleeding yesterday  She is concerned about undergoing hysteroscopy as she recently had a cholecystectomy with significant complications  She is taking warfarin, knows that multidisciplinary planning would be necessary prior to surgery  The following portions of the patient's history were reviewed and updated as appropriate: allergies, current medications, past family history, past medical history, past social history, past surgical history and problem list     Review of Systems  as above    Objective:  /84 (BP Location: Left arm, Patient Position: Sitting, Cuff Size: Standard)   Ht 5' 1\" (1 549 m)   Wt 87 5 kg (193 lb)   BMI 36 47 kg/m²      Physical Exam  Vitals reviewed  Constitutional:       Appearance: She is well-developed     HENT:      Head: " Normocephalic  Cardiovascular:      Rate and Rhythm: Normal rate and regular rhythm  Pulmonary:      Effort: Pulmonary effort is normal    Musculoskeletal:         General: Normal range of motion  Cervical back: Normal range of motion  Skin:     General: Skin is warm and dry  Neurological:      Mental Status: She is alert and oriented to person, place, and time     Psychiatric:         Behavior: Behavior normal

## 2023-05-25 DIAGNOSIS — E89.0 POSTOPERATIVE HYPOTHYROIDISM: ICD-10-CM

## 2023-05-25 RX ORDER — LEVOTHYROXINE SODIUM 112 UG/1
TABLET ORAL
Qty: 90 TABLET | Refills: 0 | Status: SHIPPED | OUTPATIENT
Start: 2023-05-25

## 2023-06-06 DIAGNOSIS — I82.5Y3 CHRONIC DEEP VEIN THROMBOSIS (DVT) OF PROXIMAL VEIN OF BOTH LOWER EXTREMITIES (HCC): Chronic | ICD-10-CM

## 2023-06-06 DIAGNOSIS — I82.5Y3 CHRONIC DEEP VEIN THROMBOSIS (DVT) OF PROXIMAL VEIN OF BOTH LOWER EXTREMITIES (HCC): ICD-10-CM

## 2023-06-06 RX ORDER — WARFARIN SODIUM 3 MG/1
TABLET ORAL
Qty: 90 TABLET | Refills: 0 | Status: SHIPPED | OUTPATIENT
Start: 2023-06-06

## 2023-06-06 RX ORDER — WARFARIN SODIUM 1 MG/1
TABLET ORAL
Qty: 30 TABLET | Refills: 0 | Status: SHIPPED | OUTPATIENT
Start: 2023-06-06

## 2023-06-08 ENCOUNTER — OFFICE VISIT (OUTPATIENT)
Dept: INTERNAL MEDICINE CLINIC | Facility: CLINIC | Age: 58
End: 2023-06-08
Payer: COMMERCIAL

## 2023-06-08 VITALS
HEART RATE: 64 BPM | HEIGHT: 61 IN | BODY MASS INDEX: 36.63 KG/M2 | OXYGEN SATURATION: 99 % | SYSTOLIC BLOOD PRESSURE: 128 MMHG | DIASTOLIC BLOOD PRESSURE: 76 MMHG | WEIGHT: 194 LBS

## 2023-06-08 DIAGNOSIS — N95.0 POST-MENOPAUSAL BLEEDING: ICD-10-CM

## 2023-06-08 DIAGNOSIS — I10 PRIMARY HYPERTENSION: Primary | ICD-10-CM

## 2023-06-08 DIAGNOSIS — M32.14 SYSTEMIC LUPUS ERYTHEMATOSUS WITH GLOMERULAR DISEASE, UNSPECIFIED SLE TYPE (HCC): ICD-10-CM

## 2023-06-08 DIAGNOSIS — R93.89 ABNORMAL CT OF THE CHEST: ICD-10-CM

## 2023-06-08 DIAGNOSIS — Z13.220 SCREENING, LIPID: ICD-10-CM

## 2023-06-08 DIAGNOSIS — E21.0 PRIMARY HYPERPARATHYROIDISM (HCC): ICD-10-CM

## 2023-06-08 DIAGNOSIS — D68.61 ANTI-PHOSPHOLIPID SYNDROME (HCC): ICD-10-CM

## 2023-06-08 DIAGNOSIS — Z13.1 SCREENING FOR DIABETES MELLITUS: ICD-10-CM

## 2023-06-08 DIAGNOSIS — N32.9 LESION OF URINARY BLADDER: ICD-10-CM

## 2023-06-08 PROCEDURE — 99214 OFFICE O/P EST MOD 30 MIN: CPT | Performed by: INTERNAL MEDICINE

## 2023-06-08 NOTE — PROGRESS NOTES
Assessment/Plan:    Hypertension  Controlled on fosinopril spironolactone nifedipine    Primary hyperparathyroidism ([facility] [address] )  Controlled on Sensipar    Anti-phospholipid syndrome [facility])  Hematology manages warfarin    Lesion of urinary bladder  Ultrasound scheduled in October    Systemic lupus erythematosus [facility])  Sees rheumatology , nephrology    Post-menopausal bleeding  Sees gyn , reluctant to have any procedure at this time    Abnormal CT of the chest  CT scheduled in August    BMI Counseling: Body mass index is 36 66 kg/m²  The BMI is above normal  Nutrition recommendations include decreasing overall calorie intake and moderation in carbohydrate intake  Exercise recommendations include exercising 3-5 times per week  Problem List Items Addressed This Visit        Endocrine    Primary hyperparathyroidism ([facility] [address] )     Controlled on Sensipar            Cardiovascular and Mediastinum    Hypertension - Primary     Controlled on fosinopril spironolactone nifedipine            Hematopoietic and Hemostatic    Anti-phospholipid syndrome [facility])     Hematology manages warfarin            Genitourinary    Lesion of urinary bladder     Ultrasound scheduled in October            Other    Systemic lupus erythematosus ([facility] )     Sees rheumatology , nephrology         Post-menopausal bleeding     Sees gyn , reluctant to have any procedure at this time         Abnormal CT of the chest     CT scheduled in August        Other Visit Diagnoses     Screening for diabetes mellitus        Relevant Orders    HEMOGLOBIN A1C W/ EAG ESTIMATION (Completed)    Screening, lipid        Relevant Orders    Lipid panel (Completed)            Subjective:      Patient ID: Salma Miller is a 66 y o  female  HPI  Lap [place] 2/5, post op anemia, hematoma  Incidental findings include bladder lesion, left lung ground glass opacity  She has seen urology and US showed small right renal cyst and non obstructing left renal stone   She has a follow up [address] in "October  Chest CT scheduled in August  [facility] and has seen gyn but does not want IUD or surgery at this time    The following portions of the patient's history were reviewed and updated as appropriate: allergies, current medications, past family history, past medical history, past social history, past surgical history and problem list     Review of Systems   Constitutional: Negative for fever  Respiratory: Negative for shortness of breath  Cardiovascular: Negative for chest pain and palpitations  Gastrointestinal: Negative for abdominal pain, constipation and diarrhea  Genitourinary: Positive for vaginal bleeding  Negative for difficulty urinating  Neurological: Negative for dizziness and headaches  Objective:      /76 (BP Location: Left arm, Patient Position: Sitting, Cuff Size: Large)   Pulse 64   Ht 5' 1\" (1 549 m)   Wt 88 kg (194 lb)   SpO2 99%   BMI 36 66 kg/m²          Physical Exam  Constitutional:       General: She is not in acute distress  Appearance: She is well-developed  She is not ill-appearing, toxic-appearing or diaphoretic  Eyes:      Conjunctiva/sclera: Conjunctivae normal    Cardiovascular:      Rate and Rhythm: Normal rate and regular rhythm  Heart sounds: Normal heart sounds  No murmur heard  Pulmonary:      Effort: Pulmonary effort is normal  No respiratory distress  Breath sounds: Normal breath sounds  No wheezing or rales  Abdominal:      General: There is no distension  Palpations: Abdomen is soft  There is no mass  Tenderness: There is no abdominal tenderness  There is no guarding or rebound  Musculoskeletal:      Cervical back: Neck supple  Right lower leg: No edema  Left lower leg: No edema  Neurological:      Mental Status: She is alert and oriented to person, place, and time  Psychiatric:         Mood and Affect: Mood normal          Behavior: Behavior normal          Thought Content:  Thought content normal          " Judgment: Judgment normal

## 2023-06-09 ENCOUNTER — APPOINTMENT (OUTPATIENT)
Dept: LAB | Age: 58
End: 2023-06-09
Payer: COMMERCIAL

## 2023-06-09 DIAGNOSIS — E89.0 POSTOPERATIVE HYPOTHYROIDISM: ICD-10-CM

## 2023-06-09 DIAGNOSIS — Z13.1 SCREENING FOR DIABETES MELLITUS: ICD-10-CM

## 2023-06-09 DIAGNOSIS — Z85.850 HISTORY OF THYROID CANCER: ICD-10-CM

## 2023-06-09 DIAGNOSIS — N18.2 CHRONIC KIDNEY DISEASE (CKD), STAGE II (MILD): ICD-10-CM

## 2023-06-09 DIAGNOSIS — Z13.220 SCREENING, LIPID: ICD-10-CM

## 2023-06-09 DIAGNOSIS — R80.8 OTHER PROTEINURIA: ICD-10-CM

## 2023-06-09 LAB
ANION GAP SERPL CALCULATED.3IONS-SCNC: 3 MMOL/L (ref 4–13)
BUN SERPL-MCNC: 24 MG/DL (ref 5–25)
CALCIUM SERPL-MCNC: 8.7 MG/DL (ref 8.3–10.1)
CHLORIDE SERPL-SCNC: 111 MMOL/L (ref 96–108)
CHOLEST SERPL-MCNC: 192 MG/DL
CO2 SERPL-SCNC: 24 MMOL/L (ref 21–32)
CREAT SERPL-MCNC: 0.99 MG/DL (ref 0.6–1.3)
CREAT UR-MCNC: 65.2 MG/DL
ERYTHROCYTE [DISTWIDTH] IN BLOOD BY AUTOMATED COUNT: 13 % (ref 11.6–15.1)
EST. AVERAGE GLUCOSE BLD GHB EST-MCNC: 103 MG/DL
GFR SERPL CREATININE-BSD FRML MDRD: 63 ML/MIN/1.73SQ M
GLUCOSE P FAST SERPL-MCNC: 99 MG/DL (ref 65–99)
HBA1C MFR BLD: 5.2 %
HCT VFR BLD AUTO: 41 % (ref 34.8–46.1)
HDLC SERPL-MCNC: 41 MG/DL
HGB BLD-MCNC: 13.7 G/DL (ref 11.5–15.4)
LDLC SERPL CALC-MCNC: 120 MG/DL (ref 0–100)
MAGNESIUM SERPL-MCNC: 1.8 MG/DL (ref 1.6–2.6)
MCH RBC QN AUTO: 29.2 PG (ref 26.8–34.3)
MCHC RBC AUTO-ENTMCNC: 33.4 G/DL (ref 31.4–37.4)
MCV RBC AUTO: 87 FL (ref 82–98)
NONHDLC SERPL-MCNC: 151 MG/DL
PHOSPHATE SERPL-MCNC: 2.7 MG/DL (ref 2.7–4.5)
PLATELET # BLD AUTO: 202 THOUSANDS/UL (ref 149–390)
PMV BLD AUTO: 11.5 FL (ref 8.9–12.7)
POTASSIUM SERPL-SCNC: 4.3 MMOL/L (ref 3.5–5.3)
PROT UR-MCNC: 68 MG/DL
PROT/CREAT UR: 1.04 MG/G{CREAT} (ref 0–0.1)
RBC # BLD AUTO: 4.69 MILLION/UL (ref 3.81–5.12)
SODIUM SERPL-SCNC: 138 MMOL/L (ref 135–147)
T4 FREE SERPL-MCNC: 0.93 NG/DL (ref 0.61–1.12)
TRIGL SERPL-MCNC: 155 MG/DL
TSH SERPL DL<=0.05 MIU/L-ACNC: 6.19 UIU/ML (ref 0.45–4.5)
WBC # BLD AUTO: 5.92 THOUSAND/UL (ref 4.31–10.16)

## 2023-06-09 PROCEDURE — 86800 THYROGLOBULIN ANTIBODY: CPT

## 2023-06-09 PROCEDURE — 82570 ASSAY OF URINE CREATININE: CPT

## 2023-06-09 PROCEDURE — 80048 BASIC METABOLIC PNL TOTAL CA: CPT

## 2023-06-09 PROCEDURE — 84432 ASSAY OF THYROGLOBULIN: CPT

## 2023-06-09 PROCEDURE — 84100 ASSAY OF PHOSPHORUS: CPT

## 2023-06-09 PROCEDURE — 80061 LIPID PANEL: CPT

## 2023-06-09 PROCEDURE — 84439 ASSAY OF FREE THYROXINE: CPT

## 2023-06-09 PROCEDURE — 84443 ASSAY THYROID STIM HORMONE: CPT

## 2023-06-09 PROCEDURE — 85027 COMPLETE CBC AUTOMATED: CPT

## 2023-06-09 PROCEDURE — 83036 HEMOGLOBIN GLYCOSYLATED A1C: CPT

## 2023-06-09 PROCEDURE — 83735 ASSAY OF MAGNESIUM: CPT

## 2023-06-09 PROCEDURE — 84156 ASSAY OF PROTEIN URINE: CPT

## 2023-06-10 LAB
THYROGLOB AB SERPL-ACNC: <1 IU/ML (ref 0–0.9)
THYROGLOB SERPL-MCNC: 0.1 NG/ML (ref 1.5–38.5)

## 2023-06-11 PROBLEM — R79.89 ELEVATED SERUM CREATININE: Status: RESOLVED | Noted: 2023-02-28 | Resolved: 2023-06-11

## 2023-06-11 PROBLEM — D62 ACUTE BLOOD LOSS ANEMIA: Status: RESOLVED | Noted: 2023-02-09 | Resolved: 2023-06-11

## 2023-06-12 DIAGNOSIS — E89.0 POSTOPERATIVE HYPOTHYROIDISM: Primary | ICD-10-CM

## 2023-06-12 DIAGNOSIS — Z85.850 HISTORY OF THYROID CANCER: ICD-10-CM

## 2023-06-14 ENCOUNTER — OFFICE VISIT (OUTPATIENT)
Dept: INTERNAL MEDICINE CLINIC | Facility: CLINIC | Age: 58
End: 2023-06-14
Payer: COMMERCIAL

## 2023-06-14 VITALS
OXYGEN SATURATION: 95 % | BODY MASS INDEX: 36.82 KG/M2 | DIASTOLIC BLOOD PRESSURE: 80 MMHG | HEART RATE: 62 BPM | SYSTOLIC BLOOD PRESSURE: 116 MMHG | HEIGHT: 61 IN | WEIGHT: 195 LBS

## 2023-06-14 DIAGNOSIS — J02.9 SORE THROAT: ICD-10-CM

## 2023-06-14 DIAGNOSIS — E89.0 POSTOPERATIVE HYPOTHYROIDISM: Primary | ICD-10-CM

## 2023-06-14 DIAGNOSIS — J02.0 STREP PHARYNGITIS: Primary | ICD-10-CM

## 2023-06-14 LAB — S PYO AG THROAT QL: POSITIVE

## 2023-06-14 PROCEDURE — 87880 STREP A ASSAY W/OPTIC: CPT | Performed by: INTERNAL MEDICINE

## 2023-06-14 PROCEDURE — 99213 OFFICE O/P EST LOW 20 MIN: CPT | Performed by: INTERNAL MEDICINE

## 2023-06-14 RX ORDER — LEVOTHYROXINE SODIUM 112 UG/1
TABLET ORAL
Qty: 96 TABLET | Refills: 0 | Status: SHIPPED | OUTPATIENT
Start: 2023-06-14

## 2023-06-14 RX ORDER — AZITHROMYCIN 250 MG/1
TABLET, FILM COATED ORAL
Qty: 6 TABLET | Refills: 0 | Status: SHIPPED | OUTPATIENT
Start: 2023-06-14 | End: 2023-06-19

## 2023-06-14 RX ORDER — LEVOTHYROXINE SODIUM 112 UG/1
TABLET ORAL
COMMUNITY
End: 2023-06-14 | Stop reason: SDUPTHER

## 2023-06-14 NOTE — PROGRESS NOTES
Name: Isabel Magaña      : 1965      MRN: [id number]  Encounter Provider: Nagi Estrada MD  Encounter Date: 2023   [facility] [address]     1  Strep pharyngitis  -     azithromycin (Zithromax) 250 mg tablet; Take 2 tablets (500 mg total) by mouth daily for 1 day, THEN 1 tablet (250 mg total) daily for 4 days  2  Sore throat  -     POCT rapid strepA    -POC strep test positive  Patient will be started on a Z-Anibal for her strep pharyngitis given her penicillin allergy  Azithromycin can potentially cause fluctuations in the INR  She was instructed to go for a repeat INR in 4 to 5 days   -For sore throat and body pain take Tylenol 1000 mg every 8 hours as needed  -Perform daily warm salt water gargles   -For sore throat take Cepacol throat lozenges as needed  -For cough and congestion take Mucinex DM as needed       Subjective      HPI   Patient presents today complaining of severe sore throat and coughing x2 days  She denies any fevers, chills, shortness of breath or wheezing  For her sore throat she has been taking Tylenol, Chloraseptic spray and performing warm salt water gargles with minimal relief      ROS as per HPI    Current Outpatient Medications on File Prior to Visit   Medication Sig   • acetaminophen (TYLENOL) 325 mg tablet Take 2 tablets (650 mg total) by mouth every 6 (six) hours   • Cholecalciferol 25 MCG (1000 UT) TBDP Take 1,000 Units by mouth in the morning   • cinacalcet (SENSIPAR) 30 mg tablet Take 1 tablet (30 mg total) by mouth daily   • folic acid (FOLVITE) 1 mg tablet Take 1 tablet by mouth daily   • fosinopril (MONOPRIL) 20 mg tablet Take 1 tablet (20 mg total) by mouth 2 (two) times a day   • hydrocortisone 2 5 % cream    • levothyroxine 112 mcg tablet TAKE ONE TABLET BY MOUTH DAILY   • NIFEdipine (PROCARDIA XL) 30 mg 24 hr tablet Take 1 tablet (30 mg total) by mouth daily   • spironolactone (ALDACTONE) 25 mg "tablet Take 1 tablet (25 mg total) by mouth daily   • warfarin (Coumadin) 1 mg tablet As directed  • warfarin (COUMADIN) 3 mg tablet TAKE ONE TABLET BY MOUTH EVERY DAY   DO NOT TAKE ON MONDAYS AND THURSDAYS   • warfarin (COUMADIN) 4 mg tablet Take by mouth daily Every day except Thursday (Patient not taking: Reported on 6/8/2023)       Objective     /80   Pulse 62   Ht 5' 1\" (1 549 m)   Wt 88 5 kg (195 lb)   SpO2 95%   BMI 36 84 kg/m²     BP Readings from Last 3 Encounters:   06/14/23 116/80   06/08/23 128/76   05/24/23 120/84        Wt Readings from Last 3 Encounters:   06/14/23 88 5 kg (195 lb)   06/08/23 88 kg (194 lb)   05/24/23 87 5 kg (193 lb)       Physical Exam    General: NAD, Alert and oriented x3   HEENT: NCAT, EOMI, normal conjunctiva, mild posterior pharyngeal erythema, no cervical lymphadenopathy  Cardiovascular: RRR, normal S1 and S2, no m/r/g  Pulmonary: Normal respiratory effort, no wheezes, rales or rhonchi  Extremities: No lower extremity edema  Skin: Normal skin color, no rashes     Shahram Colunga MD  "

## 2023-06-14 NOTE — PATIENT INSTRUCTIONS
-You will be started on a Z-Anibal for your strep pharyngitis given your penicillin allergy    Azithromycin can potentially cause fluctuations in your INR please go for a repeat INR in 4 to 5 days   -For sore throat and body pain take Tylenol 1000 mg every 8 hours as needed  -Perform daily warm salt water gargles while your symptoms persist  -For sore throat you may take Cepacol throat lozenges as needed  -Be sure to stay well-hydrated and drink warm tea with honey and lemon  -For cough and congestion take Mucinex DM as needed
Yes

## 2023-06-26 DIAGNOSIS — E83.52 HYPERCALCEMIA: ICD-10-CM

## 2023-06-27 RX ORDER — CINACALCET 30 MG/1
30 TABLET, FILM COATED ORAL DAILY
Qty: 90 TABLET | Refills: 0 | Status: SHIPPED | OUTPATIENT
Start: 2023-06-27

## 2023-07-03 DIAGNOSIS — I10 ESSENTIAL HYPERTENSION, BENIGN: ICD-10-CM

## 2023-07-03 RX ORDER — SPIRONOLACTONE 25 MG/1
25 TABLET ORAL DAILY
Qty: 90 TABLET | Refills: 0 | Status: SHIPPED | OUTPATIENT
Start: 2023-07-03

## 2023-07-03 NOTE — TELEPHONE ENCOUNTER
Called and left voicemail for patient. Please ensure this patient is still currently taking aldactone as my last note states it was discontinued during hospitalization.

## 2023-07-12 ENCOUNTER — OFFICE VISIT (OUTPATIENT)
Dept: NEPHROLOGY | Facility: CLINIC | Age: 58
End: 2023-07-12
Payer: COMMERCIAL

## 2023-07-12 ENCOUNTER — OFFICE VISIT (OUTPATIENT)
Dept: INTERNAL MEDICINE CLINIC | Facility: CLINIC | Age: 58
End: 2023-07-12
Payer: COMMERCIAL

## 2023-07-12 VITALS
DIASTOLIC BLOOD PRESSURE: 84 MMHG | HEART RATE: 72 BPM | BODY MASS INDEX: 36.13 KG/M2 | WEIGHT: 191.2 LBS | SYSTOLIC BLOOD PRESSURE: 116 MMHG | OXYGEN SATURATION: 97 %

## 2023-07-12 VITALS
WEIGHT: 191 LBS | HEART RATE: 73 BPM | HEIGHT: 61 IN | BODY MASS INDEX: 36.06 KG/M2 | OXYGEN SATURATION: 97 % | SYSTOLIC BLOOD PRESSURE: 110 MMHG | DIASTOLIC BLOOD PRESSURE: 80 MMHG

## 2023-07-12 DIAGNOSIS — L30.9 DERMATITIS: Primary | ICD-10-CM

## 2023-07-12 DIAGNOSIS — R80.8 OTHER PROTEINURIA: ICD-10-CM

## 2023-07-12 DIAGNOSIS — N18.2 CHRONIC KIDNEY DISEASE (CKD), STAGE II (MILD): Primary | ICD-10-CM

## 2023-07-12 DIAGNOSIS — I12.9 PARENCHYMAL RENAL HYPERTENSION, STAGE 1 THROUGH STAGE 4 OR UNSPECIFIED CHRONIC KIDNEY DISEASE: ICD-10-CM

## 2023-07-12 DIAGNOSIS — M32.8 OTHER FORMS OF SYSTEMIC LUPUS ERYTHEMATOSUS, UNSPECIFIED ORGAN INVOLVEMENT STATUS (HCC): ICD-10-CM

## 2023-07-12 DIAGNOSIS — E21.0 PRIMARY HYPERPARATHYROIDISM (HCC): ICD-10-CM

## 2023-07-12 DIAGNOSIS — E83.52 HYPERCALCEMIA: ICD-10-CM

## 2023-07-12 DIAGNOSIS — E55.9 VITAMIN D DEFICIENCY: ICD-10-CM

## 2023-07-12 DIAGNOSIS — I10 PRIMARY HYPERTENSION: ICD-10-CM

## 2023-07-12 DIAGNOSIS — N20.0 NEPHROLITHIASIS: ICD-10-CM

## 2023-07-12 PROCEDURE — 99214 OFFICE O/P EST MOD 30 MIN: CPT | Performed by: INTERNAL MEDICINE

## 2023-07-12 PROCEDURE — 99213 OFFICE O/P EST LOW 20 MIN: CPT | Performed by: INTERNAL MEDICINE

## 2023-07-12 RX ORDER — FOSINOPRIL SODIUM 20 MG/1
20 TABLET ORAL DAILY
Qty: 180 TABLET | Refills: 0
Start: 2023-07-12

## 2023-07-12 RX ORDER — TRIAMCINOLONE ACETONIDE 1 MG/G
CREAM TOPICAL 2 TIMES DAILY
Qty: 80 G | Refills: 0 | Status: SHIPPED | OUTPATIENT
Start: 2023-07-12 | End: 2023-07-19

## 2023-07-12 NOTE — PROGRESS NOTES
Name: Yesenia Messina      : 1965      MRN: [id number]  Encounter Provider: Constantin Crandall MD  Encounter Date: 2023   Encounter department: [facility] [place] [id number]. Dermatitis  Assessment & Plan:  -Possibly due to exposure to irritant   -Patient given a prescription for triamcinolone twice daily to use over the next 7 to 14 days  -She may take oral Claritin or Zyrtec as needed for itching  -Advised patient to use sensitive skin products with little or no fragrance  -Patient to return to care if no improvement    Orders:  -     triamcinolone (KENALOG) 0.1 % cream; Apply topically 2 (two) times a day for 7 days           Subjective      HPI   Patient presents today as an acute visit complaining of a rash involving both arms. She states the rash started 3 days ago. She notes that it is extremely itchy. She denies any changes in soaps, detergents or diet. She states she stated a motel in [facility] 2 days ago for anniversary. ROS as per [facility]    Current Outpatient Medications on File Prior to Visit   Medication Sig   • acetaminophen (TYLENOL) 325 mg tablet Take 2 tablets (650 mg total) by mouth every 6 (six) hours   • Cholecalciferol 25 MCG (1000 UT) TBDP Take 1,000 Units by mouth in the morning   • cinacalcet (SENSIPAR) 30 mg tablet Take 1 tablet (30 mg total) by mouth daily   • folic acid (FOLVITE) 1 mg tablet Take 1 tablet by mouth daily   • fosinopril (MONOPRIL) 20 mg tablet Take 1 tablet (20 mg total) by mouth daily   • hydrocortisone 2.5 % cream    • levothyroxine 112 mcg tablet Take 1 tablet (112 mcg) Mon-Sat & 1.5 tablets (168 mcg) on Sun only   • NIFEdipine (PROCARDIA XL) 30 mg 24 hr tablet Take 1 tablet (30 mg total) by mouth daily   • spironolactone (ALDACTONE) 25 mg tablet Take 1 tablet (25 mg total) by mouth daily   • warfarin (Coumadin) 1 mg tablet As directed. • warfarin (COUMADIN) 3 mg tablet TAKE ONE TABLET BY MOUTH EVERY DAY.  DO NOT TAKE ON MONDAYS AND THURSDAYS   • warfarin (COUMADIN) 4 mg tablet Take by mouth daily Every day except Thursday (Patient not taking: Reported on 7/12/2023)   • [DISCONTINUED] fosinopril (MONOPRIL) 20 mg tablet Take 1 tablet (20 mg total) by mouth 2 (two) times a day       Objective     /84   Pulse 72   Wt 86.7 kg (191 lb 3.2 oz)   SpO2 97%   BMI 36.13 kg/m²     BP Readings from Last 3 Encounters:   07/12/23 116/84   07/12/23 110/80   06/26/23 110/70        Wt Readings from Last 3 Encounters:   07/12/23 86.7 kg (191 lb 3.2 oz)   07/12/23 86.6 kg (191 lb)   06/26/23 87.5 kg (192 lb 12.8 oz)       Physical Exam    General: NAD, Alert and oriented x3   HEENT: NCAT, EOMI, normal conjunctiva  Cardiovascular: RRR, normal S1 and S2, no m/r/g  Pulmonary: Normal respiratory effort, no wheezes, rales or rhonchi  Neuro: Non-focal, ambulating without difficulty, non-antalgic gait  Extremities: No lower extremity edema  Skin: Normal skin color, scattered fine papular rash involving b/l UEs    Shahram Jerica Vigil MD

## 2023-07-12 NOTE — ASSESSMENT & PLAN NOTE
-Possibly due to exposure to irritant   -Patient given a prescription for triamcinolone twice daily to use over the next 7 to 14 days  -She may take oral Claritin or Zyrtec as needed for itching  -Advised patient to use sensitive skin products with little or no fragrance  -Patient to return to care if no improvement

## 2023-07-12 NOTE — PATIENT INSTRUCTIONS
1. CKD stage II/history of lupus nephritis  -sCr seems to be near 1 as new baseline. Latest C3 normal. Last sCr 0.99 from 6/9/23  -continue to avoid nonsteroidals(ibuprofen, aleve, advil, motrin, celebrex, naproxen, toradol)  -latest urinalysis shows trace blood, small leukocytes, 1+ protein, 1-2 RBCs, 4-10 WBCs, occasional bacteria with 0-3 hyaline casts as of April 2023  -monitor CMP, UA with microscopy as well along with repeat UpCr.   -no recent lupus flares     2. proteinuria-+residual proteinuria from prior lupus nephritis (biopsy proven in 1996), now noted to have increased weight as well as HTN.   -Off HCTZ due to hypercalcemia, on fosinopril 20mg daily, aldactone 25mg daily   -last UpCr 1.04g as of 6/9/23   -Monitor UpCr along with microalb:Cr  -would benefit from treatment for SLE as rise in proteinuria could be due to decreased dose of fosinopril from twice a day to once a day vs lupus nephritis activity     3. volume status- continue spironolactone 25mg daily, potassium normal as of 6/9/23     4. hypercalcemia/hyperparathyroidism - follows with endo, +nephrolithiasis history with recent Urinalysis showing calcium oxalate crystals  -calcium has normalized on latest bloodwork  -You must drink enough water to urinate at least 2L per day to prevent stone formation but this is difficult given her edema and need for diuretics. Unfortunately, diuretics will contribute to stone formation.  -avoid high salt diet both for stone reduction and to help blood pressure stay controlled  -PTH slightly elevated at 147 as of Feb. 2023 - on sensipar 30mg daily per endocrinology for primary hyperparathyoidism     5. hypertension-BP well controlled today  -continue nifedipine XL 30mg daily(at night), fosinopril 20mg daily, spironolactone 25mg daily (at lunch)  -avoid caffeine/tea     6. vitamin D insufficiency - Vit D level 27.2 as of Feb. 2023 - on vitamin D3 1000u daily    7.  SLE - not on plaquenil, follows with rheumatology     Obtain bloodwork and urine testing prior to next office visit in Sept. and again in 6 months. RTC in 6 months. If proteinuria rising, may need to consider renal biopsy.

## 2023-07-12 NOTE — PATIENT INSTRUCTIONS
-Current-well over the next 1 to 2 weeks please use sensitive skin products that have limited or no fragrance  -A prescription for triamcinolone cream has been sent to your pharmacy you may apply this topically over the next 7 to 14 days  -For itching you may also use over-the-counter Claritin/Zyrtec once daily

## 2023-07-12 NOTE — PROGRESS NOTES
NEPHROLOGY OUTPATIENT PROGRESS NOTE   Louis Tanner 66 y.o. female MRN: [id number]  DATE: 7/12/2023  Reason for visit:   Chief Complaint   Patient presents with   • Follow-up   • Chronic Kidney Disease        Patient Instructions   1. CKD stage II/history of lupus nephritis  -sCr seems to be near 1 as new baseline. Latest C3 normal. Last sCr 0.99 from 6/9/23  -continue to avoid nonsteroidals(ibuprofen, aleve, advil, motrin, celebrex, naproxen, toradol)  -latest urinalysis shows trace blood, small leukocytes, 1+ protein, 1-2 RBCs, 4-10 WBCs, occasional bacteria with 0-3 hyaline casts as of April 2023  -monitor CMP, UA with microscopy as well along with repeat UpCr.   -no recent lupus flares     2. proteinuria-+residual proteinuria from prior lupus nephritis (biopsy proven in 1996), now noted to have increased weight as well as HTN.   -Off HCTZ due to hypercalcemia, on fosinopril 20mg daily, aldactone 25mg daily   -last UpCr 1.04g as of 6/9/23   -Monitor UpCr along with microalb:Cr  -would benefit from treatment for SLE as rise in proteinuria could be due to decreased dose of fosinopril from twice a day to once a day vs lupus nephritis activity     3. volume status- continue spironolactone 25mg daily, potassium normal as of 6/9/23     4. hypercalcemia/hyperparathyroidism - follows with endo, +nephrolithiasis history with recent Urinalysis showing calcium oxalate crystals  -calcium has normalized on latest bloodwork  -You must drink enough water to urinate at least 2L per day to prevent stone formation but this is difficult given her edema and need for diuretics.  Unfortunately, diuretics will contribute to stone formation.  -avoid high salt diet both for stone reduction and to help blood pressure stay controlled  -PTH slightly elevated at 147 as of Feb. 2023 - on sensipar 30mg daily per endocrinology for primary hyperparathyoidism     5. hypertension-BP well controlled today  -continue nifedipine XL 30mg daily(at night), fosinopril 20mg daily, spironolactone 25mg daily (at lunch)  -avoid caffeine/tea     6. vitamin D insufficiency - Vit D level 27.2 as of Feb. 2023 - on vitamin D3 1000u daily    7. SLE - not on plaquenil, follows with rheumatology     Obtain bloodwork and urine testing prior to next office visit in Sept. and again in 6 months. RTC in 6 months. If proteinuria rising, may need to consider renal biopsy. Mitch Bethea was seen today for follow-up and chronic kidney disease. Diagnoses and all orders for this visit:    Chronic kidney disease (CKD), stage II (mild)  -     Cancel: Basic metabolic panel; Future  -     Urinalysis with microscopic; Future  -     Protein / creatinine ratio, urine; Future  -     Albumin / creatinine urine ratio; Future  -     Basic metabolic panel; Future  -     Urinalysis with microscopic  -     Protein / creatinine ratio, urine  -     Albumin / creatinine urine ratio; Future  -     Comprehensive metabolic panel; Future  -     Basic metabolic panel; Future  -     Urinalysis with microscopic; Future  -     Protein / creatinine ratio, urine; Future  -     Albumin / creatinine urine ratio; Future    Primary hyperparathyroidism ([address])    Primary hypertension    Nephrolithiasis    Hypercalcemia    Other proteinuria  -     Protein / creatinine ratio, urine; Future  -     Albumin / creatinine urine ratio; Future  -     Protein / creatinine ratio, urine  -     Albumin / creatinine urine ratio; Future  -     Protein / creatinine ratio, urine; Future  -     Albumin / creatinine urine ratio; Future    Other forms of systemic lupus erythematosus, unspecified organ involvement status ([address])    Vitamin D deficiency    Parenchymal renal hypertension, stage 1 through stage 4 or unspecified chronic kidney disease  -     fosinopril (MONOPRIL) 20 mg tablet; Take 1 tablet (20 mg total) by mouth daily        Assessment/Plan:  1.  CKD stage II/history of lupus nephritis  -sCr seems to be near 1 as new baseline. Latest C3 normal. Last sCr 0.99 from 6/9/23  -continue to avoid nonsteroidals(ibuprofen, aleve, advil, motrin, celebrex, naproxen, toradol)  -latest urinalysis shows trace blood, small leukocytes, 1+ protein, 1-2 RBCs, 4-10 WBCs, occasional bacteria with 0-3 hyaline casts as of April 2023  -monitor BMP, UA with microscopy as well along with repeat UpCr.   -no recent lupus flares     2. proteinuria-+residual proteinuria from prior lupus nephritis (biopsy proven in 1996), now noted to have increased weight as well as HTN.   -Off HCTZ due to hypercalcemia, on fosinopril 20mg daily, aldactone 25mg daily   -last UpCr 1.04g as of 6/9/23   -Monitor UpCr along with microalb:Cr  -would benefit from treatment for SLE as rise in proteinuria could be due to decreased dose of fosinopril from twice a day to once a day vs lupus nephritis activity     3. volume status- continue spironolactone 25mg daily, potassium normal as of 6/9/23     4. hypercalcemia/hyperparathyroidism - follows with endo, +nephrolithiasis history with recent Urinalysis showing calcium oxalate crystals  -calcium has normalized on latest bloodwork  -You must drink enough water to urinate at least 2L per day to prevent stone formation but this is difficult given her edema and need for diuretics. Unfortunately, diuretics will contribute to stone formation.  -avoid high salt diet both for stone reduction and to help blood pressure stay controlled  -PTH slightly elevated at 147 as of Feb. 2023 - on sensipar 30mg daily per endocrinology for primary hyperparathyoidism     5. hypertension-BP well controlled today  -continue nifedipine XL 30mg daily(at night), fosinopril 20mg daily, spironolactone 25mg daily (at lunch)  -avoid caffeine/tea     6. vitamin D insufficiency - Vit D level 27.2 as of Feb. 2023 - on vitamin D3 1000u daily    7.  SLE - not on plaquenil, follows with rheumatology     Obtain bloodwork and urine testing prior to next office visit in Sept. and again in 6 months. RTC in 6 months.     SUBJECTIVE / INTERVAL HISTORY:  66 y.o. female presents in follow up of CKD. Mason Chen denies any recent illness/hospitalizations/medication changes since last office visit. Denies NSAID use. Denies recent lupus flares. BP well controlled. Denies dizziness. Review of Systems   Constitutional: Negative for chills and fever. HENT: Negative for sore throat and trouble swallowing. Eyes: Negative for visual disturbance. Respiratory: Negative for cough and shortness of breath. Cardiovascular: Negative for chest pain and leg swelling. Gastrointestinal: Negative for abdominal pain, constipation, diarrhea, nausea and vomiting. Endocrine: Negative for polyuria. Genitourinary: Negative for difficulty urinating, dysuria and hematuria. Musculoskeletal: Negative for back pain and neck pain. Skin: Positive for rash (over shoulders). Neurological: Negative for dizziness, light-headedness and numbness. Hematological: Bruises/bleeds easily (bruises easily). Psychiatric/Behavioral: The patient is not nervous/anxious. OBJECTIVE:  /80 (BP Location: Left arm, Patient Position: Sitting, Cuff Size: Adult)   Pulse 73   Ht 5' 1" (1.549 m)   Wt 86.6 kg (191 lb)   SpO2 97%   BMI 36.09 kg/m²  Body mass index is 36.09 kg/m². Physical exam:  Physical Exam  Vitals and nursing note reviewed. Constitutional:       General: She is not in acute distress. Appearance: Normal appearance. She is well-developed. She is obese. She is not diaphoretic. HENT:      Head: Normocephalic and atraumatic. Nose: Nose normal.      Mouth/Throat:      Mouth: Mucous membranes are moist.      Pharynx: No oropharyngeal exudate. Eyes:      General: No scleral icterus. Right eye: No discharge. Left eye: No discharge. Comments: eyeglasses   Neck:      Thyroid: No thyromegaly.    Cardiovascular:      Rate and Rhythm: Normal rate and regular rhythm. Heart sounds: No murmur heard. Pulmonary:      Effort: Pulmonary effort is normal. No respiratory distress. Breath sounds: Normal breath sounds. No wheezing. Abdominal:      General: Bowel sounds are normal. There is no distension. Palpations: Abdomen is soft. Musculoskeletal:         General: Swelling (left foot) present. Normal range of motion. Cervical back: Normal range of motion and neck supple. Skin:     General: Skin is warm and dry. Findings: No rash. Neurological:      General: No focal deficit present. Mental Status: She is alert. Motor: No abnormal muscle tone. Comments: awake   Psychiatric:         Mood and Affect: Mood normal.         Behavior: Behavior normal.         Medications:    Current Outpatient Medications:   •  acetaminophen (TYLENOL) 325 mg tablet, Take 2 tablets (650 mg total) by mouth every 6 (six) hours, Disp: , Rfl: 0  •  Cholecalciferol 25 MCG (1000 UT) TBDP, Take 1,000 Units by mouth in the morning, Disp: , Rfl:   •  cinacalcet (SENSIPAR) 30 mg tablet, Take 1 tablet (30 mg total) by mouth daily, Disp: 90 tablet, Rfl: 0  •  folic acid (FOLVITE) 1 mg tablet, Take 1 tablet by mouth daily, Disp: , Rfl:   •  fosinopril (MONOPRIL) 20 mg tablet, Take 1 tablet (20 mg total) by mouth daily, Disp: 180 tablet, Rfl: 0  •  hydrocortisone 2.5 % cream, , Disp: , Rfl:   •  levothyroxine 112 mcg tablet, Take 1 tablet (112 mcg) Mon-Sat & 1.5 tablets (168 mcg) on Sun only, Disp: 96 tablet, Rfl: 0  •  NIFEdipine (PROCARDIA XL) 30 mg 24 hr tablet, Take 1 tablet (30 mg total) by mouth daily, Disp: 90 tablet, Rfl: 0  •  spironolactone (ALDACTONE) 25 mg tablet, Take 1 tablet (25 mg total) by mouth daily, Disp: 90 tablet, Rfl: 0  •  warfarin (Coumadin) 1 mg tablet, As directed., Disp: 30 tablet, Rfl: 0  •  warfarin (COUMADIN) 3 mg tablet, TAKE ONE TABLET BY MOUTH EVERY DAY.  DO NOT TAKE ON MONDAYS AND THURSDAYS, Disp: 90 tablet, Rfl: 0  • warfarin (COUMADIN) 4 mg tablet, Take by mouth daily Every day except Thursday, Disp: , Rfl:     Allergies: Allergies as of 07/12/2023 - Reviewed 07/12/2023   Allergen Reaction Noted   • Penicillins Itching 04/25/2013       The following portions of the patient's history were reviewed and updated as appropriate: past family history, past surgical history and problem list.    Laboratory Results:  Lab Results   Component Value Date    SODIUM 138 06/09/2023    K 4.3 06/09/2023     (H) 06/09/2023    CO2 24 06/09/2023    BUN 24 06/09/2023    CREATININE 0.99 06/09/2023    GLUC 91 02/15/2023    CALCIUM 8.7 06/09/2023        Lab Results   Component Value Date    .6 (H) 02/08/2023    CALCIUM 8.7 06/09/2023    PHOS 2.7 06/09/2023       Portions of the record may have been created with voice recognition software.  Occasional wrong word or "sound a like" substitutions may have occurred due to the inherent limitations of voice recognition software.  Read the chart carefully and recognize, using context, where substitutions have occurred.

## 2023-07-18 DIAGNOSIS — I82.5Y3 CHRONIC DEEP VEIN THROMBOSIS (DVT) OF PROXIMAL VEIN OF BOTH LOWER EXTREMITIES (HCC): ICD-10-CM

## 2023-07-18 RX ORDER — WARFARIN SODIUM 1 MG/1
TABLET ORAL
Qty: 30 TABLET | Refills: 0 | Status: SHIPPED | OUTPATIENT
Start: 2023-07-18

## 2023-08-02 ENCOUNTER — HOSPITAL ENCOUNTER (OUTPATIENT)
Dept: RADIOLOGY | Facility: HOSPITAL | Age: 58
Discharge: HOME | End: 2023-08-02
Payer: COMMERCIAL

## 2023-08-02 DIAGNOSIS — R93.89 ABNORMAL CT OF THE CHEST: ICD-10-CM

## 2023-08-02 PROCEDURE — G1004 CDSM NDSC: HCPCS

## 2023-08-02 PROCEDURE — 71250 CT THORAX DX C-: CPT

## 2023-08-11 ENCOUNTER — ANNUAL EXAM (OUTPATIENT)
Dept: OBGYN CLINIC | Facility: CLINIC | Age: 58
End: 2023-08-11
Payer: COMMERCIAL

## 2023-08-11 VITALS
HEIGHT: 61 IN | SYSTOLIC BLOOD PRESSURE: 112 MMHG | DIASTOLIC BLOOD PRESSURE: 78 MMHG | BODY MASS INDEX: 36.44 KG/M2 | WEIGHT: 193 LBS

## 2023-08-11 DIAGNOSIS — Z12.31 ENCOUNTER FOR SCREENING MAMMOGRAM FOR MALIGNANT NEOPLASM OF BREAST: ICD-10-CM

## 2023-08-11 DIAGNOSIS — R93.89 THICKENED ENDOMETRIUM: ICD-10-CM

## 2023-08-11 DIAGNOSIS — Z01.419 ENCOUNTER FOR GYNECOLOGICAL EXAMINATION (GENERAL) (ROUTINE) WITHOUT ABNORMAL FINDINGS: Primary | ICD-10-CM

## 2023-08-11 DIAGNOSIS — N95.0 POST-MENOPAUSAL BLEEDING: ICD-10-CM

## 2023-08-11 PROCEDURE — 99213 OFFICE O/P EST LOW 20 MIN: CPT | Performed by: STUDENT IN AN ORGANIZED HEALTH CARE EDUCATION/TRAINING PROGRAM

## 2023-08-11 PROCEDURE — S0612 ANNUAL GYNECOLOGICAL EXAMINA: HCPCS | Performed by: STUDENT IN AN ORGANIZED HEALTH CARE EDUCATION/TRAINING PROGRAM

## 2023-08-11 NOTE — PROGRESS NOTES
Assessment/Plan:       Problem List Items Addressed This Visit        Other    Post-menopausal bleeding   Other Visit Diagnoses     Encounter for gynecological examination (general) (routine) without abnormal findings    -  Primary    Encounter for screening mammogram for malignant neoplasm of breast        Relevant Orders    Mammo screening bilateral w 3d & cad    Thickened endometrium            -Desires to move forward with hysteroscopy, D&C. We discussed risks, benefits, alternatives. We discussed risks including uterine perforation, bleeding, infection, damage to surrounding organs, DVT/PE, death. Discussed minimal risks with blood transfusion to include transfusion reaction, TRALI, rare transmission of HIV/Hepatitis C. Benefits include definitive diagnosis. Patient wishes to proceed. -Will plan to proceed with hysteroscopy, D&C. Consent form reviewed in detail with patient, signed by patient      Subjective:      Patient ID: Dre Tse is a 66 y.o. female. HPI  Along with her annual exam today, she is here for preop visit for hysteroscopy, D&C for postmenopausal bleeding with thickened endometrium. The following portions of the patient's history were reviewed and updated as appropriate: allergies, current medications, past family history, past medical history, past social history, past surgical history and problem list.    Review of Systems  as above    Objective:  /78 (BP Location: Left arm, Patient Position: Sitting, Cuff Size: Large)   Ht 5' 1" (1.549 m)   Wt 87.5 kg (193 lb)   BMI 36.47 kg/m²      Physical Exam    Patient appears well and is not in distress  Neck is supple without masses  Breasts are symmetrical without mass, tenderness, nipple discharge, skin changes or adenopathy. Abdomen is soft and nontender without masses. Vulva without lesions or rashes. Urethral meatus and urethra are normal  Bladder is normal to palpation  Vagina is normal without discharge or bleeding. Cervix is normal without discharge or lesion. Uterus and adnexa are normal, mobile, nontender without palpable mass.   Rectovaginal exam without nodularity/masses/visible blood on glove

## 2023-08-11 NOTE — PROGRESS NOTES
Fuentes Govea   1965    CC:  Yearly exam    A:  Yearly exam.     Problem List Items Addressed This Visit    None  Visit Diagnoses     Encounter for gynecological examination (general) (routine) without abnormal findings    -  Primary    Encounter for screening mammogram for malignant neoplasm of breast        Relevant Orders    Mammo screening bilateral w 3d & cad          P:   Pap up to date. We reviewed ASCCP guidelines for Pap testing. Mammo slip given   Colonoscopy due    RTO one year for yearly exam or sooner as needed. S:  66 y.o. female here for yearly exam. She is postmenopausal and has had no vaginal bleeding. She denies vaginal discharge, itching, odor or dryness. Sexual activity: She is sexually active without pain, bleeding or dryness. STD testing: She does not want STD testing today.      Menopausal    Last Pap: 2022 NILM/HPV -  Last Mammo: 2022 BIRADS 2  Last Colonoscopy: Never, Declined     Non-smoker, social drinker  Exercises irregularly    Family hx of breast cancer: denies  Family hx of ovarian cancer: denies  Family hx of colon cancer: denies      Current Outpatient Medications:   •  acetaminophen (TYLENOL) 325 mg tablet, Take 2 tablets (650 mg total) by mouth every 6 (six) hours, Disp: , Rfl: 0  •  Cholecalciferol 25 MCG (1000 UT) TBDP, Take 1,000 Units by mouth in the morning, Disp: , Rfl:   •  cinacalcet (SENSIPAR) 30 mg tablet, Take 1 tablet (30 mg total) by mouth daily, Disp: 90 tablet, Rfl: 0  •  folic acid (FOLVITE) 1 mg tablet, Take 1 tablet by mouth daily, Disp: , Rfl:   •  fosinopril (MONOPRIL) 20 mg tablet, Take 1 tablet (20 mg total) by mouth daily, Disp: 180 tablet, Rfl: 0  •  hydrocortisone 2.5 % cream, , Disp: , Rfl:   •  levothyroxine 112 mcg tablet, Take 1 tablet (112 mcg) Mon-Sat & 1.5 tablets (168 mcg) on Sun only, Disp: 96 tablet, Rfl: 0  •  NIFEdipine (PROCARDIA XL) 30 mg 24 hr tablet, Take 1 tablet (30 mg total) by mouth daily, Disp: 90 tablet, Rfl: 0  •  spironolactone (ALDACTONE) 25 mg tablet, Take 1 tablet (25 mg total) by mouth daily, Disp: 90 tablet, Rfl: 0  •  triamcinolone (KENALOG) 0.1 % cream, Apply topically 2 (two) times a day for 7 days, Disp: 80 g, Rfl: 0  •  warfarin (Coumadin) 1 mg tablet, As directed., Disp: 30 tablet, Rfl: 0  •  warfarin (COUMADIN) 3 mg tablet, TAKE ONE TABLET BY MOUTH EVERY DAY. DO NOT TAKE ON MONDAYS AND THURSDAYS, Disp: 90 tablet, Rfl: 0  •  warfarin (COUMADIN) 4 mg tablet, Take by mouth daily Every day except Thursday (Patient not taking: Reported on 7/12/2023), Disp: , Rfl:   Social History     Socioeconomic History   • Marital status: /Civil Union     Spouse name: Not on file   • Number of children: Not on file   • Years of education: Not on file   • Highest education level: Not on file   Occupational History   • Not on file   Tobacco Use   • Smoking status: Never   • Smokeless tobacco: Never   Vaping Use   • Vaping Use: Never used   Substance and Sexual Activity   • Alcohol use: Not Currently     Comment: Twice a year   • Drug use: No   • Sexual activity: Yes     Partners: Male     Birth control/protection: None   Other Topics Concern   • Not on file   Social History Narrative   • Not on file     Social Determinants of Health     Financial Resource Strain: Not on file   Food Insecurity: No Food Insecurity (2/14/2023)    Hunger Vital Sign    • Worried About Running Out of Food in the Last Year: Never true    • Ran Out of Food in the Last Year: Never true   Transportation Needs: No Transportation Needs (2/14/2023)    PRAPARE - Transportation    • Lack of Transportation (Medical): No    • Lack of Transportation (Non-Medical):  No   Physical Activity: Not on file   Stress: Not on file   Social Connections: Not on file   Intimate Partner Violence: Not on file   Housing Stability: Low Risk  (2/14/2023)    Housing Stability Vital Sign    • Unable to Pay for Housing in the Last Year: No    • Number of Places Lived in the Last Year: 1    • Unstable Housing in the Last Year: No     Family History   Problem Relation Age of Onset   • No Known Problems Mother    • Diabetes type II Father    • Diabetes Father    • Stroke Father    • Diabetes type II Paternal Aunt    • Diabetes type II Paternal Uncle    • Diabetes type II Paternal Grandmother    • Stomach cancer Paternal Grandfather    • No Known Problems Sister    • No Known Problems Maternal Grandmother    • No Known Problems Maternal Grandfather    • No Known Problems Sister    • No Known Problems Maternal Aunt    • No Known Problems Maternal Aunt    • No Known Problems Maternal Aunt    • No Known Problems Paternal Aunt    • No Known Problems Paternal Aunt      Past Medical History:   Diagnosis Date   • Acute blood loss anemia 2/9/2023   • Acute cholecystitis 2/3/2023   • ADA (acute kidney injury) ([address]) 2/9/2023   • Anemia     Not sure   • Anti-phospholipid antibody syndrome (HCC)    • Cancer (HCC)     thyroid   • Cellulitis of right index finger 3/4/2021   • Chronic kidney disease    • Clotting disorder (HCC)     Lung embolism,  ITP   • Diabetes mellitus ([address])    • Disease of thyroid gland    • Edema 12/17/2013   • Elevated serum creatinine 2/28/2023   • Fibroid 1998   • Gestational diabetes    • Gross hematuria 12/12/2016   • History of chemotherapy     late 1980s for lupus   • Hordeolum externum of left lower eyelid 5/11/2022   • Hyperparathyroidism ([address])    • Hypertension    • Hypotension due to hypovolemia 2/9/2023   • Idiopathic thrombocytopenic purpura (ITP) ([facility])    • Kidney stone    • Left ear pain 3/23/2020   • Left foot pain 7/11/2022   • Lupus ([address])    • Miscarriage 1996    Lost twin girls 10-   • Osteoarthritis    • Pulmonary embolism ([address]) 1987   • Rash 3/11/2020   • Sjogren's syndrome (HCC)    • Upper respiratory tract infection 11/17/2019   • Vitamin D deficiency         Review of Systems   Respiratory: Negative. Cardiovascular: Negative. Gastrointestinal: Negative for constipation and diarrhea. Genitourinary: Negative for difficulty urinating, pelvic pain, vaginal bleeding, vaginal discharge, itching or odor. O:  Blood pressure 112/78, height 5' 1" (1.549 m), weight 87.5 kg (193 lb). Patient appears well and is not in distress  Neck is supple without masses  Breasts are symmetrical without mass, tenderness, nipple discharge, skin changes or adenopathy. Abdomen is soft and nontender without masses. Vulva without lesions or rashes. Urethral meatus and urethra are normal  Bladder is normal to palpation  Vagina is normal without discharge or bleeding. Cervix is normal without discharge or lesion. Uterus and adnexa are normal, mobile, nontender without palpable mass.   Rectovaginal exam without nodularity/masses/visible blood on glove

## 2023-08-14 ENCOUNTER — TELEPHONE (OUTPATIENT)
Dept: OBGYN CLINIC | Facility: CLINIC | Age: 58
End: 2023-08-14

## 2023-08-14 DIAGNOSIS — E89.0 POSTOPERATIVE HYPOTHYROIDISM: ICD-10-CM

## 2023-08-14 DIAGNOSIS — I82.5Y3 CHRONIC DEEP VEIN THROMBOSIS (DVT) OF PROXIMAL VEIN OF BOTH LOWER EXTREMITIES (HCC): ICD-10-CM

## 2023-08-14 RX ORDER — LEVOTHYROXINE SODIUM 112 UG/1
TABLET ORAL
Qty: 96 TABLET | Refills: 0 | Status: SHIPPED | OUTPATIENT
Start: 2023-08-14

## 2023-08-14 NOTE — TELEPHONE ENCOUNTER
Patient called back she is scheduled for her surgical procedure on 9/29/2023 with Dr. Tomeka Marlow in the [address], Surgical packet mailed out

## 2023-08-14 NOTE — TELEPHONE ENCOUNTER
MELISSA cell phone for patient to call back to schedule her surgical procedure with Dr. Shikha Arizmendi

## 2023-08-15 ENCOUNTER — HOSPITAL ENCOUNTER (OUTPATIENT)
Dept: RADIOLOGY | Age: 58
Discharge: HOME | End: 2023-08-15
Payer: COMMERCIAL

## 2023-08-15 DIAGNOSIS — Z85.850 HISTORY OF THYROID CANCER: ICD-10-CM

## 2023-08-15 PROCEDURE — 76536 US EXAM OF HEAD AND NECK: CPT

## 2023-08-15 RX ORDER — WARFARIN SODIUM 1 MG/1
TABLET ORAL
Qty: 30 TABLET | Refills: 0 | Status: SHIPPED | OUTPATIENT
Start: 2023-08-15

## 2023-09-06 ENCOUNTER — TELEPHONE (OUTPATIENT)
Dept: NEPHROLOGY | Facility: CLINIC | Age: 58
End: 2023-09-06

## 2023-09-07 ENCOUNTER — TELEPHONE (OUTPATIENT)
Dept: OBGYN CLINIC | Facility: CLINIC | Age: 58
End: 2023-09-07

## 2023-09-07 NOTE — TELEPHONE ENCOUNTER
Pt called back to schedule Jan fu. Pt scheduled on 1/3/24 with Dr Snow Johnson in [facility].

## 2023-09-07 NOTE — TELEPHONE ENCOUNTER
Patient has surgery with Dr Clinton Philip on 9/29. She was told she needs a release form from her hematologist since she is on a blood thinner. She wants to know what form she needs. Additionally, how close to the procedure should she get the ordered blood work done?

## 2023-09-07 NOTE — TELEPHONE ENCOUNTER
Patient aware to call hematology office for blood thinner advisement for surgery. Blood work to be done prior to surgery, advised to ask hematologist if he wants labs prior to making recommendations for thinners prior to surgery. Patient verbalized understanding.

## 2023-09-11 ENCOUNTER — OFFICE VISIT (OUTPATIENT)
Dept: LAB | Age: 58
End: 2023-09-11
Payer: COMMERCIAL

## 2023-09-11 ENCOUNTER — LAB (OUTPATIENT)
Dept: LAB | Age: 58
End: 2023-09-11
Payer: COMMERCIAL

## 2023-09-11 DIAGNOSIS — Z85.850 HISTORY OF THYROID CANCER: ICD-10-CM

## 2023-09-11 DIAGNOSIS — E89.0 POSTOPERATIVE HYPOTHYROIDISM: ICD-10-CM

## 2023-09-11 DIAGNOSIS — R80.8 OTHER PROTEINURIA: ICD-10-CM

## 2023-09-11 DIAGNOSIS — Z01.818 PRE-OP TESTING: ICD-10-CM

## 2023-09-11 DIAGNOSIS — N18.2 CHRONIC KIDNEY DISEASE (CKD), STAGE II (MILD): ICD-10-CM

## 2023-09-11 DIAGNOSIS — M32.14 SYSTEMIC LUPUS ERYTHEMATOSUS WITH GLOMERULAR DISEASE, UNSPECIFIED SLE TYPE (HCC): ICD-10-CM

## 2023-09-11 LAB
ALBUMIN SERPL BCP-MCNC: 3.5 G/DL (ref 3.5–5)
ALP SERPL-CCNC: 96 U/L (ref 34–104)
ALT SERPL W P-5'-P-CCNC: 25 U/L (ref 7–52)
ANION GAP SERPL CALCULATED.3IONS-SCNC: 8 MMOL/L
APTT PPP: 49 SECONDS (ref 23–37)
AST SERPL W P-5'-P-CCNC: 25 U/L (ref 13–39)
ATRIAL RATE: 60 BPM
BASOPHILS # BLD AUTO: 0.03 THOUSANDS/ÂΜL (ref 0–0.1)
BASOPHILS NFR BLD AUTO: 1 % (ref 0–1)
BILIRUB SERPL-MCNC: 0.53 MG/DL (ref 0.2–1)
BUN SERPL-MCNC: 17 MG/DL (ref 5–25)
C3 SERPL-MCNC: 131 MG/DL (ref 87–200)
C4 SERPL-MCNC: 42 MG/DL (ref 19–52)
CALCIUM SERPL-MCNC: 8.1 MG/DL (ref 8.4–10.2)
CHLORIDE SERPL-SCNC: 108 MMOL/L (ref 96–108)
CO2 SERPL-SCNC: 24 MMOL/L (ref 21–32)
CREAT SERPL-MCNC: 0.8 MG/DL (ref 0.6–1.3)
CREAT UR-MCNC: 167.9 MG/DL
CRP SERPL QL: 4.3 MG/L
EOSINOPHIL # BLD AUTO: 0.09 THOUSAND/ÂΜL (ref 0–0.61)
EOSINOPHIL NFR BLD AUTO: 1 % (ref 0–6)
ERYTHROCYTE [DISTWIDTH] IN BLOOD BY AUTOMATED COUNT: 12.2 % (ref 11.6–15.1)
GFR SERPL CREATININE-BSD FRML MDRD: 82 ML/MIN/1.73SQ M
GLUCOSE P FAST SERPL-MCNC: 93 MG/DL (ref 65–99)
HCT VFR BLD AUTO: 41.1 % (ref 34.8–46.1)
HGB BLD-MCNC: 14 G/DL (ref 11.5–15.4)
IMM GRANULOCYTES # BLD AUTO: 0.03 THOUSAND/UL (ref 0–0.2)
IMM GRANULOCYTES NFR BLD AUTO: 1 % (ref 0–2)
LYMPHOCYTES # BLD AUTO: 1.83 THOUSANDS/ÂΜL (ref 0.6–4.47)
LYMPHOCYTES NFR BLD AUTO: 29 % (ref 14–44)
MCH RBC QN AUTO: 30.2 PG (ref 26.8–34.3)
MCHC RBC AUTO-ENTMCNC: 34.1 G/DL (ref 31.4–37.4)
MCV RBC AUTO: 89 FL (ref 82–98)
MICROALBUMIN UR-MCNC: 1477.5 MG/L
MICROALBUMIN/CREAT 24H UR: 880 MG/G CREATININE (ref 0–30)
MONOCYTES # BLD AUTO: 0.44 THOUSAND/ÂΜL (ref 0.17–1.22)
MONOCYTES NFR BLD AUTO: 7 % (ref 4–12)
NEUTROPHILS # BLD AUTO: 3.92 THOUSANDS/ÂΜL (ref 1.85–7.62)
NEUTS SEG NFR BLD AUTO: 61 % (ref 43–75)
NRBC BLD AUTO-RTO: 0 /100 WBCS
P AXIS: 47 DEGREES
PLATELET # BLD AUTO: 202 THOUSANDS/UL (ref 149–390)
PMV BLD AUTO: 11 FL (ref 8.9–12.7)
POTASSIUM SERPL-SCNC: 3.8 MMOL/L (ref 3.5–5.3)
PR INTERVAL: 138 MS
PROT SERPL-MCNC: 6.3 G/DL (ref 6.4–8.4)
QRS AXIS: -32 DEGREES
QRSD INTERVAL: 82 MS
QT INTERVAL: 410 MS
QTC INTERVAL: 410 MS
RBC # BLD AUTO: 4.64 MILLION/UL (ref 3.81–5.12)
SODIUM SERPL-SCNC: 140 MMOL/L (ref 135–147)
T WAVE AXIS: 40 DEGREES
TSH SERPL DL<=0.05 MIU/L-ACNC: 1.23 UIU/ML (ref 0.45–4.5)
VENTRICULAR RATE: 60 BPM
WBC # BLD AUTO: 6.34 THOUSAND/UL (ref 4.31–10.16)

## 2023-09-11 PROCEDURE — 93010 ELECTROCARDIOGRAM REPORT: CPT | Performed by: INTERNAL MEDICINE

## 2023-09-11 PROCEDURE — 93005 ELECTROCARDIOGRAM TRACING: CPT

## 2023-09-11 PROCEDURE — 85025 COMPLETE CBC W/AUTO DIFF WBC: CPT

## 2023-09-11 PROCEDURE — 36415 COLL VENOUS BLD VENIPUNCTURE: CPT

## 2023-09-11 PROCEDURE — 80053 COMPREHEN METABOLIC PANEL: CPT

## 2023-09-11 PROCEDURE — 82570 ASSAY OF URINE CREATININE: CPT

## 2023-09-11 PROCEDURE — 86160 COMPLEMENT ANTIGEN: CPT

## 2023-09-11 PROCEDURE — 82043 UR ALBUMIN QUANTITATIVE: CPT

## 2023-09-11 PROCEDURE — 84439 ASSAY OF FREE THYROXINE: CPT

## 2023-09-11 PROCEDURE — 84443 ASSAY THYROID STIM HORMONE: CPT

## 2023-09-11 PROCEDURE — 85730 THROMBOPLASTIN TIME PARTIAL: CPT

## 2023-09-12 ENCOUNTER — TELEPHONE (OUTPATIENT)
Dept: HEMATOLOGY ONCOLOGY | Facility: CLINIC | Age: 58
End: 2023-09-12

## 2023-09-12 ENCOUNTER — DOCUMENTATION (OUTPATIENT)
Dept: HEMATOLOGY ONCOLOGY | Facility: CLINIC | Age: 58
End: 2023-09-12

## 2023-09-12 NOTE — TELEPHONE ENCOUNTER
Patient Call    Who are you speaking with? Patient    If it is not the patient, are they listed on an active communication consent form? N/A   What is the reason for this call? Patient calling to request a letter from Dr. Juan Ding giving her medical clearance for her surgery 12/29/23. She also stated her INR was high and shes concerned   Does this require a call back? Yes   If a call back is required, please list best call back number [phone]   If a call back is required, advise that a message will be forwarded to their care team and someone will return their call as soon as possible. Did you relay this information to the patient?  yes

## 2023-09-13 ENCOUNTER — TELEPHONE (OUTPATIENT)
Dept: NEPHROLOGY | Facility: CLINIC | Age: 58
End: 2023-09-13

## 2023-09-13 ENCOUNTER — TELEPHONE (OUTPATIENT)
Dept: OBGYN CLINIC | Facility: CLINIC | Age: 58
End: 2023-09-13

## 2023-09-13 ENCOUNTER — DOCUMENTATION (OUTPATIENT)
Dept: HEMATOLOGY ONCOLOGY | Facility: CLINIC | Age: 58
End: 2023-09-13

## 2023-09-13 NOTE — TELEPHONE ENCOUNTER
Patient said she had to see hematology for michael, she said they sent her a mychart message, not sure if you need that

## 2023-09-13 NOTE — TELEPHONE ENCOUNTER
----- Message from Ashish Laird DO sent at 9/12/2023  4:19 PM EDT -----  Her protein level in the urine is rising. I worry she may need a kidney biopsy but she takes warfarin. Will need to discuss this with her further. Please find out a good day/time for me to speak with her this week. She did not answer when I just called. Thanks.

## 2023-09-13 NOTE — TELEPHONE ENCOUNTER
Dr. Viral Sales,    Patient sating that if you would like to call her today or any other day please call before 1pm because she start working at [address] [place] [address],[address] [place] phone # [phone].

## 2023-09-13 NOTE — TELEPHONE ENCOUNTER
Talked to patient she is going to call hematology and have them update her note to state patient cleared or surgery. Current note in the system has her medicine guidelines so patient is aware what to take when.

## 2023-09-13 NOTE — PROGRESS NOTES
Per Dr Ny Quevedo is cleared for surgery. Pt to have surgery on 9/29/23.  Anticoagulant instructions as follows:  Hold Coumadin dose for five days prior to surgery.     Beginning 4 days prior to surgery :  Inject Lovenox 90 mg sq in am. (In the abdomen). NO INJECTIONS THE DAY OF SURGERY   The day after surgery; continue with coumadin and Lovenox 90 mg sq for 5 days.      Then: Continue coumadin ONLY

## 2023-09-14 ENCOUNTER — TELEPHONE (OUTPATIENT)
Dept: NEPHROLOGY | Facility: CLINIC | Age: 58
End: 2023-09-14

## 2023-09-14 ENCOUNTER — PREP FOR PROCEDURE (OUTPATIENT)
Dept: INTERVENTIONAL RADIOLOGY/VASCULAR | Facility: CLINIC | Age: 58
End: 2023-09-14

## 2023-09-14 DIAGNOSIS — Z87.39 H/O LUPUS NEPHRITIS: Primary | ICD-10-CM

## 2023-09-14 DIAGNOSIS — R80.8 OTHER PROTEINURIA: ICD-10-CM

## 2023-09-14 DIAGNOSIS — R80.9 PROTEINURIA, UNSPECIFIED TYPE: Primary | ICD-10-CM

## 2023-09-14 LAB — DSDNA AB SER QL CLIF: NEGATIVE

## 2023-09-14 NOTE — TELEPHONE ENCOUNTER
----- Message from Yahir Poster, DO sent at 9/14/2023  9:54 AM EDT -----  Can we please find out from IR what dates are available for renal biopsy? Thanks.

## 2023-09-14 NOTE — TELEPHONE ENCOUNTER
Called IR and first available for renal biopsy will be first week of October at [facility].

## 2023-09-14 NOTE — Clinical Note
I do think her proteinuria has risen as fosinopril dose decreased due to hypotension. The patient is hesitant to proceed with renal biopsy as she is going on lovenox pre D&C 9/29/23. I did place IR biopsy to determine options for timing of renal biopsy. I do not think she can tolerate further antihypertensives and wonder if farxiga would play a role in reducing proteinuria. With the rise, I imaging we should pursue renal biopsy regardless. Let me know your thoughts.

## 2023-09-14 NOTE — PROGRESS NOTES
She feels tired. Her protein in the urine has risen. We discussed need for IR renal biopsy. BP lately 120/80. No longer on twice daily fosinopril since   She is hesitant to proceed with renal biopsy based on timing of D&C which is 9/29/23. She has complications around procedures in the past and is nervous. She will be on lovenox for 5 days prior to this while holding coumadin and was told to continue lovenox after D&C and restart coumadin at the same time. I will place IR consult for renal biopsy to determine potential dates for kidney biopsy but suspect proteinuria rise due to decreased dose of fosinopril. Will d/c rheumatology as well.

## 2023-09-17 DIAGNOSIS — I82.5Y3 CHRONIC DEEP VEIN THROMBOSIS (DVT) OF PROXIMAL VEIN OF BOTH LOWER EXTREMITIES (HCC): ICD-10-CM

## 2023-09-17 DIAGNOSIS — I82.5Y3 CHRONIC DEEP VEIN THROMBOSIS (DVT) OF PROXIMAL VEIN OF BOTH LOWER EXTREMITIES (HCC): Chronic | ICD-10-CM

## 2023-09-18 RX ORDER — WARFARIN SODIUM 1 MG/1
TABLET ORAL
Qty: 30 TABLET | Refills: 0 | Status: SHIPPED | OUTPATIENT
Start: 2023-09-18

## 2023-09-18 RX ORDER — WARFARIN SODIUM 3 MG/1
TABLET ORAL
Qty: 90 TABLET | Refills: 0 | Status: SHIPPED | OUTPATIENT
Start: 2023-09-18

## 2023-09-19 ENCOUNTER — ANESTHESIA EVENT (OUTPATIENT)
Dept: PERIOP | Facility: HOSPITAL | Age: 58
End: 2023-09-19
Payer: COMMERCIAL

## 2023-09-19 LAB — MISCELLANEOUS LAB TEST RESULT: NORMAL

## 2023-09-19 NOTE — TELEPHONE ENCOUNTER
Patient called and is wondering what needs to be done prior to her biopsy, patient also is concerned about the lovenox and the coumadin and needing more before and after the surgery. Patient does not wish to speak to an MA and is requesting Dr. Jennifer Vasquez speak to her personally.

## 2023-09-20 NOTE — PRE-PROCEDURE INSTRUCTIONS
Pre-Surgery Instructions:   Medication Instructions   • acetaminophen (TYLENOL) 325 mg tablet Uses PRN- OK to take day of surgery   • Cholecalciferol 25 MCG (1000 UT) TBDP Stop taking 7 days prior to surgery. • cinacalcet (SENSIPAR) 30 mg tablet Take day of surgery. • folic acid (FOLVITE) 1 mg tablet Stop taking 7 days prior to surgery. • fosinopril (MONOPRIL) 20 mg tablet Hold day of surgery. • levothyroxine 112 mcg tablet Take day of surgery. • NIFEdipine (PROCARDIA XL) 30 mg 24 hr tablet Take day of surgery. • spironolactone (ALDACTONE) 25 mg tablet Hold day of surgery. • warfarin (Coumadin) 1 mg tablet Stop taking 5 days prior to surgery. • warfarin (COUMADIN) 3 mg tablet Stop taking 5 days prior to surgery. Patient is aware once she holds Coumadin, she will need to start Lovenox injections the following day. Medication instructions for day surgery reviewed. Please use only a sip of water to take your instructed medications. Avoid all over the counter vitamins, supplements and NSAIDS for one week prior to surgery per anesthesia guidelines. Tylenol is ok to take as needed. You will receive a call one business day prior to surgery with an arrival time and hospital directions. If your surgery is scheduled on a Monday, the hospital will be calling you on the Friday prior to your surgery. If you have not heard from anyone by 8pm, please call the hospital supervisor through the hospital  at [phone]. Felecia Sherman [phone]). Do not eat or drink anything after midnight the night before your surgery, including candy, mints, lifesavers, or chewing gum. Do not drink alcohol 24hrs before your surgery. Try not to smoke at least 24hrs before your surgery. Follow the pre surgery showering instructions as listed in the [facility] Surgical Experience Booklet” or otherwise provided by your surgeon's office. Do not shave the surgical area 24 hours before surgery.  Do not apply any lotions, creams, including makeup, cologne, deodorant, or perfumes after showering on the day of your surgery. No contact lenses, eye make-up, or artificial eyelashes. Remove nail polish, including gel polish, and any artificial, gel, or acrylic nails if possible. Remove all jewelry including rings and body piercing jewelry. Wear causal clothing that is easy to take on and off. Consider your type of surgery. Keep any valuables, jewelry, piercings at home. Please bring any specially ordered equipment (sling, braces) if indicated. Arrange for a responsible person to drive you to and from the hospital on the day of your surgery. Visitor Guidelines discussed. Call the surgeon's office with any new illnesses, exposures, or additional questions prior to surgery. Please reference your [facility] Surgical Experience Booklet” for additional information to prepare for your upcoming surgery.

## 2023-09-25 DIAGNOSIS — E83.52 HYPERCALCEMIA: ICD-10-CM

## 2023-09-25 RX ORDER — CINACALCET 30 MG/1
30 TABLET, FILM COATED ORAL DAILY
Qty: 90 TABLET | Refills: 0 | Status: SHIPPED | OUTPATIENT
Start: 2023-09-25

## 2023-09-26 NOTE — PRE-PROCEDURE INSTRUCTIONS
Pre-procedure Instructions for Interventional Radiology  [address]  [address]  [phone]    You are scheduled for a/an Random kidney Biopsy. On Tuesday 10/3/23    Your tentative arrival time is 0845. Short stay will notify you the day before your procedure with the exact arrival time and the location to arrive. To prepare for your procedure:  1. Please arrange for someone to drive you home after the procedure and stay with you until the next morning if you are instructed to do so. This is typically for patients receiving some type of sedative or anesthetic for the procedure. 2. DO NOT EAT OR DRINK ANYTHING after midnight on the evening before your procedure including candy & gum.  3. ONLY SIPS OF WATER with your medications are allowed on the morning of your procedure. 4. TAKE ALL OF YOUR REGULAR MEDICATIONS THE MORNING OF YOUR PROCEDURE with sips of water! We may call you to stop some of your blood sugar, blood pressure and blood thinning medications depending on the procedure. Please take all of these medications unless we instruct you to stop them. 5. If you have an allergy to x-ray dye, please contact Interventional Radiology for an x-ray dye preparation which usually consists of an oral steroid and Benadryl. The day of your procedure:  1. Bring a list of the medications you take at home. 2. Bring medications you take for breathing problems (such as inhalers), medications for chest pain, or both. 3. Bring a case for your glasses or contacts. 4. Bring your insurance card and a form of photo ID.  5. Please leave all valuables such as credit cards and jewelry at home. 6. Report to the registration desk in the main lobby at the [address] [place] [address], Entrance B. [facility] to [facility]. 7. While your procedure is being performed, your family may wait in the Radiology Waiting Room on the 1st floor in Radiology. if they need to leave, they may provide a number to be called following the procedure. 8. Be prepared to stay overnight just in case. Sometimes procedures will indicate the need for further observation or treatment. 9. If you are scheduled for a follow-up visit with the Interventional Radiologist after your procedure, you will be called with a date and time. Special Instructions (Medications to stop taking before your procedure etc.):  Coumadin LD 9/26/23 and started on Lovenox 90 units LD 10/2/23,hold AM 10/3/23 and restart per surgery.

## 2023-09-27 PROCEDURE — NC001 PR NO CHARGE: Performed by: STUDENT IN AN ORGANIZED HEALTH CARE EDUCATION/TRAINING PROGRAM

## 2023-09-27 NOTE — H&P (VIEW-ONLY)
H&P Exam - Gynecology   Pravin Muse 66 y.o. female MRN: [id number]  Unit/Bed#:  Encounter: [id number]    Assessment/Plan   66yo with PMB and thickened endometrium, for hysteroscopy, D&C  - void on call to OR  - no abx indicated  - outpatient procedure      History of Present Illness     HPI:  Pravin Muse is a 66 y.o. female who presents for [facility]  for postmenopausal bleeding and thickened endometrium.      Review of Systems as above    Historical Information   Past Medical History:   Diagnosis Date   • Acute blood loss anemia 2023   • Acute cholecystitis 2/3/2023   • ADA (acute kidney injury) ([address]) 2023   • Anemia     Not sure   • Anti-phospholipid antibody syndrome (HCC)    • Cancer (HCC)     thyroid   • Cellulitis of right index finger 3/4/2021   • Chronic kidney disease    • Clotting disorder ([address])     Lung embolism,  ITP   • Diabetes mellitus ([address])    • Disease of thyroid gland    • Edema 2013   • Elevated serum creatinine 2023   • Fibroid    • Gestational diabetes    • Gross hematuria 2016   • History of chemotherapy     late  for lupus   • Hordeolum externum of left lower eyelid 2022   • Hyperparathyroidism ([address])    • Hypertension    • Hypotension due to hypovolemia 2023   • Idiopathic thrombocytopenic purpura (ITP) (HCC)    • Kidney stone    • Left ear pain 3/23/2020   • Left foot pain 2022   • Lupus ([address])    • Miscarriage     Lost twin girls 10-   • Osteoarthritis    • Pulmonary embolism ([address])    • Rash 3/11/2020   • Sjogren's syndrome ([address])    • Upper respiratory tract infection 2019   • Vitamin D deficiency      Past Surgical History:   Procedure Laterality Date   •  SECTION      6185/0526   • CHOLECYSTECTOMY  23   • CHOLECYSTECTOMY LAPAROSCOPIC N/A 2023    Procedure: LAPAROSCOPIC CHOLECYSTECTOMY;  Surgeon: Sedrick Mattson MD;  [facility];  Service: General   • DIAGNOSTIC FLEXIBLE LARYNGOSCOPY N/A 05/25/2021    Procedure: DIAGNOSTIC FLEXIBLE LARYNGOSCOPY;  Surgeon: Madi Avila MD;  Location[facility];  Service: Surgical Oncology   • HERNIA REPAIR     • HIP SURGERY      left side, bone decompression   • HYSTEROSCOPY     • PA PARATHYROIDECTOMY/EXPLORATION PARATHYROIDS Right 05/25/2021    Procedure: PARATHYROIDECTOMY, MINIMALLY INVASIVE, right, intraoperative PTH monitoring;  Surgeon: Madi Avila MD;  Location[facility];  Service: Surgical Oncology   • RENAL BIOPSY      Not sure   • THYROID SURGERY     • TUBAL LIGATION       OB/GYN History:   Family History   Problem Relation Age of Onset   • No Known Problems Mother    • Diabetes type II Father    • Diabetes Father    • Stroke Father    • Diabetes type II Paternal Aunt    • Diabetes type II Paternal Uncle    • Diabetes type II Paternal Grandmother    • Stomach cancer Paternal Grandfather    • No Known Problems Sister    • No Known Problems Maternal Grandmother    • No Known Problems Maternal Grandfather    • No Known Problems Sister    • No Known Problems Maternal Aunt    • No Known Problems Maternal Aunt    • No Known Problems Maternal Aunt    • No Known Problems Paternal Aunt    • No Known Problems Paternal Aunt      Social History   Social History     Substance and Sexual Activity   Alcohol Use Not Currently    Comment: Twice a year     Social History     Substance and Sexual Activity   Drug Use No     Social History     Tobacco Use   Smoking Status Never   Smokeless Tobacco Never     E-Cigarette/Vaping   • E-Cigarette Use Never User      E-Cigarette/Vaping Substances   • Nicotine No    • THC No    • CBD No    • Flavoring No    • Other No    • Unknown No        Meds/Allergies   all current active meds have been reviewed  Allergies   Allergen Reactions   • Penicillins Itching       Objective   Vitals: There were no vitals taken for this visit.     No intake or output data in the 24 hours ending 09/27/23 1336       Physical Exam  "Patient appears well and is not in distress  Neck is supple without masses  Breasts are symmetrical without mass, tenderness, nipple discharge, skin changes or adenopathy. Abdomen is soft and nontender without masses. Vulva without lesions or rashes. Urethral meatus and urethra are normal  Bladder is normal to palpation  Vagina is normal without discharge or bleeding. Cervix is normal without discharge or lesion. Uterus and adnexa are normal, mobile, nontender without palpable mass.   Rectovaginal exam without nodularity/masses/visible blood on glove"

## 2023-09-27 NOTE — H&P
H&P Exam - Gynecology   Mercedes Boyd 66 y.o. female MRN: [id number]  Unit/Bed#:  Encounter: [id number]    Assessment/Plan   64yo with PMB and thickened endometrium, for hysteroscopy, D&C  - void on call to OR  - no abx indicated  - outpatient procedure      History of Present Illness     HPI:  Mercedes Boyd is a 66 y.o. female who presents for [facility]  for postmenopausal bleeding and thickened endometrium.      Review of Systems as above    Historical Information   Past Medical History:   Diagnosis Date   • Acute blood loss anemia 2023   • Acute cholecystitis 2/3/2023   • ADA (acute kidney injury) ([address]) 2023   • Anemia     Not sure   • Anti-phospholipid antibody syndrome (HCC)    • Cancer (HCC)     thyroid   • Cellulitis of right index finger 3/4/2021   • Chronic kidney disease    • Clotting disorder ([address])     Lung embolism,  ITP   • Diabetes mellitus ([address])    • Disease of thyroid gland    • Edema 2013   • Elevated serum creatinine 2023   • Fibroid    • Gestational diabetes    • Gross hematuria 2016   • History of chemotherapy     late  for lupus   • Hordeolum externum of left lower eyelid 2022   • Hyperparathyroidism ([address])    • Hypertension    • Hypotension due to hypovolemia 2023   • Idiopathic thrombocytopenic purpura (ITP) (HCC)    • Kidney stone    • Left ear pain 3/23/2020   • Left foot pain 2022   • Lupus ([address])    • Miscarriage     Lost twin girls 10-   • Osteoarthritis    • Pulmonary embolism ([address])    • Rash 3/11/2020   • Sjogren's syndrome ([address])    • Upper respiratory tract infection 2019   • Vitamin D deficiency      Past Surgical History:   Procedure Laterality Date   •  SECTION      2616/2641   • CHOLECYSTECTOMY  23   • CHOLECYSTECTOMY LAPAROSCOPIC N/A 2023    Procedure: LAPAROSCOPIC CHOLECYSTECTOMY;  Surgeon: Haley Hudson MD;  [facility];  Service: General   • DIAGNOSTIC FLEXIBLE LARYNGOSCOPY N/A 05/25/2021    Procedure: DIAGNOSTIC FLEXIBLE LARYNGOSCOPY;  Surgeon: Evelyn Winchester MD;  [facility];  Service: Surgical Oncology   • HERNIA REPAIR     • HIP SURGERY      left side, bone decompression   • HYSTEROSCOPY     • MT PARATHYROIDECTOMY/EXPLORATION PARATHYROIDS Right 05/25/2021    Procedure: PARATHYROIDECTOMY, MINIMALLY INVASIVE, right, intraoperative PTH monitoring;  Surgeon: Evelyn Winchester MD;  [facility];  Service: Surgical Oncology   • RENAL BIOPSY      Not sure   • THYROID SURGERY     • TUBAL LIGATION       OB/GYN History:   Family History   Problem Relation Age of Onset   • No Known Problems Mother    • Diabetes type II Father    • Diabetes Father    • Stroke Father    • Diabetes type II Paternal Aunt    • Diabetes type II Paternal Uncle    • Diabetes type II Paternal Grandmother    • Stomach cancer Paternal Grandfather    • No Known Problems Sister    • No Known Problems Maternal Grandmother    • No Known Problems Maternal Grandfather    • No Known Problems Sister    • No Known Problems Maternal Aunt    • No Known Problems Maternal Aunt    • No Known Problems Maternal Aunt    • No Known Problems Paternal Aunt    • No Known Problems Paternal Aunt      Social History   Social History     Substance and Sexual Activity   Alcohol Use Not Currently    Comment: Twice a year     Social History     Substance and Sexual Activity   Drug Use No     Social History     Tobacco Use   Smoking Status Never   Smokeless Tobacco Never     E-Cigarette/Vaping   • E-Cigarette Use Never User      E-Cigarette/Vaping Substances   • Nicotine No    • THC No    • CBD No    • Flavoring No    • Other No    • Unknown No        Meds/Allergies   all current active meds have been reviewed  Allergies   Allergen Reactions   • Penicillins Itching       Objective   Vitals: There were no vitals taken for this visit.     No intake or output data in the 24 hours ending 09/27/23 1336       Physical Exam  "Patient appears well and is not in distress  Neck is supple without masses  Breasts are symmetrical without mass, tenderness, nipple discharge, skin changes or adenopathy. Abdomen is soft and nontender without masses. Vulva without lesions or rashes. Urethral meatus and urethra are normal  Bladder is normal to palpation  Vagina is normal without discharge or bleeding. Cervix is normal without discharge or lesion. Uterus and adnexa are normal, mobile, nontender without palpable mass.   Rectovaginal exam without nodularity/masses/visible blood on glove"

## 2023-09-29 ENCOUNTER — ANESTHESIA (OUTPATIENT)
Dept: PERIOP | Facility: HOSPITAL | Age: 58
End: 2023-09-29
Payer: COMMERCIAL

## 2023-09-29 ENCOUNTER — HOSPITAL ENCOUNTER (OUTPATIENT)
Facility: HOSPITAL | Age: 58
Setting detail: OUTPATIENT SURGERY
Discharge: HOME | End: 2023-09-29
Attending: STUDENT IN AN ORGANIZED HEALTH CARE EDUCATION/TRAINING PROGRAM | Admitting: STUDENT IN AN ORGANIZED HEALTH CARE EDUCATION/TRAINING PROGRAM
Payer: COMMERCIAL

## 2023-09-29 VITALS
DIASTOLIC BLOOD PRESSURE: 87 MMHG | HEIGHT: 61 IN | BODY MASS INDEX: 36.44 KG/M2 | HEART RATE: 65 BPM | WEIGHT: 193 LBS | OXYGEN SATURATION: 99 % | SYSTOLIC BLOOD PRESSURE: 159 MMHG | RESPIRATION RATE: 18 BRPM | TEMPERATURE: 98 F

## 2023-09-29 DIAGNOSIS — N95.0 PMB (POSTMENOPAUSAL BLEEDING): ICD-10-CM

## 2023-09-29 DIAGNOSIS — R93.89 THICKENED ENDOMETRIUM: ICD-10-CM

## 2023-09-29 PROBLEM — Z98.890 S/P D&C (STATUS POST DILATION AND CURETTAGE): Status: ACTIVE | Noted: 2023-09-29

## 2023-09-29 LAB
APTT PPP: 31 SECONDS (ref 23–37)
GLUCOSE SERPL-MCNC: 123 MG/DL (ref 65–140)
INR PPP: 1.17 (ref 0.84–1.19)
PROTHROMBIN TIME: 15.1 SECONDS (ref 11.6–14.5)

## 2023-09-29 PROCEDURE — 88305 TISSUE EXAM BY PATHOLOGIST: CPT | Performed by: PATHOLOGY

## 2023-09-29 PROCEDURE — 85610 PROTHROMBIN TIME: CPT | Performed by: ANESTHESIOLOGY

## 2023-09-29 PROCEDURE — 82948 REAGENT STRIP/BLOOD GLUCOSE: CPT

## 2023-09-29 PROCEDURE — 58558 HYSTEROSCOPY BIOPSY: CPT | Performed by: STUDENT IN AN ORGANIZED HEALTH CARE EDUCATION/TRAINING PROGRAM

## 2023-09-29 PROCEDURE — 85730 THROMBOPLASTIN TIME PARTIAL: CPT | Performed by: ANESTHESIOLOGY

## 2023-09-29 RX ORDER — LIDOCAINE HYDROCHLORIDE AND EPINEPHRINE 10; 10 MG/ML; UG/ML
INJECTION, SOLUTION INFILTRATION; PERINEURAL AS NEEDED
Status: DISCONTINUED | OUTPATIENT
Start: 2023-09-29 | End: 2023-09-29 | Stop reason: HOSPADM

## 2023-09-29 RX ORDER — MIDAZOLAM HYDROCHLORIDE 2 MG/2ML
INJECTION, SOLUTION INTRAMUSCULAR; INTRAVENOUS AS NEEDED
Status: DISCONTINUED | OUTPATIENT
Start: 2023-09-29 | End: 2023-09-29

## 2023-09-29 RX ORDER — EPHEDRINE SULFATE 50 MG/ML
INJECTION INTRAVENOUS AS NEEDED
Status: DISCONTINUED | OUTPATIENT
Start: 2023-09-29 | End: 2023-09-29

## 2023-09-29 RX ORDER — FENTANYL CITRATE 50 UG/ML
INJECTION, SOLUTION INTRAMUSCULAR; INTRAVENOUS AS NEEDED
Status: DISCONTINUED | OUTPATIENT
Start: 2023-09-29 | End: 2023-09-29

## 2023-09-29 RX ORDER — FENTANYL CITRATE/PF 50 MCG/ML
25 SYRINGE (ML) INJECTION
Status: DISCONTINUED | OUTPATIENT
Start: 2023-09-29 | End: 2023-09-29 | Stop reason: HOSPADM

## 2023-09-29 RX ORDER — SODIUM CHLORIDE, SODIUM LACTATE, POTASSIUM CHLORIDE, CALCIUM CHLORIDE 600; 310; 30; 20 MG/100ML; MG/100ML; MG/100ML; MG/100ML
INJECTION, SOLUTION INTRAVENOUS CONTINUOUS PRN
Status: DISCONTINUED | OUTPATIENT
Start: 2023-09-29 | End: 2023-09-29

## 2023-09-29 RX ORDER — PROPOFOL 10 MG/ML
INJECTION, EMULSION INTRAVENOUS AS NEEDED
Status: DISCONTINUED | OUTPATIENT
Start: 2023-09-29 | End: 2023-09-29

## 2023-09-29 RX ORDER — LIDOCAINE HYDROCHLORIDE 10 MG/ML
0.5 INJECTION, SOLUTION EPIDURAL; INFILTRATION; INTRACAUDAL; PERINEURAL ONCE AS NEEDED
Status: DISCONTINUED | OUTPATIENT
Start: 2023-09-29 | End: 2023-09-29 | Stop reason: HOSPADM

## 2023-09-29 RX ORDER — ACETAMINOPHEN 325 MG/1
650 TABLET ORAL EVERY 6 HOURS PRN
Status: DISCONTINUED | OUTPATIENT
Start: 2023-09-29 | End: 2023-09-29 | Stop reason: HOSPADM

## 2023-09-29 RX ORDER — SODIUM CHLORIDE, SODIUM LACTATE, POTASSIUM CHLORIDE, CALCIUM CHLORIDE 600; 310; 30; 20 MG/100ML; MG/100ML; MG/100ML; MG/100ML
20 INJECTION, SOLUTION INTRAVENOUS CONTINUOUS
Status: DISCONTINUED | OUTPATIENT
Start: 2023-09-29 | End: 2023-09-29 | Stop reason: HOSPADM

## 2023-09-29 RX ORDER — HYDROMORPHONE HCL IN WATER/PF 6 MG/30 ML
0.2 PATIENT CONTROLLED ANALGESIA SYRINGE INTRAVENOUS
Status: DISCONTINUED | OUTPATIENT
Start: 2023-09-29 | End: 2023-09-29 | Stop reason: HOSPADM

## 2023-09-29 RX ORDER — ONDANSETRON 2 MG/ML
4 INJECTION INTRAMUSCULAR; INTRAVENOUS ONCE AS NEEDED
Status: DISCONTINUED | OUTPATIENT
Start: 2023-09-29 | End: 2023-09-29 | Stop reason: HOSPADM

## 2023-09-29 RX ORDER — LIDOCAINE HCL/PF 100 MG/5ML
SYRINGE (ML) INJECTION AS NEEDED
Status: DISCONTINUED | OUTPATIENT
Start: 2023-09-29 | End: 2023-09-29

## 2023-09-29 RX ORDER — SODIUM CHLORIDE, SODIUM LACTATE, POTASSIUM CHLORIDE, CALCIUM CHLORIDE 600; 310; 30; 20 MG/100ML; MG/100ML; MG/100ML; MG/100ML
125 INJECTION, SOLUTION INTRAVENOUS CONTINUOUS
Status: DISCONTINUED | OUTPATIENT
Start: 2023-09-29 | End: 2023-09-29 | Stop reason: HOSPADM

## 2023-09-29 RX ORDER — IBUPROFEN 600 MG/1
600 TABLET ORAL EVERY 6 HOURS PRN
Status: DISCONTINUED | OUTPATIENT
Start: 2023-09-29 | End: 2023-09-29 | Stop reason: HOSPADM

## 2023-09-29 RX ORDER — METOCLOPRAMIDE HYDROCHLORIDE 5 MG/ML
10 INJECTION INTRAMUSCULAR; INTRAVENOUS ONCE AS NEEDED
Status: DISCONTINUED | OUTPATIENT
Start: 2023-09-29 | End: 2023-09-29 | Stop reason: HOSPADM

## 2023-09-29 RX ORDER — DEXAMETHASONE SODIUM PHOSPHATE 10 MG/ML
INJECTION, SOLUTION INTRAMUSCULAR; INTRAVENOUS AS NEEDED
Status: DISCONTINUED | OUTPATIENT
Start: 2023-09-29 | End: 2023-09-29

## 2023-09-29 RX ORDER — ONDANSETRON 2 MG/ML
INJECTION INTRAMUSCULAR; INTRAVENOUS AS NEEDED
Status: DISCONTINUED | OUTPATIENT
Start: 2023-09-29 | End: 2023-09-29

## 2023-09-29 RX ADMIN — MIDAZOLAM 2 MG: 1 INJECTION INTRAMUSCULAR; INTRAVENOUS at 12:17

## 2023-09-29 RX ADMIN — SODIUM CHLORIDE, SODIUM LACTATE, POTASSIUM CHLORIDE, AND CALCIUM CHLORIDE 20 ML/HR: .6; .31; .03; .02 INJECTION, SOLUTION INTRAVENOUS at 13:03

## 2023-09-29 RX ADMIN — ACETAMINOPHEN 650 MG: 325 TABLET, FILM COATED ORAL at 14:08

## 2023-09-29 RX ADMIN — LIDOCAINE HYDROCHLORIDE 100 MG: 20 INJECTION INTRAVENOUS at 12:24

## 2023-09-29 RX ADMIN — FENTANYL CITRATE 25 MCG: 50 INJECTION, SOLUTION INTRAMUSCULAR; INTRAVENOUS at 12:39

## 2023-09-29 RX ADMIN — SODIUM CHLORIDE, SODIUM LACTATE, POTASSIUM CHLORIDE, AND CALCIUM CHLORIDE: .6; .31; .03; .02 INJECTION, SOLUTION INTRAVENOUS at 12:17

## 2023-09-29 RX ADMIN — EPHEDRINE SULFATE 10 MG: 50 INJECTION INTRAVENOUS at 12:46

## 2023-09-29 RX ADMIN — DEXAMETHASONE SODIUM PHOSPHATE 10 MG: 10 INJECTION, SOLUTION INTRAMUSCULAR; INTRAVENOUS at 12:25

## 2023-09-29 RX ADMIN — ONDANSETRON 4 MG: 2 INJECTION INTRAMUSCULAR; INTRAVENOUS at 12:25

## 2023-09-29 RX ADMIN — PROPOFOL 200 MG: 10 INJECTION, EMULSION INTRAVENOUS at 12:24

## 2023-09-29 RX ADMIN — SODIUM CHLORIDE, SODIUM LACTATE, POTASSIUM CHLORIDE, AND CALCIUM CHLORIDE 125 ML/HR: .6; .31; .03; .02 INJECTION, SOLUTION INTRAVENOUS at 11:27

## 2023-09-29 NOTE — ANESTHESIA POSTPROCEDURE EVALUATION
Post-Op Assessment Note    CV Status:  Stable    Pain management: adequate     Mental Status:  Alert and awake   Hydration Status:  Euvolemic   PONV Controlled:  Controlled   Airway Patency:  Patent      Post Op Vitals Reviewed: Yes      Staff: CRNA         No notable events documented.     BP  130/72   Temp  98.1   Pulse  72   Resp   18   SpO2   99

## 2023-09-29 NOTE — OP NOTE
OPERATIVE REPORT  PATIENT NAME: Darcy Mancia    :  1965  MRN: [id number]  Pt Location: [facility] OR ROOM 07    SURGERY DATE: 2023    Surgeon(s) and Role:     * Lauren Taylor MD - Primary     * Tiara Gamez MD - Assisting     * Taovn Wolf DO - Assisting     * Tavon Wolf DO    Preop Diagnosis: Thickened endometrium [R93.89]  PMB (postmenopausal bleeding) [N95.0]    Post-Op Diagnosis Codes: * Thickened endometrium [R93.89]     * PMB (postmenopausal bleeding) [N95.0]    Procedure(s):  DILATATION AND CURETTAGE (D&C) WITH HYSTEROSCOPY    Specimen(s):  ID Type Source Tests Collected by Time Destination   1 : endometrial curettings with polyps  Tissue Endometrium TISSUE EXAM Constance Pina MD 2023 1244        Estimated Blood Loss:   Minimal    Drains:  * No LDAs found *    Anesthesia Type:   General    Operative Indications:  Darcy Mancia is a 65 y/o with PMB and thickened endometrium. Endometrial stripe was 7.0mm. She was consented for hysteroscopy D&C. Operative Findings:  Normal external female genitalia   Normal appearing vagina and cervix   Uterus sounded to 8cm   On hysteroscopy- polypoid tissue noted on the posterior wall, lower uterine segment and anteriorly in the uterus. Bilateral tubal ostia were visualized. Polypoid tissue removed   Tenaculum sites hemostatic    Complications:   None    Procedure and Technique:  Patient was taken to the operating room where a time out was performed to confirm correct patient and correct procedure. General LMA anesthesia (LMA) was administered and the patient was positioned on the OR table in the dorsal lithotomy position. All pressure points were padded and a chris hugger was placed to maintain control of core body temperature. A bimanual exam was performed and the uterus was noted to be anteverted, normal in size and consistency with no palpable adnexal masses or fullness.  The patient was prepped and draped in the usual sterile fashion. A Shepard speculum was inserted into the vagina and a Latham retractor was used to visualize the anterior lip of the cervix, which was then grasped with a single toothed tenaculum. The uterus was sounded to 8cm. A paracervical block was placed circumferentially around the cervix using 10mL of local. The cervix was serially dilated to 21F using Kumar dilators for introduction of the hysteroscope. Hysteroscope was introduced under direct visualization using normal saline solution as the distention media. Hysteroscope was advanced to the uterine fundus and the entire uterine cavity was inspected in a systematic manner. There was noted to be the findings as stated above. Hysteroscope was withdrawn and sharp curetting was performed, starting at the 12'oclock position and rotating a total of 360 degrees to cover all surfaces. Endometrial tissue was obtained and sent for pathology. The hysteroscope was then re-introduced under direct visualization, again using normal saline solution as the distention media. Entire uterine cavity was inspected in a systematic manner. Adequate curetting was confirmed and hysteroscopy was withdrawn. The single toothed tenaculum was removed from the anterior lip of the cervix. Good hemostasis was confirmed at the tenaculum puncture sites. Speculum was then removed from the vagina. At the conclusion of the procedure, all needle, sponge, and instrument counts were noted to be correct x2. Dr. Oswaldo Bernal was present and participated in all key portions of the case.     Patient Disposition:  PACU  and extubated and stable        SIGNATURE: Kim Ferreira MD  DATE: September 29, 2023  TIME: 1:05 PM

## 2023-09-29 NOTE — INTERVAL H&P NOTE
H&P reviewed. After examining the patient I find no changes in the patients condition since the H&P had been written.     Vitals:    09/29/23 1059   BP: (!) 149/102   Pulse: 63   Resp: 20   Temp: (!) 97 °F (36.1 °C)   SpO2: 97%     Gen: NAD  CV: rrr  Resp: no excess work of breathing  Ext: normal movement throughout

## 2023-09-29 NOTE — ANESTHESIA PREPROCEDURE EVALUATION
Procedure:  DILATATION AND CURETTAGE (D&C) WITH HYSTEROSCOPY (Uterus)    65 yo F with PMHx of SLE, anti-phospholipid syndrome with h/o DVT/PE (on Coumadin), HTN, CKD/lupus nephritis, hypothyroidism. Stopped Coumadin 5 days ago, on daily therapeutic Lovenox, last dose 9/28 AM. SLE without cervical arthritis symptoms, denies chronic steroid use, off Plaquenil currently. Did not take antihypertensives this AM, NPO>8 hours. Relevant Problems   ANESTHESIA (within normal limits)      CARDIO   (+) Chronic deep vein thrombosis (DVT) of proximal vein of both lower extremities (HCC)   (+) Hypertension      ENDO   (+) Postoperative hypothyroidism   (+) Primary hyperparathyroidism (HCC)      /RENAL   (+) Benign hypertensive CKD   (+) Chronic kidney disease (CKD), stage II (mild)   (+) Nephrolithiasis        Physical Exam    Airway    Mallampati score: II  TM Distance: >3 FB  Neck ROM: full     Dental       Cardiovascular  Rhythm: regular, Rate: normal,     Pulmonary  Breath sounds clear to auscultation,     Other Findings        Anesthesia Plan  ASA Score- 2     Anesthesia Type-         Additional Monitors:   Airway Plan: LMA. Plan Factors-Exercise tolerance (METS): >4 METS. Chart reviewed. EKG reviewed. Imaging results reviewed. Existing labs reviewed. Patient summary reviewed. Patient is not a current smoker. Induction- intravenous. Postoperative Plan- Plan for postoperative opioid use. Planned trial extubation    Informed Consent- Anesthetic plan and risks discussed with patient. I personally reviewed this patient with the CRNA. Discussed and agreed on the Anesthesia Plan with the CRNA. Claudio Spangler

## 2023-10-02 ENCOUNTER — TELEPHONE (OUTPATIENT)
Dept: LABOR AND DELIVERY | Facility: HOSPITAL | Age: 58
End: 2023-10-02

## 2023-10-02 LAB — GLUCOSE SERPL-MCNC: 93 MG/DL (ref 65–140)

## 2023-10-03 ENCOUNTER — HOSPITAL ENCOUNTER (OUTPATIENT)
Dept: RADIOLOGY | Facility: HOSPITAL | Age: 58
Discharge: HOME | End: 2023-10-03
Attending: RADIOLOGY
Payer: COMMERCIAL

## 2023-10-03 VITALS
RESPIRATION RATE: 18 BRPM | BODY MASS INDEX: 36.82 KG/M2 | HEART RATE: 65 BPM | OXYGEN SATURATION: 99 % | SYSTOLIC BLOOD PRESSURE: 120 MMHG | WEIGHT: 195 LBS | HEIGHT: 61 IN | DIASTOLIC BLOOD PRESSURE: 71 MMHG | TEMPERATURE: 97.2 F

## 2023-10-03 DIAGNOSIS — R80.9 PROTEINURIA, UNSPECIFIED TYPE: ICD-10-CM

## 2023-10-03 LAB
ERYTHROCYTE [DISTWIDTH] IN BLOOD BY AUTOMATED COUNT: 12 % (ref 11.6–15.1)
HCT VFR BLD AUTO: 40.1 % (ref 34.8–46.1)
HGB BLD-MCNC: 13.4 G/DL (ref 11.5–15.4)
INR PPP: 0.9 (ref 0.84–1.19)
MCH RBC QN AUTO: 29.5 PG (ref 26.8–34.3)
MCHC RBC AUTO-ENTMCNC: 33.4 G/DL (ref 31.4–37.4)
MCV RBC AUTO: 88 FL (ref 82–98)
PLATELET # BLD AUTO: 187 THOUSANDS/UL (ref 149–390)
PMV BLD AUTO: 10.2 FL (ref 8.9–12.7)
PROTHROMBIN TIME: 12.1 SECONDS (ref 11.6–14.5)
RBC # BLD AUTO: 4.54 MILLION/UL (ref 3.81–5.12)
WBC # BLD AUTO: 7.53 THOUSAND/UL (ref 4.31–10.16)

## 2023-10-03 PROCEDURE — 50200 RENAL BIOPSY PERQ: CPT

## 2023-10-03 PROCEDURE — 88300 SURGICAL PATH GROSS: CPT | Performed by: STUDENT IN AN ORGANIZED HEALTH CARE EDUCATION/TRAINING PROGRAM

## 2023-10-03 PROCEDURE — 88350 IMFLUOR EA ADDL 1ANTB STN PX: CPT | Performed by: RADIOLOGY

## 2023-10-03 PROCEDURE — 99152 MOD SED SAME PHYS/QHP 5/>YRS: CPT

## 2023-10-03 PROCEDURE — 77012 CT SCAN FOR NEEDLE BIOPSY: CPT | Performed by: RADIOLOGY

## 2023-10-03 PROCEDURE — 88348 ELECTRON MICROSCOPY DX: CPT | Performed by: RADIOLOGY

## 2023-10-03 PROCEDURE — 77012 CT SCAN FOR NEEDLE BIOPSY: CPT

## 2023-10-03 PROCEDURE — 88305 TISSUE EXAM BY PATHOLOGIST: CPT | Performed by: RADIOLOGY

## 2023-10-03 PROCEDURE — 85027 COMPLETE CBC AUTOMATED: CPT | Performed by: RADIOLOGY

## 2023-10-03 PROCEDURE — 88346 IMFLUOR 1ST 1ANTB STAIN PX: CPT | Performed by: RADIOLOGY

## 2023-10-03 PROCEDURE — 88329 PATH CONSLTJ DRG SURG: CPT | Performed by: STUDENT IN AN ORGANIZED HEALTH CARE EDUCATION/TRAINING PROGRAM

## 2023-10-03 PROCEDURE — 88313 SPECIAL STAINS GROUP 2: CPT | Performed by: RADIOLOGY

## 2023-10-03 PROCEDURE — 99153 MOD SED SAME PHYS/QHP EA: CPT

## 2023-10-03 PROCEDURE — 50200 RENAL BIOPSY PERQ: CPT | Performed by: RADIOLOGY

## 2023-10-03 PROCEDURE — 99152 MOD SED SAME PHYS/QHP 5/>YRS: CPT | Performed by: RADIOLOGY

## 2023-10-03 PROCEDURE — 85610 PROTHROMBIN TIME: CPT | Performed by: RADIOLOGY

## 2023-10-03 RX ORDER — SODIUM CHLORIDE 9 MG/ML
75 INJECTION, SOLUTION INTRAVENOUS CONTINUOUS
Status: DISCONTINUED | OUTPATIENT
Start: 2023-10-03 | End: 2023-10-04 | Stop reason: HOSPADM

## 2023-10-03 RX ORDER — MIDAZOLAM HYDROCHLORIDE 2 MG/2ML
INJECTION, SOLUTION INTRAMUSCULAR; INTRAVENOUS AS NEEDED
Status: DISCONTINUED | OUTPATIENT
Start: 2023-10-03 | End: 2023-10-04 | Stop reason: HOSPADM

## 2023-10-03 RX ORDER — LIDOCAINE HYDROCHLORIDE 10 MG/ML
INJECTION, SOLUTION EPIDURAL; INFILTRATION; INTRACAUDAL; PERINEURAL AS NEEDED
Status: DISCONTINUED | OUTPATIENT
Start: 2023-10-03 | End: 2023-10-04 | Stop reason: HOSPADM

## 2023-10-03 RX ORDER — FENTANYL CITRATE 50 UG/ML
INJECTION, SOLUTION INTRAMUSCULAR; INTRAVENOUS AS NEEDED
Status: DISCONTINUED | OUTPATIENT
Start: 2023-10-03 | End: 2023-10-04 | Stop reason: HOSPADM

## 2023-10-03 RX ADMIN — FENTANYL CITRATE 50 MCG: 50 INJECTION, SOLUTION INTRAMUSCULAR; INTRAVENOUS at 10:35

## 2023-10-03 RX ADMIN — FENTANYL CITRATE 50 MCG: 50 INJECTION, SOLUTION INTRAMUSCULAR; INTRAVENOUS at 10:50

## 2023-10-03 RX ADMIN — MIDAZOLAM 1 MG: 1 INJECTION INTRAMUSCULAR; INTRAVENOUS at 10:50

## 2023-10-03 RX ADMIN — LIDOCAINE HYDROCHLORIDE 10 ML: 10 INJECTION, SOLUTION EPIDURAL; INFILTRATION; INTRACAUDAL; PERINEURAL at 10:48

## 2023-10-03 RX ADMIN — FENTANYL CITRATE 25 MCG: 50 INJECTION, SOLUTION INTRAMUSCULAR; INTRAVENOUS at 11:27

## 2023-10-03 RX ADMIN — MIDAZOLAM 1 MG: 1 INJECTION INTRAMUSCULAR; INTRAVENOUS at 10:35

## 2023-10-03 RX ADMIN — SODIUM CHLORIDE 75 ML/HR: 0.9 INJECTION, SOLUTION INTRAVENOUS at 09:01

## 2023-10-03 NOTE — DISCHARGE INSTRUCTIONS
Percutaneous Kidney Biopsy   WHAT YOU NEED TO KNOW:   A percutaneous kidney biopsy is a procedure to remove a small sample of kidney tissue. It may also be done to check for kidney disease or cancer. DISCHARGE INSTRUCTIONS:   Follow up with your healthcare provider as directed:  Write down your questions so you remember to ask them during your visits. Wound care: The Band-Aid may be removed in 24 hours. For more information:   National Kid[employer] and U[employer] Information Clearing[address] [address]  Phone: 5- [phone]  Web Address: http://kidney. niddk.nih.gov/   Care after your procedure:    1. Limit your activities for 36 hours after your biopsy. 2. No driving day of biopsy. 3. Return to your normal diet. Flat sips of flat soda helps with mild nausea. 4. Remove band-aid or dressing 24 hours after procedure. Contact Interventional Radiology at [phone]    Vishal PATIENTS: Contact Interventional Radiology at [phone]) Stephania Ugarte PATIENTS: Contact Interventional Radiology at [phone]) if:    1. Difficulty breathing, nausea or vomiting. 2  You feel weak or dizzy. 3. Chills or fever above 101 degrees F. You have persistent nausea or vomiting    4. You have severe pain in your abdomen or where You feel weak or dizzy. your           procedure was done. 5  You have blood in your urine. You urinate small amounts or not at all. 4. Develop any redness, swelling, heat, unusual drainage, heavy bruising or bleeding     from biopsy site. Moderate Sedation   WHAT YOU NEED TO KNOW:   Moderate sedation, or conscious sedation, is medicine used during procedures to help you feel relaxed and calm. You will be awake and able to follow directions without anxiety or pain. You will remember little to none of the procedure.  You may feel tired, weak, or unsteady on your feet after you get sedation. You may also have trouble concentrating or short-term memory loss. These symptoms should go away in 24 hours or less. DISCHARGE INSTRUCTIONS:   Call 911 or have someone else call for any of the following: You have sudden trouble breathing. You cannot be woken. Seek care immediately if:   You have a severe headache or dizziness. Your heart is beating faster than usual.  Contact your healthcare provider if:   You have a fever. You have nausea or are vomiting for more than 8 hours after the procedure. Your skin is itchy, swollen, or you have a rash. You have questions or concerns about your condition or care. Self-care:   Have someone stay with you for 24 hours. This person can drive you to errands and help you do things around the house. This person can also watch for problems. Rest and do quiet activities for 24 hours. Do not exercise, ride a bike, or play sports. Stand up slowly to prevent dizziness and falls. Take short walks around the house with another person. Slowly return to your usual activities the next day. Do not drive or use dangerous machines or tools for 24 hours. You may injure yourself or others. Examples include a lawnmower, saw, or drill. Do not return to work for 24 hours if you use dangerous machines or tools for work. Do not make important decisions for 24 hours. For example, do not sign important papers or invest money. Drink liquids as directed. Liquids help flush the sedation medicine out of your body. Ask how much liquid to drink each day and which liquids are best for you. Eat small, frequent meals to prevent nausea and vomiting. Start with clear liquids such as juice or broth. If you do not vomit after clear liquids, you can eat your usual foods. Do not drink alcohol or take medicines that make you drowsy. This includes medicines that help you sleep and anxiety medicines.  Ask your healthcare provider if it is safe for you to take pain medicine. Follow up with your healthcare provider as directed: Write down your questions so you remember to ask them during your visits. © 2017 5 [facility] [employer] Information is for End User's use only and may not be sold, redistributed or otherwise used for commercial purposes. All illustrations and images included in CareNotes® are the copyrighted property of [employer]. or Jonathan Pereira. The above information is an  only. It is not intended as medical advice for individual conditions or treatments. Talk to your doctor, nurse or pharmacist before following any medical regimen to see if it is safe and effective for you.

## 2023-10-03 NOTE — INTERVAL H&P NOTE
Update: (This section must be completed if the H&P was completed greater than 24 hrs to procedure or admission)    H&P reviewed. After examining the patient, I find no changed to the H&P since it had been written. Patient re-evaluated. Accept as history and physical.    Patient understands the risk of bleeding and infection with solid organ biopsy and wishes to proceed.     Carmelo Olszewski, MD/October 3, 2023/10:25 AM

## 2023-10-03 NOTE — SEDATION DOCUMENTATION
Kidney biopsy by Jessica Jimenez. Patient tolerated procedure well. Bandaid placed on biopsy site. Report called to [facility] - GET LU RN and bed rest start time 1145.

## 2023-10-04 ENCOUNTER — TELEPHONE (OUTPATIENT)
Dept: OBGYN CLINIC | Facility: CLINIC | Age: 58
End: 2023-10-04

## 2023-10-04 DIAGNOSIS — I10 ESSENTIAL HYPERTENSION, BENIGN: ICD-10-CM

## 2023-10-04 DIAGNOSIS — I10 ESSENTIAL HYPERTENSION: ICD-10-CM

## 2023-10-04 PROCEDURE — 88305 TISSUE EXAM BY PATHOLOGIST: CPT | Performed by: PATHOLOGY

## 2023-10-04 RX ORDER — NIFEDIPINE 30 MG/1
30 TABLET, EXTENDED RELEASE ORAL DAILY
Qty: 90 TABLET | Refills: 3 | Status: SHIPPED | OUTPATIENT
Start: 2023-10-04

## 2023-10-04 RX ORDER — SPIRONOLACTONE 25 MG/1
25 TABLET ORAL DAILY
Qty: 90 TABLET | Refills: 3 | Status: SHIPPED | OUTPATIENT
Start: 2023-10-04

## 2023-10-10 ENCOUNTER — APPOINTMENT (OUTPATIENT)
Dept: LAB | Facility: HOSPITAL | Age: 58
End: 2023-10-10
Payer: COMMERCIAL

## 2023-10-10 ENCOUNTER — TELEPHONE (OUTPATIENT)
Dept: NEPHROLOGY | Facility: CLINIC | Age: 58
End: 2023-10-10

## 2023-10-10 NOTE — TELEPHONE ENCOUNTER
Received a fax from [facility]. Sent it to patient chart and gave it to Dr. Rosita Trejo at [address] [place] [address].

## 2023-10-13 ENCOUNTER — TELEPHONE (OUTPATIENT)
Dept: NEPHROLOGY | Facility: CLINIC | Age: 58
End: 2023-10-13

## 2023-10-13 LAB — SCAN RESULT: NORMAL

## 2023-10-13 NOTE — TELEPHONE ENCOUNTER
Patient would like to follow up with Dr. Angel Bamberger in regards to her biopsy. She stated she does not understand all the paperwork and she would like to know where to go from here. She stated she has not heard anything back about her biopsy results. Please advise patient on next steps.

## 2023-10-16 ENCOUNTER — TELEPHONE (OUTPATIENT)
Dept: OTHER | Facility: HOSPITAL | Age: 58
End: 2023-10-16

## 2023-10-16 DIAGNOSIS — R80.9 PROTEINURIA: Primary | ICD-10-CM

## 2023-10-16 DIAGNOSIS — I12.9 PARENCHYMAL RENAL HYPERTENSION, STAGE 1 THROUGH STAGE 4 OR UNSPECIFIED CHRONIC KIDNEY DISEASE: ICD-10-CM

## 2023-10-16 NOTE — TELEPHONE ENCOUNTER
I would like to consider increasing her ACEi pending latest BP readings. Would consider plaquenil if patient willing in light of class V + III LN with mild activity and mild chronicity. Would also consider benlysta. I called the patient to review renal biopsy results but she did not answer. ----- Message from Jennie Rosa MD sent at 10/11/2023  1:51 PM EDT -----  Thanks. Guess you're just going to continue with the ACE inhibitor due to mild activity.  ----- Message -----  From: Leyla Swanson DO  Sent: 10/9/2023  10:24 AM EDT  To: Jennie Rosa MD    Her kidney biopsy report is attached. Looks like class V and class III but with mild activity/chronicity.  ----- Message -----  From: Interface, Transcription Incoming  Sent: 10/6/2023   7:32 PM EDT  To:  Leyla Swanson DO

## 2023-10-17 RX ORDER — FOSINOPRIL SODIUM 20 MG/1
20 TABLET ORAL 2 TIMES DAILY
Qty: 180 TABLET | Refills: 0 | Status: SHIPPED | OUTPATIENT
Start: 2023-10-17 | End: 2024-01-15

## 2023-10-17 NOTE — TELEPHONE ENCOUNTER
I reviewed the patient's biopsy results with her over the phone. 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 She has refused Plaquenil in the past but I highly encouraged her to consider Plaquenil as well as Benlysta. I have encouraged her to speak with her rheumatologist regarding this. I recommended increasing her fosinopril from 20 mg daily to 20 mg twice daily. Have sent prescription for fosinopril 20 mg twice daily to [address] [place] [address]. She is to monitor her blood pressure to ensure she is not having episodes of hypotension. The purpose of increasing the lisinopril is to reduce proteinuria in the setting of her lupus nephritis. She states she will be calling the rheumatology office at the end of the week to further discuss Benlysta and Plaquenil with Dr. Hetal Eastman. I have informed Dr. Hetal Eastman of this as well. We will repeat BMP in 1 week to monitor potassium in the setting of increased lisinopril dose. We will repeat proteinuria and blood work labs in December 2023.

## 2023-10-18 ENCOUNTER — HOSPITAL ENCOUNTER (OUTPATIENT)
Dept: RADIOLOGY | Age: 58
Discharge: HOME | End: 2023-10-18
Payer: COMMERCIAL

## 2023-10-18 ENCOUNTER — APPOINTMENT (OUTPATIENT)
Dept: LAB | Age: 58
End: 2023-10-18
Payer: COMMERCIAL

## 2023-10-18 DIAGNOSIS — E83.52 HYPERCALCEMIA: ICD-10-CM

## 2023-10-18 DIAGNOSIS — N28.89 RENAL MASS: ICD-10-CM

## 2023-10-18 DIAGNOSIS — Z11.59 SCREENING FOR VIRAL DISEASE: ICD-10-CM

## 2023-10-18 DIAGNOSIS — M32.14 SYSTEMIC LUPUS ERYTHEMATOSUS WITH GLOMERULAR DISEASE, UNSPECIFIED SLE TYPE (HCC): ICD-10-CM

## 2023-10-18 LAB
ANION GAP SERPL CALCULATED.3IONS-SCNC: 7 MMOL/L
BUN SERPL-MCNC: 24 MG/DL (ref 5–25)
CA-I BLD-SCNC: 1.19 MMOL/L (ref 1.12–1.32)
CALCIUM SERPL-MCNC: 8.9 MG/DL (ref 8.4–10.2)
CHLORIDE SERPL-SCNC: 103 MMOL/L (ref 96–108)
CO2 SERPL-SCNC: 26 MMOL/L (ref 21–32)
CREAT SERPL-MCNC: 0.83 MG/DL (ref 0.6–1.3)
GFR SERPL CREATININE-BSD FRML MDRD: 79 ML/MIN/1.73SQ M
GLUCOSE P FAST SERPL-MCNC: 86 MG/DL (ref 65–99)
HBV CORE AB SER QL: NORMAL
HBV CORE IGM SER QL: NORMAL
HBV SURFACE AG SER QL: NORMAL
HCV AB SER QL: NORMAL
POTASSIUM SERPL-SCNC: 3.9 MMOL/L (ref 3.5–5.3)
SODIUM SERPL-SCNC: 136 MMOL/L (ref 135–147)

## 2023-10-18 PROCEDURE — 86704 HEP B CORE ANTIBODY TOTAL: CPT

## 2023-10-18 PROCEDURE — 80048 BASIC METABOLIC PNL TOTAL CA: CPT

## 2023-10-18 PROCEDURE — 87340 HEPATITIS B SURFACE AG IA: CPT

## 2023-10-18 PROCEDURE — 86705 HEP B CORE ANTIBODY IGM: CPT

## 2023-10-18 PROCEDURE — 82330 ASSAY OF CALCIUM: CPT

## 2023-10-18 PROCEDURE — 86480 TB TEST CELL IMMUN MEASURE: CPT

## 2023-10-18 PROCEDURE — 86803 HEPATITIS C AB TEST: CPT

## 2023-10-18 PROCEDURE — 76775 US EXAM ABDO BACK WALL LIM: CPT

## 2023-10-19 LAB
GAMMA INTERFERON BACKGROUND BLD IA-ACNC: 0.02 IU/ML
M TB IFN-G BLD-IMP: NEGATIVE
M TB IFN-G CD4+ BCKGRND COR BLD-ACNC: -0.01 IU/ML
M TB IFN-G CD4+ BCKGRND COR BLD-ACNC: -0.01 IU/ML
MITOGEN IGNF BCKGRD COR BLD-ACNC: 9.98 IU/ML

## 2023-10-24 ENCOUNTER — OFFICE VISIT (OUTPATIENT)
Dept: UROLOGY | Facility: AMBULATORY SURGERY CENTER | Age: 58
End: 2023-10-24
Payer: COMMERCIAL

## 2023-10-24 VITALS
HEART RATE: 69 BPM | DIASTOLIC BLOOD PRESSURE: 80 MMHG | BODY MASS INDEX: 37.03 KG/M2 | OXYGEN SATURATION: 98 % | SYSTOLIC BLOOD PRESSURE: 112 MMHG | WEIGHT: 196 LBS

## 2023-10-24 DIAGNOSIS — N20.0 CALCULUS OF KIDNEY: Primary | ICD-10-CM

## 2023-10-24 PROCEDURE — 99213 OFFICE O/P EST LOW 20 MIN: CPT | Performed by: UROLOGY

## 2023-10-24 NOTE — PROGRESS NOTES
10/24/2023    Dre Tse  1965  [id number]        Assessment  Lupus nephritis, 8 mm nonobstructing left renal calculus, no evidence of renal mass      Discussion  I reviewed her recent ultrasound and provided her with reassurance that there is no sign of renal mass or lesion. She has a nonobstructing stable 8 mm left midpole calculus. We discussed observation versus surgical intervention. She wishes to observe at this time. She was instructed to call immediately if she were to develop left-sided flank pain. She will continue to follow with nephrology for her lupus nephritis        History of Present Illness  66 y.o. female with a history of incidentally found "subcentimeter hypodensities" on CT scan. The scan was performed in February 2023. To ensure no adverse pathology I recommended 6-month follow-up with a renal bladder ultrasound. She returns to the office today. The ultrasound shows right renal cysts as well as a nonobstructing left renal calculus measuring up to 8 mm in the midpole. This stone was present on the CT scan from February as well. She has a history of lupus nephritis and recently had a renal biopsy performed at [facility] in [place]. AUA Symptom Score      Review of Systems  Review of Systems   Constitutional: Negative. HENT: Negative. Eyes: Negative. Respiratory: Negative. Cardiovascular: Negative. Gastrointestinal: Negative. Endocrine: Negative. Genitourinary:         Per HPI   Musculoskeletal: Negative. Skin: Negative. Allergic/Immunologic: Negative. Neurological: Negative. Hematological: Negative. Psychiatric/Behavioral: Negative.            Past Medical History  Past Medical History:   Diagnosis Date    Acute blood loss anemia 2/9/2023    Acute cholecystitis 2/3/2023    ADA (acute kidney injury) ([address]) 2/9/2023    Anemia     Not sure    Anti-phospholipid antibody syndrome (HCC)     Cancer (HCC)     thyroid    Cellulitis of right index finger 3/4/2021    Chronic kidney disease     Clotting disorder ([address])     Lung embolism,  ITP    Diabetes mellitus ([address])     Disease of thyroid gland     Edema 2013    Elevated serum creatinine 2023    Fibroid 1998    Gestational diabetes     Gross hematuria 2016    History of chemotherapy     late  for lupus    Hordeolum externum of left lower eyelid 2022    Hyperparathyroidism ([address])     Hypertension     Hypotension due to hypovolemia 2023    Idiopathic thrombocytopenic purpura (ITP) (HCC)     Kidney stone     Left ear pain 3/23/2020    Left foot pain 2022    Lupus ([address])     Miscarriage 1996    Lost twin girls 10-    Osteoarthritis     Pulmonary embolism ([address]) 1987    Rash 3/11/2020    Sjogren's syndrome ([address])     Upper respiratory tract infection 2019    Vitamin D deficiency        Past Social History  Past Surgical History:   Procedure Laterality Date     SECTION      0691/393    CHOLECYSTECTOMY  23    CHOLECYSTECTOMY LAPAROSCOPIC N/A 2023    Procedure: LAPAROSCOPIC CHOLECYSTECTOMY;  Surgeon: Izzy Gutierrez MD;  Location: [facility];  Service: General    DIAGNOSTIC FLEXIBLE LARYNGOSCOPY N/A 2021    Procedure: DIAGNOSTIC FLEXIBLE LARYNGOSCOPY;  Surgeon: Olvin Purdy MD;  Location: [facility];  Service: Surgical Oncology    HERNIA REPAIR      HIP SURGERY      left side, bone decompression    HYSTEROSCOPY      IR BIOPSY KIDNEY RANDOM  10/3/2023    [place] HYSTEROSCOPY BX ENDOMETRIUM&/POLYPC W/WO D&C N/A 2023    Procedure: DILATATION AND CURETTAGE (D&C) WITH HYSTEROSCOPY;  Surgeon: Edelmira Sen MD;  Location: [facility];  Service: Gynecology    NY PARATHYROIDECTOMY/EXPLORATION PARATHYROIDS Right 2021    Procedure: PARATHYROIDECTOMY, MINIMALLY INVASIVE, right, intraoperative PTH monitoring;  Surgeon: Olvin Purdy MD;  Location: [facility];  Service: Surgical Oncology    RENAL BIOPSY      Not sure    THYROID SURGERY      TUBAL LIGATION         Past Family History  Family History   Problem Relation Age of Onset    No Known Problems Mother     Diabetes type II Father     Diabetes Father     Stroke Father     Diabetes type II Paternal Aunt     Diabetes type II Paternal Uncle     Diabetes type II Paternal Grandmother     Stomach cancer Paternal Grandfather     No Known Problems Sister     No Known Problems Maternal Grandmother     No Known Problems Maternal Grandfather     No Known Problems Sister     No Known Problems Maternal Aunt     No Known Problems Maternal Aunt     No Known Problems Maternal Aunt     No Known Problems Paternal Aunt     No Known Problems Paternal Aunt        Past Social history  Social History     Socioeconomic History    Marital status: /Civil Union     Spouse name: Not on file    Number of children: Not on file    Years of education: Not on file    Highest education level: Not on file   Occupational History    Not on file   Tobacco Use    Smoking status: Never    Smokeless tobacco: Never   Vaping Use    Vaping Use: Never used   Substance and Sexual Activity    Alcohol use: Not Currently     Comment: Twice a year    Drug use: No    Sexual activity: Yes     Partners: Male     Birth control/protection: None   Other Topics Concern    Not on file   Social History Narrative    Not on file     Social Determinants of Health     Financial Resource Strain: Not on file   Food Insecurity: No Food Insecurity (2/14/2023)    Hunger Vital Sign     Worried About Running Out of Food in the Last Year: Never true     Ran Out of Food in the Last Year: Never true   Transportation Needs: No Transportation Needs (2/14/2023)    PRAPARE - Transportation     Lack of Transportation (Medical): No     Lack of Transportation (Non-Medical):  No   Physical Activity: Not on file   Stress: Not on file   Social Connections: Not on file   Intimate Partner Violence: Not on file   Housing Stability: Low Risk  (2/14/2023)    Housing Stability Vital Sign     Unable to Pay for Housing in the Last Year: No     Number of Places Lived in the Last Year: 1     Unstable Housing in the Last Year: No       Current Medications  Current Outpatient Medications   Medication Sig Dispense Refill    acetaminophen (TYLENOL) 325 mg tablet Take 2 tablets (650 mg total) by mouth every 6 (six) hours  0    Cholecalciferol 25 MCG (1000 UT) TBDP Take 1,000 Units by mouth in the morning      cinacalcet (SENSIPAR) 30 mg tablet Take 1 tablet (30 mg total) by mouth daily 90 tablet 0    folic acid (FOLVITE) 1 mg tablet Take 1 tablet by mouth daily      fosinopril (MONOPRIL) 20 mg tablet Take 1 tablet (20 mg total) by mouth 2 (two) times a day 180 tablet 0    hydrocortisone 2.5 % cream       hydroxychloroquine (PLAQUENIL) 200 mg tablet Take 1 tablet (200 mg total) by mouth 2 (two) times a day with meals 180 tablet 1    levothyroxine 112 mcg tablet Take 1 tablet (112 mcg) Mon-Sat & 1.5 tablets (168 mcg) on Sun only 96 tablet 0    NIFEdipine (PROCARDIA XL) 30 mg 24 hr tablet Take 1 tablet (30 mg total) by mouth daily 90 tablet 3    spironolactone (ALDACTONE) 25 mg tablet Take 1 tablet (25 mg total) by mouth daily 90 tablet 3    warfarin (COUMADIN) 4 mg tablet Take by mouth daily Every day except Thursday      triamcinolone (KENALOG) 0.1 % cream Apply topically 2 (two) times a day for 7 days 80 g 0    warfarin (Coumadin) 1 mg tablet As directed. (Patient not taking: Reported on 10/24/2023) 30 tablet 0    warfarin (COUMADIN) 3 mg tablet TAKE ONE TABLET BY MOUTH EVERY DAY. DO NOT TAKE ON MONDAYS AND THURSDAYS (Patient not taking: Reported on 10/24/2023) 90 tablet 0     No current facility-administered medications for this visit. Allergies  Allergies   Allergen Reactions    Penicillins Itching       Past Medical History, Social History, Family History, medications and allergies were reviewed.     Vitals  Vitals:    10/24/23 1047   BP: 112/80   BP Location: Left arm   Patient Position: Sitting   Cuff Size: Large   Pulse: 69   SpO2: 98%   Weight: 88.9 kg (196 lb)       Physical Exam  Physical Exam  On examination she is in no acute distress. Her abdomen is soft nontender nondistended.  examination reveals no CVA tenderness. Skin is warm. Extremities without edema.   Neurologic is grossly intact and nonfocal.  Gait normal.  Affect normal      Results  No results found for: "PSA"  Lab Results   Component Value Date    GLUCOSE 105 (H) 11/18/2017    CALCIUM 8.9 10/18/2023     11/18/2017    K 3.9 10/18/2023    CO2 26 10/18/2023     10/18/2023    BUN 24 10/18/2023    CREATININE 0.83 10/18/2023     Lab Results   Component Value Date    WBC 7.53 10/03/2023    HGB 13.4 10/03/2023    HCT 40.1 10/03/2023    MCV 88 10/03/2023     10/03/2023         Office Urine Dip  No results found for this or any previous visit (from the past 1 hour(s)).]

## 2023-10-30 LAB — SCAN RESULT: NORMAL

## 2023-11-01 ENCOUNTER — APPOINTMENT (OUTPATIENT)
Dept: LAB | Age: 58
End: 2023-11-01
Payer: COMMERCIAL

## 2023-11-01 DIAGNOSIS — E21.0 PRIMARY HYPERPARATHYROIDISM (HCC): ICD-10-CM

## 2023-11-01 DIAGNOSIS — R80.9 PROTEINURIA: ICD-10-CM

## 2023-11-01 DIAGNOSIS — Z79.899 ENCOUNTER FOR LONG-TERM (CURRENT) USE OF OTHER MEDICATIONS: ICD-10-CM

## 2023-11-01 DIAGNOSIS — M32.14 OTHER SYSTEMIC LUPUS ERYTHEMATOSUS WITH GLOMERULAR DISEASE (HCC): ICD-10-CM

## 2023-11-01 DIAGNOSIS — R80.8 OTHER PROTEINURIA: ICD-10-CM

## 2023-11-01 DIAGNOSIS — E55.9 VITAMIN D DEFICIENCY: ICD-10-CM

## 2023-11-01 DIAGNOSIS — Z85.850 HISTORY OF THYROID CANCER: ICD-10-CM

## 2023-11-01 DIAGNOSIS — E89.0 POSTOPERATIVE HYPOTHYROIDISM: ICD-10-CM

## 2023-11-01 DIAGNOSIS — N18.2 CHRONIC KIDNEY DISEASE (CKD), STAGE II (MILD): ICD-10-CM

## 2023-11-01 PROBLEM — M15.0 PRIMARY GENERALIZED (OSTEO)ARTHRITIS: Status: ACTIVE | Noted: 2023-11-01

## 2023-11-01 LAB
25(OH)D3 SERPL-MCNC: 29 NG/ML (ref 30–100)
ALBUMIN SERPL BCP-MCNC: 3.8 G/DL (ref 3.5–5)
ANION GAP SERPL CALCULATED.3IONS-SCNC: 5 MMOL/L
BUN SERPL-MCNC: 25 MG/DL (ref 5–25)
CALCIUM SERPL-MCNC: 8.9 MG/DL (ref 8.4–10.2)
CHLORIDE SERPL-SCNC: 107 MMOL/L (ref 96–108)
CO2 SERPL-SCNC: 29 MMOL/L (ref 21–32)
CREAT SERPL-MCNC: 1.13 MG/DL (ref 0.6–1.3)
CREAT UR-MCNC: 201.8 MG/DL
CREAT UR-MCNC: 201.8 MG/DL
GFR SERPL CREATININE-BSD FRML MDRD: 54 ML/MIN/1.73SQ M
GLUCOSE P FAST SERPL-MCNC: 102 MG/DL (ref 65–99)
MICROALBUMIN UR-MCNC: 421.9 MG/L
MICROALBUMIN/CREAT 24H UR: 209 MG/G CREATININE (ref 0–30)
POTASSIUM SERPL-SCNC: 4.4 MMOL/L (ref 3.5–5.3)
PROT UR-MCNC: 73 MG/DL
PROT/CREAT UR: 0.36 MG/G{CREAT} (ref 0–0.1)
PTH-INTACT SERPL-MCNC: 115.3 PG/ML (ref 12–88)
SODIUM SERPL-SCNC: 141 MMOL/L (ref 135–147)
T4 FREE SERPL-MCNC: 1.06 NG/DL (ref 0.61–1.12)
TSH SERPL DL<=0.05 MIU/L-ACNC: 4.36 UIU/ML (ref 0.45–4.5)

## 2023-11-01 PROCEDURE — 80048 BASIC METABOLIC PNL TOTAL CA: CPT

## 2023-11-01 PROCEDURE — 84443 ASSAY THYROID STIM HORMONE: CPT

## 2023-11-01 PROCEDURE — 82570 ASSAY OF URINE CREATININE: CPT

## 2023-11-01 PROCEDURE — 83970 ASSAY OF PARATHORMONE: CPT

## 2023-11-01 PROCEDURE — 86800 THYROGLOBULIN ANTIBODY: CPT

## 2023-11-01 PROCEDURE — 84432 ASSAY OF THYROGLOBULIN: CPT

## 2023-11-01 PROCEDURE — 84156 ASSAY OF PROTEIN URINE: CPT

## 2023-11-01 PROCEDURE — 82306 VITAMIN D 25 HYDROXY: CPT

## 2023-11-01 PROCEDURE — 84439 ASSAY OF FREE THYROXINE: CPT

## 2023-11-01 PROCEDURE — 82043 UR ALBUMIN QUANTITATIVE: CPT

## 2023-11-01 PROCEDURE — 82040 ASSAY OF SERUM ALBUMIN: CPT

## 2023-11-02 LAB
THYROGLOB AB SERPL-ACNC: <1 IU/ML (ref 0–0.9)
THYROGLOB SERPL-MCNC: <0.1 NG/ML (ref 1.5–38.5)

## 2023-11-06 ENCOUNTER — TELEPHONE (OUTPATIENT)
Dept: HEMATOLOGY ONCOLOGY | Facility: CLINIC | Age: 58
End: 2023-11-06

## 2023-11-06 DIAGNOSIS — I82.5Y3 CHRONIC DEEP VEIN THROMBOSIS (DVT) OF PROXIMAL VEIN OF BOTH LOWER EXTREMITIES (HCC): ICD-10-CM

## 2023-11-06 RX ORDER — WARFARIN SODIUM 1 MG/1
TABLET ORAL
Qty: 90 TABLET | Refills: 2 | Status: SHIPPED | OUTPATIENT
Start: 2023-11-06

## 2023-11-06 NOTE — TELEPHONE ENCOUNTER
Medication Refill Request   Who are you speaking with? Patient   If it is not the patient, are they listed on an active communication consent form? Yes   Which medication is being requested for refill? Please list medication name and dosage warfarin (Coumadin) 1 mg tablet    How many pills does the patient have left? 1   Preferred Pharmacy / Address [address] [place] [address] - [address] [place] [address],  [address] [place] [address], [address] [place] [address], [address] [place] [address]  Phone: [phone]  Fax: [phone]    Who is the prescribing provider? Dr. Angella Butler   Call back number [phone]    Relevant Information 90 days please!

## 2023-11-07 ENCOUNTER — OFFICE VISIT (OUTPATIENT)
Dept: ENDOCRINOLOGY | Facility: CLINIC | Age: 58
End: 2023-11-07
Payer: COMMERCIAL

## 2023-11-07 VITALS
HEART RATE: 61 BPM | WEIGHT: 195 LBS | DIASTOLIC BLOOD PRESSURE: 74 MMHG | SYSTOLIC BLOOD PRESSURE: 127 MMHG | HEIGHT: 61 IN | BODY MASS INDEX: 36.82 KG/M2 | OXYGEN SATURATION: 95 %

## 2023-11-07 DIAGNOSIS — E21.0 PRIMARY HYPERPARATHYROIDISM (HCC): ICD-10-CM

## 2023-11-07 DIAGNOSIS — Z85.850 HISTORY OF THYROID CANCER: ICD-10-CM

## 2023-11-07 DIAGNOSIS — E83.52 HYPERCALCEMIA: ICD-10-CM

## 2023-11-07 DIAGNOSIS — E55.9 VITAMIN D DEFICIENCY: ICD-10-CM

## 2023-11-07 DIAGNOSIS — E89.0 POSTOPERATIVE HYPOTHYROIDISM: Primary | ICD-10-CM

## 2023-11-07 PROCEDURE — 99214 OFFICE O/P EST MOD 30 MIN: CPT | Performed by: INTERNAL MEDICINE

## 2023-11-07 RX ORDER — LEVOTHYROXINE SODIUM 0.12 MG/1
TABLET ORAL
Qty: 90 TABLET | Refills: 1 | Status: SHIPPED | OUTPATIENT
Start: 2023-11-07

## 2023-11-07 NOTE — PROGRESS NOTES
Miya Wilson 66 y.o. female MRN: [id number]    Encounter: [id number]      Assessment/Plan     Assessment: This is a 66y.o.-year-old female with hypothyroidism. Plan:  Diagnoses and all orders for this visit:    Postoperative hypothyroidism  Goal for TSH is 0.5-3.0, will increase levothyroxine to 125 mcg daily 7 days a week. Educated to take it on empty stomach 1 hour before breakfast.  We will repeat TSH and free T4 in 2 months. Follow-up in 6 months  -     T4, free; Future  -     TSH, 3rd generation; Future  -     levothyroxine 125 mcg tablet; Take one tablet daily on empty stomach 1 hour before breakfast and 4 hours apart from calcium and vitamin D supplementation  -     T4, free; Future  -     TSH, 3rd generation; Future    Hypercalcemia    Lab Results   Component Value Date    CALCIUM 8.9 11/01/2023    PHOS 2.7 06/09/2023   Calcium is 8.9, will stop Sensipar  Discussed to avoid calcium supplementation. We will repeat calcium in 2 months  -     Basic metabolic panel; Future  -     Calcium Lab Collect; Future  -     Albumin Lab Collect; Future    Primary hyperparathyroidism [facility])  Patient had 2 previous surgeries for primary hyperparathyroidism, declined any more surgeries  She wants  to be managed by conservative approach  Obtain PTH in 6 months  -     PTH, intact- Lab Collect; Future    Vitamin D deficiency  Increase vitamin D3 supplementation to 2000 IU daily  -     Vitamin D 25 hydroxy; Future    History of thyroid cancer  Stage I thyroid cancer, will continue to follow-up with thyroglobulin tumor marker  -     Thyroglobulin;  Future         CC:     Postoperative hypothyroidism, primary hyperparathyroidism, vitamin D deficiency      History of Present Illness     HPI:      Miay Wilson is a 57-year-old woman with medical history of primary hyperparathyroidism, reexploration parathyroidectomy in May 2021, vitamin D deficiency, stage I papillary thyroid cancer status post completion thyroidectomy in 2010 as well as resection of left parathyroid gland, is here for follow-up. .  She gives history of radio -iodine therapy for thyroid cancer. She takes levothyroxine 112 mcg, 1 tablet daily from Monday through Saturday and 1 and half tablet on Sunday. Last TSH is 4.3. She denies palpitations. She complains of feeling tired and fatigue, weight gain. Reviewed brain tumor marker is undetectable less than 0.1, with negative antibodies. [facility] is not suggestive of recurrent or metastatic disease. Primary hyperparathyroidism-patient has a history of elevated PTH with elevated calcium even after parathyroid exploration surgery. She was started on Sensipar 30 mg daily her calcium has been in low normal range 8.1 to 8.9 mg per DL. DEXA scan in 2021 showed normal bone density. Vitamin D deficiency-she takes supplementation 1000 IU daily    Lab Results   Component Value Date    SYM3GYBCVMTS 4.362 11/01/2023    TSH 0.354 (L) 09/30/2019     Lab Results   Component Value Date    .3 (H) 11/01/2023    CALCIUM 8.9 11/01/2023    PHOS 2.7 06/09/2023         Review of Systems   Constitutional:  Negative for activity change, diaphoresis, fatigue, fever and unexpected weight change. HENT: Negative. Eyes:  Negative for visual disturbance. Respiratory:  Negative for cough, chest tightness and shortness of breath. Cardiovascular:  Negative for chest pain, palpitations and leg swelling. Gastrointestinal:  Negative for abdominal pain, blood in stool, constipation, diarrhea, nausea and vomiting. Endocrine: Negative for cold intolerance, heat intolerance, polydipsia, polyphagia and polyuria. Genitourinary:  Negative for dysuria, enuresis, frequency and urgency. Musculoskeletal:  Negative for arthralgias and myalgias. Skin:  Negative for pallor, rash and wound. Allergic/Immunologic: Negative. Neurological:  Negative for dizziness, tremors, weakness and numbness. Hematological: Negative. Psychiatric/Behavioral: Negative.          Historical Information   Past Medical History:   Diagnosis Date    Acute blood loss anemia 2023    Acute cholecystitis 2/3/2023    ADA (acute kidney injury) ([address]) 2023    Anemia     Not sure    Anti-phospholipid antibody syndrome (HCC)     Cancer (HCC)     thyroid    Cellulitis of right index finger 3/4/2021    Chronic kidney disease     Clotting disorder ([address])     Lung embolism,  ITP    Diabetes mellitus ([address])     Disease of thyroid gland     Edema 2013    Elevated serum creatinine 2023    Fibroid 1998    Gestational diabetes     Gross hematuria 2016    History of chemotherapy     late  for lupus    Hordeolum externum of left lower eyelid 2022    Hyperparathyroidism ([address])     Hypertension     Hypotension due to hypovolemia 2023    Idiopathic thrombocytopenic purpura (ITP) (HCC)     Kidney stone     Left ear pain 3/23/2020    Left foot pain 2022    Lupus ([address])     Miscarriage     Lost twin girls 10-    Osteoarthritis     Pulmonary embolism ([address]) 1987    Rash 3/11/2020    Sjogren's syndrome ([address])     Upper respiratory tract infection 2019    Vitamin D deficiency      Past Surgical History:   Procedure Laterality Date     SECTION          CHOLECYSTECTOMY  23    CHOLECYSTECTOMY LAPAROSCOPIC N/A 2023    Procedure: LAPAROSCOPIC CHOLECYSTECTOMY;  Surgeon: Kina Castro MD;  Location: [facility];  Service: General    DIAGNOSTIC FLEXIBLE LARYNGOSCOPY N/A 2021    Procedure: DIAGNOSTIC FLEXIBLE LARYNGOSCOPY;  Surgeon: Annie Flores MD;  Location: [facility];  Service: Surgical Oncology    HERNIA REPAIR      HIP SURGERY      left side, bone decompression    HYSTEROSCOPY      IR BIOPSY KIDNEY RANDOM  10/3/2023    UT HYSTEROSCOPY BX ENDOMETRIUM&/POLYPC W/WO D&C N/A 2023    Procedure: DILATATION AND CURETTAGE (D&C) WITH HYSTEROSCOPY;  Surgeon: Jonathan Kuo MD;  Location: [facility];  Service: Gynecology    AZ PARATHYROIDECTOMY/EXPLORATION PARATHYROIDS Right 05/25/2021    Procedure: PARATHYROIDECTOMY, MINIMALLY INVASIVE, right, intraoperative PTH monitoring;  Surgeon: Linsey Smith MD;  Location: [facility];  Service: Surgical Oncology    RENAL BIOPSY      Not sure    THYROID SURGERY      TUBAL LIGATION       Social History   Social History     Substance and Sexual Activity   Alcohol Use Not Currently    Comment: Twice a year     Social History     Substance and Sexual Activity   Drug Use No     Social History     Tobacco Use   Smoking Status Never   Smokeless Tobacco Never     Family History:   Family History   Problem Relation Age of Onset    No Known Problems Mother     Diabetes type II Father     Diabetes Father     Stroke Father     Diabetes type II Paternal Aunt     Diabetes type II Paternal Uncle     Diabetes type II Paternal Grandmother     Stomach cancer Paternal Grandfather     No Known Problems Sister     No Known Problems Maternal Grandmother     No Known Problems Maternal Grandfather     No Known Problems Sister     No Known Problems Maternal Aunt     No Known Problems Maternal Aunt     No Known Problems Maternal Aunt     No Known Problems Paternal Aunt     No Known Problems Paternal Aunt        Meds/Allergies   Current Outpatient Medications   Medication Sig Dispense Refill    acetaminophen (TYLENOL) 325 mg tablet Take 2 tablets (650 mg total) by mouth every 6 (six) hours  0    Cholecalciferol 25 MCG (1000 UT) TBDP Take 1,000 Units by mouth in the morning      folic acid (FOLVITE) 1 mg tablet Take 1 tablet by mouth daily      fosinopril (MONOPRIL) 20 mg tablet Take 1 tablet (20 mg total) by mouth 2 (two) times a day 180 tablet 0    hydrocortisone 2.5 % cream       hydroxychloroquine (PLAQUENIL) 200 mg tablet Take 1 tablet (200 mg total) by mouth 2 (two) times a day with meals 180 tablet 1    levothyroxine 125 mcg tablet Take one tablet daily on empty stomach 1 hour before breakfast and 4 hours apart from calcium and vitamin D supplementation 90 tablet 1    NIFEdipine (PROCARDIA XL) 30 mg 24 hr tablet Take 1 tablet (30 mg total) by mouth daily 90 tablet 3    spironolactone (ALDACTONE) 25 mg tablet Take 1 tablet (25 mg total) by mouth daily 90 tablet 3    triamcinolone (KENALOG) 0.1 % cream Apply topically 2 (two) times a day for 7 days 80 g 0    warfarin (Coumadin) 1 mg tablet As directed. 90 tablet 2    warfarin (COUMADIN) 3 mg tablet TAKE ONE TABLET BY MOUTH EVERY DAY. DO NOT TAKE ON MONDAYS AND THURSDAYS 90 tablet 0    warfarin (COUMADIN) 4 mg tablet Take by mouth daily Every day except Thursday       No current facility-administered medications for this visit. Allergies   Allergen Reactions    Penicillins Itching       Objective   Vitals: Blood pressure 127/74, pulse 61, height 5' 1" (1.549 m), weight 88.5 kg (195 lb), SpO2 95 %. Physical Exam  Constitutional:       General: She is not in acute distress. Appearance: Normal appearance. She is not ill-appearing. HENT:      Head: Normocephalic and atraumatic. Nose: Nose normal.   Eyes:      Extraocular Movements: Extraocular movements intact. Conjunctiva/sclera: Conjunctivae normal.   Pulmonary:      Effort: No respiratory distress. Musculoskeletal:      Cervical back: Normal range of motion. Neurological:      General: No focal deficit present. Mental Status: She is alert and oriented to person, place, and time. Psychiatric:         Mood and Affect: Mood normal.         Behavior: Behavior normal.         The history was obtained from the review of the chart, patient.     Lab Results:   Lab Results   Component Value Date/Time    TSH 3RD GENERATON 4.362 11/01/2023 11:10 AM    TSH 3RD GENERATON 1.233 09/11/2023 10:54 AM    TSH 3RD GENERATON 6.187 (H) 06/09/2023 12:03 PM    Free T4 1.06 11/01/2023 11:10 AM    Free T4 0.93 06/09/2023 12:03 PM    Free T4 1.71 (H) 02/08/2023 11:08 AM       Imaging Studies:       I have personally reviewed pertinent reports. Portions of the record may have been created with voice recognition software. Occasional wrong word or "sound a like" substitutions may have occurred due to the inherent limitations of voice recognition software. Read the chart carefully and recognize, using context, where substitutions have occurred.

## 2023-12-03 DIAGNOSIS — I82.5Y3 CHRONIC DEEP VEIN THROMBOSIS (DVT) OF PROXIMAL VEIN OF BOTH LOWER EXTREMITIES (HCC): Chronic | ICD-10-CM

## 2023-12-04 RX ORDER — WARFARIN SODIUM 3 MG/1
TABLET ORAL
Qty: 90 TABLET | Refills: 0 | Status: SHIPPED | OUTPATIENT
Start: 2023-12-04

## 2023-12-12 ENCOUNTER — OFFICE VISIT (OUTPATIENT)
Dept: INTERNAL MEDICINE CLINIC | Facility: CLINIC | Age: 58
End: 2023-12-12
Payer: COMMERCIAL

## 2023-12-12 VITALS
WEIGHT: 197.4 LBS | HEIGHT: 61 IN | HEART RATE: 59 BPM | BODY MASS INDEX: 37.27 KG/M2 | SYSTOLIC BLOOD PRESSURE: 116 MMHG | OXYGEN SATURATION: 98 % | DIASTOLIC BLOOD PRESSURE: 72 MMHG

## 2023-12-12 DIAGNOSIS — I12.9 BENIGN HYPERTENSIVE KIDNEY DISEASE WITH CHRONIC KIDNEY DISEASE STAGE I THROUGH STAGE IV, OR UNSPECIFIED: Primary | ICD-10-CM

## 2023-12-12 DIAGNOSIS — Z13.220 SCREENING, LIPID: ICD-10-CM

## 2023-12-12 DIAGNOSIS — M32.15 OTHER SYSTEMIC LUPUS ERYTHEMATOSUS WITH TUBULO-INTERSTITIAL NEPHROPATHY (HCC): ICD-10-CM

## 2023-12-12 DIAGNOSIS — Z13.1 SCREENING FOR DIABETES MELLITUS: ICD-10-CM

## 2023-12-12 DIAGNOSIS — Z12.11 SCREEN FOR COLON CANCER: ICD-10-CM

## 2023-12-12 PROCEDURE — 99214 OFFICE O/P EST MOD 30 MIN: CPT | Performed by: INTERNAL MEDICINE

## 2023-12-12 NOTE — PROGRESS NOTES
Name: Rina Taylor      : 1965      MRN: [id number]  Encounter Provider: Lea Reyes, MD  Encounter Date: 2023   [facility]    Assessment & Plan     1. Benign hypertensive kidney disease with chronic kidney disease stage I through stage IV, or unspecified    2. Other systemic lupus erythematosus with tubulo-interstitial nephropathy (HCC)    3. Screening for diabetes mellitus  -     HEMOGLOBIN A1C W/ EAG ESTIMATION; Future; Expected date: 2024    4. Screening, lipid  -     Lipid panel; Future; Expected date: 2024    5. Screen for colon cancer  -     Cologuard         BP controlled  Creatinine slightly up from baseline  F/U with nephrology and rheumatology    Consider COVID pneumonia and shingles vaccines    Subjective      Here today for a follow up  No new issues  SLE with biopsy in October showing nephritis. Sees rheum and nephrology  Hypothyroid managed by endocrine      Review of Systems   Constitutional:  Negative for fatigue, fever and unexpected weight change.   Eyes:  Negative for visual disturbance.   Respiratory:  Negative for cough and shortness of breath.    Cardiovascular:  Negative for chest pain, palpitations and leg swelling.   Gastrointestinal:  Negative for abdominal pain, constipation, diarrhea, nausea and vomiting.   Genitourinary:  Negative for vaginal bleeding.   Musculoskeletal:  Negative for arthralgias and myalgias.   Neurological:  Negative for dizziness and headaches.       Current Outpatient Medications on File Prior to Visit   Medication Sig   • acetaminophen (TYLENOL) 325 mg tablet Take 2 tablets (650 mg total) by mouth every 6 (six) hours   • Cholecalciferol 25 MCG (1000 UT) TBDP Take 1,000 Units by mouth in the morning   • folic acid (FOLVITE) 1 mg tablet Take 1 tablet by mouth daily   • fosinopril (MONOPRIL) 20 mg tablet Take 1 tablet (20 mg total) by mouth 2 (two) times a day   • hydrocortisone 2.5 % cream    •  "hydroxychloroquine (PLAQUENIL) 200 mg tablet Take 1 tablet (200 mg total) by mouth 2 (two) times a day with meals   • levothyroxine 125 mcg tablet Take one tablet daily on empty stomach 1 hour before breakfast and 4 hours apart from calcium and vitamin D supplementation   • warfarin (Coumadin) 1 mg tablet As directed.   • warfarin (COUMADIN) 3 mg tablet TAKE ONE TABLET BY MOUTH EVERY DAY. DO NOT TAKE ON MONDAYS AND THURSDAYS   • warfarin (COUMADIN) 4 mg tablet Take by mouth daily Every day except Thursday   • triamcinolone (KENALOG) 0.1 % cream Apply topically 2 (two) times a day for 7 days       Objective     /72 (BP Location: Left arm, Patient Position: Sitting, Cuff Size: Large)   Pulse 59   Ht 5' 1\" (1.549 m)   Wt 89.5 kg (197 lb 6.4 oz)   SpO2 98%   BMI 37.30 kg/m²     Physical Exam  Constitutional:       Appearance: She is well-developed. She is not ill-appearing.   Eyes:      Conjunctiva/sclera: Conjunctivae normal.   Cardiovascular:      Rate and Rhythm: Normal rate and regular rhythm.      Heart sounds: Normal heart sounds. No murmur heard.  Pulmonary:      Effort: Pulmonary effort is normal. No respiratory distress.      Breath sounds: Normal breath sounds. No wheezing or rales.   Abdominal:      General: There is no distension.      Palpations: Abdomen is soft. There is no mass.      Tenderness: There is no abdominal tenderness. There is no guarding or rebound.   Musculoskeletal:      Cervical back: Neck supple.      Right lower leg: No edema.      Left lower leg: No edema.   Neurological:      Mental Status: She is alert and oriented to person, place, and time.   Psychiatric:         Mood and Affect: Mood normal.         Behavior: Behavior normal.         Thought Content: Thought content normal.         Judgment: Judgment normal.       Lea Reyes, MD    "

## 2023-12-14 DIAGNOSIS — I10 ESSENTIAL HYPERTENSION, BENIGN: ICD-10-CM

## 2023-12-14 DIAGNOSIS — I10 ESSENTIAL HYPERTENSION: ICD-10-CM

## 2023-12-14 RX ORDER — NIFEDIPINE 30 MG/1
30 TABLET, EXTENDED RELEASE ORAL DAILY
Qty: 90 TABLET | Refills: 0 | Status: SHIPPED | OUTPATIENT
Start: 2023-12-14

## 2023-12-14 RX ORDER — SPIRONOLACTONE 25 MG/1
25 TABLET ORAL DAILY
Qty: 90 TABLET | Refills: 0 | Status: SHIPPED | OUTPATIENT
Start: 2023-12-14

## 2023-12-14 NOTE — TELEPHONE ENCOUNTER
Left a voicemail with the following information: Arsenio Abrams, just to let you know that nifedipine and spironolactone renewals have been approved and sent to the pharmacy. Advised patient to please call the office to let us know she received the message and if have any other questions or concerns.

## 2023-12-21 DIAGNOSIS — I10 ESSENTIAL HYPERTENSION: Primary | ICD-10-CM

## 2023-12-21 DIAGNOSIS — N20.0 NEPHROLITHIASIS: ICD-10-CM

## 2023-12-21 DIAGNOSIS — N18.2 CHRONIC KIDNEY DISEASE (CKD), STAGE II (MILD): ICD-10-CM

## 2023-12-27 ENCOUNTER — LAB (OUTPATIENT)
Dept: LAB | Age: 58
End: 2023-12-27
Payer: COMMERCIAL

## 2023-12-27 DIAGNOSIS — I12.9 PARENCHYMAL RENAL HYPERTENSION, STAGE 1 THROUGH STAGE 4 OR UNSPECIFIED CHRONIC KIDNEY DISEASE: ICD-10-CM

## 2023-12-27 DIAGNOSIS — E83.52 HYPERCALCEMIA: ICD-10-CM

## 2023-12-27 DIAGNOSIS — E89.0 POSTOPERATIVE HYPOTHYROIDISM: ICD-10-CM

## 2023-12-27 LAB
ALBUMIN SERPL BCP-MCNC: 3.6 G/DL (ref 3.5–5)
ALP SERPL-CCNC: 77 U/L (ref 34–104)
ALT SERPL W P-5'-P-CCNC: 28 U/L (ref 7–52)
ANION GAP SERPL CALCULATED.3IONS-SCNC: 4 MMOL/L
AST SERPL W P-5'-P-CCNC: 27 U/L (ref 13–39)
BACTERIA UR QL AUTO: ABNORMAL /HPF
BASOPHILS # BLD AUTO: 0.02 THOUSANDS/ÂΜL (ref 0–0.1)
BASOPHILS NFR BLD AUTO: 0 % (ref 0–1)
BILIRUB SERPL-MCNC: 0.62 MG/DL (ref 0.2–1)
BILIRUB UR QL STRIP: NEGATIVE
BUN SERPL-MCNC: 20 MG/DL (ref 5–25)
C3 SERPL-MCNC: 118 MG/DL (ref 87–200)
C4 SERPL-MCNC: 43 MG/DL (ref 19–52)
CALCIUM SERPL-MCNC: 9.9 MG/DL (ref 8.4–10.2)
CHLORIDE SERPL-SCNC: 108 MMOL/L (ref 96–108)
CLARITY UR: ABNORMAL
CO2 SERPL-SCNC: 29 MMOL/L (ref 21–32)
COLOR UR: YELLOW
CREAT SERPL-MCNC: 0.97 MG/DL (ref 0.6–1.3)
CREAT UR-MCNC: 211.1 MG/DL
CRP SERPL QL: 2.8 MG/L
EOSINOPHIL # BLD AUTO: 0.05 THOUSAND/ÂΜL (ref 0–0.61)
EOSINOPHIL NFR BLD AUTO: 1 % (ref 0–6)
ERYTHROCYTE [DISTWIDTH] IN BLOOD BY AUTOMATED COUNT: 12.9 % (ref 11.6–15.1)
ERYTHROCYTE [SEDIMENTATION RATE] IN BLOOD: 16 MM/HOUR (ref 0–29)
GFR SERPL CREATININE-BSD FRML MDRD: 65 ML/MIN/1.73SQ M
GLUCOSE P FAST SERPL-MCNC: 86 MG/DL (ref 65–99)
GLUCOSE UR STRIP-MCNC: NEGATIVE MG/DL
GRAN CASTS #/AREA URNS LPF: ABNORMAL /[LPF]
HCT VFR BLD AUTO: 38.8 % (ref 34.8–46.1)
HGB BLD-MCNC: 13.1 G/DL (ref 11.5–15.4)
HGB UR QL STRIP.AUTO: ABNORMAL
HYALINE CASTS #/AREA URNS LPF: ABNORMAL /LPF
IMM GRANULOCYTES # BLD AUTO: 0.03 THOUSAND/UL (ref 0–0.2)
IMM GRANULOCYTES NFR BLD AUTO: 1 % (ref 0–2)
KETONES UR STRIP-MCNC: NEGATIVE MG/DL
LEUKOCYTE ESTERASE UR QL STRIP: ABNORMAL
LYMPHOCYTES # BLD AUTO: 1.29 THOUSANDS/ÂΜL (ref 0.6–4.47)
LYMPHOCYTES NFR BLD AUTO: 26 % (ref 14–44)
MCH RBC QN AUTO: 29.8 PG (ref 26.8–34.3)
MCHC RBC AUTO-ENTMCNC: 33.8 G/DL (ref 31.4–37.4)
MCV RBC AUTO: 88 FL (ref 82–98)
MONOCYTES # BLD AUTO: 0.4 THOUSAND/ÂΜL (ref 0.17–1.22)
MONOCYTES NFR BLD AUTO: 8 % (ref 4–12)
MUCOUS THREADS UR QL AUTO: ABNORMAL
NEUTROPHILS # BLD AUTO: 3.2 THOUSANDS/ÂΜL (ref 1.85–7.62)
NEUTS SEG NFR BLD AUTO: 64 % (ref 43–75)
NITRITE UR QL STRIP: NEGATIVE
NON-SQ EPI CELLS URNS QL MICRO: ABNORMAL /HPF
NRBC BLD AUTO-RTO: 0 /100 WBCS
PH UR STRIP.AUTO: 6 [PH]
PLATELET # BLD AUTO: 176 THOUSANDS/UL (ref 149–390)
PMV BLD AUTO: 10.7 FL (ref 8.9–12.7)
POTASSIUM SERPL-SCNC: 4.1 MMOL/L (ref 3.5–5.3)
PROT SERPL-MCNC: 6.4 G/DL (ref 6.4–8.4)
PROT UR STRIP-MCNC: ABNORMAL MG/DL
PROT UR-MCNC: 178 MG/DL
PROT/CREAT UR: 0.84 MG/G{CREAT} (ref 0–0.1)
RBC # BLD AUTO: 4.4 MILLION/UL (ref 3.81–5.12)
RBC #/AREA URNS AUTO: ABNORMAL /HPF
SODIUM SERPL-SCNC: 141 MMOL/L (ref 135–147)
SP GR UR STRIP.AUTO: 1.02 (ref 1–1.03)
T4 FREE SERPL-MCNC: 1.34 NG/DL (ref 0.61–1.12)
TSH SERPL DL<=0.05 MIU/L-ACNC: 1.09 UIU/ML (ref 0.45–4.5)
UROBILINOGEN UR STRIP-ACNC: <2 MG/DL
WBC # BLD AUTO: 4.99 THOUSAND/UL (ref 4.31–10.16)
WBC #/AREA URNS AUTO: ABNORMAL /HPF

## 2023-12-27 PROCEDURE — 82570 ASSAY OF URINE CREATININE: CPT

## 2023-12-27 PROCEDURE — 81001 URINALYSIS AUTO W/SCOPE: CPT

## 2023-12-27 PROCEDURE — 84156 ASSAY OF PROTEIN URINE: CPT

## 2023-12-27 PROCEDURE — 84439 ASSAY OF FREE THYROXINE: CPT

## 2023-12-27 PROCEDURE — 84443 ASSAY THYROID STIM HORMONE: CPT

## 2023-12-27 RX ORDER — FOSINOPRIL SODIUM 20 MG/1
20 TABLET ORAL 2 TIMES DAILY
Qty: 180 TABLET | Refills: 3 | Status: SHIPPED | OUTPATIENT
Start: 2023-12-27 | End: 2024-12-21

## 2023-12-28 NOTE — RESULT ENCOUNTER NOTE
Please call the patient regarding labs -TSH has improved and is currently at goal-continue levothyroxine at current dose

## 2024-01-02 LAB — DSDNA AB SER QL CLIF: NEGATIVE

## 2024-01-03 ENCOUNTER — OFFICE VISIT (OUTPATIENT)
Dept: NEPHROLOGY | Facility: CLINIC | Age: 59
End: 2024-01-03
Payer: COMMERCIAL

## 2024-01-03 VITALS
SYSTOLIC BLOOD PRESSURE: 126 MMHG | HEIGHT: 61 IN | HEART RATE: 59 BPM | DIASTOLIC BLOOD PRESSURE: 78 MMHG | BODY MASS INDEX: 37 KG/M2 | WEIGHT: 196 LBS | OXYGEN SATURATION: 97 %

## 2024-01-03 DIAGNOSIS — R80.8 OTHER PROTEINURIA: ICD-10-CM

## 2024-01-03 DIAGNOSIS — M32.14 DRUG-INDUCED SYSTEMIC LUPUS ERYTHEMATOSUS WITH GLOMERULAR DISEASE (HCC): ICD-10-CM

## 2024-01-03 DIAGNOSIS — M32.0 DRUG-INDUCED SYSTEMIC LUPUS ERYTHEMATOSUS WITH GLOMERULAR DISEASE (HCC): ICD-10-CM

## 2024-01-03 DIAGNOSIS — N20.0 NEPHROLITHIASIS: ICD-10-CM

## 2024-01-03 DIAGNOSIS — E83.52 HYPERCALCEMIA: ICD-10-CM

## 2024-01-03 DIAGNOSIS — I10 PRIMARY HYPERTENSION: ICD-10-CM

## 2024-01-03 DIAGNOSIS — N18.2 CHRONIC KIDNEY DISEASE (CKD), STAGE II (MILD): Primary | ICD-10-CM

## 2024-01-03 DIAGNOSIS — E21.0 PRIMARY HYPERPARATHYROIDISM (HCC): ICD-10-CM

## 2024-01-03 DIAGNOSIS — E55.9 VITAMIN D DEFICIENCY: ICD-10-CM

## 2024-01-03 PROCEDURE — 99214 OFFICE O/P EST MOD 30 MIN: CPT | Performed by: INTERNAL MEDICINE

## 2024-01-03 NOTE — PROGRESS NOTES
NEPHROLOGY OUTPATIENT PROGRESS NOTE   Rina Taylor 58 y.o. female MRN: [id number]  DATE: 1/3/2024  Reason for visit:   Chief Complaint   Patient presents with    Follow-up    Chronic Kidney Disease        Patient Instructions   1. 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  Last sCr 0.97 from 12/27/23  -continue to avoid nonsteroidals(ibuprofen, aleve, advil, motrin, celebrex, naproxen, toradol)  -latest urinalysis shows large blood, moderate leukocytes, 2+ protein, innumerable RBCs, 4-10 WBCs, 25-50 hyaline casts, 0-3 granular casts as of December 27, 2023  -UpCr higher at 0.84g as of 12/27/23  -monitor CMP, UA with microscopy as well along with repeat UpCr.   -rheumatology recommends benlysta     2. proteinuria-+residual proteinuria from prior lupus nephritis (biopsy proven in 1996), now noted to have increased weight as well as HTN.   -Off HCTZ due to hypercalcemia, on fosinopril 20mg daily, aldactone 25mg daily   -last UpCr 0.84g as of 12/27/23   -Monitor UpCr along with microalb:Cr     3. volume status- continue spironolactone 25mg daily, potassium normal as of 12/27/23     4. hypercalcemia/hyperparathyroidism - follows with endo, +nephrolithiasis history with recent Urinalysis showing calcium oxalate crystals  -calcium has normalized on latest bloodwork  -You must drink enough water to urinate at least 2L per day to prevent stone formation but this is difficult given her edema and need for diuretics. Unfortunately, diuretics will contribute to stone formation.  -avoid high salt diet both for stone reduction and to help blood pressure stay controlled  -PTH slightly elevated at 115 as of Nov. 2023 - off sensipar 30mg daily per endocrinology     5. hypertension-BP well controlled today  -continue nifedipine XL 30mg daily(at night),  fosinopril 20mg twice daily, spironolactone 25mg daily (at lunch)  -avoid caffeine/tea     6. vitamin D insufficiency - Vit D level 29 as of Nov. 2023 - on vitamin D3 1000u daily     7. SLE - on plaquenil, follows with rheumatology     Obtain bloodwork and urine testing prior to next office visit in March 2024 and again in 6 months.  RTC in 6 months.       Rina was seen today for follow-up and chronic kidney disease.    Diagnoses and all orders for this visit:    Chronic kidney disease (CKD), stage II (mild)  -     Urinalysis with microscopic; Standing  -     Protein / creatinine ratio, urine; Standing  -     Albumin / creatinine urine ratio; Standing  -     Comprehensive metabolic panel; Standing  -     Urinalysis with microscopic  -     Protein / creatinine ratio, urine  -     Albumin / creatinine urine ratio  -     Comprehensive metabolic panel    Primary hypertension    Primary hyperparathyroidism (HCC)    Nephrolithiasis    Hypercalcemia  -     Comprehensive metabolic panel; Standing  -     Comprehensive metabolic panel    Other proteinuria  -     Protein / creatinine ratio, urine; Standing  -     Albumin / creatinine urine ratio; Standing  -     Protein / creatinine ratio, urine  -     Albumin / creatinine urine ratio    Drug-induced systemic lupus erythematosus with glomerular disease (HCC)    Vitamin D deficiency        Assessment/Plan:  1. 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  Last sCr 0.97 from 12/27/23  -continue to avoid nonsteroidals(ibuprofen, aleve, advil, motrin, celebrex, naproxen, toradol)  -latest urinalysis shows large blood, moderate leukocytes, 2+ protein, innumerable RBCs, 4-10 WBCs, 25-50 hyaline casts, 0-3 granular casts as of December 27, 2023  -UpCr higher at 0.84g as of 12/27/23  -monitor CMP, UA with  microscopy as well along with repeat UpCr.   -rheumatology recommends benlysta     2. proteinuria-+residual proteinuria from prior lupus nephritis (biopsy proven in 1996), now noted to have increased weight as well as HTN.   -Off HCTZ due to hypercalcemia, on fosinopril 20mg daily, aldactone 25mg daily   -last UpCr 0.84g as of 12/27/23   -Monitor UpCr along with microalb:Cr     3. volume status- continue spironolactone 25mg daily, potassium normal as of 12/27/23     4. hypercalcemia/hyperparathyroidism - follows with endo, +nephrolithiasis history with recent Urinalysis showing calcium oxalate crystals  -calcium has normalized on latest bloodwork  -You must drink enough water to urinate at least 2L per day to prevent stone formation but this is difficult given her edema and need for diuretics. Unfortunately, diuretics will contribute to stone formation.  -avoid high salt diet both for stone reduction and to help blood pressure stay controlled  -PTH slightly elevated at 115 as of Nov. 2023 - off sensipar 30mg daily per endocrinology     5. hypertension-BP well controlled today  -continue nifedipine XL 30mg daily(at night), fosinopril 20mg twice daily, spironolactone 25mg daily (at lunch)  -avoid caffeine/tea     6. vitamin D insufficiency - Vit D level 29 as of Nov. 2023 - on vitamin D3 1000u daily     7. SLE - on plaquenil, follows with rheumatology     Obtain bloodwork and urine testing prior to next office visit in March 2024 and again in 6 months.  RTC in 6 months.       SUBJECTIVE / INTERVAL HISTORY:  58 y.o. female presents in follow up of CKD/LN.     Rina Taylor denies any recent illness/hospitalizations/medication changes since last office visit.    Denies NSAID use. Uses rare tylenol.  Denies lupus flares.  Denies recent kidney stones.  HTN - BP at home well controlled    Review of Systems   Constitutional:  Negative for chills and fever.   HENT:  Negative for sore throat and trouble swallowing.    Eyes:   "Negative for visual disturbance.   Respiratory:  Negative for cough and shortness of breath.    Cardiovascular:  Negative for chest pain and leg swelling.   Gastrointestinal:  Negative for abdominal pain, constipation, diarrhea, nausea and vomiting.        Loose stools   Endocrine: Negative for polyuria.   Genitourinary:  Negative for difficulty urinating, dysuria and hematuria.   Musculoskeletal:  Positive for back pain. Negative for neck pain.   Skin:  Negative for rash.   Neurological:  Negative for dizziness, light-headedness and numbness.   Psychiatric/Behavioral:  The patient is not nervous/anxious.        OBJECTIVE:  /78 (BP Location: Left arm, Patient Position: Sitting, Cuff Size: Adult)   Pulse 59   Ht 5' 1\" (1.549 m)   Wt 88.9 kg (196 lb)   SpO2 97%   BMI 37.03 kg/m²  Body mass index is 37.03 kg/m².    Physical exam:  Physical Exam  Vitals and nursing note reviewed.   Constitutional:       General: She is not in acute distress.     Appearance: Normal appearance. She is well-developed. She is obese. She is not diaphoretic.   HENT:      Head: Normocephalic and atraumatic.      Nose: Nose normal.      Mouth/Throat:      Mouth: Mucous membranes are moist.      Pharynx: No oropharyngeal exudate.   Eyes:      General: No scleral icterus.        Right eye: No discharge.         Left eye: No discharge.      Comments: eyeglasses   Neck:      Thyroid: No thyromegaly.   Cardiovascular:      Rate and Rhythm: Normal rate and regular rhythm.      Heart sounds: No murmur heard.  Pulmonary:      Effort: Pulmonary effort is normal. No respiratory distress.      Breath sounds: Normal breath sounds. No wheezing.   Abdominal:      General: Bowel sounds are normal. There is no distension.      Palpations: Abdomen is soft.   Musculoskeletal:         General: No swelling (trace left ankle). Normal range of motion.      Cervical back: Normal range of motion and neck supple.   Skin:     General: Skin is warm and dry.    "   Coloration: Skin is not jaundiced.      Findings: No rash.   Neurological:      General: No focal deficit present.      Mental Status: She is alert.      Motor: No abnormal muscle tone.      Comments: awake   Psychiatric:         Mood and Affect: Mood normal.         Behavior: Behavior normal.         Medications:    Current Outpatient Medications:     acetaminophen (TYLENOL) 325 mg tablet, Take 2 tablets (650 mg total) by mouth every 6 (six) hours, Disp: , Rfl: 0    Cholecalciferol 25 MCG (1000 UT) TBDP, Take 1,000 Units by mouth in the morning, Disp: , Rfl:     folic acid (FOLVITE) 1 mg tablet, Take 1 tablet by mouth daily, Disp: , Rfl:     fosinopril (MONOPRIL) 20 mg tablet, Take 1 tablet (20 mg total) by mouth 2 (two) times a day, Disp: 180 tablet, Rfl: 3    hydrocortisone 2.5 % cream, , Disp: , Rfl:     hydroxychloroquine (PLAQUENIL) 200 mg tablet, Take 1 tablet (200 mg total) by mouth 2 (two) times a day with meals, Disp: 180 tablet, Rfl: 1    levothyroxine 125 mcg tablet, Take one tablet daily on empty stomach 1 hour before breakfast and 4 hours apart from calcium and vitamin D supplementation, Disp: 90 tablet, Rfl: 1    NIFEdipine (PROCARDIA XL) 30 mg 24 hr tablet, Take 1 tablet (30 mg total) by mouth daily, Disp: 90 tablet, Rfl: 0    spironolactone (ALDACTONE) 25 mg tablet, Take 1 tablet (25 mg total) by mouth daily, Disp: 90 tablet, Rfl: 0    triamcinolone (KENALOG) 0.1 % cream, Apply topically 2 (two) times a day for 7 days, Disp: 80 g, Rfl: 0    warfarin (Coumadin) 1 mg tablet, As directed., Disp: 90 tablet, Rfl: 2    warfarin (COUMADIN) 3 mg tablet, TAKE ONE TABLET BY MOUTH EVERY DAY. DO NOT TAKE ON MONDAYS AND THURSDAYS, Disp: 90 tablet, Rfl: 0    warfarin (COUMADIN) 4 mg tablet, Take by mouth daily Every day except Thursday, Disp: , Rfl:     Allergies:  Allergies as of 01/03/2024 - Reviewed 01/03/2024   Allergen Reaction Noted    Penicillins Itching 04/25/2013       The following portions of the  "patient's history were reviewed and updated as appropriate: past family history, past surgical history and problem list.    Laboratory Results:  Lab Results   Component Value Date    SODIUM 141 12/27/2023    K 4.1 12/27/2023     12/27/2023    CO2 29 12/27/2023    BUN 20 12/27/2023    CREATININE 0.97 12/27/2023    GLUC 91 02/15/2023    CALCIUM 9.9 12/27/2023        Lab Results   Component Value Date    .3 (H) 11/01/2023    CALCIUM 9.9 12/27/2023    PHOS 2.7 06/09/2023       Portions of the record may have been created with voice recognition software.  Occasional wrong word or \"sound a like\" substitutions may have occurred due to the inherent limitations of voice recognition software.  Read the chart carefully and recognize, using context, where substitutions have occurred.  "

## 2024-01-03 NOTE — PATIENT INSTRUCTIONS
1. 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  Last sCr 0.97 from 12/27/23  -continue to avoid nonsteroidals(ibuprofen, aleve, advil, motrin, celebrex, naproxen, toradol)  -latest urinalysis shows large blood, moderate leukocytes, 2+ protein, innumerable RBCs, 4-10 WBCs, 25-50 hyaline casts, 0-3 granular casts as of December 27, 2023  -UpCr higher at 0.84g as of 12/27/23  -monitor CMP, UA with microscopy as well along with repeat UpCr.   -rheumatology recommends benlysta     2. proteinuria-+residual proteinuria from prior lupus nephritis (biopsy proven in 1996), now noted to have increased weight as well as HTN.   -Off HCTZ due to hypercalcemia, on fosinopril 20mg daily, aldactone 25mg daily   -last UpCr 0.84g as of 12/27/23   -Monitor UpCr along with microalb:Cr     3. volume status- continue spironolactone 25mg daily, potassium normal as of 12/27/23     4. hypercalcemia/hyperparathyroidism - follows with endo, +nephrolithiasis history with recent Urinalysis showing calcium oxalate crystals  -calcium has normalized on latest bloodwork  -You must drink enough water to urinate at least 2L per day to prevent stone formation but this is difficult given her edema and need for diuretics. Unfortunately, diuretics will contribute to stone formation.  -avoid high salt diet both for stone reduction and to help blood pressure stay controlled  -PTH slightly elevated at 115 as of Nov. 2023 - off sensipar 30mg daily per endocrinology     5. hypertension-BP well controlled today  -continue nifedipine XL 30mg daily(at night), fosinopril 20mg twice daily, spironolactone 25mg daily (at lunch)  -avoid caffeine/tea     6. vitamin D insufficiency - Vit D level 29 as of Nov. 2023 - on vitamin D3 1000u daily     7. SLE - on plaquenil, follows with  rheumatology     Obtain bloodwork and urine testing prior to next office visit in March 2024 and again in 6 months.  RTC in 6 months.

## 2024-02-02 ENCOUNTER — TELEPHONE (OUTPATIENT)
Dept: NEPHROLOGY | Facility: CLINIC | Age: 59
End: 2024-02-02

## 2024-02-02 NOTE — TELEPHONE ENCOUNTER
Pt called office in regard to co-pay that pt was billed, I ensured pt that we have receipt that pt was charged for co-pay and gave pt reference #, pt will contact billing dept

## 2024-03-19 ENCOUNTER — APPOINTMENT (OUTPATIENT)
Dept: LAB | Facility: HOSPITAL | Age: 59
End: 2024-03-19
Payer: COMMERCIAL

## 2024-03-21 ENCOUNTER — OFFICE VISIT (OUTPATIENT)
Dept: HEMATOLOGY ONCOLOGY | Facility: CLINIC | Age: 59
End: 2024-03-21
Payer: COMMERCIAL

## 2024-03-21 VITALS
TEMPERATURE: 97.2 F | HEART RATE: 57 BPM | WEIGHT: 194 LBS | HEIGHT: 61 IN | BODY MASS INDEX: 36.63 KG/M2 | SYSTOLIC BLOOD PRESSURE: 126 MMHG | OXYGEN SATURATION: 100 % | DIASTOLIC BLOOD PRESSURE: 78 MMHG | RESPIRATION RATE: 17 BRPM

## 2024-03-21 DIAGNOSIS — D68.61 ANTI-PHOSPHOLIPID SYNDROME (HCC): ICD-10-CM

## 2024-03-21 DIAGNOSIS — Z12.11 COLON CANCER SCREENING: ICD-10-CM

## 2024-03-21 DIAGNOSIS — I82.5Y3 CHRONIC DEEP VEIN THROMBOSIS (DVT) OF PROXIMAL VEIN OF BOTH LOWER EXTREMITIES (HCC): Primary | ICD-10-CM

## 2024-03-21 PROCEDURE — 99214 OFFICE O/P EST MOD 30 MIN: CPT | Performed by: INTERNAL MEDICINE

## 2024-03-21 NOTE — PROGRESS NOTES
[facility] [place]  [address]  [facility] [address]  [phone]  [phone]    Rina Taylor,1965, [id number]  03/21/24    Discussion:   In summary, this is a 58-year-old female with a history of phospholipid antibody syndrome.  She has been on Coumadin.  She is clinically stable.  Medication adjustments regarding lupus nephritis.  No bleeding or thrombotic symptoms.  INR in target greater than 80%.  Continuation of treatment is recommended.  She has not had a colonoscopy.  I encouraged her to proceed with Cologuard as ordered.  Last mammogram was August 2022.  I suggested proceeding with mammogram as ordered.  I discussed the above with the patient.  The patient  voiced understanding and agreement.  ______________________________________________________________________    Chief Complaint   Patient presents with    Follow-up       HPI:  Oncology History    No history exists.       Interval History: Clinically stable.  ECOG-  1 - Symptomatic but completely ambulatory    Review of Systems   Constitutional:  Negative for appetite change, diaphoresis, fatigue and fever.   HENT:  Negative for sinus pain.    Eyes:  Negative for discharge.   Respiratory:  Negative for cough and shortness of breath.    Cardiovascular:  Negative for chest pain.   Gastrointestinal:  Negative for abdominal pain, constipation and diarrhea.   Endocrine: Negative for cold intolerance.   Genitourinary:  Negative for difficulty urinating and hematuria.   Musculoskeletal:  Positive for arthralgias. Negative for joint swelling.   Skin:  Negative for rash.   Allergic/Immunologic: Negative for environmental allergies.   Neurological:  Negative for dizziness and headaches.   Hematological:  Negative for adenopathy.   Psychiatric/Behavioral:  Negative for agitation.        Past Medical History:   Diagnosis Date    Acute blood loss anemia 2/9/2023    Acute cholecystitis 2/3/2023    ADA (acute  kidney injury) (HCC) 2/9/2023    Anemia     Not sure    Anti-phospholipid antibody syndrome (HCC)     Cancer (HCC)     thyroid    Cellulitis of right index finger 3/4/2021    Chronic kidney disease     Clotting disorder (HCC)     Lung embolism,  ITP    Diabetes mellitus (HCC)     Disease of thyroid gland     Edema 12/17/2013    Elevated serum creatinine 2/28/2023    Fibroid 1998    Gestational diabetes     Gross hematuria 12/12/2016    History of chemotherapy     late 1980s for lupus    Hordeolum externum of left lower eyelid 5/11/2022    Hyperparathyroidism (HCC)     Hypertension     Hypotension due to hypovolemia 2/9/2023    Idiopathic thrombocytopenic purpura (ITP) (HCC)     Kidney stone     Left ear pain 3/23/2020    Left foot pain 7/11/2022    Lupus (HCC)     Miscarriage 1996    Lost twin girls 10-    Osteoarthritis     Pulmonary embolism (HCC) 1987    Rash 3/11/2020    Sjogren's syndrome (HCC)     Upper respiratory tract infection 11/17/2019    Vitamin D deficiency      Patient Active Problem List   Diagnosis    Chronic deep vein thrombosis (DVT) of proximal vein of both lower extremities (HCC)    History of ITP    Benign hypertensive CKD    Chronic kidney disease (CKD), stage II (mild)    Hypercalcemia    Primary hyperparathyroidism (HCC)    Hypertension    Nephrolithiasis    Proteinuria    Systemic lupus erythematosus (HCC)    Vitamin D deficiency    Elevated PTHrP level    History of thyroid cancer    BMI 39.0-39.9,adult    Post-menopausal bleeding    Greater trochanteric bursitis of left hip    Anti-phospholipid syndrome (HCC)    Status post parathyroidectomy (HCC)    Postoperative hypothyroidism    Sjogren's syndrome without extraglandular involvement (HCC)    Abnormal CT of the chest    Lesion of urinary bladder    Dermatitis    S/P D&C (status post dilation and curettage)    Primary generalized (osteo)arthritis       Current Outpatient Medications:     Cholecalciferol 25 MCG (1000 UT) TBDP, Take  1,000 Units by mouth in the morning, Disp: , Rfl:     folic acid (FOLVITE) 1 mg tablet, Take 1 tablet by mouth daily, Disp: , Rfl:     fosinopril (MONOPRIL) 20 mg tablet, Take 1 tablet (20 mg total) by mouth 2 (two) times a day, Disp: 180 tablet, Rfl: 3    hydrocortisone 2.5 % cream, , Disp: , Rfl:     hydroxychloroquine (PLAQUENIL) 200 mg tablet, Take 1 tablet (200 mg total) by mouth 2 (two) times a day with meals, Disp: 180 tablet, Rfl: 1    levothyroxine 125 mcg tablet, Take one tablet daily on empty stomach 1 hour before breakfast and 4 hours apart from calcium and vitamin D supplementation, Disp: 90 tablet, Rfl: 1    NIFEdipine (PROCARDIA XL) 30 mg 24 hr tablet, Take 1 tablet (30 mg total) by mouth daily, Disp: 90 tablet, Rfl: 0    spironolactone (ALDACTONE) 25 mg tablet, Take 1 tablet (25 mg total) by mouth daily, Disp: 90 tablet, Rfl: 0    triamcinolone (KENALOG) 0.1 % cream, Apply topically 2 (two) times a day for 7 days, Disp: 80 g, Rfl: 0    warfarin (Coumadin) 1 mg tablet, As directed., Disp: 90 tablet, Rfl: 2    warfarin (COUMADIN) 3 mg tablet, TAKE ONE TABLET BY MOUTH EVERY DAY. DO NOT TAKE ON  AND , Disp: 90 tablet, Rfl: 0    warfarin (COUMADIN) 4 mg tablet, Take by mouth daily Every day except Thursday, Disp: , Rfl:     acetaminophen (TYLENOL) 325 mg tablet, Take 2 tablets (650 mg total) by mouth every 6 (six) hours, Disp: , Rfl: 0  Allergies   Allergen Reactions    Penicillins Itching     Past Surgical History:   Procedure Laterality Date     SECTION          CHOLECYSTECTOMY  23    CHOLECYSTECTOMY LAPAROSCOPIC N/A 2023    Procedure: LAPAROSCOPIC CHOLECYSTECTOMY;  Surgeon: Guille Diaz MD;  Location: [facility];  Service: General    DIAGNOSTIC FLEXIBLE LARYNGOSCOPY N/A 2021    Procedure: DIAGNOSTIC FLEXIBLE LARYNGOSCOPY;  Surgeon: Jairo Christensen MD;  Location: [facility];  Service: Surgical Oncology    HERNIA REPAIR      HIP SURGERY      left  "side, bone decompression    HYSTEROSCOPY      IR BIOPSY KIDNEY RANDOM  10/3/2023    AR HYSTEROSCOPY BX ENDOMETRIUM&/POLYPC W/WO D&C N/A 9/29/2023    Procedure: DILATATION AND CURETTAGE (D&C) WITH HYSTEROSCOPY;  Surgeon: Lauren Chase MD;  Location: [facility];  Service: Gynecology    AR PARATHYROIDECTOMY/EXPLORATION PARATHYROIDS Right 05/25/2021    Procedure: PARATHYROIDECTOMY, MINIMALLY INVASIVE, right, intraoperative PTH monitoring;  Surgeon: Jairo Christensen MD;  Location: [facility];  Service: Surgical Oncology    RENAL BIOPSY      Not sure    THYROID SURGERY      TUBAL LIGATION       Social History     Objective:  Vitals:    03/21/24 0901   BP: 126/78   BP Location: Left arm   Patient Position: Sitting   Cuff Size: Adult   Pulse: 57   Resp: 17   Temp: (!) 97.2 °F (36.2 °C)   SpO2: 100%   Weight: 88 kg (194 lb)   Height: 5' 1\" (1.549 m)     Physical Exam  Constitutional:       Appearance: She is well-developed.   HENT:      Head: Normocephalic and atraumatic.   Eyes:      Pupils: Pupils are equal, round, and reactive to light.   Cardiovascular:      Rate and Rhythm: Normal rate.      Heart sounds: No murmur heard.  Pulmonary:      Effort: No respiratory distress.      Breath sounds: No wheezing or rales.   Abdominal:      General: There is no distension.      Palpations: Abdomen is soft.      Tenderness: There is no abdominal tenderness. There is no rebound.   Musculoskeletal:         General: No tenderness.      Cervical back: Neck supple.   Lymphadenopathy:      Cervical: No cervical adenopathy.   Skin:     General: Skin is warm.      Findings: No rash.   Neurological:      Mental Status: She is alert and oriented to person, place, and time.      Deep Tendon Reflexes: Reflexes normal.   Psychiatric:         Thought Content: Thought content normal.           Labs:  I personally reviewed the labs and imaging pertinent to this patient care.  "

## 2024-03-24 ENCOUNTER — OFFICE VISIT (OUTPATIENT)
Dept: URGENT CARE | Age: 59
End: 2024-03-24
Payer: COMMERCIAL

## 2024-03-24 VITALS
HEART RATE: 56 BPM | SYSTOLIC BLOOD PRESSURE: 157 MMHG | HEIGHT: 61 IN | RESPIRATION RATE: 16 BRPM | WEIGHT: 196 LBS | DIASTOLIC BLOOD PRESSURE: 94 MMHG | BODY MASS INDEX: 37 KG/M2 | TEMPERATURE: 97.1 F | OXYGEN SATURATION: 97 %

## 2024-03-24 DIAGNOSIS — J01.80 OTHER ACUTE SINUSITIS, RECURRENCE NOT SPECIFIED: Primary | ICD-10-CM

## 2024-03-24 PROCEDURE — 99213 OFFICE O/P EST LOW 20 MIN: CPT | Performed by: NURSE PRACTITIONER

## 2024-03-24 RX ORDER — PREDNISONE 20 MG/1
20 TABLET ORAL 2 TIMES DAILY WITH MEALS
Qty: 10 TABLET | Refills: 0 | Status: SHIPPED | OUTPATIENT
Start: 2024-03-24 | End: 2024-03-29

## 2024-03-24 NOTE — PROGRESS NOTES
[facility]        NAME: Rina Taylor is a 58 y.o. female  : 1965    MRN: [id number]  DATE: 2024  TIME: 10:52 AM    Assessment and Plan   Other acute sinusitis, recurrence not specified [J01.80]  1. Other acute sinusitis, recurrence not specified  predniSONE 20 mg tablet            Patient Instructions     Take medication as prescribed  Mucinex dm OTC as needed  Follow up with PCP in 3-5 days.  Proceed to  ER if symptoms worsen.    If tests have been performed at [facility], our office will contact you with results if changes need to be made to the care plan discussed with you at the visit.  You can review your full results on [facility].    Chief Complaint     Chief Complaint   Patient presents with    Cold Like Symptoms     Per pt, her symptoms started on Wednesday, her nose is continuously running, dry non-productive cough, left ear pain.          History of Present Illness       HPI  Presents to clinic with complaint of cold symptoms for about 4 days.  Main symptoms include runny and congested nose, dry nonproductive cough and pain in the left ear.  Took some DayQuil without much relief    Review of Systems   Review of Systems   Constitutional:  Negative for fever.   HENT:  Positive for congestion, ear pain (left), postnasal drip, rhinorrhea and sore throat.    Respiratory:  Positive for cough. Negative for chest tightness, shortness of breath and wheezing.    Cardiovascular:  Negative for chest pain.         Current Medications       Current Outpatient Medications:     acetaminophen (TYLENOL) 325 mg tablet, Take 2 tablets (650 mg total) by mouth every 6 (six) hours, Disp: , Rfl: 0    Cholecalciferol 25 MCG (1000 UT) TBDP, Take 1,000 Units by mouth in the morning, Disp: , Rfl:     folic acid (FOLVITE) 1 mg tablet, Take 1 tablet by mouth daily, Disp: , Rfl:     fosinopril (MONOPRIL) 20 mg tablet, Take 1 tablet (20 mg total) by mouth 2 (two) times a day, Disp: 180 tablet, Rfl:  3    levothyroxine 125 mcg tablet, Take one tablet daily on empty stomach 1 hour before breakfast and 4 hours apart from calcium and vitamin D supplementation, Disp: 90 tablet, Rfl: 1    NIFEdipine (PROCARDIA XL) 30 mg 24 hr tablet, Take 1 tablet (30 mg total) by mouth daily, Disp: 90 tablet, Rfl: 0    predniSONE 20 mg tablet, Take 1 tablet (20 mg total) by mouth 2 (two) times a day with meals for 5 days, Disp: 10 tablet, Rfl: 0    spironolactone (ALDACTONE) 25 mg tablet, Take 1 tablet (25 mg total) by mouth daily, Disp: 90 tablet, Rfl: 0    warfarin (COUMADIN) 4 mg tablet, Take by mouth daily Every day except Thursday, Disp: , Rfl:     hydrocortisone 2.5 % cream, , Disp: , Rfl:     hydroxychloroquine (PLAQUENIL) 200 mg tablet, Take 1 tablet (200 mg total) by mouth 2 (two) times a day with meals, Disp: 180 tablet, Rfl: 1    triamcinolone (KENALOG) 0.1 % cream, Apply topically 2 (two) times a day for 7 days, Disp: 80 g, Rfl: 0    warfarin (Coumadin) 1 mg tablet, As directed. (Patient not taking: Reported on 3/24/2024), Disp: 90 tablet, Rfl: 2    warfarin (COUMADIN) 3 mg tablet, TAKE ONE TABLET BY MOUTH EVERY DAY. DO NOT TAKE ON MONDAYS AND THURSDAYS (Patient not taking: Reported on 3/24/2024), Disp: 90 tablet, Rfl: 0    Current Allergies     Allergies as of 03/24/2024 - Reviewed 03/24/2024   Allergen Reaction Noted    Penicillins Itching 04/25/2013            The following portions of the patient's history were reviewed and updated as appropriate: allergies, current medications, past family history, past medical history, past social history, past surgical history and problem list.     Past Medical History:   Diagnosis Date    Acute blood loss anemia 2/9/2023    Acute cholecystitis 2/3/2023    ADA (acute kidney injury) (HCC) 2/9/2023    Anemia     Not sure    Anti-phospholipid antibody syndrome (HCC)     Cancer (HCC)     thyroid    Cellulitis of right index finger 3/4/2021    Chronic kidney disease     Clotting  disorder (HCC)     Lung embolism,  ITP    Diabetes mellitus (HCC)     Disease of thyroid gland     Edema 2013    Elevated serum creatinine 2023    Fibroid 1998    Gestational diabetes     Gross hematuria 2016    History of chemotherapy     late  for lupus    Hordeolum externum of left lower eyelid 2022    Hyperparathyroidism (HCC)     Hypertension     Hypotension due to hypovolemia 2023    Idiopathic thrombocytopenic purpura (ITP) (HCC)     Kidney stone     Left ear pain 3/23/2020    Left foot pain 2022    Lupus (HCC)     Miscarriage     Lost twin girls 10-    Osteoarthritis     Pulmonary embolism (HCC) 1987    Rash 3/11/2020    Sjogren's syndrome (HCC)     Upper respiratory tract infection 2019    Vitamin D deficiency        Past Surgical History:   Procedure Laterality Date     SECTION          CHOLECYSTECTOMY  23    CHOLECYSTECTOMY LAPAROSCOPIC N/A 2023    Procedure: LAPAROSCOPIC CHOLECYSTECTOMY;  Surgeon: Guille Diaz MD;  Location: [facility];  Service: General    DIAGNOSTIC FLEXIBLE LARYNGOSCOPY N/A 2021    Procedure: DIAGNOSTIC FLEXIBLE LARYNGOSCOPY;  Surgeon: Jairo Christensen MD;  Location: [facility];  Service: Surgical Oncology    HERNIA REPAIR      HIP SURGERY      left side, bone decompression    HYSTEROSCOPY      IR BIOPSY KIDNEY RANDOM  10/3/2023    MD HYSTEROSCOPY BX ENDOMETRIUM&/POLYPC W/WO D&C N/A 2023    Procedure: DILATATION AND CURETTAGE (D&C) WITH HYSTEROSCOPY;  Surgeon: Lauren Chase MD;  Location: [facility];  Service: Gynecology    MD PARATHYROIDECTOMY/EXPLORATION PARATHYROIDS Right 2021    Procedure: PARATHYROIDECTOMY, MINIMALLY INVASIVE, right, intraoperative PTH monitoring;  Surgeon: Jairo Christensen MD;  Location: [facility];  Service: Surgical Oncology    RENAL BIOPSY      Not sure    THYROID SURGERY      TUBAL LIGATION         Family History   Problem Relation Age of Onset    No  "Known Problems Mother     Diabetes type II Father     Diabetes Father     Stroke Father     Diabetes type II Paternal Aunt     Diabetes type II Paternal Uncle     Diabetes type II Paternal Grandmother     Stomach cancer Paternal Grandfather     No Known Problems Sister     No Known Problems Maternal Grandmother     No Known Problems Maternal Grandfather     No Known Problems Sister     No Known Problems Maternal Aunt     No Known Problems Maternal Aunt     No Known Problems Maternal Aunt     No Known Problems Paternal Aunt     No Known Problems Paternal Aunt          Medications have been verified.        Objective   /94 (BP Location: Left arm, Patient Position: Sitting)   Pulse 56   Temp (!) 97.1 °F (36.2 °C) (Skin)   Resp 16   Ht 5' 1.25\" (1.556 m)   Wt 88.9 kg (196 lb)   SpO2 97%   BMI 36.73 kg/m²   No LMP recorded. Patient is postmenopausal.       Physical Exam     Physical Exam  Constitutional:       General: She is not in acute distress.  HENT:      Right Ear: Tympanic membrane normal.      Left Ear: Tympanic membrane normal.      Nose: Congestion and rhinorrhea present.      Mouth/Throat:      Pharynx: No posterior oropharyngeal erythema.      Comments: Mild post nasal drip  Cardiovascular:      Rate and Rhythm: Regular rhythm.      Heart sounds: Normal heart sounds.   Pulmonary:      Effort: Pulmonary effort is normal.      Breath sounds: Normal breath sounds. No wheezing.                   "

## 2024-03-26 ENCOUNTER — TELEPHONE (OUTPATIENT)
Dept: NEPHROLOGY | Facility: CLINIC | Age: 59
End: 2024-03-26

## 2024-04-08 DIAGNOSIS — I10 ESSENTIAL HYPERTENSION, BENIGN: ICD-10-CM

## 2024-04-08 RX ORDER — SPIRONOLACTONE 25 MG/1
TABLET ORAL
Qty: 90 TABLET | Refills: 1 | Status: SHIPPED | OUTPATIENT
Start: 2024-04-08

## 2024-04-21 DIAGNOSIS — E89.0 POSTOPERATIVE HYPOTHYROIDISM: ICD-10-CM

## 2024-04-21 DIAGNOSIS — I10 ESSENTIAL HYPERTENSION: ICD-10-CM

## 2024-04-21 DIAGNOSIS — I82.5Y3 CHRONIC DEEP VEIN THROMBOSIS (DVT) OF PROXIMAL VEIN OF BOTH LOWER EXTREMITIES (HCC): Chronic | ICD-10-CM

## 2024-04-22 RX ORDER — NIFEDIPINE 30 MG/1
30 TABLET, EXTENDED RELEASE ORAL DAILY
Qty: 90 TABLET | Refills: 1 | Status: SHIPPED | OUTPATIENT
Start: 2024-04-22

## 2024-04-22 RX ORDER — LEVOTHYROXINE SODIUM 0.12 MG/1
TABLET ORAL
Qty: 90 TABLET | Refills: 1 | Status: SHIPPED | OUTPATIENT
Start: 2024-04-22

## 2024-04-22 RX ORDER — WARFARIN SODIUM 3 MG/1
TABLET ORAL
Qty: 90 TABLET | Refills: 0 | Status: SHIPPED | OUTPATIENT
Start: 2024-04-22

## 2024-05-01 PROBLEM — Z12.11 COLON CANCER SCREENING: Status: RESOLVED | Noted: 2024-03-21 | Resolved: 2024-05-01

## 2024-05-13 ENCOUNTER — TELEPHONE (OUTPATIENT)
Age: 59
End: 2024-05-13

## 2024-05-13 ENCOUNTER — LAB (OUTPATIENT)
Dept: LAB | Facility: CLINIC | Age: 59
End: 2024-05-13
Payer: COMMERCIAL

## 2024-05-13 DIAGNOSIS — E89.0 POSTOPERATIVE HYPOTHYROIDISM: ICD-10-CM

## 2024-05-13 DIAGNOSIS — E21.0 PRIMARY HYPERPARATHYROIDISM (HCC): ICD-10-CM

## 2024-05-13 DIAGNOSIS — Z85.850 HISTORY OF THYROID CANCER: ICD-10-CM

## 2024-05-13 DIAGNOSIS — E83.52 HYPERCALCEMIA: ICD-10-CM

## 2024-05-13 DIAGNOSIS — Z79.899 ENCOUNTER FOR LONG-TERM (CURRENT) USE OF OTHER MEDICATIONS: ICD-10-CM

## 2024-05-13 DIAGNOSIS — M32.8 OTHER FORMS OF SYSTEMIC LUPUS ERYTHEMATOSUS, UNSPECIFIED ORGAN INVOLVEMENT STATUS (HCC): Primary | ICD-10-CM

## 2024-05-13 DIAGNOSIS — I82.5Y3 CHRONIC DEEP VEIN THROMBOSIS (DVT) OF PROXIMAL VEIN OF BOTH LOWER EXTREMITIES (HCC): ICD-10-CM

## 2024-05-13 DIAGNOSIS — E55.9 VITAMIN D DEFICIENCY: ICD-10-CM

## 2024-05-13 DIAGNOSIS — Z13.1 SCREENING FOR DIABETES MELLITUS: ICD-10-CM

## 2024-05-13 DIAGNOSIS — Z13.220 SCREENING, LIPID: ICD-10-CM

## 2024-05-13 LAB
25(OH)D3 SERPL-MCNC: 27.1 NG/ML (ref 30–100)
ALBUMIN SERPL BCP-MCNC: 3.6 G/DL (ref 3.5–5)
ALP SERPL-CCNC: 80 U/L (ref 34–104)
ALT SERPL W P-5'-P-CCNC: 17 U/L (ref 7–52)
ANION GAP SERPL CALCULATED.3IONS-SCNC: 7 MMOL/L (ref 4–13)
AST SERPL W P-5'-P-CCNC: 20 U/L (ref 13–39)
BACTERIA UR QL AUTO: ABNORMAL /HPF
BILIRUB SERPL-MCNC: 0.62 MG/DL (ref 0.2–1)
BILIRUB UR QL STRIP: NEGATIVE
BUN SERPL-MCNC: 20 MG/DL (ref 5–25)
CALCIUM SERPL-MCNC: 9.7 MG/DL (ref 8.4–10.2)
CHLORIDE SERPL-SCNC: 106 MMOL/L (ref 96–108)
CHOLEST SERPL-MCNC: 159 MG/DL
CLARITY UR: CLEAR
CO2 SERPL-SCNC: 26 MMOL/L (ref 21–32)
COLOR UR: ABNORMAL
CREAT SERPL-MCNC: 0.97 MG/DL (ref 0.6–1.3)
CREAT UR-MCNC: 73.7 MG/DL
CREAT UR-MCNC: 73.7 MG/DL
EST. AVERAGE GLUCOSE BLD GHB EST-MCNC: 94 MG/DL
GFR SERPL CREATININE-BSD FRML MDRD: 65 ML/MIN/1.73SQ M
GLUCOSE P FAST SERPL-MCNC: 90 MG/DL (ref 65–99)
GLUCOSE UR STRIP-MCNC: NEGATIVE MG/DL
HBA1C MFR BLD: 4.9 %
HDLC SERPL-MCNC: 37 MG/DL
HGB UR QL STRIP.AUTO: NEGATIVE
HYALINE CASTS #/AREA URNS LPF: ABNORMAL /LPF
KETONES UR STRIP-MCNC: NEGATIVE MG/DL
LDLC SERPL CALC-MCNC: 86 MG/DL (ref 0–100)
LEUKOCYTE ESTERASE UR QL STRIP: ABNORMAL
MICROALBUMIN UR-MCNC: 259 MG/L
MICROALBUMIN/CREAT 24H UR: 351 MG/G CREATININE (ref 0–30)
MUCOUS THREADS UR QL AUTO: ABNORMAL
NITRITE UR QL STRIP: NEGATIVE
NON-SQ EPI CELLS URNS QL MICRO: ABNORMAL /HPF
NONHDLC SERPL-MCNC: 122 MG/DL
PH UR STRIP.AUTO: 6 [PH]
POTASSIUM SERPL-SCNC: 4.1 MMOL/L (ref 3.5–5.3)
PROT SERPL-MCNC: 6.5 G/DL (ref 6.4–8.4)
PROT UR STRIP-MCNC: ABNORMAL MG/DL
PROT UR-MCNC: 40 MG/DL
PROT/CREAT UR: 0.54 MG/G{CREAT} (ref 0–0.1)
PTH-INTACT SERPL-MCNC: 165.8 PG/ML (ref 12–88)
RBC #/AREA URNS AUTO: ABNORMAL /HPF
SODIUM SERPL-SCNC: 139 MMOL/L (ref 135–147)
SP GR UR STRIP.AUTO: 1.01 (ref 1–1.03)
T4 FREE SERPL-MCNC: 0.85 NG/DL (ref 0.61–1.12)
TRIGL SERPL-MCNC: 179 MG/DL
TSH SERPL DL<=0.05 MIU/L-ACNC: 0.89 UIU/ML (ref 0.45–4.5)
UROBILINOGEN UR STRIP-ACNC: <2 MG/DL
WBC #/AREA URNS AUTO: ABNORMAL /HPF

## 2024-05-13 PROCEDURE — 84432 ASSAY OF THYROGLOBULIN: CPT

## 2024-05-13 PROCEDURE — 84439 ASSAY OF FREE THYROXINE: CPT

## 2024-05-13 PROCEDURE — 83970 ASSAY OF PARATHORMONE: CPT

## 2024-05-13 PROCEDURE — 80061 LIPID PANEL: CPT

## 2024-05-13 PROCEDURE — 82306 VITAMIN D 25 HYDROXY: CPT

## 2024-05-13 PROCEDURE — 84443 ASSAY THYROID STIM HORMONE: CPT

## 2024-05-13 PROCEDURE — 83036 HEMOGLOBIN GLYCOSYLATED A1C: CPT

## 2024-05-13 PROCEDURE — 86800 THYROGLOBULIN ANTIBODY: CPT

## 2024-05-13 NOTE — TELEPHONE ENCOUNTER
Patient had labs completed in Dec, does she need additional labs completed prior to June appointment?

## 2024-05-13 NOTE — TELEPHONE ENCOUNTER
Patient is calling because thehave an appt on 6/6 and would like to go and get labs completed can we please order the patient's labs thank you.

## 2024-05-14 LAB
THYROGLOB AB SERPL-ACNC: <1 IU/ML (ref 0–0.9)
THYROGLOB SERPL-MCNC: <0.1 NG/ML (ref 1.5–38.5)

## 2024-05-21 ENCOUNTER — HOSPITAL ENCOUNTER (OUTPATIENT)
Dept: RADIOLOGY | Age: 59
Discharge: HOME | End: 2024-05-21
Payer: COMMERCIAL

## 2024-05-21 VITALS — HEIGHT: 61 IN | BODY MASS INDEX: 37 KG/M2 | WEIGHT: 196 LBS

## 2024-05-21 DIAGNOSIS — Z12.31 ENCOUNTER FOR SCREENING MAMMOGRAM FOR MALIGNANT NEOPLASM OF BREAST: ICD-10-CM

## 2024-05-21 PROCEDURE — 77063 BREAST TOMOSYNTHESIS BI: CPT

## 2024-05-21 PROCEDURE — 77067 SCR MAMMO BI INCL CAD: CPT

## 2024-05-30 ENCOUNTER — APPOINTMENT (OUTPATIENT)
Dept: LAB | Facility: CLINIC | Age: 59
End: 2024-05-30
Payer: COMMERCIAL

## 2024-05-30 DIAGNOSIS — Z79.899 ENCOUNTER FOR LONG-TERM (CURRENT) USE OF OTHER MEDICATIONS: ICD-10-CM

## 2024-05-30 DIAGNOSIS — M32.8 OTHER FORMS OF SYSTEMIC LUPUS ERYTHEMATOSUS, UNSPECIFIED ORGAN INVOLVEMENT STATUS (HCC): ICD-10-CM

## 2024-05-30 DIAGNOSIS — I82.5Y3 CHRONIC DEEP VEIN THROMBOSIS (DVT) OF PROXIMAL VEIN OF BOTH LOWER EXTREMITIES (HCC): ICD-10-CM

## 2024-05-30 LAB
ALBUMIN SERPL BCP-MCNC: 3.4 G/DL (ref 3.5–5)
ALP SERPL-CCNC: 83 U/L (ref 34–104)
ALT SERPL W P-5'-P-CCNC: 17 U/L (ref 7–52)
ANION GAP SERPL CALCULATED.3IONS-SCNC: 5 MMOL/L (ref 4–13)
AST SERPL W P-5'-P-CCNC: 18 U/L (ref 13–39)
BASOPHILS # BLD AUTO: 0.03 THOUSANDS/ÂΜL (ref 0–0.1)
BASOPHILS NFR BLD AUTO: 1 % (ref 0–1)
BILIRUB SERPL-MCNC: 0.58 MG/DL (ref 0.2–1)
BUN SERPL-MCNC: 25 MG/DL (ref 5–25)
C3 SERPL-MCNC: 117 MG/DL (ref 87–200)
C4 SERPL-MCNC: 46 MG/DL (ref 19–52)
CALCIUM ALBUM COR SERPL-MCNC: 11.2 MG/DL (ref 8.3–10.1)
CALCIUM SERPL-MCNC: 10.7 MG/DL (ref 8.4–10.2)
CHLORIDE SERPL-SCNC: 108 MMOL/L (ref 96–108)
CO2 SERPL-SCNC: 27 MMOL/L (ref 21–32)
CREAT SERPL-MCNC: 1.04 MG/DL (ref 0.6–1.3)
CRP SERPL QL: 5 MG/L
EOSINOPHIL # BLD AUTO: 0.08 THOUSAND/ÂΜL (ref 0–0.61)
EOSINOPHIL NFR BLD AUTO: 2 % (ref 0–6)
ERYTHROCYTE [DISTWIDTH] IN BLOOD BY AUTOMATED COUNT: 12.6 % (ref 11.6–15.1)
ERYTHROCYTE [SEDIMENTATION RATE] IN BLOOD: 19 MM/HOUR (ref 0–29)
GFR SERPL CREATININE-BSD FRML MDRD: 59 ML/MIN/1.73SQ M
GLUCOSE P FAST SERPL-MCNC: 96 MG/DL (ref 65–99)
HCT VFR BLD AUTO: 38 % (ref 34.8–46.1)
HGB BLD-MCNC: 12.7 G/DL (ref 11.5–15.4)
IMM GRANULOCYTES # BLD AUTO: 0.02 THOUSAND/UL (ref 0–0.2)
IMM GRANULOCYTES NFR BLD AUTO: 0 % (ref 0–2)
INR PPP: 3.33 (ref 0.84–1.19)
LYMPHOCYTES # BLD AUTO: 1.31 THOUSANDS/ÂΜL (ref 0.6–4.47)
LYMPHOCYTES NFR BLD AUTO: 27 % (ref 14–44)
MCH RBC QN AUTO: 30.1 PG (ref 26.8–34.3)
MCHC RBC AUTO-ENTMCNC: 33.4 G/DL (ref 31.4–37.4)
MCV RBC AUTO: 90 FL (ref 82–98)
MONOCYTES # BLD AUTO: 0.49 THOUSAND/ÂΜL (ref 0.17–1.22)
MONOCYTES NFR BLD AUTO: 10 % (ref 4–12)
NEUTROPHILS # BLD AUTO: 3.01 THOUSANDS/ÂΜL (ref 1.85–7.62)
NEUTS SEG NFR BLD AUTO: 60 % (ref 43–75)
NRBC BLD AUTO-RTO: 0 /100 WBCS
PLATELET # BLD AUTO: 160 THOUSANDS/UL (ref 149–390)
PMV BLD AUTO: 10.8 FL (ref 8.9–12.7)
POTASSIUM SERPL-SCNC: 4.5 MMOL/L (ref 3.5–5.3)
PROT SERPL-MCNC: 6.5 G/DL (ref 6.4–8.4)
PROTHROMBIN TIME: 33.1 SECONDS (ref 11.6–14.5)
RBC # BLD AUTO: 4.22 MILLION/UL (ref 3.81–5.12)
SODIUM SERPL-SCNC: 140 MMOL/L (ref 135–147)
WBC # BLD AUTO: 4.94 THOUSAND/UL (ref 4.31–10.16)

## 2024-05-30 PROCEDURE — 85025 COMPLETE CBC W/AUTO DIFF WBC: CPT

## 2024-05-30 PROCEDURE — 86225 DNA ANTIBODY NATIVE: CPT

## 2024-05-30 PROCEDURE — 85610 PROTHROMBIN TIME: CPT

## 2024-05-30 PROCEDURE — 86160 COMPLEMENT ANTIGEN: CPT

## 2024-05-30 PROCEDURE — 80053 COMPREHEN METABOLIC PANEL: CPT

## 2024-05-30 PROCEDURE — 36415 COLL VENOUS BLD VENIPUNCTURE: CPT

## 2024-05-30 PROCEDURE — 86140 C-REACTIVE PROTEIN: CPT

## 2024-05-30 PROCEDURE — 85652 RBC SED RATE AUTOMATED: CPT

## 2024-05-31 ENCOUNTER — ESTABLISHED COMPREHENSIVE EXAM (OUTPATIENT)
Dept: URBAN - METROPOLITAN AREA CLINIC 6 | Facility: CLINIC | Age: 59
End: 2024-05-31

## 2024-05-31 DIAGNOSIS — H25.043: ICD-10-CM

## 2024-05-31 DIAGNOSIS — Z79.61: ICD-10-CM

## 2024-05-31 DIAGNOSIS — M32.10: ICD-10-CM

## 2024-05-31 PROCEDURE — 92250 FUNDUS PHOTOGRAPHY W/I&R: CPT

## 2024-05-31 PROCEDURE — 92083 EXTENDED VISUAL FIELD XM: CPT

## 2024-05-31 PROCEDURE — 92014 COMPRE OPH EXAM EST PT 1/>: CPT

## 2024-05-31 ASSESSMENT — KERATOMETRY
OS_K2POWER_DIOPTERS: 42.75
OD_K1POWER_DIOPTERS: 43.25
OS_AXISANGLE2_DEGREES: 39
OD_AXISANGLE_DEGREES: 112
OS_K1POWER_DIOPTERS: 42.25
OD_K2POWER_DIOPTERS: 43.50
OS_AXISANGLE_DEGREES: 129
OD_AXISANGLE2_DEGREES: 22

## 2024-05-31 ASSESSMENT — TONOMETRY
OD_IOP_MMHG: 17
OS_IOP_MMHG: 16

## 2024-05-31 ASSESSMENT — VISUAL ACUITY
OS_CC: 20/25+2
OS_GLARE: 20/60
OD_CC: 20/20-1
OS_CC: J1
OD_GLARE: 20/50

## 2024-06-03 ENCOUNTER — OFFICE VISIT (OUTPATIENT)
Dept: INTERNAL MEDICINE CLINIC | Facility: CLINIC | Age: 59
End: 2024-06-03
Payer: COMMERCIAL

## 2024-06-03 VITALS
SYSTOLIC BLOOD PRESSURE: 130 MMHG | WEIGHT: 200.4 LBS | DIASTOLIC BLOOD PRESSURE: 80 MMHG | HEIGHT: 61 IN | OXYGEN SATURATION: 96 % | BODY MASS INDEX: 37.84 KG/M2 | HEART RATE: 56 BPM

## 2024-06-03 DIAGNOSIS — Z00.00 ANNUAL PHYSICAL EXAM: Primary | ICD-10-CM

## 2024-06-03 DIAGNOSIS — E83.52 HYPERCALCEMIA: ICD-10-CM

## 2024-06-03 DIAGNOSIS — M25.571 ACUTE RIGHT ANKLE PAIN: ICD-10-CM

## 2024-06-03 LAB — DSDNA AB SER QL CLIF: NEGATIVE

## 2024-06-03 PROCEDURE — 99396 PREV VISIT EST AGE 40-64: CPT | Performed by: INTERNAL MEDICINE

## 2024-06-03 NOTE — PROGRESS NOTES
Adult Annual Physical  Name: Rina Taylor      : 1965      MRN: [id number]  Encounter Provider: Lea Reyes, MD  Encounter Date: 6/3/2024   [facility]    Assessment & Plan   1. Annual physical exam  2. Acute right ankle pain  -     XR ankle 3+ vw right; Future; Expected date: 2024  3. Hypercalcemia  Assessment & Plan:  Elevated calcium on most recent labs  F/U with nephrology and endocrinology  Immunizations and preventive care screenings were discussed with patient today. Appropriate education was printed on patient's after visit summary.  Declines Shingrix    Counseling:  Exercise: the importance of regular exercise/physical activity was discussed. Recommend exercise 3-5 times per week for at least 30 minutes.       Depression Screening and Follow-up Plan: Patient was screened for depression during today's encounter. They screened negative with a PHQ-2 score of 0.        History of Present Illness     Adult Annual Physical:  Patient presents for annual physical.     Diet and Physical Activity:  - Diet/Nutrition: high fat diet.  - Exercise: no formal exercise.    Depression Screening:  - PHQ-2 Score: 0    General Health:  - Sleep: sleeps well.  - Hearing: normal hearing bilateral ears.  - Vision: goes for regular eye exams.  - Dental: no dental visits for > 1 year.    /GYN Health:  - Follows with GYN: yes.   - Menopause: postmenopausal.     Review of Systems   Constitutional:  Positive for unexpected weight change (weight gain).   Respiratory:  Negative for shortness of breath.    Cardiovascular:  Negative for chest pain and palpitations.   Gastrointestinal:  Negative for abdominal pain, constipation and diarrhea.   Musculoskeletal:  Positive for arthralgias (right ankle for a month).   Skin:  Positive for rash (left upper chest wall).   Neurological:  Negative for dizziness and headaches.         Objective     /80 (BP Location: Left arm, Patient  "Position: Sitting, Cuff Size: Large)   Pulse 56   Ht 5' 1\" (1.549 m)   Wt 90.9 kg (200 lb 6.4 oz)   SpO2 96%   BMI 37.87 kg/m²     Physical Exam  Constitutional:       General: She is not in acute distress.     Appearance: She is well-developed. She is obese. She is not ill-appearing, toxic-appearing or diaphoretic.   HENT:      Right Ear: External ear normal. There is no impacted cerumen.      Left Ear: External ear normal. There is no impacted cerumen.   Eyes:      Conjunctiva/sclera: Conjunctivae normal.   Cardiovascular:      Rate and Rhythm: Normal rate and regular rhythm.      Heart sounds: Normal heart sounds. No murmur heard.  Pulmonary:      Effort: Pulmonary effort is normal. No respiratory distress.      Breath sounds: Normal breath sounds. No stridor. No wheezing or rales.   Abdominal:      General: There is no distension.      Palpations: Abdomen is soft. There is no mass.      Tenderness: There is no abdominal tenderness. There is no guarding or rebound.   Musculoskeletal:      Cervical back: Neck supple.      Right lower leg: No edema.      Left lower leg: Tenderness (medial part) present. No swelling. No edema.   Skin:     Findings: Rash (mild excoriation of the skin left upper chest wall) present.   Neurological:      Mental Status: She is alert and oriented to person, place, and time.   Psychiatric:         Mood and Affect: Mood normal.         Behavior: Behavior normal.         Thought Content: Thought content normal.         Judgment: Judgment normal.       Administrative Statements         "

## 2024-06-03 NOTE — PROGRESS NOTES
"Adult Annual Physical  Name: Rina Taylor      : 1965      MRN: [id number]  Encounter Provider: Lea Reyes, MD  Encounter Date: 6/3/2024   [facility] department: [facility]    Assessment & Plan   1. Annual physical exam  2. Acute right ankle pain  -     XR ankle 3+ vw right; Future; Expected date: 2024  Immunizations and preventive care screenings were discussed with patient today. Appropriate education was printed on patient's after visit summary.    Counseling:  {Annual Physical; Counselin}         History of Present Illness   {Disappearing Hyperlinks I Encounters * My Last Note * Since Last Visit * History :64761}  Adult Annual Physical:  Patient presents for annual physical.     Diet and Physical Activity:  - Diet/Nutrition: high fat diet.  - Exercise: no formal exercise.    Depression Screening:  - PHQ-2 Score: 0    General Health:  - Sleep: sleeps well.  - Hearing: normal hearing bilateral ears.  - Vision: goes for regular eye exams.  - Dental: no dental visits for > 1 year.    /GYN Health:  - Follows with GYN: yes.   - Menopause: postmenopausal.     Review of Systems   Musculoskeletal:  Positive for arthralgias (right ankle for a month).   Skin:  Positive for rash (left upper chest wall).     {Select to Display [facility] (Optional):58990}    Objective   {Disappearing Hyperlinks   Review Vitals * Enter New Vitals * Results Review * Labs * Imaging * Cardiology * Procedures * Lung Cancer Screening :71352}  /80 (BP Location: Left arm, Patient Position: Sitting, Cuff Size: Large)   Pulse 56   Ht 5' 1\" (1.549 m)   Wt 90.9 kg (200 lb 6.4 oz)   SpO2 96%   BMI 37.87 kg/m²     Physical Exam  Constitutional:       General: She is not in acute distress.     Appearance: She is well-developed. She is not ill-appearing, toxic-appearing or diaphoretic.   HENT:      Right Ear: External ear normal. There is no impacted cerumen.      Left Ear: External ear normal. There is " no impacted cerumen.   Eyes:      Conjunctiva/sclera: Conjunctivae normal.   Cardiovascular:      Rate and Rhythm: Normal rate and regular rhythm.      Heart sounds: Normal heart sounds. No murmur heard.  Pulmonary:      Effort: Pulmonary effort is normal. No respiratory distress.      Breath sounds: Normal breath sounds. No stridor. No wheezing or rales.   Abdominal:      General: There is no distension.      Palpations: Abdomen is soft. There is no mass.      Tenderness: There is no abdominal tenderness. There is no guarding or rebound.   Musculoskeletal:      Cervical back: Neck supple.      Right lower leg: No edema.      Left lower leg: No edema.   Neurological:      Mental Status: She is alert and oriented to person, place, and time.   Psychiatric:         Mood and Affect: Mood normal.         Behavior: Behavior normal.         Thought Content: Thought content normal.         Judgment: Judgment normal.       Administrative Statements {Disappearing Hyperlinks I  [facility] Service [facility]}  {Time Spent Statement (Optional):26981}

## 2024-06-06 ENCOUNTER — OFFICE VISIT (OUTPATIENT)
Age: 59
End: 2024-06-06
Payer: COMMERCIAL

## 2024-06-06 VITALS
OXYGEN SATURATION: 97 % | DIASTOLIC BLOOD PRESSURE: 60 MMHG | TEMPERATURE: 97.6 F | SYSTOLIC BLOOD PRESSURE: 114 MMHG | WEIGHT: 202 LBS | BODY MASS INDEX: 37.17 KG/M2 | HEART RATE: 51 BPM | HEIGHT: 62 IN

## 2024-06-06 DIAGNOSIS — M32.8 OTHER FORMS OF SYSTEMIC LUPUS ERYTHEMATOSUS, UNSPECIFIED ORGAN INVOLVEMENT STATUS (HCC): Primary | ICD-10-CM

## 2024-06-06 DIAGNOSIS — E83.52 HYPERCALCEMIA: ICD-10-CM

## 2024-06-06 DIAGNOSIS — Z79.899 ENCOUNTER FOR LONG-TERM (CURRENT) USE OF OTHER MEDICATIONS: ICD-10-CM

## 2024-06-06 DIAGNOSIS — D68.61 ANTI-PHOSPHOLIPID SYNDROME (HCC): ICD-10-CM

## 2024-06-06 DIAGNOSIS — M15.0 PRIMARY GENERALIZED (OSTEO)ARTHRITIS: ICD-10-CM

## 2024-06-06 PROCEDURE — 99214 OFFICE O/P EST MOD 30 MIN: CPT | Performed by: PHYSICIAN ASSISTANT

## 2024-06-06 NOTE — ASSESSMENT & PLAN NOTE
History of biopsy-positive lupus nephritis (1996) with history of fetal loss, antiphospholipid antibody syndrome, and proteinuria.  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  She restarted Plaquenil slightly after her kidney biopsy was done in the fall 2023.  Benlysta was also discussed however this time she is hesitant to start infusion therapy.  We will plan to repeat an [facility] lupus monitoring test.  If she continues to have markers of active lupus would strongly recommend initiating Benlysta therapy.  We will continue to follow labs and symptoms closely.  Labs as ordered to monitor for disease activity as well as medication side effects and toxicity.  Continue following with eye doctor regularly to monitor for Plaquenil toxicity.  Follow-up with nephrologist as scheduled.  Follow-up here in 3 months or sooner if needed.

## 2024-06-06 NOTE — ASSESSMENT & PLAN NOTE
She has been experiencing more right foot and ankle discomfort.  She does have a prescription for a right ankle x-ray and I have given her a prescription for a right foot x-ray as well.  She does have some mechanical foot deformities which is likely contributing to her symptoms and likely has some osteoarthritis changes.  If symptoms persist would recommend evaluation with podiatry.

## 2024-06-06 NOTE — PROGRESS NOTES
Ambulatory Visit  Name: Rina Taylor      : 1965      MRN: [id number]  Encounter Provider: Nazia Lam PA-C  Encounter Date: 2024   Encounter department: [facility] RHEUMATOLOGY [facility]    Assessment & Plan   1. Other forms of systemic lupus erythematosus, unspecified organ involvement status (HCC)  Assessment & Plan:  History of biopsy-positive lupus nephritis () with history of fetal loss, antiphospholipid antibody syndrome, and proteinuria.  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  She restarted Plaquenil slightly after her kidney biopsy was done in the 2023.  Benlysta was also discussed however this time she is hesitant to start infusion therapy.  We will plan to repeat an Avise lupus monitoring test.  If she continues to have markers of active lupus would strongly recommend initiating Benlysta therapy.  We will continue to follow labs and symptoms closely.  Labs as ordered to monitor for disease activity as well as medication side effects and toxicity.  Continue following with eye doctor regularly to monitor for Plaquenil toxicity.  Follow-up with nephrologist as scheduled.  Follow-up here in 3 months or sooner if needed.  Orders:  -     MISCELLANEOUS LAB TEST; Future  -     CBC and differential; Future  -     Comprehensive metabolic panel; Future  -     C-reactive protein; Future  -     Sedimentation rate, automated; Future  -     dsDNA Antibody by IFA, Crithidia luciliae, with Reflex to Titer; Future  -     C3 complement; Future  -     C4 complement; Future  2. Anti-phospholipid syndrome (HCC)  Assessment & Plan:  History of positive APL with history of pulmonary embolism.  Continue Coumadin.  3. Primary generalized (osteo)arthritis  Assessment & Plan:  She has been  experiencing more right foot and ankle discomfort.  She does have a prescription for a right ankle x-ray and I have given her a prescription for a right foot x-ray as well.  She does have some mechanical foot deformities which is likely contributing to her symptoms and likely has some osteoarthritis changes.  If symptoms persist would recommend evaluation with podiatry.  Orders:  -     XR foot 3+ vw right; Future; Expected date: 06/06/2024  4. Hypercalcemia  Assessment & Plan:  Continue following with endocrinology.  She is scheduled for follow-up later this month.  5. Encounter for long-term (current) use of other medications  -     MISCELLANEOUS LAB TEST; Future  -     CBC and differential; Future  -     Comprehensive metabolic panel; Future  -     C-reactive protein; Future  -     Sedimentation rate, automated; Future  -     dsDNA Antibody by IFA, Scotty singletary, with Reflex to Titer; Future  -     C3 complement; Future  -     C4 complement; Future      History of Present Illness     Rina Taylor is a 59 y.o. female.  She is here for follow-up of her lupus with biopsy-positive lupus nephritis diagnosed in 1996 with history of fetal loss, antiphospholipid antibody syndrome and proteinuria.  She had a lupus Avise test on 9/11/2023.  This revealed a positive lupus index along with complement activation consistent with active disease.  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  She had previously taken Plaquenil in the past and recently restarted it in the fall 2023.  Dr. Tellez also recommended starting Benlysta infusions.  Her symptoms are generally stable at this time.  She does continue to struggle with chronic fatigue.  She has no significant joint pain at this time other than some discomfort in her right foot and ankle.  She did see her PCP for this and has a prescription for a right ankle x-ray.  She  has no swelling in her joints.  She has no fevers or rashes.  She has a follow-up with her nephrologist in September.    She is currently on Coumadin for her antiphospholipid syndrome with history of pulmonary embolus.  She has a history of primary hyperparathyroidism status post thyroidectomy with postoperative hypercalcemia.  She follows with endocrinology.  She did have a DEXA scan done in March 2021 which revealed normal bone density.  She has a history of acquired hypothyroidism secondary to history of thyroid cancer status post thyroidectomy.  She was treated with radioactive iodine as well.     She has a history of postmenopausal bleeding and is following with gynecology.  She had a biopsy done which was benign per patient.  She has a history of osteoarthritis along with lumbar spondylosis.  The symptoms have been stable.    She had labs done on 5/30/2024.  CBC unremarkable.  Creatinine 1.04, GFR 59.  Calcium 10.7 (corrected calcium 11.2).  ESR within normal limits.  CRP 5.0.  C3, C4 within normal limits.  Double-stranded SRINIVASAN negative.  She had a QuantiFERON gold and hepatitis panel done in October 2023 both of which were negative.    Review of Systems   Constitutional:  Positive for fatigue. Negative for chills and fever.   HENT:  Negative for hearing loss, sore throat and tinnitus.    Eyes:  Negative for pain and visual disturbance.   Respiratory:  Negative for cough and shortness of breath.    Cardiovascular:  Negative for chest pain and palpitations.   Gastrointestinal:  Negative for abdominal pain, nausea and vomiting.   Genitourinary:  Negative for difficulty urinating.   Musculoskeletal:  Positive for back pain (Occasionally). Negative for arthralgias, gait problem, joint swelling, myalgias, neck pain and neck stiffness.   Skin:  Negative for rash.   Neurological:  Negative for dizziness, seizures, weakness, numbness and headaches.   Psychiatric/Behavioral:  Negative for confusion, decreased  "concentration and sleep disturbance.      Current Outpatient Medications on File Prior to Visit   Medication Sig Dispense Refill    Cholecalciferol 25 MCG (1000 UT) TBDP Take 1,000 Units by mouth in the morning      folic acid (FOLVITE) 1 mg tablet Take 1 tablet by mouth daily      fosinopril (MONOPRIL) 20 mg tablet Take 1 tablet (20 mg total) by mouth 2 (two) times a day 180 tablet 3    hydrocortisone 2.5 % cream       hydroxychloroquine (PLAQUENIL) 200 mg tablet Take 1 tablet (200 mg total) by mouth 2 (two) times a day with meals 180 tablet 1    levothyroxine 125 mcg tablet Take one tablet daily on empty stomach 1 hour before breakfast and 4 hours apart from calcium and vitamin D supplementation 90 tablet 1    NIFEdipine (PROCARDIA XL) 30 mg 24 hr tablet Take 1 tablet (30 mg total) by mouth daily 90 tablet 1    spironolactone (ALDACTONE) 25 mg tablet TAKE1 TABLET BY MOUTH DAILY 90 tablet 1    triamcinolone (KENALOG) 0.1 % cream Apply topically 2 (two) times a day for 7 days 80 g 0    warfarin (COUMADIN) 3 mg tablet TAKE ONE TABLET BY MOUTH EVERY DAY. DO NOT TAKE ON MONDAYS AND THURSDAYS 90 tablet 0    warfarin (COUMADIN) 4 mg tablet Take by mouth daily Every day except Thursday      warfarin (Coumadin) 1 mg tablet As directed. (Patient not taking: Reported on 3/24/2024) 90 tablet 2     No current facility-administered medications on file prior to visit.      Objective     /60 (BP Location: Left arm, Patient Position: Sitting, Cuff Size: Adult)   Pulse (!) 51   Temp 97.6 °F (36.4 °C) (Temporal)   Ht 5' 1.5\" (1.562 m)   Wt 91.6 kg (202 lb)   SpO2 97%   BMI 37.55 kg/m²     Physical Exam  Constitutional:       Appearance: Normal appearance.   Cardiovascular:      Rate and Rhythm: Normal rate and regular rhythm.   Pulmonary:      Breath sounds: Normal breath sounds.   Musculoskeletal:      Comments: Neck slightly decreased lateral flexion.  Shoulders, elbows, wrists full range of motion.  Tinel's negative " bilaterally.  Hands mild interphalangeal osteoarthritis.  No synovitis noted.  Straight leg raising negative bilaterally.  Hips full range of motion.  Knees full range of motion with patellofemoral crepitus.  Ankles decreased dorsiflexion.  Feet hyperpronation left greater than right with no synovitis.  Early pes planus deformities.     Skin:     General: Skin is warm and dry.   Neurological:      General: No focal deficit present.      Mental Status: She is alert.       Administrative Statements         Dragon Dictation software was used to dictate this note. It may contain errors with dictating incorrect words/spelling. Please contact provider directly for any questions.

## 2024-06-18 ENCOUNTER — OFFICE VISIT (OUTPATIENT)
Dept: ENDOCRINOLOGY | Facility: CLINIC | Age: 59
End: 2024-06-18
Payer: COMMERCIAL

## 2024-06-18 VITALS
DIASTOLIC BLOOD PRESSURE: 84 MMHG | HEART RATE: 56 BPM | OXYGEN SATURATION: 95 % | WEIGHT: 201 LBS | SYSTOLIC BLOOD PRESSURE: 110 MMHG | BODY MASS INDEX: 36.99 KG/M2 | HEIGHT: 62 IN

## 2024-06-18 DIAGNOSIS — E55.9 VITAMIN D DEFICIENCY: ICD-10-CM

## 2024-06-18 DIAGNOSIS — E21.0 PRIMARY HYPERPARATHYROIDISM (HCC): Primary | ICD-10-CM

## 2024-06-18 DIAGNOSIS — E89.0 POSTOPERATIVE HYPOTHYROIDISM: ICD-10-CM

## 2024-06-18 DIAGNOSIS — Z85.850 HISTORY OF THYROID CANCER: ICD-10-CM

## 2024-06-18 PROCEDURE — 99214 OFFICE O/P EST MOD 30 MIN: CPT | Performed by: PHYSICIAN ASSISTANT

## 2024-06-18 NOTE — PROGRESS NOTES
Patient Progress Note    CC: hypothyroidism, hx of thyroid cancer, primary hyperparathyroidism    Referring Provider  Lea Reyes, Md  [address] [place] [address]     History of Present Illness:     Patient is a 59-year-old female here for follow-up of postsurgical hypothyroidism, primary hyperparathyroidism, vitamin-D deficiency.  She has a past medical history of stage I papillary thyroid cancer. She underwent right hemithyroidectomy and right parathyroid gland removal in 2006, followed by completion of thyroidectomy in 2010. In 2010 she also had resection of left parathyroid gland.  She also had LEONARDO therapy. Patient is on levothyroxine 125 mcg 1 tablet daily.  Patient is taking it 60 minutes before breakfast on an empty stomach and at least 4 hrs apart from supplements.  Tolerating medication well.  No recent Iodine loading in form of medication, biotin or kelp supplements or radiological diagnostic studies.  Most recent thyroid function tests were normal, TSH 0.890 and free T4 normal at 0.85. Thyroglobulin antibody level was less than 1.0 and thyroglobulin < 0.1.  Head neck ultrasound done in August 2023 did not show evidence of recurrent or metastatic lymph nodes.  Chronically stable nodule in the right thyroid bed region.    Primary hyperparathyroidism: patient underwent a re-exploration parathyroidectomy in May 2021. Her parathyroid levels did improve during surgery. Most recent PTH level elevated at 165.8. She is not taking calcium supplement. Corrected calcium previously was 11.2.  In the past she did take Sensipar however that was stopped when calcium started trending low normal.. Vitamin D is slightly low at 27.1. She is supposed to be taking vitamin D3 1000 IU a day but is using it sporadically.  Her last DEXA scan in March 2021 showed normal bone density.        Patient Active Problem List   Diagnosis    Chronic deep vein thrombosis (DVT) of proximal vein of both lower extremities (HCC)     History of ITP    Benign hypertensive CKD    Chronic kidney disease (CKD), stage II (mild)    Hypercalcemia    Primary hyperparathyroidism (HCC)    Hypertension    Nephrolithiasis    Proteinuria    Systemic lupus erythematosus (HCC)    Vitamin D deficiency    Elevated PTHrP level    History of thyroid cancer    BMI 39.0-39.9,adult    Post-menopausal bleeding    Greater trochanteric bursitis of left hip    Anti-phospholipid syndrome (HCC)    Status post parathyroidectomy    Postoperative hypothyroidism    Sjogren's syndrome without extraglandular involvement (HCC)    Abnormal CT of the chest    Lesion of urinary bladder    Dermatitis    S/P D&C (status post dilation and curettage)    Primary generalized (osteo)arthritis     Past Medical History:   Diagnosis Date    Acute blood loss anemia 2023    Acute cholecystitis 2/3/2023    ADA (acute kidney injury) (HCC) 2023    Anemia     Not sure    Anti-phospholipid antibody syndrome (HCC)     Cancer (HCC)     thyroid    Cellulitis of right index finger 3/4/2021    Chronic kidney disease     Clotting disorder (HCC)     Lung embolism,  ITP    Diabetes mellitus (HCC)     Disease of thyroid gland     Edema 2013    Elevated serum creatinine 2023    Fibroid 1998    Gestational diabetes     Gross hematuria 2016    History of chemotherapy     late  for lupus    Hordeolum externum of left lower eyelid 2022    Hyperparathyroidism (HCC)     Hypertension     Hypotension due to hypovolemia 2023    Idiopathic thrombocytopenic purpura (ITP) (HCC)     Kidney stone     Left ear pain 3/23/2020    Left foot pain 2022    Lupus (HCC)     Miscarriage 1996    Lost twin girls 10-13-    Osteoarthritis     Pulmonary embolism (HCC) 1987    Rash 3/11/2020    Sjogren's syndrome (HCC)     Upper respiratory tract infection 2019    Vitamin D deficiency       Past Surgical History:   Procedure Laterality Date     SECTION           CHOLECYSTECTOMY  02/05/23    CHOLECYSTECTOMY LAPAROSCOPIC N/A 02/05/2023    Procedure: LAPAROSCOPIC CHOLECYSTECTOMY;  Surgeon: Guille Diaz MD;  Location: [facility];  Service: General    DIAGNOSTIC FLEXIBLE LARYNGOSCOPY N/A 05/25/2021    Procedure: DIAGNOSTIC FLEXIBLE LARYNGOSCOPY;  Surgeon: Jairo Christensen MD;  Location: [facility];  Service: Surgical Oncology    HERNIA REPAIR      HIP SURGERY      left side, bone decompression    HYSTEROSCOPY      IR BIOPSY KIDNEY RANDOM  10/3/2023    VT HYSTEROSCOPY BX ENDOMETRIUM&/POLYPC W/WO D&C N/A 9/29/2023    Procedure: DILATATION AND CURETTAGE (D&C) WITH HYSTEROSCOPY;  Surgeon: Lauren Chase MD;  Location: [facility];  Service: Gynecology    VT PARATHYROIDECTOMY/EXPLORATION PARATHYROIDS Right 05/25/2021    Procedure: PARATHYROIDECTOMY, MINIMALLY INVASIVE, right, intraoperative PTH monitoring;  Surgeon: Jairo Christensen MD;  Location: [facility];  Service: Surgical Oncology    RENAL BIOPSY      Not sure    THYROID SURGERY      TUBAL LIGATION        Family History   Problem Relation Age of Onset    No Known Problems Mother     Diabetes type II Father     Diabetes Father     Stroke Father     Diabetes type II Paternal Aunt     Diabetes type II Paternal Uncle     Diabetes type II Paternal Grandmother     Stomach cancer Paternal Grandfather     No Known Problems Sister     No Known Problems Maternal Grandmother     No Known Problems Maternal Grandfather     No Known Problems Sister     No Known Problems Maternal Aunt     No Known Problems Maternal Aunt     No Known Problems Maternal Aunt     No Known Problems Paternal Aunt     No Known Problems Paternal Aunt      Social History     Tobacco Use    Smoking status: Never     Passive exposure: Never    Smokeless tobacco: Never   Substance Use Topics    Alcohol use: Not Currently     Comment: Twice a year     Allergies   Allergen Reactions    Penicillins Itching     Current Outpatient Medications   Medication Sig  Dispense Refill    Cholecalciferol 25 MCG (1000 UT) TBDP Take 1,000 Units by mouth in the morning      folic acid (FOLVITE) 1 mg tablet Take 1 tablet by mouth daily      fosinopril (MONOPRIL) 20 mg tablet Take 1 tablet (20 mg total) by mouth 2 (two) times a day 180 tablet 3    hydrocortisone 2.5 % cream       hydroxychloroquine (PLAQUENIL) 200 mg tablet Take 1 tablet (200 mg total) by mouth 2 (two) times a day with meals 180 tablet 1    levothyroxine 125 mcg tablet Take one tablet daily on empty stomach 1 hour before breakfast and 4 hours apart from calcium and vitamin D supplementation 90 tablet 1    NIFEdipine (PROCARDIA XL) 30 mg 24 hr tablet Take 1 tablet (30 mg total) by mouth daily 90 tablet 1    spironolactone (ALDACTONE) 25 mg tablet TAKE1 TABLET BY MOUTH DAILY 90 tablet 1    triamcinolone (KENALOG) 0.1 % cream Apply topically 2 (two) times a day for 7 days 80 g 0    warfarin (Coumadin) 1 mg tablet As directed. 90 tablet 2    warfarin (COUMADIN) 3 mg tablet TAKE ONE TABLET BY MOUTH EVERY DAY. DO NOT TAKE ON MONDAYS AND THURSDAYS 90 tablet 0    warfarin (COUMADIN) 4 mg tablet Take by mouth daily Every day except Thursday       No current facility-administered medications for this visit.         Review of Systems   Constitutional:  Negative for activity change, appetite change, fatigue and unexpected weight change.   HENT:  Negative for trouble swallowing.    Eyes:  Negative for visual disturbance.   Respiratory:  Negative for shortness of breath.    Cardiovascular:  Negative for chest pain and palpitations.   Gastrointestinal:  Negative for constipation and diarrhea.   Endocrine: Negative for cold intolerance and heat intolerance.   Musculoskeletal:         Foot discomfort   Skin: Negative.    Neurological:  Negative for tremors.   Psychiatric/Behavioral: Negative.         Physical Exam:  Body mass index is 37.36 kg/m².  /84 (BP Location: Left arm, Patient Position: Sitting, Cuff Size: Large)   Pulse  "56   Ht 5' 1.5\" (1.562 m)   Wt 91.2 kg (201 lb)   SpO2 95%   BMI 37.36 kg/m²    Wt Readings from Last 3 Encounters:   06/18/24 91.2 kg (201 lb)   06/06/24 91.6 kg (202 lb)   06/03/24 90.9 kg (200 lb 6.4 oz)       Physical Exam  Vitals and nursing note reviewed.   Constitutional:       Appearance: She is well-developed.   HENT:      Head: Normocephalic.   Eyes:      General: No scleral icterus.     Extraocular Movements: EOM normal.   Neck:      Thyroid: No thyromegaly.   Cardiovascular:      Rate and Rhythm: Normal rate and regular rhythm.      Pulses:           Radial pulses are 2+ on the right side and 2+ on the left side.      Heart sounds: No murmur heard.  Pulmonary:      Effort: Pulmonary effort is normal. No respiratory distress.      Breath sounds: Normal breath sounds. No wheezing.   Musculoskeletal:      Cervical back: Neck supple.   Skin:     General: Skin is warm and dry.   Neurological:      Mental Status: She is alert.   Psychiatric:         Mood and Affect: Mood and affect normal.       Patient's shoes and socks were not removed.          Labs:   Lab Results   Component Value Date    HGBA1C 4.9 05/13/2024       Lab Results   Component Value Date    HDL 37 (L) 05/13/2024    TRIG 179 (H) 05/13/2024       Lab Results   Component Value Date    GLUCOSE 105 (H) 11/18/2017    CALCIUM 10.7 (H) 05/30/2024     11/18/2017    K 4.5 05/30/2024    CO2 27 05/30/2024     05/30/2024    BUN 25 05/30/2024    CREATININE 1.04 05/30/2024        eGFR   Date Value Ref Range Status   05/30/2024 59 ml/min/1.73sq m Final       Lab Results   Component Value Date    ALT 17 05/30/2024    AST 18 05/30/2024    ALKPHOS 83 05/30/2024    BILITOT 0.5 07/01/2017       Lab Results   Component Value Date    UWI9YOEXEFCI 0.890 05/13/2024    TSH 0.354 (L) 09/30/2019         Plan:    Diagnoses and all orders for this visit:    Primary hyperparathyroidism (HCC)  PTH elevated at 165.8  Last calcium elevated at 11.2, 2 weeks " prior to that her calcium was high normal.  Avoid calcium supplementation  Hydrate well  Repeat calcium level now.  If calcium remains elevated over 11.0 she will need to start Sensipar.  Monitor labs  -     PTH, intact; Future  -     Albumin; Future  -     Basic metabolic panel; Future  -     Albumin; Future  -     Basic metabolic panel; Future    Postoperative hypothyroidism  TSH 0.890 and free T4 normal at 0.85  TSH goal is 0.5-3.0  Continue current dose of levothyroxine  Monitor labs  -     T4, free; Future  -     TSH, 3rd generation; Future    History of thyroid cancer  Thyroglobulin antibody is less than 1.0 and thyroglobulin level is undetectable  Head neck ultrasound previously did not show evidence of recurrent or metastatic disease  Check levels in 6 months and repeat ultrasound in August 2024  -     US head neck lymph node mapping; Future  -     Thyroglobulin; Future    Vitamin D deficiency  Vitamin D low at 27.1  Continue current supplementation  -     Vitamin D 25 hydroxy; Future            Discussed with the patient and all questions fully answered. She will call me if any problems arise.    Counseled patient on diagnostic results, prognosis, risk and benefit of treatment options, instruction for management, importance of treatment compliance, risk  factor reduction and impressions      Diana Jin PA-C

## 2024-07-07 DIAGNOSIS — I82.5Y3 CHRONIC DEEP VEIN THROMBOSIS (DVT) OF PROXIMAL VEIN OF BOTH LOWER EXTREMITIES (HCC): Chronic | ICD-10-CM

## 2024-07-08 RX ORDER — WARFARIN SODIUM 3 MG/1
TABLET ORAL
Qty: 70 TABLET | Refills: 0 | Status: SHIPPED | OUTPATIENT
Start: 2024-07-08

## 2024-07-30 ENCOUNTER — OFFICE VISIT (OUTPATIENT)
Dept: NEPHROLOGY | Facility: CLINIC | Age: 59
End: 2024-07-30
Payer: COMMERCIAL

## 2024-07-30 VITALS
WEIGHT: 201 LBS | HEIGHT: 61 IN | DIASTOLIC BLOOD PRESSURE: 78 MMHG | SYSTOLIC BLOOD PRESSURE: 122 MMHG | BODY MASS INDEX: 37.95 KG/M2

## 2024-07-30 DIAGNOSIS — E21.0 PRIMARY HYPERPARATHYROIDISM (HCC): ICD-10-CM

## 2024-07-30 DIAGNOSIS — E83.52 HYPERCALCEMIA: ICD-10-CM

## 2024-07-30 DIAGNOSIS — I10 PRIMARY HYPERTENSION: ICD-10-CM

## 2024-07-30 DIAGNOSIS — E55.9 VITAMIN D DEFICIENCY: ICD-10-CM

## 2024-07-30 DIAGNOSIS — N18.2 CHRONIC KIDNEY DISEASE (CKD), STAGE II (MILD): Primary | ICD-10-CM

## 2024-07-30 DIAGNOSIS — M32.0 DRUG-INDUCED SYSTEMIC LUPUS ERYTHEMATOSUS, UNSPECIFIED ORGAN INVOLVEMENT STATUS (HCC): ICD-10-CM

## 2024-07-30 DIAGNOSIS — R80.8 OTHER PROTEINURIA: ICD-10-CM

## 2024-07-30 PROCEDURE — 99214 OFFICE O/P EST MOD 30 MIN: CPT | Performed by: INTERNAL MEDICINE

## 2024-07-30 NOTE — PROGRESS NOTES
NEPHROLOGY OUTPATIENT PROGRESS NOTE   Rina Taylor 59 y.o. female MRN: [id number]  DATE: 7/30/2024  Reason for visit:   Chief Complaint   Patient presents with    Chronic Kidney Disease    Follow-up        Patient Instructions   1. 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  Last sCr 1.04 from 5/30/34  -continue to avoid nonsteroidals(ibuprofen, aleve, advil, motrin, celebrex, naproxen, toradol)  -latest urinalysis shows moderate leukocytes, 1+ protein, 2-4 RBCs, 4-10 WBCs, 0-3 hyaline casts  -UpCr lower at 0.54g as of 5/13/24  -monitor CMP, UA with microscopy as well along with repeat UpCr.   -rheumatology recommends benlysta     2. proteinuria-+residual proteinuria from prior lupus nephritis (biopsy proven in 1996), now noted to have increased weight as well as HTN.   -Off HCTZ due to hypercalcemia, on fosinopril 20mg twice daily, aldactone 25mg daily   -last UpCr 0.54g as of May 2024  -Monitor UpCr along with microalb:Cr     3.. hypercalcemia/hyperparathyroidism - follows with endo, +nephrolithiasis history with recent Urinalysis showing calcium oxalate crystals  -calcium has normalized on latest bloodwork  -You must drink enough water to urinate at least 2L per day to prevent stone formation but this is difficult given her edema and need for diuretics. Unfortunately, diuretics will contribute to stone formation.  -avoid high salt diet both for stone reduction and to help blood pressure stay controlled  -PTH slightly elevated at 166 - off sensipar 30mg daily, per endocrinology  -calcium elevated at 11.2     4. hypertension-BP well controlled today  -continue nifedipine XL 30mg daily(at night), fosinopril 20mg twice daily, spironolactone 25mg daily (at lunch)  -avoid caffeine/tea     5. vitamin D insufficiency - Vit D level 27 as of  May 2024 - on vitamin D3 1000u daily     6. SLE - on plaquenil, follows with rheumatology     Obtain bloodwork and urine testing prior to next office visit.  RTC in 6 months.   Your risk of kidney failure at 2 years and 5 years per [facility] remains low.      Rina was seen today for chronic kidney disease and follow-up.    Diagnoses and all orders for this visit:    Chronic kidney disease (CKD), stage II (mild)  -     Urinalysis with microscopic; Future  -     Protein / creatinine ratio, urine; Future  -     Albumin / creatinine urine ratio; Future  -     Comprehensive metabolic panel; Future  -     PTH, intact; Future  -     Protein / creatinine ratio, urine; Future  -     Urinalysis with microscopic; Future  -     Albumin / creatinine urine ratio; Future    Primary hypertension    Primary hyperparathyroidism (HCC)  -     PTH, intact; Future    Drug-induced systemic lupus erythematosus, unspecified organ involvement status (HCC)    Hypercalcemia  -     Comprehensive metabolic panel; Future    Other proteinuria  -     Protein / creatinine ratio, urine; Future  -     Albumin / creatinine urine ratio; Future    Vitamin D deficiency        Assessment/Plan:  1. 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  Last sCr 1.04 from 5/30/34  -continue to avoid nonsteroidals(ibuprofen, aleve, advil, motrin, celebrex, naproxen, toradol)  -latest urinalysis shows moderate leukocytes, 1+ protein, 2-4 RBCs, 4-10 WBCs, 0-3 hyaline casts  -UpCr lower at 0.54g as of 5/13/24  -monitor CMP, UA with microscopy as well along with repeat UpCr.   -rheumatology recommends benlysta     2. proteinuria-+residual proteinuria from prior lupus nephritis (biopsy proven in 1996), now noted to have increased weight as well as HTN.   -Off HCTZ due to hypercalcemia, on fosinopril  20mg twice daily, aldactone 25mg daily   -last UpCr 0.54g as of May 2024  -Monitor UpCr along with microalb:Cr     3.. hypercalcemia/hyperparathyroidism - follows with endo, +nephrolithiasis history with recent Urinalysis showing calcium oxalate crystals  -calcium has normalized on latest bloodwork  -You must drink enough water to urinate at least 2L per day to prevent stone formation but this is difficult given her edema and need for diuretics. Unfortunately, diuretics will contribute to stone formation.  -avoid high salt diet both for stone reduction and to help blood pressure stay controlled  -PTH slightly elevated at 166 - off sensipar 30mg daily, per endocrinology  -calcium elevated at 11.2     4. hypertension-BP well controlled today  -continue nifedipine XL 30mg daily(at night), fosinopril 20mg twice daily, spironolactone 25mg daily (at lunch)  -avoid caffeine/tea     5. vitamin D insufficiency - Vit D level 27 as of May 2024 - on vitamin D3 1000u daily     6. SLE - on plaquenil, follows with rheumatology     Obtain bloodwork and urine testing prior to next office visit.  RTC in 6 months.   Your risk of kidney failure at 2 years and 5 years per [facility] remains low.    SUBJECTIVE / INTERVAL HISTORY:  59 y.o. female presents in follow up of CKD.     Rina Taylor denies any recent illness/hospitalizations/medication changes since last office visit.    Denies NSAID use.  HTN - BP at goal generally    Review of Systems   Constitutional:  Negative for chills and fever.   HENT:  Negative for sore throat and trouble swallowing.    Eyes:  Negative for visual disturbance.   Respiratory:  Negative for cough and shortness of breath.    Cardiovascular:  Negative for chest pain and leg swelling.   Gastrointestinal:  Negative for abdominal pain, constipation, diarrhea, nausea and vomiting.   Endocrine: Negative for polyuria.   Genitourinary:  Negative for difficulty urinating, dysuria and hematuria.   Musculoskeletal:   "Negative for back pain and neck pain.   Skin:  Negative for rash.   Neurological:  Negative for dizziness, light-headedness and numbness.   Psychiatric/Behavioral:  The patient is not nervous/anxious.        OBJECTIVE:  /78 (BP Location: Left arm, Patient Position: Sitting, Cuff Size: Large)   Ht 5' 1\" (1.549 m)   Wt 91.2 kg (201 lb)   BMI 37.98 kg/m²  Body mass index is 37.98 kg/m².    Physical exam:  Physical Exam  Vitals and nursing note reviewed.   Constitutional:       General: She is not in acute distress.     Appearance: Normal appearance. She is well-developed. She is obese. She is not diaphoretic.   HENT:      Head: Normocephalic and atraumatic.      Nose: Nose normal.      Mouth/Throat:      Pharynx: No oropharyngeal exudate.   Eyes:      General: No scleral icterus.        Right eye: No discharge.         Left eye: No discharge.      Comments: eyeglasses   Neck:      Thyroid: No thyromegaly.   Cardiovascular:      Rate and Rhythm: Normal rate and regular rhythm.      Heart sounds: No murmur heard.  Pulmonary:      Effort: Pulmonary effort is normal. No respiratory distress.      Breath sounds: Normal breath sounds. No wheezing.   Abdominal:      General: Bowel sounds are normal. There is no distension.      Palpations: Abdomen is soft.   Musculoskeletal:         General: No swelling. Normal range of motion.      Cervical back: Normal range of motion and neck supple.   Skin:     General: Skin is warm and dry.      Coloration: Skin is not jaundiced.      Findings: No rash.   Neurological:      General: No focal deficit present.      Mental Status: She is alert.      Motor: No abnormal muscle tone.      Comments: awake   Psychiatric:         Mood and Affect: Mood normal.         Behavior: Behavior normal.         Medications:    Current Outpatient Medications:     Cholecalciferol 25 MCG (1000 UT) TBDP, Take 1,000 Units by mouth in the morning, Disp: , Rfl:     folic acid (FOLVITE) 1 mg tablet, Take " 1 tablet by mouth daily, Disp: , Rfl:     fosinopril (MONOPRIL) 20 mg tablet, Take 1 tablet (20 mg total) by mouth 2 (two) times a day, Disp: 180 tablet, Rfl: 3    hydrocortisone 2.5 % cream, , Disp: , Rfl:     hydroxychloroquine (PLAQUENIL) 200 mg tablet, Take 1 tablet (200 mg total) by mouth 2 (two) times a day with meals, Disp: 180 tablet, Rfl: 0    levothyroxine 125 mcg tablet, Take one tablet daily on empty stomach 1 hour before breakfast and 4 hours apart from calcium and vitamin D supplementation, Disp: 90 tablet, Rfl: 1    NIFEdipine (PROCARDIA XL) 30 mg 24 hr tablet, Take 1 tablet (30 mg total) by mouth daily, Disp: 90 tablet, Rfl: 1    spironolactone (ALDACTONE) 25 mg tablet, TAKE1 TABLET BY MOUTH DAILY, Disp: 90 tablet, Rfl: 1    warfarin (Coumadin) 1 mg tablet, As directed., Disp: 90 tablet, Rfl: 2    warfarin (COUMADIN) 3 mg tablet, TAKE ONE TABLET BY MOUTH EVERY DAY. DO NOT TAKE ON MONDAYS AND THURSDAYS, Disp: 70 tablet, Rfl: 0    warfarin (COUMADIN) 4 mg tablet, Take by mouth daily Every day except Thursday, Disp: , Rfl:     triamcinolone (KENALOG) 0.1 % cream, Apply topically 2 (two) times a day for 7 days (Patient not taking: Reported on 7/30/2024), Disp: 80 g, Rfl: 0    Allergies:  Allergies as of 07/30/2024 - Reviewed 07/30/2024   Allergen Reaction Noted    Penicillins Itching 04/25/2013       The following portions of the patient's history were reviewed and updated as appropriate: past family history, past surgical history and problem list.    Laboratory Results:  Lab Results   Component Value Date    SODIUM 140 05/30/2024    K 4.5 05/30/2024     05/30/2024    CO2 27 05/30/2024    BUN 25 05/30/2024    CREATININE 1.04 05/30/2024    GLUC 91 02/15/2023    CALCIUM 10.7 (H) 05/30/2024        Lab Results   Component Value Date    .8 (H) 05/13/2024    CALCIUM 10.7 (H) 05/30/2024    PHOS 2.7 06/09/2023       Portions of the record may have been created with voice recognition software.   "Occasional wrong word or \"sound a like\" substitutions may have occurred due to the inherent limitations of voice recognition software.  Read the chart carefully and recognize, using context, where substitutions have occurred.  "

## 2024-08-19 ENCOUNTER — HOSPITAL ENCOUNTER (OUTPATIENT)
Dept: RADIOLOGY | Age: 59
Discharge: HOME | End: 2024-08-19
Payer: COMMERCIAL

## 2024-08-19 ENCOUNTER — APPOINTMENT (OUTPATIENT)
Dept: LAB | Age: 59
End: 2024-08-19
Payer: COMMERCIAL

## 2024-08-19 DIAGNOSIS — Z79.899 ENCOUNTER FOR LONG-TERM (CURRENT) USE OF OTHER MEDICATIONS: ICD-10-CM

## 2024-08-19 DIAGNOSIS — Z85.850 HISTORY OF THYROID CANCER: ICD-10-CM

## 2024-08-19 DIAGNOSIS — M32.8 OTHER FORMS OF SYSTEMIC LUPUS ERYTHEMATOSUS, UNSPECIFIED ORGAN INVOLVEMENT STATUS (HCC): ICD-10-CM

## 2024-08-19 DIAGNOSIS — I82.5Y3 CHRONIC DEEP VEIN THROMBOSIS (DVT) OF PROXIMAL VEIN OF BOTH LOWER EXTREMITIES (HCC): ICD-10-CM

## 2024-08-19 LAB
ALBUMIN SERPL BCG-MCNC: 3.7 G/DL (ref 3.5–5)
ALP SERPL-CCNC: 80 U/L (ref 34–104)
ALT SERPL W P-5'-P-CCNC: 19 U/L (ref 7–52)
ANION GAP SERPL CALCULATED.3IONS-SCNC: 5 MMOL/L (ref 4–13)
AST SERPL W P-5'-P-CCNC: 20 U/L (ref 13–39)
BASOPHILS # BLD AUTO: 0.03 THOUSANDS/ÂΜL (ref 0–0.1)
BASOPHILS NFR BLD AUTO: 1 % (ref 0–1)
BILIRUB SERPL-MCNC: 0.65 MG/DL (ref 0.2–1)
BUN SERPL-MCNC: 30 MG/DL (ref 5–25)
C3 SERPL-MCNC: 115 MG/DL (ref 87–200)
C4 SERPL-MCNC: 47 MG/DL (ref 19–52)
CALCIUM SERPL-MCNC: 10.7 MG/DL (ref 8.4–10.2)
CHLORIDE SERPL-SCNC: 109 MMOL/L (ref 96–108)
CO2 SERPL-SCNC: 24 MMOL/L (ref 21–32)
CREAT SERPL-MCNC: 1.22 MG/DL (ref 0.6–1.3)
CRP SERPL QL: 2.5 MG/L
EOSINOPHIL # BLD AUTO: 0.08 THOUSAND/ÂΜL (ref 0–0.61)
EOSINOPHIL NFR BLD AUTO: 1 % (ref 0–6)
ERYTHROCYTE [DISTWIDTH] IN BLOOD BY AUTOMATED COUNT: 12.4 % (ref 11.6–15.1)
ERYTHROCYTE [SEDIMENTATION RATE] IN BLOOD: 13 MM/HOUR (ref 0–29)
GFR SERPL CREATININE-BSD FRML MDRD: 49 ML/MIN/1.73SQ M
GLUCOSE P FAST SERPL-MCNC: 98 MG/DL (ref 65–99)
HCT VFR BLD AUTO: 39.5 % (ref 34.8–46.1)
HGB BLD-MCNC: 13.6 G/DL (ref 11.5–15.4)
IMM GRANULOCYTES # BLD AUTO: 0.03 THOUSAND/UL (ref 0–0.2)
IMM GRANULOCYTES NFR BLD AUTO: 1 % (ref 0–2)
INR PPP: 2.85 (ref 0.85–1.19)
LYMPHOCYTES # BLD AUTO: 1.53 THOUSANDS/ÂΜL (ref 0.6–4.47)
LYMPHOCYTES NFR BLD AUTO: 26 % (ref 14–44)
MCH RBC QN AUTO: 30.8 PG (ref 26.8–34.3)
MCHC RBC AUTO-ENTMCNC: 34.4 G/DL (ref 31.4–37.4)
MCV RBC AUTO: 90 FL (ref 82–98)
MONOCYTES # BLD AUTO: 0.45 THOUSAND/ÂΜL (ref 0.17–1.22)
MONOCYTES NFR BLD AUTO: 8 % (ref 4–12)
NEUTROPHILS # BLD AUTO: 3.67 THOUSANDS/ÂΜL (ref 1.85–7.62)
NEUTS SEG NFR BLD AUTO: 63 % (ref 43–75)
NRBC BLD AUTO-RTO: 0 /100 WBCS
PLATELET # BLD AUTO: 175 THOUSANDS/UL (ref 149–390)
PMV BLD AUTO: 10.8 FL (ref 8.9–12.7)
POTASSIUM SERPL-SCNC: 4.4 MMOL/L (ref 3.5–5.3)
PROT SERPL-MCNC: 6.8 G/DL (ref 6.4–8.4)
PROTHROMBIN TIME: 29.7 SECONDS (ref 12.3–15)
RBC # BLD AUTO: 4.41 MILLION/UL (ref 3.81–5.12)
SODIUM SERPL-SCNC: 138 MMOL/L (ref 135–147)
WBC # BLD AUTO: 5.79 THOUSAND/UL (ref 4.31–10.16)

## 2024-08-19 PROCEDURE — 86225 DNA ANTIBODY NATIVE: CPT

## 2024-08-19 PROCEDURE — 85025 COMPLETE CBC W/AUTO DIFF WBC: CPT

## 2024-08-19 PROCEDURE — 36415 COLL VENOUS BLD VENIPUNCTURE: CPT

## 2024-08-19 PROCEDURE — 85652 RBC SED RATE AUTOMATED: CPT

## 2024-08-19 PROCEDURE — 86140 C-REACTIVE PROTEIN: CPT

## 2024-08-19 PROCEDURE — 80053 COMPREHEN METABOLIC PANEL: CPT

## 2024-08-19 PROCEDURE — 85610 PROTHROMBIN TIME: CPT

## 2024-08-19 PROCEDURE — 86160 COMPLEMENT ANTIGEN: CPT

## 2024-08-19 PROCEDURE — 76536 US EXAM OF HEAD AND NECK: CPT

## 2024-08-20 DIAGNOSIS — M32.8 OTHER FORMS OF SYSTEMIC LUPUS ERYTHEMATOSUS, UNSPECIFIED ORGAN INVOLVEMENT STATUS (HCC): Primary | ICD-10-CM

## 2024-08-21 ENCOUNTER — TELEPHONE (OUTPATIENT)
Age: 59
End: 2024-08-21

## 2024-08-21 LAB — DSDNA AB SER QL CLIF: NEGATIVE

## 2024-08-28 ENCOUNTER — TELEPHONE (OUTPATIENT)
Age: 59
End: 2024-08-28

## 2024-09-10 ENCOUNTER — TELEPHONE (OUTPATIENT)
Age: 59
End: 2024-09-10

## 2024-09-10 NOTE — TELEPHONE ENCOUNTER
Spoke with pt. States she was already notified of results last week and has adjusted her dose as recommended by Nazia.

## 2024-09-10 NOTE — TELEPHONE ENCOUNTER
----- Message from Nazia Lam PA-C sent at 9/10/2024 11:34 AM EDT -----  Sent patient a 66. com message regarding her Plaquenil Avise test results.  Her level was slightly above the therapeutic range.  I would recommend decreasing her dose to 400 mg (2 pills) on Mondays, Wednesdays, and Fridays.  She should only take 200 mg (1 pill) all other days.

## 2024-09-22 DIAGNOSIS — I82.5Y3 CHRONIC DEEP VEIN THROMBOSIS (DVT) OF PROXIMAL VEIN OF BOTH LOWER EXTREMITIES (HCC): ICD-10-CM

## 2024-09-23 RX ORDER — WARFARIN SODIUM 1 MG/1
TABLET ORAL
Qty: 90 TABLET | Refills: 2 | Status: SHIPPED | OUTPATIENT
Start: 2024-09-23

## 2024-10-03 ENCOUNTER — OFFICE VISIT (OUTPATIENT)
Age: 59
End: 2024-10-03
Payer: COMMERCIAL

## 2024-10-03 VITALS
DIASTOLIC BLOOD PRESSURE: 70 MMHG | HEIGHT: 61 IN | WEIGHT: 203.6 LBS | BODY MASS INDEX: 38.44 KG/M2 | SYSTOLIC BLOOD PRESSURE: 118 MMHG

## 2024-10-03 DIAGNOSIS — Z79.899 ENCOUNTER FOR LONG-TERM (CURRENT) USE OF MEDICATIONS: ICD-10-CM

## 2024-10-03 DIAGNOSIS — M32.8 OTHER FORMS OF SYSTEMIC LUPUS ERYTHEMATOSUS, UNSPECIFIED ORGAN INVOLVEMENT STATUS (HCC): Primary | ICD-10-CM

## 2024-10-03 DIAGNOSIS — M15.0 PRIMARY GENERALIZED (OSTEO)ARTHRITIS: ICD-10-CM

## 2024-10-03 DIAGNOSIS — D68.61 ANTI-PHOSPHOLIPID SYNDROME (HCC): ICD-10-CM

## 2024-10-03 PROCEDURE — 99214 OFFICE O/P EST MOD 30 MIN: CPT | Performed by: PHYSICIAN ASSISTANT

## 2024-10-03 NOTE — ASSESSMENT & PLAN NOTE
Generalized osteoarthritis with history of lower back pain.  Encouraged regular home exercise program as tolerated.

## 2024-10-03 NOTE — PROGRESS NOTES
Ambulatory Visit  Name: Rina Taylor      : 1965      MRN: [id number]  Encounter Provider: Nazia Lam PA-C  Encounter Date: 10/3/2024   Encounter department: [facility] RHEUMATOLOGY [facility]    Assessment & Plan  Other forms of systemic lupus erythematosus, unspecified organ involvement status (HCC)  History of biopsy positive lupus nephritis () with history of fetal loss, antiphospholipid antibody syndrome, and proteinuria.  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  She is back on Plaquenil which she restarted in the 2023.  Her dose was recently decreased as she had a supratherapeutic dose on Plaquenil Avise testing.  She is currently taking 400 mg on , , and , and 200 mg all other days.  Recent Avise SLE monitor test revealed negative markers for activity with the exception of mildly positive erythrocyte bound C4d.  Complement levels within normal limits and double-stranded DNA antibody was negative.  We have discussed Benlysta in the past however she is not anxious to initiate infusion therapy.  We will continue to monitor symptoms and labs closely.  I have given her another Avise kit to check her Plaquenil levels along with lupus activity markers prior to her next office visit.  Continue following with eye doctor regularly to monitor for Plaquenil toxicity.  Follow-up with nephrologist as scheduled.  Follow-up here with Dr. Tellez in 4 months or sooner if needed.    Orders:    MISCELLANEOUS LAB TEST; Future    CBC and differential; Future    Comprehensive metabolic panel; Future    C-reactive protein; Future    Sedimentation rate, automated; Future    dsDNA Antibody by IFA, Scotty luciliae, with Reflex to Titer; Future    C3 complement; Future    C4  complement; Future    Primary generalized (osteo)arthritis  Generalized osteoarthritis with history of lower back pain.  Encouraged regular home exercise program as tolerated.       Anti-phospholipid syndrome (HCC)  History of positive APL with history of pulmonary embolism.  Continue Coumadin.  Follow-up with hematology as scheduled.       Encounter for long-term (current) use of medications    Orders:    MISCELLANEOUS LAB TEST; Future    CBC and differential; Future    Comprehensive metabolic panel; Future    C-reactive protein; Future    Sedimentation rate, automated; Future    dsDNA Antibody by IFA, Scotty singletary, with Reflex to Titer; Future    C3 complement; Future    C4 complement; Future      History of Present Illness     Rina Taylor is a 59 y.o. female.  She is here for follow-up of her lupus with biopsy-positive lupus nephritis diagnosed in 1996 with history of fetal loss, antiphospholipid antibody syndrome and proteinuria.  She had a lupus Avise test on 9/11/2023.  This revealed a positive lupus index along with complement activation consistent with active disease.  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  She had previously taken Plaquenil in the past and recently restarted it in the fall 2023.  Dr. Tellez also recommended starting Benlysta infusions.  Overall her symptoms are stable at this time.  She has no significant joint pain and no joint swelling.  She has no recurrent fevers or rashes.     She is currently on Coumadin for her antiphospholipid syndrome with history of pulmonary embolus.  She has a history of primary hyperparathyroidism status post thyroidectomy with postoperative hypercalcemia.  She follows with endocrinology.  She did have a DEXA scan done in March 2021 which revealed normal bone density.  She has a history of acquired hypothyroidism secondary to history of thyroid  cancer status post thyroidectomy.  She was treated with radioactive iodine as well.     She has a history of postmenopausal bleeding and is following with gynecology.  She had a biopsy done which was benign per patient.  She has a history of osteoarthritis along with lumbar spondylosis.  She does note some back pain and stiffness.    She had labs done on 8/19/2024.  CBC unremarkable.  Creatinine 1.22, GFR 49.  ESR, CRP, C3, C4 within normal limits.  Double-stranded DNA negative.  She had an Avise SLE monitor test done as well.  She had a positive erythrocyte bound C4d.  Complement levels normal.  Double-stranded DNA, C1q negative.  She had a Plaquenil Avise done as well which revealed a supratherapeutic level.  She was instructed to decrease her dose to 400 mg on Mondays, Wednesdays, and Fridays and 200 mg all other days.      Review of Systems   Constitutional:  Positive for fatigue. Negative for chills and fever.   HENT:  Negative for hearing loss, sore throat and tinnitus.    Eyes:  Negative for pain and visual disturbance.   Respiratory:  Negative for cough and shortness of breath.    Cardiovascular:  Negative for chest pain and palpitations.   Gastrointestinal:  Negative for abdominal pain, nausea and vomiting.   Genitourinary:  Negative for difficulty urinating.   Musculoskeletal:  Positive for back pain (Occasionally). Negative for arthralgias, gait problem, joint swelling, myalgias, neck pain and neck stiffness.   Skin:  Negative for rash.   Neurological:  Negative for dizziness, seizures, weakness, numbness and headaches.   Psychiatric/Behavioral:  Negative for confusion, decreased concentration and sleep disturbance.      Current Outpatient Medications on File Prior to Visit   Medication Sig Dispense Refill    Cholecalciferol 25 MCG (1000 UT) TBDP Take 1,000 Units by mouth in the morning      fosinopril (MONOPRIL) 20 mg tablet Take 1 tablet (20 mg total) by mouth 2 (two) times a day 180 tablet 3     "hydrocortisone 2.5 % cream       hydroxychloroquine (PLAQUENIL) 200 mg tablet Take 1 tablet (200 mg total) by mouth 2 (two) times a day with meals 180 tablet 0    levothyroxine 125 mcg tablet Take one tablet daily on empty stomach 1 hour before breakfast and 4 hours apart from calcium and vitamin D supplementation 90 tablet 1    NIFEdipine (PROCARDIA XL) 30 mg 24 hr tablet Take 1 tablet (30 mg total) by mouth daily 90 tablet 1    spironolactone (ALDACTONE) 25 mg tablet TAKE1 TABLET BY MOUTH DAILY 90 tablet 1    warfarin (Coumadin) 1 mg tablet As directed. 90 tablet 2    warfarin (COUMADIN) 3 mg tablet TAKE ONE TABLET BY MOUTH EVERY DAY. DO NOT TAKE ON MONDAYS AND THURSDAYS 70 tablet 0    warfarin (COUMADIN) 4 mg tablet Take by mouth daily Every day except Thursday      folic acid (FOLVITE) 1 mg tablet Take 1 tablet by mouth daily (Patient not taking: Reported on 10/3/2024)      triamcinolone (KENALOG) 0.1 % cream Apply topically 2 (two) times a day for 7 days (Patient not taking: Reported on 7/30/2024) 80 g 0     No current facility-administered medications on file prior to visit.          Objective     /70 (BP Location: Left arm, Patient Position: Sitting, Cuff Size: Large)   Ht 5' 1\" (1.549 m)   Wt 92.4 kg (203 lb 9.6 oz)   BMI 38.47 kg/m²     Physical Exam  Constitutional:       Appearance: Normal appearance.   Cardiovascular:      Rate and Rhythm: Normal rate and regular rhythm.   Pulmonary:      Breath sounds: Normal breath sounds.   Musculoskeletal:      Comments: Neck slightly decreased lateral flexion.  Shoulders, elbows, wrists full range of motion.  Tinel's negative bilaterally.  Hands mild interphalangeal osteoarthritis.  No synovitis noted.  Straight leg raising negative bilaterally.  Hips full range of motion.  Knees full range of motion with patellofemoral crepitus.  Ankles decreased dorsiflexion.  Feet hyperpronation left greater than right with no synovitis.  Early pes planus deformities.   "   Skin:     General: Skin is warm and dry.   Neurological:      General: No focal deficit present.      Mental Status: She is alert.           Dragon Dictation software was used to dictate this note. It may contain errors with dictating incorrect words/spelling. Please contact provider directly for any questions.

## 2024-10-03 NOTE — ASSESSMENT & PLAN NOTE
History of positive APL with history of pulmonary embolism.  Continue Coumadin.  Follow-up with hematology as scheduled.

## 2024-10-03 NOTE — ASSESSMENT & PLAN NOTE
History of biopsy positive lupus nephritis (1996) with history of fetal loss, antiphospholipid antibody syndrome, and proteinuria.  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  She is back on Plaquenil which she restarted in the fall 2023.  Her dose was recently decreased as she had a supratherapeutic dose on Plaquenil Avise testing.  She is currently taking 400 mg on Mondays, Wednesdays, and Fridays, and 200 mg all other days.  Recent Avise SLE monitor test revealed negative markers for activity with the exception of mildly positive erythrocyte bound C4d.  Complement levels within normal limits and double-stranded DNA antibody was negative.  We have discussed Benlysta in the past however she is not anxious to initiate infusion therapy.  We will continue to monitor symptoms and labs closely.  I have given her another Avise kit to check her Plaquenil levels along with lupus activity markers prior to her next office visit.  Continue following with eye doctor regularly to monitor for Plaquenil toxicity.  Follow-up with nephrologist as scheduled.  Follow-up here with Dr. Tellez in 4 months or sooner if needed.    Orders:    MISCELLANEOUS LAB TEST; Future    CBC and differential; Future    Comprehensive metabolic panel; Future    C-reactive protein; Future    Sedimentation rate, automated; Future    dsDNA Antibody by IFA, Scotty luciliaabdulaziz, with Reflex to Titer; Future    C3 complement; Future    C4 complement; Future

## 2024-10-06 DIAGNOSIS — I10 ESSENTIAL HYPERTENSION, BENIGN: ICD-10-CM

## 2024-10-07 ENCOUNTER — TELEPHONE (OUTPATIENT)
Age: 59
End: 2024-10-07

## 2024-10-07 NOTE — TELEPHONE ENCOUNTER
Called [employer] and spoke with Yancy.  They did not mail an invoice out to the patient (she states invoices typically don't go out until 9-12 months after the test, which was 8-22-24).  She additionally states that once they receive the EOB from the insurance company and it's not covered, they will appeal it (which takes 60 to 120 days).  She feels that what the patient is looking at is the insurance EOB.  She looked at her account and says that she did not sign the Qualification Form that would get her enrolled in the access program.    Spoke with patient and explained that no statement has been issued by [employer] yet.  She confirmed that she was looking at her insurance EOB.  Will mail her a Qualification Form.  She will sign and mail back to us.  Once received, we will fax in to [facility] [phone] so she can get enrolled in their access program.

## 2024-10-07 NOTE — TELEPHONE ENCOUNTER
Patient called in screaming and yelling at me on the phone because she is upset that she is being charged over $900 for a testing kit that Nazia ordered for her. Patient states that her insurance will not cover this testing because it is out of network for her and she says she refuses to have this test done again for next year. Patient is asking for a call back to figure out what can be done about this bill because she says that she does not have that kind of money. Please advise.

## 2024-10-08 RX ORDER — SPIRONOLACTONE 25 MG/1
25 TABLET ORAL DAILY
Qty: 90 TABLET | Refills: 0 | Status: SHIPPED | OUTPATIENT
Start: 2024-10-08

## 2024-10-14 DIAGNOSIS — E89.0 POSTOPERATIVE HYPOTHYROIDISM: ICD-10-CM

## 2024-10-14 DIAGNOSIS — I82.5Y3 CHRONIC DEEP VEIN THROMBOSIS (DVT) OF PROXIMAL VEIN OF BOTH LOWER EXTREMITIES (HCC): Chronic | ICD-10-CM

## 2024-10-14 RX ORDER — WARFARIN SODIUM 3 MG/1
TABLET ORAL
Qty: 70 TABLET | Refills: 0 | Status: SHIPPED | OUTPATIENT
Start: 2024-10-14

## 2024-10-15 RX ORDER — LEVOTHYROXINE SODIUM 125 UG/1
TABLET ORAL
Qty: 90 TABLET | Refills: 1 | Status: SHIPPED | OUTPATIENT
Start: 2024-10-15

## 2024-10-29 ENCOUNTER — TELEPHONE (OUTPATIENT)
Dept: UROLOGY | Facility: AMBULATORY SURGERY CENTER | Age: 59
End: 2024-10-29

## 2024-10-29 ENCOUNTER — TELEPHONE (OUTPATIENT)
Age: 59
End: 2024-10-29

## 2024-10-29 DIAGNOSIS — N20.0 NEPHROLITHIASIS: Primary | ICD-10-CM

## 2024-10-29 NOTE — TELEPHONE ENCOUNTER
Pt returned call, sts that she needs to r/s appt on 11/6/24 due to a scheduling conflict.  Pt sts that she needs appt before 11:30AM.  Offered pt 12/24/24 and 12/26/24 with the [facility].  Pt declined appts due to the holiday.  Pt sts that she doesn't understand why she is scheduled with the [facility], and not Dr. Welsh.  Pt seen Dr. Welsh 10/24/2023:  Return in about 1 year (around 10/24/2024) for 1 yr with [facility]   Scheduled pt with the [facility] 1/10/2025 at 10:40AM and placed on wait list.  Pt sts that she was supposed to have KUB done prior to the f/u appt.  Pt is questioning if she should have KUB completed now, or if she should wait closed to her appt date.  Please review and advise.    Pt call back: [phone]

## 2024-11-01 NOTE — PROGRESS NOTES
Rina Taylor   1965    CC:  Yearly exam    A:  Yearly exam.     Problem List Items Addressed This Visit    None  Visit Diagnoses       Encounter for gynecological examination (general) (routine) without abnormal findings    -  Primary            P:   Pap up to date. We reviewed ASCCP guidelines for Pap testing.   Mammo scheduled  Colon cancer screening overdue. Reviewed recommendations for colon cancer screening today. Declines colonoscopy, will consider cologuard    RTO one year for yearly exam or sooner as needed.      S:  59 y.o. female here for yearly exam. She is postmenopausal and has had no vaginal bleeding.  She denies vaginal discharge, itching, odor or dryness.     Sexual activity: She is sexually active without pain, bleeding or dryness.     STD testing: She does not want STD testing today.     Menopausal    Last Pap: 2022 NILM/HPV -  Last Mammo: 2024 BIRADS 1  Last Colonoscopy: Never     Non-smoker, social drinker  Exercises irregularly    Family hx of breast cancer: denies  Family hx of ovarian cancer: denies  Family hx of colon cancer: denies      Current Outpatient Medications:     Cholecalciferol 25 MCG (1000 UT) TBDP, Take 1,000 Units by mouth in the morning, Disp: , Rfl:     fosinopril (MONOPRIL) 20 mg tablet, Take 1 tablet (20 mg total) by mouth 2 (two) times a day, Disp: 180 tablet, Rfl: 3    hydrocortisone 2.5 % cream, , Disp: , Rfl:     hydroxychloroquine (PLAQUENIL) 200 mg tablet, Take 1 tablet (200 mg total) by mouth 2 (two) times a day with meals, Disp: 180 tablet, Rfl: 1    levothyroxine 125 mcg tablet, Take one tablet daily on empty stomach 1 hour before breakfast and 4 hours apart from calcium and vitamin D supplementation, Disp: 90 tablet, Rfl: 1    NIFEdipine (PROCARDIA XL) 30 mg 24 hr tablet, Take 1 tablet (30 mg total) by mouth daily, Disp: 90 tablet, Rfl: 1    spironolactone (ALDACTONE) 25 mg tablet, Take 1 tablet (25 mg total) by mouth daily, Disp: 90 tablet, Rfl: 0     warfarin (Coumadin) 1 mg tablet, As directed., Disp: 90 tablet, Rfl: 2    warfarin (COUMADIN) 3 mg tablet, TAKE ONE TABLET BY MOUTH EVERY DAY. DO NOT TAKE ON MONDAYS AND THURSDAYS, Disp: 70 tablet, Rfl: 0    warfarin (COUMADIN) 4 mg tablet, Take by mouth daily Every day except Thursday, Disp: , Rfl:     folic acid (FOLVITE) 1 mg tablet, Take 1 tablet by mouth daily (Patient not taking: Reported on 10/3/2024), Disp: , Rfl:     triamcinolone (KENALOG) 0.1 % cream, Apply topically 2 (two) times a day for 7 days (Patient not taking: Reported on 7/30/2024), Disp: 80 g, Rfl: 0  Social History     Socioeconomic History    Marital status: /Civil Union     Spouse name: Not on file    Number of children: Not on file    Years of education: Not on file    Highest education level: Not on file   Occupational History    Not on file   Tobacco Use    Smoking status: Never     Passive exposure: Never    Smokeless tobacco: Never   Vaping Use    Vaping status: Never Used   Substance and Sexual Activity    Alcohol use: Not Currently     Comment: Twice a year    Drug use: No    Sexual activity: Yes     Partners: Male     Birth control/protection: None   Other Topics Concern    Not on file   Social History Narrative    Not on file     Social Determinants of Health     Financial Resource Strain: Not on file   Food Insecurity: No Food Insecurity (2/14/2023)    Hunger Vital Sign     Worried About Running Out of Food in the Last Year: Never true     Ran Out of Food in the Last Year: Never true   Transportation Needs: No Transportation Needs (2/14/2023)    PRAPARE - Transportation     Lack of Transportation (Medical): No     Lack of Transportation (Non-Medical): No   Physical Activity: Not on file   Stress: Not on file   Social Connections: Not on file   Intimate Partner Violence: Not on file   Housing Stability: Low Risk  (2/14/2023)    Housing Stability Vital Sign     Unable to Pay for Housing in the Last Year: No     Number of  Places Lived in the Last Year: 1     Unstable Housing in the Last Year: No     Family History   Problem Relation Age of Onset    No Known Problems Mother     Diabetes type II Father     Diabetes Father     Stroke Father     Diabetes type II Paternal Aunt     Diabetes type II Paternal Uncle     Diabetes type II Paternal Grandmother     Stomach cancer Paternal Grandfather     No Known Problems Sister     No Known Problems Maternal Grandmother     No Known Problems Maternal Grandfather     No Known Problems Sister     No Known Problems Maternal Aunt     No Known Problems Maternal Aunt     No Known Problems Maternal Aunt     No Known Problems Paternal Aunt     No Known Problems Paternal Aunt      Past Medical History:   Diagnosis Date    Acute blood loss anemia 2/9/2023    Acute cholecystitis 2/3/2023    ADA (acute kidney injury) (HCC) 2/9/2023    Anemia     Not sure    Anti-phospholipid antibody syndrome (HCC)     Cancer (HCC)     thyroid    Cellulitis of right index finger 3/4/2021    Chronic kidney disease     Clotting disorder (HCC)     Lung embolism,  ITP    Diabetes mellitus (HCC)     Disease of thyroid gland     Edema 12/17/2013    Elevated serum creatinine 2/28/2023    Fibroid 1998    Gestational diabetes     Gross hematuria 12/12/2016    History of chemotherapy     late 1980s for lupus    Hordeolum externum of left lower eyelid 5/11/2022    Hyperparathyroidism (HCC)     Hypertension     Hypotension due to hypovolemia 2/9/2023    Idiopathic thrombocytopenic purpura (ITP) (HCC)     Kidney stone     Left ear pain 3/23/2020    Left foot pain 7/11/2022    Lupus     Miscarriage 1996    Lost twin girls 10-    Osteoarthritis     Pulmonary embolism (HCC) 1987    Rash 3/11/2020    Sjogren's syndrome (HCC)     Upper respiratory tract infection 11/17/2019    Vitamin D deficiency         Review of Systems   Respiratory: Negative.    Cardiovascular: Negative.    Gastrointestinal: Negative for constipation and  "diarrhea.   Genitourinary: Negative for difficulty urinating, pelvic pain, vaginal bleeding, vaginal discharge, itching or odor.    O:  Blood pressure 120/90, height 5' 1\" (1.549 m), weight 91.6 kg (202 lb).    Patient appears well and is not in distress  Neck is supple without masses  Breasts are symmetrical without mass, tenderness, nipple discharge, skin changes or adenopathy.   Abdomen is soft and nontender without masses.   Vulva without lesions or rashes.  Urethral meatus and urethra are normal  Bladder is normal to palpation  Vagina is normal without discharge or bleeding.   Cervix is normal without discharge or lesion.   Uterus and adnexa are normal, mobile, nontender without palpable mass.  Rectovaginal exam without nodularity/masses/visible blood on glove   "

## 2024-11-04 ENCOUNTER — ANNUAL EXAM (OUTPATIENT)
Dept: OBGYN CLINIC | Facility: CLINIC | Age: 59
End: 2024-11-04
Payer: COMMERCIAL

## 2024-11-04 VITALS
DIASTOLIC BLOOD PRESSURE: 90 MMHG | BODY MASS INDEX: 38.14 KG/M2 | SYSTOLIC BLOOD PRESSURE: 120 MMHG | HEIGHT: 61 IN | WEIGHT: 202 LBS

## 2024-11-04 DIAGNOSIS — Z01.419 ENCOUNTER FOR GYNECOLOGICAL EXAMINATION (GENERAL) (ROUTINE) WITHOUT ABNORMAL FINDINGS: Primary | ICD-10-CM

## 2024-11-04 PROBLEM — Z98.890 S/P D&C (STATUS POST DILATION AND CURETTAGE): Status: RESOLVED | Noted: 2023-09-29 | Resolved: 2024-11-04

## 2024-11-04 PROBLEM — N95.0 POST-MENOPAUSAL BLEEDING: Status: RESOLVED | Noted: 2021-02-26 | Resolved: 2024-11-04

## 2024-11-04 PROCEDURE — S0612 ANNUAL GYNECOLOGICAL EXAMINA: HCPCS | Performed by: STUDENT IN AN ORGANIZED HEALTH CARE EDUCATION/TRAINING PROGRAM

## 2024-11-14 DIAGNOSIS — I10 ESSENTIAL HYPERTENSION: ICD-10-CM

## 2024-11-14 RX ORDER — NIFEDIPINE 30 MG/1
30 TABLET, EXTENDED RELEASE ORAL DAILY
Qty: 90 TABLET | Refills: 1 | Status: SHIPPED | OUTPATIENT
Start: 2024-11-14

## 2024-11-27 ENCOUNTER — OFFICE VISIT (OUTPATIENT)
Dept: INTERNAL MEDICINE CLINIC | Facility: CLINIC | Age: 59
End: 2024-11-27
Payer: COMMERCIAL

## 2024-11-27 VITALS
DIASTOLIC BLOOD PRESSURE: 70 MMHG | HEART RATE: 58 BPM | WEIGHT: 203.4 LBS | OXYGEN SATURATION: 98 % | SYSTOLIC BLOOD PRESSURE: 128 MMHG | BODY MASS INDEX: 38.43 KG/M2

## 2024-11-27 DIAGNOSIS — H02.89 EYELID PAIN, RIGHT: Primary | ICD-10-CM

## 2024-11-27 DIAGNOSIS — M79.672 LEFT FOOT PAIN: ICD-10-CM

## 2024-11-27 PROCEDURE — 99213 OFFICE O/P EST LOW 20 MIN: CPT | Performed by: GENERAL ACUTE CARE HOSPITAL

## 2024-11-27 RX ORDER — ERYTHROMYCIN 5 MG/G
0.5 OINTMENT OPHTHALMIC
Qty: 3.5 G | Refills: 0 | Status: SHIPPED | OUTPATIENT
Start: 2024-11-27 | End: 2024-12-04

## 2024-11-27 NOTE — ASSESSMENT & PLAN NOTE
Tripped 2d ago   Foot pain since  Mild tenderness  Able to walk and bear wt  Ice or heat  Voltaren or tylenol  Monitor  Low suspicion for fracture

## 2024-11-27 NOTE — PROGRESS NOTES
Name: Rina Taylor      : 1965      MRN: [id number]  Encounter Provider: Jodee Krishnamurthy MD  Encounter Date: 2024   Enc[facility]  :  Assessment & Plan  Eyelid pain, right    Orders:    erythromycin (ILOTYCIN) ophthalmic ointment; Administer 0.5 inches to the right eye daily at bedtime    Left foot pain  Tripped 2d ago   Foot pain since  Mild tenderness  Able to walk and bear wt  Ice or heat  Voltaren or tylenol  Monitor  Low suspicion for fracture                History of Present Illness     HPI    2 days ago, right eye started to get swollen, then painful, no drainage, no crusty, denies eyeball pain, denies vision changes    2 days ago at work tripped and hit right ankle  Review of Systems       Objective   /70 (BP Location: Left arm, Patient Position: Sitting, Cuff Size: Large)   Pulse 58   Wt 92.3 kg (203 lb 6.4 oz)   SpO2 98%   BMI 38.43 kg/m²      Physical Exam

## 2024-11-27 NOTE — ASSESSMENT & PLAN NOTE
Orders:    erythromycin (ILOTYCIN) ophthalmic ointment; Administer 0.5 inches to the right eye daily at bedtime

## 2024-11-30 ENCOUNTER — APPOINTMENT (OUTPATIENT)
Dept: LAB | Facility: CLINIC | Age: 59
End: 2024-11-30
Payer: COMMERCIAL

## 2024-11-30 DIAGNOSIS — E21.0 PRIMARY HYPERPARATHYROIDISM (HCC): ICD-10-CM

## 2024-11-30 DIAGNOSIS — Z79.899 ENCOUNTER FOR LONG-TERM (CURRENT) USE OF MEDICATIONS: ICD-10-CM

## 2024-11-30 DIAGNOSIS — I82.5Y3 CHRONIC DEEP VEIN THROMBOSIS (DVT) OF PROXIMAL VEIN OF BOTH LOWER EXTREMITIES (HCC): ICD-10-CM

## 2024-11-30 DIAGNOSIS — M32.8 OTHER FORMS OF SYSTEMIC LUPUS ERYTHEMATOSUS, UNSPECIFIED ORGAN INVOLVEMENT STATUS (HCC): ICD-10-CM

## 2024-11-30 DIAGNOSIS — Z85.850 HISTORY OF THYROID CANCER: ICD-10-CM

## 2024-11-30 DIAGNOSIS — R80.8 OTHER PROTEINURIA: ICD-10-CM

## 2024-11-30 DIAGNOSIS — E55.9 VITAMIN D DEFICIENCY: ICD-10-CM

## 2024-11-30 DIAGNOSIS — N18.2 CHRONIC KIDNEY DISEASE (CKD), STAGE II (MILD): ICD-10-CM

## 2024-11-30 DIAGNOSIS — E89.0 POSTOPERATIVE HYPOTHYROIDISM: ICD-10-CM

## 2024-11-30 DIAGNOSIS — E83.52 HYPERCALCEMIA: ICD-10-CM

## 2024-11-30 LAB
25(OH)D3 SERPL-MCNC: 18.9 NG/ML (ref 30–100)
ALBUMIN SERPL BCG-MCNC: 3.9 G/DL (ref 3.5–5)
ALP SERPL-CCNC: 80 U/L (ref 34–104)
ALT SERPL W P-5'-P-CCNC: 19 U/L (ref 7–52)
ANION GAP SERPL CALCULATED.3IONS-SCNC: 9 MMOL/L (ref 4–13)
AST SERPL W P-5'-P-CCNC: 23 U/L (ref 13–39)
BACTERIA UR QL AUTO: ABNORMAL /HPF
BASOPHILS # BLD AUTO: 0.04 THOUSANDS/ΜL (ref 0–0.1)
BASOPHILS NFR BLD AUTO: 1 % (ref 0–1)
BILIRUB SERPL-MCNC: 0.59 MG/DL (ref 0.2–1)
BILIRUB UR QL STRIP: NEGATIVE
BUN SERPL-MCNC: 32 MG/DL (ref 5–25)
C3 SERPL-MCNC: 129 MG/DL (ref 87–200)
C4 SERPL-MCNC: 49 MG/DL (ref 19–52)
CALCIUM SERPL-MCNC: 9.1 MG/DL (ref 8.4–10.2)
CHLORIDE SERPL-SCNC: 108 MMOL/L (ref 96–108)
CLARITY UR: CLEAR
CO2 SERPL-SCNC: 24 MMOL/L (ref 21–32)
COLOR UR: ABNORMAL
CREAT SERPL-MCNC: 1.31 MG/DL (ref 0.6–1.3)
CREAT UR-MCNC: 136.1 MG/DL
CREAT UR-MCNC: 136.1 MG/DL
CRP SERPL QL: 4.9 MG/L
EOSINOPHIL # BLD AUTO: 0.07 THOUSAND/ΜL (ref 0–0.61)
EOSINOPHIL NFR BLD AUTO: 1 % (ref 0–6)
ERYTHROCYTE [DISTWIDTH] IN BLOOD BY AUTOMATED COUNT: 12.4 % (ref 11.6–15.1)
ERYTHROCYTE [SEDIMENTATION RATE] IN BLOOD: 25 MM/HOUR (ref 0–29)
GFR SERPL CREATININE-BSD FRML MDRD: 45 ML/MIN/1.73SQ M
GLUCOSE P FAST SERPL-MCNC: 83 MG/DL (ref 65–99)
GLUCOSE UR STRIP-MCNC: NEGATIVE MG/DL
HCT VFR BLD AUTO: 39.3 % (ref 34.8–46.1)
HGB BLD-MCNC: 13.1 G/DL (ref 11.5–15.4)
HGB UR QL STRIP.AUTO: NEGATIVE
IMM GRANULOCYTES # BLD AUTO: 0.01 THOUSAND/UL (ref 0–0.2)
IMM GRANULOCYTES NFR BLD AUTO: 0 % (ref 0–2)
INR PPP: 3.01 (ref 0.85–1.19)
KETONES UR STRIP-MCNC: NEGATIVE MG/DL
LEUKOCYTE ESTERASE UR QL STRIP: ABNORMAL
LYMPHOCYTES # BLD AUTO: 1.53 THOUSANDS/ΜL (ref 0.6–4.47)
LYMPHOCYTES NFR BLD AUTO: 27 % (ref 14–44)
MCH RBC QN AUTO: 30 PG (ref 26.8–34.3)
MCHC RBC AUTO-ENTMCNC: 33.3 G/DL (ref 31.4–37.4)
MCV RBC AUTO: 90 FL (ref 82–98)
MICROALBUMIN UR-MCNC: 229.7 MG/L
MICROALBUMIN/CREAT 24H UR: 169 MG/G CREATININE (ref 0–30)
MONOCYTES # BLD AUTO: 0.44 THOUSAND/ΜL (ref 0.17–1.22)
MONOCYTES NFR BLD AUTO: 8 % (ref 4–12)
NEUTROPHILS # BLD AUTO: 3.67 THOUSANDS/ΜL (ref 1.85–7.62)
NEUTS SEG NFR BLD AUTO: 63 % (ref 43–75)
NITRITE UR QL STRIP: NEGATIVE
NON-SQ EPI CELLS URNS QL MICRO: ABNORMAL /HPF
NRBC BLD AUTO-RTO: 0 /100 WBCS
PH UR STRIP.AUTO: 6 [PH]
PLATELET # BLD AUTO: 182 THOUSANDS/UL (ref 149–390)
PMV BLD AUTO: 10.1 FL (ref 8.9–12.7)
POTASSIUM SERPL-SCNC: 4.4 MMOL/L (ref 3.5–5.3)
PROT SERPL-MCNC: 7.3 G/DL (ref 6.4–8.4)
PROT UR STRIP-MCNC: ABNORMAL MG/DL
PROT UR-MCNC: 42.3 MG/DL
PROT/CREAT UR: 0.3 MG/G{CREAT} (ref 0–0.1)
PROTHROMBIN TIME: 30.9 SECONDS (ref 12.3–15)
PTH-INTACT SERPL-MCNC: 131.9 PG/ML (ref 12–88)
RBC # BLD AUTO: 4.36 MILLION/UL (ref 3.81–5.12)
RBC #/AREA URNS AUTO: ABNORMAL /HPF
SODIUM SERPL-SCNC: 141 MMOL/L (ref 135–147)
SP GR UR STRIP.AUTO: 1.02 (ref 1–1.03)
T4 FREE SERPL-MCNC: 1.29 NG/DL (ref 0.61–1.12)
TSH SERPL DL<=0.05 MIU/L-ACNC: 0.79 UIU/ML (ref 0.45–4.5)
UROBILINOGEN UR STRIP-ACNC: <2 MG/DL
WBC # BLD AUTO: 5.76 THOUSAND/UL (ref 4.31–10.16)
WBC #/AREA URNS AUTO: ABNORMAL /HPF

## 2024-11-30 PROCEDURE — 86140 C-REACTIVE PROTEIN: CPT

## 2024-11-30 PROCEDURE — 82570 ASSAY OF URINE CREATININE: CPT

## 2024-11-30 PROCEDURE — 86225 DNA ANTIBODY NATIVE: CPT

## 2024-11-30 PROCEDURE — 85652 RBC SED RATE AUTOMATED: CPT

## 2024-11-30 PROCEDURE — 86800 THYROGLOBULIN ANTIBODY: CPT

## 2024-11-30 PROCEDURE — 85610 PROTHROMBIN TIME: CPT

## 2024-11-30 PROCEDURE — 86160 COMPLEMENT ANTIGEN: CPT

## 2024-11-30 PROCEDURE — 82043 UR ALBUMIN QUANTITATIVE: CPT

## 2024-11-30 PROCEDURE — 83970 ASSAY OF PARATHORMONE: CPT

## 2024-11-30 PROCEDURE — 36415 COLL VENOUS BLD VENIPUNCTURE: CPT

## 2024-11-30 PROCEDURE — 85025 COMPLETE CBC W/AUTO DIFF WBC: CPT

## 2024-11-30 PROCEDURE — 84439 ASSAY OF FREE THYROXINE: CPT

## 2024-11-30 PROCEDURE — 81001 URINALYSIS AUTO W/SCOPE: CPT

## 2024-11-30 PROCEDURE — 82306 VITAMIN D 25 HYDROXY: CPT

## 2024-11-30 PROCEDURE — 84156 ASSAY OF PROTEIN URINE: CPT

## 2024-11-30 PROCEDURE — 84443 ASSAY THYROID STIM HORMONE: CPT

## 2024-11-30 PROCEDURE — 80053 COMPREHEN METABOLIC PANEL: CPT

## 2024-11-30 PROCEDURE — 84432 ASSAY OF THYROGLOBULIN: CPT

## 2024-12-02 ENCOUNTER — RESULTS FOLLOW-UP (OUTPATIENT)
Dept: NEPHROLOGY | Facility: HOSPITAL | Age: 59
End: 2024-12-02

## 2024-12-02 ENCOUNTER — RESULTS FOLLOW-UP (OUTPATIENT)
Dept: ENDOCRINOLOGY | Facility: CLINIC | Age: 59
End: 2024-12-02

## 2024-12-02 DIAGNOSIS — N18.2 CHRONIC KIDNEY DISEASE (CKD), STAGE II (MILD): Primary | ICD-10-CM

## 2024-12-02 DIAGNOSIS — N20.0 NEPHROLITHIASIS: ICD-10-CM

## 2024-12-02 DIAGNOSIS — I10 ESSENTIAL HYPERTENSION, BENIGN: ICD-10-CM

## 2024-12-02 LAB — DSDNA AB SER QL CLIF: NEGATIVE

## 2024-12-02 NOTE — TELEPHONE ENCOUNTER
LM for pt - lab results were reviewed from 11/30/24 proteinuria has improved - sCr is higher at 1.31 - advised lab order for BMP will be added into Epic for pt to complete prior to scheduled office visit - encouraged to increase hydration - asked pt to contact office with any questions or concerns

## 2024-12-02 NOTE — TELEPHONE ENCOUNTER
----- Message from Peg Stapleton DO sent at 12/2/2024  9:20 AM EST -----  Proteinuria improved but sCr higher at 1.31 as of 11/30/24. Repeat BMP prior to office visit. Increase hydration.

## 2024-12-03 LAB
THYROGLOB AB SERPL-ACNC: <1 IU/ML (ref 0–0.9)
THYROGLOB SERPL-MCNC: <0.1 NG/ML (ref 1.5–38.5)

## 2024-12-04 ENCOUNTER — OFFICE VISIT (OUTPATIENT)
Dept: INTERNAL MEDICINE CLINIC | Facility: CLINIC | Age: 59
End: 2024-12-04
Payer: COMMERCIAL

## 2024-12-04 VITALS
WEIGHT: 205.4 LBS | DIASTOLIC BLOOD PRESSURE: 64 MMHG | HEIGHT: 61 IN | SYSTOLIC BLOOD PRESSURE: 128 MMHG | OXYGEN SATURATION: 97 % | BODY MASS INDEX: 38.78 KG/M2 | HEART RATE: 61 BPM

## 2024-12-04 DIAGNOSIS — E89.0 POSTOPERATIVE HYPOTHYROIDISM: ICD-10-CM

## 2024-12-04 DIAGNOSIS — M32.8 OTHER FORMS OF SYSTEMIC LUPUS ERYTHEMATOSUS, UNSPECIFIED ORGAN INVOLVEMENT STATUS (HCC): ICD-10-CM

## 2024-12-04 DIAGNOSIS — Z12.11 SCREEN FOR COLON CANCER: ICD-10-CM

## 2024-12-04 DIAGNOSIS — I12.9 BENIGN HYPERTENSIVE KIDNEY DISEASE WITH CHRONIC KIDNEY DISEASE STAGE I THROUGH STAGE IV, OR UNSPECIFIED: ICD-10-CM

## 2024-12-04 DIAGNOSIS — H02.89 EYELID PAIN, RIGHT: Primary | ICD-10-CM

## 2024-12-04 DIAGNOSIS — Z13.220 SCREENING, LIPID: ICD-10-CM

## 2024-12-04 DIAGNOSIS — Z13.1 SCREENING FOR DIABETES MELLITUS: ICD-10-CM

## 2024-12-04 PROBLEM — M79.672 LEFT FOOT PAIN: Status: RESOLVED | Noted: 2022-07-11 | Resolved: 2024-12-04

## 2024-12-04 PROCEDURE — 99214 OFFICE O/P EST MOD 30 MIN: CPT | Performed by: INTERNAL MEDICINE

## 2024-12-04 NOTE — ASSESSMENT & PLAN NOTE
Creatinine slightly elevated at 1.31 from most recent blood work  Reminded to get repeat BMP for nephrology for her next visit in January

## 2024-12-04 NOTE — PROGRESS NOTES
Name: Rina Taylor      : 1965      MRN: [id number]  Encounter Provider: Lea Reyes, MD  Encounter Date: 2024   Encounter department: [facility]    Assessment & Plan  Eyelid pain, right  Stye right lower lid improving         Other forms of systemic lupus erythematosus, unspecified organ involvement status (HCC)  Creatinine slightly elevated at 1.31 from most recent blood work  Reminded to get repeat BMP for nephrology for her next visit in January       Postoperative hypothyroidism  Managed by endocrinology       Benign hypertensive kidney disease with chronic kidney disease stage I through stage IV, or unspecified  Lab Results   Component Value Date    EGFR 45 2024    EGFR 49 2024    EGFR 59 2024    CREATININE 1.31 (H) 2024    CREATININE 1.22 2024    CREATININE 1.04 2024   Blood pressure controlled on current medications  Understands importance of increased physical activity to improve blood pressure, lower cholesterol       Screening, lipid    Orders:    Lipid panel; Future    Screening for diabetes mellitus    Orders:    Hemoglobin A1C; Future    Screen for colon cancer    Orders:    Ambulatory Referral to Gastroenterology; Future         History of Present Illness     Here for follow-up  No new issues  Right lower leg redness is improving and she has not needed to use the topical antibiotic  Pain in the right foot has improved also      Review of Systems   Constitutional:  Positive for unexpected weight change (Weight gain).   Respiratory:  Negative for shortness of breath.    Cardiovascular:  Negative for chest pain and palpitations.   Gastrointestinal:  Negative for abdominal pain, constipation and diarrhea.   Musculoskeletal:  Positive for arthralgias.     Past Medical History:   Diagnosis Date    Acute blood loss anemia 2023    Acute cholecystitis 2023    ADA (acute kidney injury) (HCC) 2023    Anemia     Not sure     Anti-phospholipid antibody syndrome (HCC)     Cancer (HCC)     thyroid    Cellulitis of right index finger 2021    Chronic kidney disease     Clotting disorder (HCC)     Lung embolism,  ITP    Diabetes mellitus (HCC)     Disease of thyroid gland     Edema 2013    Elevated serum creatinine 2023    Fibroid 1998    Gestational diabetes     Gross hematuria 2016    History of chemotherapy     late  for lupus    Hordeolum externum of left lower eyelid 2022    Hyperparathyroidism (HCC)     Hypertension     Hypotension due to hypovolemia 2023    Idiopathic thrombocytopenic purpura (ITP) (HCC)     Kidney stone     Left ear pain 2020    Left foot pain 2022    Left foot pain 2022    Lupus     Miscarriage 1996    Lost twin girls 10-    Osteoarthritis     Pulmonary embolism (HCC) 1987    Rash 2020    Sjogren's syndrome (HCC)     Upper respiratory tract infection 2019    Vitamin D deficiency      Past Surgical History:   Procedure Laterality Date     SECTION          CHOLECYSTECTOMY  23    CHOLECYSTECTOMY LAPAROSCOPIC N/A 2023    Procedure: LAPAROSCOPIC CHOLECYSTECTOMY;  Surgeon: Guille Diaz MD;  Location: [facility];  Service: General    DIAGNOSTIC FLEXIBLE LARYNGOSCOPY N/A 2021    Procedure: DIAGNOSTIC FLEXIBLE LARYNGOSCOPY;  Surgeon: Jairo Christensen MD;  Location: [facility];  Service: Surgical Oncology    HERNIA REPAIR      HIP SURGERY      left side, bone decompression    HYSTEROSCOPY      IR BIOPSY KIDNEY RANDOM  10/3/2023    DE HYSTEROSCOPY BX ENDOMETRIUM&/POLYPC W/WO D&C N/A 2023    Procedure: DILATATION AND CURETTAGE (D&C) WITH HYSTEROSCOPY;  Surgeon: Lauren Chase MD;  Location: [facility];  Service: Gynecology    DE PARATHYROIDECTOMY/EXPLORATION PARATHYROIDS Right 2021    Procedure: PARATHYROIDECTOMY, MINIMALLY INVASIVE, right, intraoperative PTH monitoring;  Surgeon: Jairo Christensen  MD;  Location: [facility];  Service: Surgical Oncology    RENAL BIOPSY      Not sure    THYROID SURGERY      TUBAL LIGATION       Family History   Problem Relation Age of Onset    No Known Problems Mother     Diabetes type II Father     Diabetes Father     Stroke Father     Diabetes type II Paternal Aunt     Diabetes type II Paternal Uncle     Diabetes type II Paternal Grandmother     Stomach cancer Paternal Grandfather     No Known Problems Sister     No Known Problems Maternal Grandmother     No Known Problems Maternal Grandfather     No Known Problems Sister     No Known Problems Maternal Aunt     No Known Problems Maternal Aunt     No Known Problems Maternal Aunt     No Known Problems Paternal Aunt     No Known Problems Paternal Aunt      Social History     Tobacco Use    Smoking status: Never     Passive exposure: Never    Smokeless tobacco: Never   Vaping Use    Vaping status: Never Used   Substance and Sexual Activity    Alcohol use: Not Currently     Comment: Twice a year    Drug use: No    Sexual activity: Yes     Partners: Male     Birth control/protection: None     Current Outpatient Medications on File Prior to Visit   Medication Sig    Cholecalciferol 25 MCG (1000 UT) TBDP Take 1,000 Units by mouth in the morning    fosinopril (MONOPRIL) 20 mg tablet Take 1 tablet (20 mg total) by mouth 2 (two) times a day    hydrocortisone 2.5 % cream     hydroxychloroquine (PLAQUENIL) 200 mg tablet Take 1 tablet (200 mg total) by mouth 2 (two) times a day with meals    levothyroxine 125 mcg tablet Take one tablet daily on empty stomach 1 hour before breakfast and 4 hours apart from calcium and vitamin D supplementation    NIFEdipine (PROCARDIA XL) 30 mg 24 hr tablet Take 1 tablet (30 mg total) by mouth daily    spironolactone (ALDACTONE) 25 mg tablet Take 1 tablet (25 mg total) by mouth daily    warfarin (Coumadin) 1 mg tablet As directed.    warfarin (COUMADIN) 3 mg tablet TAKE ONE TABLET BY MOUTH EVERY DAY. DO NOT  "TAKE ON MONDAYS AND THURSDAYS    warfarin (COUMADIN) 4 mg tablet Take by mouth daily Every day except Thursday    folic acid (FOLVITE) 1 mg tablet Take 1 tablet by mouth daily (Patient not taking: Reported on 10/3/2024)    triamcinolone (KENALOG) 0.1 % cream Apply topically 2 (two) times a day for 7 days (Patient not taking: Reported on 7/30/2024)    [DISCONTINUED] erythromycin (ILOTYCIN) ophthalmic ointment Administer 0.5 inches to the right eye daily at bedtime (Patient not taking: Reported on 12/4/2024)     Allergies   Allergen Reactions    Penicillins Itching     Immunization History   Administered Date(s) Administered    COVID-19 MODERNA VACC 0.5 ML IM 01/04/2021, 02/06/2021, 03/07/2022    Hep B, adult 09/03/2019, 12/03/2019, 03/05/2020    INFLUENZA 10/30/2019, 10/26/2020    Tdap 08/27/2019    Tuberculin Skin Test-PPD Intradermal 08/27/2019     Objective   /64 (BP Location: Right arm, Patient Position: Sitting, Cuff Size: Large)   Pulse 61   Ht 5' 1\" (1.549 m)   Wt 93.2 kg (205 lb 6.4 oz)   SpO2 97%   BMI 38.81 kg/m²     Physical Exam  Constitutional:       General: She is not in acute distress.     Appearance: She is well-developed. She is not ill-appearing, toxic-appearing or diaphoretic.   HENT:      Right Ear: External ear normal. There is no impacted cerumen.      Left Ear: External ear normal. There is no impacted cerumen.   Eyes:      General:         Right eye: Hordeolum (Very small lower lid) present.      Conjunctiva/sclera: Conjunctivae normal.   Cardiovascular:      Rate and Rhythm: Normal rate and regular rhythm.      Heart sounds: Normal heart sounds. No murmur heard.  Pulmonary:      Effort: Pulmonary effort is normal. No respiratory distress.      Breath sounds: Normal breath sounds. No stridor. No wheezing or rales.   Abdominal:      General: There is no distension.      Palpations: Abdomen is soft. There is no mass.      Tenderness: There is no abdominal tenderness. There is no " guarding or rebound.   Musculoskeletal:      Cervical back: Neck supple.      Right lower leg: No edema.      Left lower leg: No edema.      Right foot: No foot drop.      Left foot: No foot drop.   Neurological:      Mental Status: She is alert and oriented to person, place, and time.   Psychiatric:         Mood and Affect: Mood normal.         Behavior: Behavior normal.         Thought Content: Thought content normal.         Judgment: Judgment normal.

## 2024-12-04 NOTE — ASSESSMENT & PLAN NOTE
Lab Results   Component Value Date    EGFR 45 11/30/2024    EGFR 49 08/19/2024    EGFR 59 05/30/2024    CREATININE 1.31 (H) 11/30/2024    CREATININE 1.22 08/19/2024    CREATININE 1.04 05/30/2024   Blood pressure controlled on current medications  Understands importance of increased physical activity to improve blood pressure, lower cholesterol

## 2024-12-15 DIAGNOSIS — I10 ESSENTIAL HYPERTENSION, BENIGN: ICD-10-CM

## 2024-12-17 RX ORDER — SPIRONOLACTONE 25 MG/1
25 TABLET ORAL DAILY
Qty: 90 TABLET | Refills: 1 | Status: SHIPPED | OUTPATIENT
Start: 2024-12-17

## 2024-12-23 ENCOUNTER — OFFICE VISIT (OUTPATIENT)
Dept: ENDOCRINOLOGY | Facility: CLINIC | Age: 59
End: 2024-12-23
Payer: COMMERCIAL

## 2024-12-23 VITALS
HEIGHT: 61 IN | BODY MASS INDEX: 38.33 KG/M2 | SYSTOLIC BLOOD PRESSURE: 126 MMHG | OXYGEN SATURATION: 95 % | HEART RATE: 53 BPM | WEIGHT: 203 LBS | DIASTOLIC BLOOD PRESSURE: 86 MMHG

## 2024-12-23 DIAGNOSIS — Z85.850 HISTORY OF THYROID CANCER: ICD-10-CM

## 2024-12-23 DIAGNOSIS — E89.0 POSTOPERATIVE HYPOTHYROIDISM: ICD-10-CM

## 2024-12-23 DIAGNOSIS — E55.9 VITAMIN D DEFICIENCY: ICD-10-CM

## 2024-12-23 DIAGNOSIS — E21.0 PRIMARY HYPERPARATHYROIDISM (HCC): Primary | ICD-10-CM

## 2024-12-23 DIAGNOSIS — Z98.890 STATUS POST PARATHYROIDECTOMY: ICD-10-CM

## 2024-12-23 DIAGNOSIS — Z90.89 STATUS POST PARATHYROIDECTOMY: ICD-10-CM

## 2024-12-23 PROCEDURE — 99214 OFFICE O/P EST MOD 30 MIN: CPT | Performed by: INTERNAL MEDICINE

## 2024-12-23 RX ORDER — CICLOPIROX OLAMINE 7.7 MG/G
CREAM TOPICAL 2 TIMES DAILY
COMMUNITY
Start: 2024-12-16

## 2024-12-23 NOTE — PROGRESS NOTES
Rina Taylor 59 y.o. female MRN: [id number]    Encounter: [id number]      Assessment & Plan   1. Primary hyperparathyroidism (HCC)  -     Basic metabolic panel; Future; Expected date: 06/23/2025  -     PTH, intact; Future; Expected date: 06/23/2025  -     Calcium, urine, 24 hour; Future; Expected date: 06/23/2025  -     Creatinine, urine, 24 hour; Future; Expected date: 06/23/2025  -     Calcium, ionized; Future; Expected date: 06/23/2025  -     DXA bone density spine hip and pelvis; Future; Expected date: 12/23/2024  2. Postoperative hypothyroidism  -     TSH, 3rd generation; Future; Expected date: 06/23/2025  -     T4, free; Future; Expected date: 06/23/2025  -     Thyroglobulin; Future; Expected date: 06/23/2025  -     TGAB+THYROGLOBULIN,MIRACLE OR LCMS; Future; Expected date: 06/23/2025  3. History of thyroid cancer  -     TSH, 3rd generation; Future; Expected date: 06/23/2025  -     T4, free; Future; Expected date: 06/23/2025  -     Thyroglobulin; Future; Expected date: 06/23/2025  -     TGAB+THYROGLOBULIN,MIRACLE OR LCMS; Future; Expected date: 06/23/2025  4. Status post parathyroidectomy  5. Vitamin D deficiency  -     Vitamin D 25 hydroxy; Future; Expected date: 06/23/2025    .    Plan:  History of stage I papillary thyroid cancer status post thyroidectomy with postop hypothyroidism  With history of thyroid cancer, TSH goal is 0.5-3.0, TSH currently at goal  Continue current dose of levothyroxine 125 mcg every morning on an empty stomach  Patient is currently asymptomatic from slightly elevated free T4  TFTs to be repeated before next visit  Thyroglobulin antibody and thyroglobulin level suppressed, indicating low risk of recurrence.  Will continue to monitor and repeat before next visit in 6 months  Thyroid ultrasound continues to be stable with no evidence of recurrent or metastatic disease.    Primary hyperparathyroidism status post parathyroidectomy  Vitamin D deficiency due to poor oral supplementation with  secondary hyperparathyroidism  Reviewed need for adequate hydration, preventing dehydration and prolonged immobilzation  Vitamin D level is in labs, encouraged to restart vitamin D supplementation.  Will recheck labs including BMP, PTH, ionized calcium, vitamin D levels prior to next visit following adequate vitamin D supplementation  Also advised to perform a 24-hour urine calcium and creatinine ratio to rule out underlying FHH in the setting of continued elevated PTH levels despite parathyroidectomy  DEXA scan to be repeated to evaluate for osteoporosis in the setting of hyperparathyroidism  Follow-up in 6 months    CC:  Hypercalcemia    History of Present Illness     HPI:Rina Taylor is a 59 y.o. female who is here for follow up patient visit for evaluation of postsurgical hypothyroidism, primary hyperparathyroidism, vitamin D deficiency.  Patient has a past medical history of stage I papillary thyroid cancer status post right hemithyroidectomy and right parathyroid gland removal in 2006, followed by completion thyroidectomy 2010 with resection of left parathyroid gland along with LEONARDO ablation.  Patient is currently maintained on levothyroxine 125 mcg every morning on empty stomach for postsurgical hypothyroidism.  No recent iodine loading in the form of medication, biotin, kelp or seaweed supplements, radiological studies.  She was last seen in our clinic in June 2024 by Diana Jin  Based on labs performed in November 2024 last month:  TFTs show TSH at 0.79, free T4 slightly elevated at 1.29  Thyroglobulin antibody level less than 1.0 thyroglobulin less than 0.1  Thyroid ultrasound done in August 2023 showed no recurrence/lymph nodes chronic stable nodule in right thyroid bed region.  Repeat thyroid ultrasound done in August 2024 this year continues to be stable with no evidence of recurrent or metastatic disease.  Primary hyperparathyroidism status post parathyroidectomy: Right-sided parathyroidectomy 2006,  left parathyroidectomy 2010, reexploration parathyroidectomy May 2021.  PTH elevated at 165 at last visit when patient was off calcium and vitamin D supplements with vitamin D level at 27.  Vitamin D is decreased further at 18 and PTH has slightly improved to 131 in November 2024 on recent labs.  Last DEXA March 2021, no osteoporosis      Modifying Factors:  Calcium intake in diet: no dairy  Calcium supplements (including multivitamin): no, does not take vitamin D      Associated signs/symptoms: (symptoms related to elevated calcium level)  Fatigue                    no     Anxiety                       no   Depression                no    Memory loss/disturbance      no    Constipation               no   Nausea                      no    Poor appetite             no    Increased thirst          no   Increased urination    no   Kidney stones           yes    Muscle weakness      no   Bone pain                   no   H/o osteoporosis         no        Review of Systems  All other systems were reviewed and are negative.    Historical Information   Past Medical History:   Diagnosis Date    Acute blood loss anemia 02/09/2023    Acute cholecystitis 02/03/2023    ADA (acute kidney injury) (HCC) 02/09/2023    Anemia     Not sure    Anti-phospholipid antibody syndrome (HCC)     Cancer (HCC)     thyroid    Cellulitis of right index finger 03/04/2021    Chronic kidney disease     Clotting disorder (HCC)     Lung embolism,  ITP    Diabetes mellitus (HCC)     Disease of thyroid gland     Edema 12/17/2013    Elevated serum creatinine 02/28/2023    Fibroid 1998    Gestational diabetes     Gross hematuria 12/12/2016    History of chemotherapy     late 1980s for lupus    Hordeolum externum of left lower eyelid 05/11/2022    Hyperparathyroidism (HCC)     Hypertension     Hypotension due to hypovolemia 02/09/2023    Idiopathic thrombocytopenic purpura (ITP) (HCC)     Kidney stone     Left ear pain 03/23/2020    Left foot pain  2022    Left foot pain 2022    Lupus     Miscarriage 1996    Lost twin girls 10-    Osteoarthritis     Pulmonary embolism (HCC) 1987    Rash 2020    Sjogren's syndrome (HCC)     Upper respiratory tract infection 2019    Vitamin D deficiency      Past Surgical History:   Procedure Laterality Date     SECTION          CHOLECYSTECTOMY  23    CHOLECYSTECTOMY LAPAROSCOPIC N/A 2023    Procedure: LAPAROSCOPIC CHOLECYSTECTOMY;  Surgeon: Guille Diaz MD;  Location: [facility];  Service: General    DIAGNOSTIC FLEXIBLE LARYNGOSCOPY N/A 2021    Procedure: DIAGNOSTIC FLEXIBLE LARYNGOSCOPY;  Surgeon: Jairo Christensen MD;  Location: [facility];  Service: Surgical Oncology    HERNIA REPAIR      HIP SURGERY      left side, bone decompression    HYSTEROSCOPY      IR BIOPSY KIDNEY RANDOM  10/3/2023    KS HYSTEROSCOPY BX ENDOMETRIUM&/POLYPC W/WO D&C N/A 2023    Procedure: DILATATION AND CURETTAGE (D&C) WITH HYSTEROSCOPY;  Surgeon: Lauren Chase MD;  Location: [facility];  Service: Gynecology    KS PARATHYROIDECTOMY/EXPLORATION PARATHYROIDS Right 2021    Procedure: PARATHYROIDECTOMY, MINIMALLY INVASIVE, right, intraoperative PTH monitoring;  Surgeon: Jairo Christensen MD;  Location: [facility];  Service: Surgical Oncology    RENAL BIOPSY      Not sure    THYROID SURGERY      TUBAL LIGATION       Social History   Social History     Substance and Sexual Activity   Alcohol Use Not Currently    Comment: Twice a year     Social History     Substance and Sexual Activity   Drug Use No     Social History     Tobacco Use   Smoking Status Never    Passive exposure: Never   Smokeless Tobacco Never     Family History:   Family History   Problem Relation Age of Onset    No Known Problems Mother     Diabetes type II Father     Diabetes Father     Stroke Father     Diabetes type II Paternal Aunt     Diabetes type II Paternal Uncle     Diabetes type II Paternal  "Grandmother     Stomach cancer Paternal Grandfather     No Known Problems Sister     No Known Problems Maternal Grandmother     No Known Problems Maternal Grandfather     No Known Problems Sister     No Known Problems Maternal Aunt     No Known Problems Maternal Aunt     No Known Problems Maternal Aunt     No Known Problems Paternal Aunt     No Known Problems Paternal Aunt        Meds/Allergies   Current Outpatient Medications   Medication Sig Dispense Refill    Cholecalciferol 25 MCG (1000 UT) TBDP Take 1,000 Units by mouth in the morning      ciclopirox (LOPROX) 0.77 % cream Apply topically 2 (two) times a day      folic acid (FOLVITE) 1 mg tablet Take 1 tablet by mouth daily      hydrocortisone 2.5 % cream       hydroxychloroquine (PLAQUENIL) 200 mg tablet Take 1 tablet (200 mg total) by mouth 2 (two) times a day with meals 180 tablet 1    levothyroxine 125 mcg tablet Take one tablet daily on empty stomach 1 hour before breakfast and 4 hours apart from calcium and vitamin D supplementation 90 tablet 1    NIFEdipine (PROCARDIA XL) 30 mg 24 hr tablet Take 1 tablet (30 mg total) by mouth daily 90 tablet 1    spironolactone (ALDACTONE) 25 mg tablet Take 1 tablet (25 mg total) by mouth daily 90 tablet 1    warfarin (Coumadin) 1 mg tablet As directed. 90 tablet 2    warfarin (COUMADIN) 3 mg tablet TAKE ONE TABLET BY MOUTH EVERY DAY. DO NOT TAKE ON MONDAYS AND THURSDAYS 70 tablet 0    warfarin (COUMADIN) 4 mg tablet Take by mouth daily Every day except Thursday      fosinopril (MONOPRIL) 20 mg tablet Take 1 tablet (20 mg total) by mouth 2 (two) times a day 180 tablet 3    triamcinolone (KENALOG) 0.1 % cream Apply topically 2 (two) times a day for 7 days (Patient not taking: Reported on 7/30/2024) 80 g 0     No current facility-administered medications for this visit.     Allergies   Allergen Reactions    Penicillins Itching       Objective   Vitals: Blood pressure 126/86, pulse (!) 53, height 5' 1\" (1.549 m), weight " 92.1 kg (203 lb), SpO2 95%.    Physical Exam  Constitutional:       General: She is not in acute distress.  HENT:      Head: Normocephalic and atraumatic.   Neck:      Comments: No palpable nodules  Cardiovascular:      Rate and Rhythm: Bradycardia present.   Pulmonary:      Effort: Pulmonary effort is normal.   Musculoskeletal:         General: Normal range of motion.      Cervical back: Neck supple.   Skin:     General: Skin is dry.   Neurological:      Mental Status: She is alert and oriented to person, place, and time. Mental status is at baseline.      Comments: No tremors of outstretched hands   Psychiatric:         Thought Content: Thought content normal.         The history was obtained from the review of the chart, patient.    Lab Results:     Lab Results   Component Value Date/Time    TSH 3RD GENERATON 0.794 11/30/2024 10:59 AM    TSH 3RD GENERATON 0.890 05/13/2024 10:52 AM    TSH 3RD GENERATON 1.086 12/27/2023 01:21 PM    Free T4 1.29 (H) 11/30/2024 10:59 AM    Free T4 0.85 05/13/2024 10:52 AM    Free T4 1.34 (H) 12/27/2023 01:21 PM     Component  Ref Range & Units (hover) 11/30/24 10:59 AM 5/13/24 10:52 AM 12/27/23  1:21 PM 11/1/23 11:10 AM 9/11/23 10:54 AM 6/9/23 12:03 PM 2/8/23 11:08 AM   TSH 3RD GENERATON 0.794 0.890            Component  Ref Range & Units (hover) 11/30/24 10:59 AM 5/13/24 10:52 AM 12/27/23  1:21 PM 11/1/23 11:10 AM 6/9/23 12:03 PM 2/8/23 11:08 AM 6/22/22 11:20 AM   Free T4 1.29 High  0.85 C          Component  Ref Range & Units (hover) 11/30/24 10:59 AM 5/13/24 10:52 AM 11/1/23 11:10 AM 2/8/23 11:08 AM 6/22/22 11:20 AM 2/21/22  3:20 PM 2/16/22  2:07 PM   .9 High  165.8 High           Component  Ref Range & Units (hover) 11/30/24 10:59 AM 5/13/24 10:52 AM 11/1/23 11:10 AM 2/8/23 11:08 AM 6/22/22 11:20 AM 2/21/22  3:20 PM 2/16/22  2:07 PM   Vit D, 25-Hydroxy 18.9 Low            Lab Results   Component Value Date    WBC 5.76 11/30/2024    HGB 13.1 11/30/2024    HCT 39.3  11/30/2024    MCV 90 11/30/2024     11/30/2024     Lab Results   Component Value Date    CREATININE 1.31 (H) 11/30/2024    BUN 32 (H) 11/30/2024     11/18/2017    K 4.4 11/30/2024     11/30/2024    CO2 24 11/30/2024     Lab Results   Component Value Date    HGBA1C 4.9 05/13/2024               Component  Ref Range & Units (hover) 11/30/24 10:59 AM 5/13/24 10:52 AM 11/1/23 11:10 AM 6/9/23 12:03 PM 6/22/22 11:20 AM 9/24/21 11:23 AM 7/14/21  2:44 PM   Thyroglobulin Ab <1.0 <1.0 CM <1.0 CM <1.0 CM <1.0 CM <1.0 CM <1.0 CM   Comment: Thyroglob        Component  Ref Range & Units (hover) 11/30/24 10:59 AM 5/13/24 10:52 AM 11/1/23 11:10 AM 6/9/23 12:03 PM 6/22/22 11:20 AM 9/24/21 11:23 AM 7/14/21  2:44 PM   Thyroglobulin-MIRACLE <0.1 Low            Component      Latest Ref Rng 11/30/2024   EXT Creatinine Urine      Reference range not established. mg/dL 136.1    EXT Creatinine Urine      Reference range not established. mg/dL 136.1    Albumin,U,Random      <20.0 mg/L 229.7 (H)    Albumin Creat Ratio      0 - 30 mg/g creatinine 169 (H)       Legend:  (H) High    Lab Results   Component Value Date    CALCIUM 9.1 11/30/2024    [id number] 95 (H) 06/03/2015    [id number] 95 06/03/2015    [id number] <8 06/03/2015    TSH 0.354 (L) 09/30/2019          Imaging Studies:     Study Result    Narrative & Impression   NECK ULTRASOUND     INDICATION: Z85.850: Personal history of malignant neoplasm of thyroid. History of thyroid carcinoma.     COMPARISON: 8/15/2023; 4/12/2019     FINDINGS:     Ultrasound of the thyroidectomy bed and cervical lymph node chains was performed with a high frequency linear transducer.     There is no suspicion of recurrent mass in the thyroidectomy bed. Again demonstrated is a 0.6 x 0.6 x 0.3 cm hypoechoic nodule in the right thyroidectomy bed, stable from the prior studies..     Lymph nodes maintain normal morphologic contour, echogenicity and short axis dimensions of less than 0.7 cm. No  "evidence for microcalcification or focal nodularity.     IMPRESSION:     No evidence of recurrent or metastatic disease.        Workstation performed: [id number]        Imaging    US head neck lymph node mapping (Order: [id number]) - 8/19/2024      Portions of the record may have been created with voice recognition software. Occasional wrong word or \"sound a like\" substitutions may have occurred due to the inherent limitations of voice recognition software. Read the chart carefully and recognize, using context, where substitutions have occurred.    "

## 2024-12-26 ENCOUNTER — TELEPHONE (OUTPATIENT)
Age: 59
End: 2024-12-26

## 2024-12-26 ENCOUNTER — TELEPHONE (OUTPATIENT)
Dept: HEMATOLOGY ONCOLOGY | Facility: MEDICAL CENTER | Age: 59
End: 2024-12-26

## 2024-12-26 DIAGNOSIS — E89.0 POSTOPERATIVE HYPOTHYROIDISM: ICD-10-CM

## 2024-12-26 DIAGNOSIS — I82.5Y3 CHRONIC DEEP VEIN THROMBOSIS (DVT) OF PROXIMAL VEIN OF BOTH LOWER EXTREMITIES (HCC): Chronic | ICD-10-CM

## 2024-12-26 RX ORDER — WARFARIN SODIUM 3 MG/1
TABLET ORAL
Qty: 70 TABLET | Refills: 0 | Status: SHIPPED | OUTPATIENT
Start: 2024-12-26

## 2024-12-26 NOTE — TELEPHONE ENCOUNTER
Patient returned call to JENNIFER Patel regarding her coumadin refill request. She stated she usually gets her INR checked monthly and last was on 11/30. Patient unsure why this is not sufficient for her refill. I checked with JENNIFER Patel and advised 11/30 lab is sufficient, and Amadna will send refill as requested.    Patient also stated her insurance is now charging copays for every lab visit,and will now be getting all her labs done every 3 months, as that is when she gets them done for other providers.  She said she has been on coumadin for such a long time and understands how her body works, well enough to be able to know if her blood is to thin or not.

## 2024-12-26 NOTE — TELEPHONE ENCOUNTER
Received refill request for coumadin  Do not see a recent INR in chart  Left voicemail for patient to call [facility] to discuss

## 2024-12-27 RX ORDER — LEVOTHYROXINE SODIUM 125 UG/1
TABLET ORAL
Qty: 90 TABLET | Refills: 1 | Status: SHIPPED | OUTPATIENT
Start: 2024-12-27

## 2024-12-30 ENCOUNTER — HOSPITAL ENCOUNTER (OUTPATIENT)
Dept: RADIOLOGY | Facility: HOSPITAL | Age: 59
Discharge: HOME | End: 2024-12-30
Payer: COMMERCIAL

## 2024-12-30 DIAGNOSIS — N20.0 NEPHROLITHIASIS: ICD-10-CM

## 2024-12-30 PROCEDURE — 74018 RADEX ABDOMEN 1 VIEW: CPT

## 2025-01-10 ENCOUNTER — OFFICE VISIT (OUTPATIENT)
Dept: UROLOGY | Facility: AMBULATORY SURGERY CENTER | Age: 60
End: 2025-01-10
Payer: COMMERCIAL

## 2025-01-10 VITALS
WEIGHT: 201 LBS | HEART RATE: 67 BPM | BODY MASS INDEX: 37.95 KG/M2 | SYSTOLIC BLOOD PRESSURE: 136 MMHG | DIASTOLIC BLOOD PRESSURE: 82 MMHG | HEIGHT: 61 IN | OXYGEN SATURATION: 97 %

## 2025-01-10 DIAGNOSIS — N20.0 NEPHROLITHIASIS: Primary | ICD-10-CM

## 2025-01-10 PROCEDURE — 99213 OFFICE O/P EST LOW 20 MIN: CPT

## 2025-01-10 NOTE — PROGRESS NOTES
Name: Rina Taylor      : 1965      MRN: [id number]  Encounter Provider: PEACE Perry  Encounter Date: 1/10/2025   Encounter department: [facility] [place]  :  Assessment & Plan  Nephrolithiasis  KUB x-ray on 2024 demonstrating an unchanged 8 mm left renal calculus, unchanged from previous imaging  Discussed diet lifestyle modifications to prevent future kidney stone formation, this includes adequate water intake of at least 2 to 3 L of water daily, adding citrus to drinks, following a low calcium oxalate diet  We did discuss surgical intervention with both ESWL and ureteroscopy, she wishes to defer any definitive stone management at this time  Discussed ER precautions that if she develops any severe flank pain, nausea/vomiting or fever/chills she should go to the ER for further evaluation  Follow-up in 1 year with KUB x-ray  Orders:    XR abdomen 1 view kub; Future        History of Present Illness   Rina Taylor is a 59 y.o. female who presents today to the office for follow-up of nephrolithiasis.  She was last seen by Dr. Welsh in 2023.  She has a history of nephrolithiasis and lupus nephritis.  Her lupus nephritis is followed by nephrology.  She also has a history of simple kidney cyst on the right kidney.  She has had no issues with kidney stone passage.  Today in the office she states she is overall doing very well.  She denies any urinary/urological complaints.  She states she would not be interested in any definitive stone management at this time.    Review of Systems   Constitutional:  Negative for chills and fever.   Respiratory: Negative.  Negative for cough and shortness of breath.    Cardiovascular:  Negative for chest pain and leg swelling.   Genitourinary:  Negative for dyspareunia, dysuria, flank pain, frequency, hematuria, menstrual problem, pelvic pain, urgency, vaginal bleeding, vaginal discharge and vaginal pain.   Skin:  Negative for  "rash.   Neurological: Negative.    Hematological:  Negative for adenopathy. Does not bruise/bleed easily.          Objective   /82 (BP Location: Left arm, Patient Position: Sitting, Cuff Size: Standard)   Pulse 67   Ht 5' 1\" (1.549 m)   Wt 91.2 kg (201 lb)   SpO2 97%   BMI 37.98 kg/m²     Physical Exam  Vitals reviewed.   Constitutional:       Appearance: Normal appearance.   HENT:      Head: Normocephalic and atraumatic.   Eyes:      Pupils: Pupils are equal, round, and reactive to light.   Cardiovascular:      Rate and Rhythm: Normal rate.   Pulmonary:      Effort: Pulmonary effort is normal.   Musculoskeletal:      Cervical back: Normal range of motion.   Skin:     General: Skin is warm and dry.   Neurological:      General: No focal deficit present.      Mental Status: She is alert and oriented to person, place, and time. Mental status is at baseline.   Psychiatric:         Mood and Affect: Mood normal.         Behavior: Behavior normal.         Thought Content: Thought content normal.         Judgment: Judgment normal.          Results  No results found for: \"PSA\"  Lab Results   Component Value Date    GLUCOSE 105 (H) 11/18/2017    CALCIUM 9.1 11/30/2024     11/18/2017    K 4.4 11/30/2024    CO2 24 11/30/2024     11/30/2024    BUN 32 (H) 11/30/2024    CREATININE 1.31 (H) 11/30/2024     Lab Results   Component Value Date    WBC 5.76 11/30/2024    HGB 13.1 11/30/2024    HCT 39.3 11/30/2024    MCV 90 11/30/2024     11/30/2024       Office Urine Dip  No results found for this or any previous visit (from the past hour).]      "

## 2025-01-10 NOTE — ASSESSMENT & PLAN NOTE
KUB x-ray on 12/30/2024 demonstrating an unchanged 8 mm left renal calculus, unchanged from previous imaging  Discussed diet lifestyle modifications to prevent future kidney stone formation, this includes adequate water intake of at least 2 to 3 L of water daily, adding citrus to drinks, following a low calcium oxalate diet  We did discuss surgical intervention with both ESWL and ureteroscopy, she wishes to defer any definitive stone management at this time  Discussed ER precautions that if she develops any severe flank pain, nausea/vomiting or fever/chills she should go to the ER for further evaluation  Follow-up in 1 year with KUB x-ray  Orders:    XR abdomen 1 view kub; Future

## 2025-01-12 DIAGNOSIS — I12.9 PARENCHYMAL RENAL HYPERTENSION, STAGE 1 THROUGH STAGE 4 OR UNSPECIFIED CHRONIC KIDNEY DISEASE: ICD-10-CM

## 2025-01-14 RX ORDER — FOSINOPRIL SODIUM 20 MG/1
20 TABLET ORAL 2 TIMES DAILY
Qty: 180 TABLET | Refills: 0 | Status: SHIPPED | OUTPATIENT
Start: 2025-01-14 | End: 2026-01-09

## 2025-01-28 ENCOUNTER — PATIENT MESSAGE (OUTPATIENT)
Dept: NEPHROLOGY | Facility: CLINIC | Age: 60
End: 2025-01-28

## 2025-01-28 NOTE — PATIENT COMMUNICATION
Patient called stating she received the message about completing blood work prior to her appointment. She asked what blood work is ordered, informed her BMP is ordered and requires fasting. She will get this done tomorrow.  No further action needed

## 2025-01-29 ENCOUNTER — RESULTS FOLLOW-UP (OUTPATIENT)
Dept: NEPHROLOGY | Facility: CLINIC | Age: 60
End: 2025-01-29

## 2025-01-29 ENCOUNTER — APPOINTMENT (OUTPATIENT)
Dept: LAB | Facility: HOSPITAL | Age: 60
End: 2025-01-29
Payer: COMMERCIAL

## 2025-01-29 DIAGNOSIS — N20.0 NEPHROLITHIASIS: ICD-10-CM

## 2025-01-29 DIAGNOSIS — N18.2 CHRONIC KIDNEY DISEASE (CKD), STAGE II (MILD): ICD-10-CM

## 2025-01-29 DIAGNOSIS — I82.5Y3 CHRONIC DEEP VEIN THROMBOSIS (DVT) OF PROXIMAL VEIN OF BOTH LOWER EXTREMITIES (HCC): ICD-10-CM

## 2025-01-29 DIAGNOSIS — I10 ESSENTIAL HYPERTENSION, BENIGN: ICD-10-CM

## 2025-01-29 LAB
ANION GAP SERPL CALCULATED.3IONS-SCNC: 3 MMOL/L (ref 4–13)
BUN SERPL-MCNC: 33 MG/DL (ref 5–25)
CALCIUM SERPL-MCNC: 9.9 MG/DL (ref 8.4–10.2)
CHLORIDE SERPL-SCNC: 106 MMOL/L (ref 96–108)
CO2 SERPL-SCNC: 29 MMOL/L (ref 21–32)
CREAT SERPL-MCNC: 1.4 MG/DL (ref 0.6–1.3)
GFR SERPL CREATININE-BSD FRML MDRD: 41 ML/MIN/1.73SQ M
GLUCOSE SERPL-MCNC: 89 MG/DL (ref 65–140)
INR PPP: 3.44 (ref 0.85–1.19)
POTASSIUM SERPL-SCNC: 5 MMOL/L (ref 3.5–5.3)
PROTHROMBIN TIME: 33.9 SECONDS (ref 12.3–15)
SODIUM SERPL-SCNC: 138 MMOL/L (ref 135–147)

## 2025-01-29 PROCEDURE — 85610 PROTHROMBIN TIME: CPT

## 2025-01-29 PROCEDURE — 80048 BASIC METABOLIC PNL TOTAL CA: CPT

## 2025-01-29 PROCEDURE — 36415 COLL VENOUS BLD VENIPUNCTURE: CPT

## 2025-01-30 ENCOUNTER — TELEPHONE (OUTPATIENT)
Dept: NEPHROLOGY | Facility: CLINIC | Age: 60
End: 2025-01-30

## 2025-01-30 ENCOUNTER — OFFICE VISIT (OUTPATIENT)
Dept: NEPHROLOGY | Facility: CLINIC | Age: 60
End: 2025-01-30
Payer: COMMERCIAL

## 2025-01-30 VITALS
OXYGEN SATURATION: 99 % | SYSTOLIC BLOOD PRESSURE: 120 MMHG | DIASTOLIC BLOOD PRESSURE: 76 MMHG | WEIGHT: 205 LBS | BODY MASS INDEX: 38.73 KG/M2 | HEART RATE: 61 BPM

## 2025-01-30 DIAGNOSIS — R80.8 OTHER PROTEINURIA: ICD-10-CM

## 2025-01-30 DIAGNOSIS — E83.52 HYPERCALCEMIA: ICD-10-CM

## 2025-01-30 DIAGNOSIS — Z98.890 STATUS POST PARATHYROIDECTOMY: ICD-10-CM

## 2025-01-30 DIAGNOSIS — I10 PRIMARY HYPERTENSION: ICD-10-CM

## 2025-01-30 DIAGNOSIS — N17.9 ACUTE KIDNEY INJURY SUPERIMPOSED ON STAGE 2 CHRONIC KIDNEY DISEASE (HCC): Primary | ICD-10-CM

## 2025-01-30 DIAGNOSIS — N20.0 NEPHROLITHIASIS: ICD-10-CM

## 2025-01-30 DIAGNOSIS — E21.0 PRIMARY HYPERPARATHYROIDISM (HCC): ICD-10-CM

## 2025-01-30 DIAGNOSIS — Z90.89 STATUS POST PARATHYROIDECTOMY: ICD-10-CM

## 2025-01-30 DIAGNOSIS — N18.2 ACUTE KIDNEY INJURY SUPERIMPOSED ON STAGE 2 CHRONIC KIDNEY DISEASE (HCC): Primary | ICD-10-CM

## 2025-01-30 DIAGNOSIS — E55.9 VITAMIN D DEFICIENCY: ICD-10-CM

## 2025-01-30 PROCEDURE — 99214 OFFICE O/P EST MOD 30 MIN: CPT | Performed by: INTERNAL MEDICINE

## 2025-01-30 NOTE — PROGRESS NOTES
NEPHROLOGY OUTPATIENT PROGRESS NOTE   Rina Taylor 59 y.o. 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 2024 without simultaneous repeat testing  -continue to avoid nonsteroidals(ibuprofen, aleve, advil, motrin, celebrex, naproxen, toradol)  -latest urinalysis shows large leukocytes, 1+ protein, 2-4 RBCs, 10-20 WBCs, 0-3 hyaline casts  -monitor CMP, UA with microscopy as well along with repeat UpCr.   -rheumatology recommends benlysta  Primary hypertension  -BP well controlled today  -continue nifedipine XL 30mg daily(at night), fosinopril 20mg twice daily, spironolactone 25mg daily (at lunch)  -avoid caffeine/tea     Primary hyperparathyroidism (HCC)  - follows with endo, +nephrolithiasis history with recent Urinalysis showing calcium oxalate crystals  -calcium has normalized on latest bloodwork  -PTH slightly elevated at 132 as of Nov. 2024 - off sensipar 30mg daily, per endocrinology  -calcium normal  -repeat PTH as per endocrinology  Nephrolithiasis  -You must drink enough water to urinate at least 2L per day to prevent stone formation but this is difficult given her edema and need for diuretics. Unfortunately, diuretics will contribute to stone formation.  -avoid high salt diet both for stone reduction and to help blood pressure stay controlled  -repeat renal ultrasound as elevation in sCr noted  -left sided kidney stone noted on 12/30/24 KUB  -obtain  fabricio (24 hr urine collection). The bottle will be mailed to you  Status post parathyroidectomy  -per endocrinology, s/p surgery May 2021  -off sensipar  Hypercalcemia  -resolved, last calcium 9.9 as of 1/29/25  -repeat CMP  Other proteinuria  -+residual proteinuria from prior lupus nephritis (biopsy proven in 1996), now noted to have increased weight as well as HTN.   -Off HCTZ due to hypercalcemia, on fosinopril 20mg twice daily, aldactone 25mg daily   -last UpCr 0.3g as of Nov. 2024  -Monitor UpCr along with microalb:Cr  Vitamin D deficiency  - Vit D level 18.9 as of Nov. 2024   - on vitamin D3 1000u daily, per endocrinology    RTC in 3 months.  Obtain blood and urine testing ASAP.  Obtain kidney ultrasound ASAP.      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 2024 without simultaneous repeat testing  -continue to avoid nonsteroidals(ibuprofen, aleve, advil, motrin, celebrex, naproxen, toradol)  -latest urinalysis shows large leukocytes, 1+ protein, 2-4 RBCs, 10-20 WBCs, 0-3 hyaline casts  -monitor CMP, UA with microscopy as well along with repeat UpCr.   -rheumatology recommends benlysta  Primary hypertension  -BP well controlled today  -continue nifedipine XL 30mg daily(at night), fosinopril 20mg twice daily, spironolactone 25mg daily (at lunch)  -avoid caffeine/tea     Primary hyperparathyroidism (HCC)  - follows with endo, +nephrolithiasis history with recent Urinalysis showing calcium oxalate crystals  -calcium has normalized on latest bloodwork  -PTH slightly elevated at 132 as of Nov. 2024 - off  sensipar 30mg daily, per endocrinology  -calcium normal  -repeat PTH as per endocrinology  Nephrolithiasis  -You must drink enough water to urinate at least 2L per day to prevent stone formation but this is difficult given her edema and need for diuretics. Unfortunately, diuretics will contribute to stone formation.  -avoid high salt diet both for stone reduction and to help blood pressure stay controlled  -repeat renal ultrasound as elevation in sCr noted  -left sided kidney stone noted on 12/30/24 KUB  -obtain litholink (24 hr urine collection). The bottle will be mailed to you  Status post parathyroidectomy  -per endocrinology, s/p surgery May 2021  -off sensipar  Hypercalcemia  -resolved, last calcium 9.9 as of 1/29/25  -repeat CMP  Other proteinuria  -+residual proteinuria from prior lupus nephritis (biopsy proven in 1996), now noted to have increased weight as well as HTN.   -Off HCTZ due to hypercalcemia, on fosinopril 20mg twice daily, aldactone 25mg daily   -last UpCr 0.3g as of Nov. 2024  -Monitor UpCr along with microalb:Cr  Vitamin D deficiency  - Vit D level 18.9 as of Nov. 2024   - on vitamin D3 1000u daily, per endocrinology    RTC in 3 months.  Obtain blood and urine testing ASAP.  Obtain kidney ultrasound ASAP.    SUBJECTIVE / INTERVAL HISTORY:  59 y.o. female presents in follow up of CKD.     Rina Taylor denies any recent illness/hospitalizations/medication changes since last office visit.    Denies NSAID use.  Denies kidney stone pain.  Drinks 32oz of water a day along with juice, lemonade and iced tea.     Review of Systems   Constitutional:  Negative for chills and fever.   HENT:  Negative for sore throat and trouble swallowing.    Eyes:  Negative for visual disturbance.   Respiratory:  Negative for cough and shortness of breath.    Cardiovascular:  Negative for chest pain and leg swelling.   Gastrointestinal:  Negative for abdominal pain, constipation, diarrhea, nausea and vomiting.    Endocrine: Negative for polyuria.   Genitourinary:  Negative for difficulty urinating, dysuria and hematuria.   Musculoskeletal:  Positive for back pain. Negative for neck pain.   Skin:  Negative for rash.   Neurological:  Negative for dizziness, light-headedness and numbness.   Psychiatric/Behavioral:  The patient is not nervous/anxious.        OBJECTIVE:  /76 (BP Location: Left arm, Patient Position: Sitting, Cuff Size: Adult)   Pulse 61   Wt 93 kg (205 lb)   SpO2 99%   BMI 38.73 kg/m²  Body mass index is 38.73 kg/m².    Physical exam:  Physical Exam  Vitals and nursing note reviewed.   Constitutional:       General: She is not in acute distress.     Appearance: Normal appearance. She is well-developed. She is obese. She is not diaphoretic.   HENT:      Head: Normocephalic and atraumatic.      Nose: Nose normal.      Mouth/Throat:      Mouth: Mucous membranes are moist.      Pharynx: No oropharyngeal exudate.   Eyes:      General: No scleral icterus.        Right eye: No discharge.         Left eye: No discharge.      Comments: eyeglasses   Neck:      Thyroid: No thyromegaly.   Cardiovascular:      Rate and Rhythm: Normal rate and regular rhythm.      Heart sounds: No murmur heard.  Pulmonary:      Effort: Pulmonary effort is normal. No respiratory distress.      Breath sounds: Normal breath sounds. No wheezing.   Abdominal:      General: Bowel sounds are normal. There is no distension.      Palpations: Abdomen is soft.   Musculoskeletal:         General: Swelling (trace b/l LE) present. Normal range of motion.      Cervical back: Normal range of motion and neck supple.   Skin:     General: Skin is warm and dry.      Coloration: Skin is not jaundiced.      Findings: No rash.   Neurological:      General: No focal deficit present.      Mental Status: She is alert.      Motor: No abnormal muscle tone.      Comments: awake   Psychiatric:         Mood and Affect: Mood normal.         Behavior: Behavior  normal.         Medications:    Current Outpatient Medications:     Cholecalciferol 25 MCG (1000 UT) TBDP, Take 1,000 Units by mouth in the morning, Disp: , Rfl:     ciclopirox (LOPROX) 0.77 % cream, Apply topically 2 (two) times a day, Disp: , Rfl:     folic acid (FOLVITE) 1 mg tablet, Take 1 tablet by mouth daily, Disp: , Rfl:     fosinopril (MONOPRIL) 20 mg tablet, Take 1 tablet (20 mg total) by mouth 2 (two) times a day, Disp: 180 tablet, Rfl: 0    hydrocortisone 2.5 % cream, , Disp: , Rfl:     hydroxychloroquine (PLAQUENIL) 200 mg tablet, Take 1 tablet (200 mg total) by mouth 2 (two) times a day with meals, Disp: 180 tablet, Rfl: 1    levothyroxine 125 mcg tablet, Take one tablet daily on empty stomach 1 hour before breakfast and 4 hours apart from calcium and vitamin D supplementation, Disp: 90 tablet, Rfl: 1    NIFEdipine (PROCARDIA XL) 30 mg 24 hr tablet, Take 1 tablet (30 mg total) by mouth daily, Disp: 90 tablet, Rfl: 1    spironolactone (ALDACTONE) 25 mg tablet, Take 1 tablet (25 mg total) by mouth daily, Disp: 90 tablet, Rfl: 1    warfarin (Coumadin) 1 mg tablet, As directed., Disp: 90 tablet, Rfl: 2    warfarin (COUMADIN) 3 mg tablet, TAKE ONE TABLET BY MOUTH EVERY DAY. DO NOT TAKE ON MONDAYS AND THURSDAYS, Disp: 70 tablet, Rfl: 0    warfarin (COUMADIN) 4 mg tablet, Take by mouth daily Every day except Thursday, Disp: , Rfl:     triamcinolone (KENALOG) 0.1 % cream, Apply topically 2 (two) times a day for 7 days (Patient not taking: Reported on 7/30/2024), Disp: 80 g, Rfl: 0    Allergies:  Allergies as of 01/30/2025 - Reviewed 01/30/2025   Allergen Reaction Noted    Penicillins Itching 04/25/2013       The following portions of the patient's history were reviewed and updated as appropriate: past family history, past surgical history and problem list.    Laboratory Results:  Lab Results   Component Value Date    SODIUM 138 01/29/2025    K 5.0 01/29/2025     01/29/2025    CO2 29 01/29/2025    BUN 33  "(H) 01/29/2025    CREATININE 1.40 (H) 01/29/2025    GLUC 89 01/29/2025    CALCIUM 9.9 01/29/2025        Lab Results   Component Value Date    .9 (H) 11/30/2024    CALCIUM 9.9 01/29/2025    PHOS 2.7 06/09/2023       Portions of the record may have been created with voice recognition software.  Occasional wrong word or \"sound a like\" substitutions may have occurred due to the inherent limitations of voice recognition software.  Read the chart carefully and recognize, using context, where substitutions have occurred.  "

## 2025-01-30 NOTE — ASSESSMENT & PLAN NOTE
- follows with endo, +nephrolithiasis history with recent Urinalysis showing calcium oxalate crystals  -calcium has normalized on latest bloodwork  -PTH slightly elevated at 132 as of Nov. 2024 - off sensipar 30mg daily, per endocrinology  -calcium normal  -repeat PTH as per endocrinology

## 2025-01-30 NOTE — ASSESSMENT & PLAN NOTE
-You must drink enough water to urinate at least 2L per day to prevent stone formation but this is difficult given her edema and need for diuretics. Unfortunately, diuretics will contribute to stone formation.  -avoid high salt diet both for stone reduction and to help blood pressure stay controlled  -repeat renal ultrasound as elevation in sCr noted  -left sided kidney stone noted on 12/30/24 KUB  -obtain litholink (24 hr urine collection). The bottle will be mailed to you

## 2025-01-30 NOTE — ASSESSMENT & PLAN NOTE
-BP well controlled today  -continue nifedipine XL 30mg daily(at night), fosinopril 20mg twice daily, spironolactone 25mg daily (at lunch)  -avoid caffeine/tea

## 2025-01-30 NOTE — TELEPHONE ENCOUNTER
Pt was seen today 1/30 and was advised to follow up in 3 months by Dr Stapleton. Pt would like to only schedule with Dr Stapletno. Please assist in scheduling. Pt can only schedule morning appts before 12pm.

## 2025-01-30 NOTE — PATIENT INSTRUCTIONS
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 2024 without simultaneous repeat testing  -continue to avoid nonsteroidals(ibuprofen, aleve, advil, motrin, celebrex, naproxen, toradol)  -latest urinalysis shows large leukocytes, 1+ protein, 2-4 RBCs, 10-20 WBCs, 0-3 hyaline casts  -monitor CMP, UA with microscopy as well along with repeat UpCr.   -rheumatology recommends benlysta  Primary hypertension  -BP well controlled today  -continue nifedipine XL 30mg daily(at night), fosinopril 20mg twice daily, spironolactone 25mg daily (at lunch)  -avoid caffeine/tea     Primary hyperparathyroidism (HCC)  - follows with endo, +nephrolithiasis history with recent Urinalysis showing calcium oxalate crystals  -calcium has normalized on latest bloodwork  -PTH slightly elevated at 132 as of Nov. 2024 - off sensipar 30mg daily, per endocrinology  -calcium normal  -repeat PTH as per endocrinology  Nephrolithiasis  -You must drink enough water to urinate at least 2L per day to prevent stone formation but this is difficult given her edema and need for diuretics. Unfortunately, diuretics will contribute to stone formation.  -avoid high salt diet both for stone reduction and to help blood pressure stay controlled  -repeat renal ultrasound as elevation in sCr noted  -left sided kidney stone noted on 12/30/24 KUB  -obtain litholink (24 hr urine collection). The bottle will be mailed to you  Status post parathyroidectomy  -per endocrinology, s/p surgery May 2021  -off sensipar  Hypercalcemia  -resolved, last calcium 9.9 as of 1/29/25  -repeat CMP  Other  proteinuria  -+residual proteinuria from prior lupus nephritis (biopsy proven in 1996), now noted to have increased weight as well as HTN.   -Off HCTZ due to hypercalcemia, on fosinopril 20mg twice daily, aldactone 25mg daily   -last UpCr 0.3g as of Nov. 2024  -Monitor UpCr along with microalb:Cr  Vitamin D deficiency  - Vit D level 18.9 as of Nov. 2024   - on vitamin D3 1000u daily, per endocrinology    RTC in 3 months.  Obtain blood and urine testing ASAP.  Obtain kidney ultrasound ASAP.

## 2025-01-30 NOTE — ASSESSMENT & PLAN NOTE
-+residual proteinuria from prior lupus nephritis (biopsy proven in 1996), now noted to have increased weight as well as HTN.   -Off HCTZ due to hypercalcemia, on fosinopril 20mg twice daily, aldactone 25mg daily   -last UpCr 0.3g as of Nov. 2024  -Monitor UpCr along with microalb:Cr

## 2025-01-30 NOTE — ASSESSMENT & PLAN NOTE
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 2024 without simultaneous repeat testing  -continue to avoid nonsteroidals(ibuprofen, aleve, advil, motrin, celebrex, naproxen, toradol)  -latest urinalysis shows large leukocytes, 1+ protein, 2-4 RBCs, 10-20 WBCs, 0-3 hyaline casts  -monitor CMP, UA with microscopy as well along with repeat UpCr.   -rheumatology recommends benlysta

## 2025-02-01 ENCOUNTER — HOSPITAL ENCOUNTER (EMERGENCY)
Facility: HOSPITAL | Age: 60
Discharge: HOME | End: 2025-02-01
Attending: EMERGENCY MEDICINE | Admitting: EMERGENCY MEDICINE
Payer: COMMERCIAL

## 2025-02-01 ENCOUNTER — APPOINTMENT (EMERGENCY)
Dept: RADIOLOGY | Facility: HOSPITAL | Age: 60
End: 2025-02-01
Payer: COMMERCIAL

## 2025-02-01 VITALS
WEIGHT: 203 LBS | RESPIRATION RATE: 16 BRPM | HEART RATE: 75 BPM | SYSTOLIC BLOOD PRESSURE: 112 MMHG | OXYGEN SATURATION: 98 % | TEMPERATURE: 97.4 F | DIASTOLIC BLOOD PRESSURE: 80 MMHG | BODY MASS INDEX: 38.36 KG/M2

## 2025-02-01 DIAGNOSIS — S90.32XA CONTUSION OF LEFT FOOT, INITIAL ENCOUNTER: ICD-10-CM

## 2025-02-01 DIAGNOSIS — M79.673 FOOT PAIN: Primary | ICD-10-CM

## 2025-02-01 PROCEDURE — 99283 EMERGENCY DEPT VISIT LOW MDM: CPT

## 2025-02-01 PROCEDURE — 99284 EMERGENCY DEPT VISIT MOD MDM: CPT | Performed by: EMERGENCY MEDICINE

## 2025-02-01 PROCEDURE — 73630 X-RAY EXAM OF FOOT: CPT

## 2025-02-01 RX ORDER — ACETAMINOPHEN 325 MG/1
650 TABLET ORAL ONCE
Status: COMPLETED | OUTPATIENT
Start: 2025-02-01 | End: 2025-02-01

## 2025-02-01 RX ADMIN — ACETAMINOPHEN 650 MG: 325 TABLET, FILM COATED ORAL at 13:38

## 2025-02-01 NOTE — Clinical Note
Rina Taylor was seen and treated in our emergency department on 2/1/2025.                Diagnosis:     Rina  is off the rest of the shift today, may return to work on return date.    She may return on this date: 02/04/2025         If you have any questions or concerns, please don't hesitate to call.      Ramon Devine, DO    ______________________________           _______________          _______________  Hospital Representative                              Date                                Time

## 2025-02-01 NOTE — Clinical Note
Rina Taylor was seen and treated in our emergency department on 2/1/2025.                Diagnosis:     Rina  is off the rest of the shift today, may return to work on return date.    She may return on this date: 02/11/2025         If you have any questions or concerns, please don't hesitate to call.      Ramon Devine, DO    ______________________________           _______________          _______________  Hospital Representative                              Date                                Time

## 2025-02-01 NOTE — DISCHARGE INSTRUCTIONS
You were seen in the emergency department for swelling of your foot.  We performed an x-ray which was negative for any bony abnormality.  Please continue to use ice, the foot, comfort stocking, Tylenol and ibuprofen for swelling and pain control.  Please follow-up with your primary care in the next couple of weeks for reevaluation.  Please return the emergency department should you experience any chest pain, shortness of breath, severe worsening of your foot pain, or any other concerning symptoms he would like evaluated.  Otherwise please follow-up with your primary care.

## 2025-02-01 NOTE — ED ATTENDING ATTESTATION
2/1/2025  I, Valeriano Garcia MD, saw and evaluated the patient. I have discussed the patient with the resident/non-physician practitioner and agree with the resident's/non-physician practitioner's findings, Plan of Care, and MDM as documented in the resident's/non-physician practitioner's note, except where noted. All available labs and Radiology studies were reviewed.  I was present for key portions of any procedure(s) performed by the resident/non-physician practitioner and I was immediately available to provide assistance.       At this point I agree with the current assessment done in the Emergency Department.  I have conducted an independent evaluation of this patient a history and physical is as follows:  Left foot pain and swelling no injury  hurts to walk on it   Has ckd   no fever patient is on Coumadin patient had a pulmonary embolism in the past patient states her INR was 3.2  2 days ago she has not been on antibiotics  Afebrile   looks well    Dorsal swelling noted   no redness  no cellulitis   some mild bruising    No tednerness over the 5th MT  ankle normal pulses normal   Impression pain secondary to contusion/hematoma Will x-ray to rule out fracture  ED Course         Critical Care Time  Procedures

## 2025-02-01 NOTE — Clinical Note
Rina Taylor was seen and treated in our emergency department on 2/1/2025.                Diagnosis:     Rina  is off the rest of the shift today, may return to work on return date.    She may return on this date: 02/13/2025         If you have any questions or concerns, please don't hesitate to call.      Ramon Devine, DO    ______________________________           _______________          _______________  Hospital Representative                              Date                                Time

## 2025-02-01 NOTE — ED PROVIDER NOTES
"Time reflects when diagnosis was documented in both MDM as applicable and the Disposition within this note       Time User Action Codes Description Comment    2/1/2025  2:14 PM Ramon Devine Add [M79.673] Foot pain     2/1/2025  2:14 PM Ramon Devine [S90.32XA] Contusion of left foot, initial encounter           ED Disposition       ED Disposition   Discharge    Condition   Stable    Date/Time   Sat Feb 1, 2025  2:16 PM    Comment   Rinamitesh Taylor discharge to home/self care.                   Assessment & Plan       Medical Decision Making  Patient is a 59 y.o. female with PMH of CKD, antiphospholipid syndrome on Coumadin, hypertension, lupus, Sjogren's who presents to the ED with left foot pain.    Vital signs stable.  Physical exam as above.    History and physical exam most consistent with contusion. However, differential diagnosis included but not limited to fracture, dislocation, nerve injury, vascular injury.     Plan: We will order x-ray of foot and Tylenol for pain control.    View ED course above for further discussion on patient workup.     On review of previous records, patient was seen by PCP on 12//24.  Visit note reviewed.    All labs reviewed and utilized in the medical decision making process  All radiology studies independently viewed by me and interpreted by the radiologist.  I reviewed all testing with the patient.     Upon re-evaluation, patient noted improvement of pain with medication.    Disposition: Patient discharged in stable condition.  Patient given strict return precautions.  Patient will follow-up with PCP and Ortho for continued management of her foot pain.  Patient will also ice, elevate, and compress the area to help with the swelling.  Patient will use Tylenol for pain.    Portions of the record may have been created with voice recognition software. Occasional wrong word or \"sound a like\" substitutions may have occurred due to the inherent limitations of voice recognition " software. Read the chart carefully and recognize, using context, where substitutions have occurred.     Amount and/or Complexity of Data Reviewed  Radiology: ordered. Decision-making details documented in ED Course.    Risk  OTC drugs.        ED Course as of 02/01/25 1513   Sat Feb 01, 2025   1341 Patient given ice for her foot.   1411 XR foot 3+ views LEFT  No acute bony abnormality.       Medications   acetaminophen (TYLENOL) tablet 650 mg (650 mg Oral Given 2/1/25 1338)       ED Risk Strat Scores                                              History of Present Illness       Chief Complaint   Patient presents with    Foot Pain     Pt reports left foot pain, since Thursday. Noticed it started to swell so came in for eval.       Past Medical History:   Diagnosis Date    Acute blood loss anemia 02/09/2023    Acute cholecystitis 02/03/2023    ADA (acute kidney injury) (HCC) 02/09/2023    Anemia     Not sure    Anti-phospholipid antibody syndrome (HCC)     Cancer (HCC)     thyroid    Cellulitis of right index finger 03/04/2021    Chronic kidney disease     Clotting disorder (HCC)     Lung embolism,  ITP    Diabetes mellitus (HCC)     Disease of thyroid gland     Edema 12/17/2013    Elevated serum creatinine 02/28/2023    Fibroid 1998    Gestational diabetes     Gross hematuria 12/12/2016    History of chemotherapy     late 1980s for lupus    Hordeolum externum of left lower eyelid 05/11/2022    Hyperparathyroidism (HCC)     Hypertension     Hypotension due to hypovolemia 02/09/2023    Idiopathic thrombocytopenic purpura (ITP) (HCC)     Kidney stone     Left ear pain 03/23/2020    Left foot pain 07/11/2022    Left foot pain 07/11/2022    Lupus     Miscarriage 1996    Lost twin girls 10-    Osteoarthritis     Pulmonary embolism (HCC) 1987    Rash 03/11/2020    Sjogren's syndrome (HCC)     Upper respiratory tract infection 11/17/2019    Vitamin D deficiency       Past Surgical History:   Procedure Laterality Date      SECTION          CHOLECYSTECTOMY  23    CHOLECYSTECTOMY LAPAROSCOPIC N/A 2023    Procedure: LAPAROSCOPIC CHOLECYSTECTOMY;  Surgeon: Guille Diaz MD;  [facility];  Service: General    DIAGNOSTIC FLEXIBLE LARYNGOSCOPY N/A 2021    Procedure: DIAGNOSTIC FLEXIBLE LARYNGOSCOPY;  Surgeon: Jairo Christensen MD;  [facility];  Service: Surgical Oncology    HERNIA REPAIR      HIP SURGERY      left side, bone decompression    HYSTEROSCOPY      IR BIOPSY KIDNEY RANDOM  10/3/2023    TX HYSTEROSCOPY BX ENDOMETRIUM&/POLYPC W/WO D&C N/A 2023    Procedure: DILATATION AND CURETTAGE (D&C) WITH HYSTEROSCOPY;  Surgeon: Lauren Chase MD;  [facility];  Service: Gynecology    TX PARATHYROIDECTOMY/EXPLORATION PARATHYROIDS Right 2021    Procedure: PARATHYROIDECTOMY, MINIMALLY INVASIVE, right, intraoperative PTH monitoring;  Surgeon: Jairo Christensen MD;  [facility];  Service: Surgical Oncology    RENAL BIOPSY      Not sure    THYROID SURGERY      TUBAL LIGATION        Family History   Problem Relation Age of Onset    No Known Problems Mother     Diabetes type II Father     Diabetes Father     Stroke Father     Diabetes type II Paternal Aunt     Diabetes type II Paternal Uncle     Diabetes type II Paternal Grandmother     Stomach cancer Paternal Grandfather     No Known Problems Sister     No Known Problems Maternal Grandmother     No Known Problems Maternal Grandfather     No Known Problems Sister     No Known Problems Maternal Aunt     No Known Problems Maternal Aunt     No Known Problems Maternal Aunt     No Known Problems Paternal Aunt     No Known Problems Paternal Aunt       Social History     Tobacco Use    Smoking status: Never     Passive exposure: Never    Smokeless tobacco: Never   Vaping Use    Vaping status: Never Used   Substance Use Topics    Alcohol use: Not Currently     Comment: Twice a year    Drug use: No      E-Cigarette/Vaping     E-Cigarette Use Never User       E-Cigarette/Vaping Substances    Nicotine No     THC No     CBD No     Flavoring No     Other No     Unknown No       I have reviewed and agree with the history as documented.     The patient is a 59-year-old female with past medical history of CKD, antiphospholipid syndrome on Coumadin, hypertension, lupus, Sjogren's who presents today with left foot pain.  The patient notes that on Thursday she started to notice some pain in her left foot when she walks.  Patient notes that her foot has been swollen as well and states that is only been in pain when she is walking on it.  Patient states that she has been using ice and Tylenol, but has not noticed any improvement in the pain or swelling.  Patient states that she does not remember injuring the foot in any way.  Patient denies any fevers, chills, chest pain, shortness of breath, abdominal pain, nausea, vomiting, diarrhea, aspiration, headache, vision changes.  Patient notes that she has been able to walk on the foot, but it is in pain.        Review of Systems   Constitutional:  Negative for chills and fever.   HENT:  Negative for ear pain and sore throat.    Eyes:  Negative for pain and visual disturbance.   Respiratory:  Negative for cough and shortness of breath.    Cardiovascular:  Negative for chest pain and palpitations.   Gastrointestinal:  Negative for abdominal pain and vomiting.   Genitourinary:  Negative for dysuria and hematuria.   Musculoskeletal:  Negative for arthralgias and back pain.        Left foot pain and swelling   Skin:  Negative for color change and rash.   Neurological:  Negative for seizures and syncope.   All other systems reviewed and are negative.          Objective       ED Triage Vitals [02/01/25 1227]   Temperature Pulse Blood Pressure Respirations SpO2 Patient Position - Orthostatic VS   (!) 97.4 °F (36.3 °C) 75 112/80 16 98 % Sitting      Temp Source Heart Rate Source BP Location FiO2 (%) Pain Score     Oral Monitor Left arm -- 8      Vitals      Date and Time Temp Pulse SpO2 Resp BP Pain Score FACES Pain Rating User   02/01/25 1338 -- -- -- -- -- 8 -- CR   02/01/25 1227 97.4 °F (36.3 °C) 75 98 % 16 112/80 8 -- CS            Physical Exam  Vitals and nursing note reviewed.   Constitutional:       General: She is not in acute distress.     Appearance: Normal appearance. She is well-developed. She is not ill-appearing.   HENT:      Head: Normocephalic and atraumatic.      Nose: Nose normal.      Mouth/Throat:      Mouth: Mucous membranes are moist.      Pharynx: Oropharynx is clear.   Eyes:      Extraocular Movements: Extraocular movements intact.      Conjunctiva/sclera: Conjunctivae normal.      Pupils: Pupils are equal, round, and reactive to light.   Cardiovascular:      Rate and Rhythm: Normal rate and regular rhythm.      Pulses: Normal pulses.      Heart sounds: Normal heart sounds. No murmur heard.     No friction rub. No gallop.   Pulmonary:      Effort: Pulmonary effort is normal. No respiratory distress.      Breath sounds: Normal breath sounds. No stridor. No wheezing, rhonchi or rales.   Abdominal:      General: Abdomen is flat. Bowel sounds are normal. There is no distension.      Palpations: Abdomen is soft. There is no mass.      Tenderness: There is no abdominal tenderness. There is no guarding or rebound.   Musculoskeletal:         General: No swelling. Normal range of motion.      Cervical back: Normal range of motion and neck supple.      Comments: The patient has a large contusion over the dorsal aspect her left foot.  The patient has normal range of motion, motor, and sensory of the foot.  The patient has normal pulses.  Patient denies any tenderness to palpation over the area, but does note pain when she stands.  Patient is able to ambulate on the foot.   Skin:     General: Skin is warm and dry.      Capillary Refill: Capillary refill takes less than 2 seconds.   Neurological:      General:  No focal deficit present.      Mental Status: She is alert and oriented to person, place, and time.   Psychiatric:         Mood and Affect: Mood normal.         Results Reviewed       None            XR foot 3+ views LEFT    (Results Pending)       Procedures    ED Medication and Procedure Management   Prior to Admission Medications   Prescriptions Last Dose Informant Patient Reported? Taking?   Cholecalciferol 25 MCG (1000 UT) TBDP  Self Yes No   Sig: Take 1,000 Units by mouth in the morning   NIFEdipine (PROCARDIA XL) 30 mg 24 hr tablet  Self No No   Sig: Take 1 tablet (30 mg total) by mouth daily   ciclopirox (LOPROX) 0.77 % cream  Self Yes No   Sig: Apply topically 2 (two) times a day   folic acid (FOLVITE) 1 mg tablet  Self Yes No   Sig: Take 1 tablet by mouth daily   fosinopril (MONOPRIL) 20 mg tablet   No No   Sig: Take 1 tablet (20 mg total) by mouth 2 (two) times a day   hydrocortisone 2.5 % cream  Self Yes No   hydroxychloroquine (PLAQUENIL) 200 mg tablet  Self No No   Sig: Take 1 tablet (200 mg total) by mouth 2 (two) times a day with meals   levothyroxine 125 mcg tablet  Self No No   Sig: Take one tablet daily on empty stomach 1 hour before breakfast and 4 hours apart from calcium and vitamin D supplementation   spironolactone (ALDACTONE) 25 mg tablet  Self No No   Sig: Take 1 tablet (25 mg total) by mouth daily   triamcinolone (KENALOG) 0.1 % cream   No No   Sig: Apply topically 2 (two) times a day for 7 days   Patient not taking: Reported on 7/30/2024   warfarin (COUMADIN) 3 mg tablet  Self No No   Sig: TAKE ONE TABLET BY MOUTH EVERY DAY. DO NOT TAKE ON MONDAYS AND THURSDAYS   warfarin (COUMADIN) 4 mg tablet  Self Yes No   Sig: Take by mouth daily Every day except Thursday   warfarin (Coumadin) 1 mg tablet  Self No No   Sig: As directed.      Facility-Administered Medications: None     Discharge Medication List as of 2/1/2025  2:17 PM        CONTINUE these medications which have NOT CHANGED    Details    Cholecalciferol 25 MCG (1000 UT) TBDP Take 1,000 Units by mouth in the morning, Historical Med      ciclopirox (LOPROX) 0.77 % cream Apply topically 2 (two) times a day, Starting Mon 12/16/2024, Historical Med      folic acid (FOLVITE) 1 mg tablet Take 1 tablet by mouth daily, Starting Wed 5/18/2011, Historical Med      fosinopril (MONOPRIL) 20 mg tablet Take 1 tablet (20 mg total) by mouth 2 (two) times a day, Starting Tue 1/14/2025, Until Fri 1/9/2026, Normal      hydrocortisone 2.5 % cream Starting Mon 11/21/2022, Historical Med      hydroxychloroquine (PLAQUENIL) 200 mg tablet Take 1 tablet (200 mg total) by mouth 2 (two) times a day with meals, Starting Mon 10/14/2024, Until Sat 4/12/2025, Normal      levothyroxine 125 mcg tablet Take one tablet daily on empty stomach 1 hour before breakfast and 4 hours apart from calcium and vitamin D supplementation, Normal      NIFEdipine (PROCARDIA XL) 30 mg 24 hr tablet Take 1 tablet (30 mg total) by mouth daily, Starting Thu 11/14/2024, Normal      spironolactone (ALDACTONE) 25 mg tablet Take 1 tablet (25 mg total) by mouth daily, Starting Tue 12/17/2024, Normal      triamcinolone (KENALOG) 0.1 % cream Apply topically 2 (two) times a day for 7 days, Starting Wed 7/12/2023, Until Tue 7/30/2024, Normal      !! warfarin (Coumadin) 1 mg tablet As directed., Normal      !! warfarin (COUMADIN) 3 mg tablet TAKE ONE TABLET BY MOUTH EVERY DAY. DO NOT TAKE ON MONDAYS AND THURSDAYS, Normal      !! warfarin (COUMADIN) 4 mg tablet Take by mouth daily Every day except Thursday, Historical Med       !! - Potential duplicate medications found. Please discuss with provider.        No discharge procedures on file.  ED SEPSIS DOCUMENTATION   Time reflects when diagnosis was documented in both MDM as applicable and the Disposition within this note       Time User Action Codes Description Comment    2/1/2025  2:14 PM Ramon Devine [M79.673] Foot pain     2/1/2025  2:14 PM Sybil  Ramon Cedeno [S90.32XA] Contusion of left foot, initial encounter                  Ramon Devine DO  02/01/25 1515

## 2025-02-03 NOTE — TELEPHONE ENCOUNTER
Called patient and left a voicemail to schedule her 3 month follow up with Dr. Stapleton 5/20 11 am.   Asked if she could call us to verify if this works for her or if we need to reschedule.

## 2025-02-04 ENCOUNTER — APPOINTMENT (OUTPATIENT)
Dept: LAB | Age: 60
End: 2025-02-04
Payer: COMMERCIAL

## 2025-02-04 ENCOUNTER — HOSPITAL ENCOUNTER (OUTPATIENT)
Dept: RADIOLOGY | Age: 60
Discharge: HOME | End: 2025-02-04
Payer: COMMERCIAL

## 2025-02-04 DIAGNOSIS — N18.2 ACUTE KIDNEY INJURY SUPERIMPOSED ON STAGE 2 CHRONIC KIDNEY DISEASE (HCC): ICD-10-CM

## 2025-02-04 DIAGNOSIS — N17.9 ACUTE KIDNEY INJURY SUPERIMPOSED ON STAGE 2 CHRONIC KIDNEY DISEASE (HCC): ICD-10-CM

## 2025-02-04 DIAGNOSIS — I82.5Y3 CHRONIC DEEP VEIN THROMBOSIS (DVT) OF PROXIMAL VEIN OF BOTH LOWER EXTREMITIES (HCC): ICD-10-CM

## 2025-02-04 DIAGNOSIS — M79.89 SWELLING OF LEFT FOOT: ICD-10-CM

## 2025-02-04 DIAGNOSIS — R80.8 OTHER PROTEINURIA: ICD-10-CM

## 2025-02-04 LAB
ALBUMIN SERPL BCG-MCNC: 3.8 G/DL (ref 3.5–5)
ALP SERPL-CCNC: 88 U/L (ref 34–104)
ALT SERPL W P-5'-P-CCNC: 28 U/L (ref 7–52)
AMORPH URATE CRY URNS QL MICRO: ABNORMAL
ANION GAP SERPL CALCULATED.3IONS-SCNC: 8 MMOL/L (ref 4–13)
AST SERPL W P-5'-P-CCNC: 26 U/L (ref 13–39)
BACTERIA UR QL AUTO: ABNORMAL /HPF
BILIRUB SERPL-MCNC: 0.47 MG/DL (ref 0.2–1)
BILIRUB UR QL STRIP: NEGATIVE
BUN SERPL-MCNC: 28 MG/DL (ref 5–25)
CALCIUM SERPL-MCNC: 9.7 MG/DL (ref 8.4–10.2)
CHLORIDE SERPL-SCNC: 104 MMOL/L (ref 96–108)
CLARITY UR: CLEAR
CO2 SERPL-SCNC: 24 MMOL/L (ref 21–32)
COLOR UR: ABNORMAL
CREAT SERPL-MCNC: 1.32 MG/DL (ref 0.6–1.3)
CREAT UR-MCNC: 97.6 MG/DL
CREAT UR-MCNC: 97.6 MG/DL
GFR SERPL CREATININE-BSD FRML MDRD: 44 ML/MIN/1.73SQ M
GLUCOSE P FAST SERPL-MCNC: 88 MG/DL (ref 65–99)
GLUCOSE UR STRIP-MCNC: NEGATIVE MG/DL
HGB UR QL STRIP.AUTO: NEGATIVE
INR PPP: 2.99 (ref 0.85–1.19)
KETONES UR STRIP-MCNC: NEGATIVE MG/DL
LEUKOCYTE ESTERASE UR QL STRIP: ABNORMAL
MICROALBUMIN UR-MCNC: 416.9 MG/L
MICROALBUMIN/CREAT 24H UR: 427 MG/G CREATININE (ref 0–30)
MUCOUS THREADS UR QL AUTO: ABNORMAL
NITRITE UR QL STRIP: NEGATIVE
NON-SQ EPI CELLS URNS QL MICRO: ABNORMAL /HPF
PH UR STRIP.AUTO: 6.5 [PH]
POTASSIUM SERPL-SCNC: 4.2 MMOL/L (ref 3.5–5.3)
PROT SERPL-MCNC: 7.1 G/DL (ref 6.4–8.4)
PROT UR STRIP-MCNC: ABNORMAL MG/DL
PROT UR-MCNC: 69.8 MG/DL
PROT/CREAT UR: 0.7 MG/G{CREAT} (ref 0–0.1)
PROTHROMBIN TIME: 30.8 SECONDS (ref 12.3–15)
RBC #/AREA URNS AUTO: ABNORMAL /HPF
SODIUM SERPL-SCNC: 136 MMOL/L (ref 135–147)
SP GR UR STRIP.AUTO: 1.01 (ref 1–1.03)
UROBILINOGEN UR STRIP-ACNC: <2 MG/DL
WBC #/AREA URNS AUTO: ABNORMAL /HPF

## 2025-02-04 PROCEDURE — 82570 ASSAY OF URINE CREATININE: CPT

## 2025-02-04 PROCEDURE — 80053 COMPREHEN METABOLIC PANEL: CPT

## 2025-02-04 PROCEDURE — 81001 URINALYSIS AUTO W/SCOPE: CPT

## 2025-02-04 PROCEDURE — 85610 PROTHROMBIN TIME: CPT

## 2025-02-04 PROCEDURE — 36415 COLL VENOUS BLD VENIPUNCTURE: CPT

## 2025-02-04 PROCEDURE — 76770 US EXAM ABDO BACK WALL COMP: CPT

## 2025-02-04 PROCEDURE — 84550 ASSAY OF BLOOD/URIC ACID: CPT

## 2025-02-04 PROCEDURE — 82043 UR ALBUMIN QUANTITATIVE: CPT

## 2025-02-04 PROCEDURE — 84156 ASSAY OF PROTEIN URINE: CPT

## 2025-02-04 PROCEDURE — 86140 C-REACTIVE PROTEIN: CPT

## 2025-02-05 ENCOUNTER — RESULTS FOLLOW-UP (OUTPATIENT)
Dept: NEPHROLOGY | Facility: HOSPITAL | Age: 60
End: 2025-02-05

## 2025-02-05 ENCOUNTER — OFFICE VISIT (OUTPATIENT)
Dept: INTERNAL MEDICINE CLINIC | Facility: CLINIC | Age: 60
End: 2025-02-05
Payer: COMMERCIAL

## 2025-02-05 ENCOUNTER — TELEPHONE (OUTPATIENT)
Dept: INTERNAL MEDICINE CLINIC | Facility: CLINIC | Age: 60
End: 2025-02-05

## 2025-02-05 VITALS
OXYGEN SATURATION: 97 % | TEMPERATURE: 97 F | HEART RATE: 65 BPM | SYSTOLIC BLOOD PRESSURE: 120 MMHG | DIASTOLIC BLOOD PRESSURE: 80 MMHG

## 2025-02-05 DIAGNOSIS — M79.89 SWELLING OF LEFT FOOT: Primary | ICD-10-CM

## 2025-02-05 DIAGNOSIS — N17.9 ACUTE KIDNEY INJURY SUPERIMPOSED ON STAGE 2 CHRONIC KIDNEY DISEASE (HCC): ICD-10-CM

## 2025-02-05 DIAGNOSIS — M32.8 OTHER FORMS OF SYSTEMIC LUPUS ERYTHEMATOSUS, UNSPECIFIED ORGAN INVOLVEMENT STATUS (HCC): ICD-10-CM

## 2025-02-05 DIAGNOSIS — N18.2 ACUTE KIDNEY INJURY SUPERIMPOSED ON STAGE 2 CHRONIC KIDNEY DISEASE (HCC): ICD-10-CM

## 2025-02-05 LAB
CRP SERPL QL: 14.1 MG/L
URATE SERPL-MCNC: 7.9 MG/DL (ref 2–7.5)

## 2025-02-05 PROCEDURE — 99214 OFFICE O/P EST MOD 30 MIN: CPT | Performed by: INTERNAL MEDICINE

## 2025-02-05 RX ORDER — TRAMADOL HYDROCHLORIDE 50 MG/1
50 TABLET ORAL EVERY 6 HOURS PRN
Qty: 30 TABLET | Refills: 0 | Status: SHIPPED | OUTPATIENT
Start: 2025-02-05

## 2025-02-05 NOTE — PROGRESS NOTES
Name: Rina Taylor      : 1965      MRN: [id number]  Encounter Provider: Lea Reyes, MD  Encounter Date: 2025   Enc[facility]: [facility]    Assessment & Plan  Swelling of left foot  Acute pain and swelling in the left foot with no known injuries in the patient who is on warfarin with therapeutic INR  Gout versus occult fracture versus lupus flare versus PAD  X-ray showing atherosclerosis so we will obtain vascular consultation and arterial Doppler  Will try to add uric acid and sed rate CRP to labs drawn yesterday  She cannot take NSAIDs due to taking warfarin and ADA with CKD  Minimal relief from Tylenol so provided tramadol to take for severe pain  She has a rheumatologist, Dr. Tellez and if CRP sed rate and uric acid are high we will likely start steroids with guidance from Dr. Tellez  Orders:  •  Ambulatory Referral to Vascular Surgery; Future  •  VAS ARTERIAL DUPLEX- LOWER LIMB BILATERAL; Future  •  Uric acid; Future  •  Sedimentation rate, automated; Future  •  C-reactive protein; Future  •  traMADol (Ultram) 50 mg tablet; Take 1 tablet (50 mg total) by mouth every 6 (six) hours as needed for moderate pain or severe pain  •  CBC    Acute kidney injury superimposed on stage 2 chronic kidney disease (HCC)  Lab Results   Component Value Date    EGFR 44 2025    EGFR 41 2025    EGFR 45 2024    CREATININE 1.32 (H) 2025    CREATININE 1.40 (H) 2025    CREATININE 1.31 (H) 2024          Other forms of systemic lupus erythematosus, unspecified organ involvement status (HCC)              History of Present Illness     Started with left foot pain 6 days ago  She did not wake up with it but it developed throughout the day progressively got worse.  There was nothing traumatic occurring to the foot  She has chronic intermittent mild swelling in the left foot, no history of gout  She went to the emergency room where x-ray showed swelling on the top  of the foot and severe atherosclerotic disease  Minimal improvement of the swelling and pain since  Only taking Tylenol for the pain.  She is on warfarin for antiphospholipid antibody syndrome and INR is therapeutic  She has SLE and sees nephrology.  Most recent creatinine slightly elevated  Denies fever chills  There is mild dark discoloration on the top of the left foot.  Denies that it feels cold or cool to touch  Painful putting weight on the foot        Review of Systems   Constitutional:  Negative for chills and fever.   Respiratory:  Negative for shortness of breath.    Cardiovascular:  Negative for chest pain and palpitations.   Gastrointestinal:  Negative for abdominal pain and constipation.   Genitourinary:  Negative for difficulty urinating.     Past Medical History:   Diagnosis Date   • Acute blood loss anemia 02/09/2023   • Acute cholecystitis 02/03/2023   • ADA (acute kidney injury) ([facility]) 02/09/2023   • Anemia     Not sure   • Anti-phospholipid antibody syndrome (HCC)    • Cancer ([facility])     thyroid   • Cellulitis of right index finger 03/04/2021   • Chronic kidney disease    • Clotting disorder (HCC)     Lung embolism,  ITP   • Diabetes mellitus ([facility])    • Disease of thyroid gland    • Edema 12/17/2013   • Elevated serum creatinine 02/28/2023   • Fibroid 1998   • Gestational diabetes    • Gross hematuria 12/12/2016   • History of chemotherapy     late 1980s for lupus   • Hordeolum externum of left lower eyelid 05/11/2022   • Hyperparathyroidism ([facility])    • Hypertension    • Hypotension due to hypovolemia 02/09/2023   • Idiopathic thrombocytopenic purpura (ITP) ([facility])    • Kidney stone    • Left ear pain 03/23/2020   • Left foot pain 07/11/2022   • Left foot pain 07/11/2022   • Lupus    • Miscarriage 1996    Lost twin girls 10-   • Osteoarthritis    • Pulmonary embolism ([facility]) 1987   • Rash 03/11/2020   • Sjogren's syndrome ([facility])    • Upper respiratory tract infection 11/17/2019   • Vitamin D  deficiency      Past Surgical History:   Procedure Laterality Date   •  SECTION         • CHOLECYSTECTOMY  23   • CHOLECYSTECTOMY LAPAROSCOPIC N/A 2023    Procedure: LAPAROSCOPIC CHOLECYSTECTOMY;  Surgeon: Guille Diaz MD;  [facility];  Service: General   • DIAGNOSTIC FLEXIBLE LARYNGOSCOPY N/A 2021    Procedure: DIAGNOSTIC FLEXIBLE LARYNGOSCOPY;  Surgeon: Jairo Christensne MD;  [facility];  Service: Surgical Oncology   • HERNIA REPAIR     • HIP SURGERY      left side, bone decompression   • HYSTEROSCOPY     • IR BIOPSY KIDNEY RANDOM  10/3/2023   • NM HYSTEROSCOPY BX ENDOMETRIUM&/POLYPC W/WO D&C N/A 2023    Procedure: DILATATION AND CURETTAGE (D&C) WITH HYSTEROSCOPY;  Surgeon: Lauren Chase MD;  [facility];  Service: Gynecology   • NM PARATHYROIDECTOMY/EXPLORATION PARATHYROIDS Right 2021    Procedure: PARATHYROIDECTOMY, MINIMALLY INVASIVE, right, intraoperative PTH monitoring;  Surgeon: Jairo Christensen MD;  [facility];  Service: Surgical Oncology   • RENAL BIOPSY      Not sure   • THYROID SURGERY     • TUBAL LIGATION       Family History   Problem Relation Age of Onset   • No Known Problems Mother    • Diabetes type II Father    • Diabetes Father    • Stroke Father    • Diabetes type II Paternal Aunt    • Diabetes type II Paternal Uncle    • Diabetes type II Paternal Grandmother    • Stomach cancer Paternal Grandfather    • No Known Problems Sister    • No Known Problems Maternal Grandmother    • No Known Problems Maternal Grandfather    • No Known Problems Sister    • No Known Problems Maternal Aunt    • No Known Problems Maternal Aunt    • No Known Problems Maternal Aunt    • No Known Problems Paternal Aunt    • No Known Problems Paternal Aunt      Social History     Tobacco Use   • Smoking status: Never     Passive exposure: Never   • Smokeless tobacco: Never   Vaping Use   • Vaping status: Never Used   Substance and  Sexual Activity   • Alcohol use: Not Currently     Comment: Twice a year   • Drug use: No   • Sexual activity: Yes     Partners: Male     Birth control/protection: None     Current Outpatient Medications on File Prior to Visit   Medication Sig   • Cholecalciferol 25 MCG (1000 UT) TBDP Take 1,000 Units by mouth in the morning   • ciclopirox (LOPROX) 0.77 % cream Apply topically 2 (two) times a day   • folic acid (FOLVITE) 1 mg tablet Take 1 tablet by mouth daily   • fosinopril (MONOPRIL) 20 mg tablet Take 1 tablet (20 mg total) by mouth 2 (two) times a day   • hydrocortisone 2.5 % cream    • hydroxychloroquine (PLAQUENIL) 200 mg tablet Take 1 tablet (200 mg total) by mouth 2 (two) times a day with meals   • levothyroxine 125 mcg tablet Take one tablet daily on empty stomach 1 hour before breakfast and 4 hours apart from calcium and vitamin D supplementation   • NIFEdipine (PROCARDIA XL) 30 mg 24 hr tablet Take 1 tablet (30 mg total) by mouth daily   • spironolactone (ALDACTONE) 25 mg tablet Take 1 tablet (25 mg total) by mouth daily   • triamcinolone (KENALOG) 0.1 % cream Apply topically 2 (two) times a day for 7 days (Patient not taking: Reported on 7/30/2024)   • warfarin (Coumadin) 1 mg tablet As directed.   • warfarin (COUMADIN) 3 mg tablet TAKE ONE TABLET BY MOUTH EVERY DAY. DO NOT TAKE ON MONDAYS AND THURSDAYS   • warfarin (COUMADIN) 4 mg tablet Take by mouth daily Every day except Thursday     Allergies   Allergen Reactions   • Penicillins Itching     Immunization History   Administered Date(s) Administered   • COVID-19 MODERNA VACC 0.5 ML IM 01/04/2021, 02/06/2021, 03/07/2022   • Hep B, adult 09/03/2019, 12/03/2019, 03/05/2020   • INFLUENZA 10/30/2019, 10/26/2020   • Tdap 08/27/2019   • Tuberculin Skin Test-PPD Intradermal 08/27/2019     Objective   /80   Pulse 65   Temp (!) 97 °F (36.1 °C) (Tympanic)   SpO2 97%     Physical Exam  Constitutional:       Appearance: She is not ill-appearing.    Cardiovascular:      Rate and Rhythm: Regular rhythm.      Heart sounds: Normal heart sounds.      Comments: Pulses in the left foot difficult to palpate due to swelling  There is good capillary refill in the toes  The foot is warm to touch  Pulmonary:      Breath sounds: Normal breath sounds.   Musculoskeletal:      Right lower leg: Edema present.      Left lower leg: Edema present.      Left ankle: Swelling (Dorsum of the foot and outer ankle) present. No deformity, ecchymosis or lacerations. Tenderness present. Decreased range of motion.

## 2025-02-05 NOTE — LETTER
February 5, 2025     Patient: Rina Taylor  YOB: 1965  Date of Visit: 2/5/2025      To Whom it May Concern:    Rina Taylor is under my professional care. Rina was seen in my office on 2/5/2025.  Please allow the patient to work seated for the rest of the week starting 2/5-2/7/2025.  Diagnosis is left foot pain and swelling, workup is ongoing.    If you have any questions or concerns, please don't hesitate to call.         Sincerely,          Lea Reyes, MD        CC: No Recipients

## 2025-02-05 NOTE — TELEPHONE ENCOUNTER
Please call the patient that the lab can only add 2 out of the 4 things I wanted to check so unfortunately she will need to go back there to get blood work

## 2025-02-05 NOTE — TELEPHONE ENCOUNTER
Patient called in stating she is okay with going back to the lab and asked if the orders can be placed.    Please advise, thank you

## 2025-02-05 NOTE — TELEPHONE ENCOUNTER
Please call the lab if they are able to add a sed rate, CRP, uric acid, CBC to the blood work she had yesterday.  She had her CMP and INR drawn.

## 2025-02-05 NOTE — ASSESSMENT & PLAN NOTE
Lab Results   Component Value Date    EGFR 44 02/04/2025    EGFR 41 01/29/2025    EGFR 45 11/30/2024    CREATININE 1.32 (H) 02/04/2025    CREATININE 1.40 (H) 01/29/2025    CREATININE 1.31 (H) 11/30/2024

## 2025-02-06 ENCOUNTER — RESULTS FOLLOW-UP (OUTPATIENT)
Dept: INTERNAL MEDICINE CLINIC | Facility: CLINIC | Age: 60
End: 2025-02-06

## 2025-02-07 ENCOUNTER — APPOINTMENT (OUTPATIENT)
Dept: LAB | Facility: HOSPITAL | Age: 60
End: 2025-02-07
Payer: COMMERCIAL

## 2025-02-07 DIAGNOSIS — M79.89 SWELLING OF LEFT FOOT: ICD-10-CM

## 2025-02-07 LAB
ERYTHROCYTE [DISTWIDTH] IN BLOOD BY AUTOMATED COUNT: 12.2 % (ref 11.6–15.1)
ERYTHROCYTE [SEDIMENTATION RATE] IN BLOOD: 28 MM/HOUR (ref 0–29)
HCT VFR BLD AUTO: 36.4 % (ref 34.8–46.1)
HGB BLD-MCNC: 12.5 G/DL (ref 11.5–15.4)
MCH RBC QN AUTO: 29.9 PG (ref 26.8–34.3)
MCHC RBC AUTO-ENTMCNC: 34.3 G/DL (ref 31.4–37.4)
MCV RBC AUTO: 87 FL (ref 82–98)
PLATELET # BLD AUTO: 203 THOUSANDS/UL (ref 149–390)
PMV BLD AUTO: 10.5 FL (ref 8.9–12.7)
RBC # BLD AUTO: 4.18 MILLION/UL (ref 3.81–5.12)
WBC # BLD AUTO: 6.79 THOUSAND/UL (ref 4.31–10.16)

## 2025-02-07 PROCEDURE — 36415 COLL VENOUS BLD VENIPUNCTURE: CPT | Performed by: INTERNAL MEDICINE

## 2025-02-07 PROCEDURE — 85652 RBC SED RATE AUTOMATED: CPT

## 2025-02-07 PROCEDURE — 85027 COMPLETE CBC AUTOMATED: CPT | Performed by: INTERNAL MEDICINE

## 2025-02-19 ENCOUNTER — HOSPITAL ENCOUNTER (OUTPATIENT)
Dept: NON INVASIVE DIAGNOSTICS | Facility: HOSPITAL | Age: 60
Discharge: HOME | End: 2025-02-19
Payer: COMMERCIAL

## 2025-02-19 DIAGNOSIS — M79.89 SWELLING OF LEFT FOOT: ICD-10-CM

## 2025-02-19 PROCEDURE — 93925 LOWER EXTREMITY STUDY: CPT

## 2025-02-19 PROCEDURE — 93923 UPR/LXTR ART STDY 3+ LVLS: CPT

## 2025-02-20 PROCEDURE — 93925 LOWER EXTREMITY STUDY: CPT | Performed by: SURGERY

## 2025-02-20 PROCEDURE — 93922 UPR/L XTREMITY ART 2 LEVELS: CPT | Performed by: SURGERY

## 2025-02-25 ENCOUNTER — OFFICE VISIT (OUTPATIENT)
Age: 60
End: 2025-02-25
Payer: COMMERCIAL

## 2025-02-25 ENCOUNTER — TELEPHONE (OUTPATIENT)
Age: 60
End: 2025-02-25

## 2025-02-25 VITALS
TEMPERATURE: 97 F | BODY MASS INDEX: 38.06 KG/M2 | SYSTOLIC BLOOD PRESSURE: 126 MMHG | WEIGHT: 201.6 LBS | HEIGHT: 61 IN | DIASTOLIC BLOOD PRESSURE: 74 MMHG | HEART RATE: 52 BPM | OXYGEN SATURATION: 99 %

## 2025-02-25 DIAGNOSIS — Z79.899 LONG-TERM USE OF PLAQUENIL: ICD-10-CM

## 2025-02-25 DIAGNOSIS — M32.14 OTHER SYSTEMIC LUPUS ERYTHEMATOSUS WITH GLOMERULAR DISEASE (HCC): Primary | ICD-10-CM

## 2025-02-25 DIAGNOSIS — Z86.2 HISTORY OF ITP: ICD-10-CM

## 2025-02-25 DIAGNOSIS — M35.00 SJOGREN'S SYNDROME WITHOUT EXTRAGLANDULAR INVOLVEMENT (HCC): ICD-10-CM

## 2025-02-25 DIAGNOSIS — E21.0 PRIMARY HYPERPARATHYROIDISM (HCC): ICD-10-CM

## 2025-02-25 DIAGNOSIS — N20.0 NEPHROLITHIASIS: ICD-10-CM

## 2025-02-25 DIAGNOSIS — D68.61 ANTI-PHOSPHOLIPID SYNDROME (HCC): ICD-10-CM

## 2025-02-25 DIAGNOSIS — E83.52 HYPERCALCEMIA: ICD-10-CM

## 2025-02-25 DIAGNOSIS — M15.0 PRIMARY GENERALIZED (OSTEO)ARTHRITIS: ICD-10-CM

## 2025-02-25 DIAGNOSIS — D69.6 THROMBOCYTOPENIA (HCC): ICD-10-CM

## 2025-02-25 PROCEDURE — 99214 OFFICE O/P EST MOD 30 MIN: CPT | Performed by: INTERNAL MEDICINE

## 2025-02-25 NOTE — PROGRESS NOTES
Assessment/Plan:    Systemic lupus erythematosus (HCC)  History of biopsy positive lupus nephritis (1996) with history of fetal loss, antiphospholipid antibody syndrome, and proteinuria.  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  She continues Plaquenil which she restarted in the fall 2023.  Her dose was recently decreased as she had a supratherapeutic dose on Plaquenil Avise testing.  She is currently taking 400 mg on Mondays, Wednesdays, and Fridays, and 200 mg all other days.  Avise SLE monitor test dated 8/19/2024 revealed negative markers for activity with the exception of mildly positive erythrocyte bound C4d.  Complement levels within normal limits and double-stranded DNA antibody was negative.  Long discussion with patient regarding Benlysta including risk-benefit profile, indication and administration, which would be prudent in view of her long history of lupus with evidence of membranous glomerulonephritis with mild activity and mild chronicity and mild proteinuria.  Will submit for prior authorization.  Will get updated QuantiFERON gold prior to initiation of Benlysta.  Patient does have literature regarding this and is willing to proceed.  We will continue to monitor symptoms and labs closely.  Will recheck lupus Avise monitor testing and repeat Plaquenil level prior to her next office visit.  Continue following with eye doctor regularly to monitor for Plaquenil toxicity.  Follow-up with nephrologist as scheduled.  Follow-up 6 months or sooner if needed.    SmartLink not supported outside of the Encounter Diagnoses SmartSection.        Sjogren's syndrome without extraglandular involvement (HCC)  Sicca symptoms treated symptomatically.  Encourage vigilant dental hygiene and follow-up with dentist every 6 months or sooner if needed.   Continue to monitor.    Anti-phospholipid syndrome (HCC)  History of positive APL with history of pulmonary embolism.  Continue Coumadin.  Follow-up with hematology as scheduled.        History of ITP  Remote diagnosis ITP treated with steroids.  History positive phospholipid antibody syndrome on chronic Coumadin.  Most recent platelet count dated 2/7/2025 was 203..  Continue to monitor.    Hypercalcemia  History of hypercalcemia with recent calciums normal.  History of elevated PTH most recently 131.9 on 11/30/2024 with vitamin D significantly low at 18.9 which may reflect secondary hyperparathyroidism.  She has had chronically elevated PTH which may reflect retained parathyroid tissue but patient has not been eager for further neck exploration.  She will not take any calcium supplementation.  Follow-up endocrinology.    Primary generalized (osteo)arthritis  Recent left foot pain with x-ray negative for arthritic changes.  There was significant atherosclerotic disease noted.  Suspect her foot pain is secondary to mechanical foot deformities.  I have suggested foot solutions for evaluation of shoe gear.  I suspect her foot symptoms would improve with appropriately fitted shoes.    Primary hyperparathyroidism (HCC)  Elevated PTH on lab work dated 11/30/2024 at 131.9 however vitamin D low at 18.9.  Prior history of elevated PTH with prior hypercalcemia resolved.  Most recent serum calcium on 2/4/2025 normal at 9.7.  Rule out primary versus secondary hyperparathyroidism.  Follow-up endocrinology.  Continue to monitor.    Nephrolithiasis  History of left kidney stone on recent renal ultrasound midpole of 8 mm.  Nephrology referred patient for Litholink.  Follow-up nephrology and urology.  Follow-up endocrinology in view of history of elevated PTH.  Continue to monitor.         Problem List Items Addressed This Visit     History of ITP    Remote diagnosis ITP treated with steroids.  History positive phospholipid antibody  syndrome on chronic Coumadin.  Most recent platelet count dated 2/7/2025 was 203..  Continue to monitor.         Hypercalcemia    History of hypercalcemia with recent calciums normal.  History of elevated PTH most recently 131.9 on 11/30/2024 with vitamin D significantly low at 18.9 which may reflect secondary hyperparathyroidism.  She has had chronically elevated PTH which may reflect retained parathyroid tissue but patient has not been eager for further neck exploration.  She will not take any calcium supplementation.  Follow-up endocrinology.         Primary hyperparathyroidism (HCC)    Elevated PTH on lab work dated 11/30/2024 at 131.9 however vitamin D low at 18.9.  Prior history of elevated PTH with prior hypercalcemia resolved.  Most recent serum calcium on 2/4/2025 normal at 9.7.  Rule out primary versus secondary hyperparathyroidism.  Follow-up endocrinology.  Continue to monitor.         Nephrolithiasis    History of left kidney stone on recent renal ultrasound midpole of 8 mm.  Nephrology referred patient for Litholink.  Follow-up nephrology and urology.  Follow-up endocrinology in view of history of elevated PTH.  Continue to monitor.         Systemic lupus erythematosus (HCC) - Primary    History of biopsy positive lupus nephritis (1996) with history of fetal loss, antiphospholipid antibody syndrome, and proteinuria.  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  She continues Plaquenil which she restarted in the fall 2023.  Her dose was recently decreased as she had a supratherapeutic dose on Plaquenil [facility]ise testing.  She is currently taking 400 mg on Mondays, Wednesdays, and Fridays, and 200 mg all other days.  Avise SLE monitor test dated 8/19/2024 revealed negative markers for activity with the exception of mildly positive erythrocyte  bound C4d.  Complement levels within normal limits and double-stranded DNA antibody was negative.  Long discussion with patient regarding Benlysta including risk-benefit profile, indication and administration, which would be prudent in view of her long history of lupus with evidence of membranous glomerulonephritis with mild activity and mild chronicity and mild proteinuria.  Will submit for prior authorization.  Will get updated QuantiFERON gold prior to initiation of Benlysta.  Patient does have literature regarding this and is willing to proceed.  We will continue to monitor symptoms and labs closely.  Will recheck lupus Avise monitor testing and repeat Plaquenil level prior to her next office visit.  Continue following with eye doctor regularly to monitor for Plaquenil toxicity.  Follow-up with nephrologist as scheduled.  Follow-up 6 months or sooner if needed.    SmartLink not supported outside of the Encounter Diagnoses SmartSection.             Relevant Orders    MISCELLANEOUS LAB TEST    C-reactive protein    CBC and differential    Comprehensive metabolic panel    dsDNA Antibody by IFA, Scotty lucbandar, with Reflex to Titer    C3 complement    C4 complement    Urinalysis with microscopic    Quantiferon TB Gold Plus Assay (Completed)    Anti-phospholipid syndrome (HCC)    History of positive APL with history of pulmonary embolism.  Continue Coumadin.  Follow-up with hematology as scheduled.             Sjogren's syndrome without extraglandular involvement (HCC)    Sicca symptoms treated symptomatically.  Encourage vigilant dental hygiene and follow-up with dentist every 6 months or sooner if needed.  Continue to monitor.         Primary generalized (osteo)arthritis    Recent left foot pain with x-ray negative for arthritic changes.  There was significant atherosclerotic disease noted.  Suspect her foot pain is secondary to mechanical foot deformities.  I have suggested foot solutions for evaluation of  shoe gear.  I suspect her foot symptoms would improve with appropriately fitted shoes.        Other Visit Diagnoses       Thrombocytopenia (HCC)          Long-term use of Plaquenil        Relevant Orders    MISCELLANEOUS LAB TEST (Completed)                Reviewed records, labs, and imaging with the patient in detail.  Counseled patient.  Discussion regarding my findings and recommendations.  Office visit with documentation 35 min.    Subjective:      Patient ID: Rina Taylor is a 59 y.o. female.    Patient presents for follow-up of her lupus with biopsy-positive lupus nephritis diagnosed in 1996 with history of fetal loss, antiphospholipid antibody syndrome and proteinuria.  She had a lupus Avise test on 9/11/2023.  This revealed a positive lupus index along with complement activation consistent with active disease.  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  Patient did restart Plaquenil 200 mg twice daily but due to elevated Plaquenil level of 1758.9 in August 2, 2024, she decreased her Plaquenil to 400 mg every Monday, Wednesday, and Friday and 200 mg every Tuesday, Thursday, Saturday, and Sunday.  We had discussed Benlysta infusions in view of her kidney disease. She has had significant fatigue lately.  She has no fevers or rashes.  She was seen in the emergency room for left foot pain with no etiology found.  She has noted some swelling in the left foot.  Morning stiffness is 10 minutes duration.  She does use a cane for ambulation assistance.  She has questionable current mouth sore which is not painful.  She has occasional loose stools.  She notes occasional numbness in both feet with prolonged standing.  She does have occasional lower back discomfort however she does have a history of lumbar spondylosis.  She states that her back pain improves after a bowel movement.    She continues on Coumadin for  her antiphospholipid syndrome with history of pulmonary embolus.  She has a history of primary hyperparathyroidism status post thyroidectomy with postoperative hypercalcemia.  She follows with endocrinology.  She did have a DEXA scan done in March 2021 which revealed normal bone density.  She has a history of acquired hypothyroidism secondary to history of thyroid cancer status post thyroidectomy.  She was treated with radioactive iodine as well.    She has a history of postmenopausal bleeding and is following with gynecology.  She did recently have a biopsy done which was benign per patient.     Lab work dated 2/7/2025 significant for sed rate 28.  CBC unremarkable with platelet count 203.  Lab work dated 2/4/2025 significant for CRP elevated at 14.1.  Uric acid 7.9.  Creatinine 1.22 with estimated GFR 44.  Calcium 9.7.  INR 2.99.  Protein to creatinine ratio urine 0.70.  Albumin to creatinine urine ratio 427.  Urinalysis 2+ proteinuria with 2-4 WBCs per high-power field with no symptoms.  Lupus Avise monitor testing dated 8/19/2024 significant for slightly positive complement activation by EC4d of 16.  C3 and C4 complements normal.  Double-stranded DNA antibodies negative.  C1q negative.  Hydroxychloroquine level elevated at 1758.9 which prompted a decrease in her dosing.  She had labs done on 10/18/2023.  QuantiFERON gold negative.  Hepatitis panel negative.  Creatinine 0.83, GFR 79.  Lab work dated 11/30/2024 significant for vitamin D low at 18.9 with PTH high at 131.9.        Allergies  Allergies   Allergen Reactions   • Penicillins Itching       Home Medications    Current Outpatient Medications:   •  Cholecalciferol 25 MCG (1000 UT) TBDP, Take 1,000 Units by mouth in the morning, Disp: , Rfl:   •  ciclopirox (LOPROX) 0.77 % cream, Apply topically 2 (two) times a day, Disp: , Rfl:   •  folic acid (FOLVITE) 1 mg tablet, Take 1 tablet by mouth daily, Disp: , Rfl:   •  fosinopril (MONOPRIL) 20 mg tablet, Take 1  tablet (20 mg total) by mouth 2 (two) times a day, Disp: 180 tablet, Rfl: 0  •  hydrocortisone 2.5 % cream, , Disp: , Rfl:   •  hydroxychloroquine (PLAQUENIL) 200 mg tablet, Take 1 tablet (200 mg total) by mouth 2 (two) times a day with meals (Patient taking differently: Take 200 mg by mouth 2 (two) times a day with meals 400 mg every Monday, Wednesday, Friday with 200 mg every Tuesday, Thursday, Saturday, and Sunday), Disp: 180 tablet, Rfl: 1  •  levothyroxine 125 mcg tablet, Take one tablet daily on empty stomach 1 hour before breakfast and 4 hours apart from calcium and vitamin D supplementation, Disp: 90 tablet, Rfl: 1  •  NIFEdipine (PROCARDIA XL) 30 mg 24 hr tablet, Take 1 tablet (30 mg total) by mouth daily, Disp: 90 tablet, Rfl: 1  •  spironolactone (ALDACTONE) 25 mg tablet, Take 1 tablet (25 mg total) by mouth daily, Disp: 90 tablet, Rfl: 1  •  traMADol (Ultram) 50 mg tablet, Take 1 tablet (50 mg total) by mouth every 6 (six) hours as needed for moderate pain or severe pain, Disp: 30 tablet, Rfl: 0  •  warfarin (Coumadin) 1 mg tablet, As directed., Disp: 90 tablet, Rfl: 2  •  warfarin (COUMADIN) 3 mg tablet, TAKE ONE TABLET BY MOUTH EVERY DAY. DO NOT TAKE ON MONDAYS AND THURSDAYS, Disp: 70 tablet, Rfl: 0  •  warfarin (COUMADIN) 4 mg tablet, Take by mouth daily Every day except Thursday, Disp: , Rfl:   •  triamcinolone (KENALOG) 0.1 % cream, Apply topically 2 (two) times a day for 7 days (Patient not taking: Reported on 7/30/2024), Disp: 80 g, Rfl: 0    Past Medical History  Past Medical History:   Diagnosis Date   • Acute blood loss anemia 02/09/2023   • Acute cholecystitis 02/03/2023   • ADA (acute kidney injury) (HCC) 02/09/2023   • Anemia     Not sure   • Anti-phospholipid antibody syndrome (HCC)    • Cancer (HCC)     thyroid   • Cellulitis of right index finger 03/04/2021   • Chronic kidney disease    • Clotting disorder (HCC)     Lung embolism,  ITP   • Diabetes mellitus (HCC)    • Disease of thyroid  gland    • Edema 2013   • Elevated serum creatinine 2023   • Fibroid    • Gestational diabetes    • Gross hematuria 2016   • History of chemotherapy     late  for lupus   • Hordeolum externum of left lower eyelid 2022   • Hyperparathyroidism (HCC)    • Hypertension    • Hypotension due to hypovolemia 2023   • Idiopathic thrombocytopenic purpura (ITP) (HCC)    • Kidney stone    • Left ear pain 2020   • Left foot pain 2022   • Left foot pain 2022   • Lupus    • Miscarriage     Lost twin girls 10-   • Osteoarthritis    • Pulmonary embolism (HCC)    • Rash 2020   • Sjogren's syndrome (HCC)    • Upper respiratory tract infection 2019   • Vitamin D deficiency        Past Surgical History   Past Surgical History:   Procedure Laterality Date   •  SECTION         • CHOLECYSTECTOMY  23   • CHOLECYSTECTOMY LAPAROSCOPIC N/A 2023    Procedure: LAPAROSCOPIC CHOLECYSTECTOMY;  Surgeon: Guille Diaz MD;  Location: [facility];  Service: General   • DIAGNOSTIC FLEXIBLE LARYNGOSCOPY N/A 2021    Procedure: DIAGNOSTIC FLEXIBLE LARYNGOSCOPY;  Surgeon: Jairo Christensen MD;  Location: [facility];  Service: Surgical Oncology   • HERNIA REPAIR     • HIP SURGERY      left side, bone decompression   • HYSTEROSCOPY     • IR BIOPSY KIDNEY RANDOM  10/3/2023   • HI HYSTEROSCOPY BX ENDOMETRIUM&/POLYPC W/WO D&C N/A 2023    Procedure: DILATATION AND CURETTAGE (D&C) WITH HYSTEROSCOPY;  Surgeon: Lauren Chase MD;  Location: [facility];  Service: Gynecology   • HI PARATHYROIDECTOMY/EXPLORATION PARATHYROIDS Right 2021    Procedure: PARATHYROIDECTOMY, MINIMALLY INVASIVE, right, intraoperative PTH monitoring;  Surgeon: Jairo Christensen MD;  Location: [facility];  Service: Surgical Oncology   • RENAL BIOPSY      Not sure   • THYROID SURGERY     • TUBAL LIGATION         Family History   Family History   Problem Relation  Age of Onset   • No Known Problems Mother    • Diabetes type II Father    • Diabetes Father    • Stroke Father    • Diabetes type II Paternal Aunt    • Diabetes type II Paternal Uncle    • Diabetes type II Paternal Grandmother    • Stomach cancer Paternal Grandfather    • No Known Problems Sister    • No Known Problems Maternal Grandmother    • No Known Problems Maternal Grandfather    • No Known Problems Sister    • No Known Problems Maternal Aunt    • No Known Problems Maternal Aunt    • No Known Problems Maternal Aunt    • No Known Problems Paternal Aunt    • No Known Problems Paternal Aunt        The following portions of the patient's history were reviewed and updated as appropriate: allergies, current medications, past family history, past medical history, past social history, past surgical history, and problem list.    Review of Systems   Constitutional:  Positive for fatigue. Negative for chills and fever.   HENT:  Negative for hearing loss, sore throat and tinnitus.    Eyes:  Negative for pain and visual disturbance.   Respiratory:  Negative for cough and shortness of breath.    Cardiovascular:  Negative for chest pain and palpitations.   Gastrointestinal:  Negative for abdominal pain, nausea and vomiting.        Occasional loose stools   Genitourinary:  Negative for difficulty urinating.   Musculoskeletal:  Positive for back pain (Improves after a bowel movement). Negative for arthralgias (Left foot pain with swelling seen in the emergency room.), gait problem, joint swelling, myalgias, neck pain and neck stiffness.   Skin:  Negative for rash.   Neurological:  Negative for dizziness, seizures, weakness, numbness (Occasional numbness left foot with prolonged standing) and headaches.   Psychiatric/Behavioral:  Negative for confusion, decreased concentration and sleep disturbance.          Objective:      /74 (BP Location: Left arm, Patient Position: Sitting, Cuff Size: Large)   Pulse (!) 52   Temp  "(!) 97 °F (36.1 °C) (Tympanic)   Ht 5' 0.5\" (1.537 m)   Wt 91.4 kg (201 lb 9.6 oz)   SpO2 99%   BMI 38.72 kg/m²          Physical Exam  Vitals reviewed.   Constitutional:       Appearance: Normal appearance.   HENT:      Head: Normocephalic.      Nose:      Comments: Nose and throat unremarkable.  Eyes:      Extraocular Movements: Extraocular movements intact.   Neck:      Comments: Without masses, thyromegaly, lymphadenopathy  Cardiovascular:      Rate and Rhythm: Normal rate and regular rhythm.   Pulmonary:      Breath sounds: Normal breath sounds.   Abdominal:      Palpations: Abdomen is soft.   Musculoskeletal:      Cervical back: Neck supple.      Comments: Neck slightly decreased lateral flexion.  Shoulders, elbows, wrists full range of motion.  Tinel's negative bilaterally.  Hands mild interphalangeal osteoarthritis.  No synovitis noted.  Straight leg raising negative bilaterally.  Hips full range of motion.  Knees full range of motion with patellofemoral crepitus.  Ankles decreased dorsiflexion.  Feet hyperpronation left greater than right with no synovitis.  Early pes planus deformities.    Skin:     General: Skin is warm and dry.   Neurological:      General: No focal deficit present.      Mental Status: She is alert.               This note was written in part using the assistance of the sendwithus Direct [id number] microphone system. Those portions using this system have been dictated and not read.  "

## 2025-02-25 NOTE — TELEPHONE ENCOUNTER
----- Message from Sarah Tellez MD sent at 2/25/2025 10:57 AM EST -----  Please do prior authorization for this patient for Benlysta with lupus nephritis and phospholipid antibody syndrome.  She is on Plaquenil.

## 2025-02-25 NOTE — PATIENT INSTRUCTIONS
I put an order for 2 blood test to be done tomorrow.  1 is the kit which we will give you.  The other they would just draw which is a QuantiFERON gold, the TB blood test that we get once yearly if you are going to go on a biologic medication.  We will resubmit for the Benlysta.  It is an infusion every 28 days.  It should really make a benefit for your kidneys as well as her overall lupus.  The other lab tests I put for you to get the end of June.  I will plan to see you over the latter part of the summer.

## 2025-02-26 ENCOUNTER — APPOINTMENT (OUTPATIENT)
Dept: LAB | Facility: CLINIC | Age: 60
End: 2025-02-26
Payer: COMMERCIAL

## 2025-02-26 DIAGNOSIS — I82.5Y3 CHRONIC DEEP VEIN THROMBOSIS (DVT) OF PROXIMAL VEIN OF BOTH LOWER EXTREMITIES (HCC): ICD-10-CM

## 2025-02-26 DIAGNOSIS — M32.14 OTHER SYSTEMIC LUPUS ERYTHEMATOSUS WITH GLOMERULAR DISEASE (HCC): ICD-10-CM

## 2025-02-26 LAB
INR PPP: 4.16 (ref 0.85–1.19)
PROTHROMBIN TIME: 39.5 SECONDS (ref 12.3–15)

## 2025-02-26 PROCEDURE — 36415 COLL VENOUS BLD VENIPUNCTURE: CPT

## 2025-02-26 PROCEDURE — 86480 TB TEST CELL IMMUN MEASURE: CPT

## 2025-02-26 PROCEDURE — 85610 PROTHROMBIN TIME: CPT

## 2025-02-27 LAB
GAMMA INTERFERON BACKGROUND BLD IA-ACNC: 0.04 IU/ML
M TB IFN-G BLD-IMP: NEGATIVE
M TB IFN-G CD4+ BCKGRND COR BLD-ACNC: 0.01 IU/ML
M TB IFN-G CD4+ BCKGRND COR BLD-ACNC: 0.01 IU/ML
MITOGEN IGNF BCKGRD COR BLD-ACNC: 9.96 IU/ML

## 2025-03-01 NOTE — ASSESSMENT & PLAN NOTE
Sicca symptoms treated symptomatically.  Encourage vigilant dental hygiene and follow-up with dentist every 6 months or sooner if needed.  Continue to monitor.

## 2025-03-01 NOTE — ASSESSMENT & PLAN NOTE
History of hypercalcemia with recent calciums normal.  History of elevated PTH most recently 131.9 on 11/30/2024 with vitamin D significantly low at 18.9 which may reflect secondary hyperparathyroidism.  She has had chronically elevated PTH which may reflect retained parathyroid tissue but patient has not been eager for further neck exploration.  She will not take any calcium supplementation.  Follow-up endocrinology.

## 2025-03-01 NOTE — ASSESSMENT & PLAN NOTE
Elevated PTH on lab work dated 11/30/2024 at 131.9 however vitamin D low at 18.9.  Prior history of elevated PTH with prior hypercalcemia resolved.  Most recent serum calcium on 2/4/2025 normal at 9.7.  Rule out primary versus secondary hyperparathyroidism.  Follow-up endocrinology.  Continue to monitor.

## 2025-03-01 NOTE — ASSESSMENT & PLAN NOTE
History of biopsy positive lupus nephritis (1996) with history of fetal loss, antiphospholipid antibody syndrome, and proteinuria.  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  She continues Plaquenil which she restarted in the fall 2023.  Her dose was recently decreased as she had a supratherapeutic dose on Plaquenil Avise testing.  She is currently taking 400 mg on Mondays, Wednesdays, and Fridays, and 200 mg all other days.  Avise SLE monitor test dated 8/19/2024 revealed negative markers for activity with the exception of mildly positive erythrocyte bound C4d.  Complement levels within normal limits and double-stranded DNA antibody was negative.  Long discussion with patient regarding Benlysta including risk-benefit profile, indication and administration, which would be prudent in view of her long history of lupus with evidence of membranous glomerulonephritis with mild activity and mild chronicity and mild proteinuria.  Will submit for prior authorization.  Will get updated QuantiFERON gold prior to initiation of Benlysta.  Patient does have literature regarding this and is willing to proceed.  We will continue to monitor symptoms and labs closely.  Will recheck lupus Avise monitor testing and repeat Plaquenil level prior to her next office visit.  Continue following with eye doctor regularly to monitor for Plaquenil toxicity.  Follow-up with nephrologist as scheduled.  Follow-up 6 months or sooner if needed.    SmartLink not supported outside of the Encounter Diagnoses SmartSection.

## 2025-03-01 NOTE — ASSESSMENT & PLAN NOTE
History of left kidney stone on recent renal ultrasound midpole of 8 mm.  Nephrology referred patient for Litholink.  Follow-up nephrology and urology.  Follow-up endocrinology in view of history of elevated PTH.  Continue to monitor.

## 2025-03-01 NOTE — ASSESSMENT & PLAN NOTE
Remote diagnosis ITP treated with steroids.  History positive phospholipid antibody syndrome on chronic Coumadin.  Most recent platelet count dated 2/7/2025 was 203..  Continue to monitor.

## 2025-03-01 NOTE — ASSESSMENT & PLAN NOTE
Recent left foot pain with x-ray negative for arthritic changes.  There was significant atherosclerotic disease noted.  Suspect her foot pain is secondary to mechanical foot deformities.  I have suggested foot solutions for evaluation of shoe gear.  I suspect her foot symptoms would improve with appropriately fitted shoes.

## 2025-03-07 DIAGNOSIS — M32.14 OTHER SYSTEMIC LUPUS ERYTHEMATOSUS WITH GLOMERULAR DISEASE (HCC): Primary | ICD-10-CM

## 2025-03-07 RX ORDER — SODIUM CHLORIDE 9 MG/ML
20 INJECTION, SOLUTION INTRAVENOUS ONCE
OUTPATIENT
Start: 2025-03-17

## 2025-03-07 NOTE — TELEPHONE ENCOUNTER
Received a phone call from [facility] representative Kathya Marie requesting HECTOR results for pt. Faxed over HECTOR result from 3/11/2017 along with Avise kit results. Kathya stated that once she receives she will determine if approved or denied via [facility] portal.

## 2025-03-07 NOTE — TELEPHONE ENCOUNTER
Called pt and LM asking to return call with her best days and times to schedule her infusion at [facility].

## 2025-03-09 ENCOUNTER — OFFICE VISIT (OUTPATIENT)
Dept: URGENT CARE | Age: 60
End: 2025-03-09
Payer: COMMERCIAL

## 2025-03-09 VITALS
DIASTOLIC BLOOD PRESSURE: 85 MMHG | HEART RATE: 70 BPM | TEMPERATURE: 97.6 F | SYSTOLIC BLOOD PRESSURE: 128 MMHG | OXYGEN SATURATION: 97 % | RESPIRATION RATE: 18 BRPM

## 2025-03-09 DIAGNOSIS — J02.9 ACUTE PHARYNGITIS, UNSPECIFIED ETIOLOGY: Primary | ICD-10-CM

## 2025-03-09 DIAGNOSIS — J02.9 SORE THROAT: ICD-10-CM

## 2025-03-09 LAB — S PYO AG THROAT QL: NEGATIVE

## 2025-03-09 PROCEDURE — 87880 STREP A ASSAY W/OPTIC: CPT | Performed by: NURSE PRACTITIONER

## 2025-03-09 PROCEDURE — 99213 OFFICE O/P EST LOW 20 MIN: CPT | Performed by: NURSE PRACTITIONER

## 2025-03-09 PROCEDURE — 87070 CULTURE OTHR SPECIMN AEROBIC: CPT | Performed by: NURSE PRACTITIONER

## 2025-03-09 RX ORDER — PREDNISONE 10 MG/1
10 TABLET ORAL DAILY
Qty: 5 TABLET | Refills: 0 | Status: SHIPPED | OUTPATIENT
Start: 2025-03-09 | End: 2025-03-14

## 2025-03-09 NOTE — PATIENT INSTRUCTIONS
Rapid strep was negative, culture pending send out   Patient unable to take NSAIDs and reports her throat as extremely painful   Start prednisone 10 mg x 5 days   Mucinex OTC throat lozenges PRN   Salt water gargles PRN   Tylenol PRN up to 1000 mg q8hrs for pain/fever   F/u with PCP as needed  Proceed to the ER should symptoms worsen

## 2025-03-09 NOTE — PROGRESS NOTES
[facility]  Name: Rina Taylor      : 1965      MRN: [id number]  Encounter Provider: PEACE Daugherty  Encounter Date: 3/9/2025   Encounter department: [facility] [place]  :  Assessment & Plan  Sore throat    Orders:    POCT rapid ANTIGEN strepA    Throat culture    Acute pharyngitis, unspecified etiology    Orders:    predniSONE 10 mg tablet; Take 1 tablet (10 mg total) by mouth daily for 5 days      Patient Instructions  Rapid strep was negative, culture pending send out   Patient unable to take NSAIDs and reports her throat as extremely painful   Start prednisone 10 mg x 5 days   Mucinex OTC throat lozenges PRN   Salt water gargles PRN   Tylenol PRN up to 1000 mg q8hrs for pain/fever   F/u with PCP as needed  Proceed to the ER should symptoms worsen     If tests are performed, our office will contact you with results only if changes need to made to the care plan discussed with you at the visit. [facility]    Chief Complaint:   Chief Complaint   Patient presents with    Sore Throat    Cold Like Symptoms     Sore throat and slight congestion started Friday night.      History of Present Illness   58 y/o female presents for sore throat, rhinorrhea since Friday. Patient has tried tylenol and throat lozenges with minimal relief. 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           Review of Systems   Constitutional:  Positive for appetite change.   HENT:  Positive for sore throat.    Eyes: Negative.    Respiratory: Negative.     Cardiovascular: Negative.    Gastrointestinal: Negative.    Endocrine: Negative.    Genitourinary: Negative.    Musculoskeletal: Negative.    Skin: Negative.    Allergic/Immunologic: Negative.    Neurological: Negative.    Hematological: Negative.    Psychiatric/Behavioral: Negative.       Past Medical History   Past Medical History:   Diagnosis Date     Acute blood loss anemia 2023    Acute cholecystitis 2023    ADA (acute kidney injury) (HCC) 2023    Anemia     Not sure    Anti-phospholipid antibody syndrome (HCC)     Cancer (HCC)     thyroid    Cellulitis of right index finger 2021    Chronic kidney disease     Clotting disorder (HCC)     Lung embolism,  ITP    Diabetes mellitus (HCC)     Disease of thyroid gland     Edema 2013    Elevated serum creatinine 2023    Fibroid 1998    Gestational diabetes     Gross hematuria 2016    History of chemotherapy     late  for lupus    Hordeolum externum of left lower eyelid 2022    Hyperparathyroidism (HCC)     Hypertension     Hypotension due to hypovolemia 2023    Idiopathic thrombocytopenic purpura (ITP) (HCC)     Kidney stone     Left ear pain 2020    Left foot pain 2022    Left foot pain 2022    Lupus     Miscarriage 1996    Lost twin girls 10-    Osteoarthritis     Pulmonary embolism (HCC) 1987    Rash 2020    Sjogren's syndrome (HCC)     Upper respiratory tract infection 2019    Vitamin D deficiency      Past Surgical History:   Procedure Laterality Date     SECTION          CHOLECYSTECTOMY  23    CHOLECYSTECTOMY LAPAROSCOPIC N/A 2023    Procedure: LAPAROSCOPIC CHOLECYSTECTOMY;  Surgeon: Guille Diaz MD;  Location: [facility];  Service: General    DIAGNOSTIC FLEXIBLE LARYNGOSCOPY N/A 2021    Procedure: DIAGNOSTIC FLEXIBLE LARYNGOSCOPY;  Surgeon: Jairo Christensen MD;  Location: [facility];  Service: Surgical Oncology    HERNIA REPAIR      HIP SURGERY      left side, bone decompression    HYSTEROSCOPY      IR BIOPSY KIDNEY RANDOM  10/3/2023    IL HYSTEROSCOPY BX ENDOMETRIUM&/POLYPC W/WO D&C N/A 2023    Procedure: DILATATION AND CURETTAGE (D&C) WITH HYSTEROSCOPY;  Surgeon: Lauren Chase MD;  Location: [facility];  Service: Gynecology    IL PARATHYROIDECTOMY/EXPLORATION  PARATHYROIDS Right 05/25/2021    Procedure: PARATHYROIDECTOMY, MINIMALLY INVASIVE, right, intraoperative PTH monitoring;  Surgeon: Jairo Christensen MD;  Location: [facility];  Service: Surgical Oncology    RENAL BIOPSY      Not sure    THYROID SURGERY      TUBAL LIGATION       Family History   Problem Relation Age of Onset    No Known Problems Mother     Diabetes type II Father     Diabetes Father     Stroke Father     Diabetes type II Paternal Aunt     Diabetes type II Paternal Uncle     Diabetes type II Paternal Grandmother     Stomach cancer Paternal Grandfather     No Known Problems Sister     No Known Problems Maternal Grandmother     No Known Problems Maternal Grandfather     No Known Problems Sister     No Known Problems Maternal Aunt     No Known Problems Maternal Aunt     No Known Problems Maternal Aunt     No Known Problems Paternal Aunt     No Known Problems Paternal Aunt      she reports that she has never smoked. She has never been exposed to tobacco smoke. She has never used smokeless tobacco. She reports that she does not currently use alcohol. She reports that she does not use drugs.  Current Outpatient Medications   Medication Instructions    Cholecalciferol 1,000 Units, Daily    ciclopirox (LOPROX) 0.77 % cream 2 times daily    folic acid (FOLVITE) 1 mg tablet 1 tablet, Daily    fosinopril (MONOPRIL) 20 mg, Oral, 2 times daily    hydrocortisone 2.5 % cream No dose, route, or frequency recorded.    hydroxychloroquine (PLAQUENIL) 200 mg, Oral, 2 times daily with meals    levothyroxine 125 mcg tablet Take one tablet daily on empty stomach 1 hour before breakfast and 4 hours apart from calcium and vitamin D supplementation    NIFEdipine (PROCARDIA XL) 30 mg, Oral, Daily    predniSONE 10 mg, Oral, Daily    spironolactone (ALDACTONE) 25 mg, Oral, Daily    traMADol (ULTRAM) 50 mg, Oral, Every 6 hours PRN    triamcinolone (KENALOG) 0.1 % cream Topical, 2 times daily    warfarin (Coumadin) 1 mg tablet As  "directed.    warfarin (COUMADIN) 3 mg tablet TAKE ONE TABLET BY MOUTH EVERY DAY. DO NOT TAKE ON MONDAYS AND THURSDAYS    warfarin (COUMADIN) 4 mg tablet Daily (warfarin)     Allergies   Allergen Reactions    Penicillins Itching        Objective   /85   Pulse 70   Temp 97.6 °F (36.4 °C)   Resp 18   SpO2 97%      Physical Exam  Constitutional:       Appearance: She is well-developed.   HENT:      Head: Normocephalic and atraumatic.      Right Ear: Tympanic membrane and ear canal normal. Tympanic membrane is not erythematous.      Left Ear: Tympanic membrane and ear canal normal. Tympanic membrane is not erythematous.      Nose: No congestion.      Mouth/Throat:      Mouth: Mucous membranes are dry.      Pharynx: Uvula midline. Pharyngeal swelling and posterior oropharyngeal erythema present.      Tonsils: No tonsillar exudate.   Eyes:      Conjunctiva/sclera: Conjunctivae normal.   Cardiovascular:      Rate and Rhythm: Normal rate and regular rhythm.      Heart sounds: Normal heart sounds.   Pulmonary:      Effort: Pulmonary effort is normal.      Breath sounds: Normal breath sounds.   Abdominal:      Palpations: Abdomen is soft.   Musculoskeletal:      Cervical back: Normal range of motion.   Lymphadenopathy:      Cervical: No cervical adenopathy.   Skin:     General: Skin is warm and dry.      Capillary Refill: Capillary refill takes less than 2 seconds.   Neurological:      General: No focal deficit present.      Mental Status: She is alert and oriented to person, place, and time.   Psychiatric:         Mood and Affect: Mood normal.         Behavior: Behavior normal.         Portions of the record may have been created with voice recognition software.  Occasional wrong word or \"sound a like\" substitutions may have occurred due to the inherent limitations of voice recognition software.  Read the chart carefully and recognize, using context, where substitutions have occurred.  "

## 2025-03-10 ENCOUNTER — TELEPHONE (OUTPATIENT)
Age: 60
End: 2025-03-10

## 2025-03-10 NOTE — TELEPHONE ENCOUNTER
Patient returning call to office regarding scheduling infusion appointment.  Patient states she is currently sick and on steroids, patient not sure if that will affect an appointment.    Patient requesting a call back

## 2025-03-10 NOTE — TELEPHONE ENCOUNTER
Will still schedule pt for infusion as they are booking pt's last week of March in [facility].

## 2025-03-11 ENCOUNTER — OFFICE VISIT (OUTPATIENT)
Dept: VASCULAR SURGERY | Facility: CLINIC | Age: 60
End: 2025-03-11
Payer: COMMERCIAL

## 2025-03-11 VITALS
OXYGEN SATURATION: 97 % | WEIGHT: 199.5 LBS | BODY MASS INDEX: 37.66 KG/M2 | SYSTOLIC BLOOD PRESSURE: 110 MMHG | DIASTOLIC BLOOD PRESSURE: 60 MMHG | HEART RATE: 61 BPM | HEIGHT: 61 IN

## 2025-03-11 DIAGNOSIS — M79.89 SWELLING OF LEFT FOOT: ICD-10-CM

## 2025-03-11 DIAGNOSIS — I73.9 PAD (PERIPHERAL ARTERY DISEASE) (HCC): Primary | ICD-10-CM

## 2025-03-11 LAB — BACTERIA THROAT CULT: NORMAL

## 2025-03-11 PROCEDURE — 99243 OFF/OP CNSLTJ NEW/EST LOW 30: CPT | Performed by: NURSE PRACTITIONER

## 2025-03-11 NOTE — PATIENT INSTRUCTIONS
"Patient Education     Peripheral artery disease and claudication   The Basics   Written by the doctors and editors at [facility]   What is peripheral artery disease? -- Peripheral artery disease, or \"PAD,\" is a condition that affects the blood vessels (called arteries) that bring blood to the legs. PAD can cause muscle pain that gets worse with activity and better with rest. This is called \"claudication.\" PAD can also cause leg wounds to heal more slowly than usual. This article is only about the leg pain related to PAD.  Normally, blood flows easily through arteries to all parts of the body. But sometimes, fatty clumps called \"plaque\" build up inside the walls of arteries (figure 1). Plaque can cause arteries to become narrow or blocked. This prevents blood from flowing normally. When muscles do not get enough blood, symptoms can happen.  Some people have a greater chance of getting PAD, such as those who:   Smoke   Have diabetes   Have high cholesterol   Have high blood pressure  What are the symptoms of PAD? -- PAD often causes pain in the back of the lower leg. The pain usually gets worse with walking or other exercise, and gets better with rest. PAD can also cause pain in the buttocks, thighs, or sometimes in the feet. People who have leg pain can have other symptoms, too, such as:   Trouble walking up stairs   Trouble with sexual arousal  Symptoms of claudication can be mild or severe, depending on:   Which arteries are affected, and how many   How narrow the arteries are   How much activity a person does  Is there a test for PAD? -- Yes. Your doctor or nurse can do different tests to find out if you have PAD, and to check how severe it is. They might:   Take the blood pressure in your arm and lower leg (just above the ankle) - This is done at rest and right after exercise, then the numbers are compared.   Take the blood pressure in other places in your leg, like the thigh   Order a blood vessel imaging test, " "such as an ultrasound - This can show pictures of the arteries that bring blood to the leg.  How can I help treat my PAD? -- To help treat your PAD and prevent it from getting worse, you can:   Stop smoking.   Get your diabetes, high blood pressure, and high cholesterol under control (if you have these conditions).   Walking - Doctors recommend that most people with PAD walk each day. Ask your doctor or nurse how best to begin a walking program.  What other treatments might I have? -- Along with a walking program and getting medical conditions under control, some people are also treated with medicines. The medicines used to treat PAD can reduce symptoms, increase blood flow to the legs, and help people walk farther without pain. Most doctors ask their patients to try a medicine called cilostazol (brand name: Pletal). But people who have certain heart problems cannot take cilostazol and must take a different medicine.  If you still have severe symptoms after trying medicines, your doctor will talk with you about the possibility of having a procedure to increase blood flow to your legs and feet. Your treatment options might include:   Angioplasty or stenting - For these procedures, the doctor inserts a thin tube with a balloon at the end of it into the part of the artery that is blocked. Then, they inflate the balloon to open the blockage. Sometimes, the doctor might need to prop open the artery using a tiny mesh tube called a \"stent,\" which stays in the body.   Open patch angioplasty - This involves cutting open the artery, cleaning out the plaque, then closing or \"patching\" the cut.   Bypass surgery - During bypass surgery, the doctor removes a piece of a vein from another part of the body. Then, they reattach that piece (called a vein graft) above and below the area that is blocked. This re-routes blood around the blocked area, and allows it to get to the part of the leg that was not getting enough blood. " Sometimes, instead of taking a vein from another part of the body, the doctor can use an artificial graft.  What should I know about the different procedures? -- Angioplasty and stenting and patch angioplasty work best for treating blocked areas that are short. Surgery works better long term for treating blocked areas that are long. People who have the fewest long-term problems after bypass surgery include those who are younger than 70 and do not have diabetes.  Your doctor might recommend a particular procedure for you, depending on your symptoms, age, and medical problems. But many people can choose which procedure to have. If your doctor offers you a choice, ask:   What are the benefits of each procedure for me?   What are the downsides of each procedure for me?   What happens if I do not have any procedure?  All topics are updated as new evidence becomes available and our peer review process is complete.  This topic retrieved from [employer] on: Feb 26, 2024.  Topic 87460 Version 16.0  Release: 32.2.4 - C32.56  © 2024 UpToDate, Inc. and/or its affiliates. All rights reserved.  figure 1: Peripheral artery disease     Graphic 95860 Version 6.0  Consumer Information Use and Disclaimer   Disclaimer: This generalized information is a limited summary of diagnosis, treatment, and/or medication information. It is not meant to be comprehensive and should be used as a tool to help the user understand and/or assess potential diagnostic and treatment options. It does NOT include all information about conditions, treatments, medications, side effects, or risks that may apply to a specific patient. It is not intended to be medical advice or a substitute for the medical advice, diagnosis, or treatment of a health care provider based on the health care provider's examination and assessment of a patient's specific and unique circumstances. Patients must speak with a health care provider for complete information about their health,  medical questions, and treatment options, including any risks or benefits regarding use of medications. This information does not endorse any treatments or medications as safe, effective, or approved for treating a specific patient. UpToDate, Inc. and its affiliates disclaim any warranty or liability relating to this information or the use thereof.The use of this information is governed by the Terms of Use, available at https://www.[employer].com/en/know/clinical-effectiveness-terms. 2024© UpToDate, Inc. and its affiliates and/or licensors. All rights reserved.  Copyright   © 2024 UpToDate, Inc. and/or its affiliates. All rights reserved.

## 2025-03-12 PROBLEM — M79.89 SWELLING OF LEFT FOOT: Status: ACTIVE | Noted: 2025-03-12

## 2025-03-12 PROBLEM — I73.9 PAD (PERIPHERAL ARTERY DISEASE) (HCC): Status: ACTIVE | Noted: 2025-03-12

## 2025-03-12 NOTE — ASSESSMENT & PLAN NOTE
69-year-old female with HTN, antiphospholipid syndrome, systemic lupus, Sjogren's, history of PE '87, LLE DVT x2, on long-term warfarin.    Patient with left dorsal foot swelling and pain.  Evaluated with x-ray which noted calcifications prompting evaluation with LEAD.  She is referred for vascular evaluation    -LEAD 2/19/2025 showed diffuse right femoral-popliteal and tibial disease without focal stenosis, right VESNA 1.04/81/90 and left distal PT occlusion, diffusefemoral-popliteal and tibial disease , left VESNA 0.64/74/67  -Complaining of nonspecific bilateral lower extremity tiredness with ambulation.  Does not walk for exercise.  -No ischemic rest pain or ischemic tissue loss  -We reviewed study in detail  -No vascular intervention recommended at this time given no major symptoms at tibial disease  -Encouraged daily walking routine  -We also discussed optimal medical therapy with aspirin 81 mg and statin therapy.  Will forego aspirin currently as she is on warfarin.  She is due for a lipid profile.  Will defer statin therapy to PCP  -Recommended repeat duplex in 6 months  -Follow-up in the office in 6 months to review study and evaluate symptoms with walking routine      Orders:    VAS ARTERIAL DUPLEX- LOWER LIMB BILATERAL; Future

## 2025-03-12 NOTE — ASSESSMENT & PLAN NOTE
-Left foot swelling secondary to chronic left lower extremity DVT  -Given decreased left VESNA did not recommend compression.  If needed could try light Tubigrip compression sleeve  -Recommended walking and leg elevation to help manage lower extremity swelling    Orders:    Ambulatory Referral to Vascular Surgery

## 2025-03-12 NOTE — PROGRESS NOTES
Name: Rina Taylor      : 1965      MRN: [id number]  Encounter Provider: PEACE May  Encounter Date: 3/11/2025   Encounter department: [facility] [place]  :  Assessment & Plan  PAD (peripheral artery disease) (HCC)  69-year-old female with HTN, antiphospholipid syndrome, systemic lupus, Sjogren's, history of PE '87, LLE DVT x2, on long-term warfarin.    Patient with left dorsal foot swelling and pain.  Evaluated with x-ray which noted calcifications prompting evaluation with LEAD.  She is referred for vascular evaluation    -LEAD 2025 showed diffuse right femoral-popliteal and tibial disease without focal stenosis, right VESNA 1.04/81/90 and left distal PT occlusion, diffusefemoral-popliteal and tibial disease , left VESNA 0.64/74/67  -Complaining of nonspecific bilateral lower extremity tiredness with ambulation.  Does not walk for exercise.  -No ischemic rest pain or ischemic tissue loss  -We reviewed study in detail  -No vascular intervention recommended at this time given no major symptoms at tibial disease  -Encouraged daily walking routine  -We also discussed optimal medical therapy with aspirin 81 mg and statin therapy.  Will forego aspirin currently as she is on warfarin.  She is due for a lipid profile.  Will defer statin therapy to PCP  -Recommended repeat duplex in 6 months  -Follow-up in the office in 6 months to review study and evaluate symptoms with walking routine      Orders:    VAS ARTERIAL DUPLEX- LOWER LIMB BILATERAL; Future    Swelling of left foot  -Left foot swelling secondary to chronic left lower extremity DVT  -Given decreased left VESNA did not recommend compression.  If needed could try light Tubigrip compression sleeve  -Recommended walking and leg elevation to help manage lower extremity swelling    Orders:    Ambulatory Referral to Vascular Surgery      Chief Complaint   Patient presents with    New Patient Visit    Peripheral Vascular Disease     Pt  "has been having LLE pain and swelling       History of Present Illness   Rina Taylor is a 59 y.o. female who presents to the office to review lower extremity arterial duplex 2/19/2025.  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  Patient was evaluated in the ED 2/1/2025 secondary to left foot pain and swelling.  She was evaluated with a stray which showed calcifications further prompting lower extremity arterial study.  Lower extremity study did show diffuse bilateral lower extremity femoral-popliteal and tibial disease, left PT occlusion with preserved right VESNA and decreased left VESNA 0.64.  She complains of bilateral lower extremity generalized tiredness in the feet bilaterally with walking.  She denies any ischemic rest pain isnew tissue loss.  She is a non-smoker.  She is on warfarin for many years due to recurrent DVT, initial diagnosis PE in 1987.  She has systemic lupus, follows with rheumatology.  We reviewed her lipid profile, triglycerides are elevated historically.  Initial blood pressure taken by medical assistant 80/60.  Rechecked by me 110/60.  Denies any lightheadedness or dizziness.  History obtained from: patient    Review of Systems   Cardiovascular:  Positive for leg swelling.   Skin:  Negative for wound.     Medical History Reviewed by provider this encounter:  Tobacco  Allergies  Meds  Problems  Med Hx  Surg Hx  Fam Hx     .     Objective   I have reviewed and made appropriate changes to the review of systems input by the medical assistant.    Vitals:    03/11/25 1402 03/11/25 1449   BP: (!) 82/60 110/60   BP Location: Right arm Right arm   Patient Position: Sitting Sitting   Cuff Size: Large Large   Pulse: 61    SpO2: 97%    Weight: 90.5 kg (199 lb 8 oz)    Height: 5' 1\" (1.549 m)        Patient Active Problem List   Diagnosis    Chronic deep vein thrombosis (DVT) of proximal vein of both lower extremities (HCC)    History of ITP    Benign hypertensive CKD "    Acute kidney injury superimposed on stage 2 chronic kidney disease (HCC)    Hypercalcemia    Primary hyperparathyroidism (HCC)    Hypertension    Nephrolithiasis    Proteinuria    Systemic lupus erythematosus (HCC)    Vitamin D deficiency    Elevated PTHrP level    History of thyroid cancer    BMI 39.0-39.9,adult    Greater trochanteric bursitis of left hip    Anti-phospholipid syndrome (HCC)    Status post parathyroidectomy    Postoperative hypothyroidism    Sjogren's syndrome without extraglandular involvement (HCC)    Abnormal CT of the chest    Lesion of urinary bladder    Dermatitis    Primary generalized (osteo)arthritis    Eyelid pain, right    PAD (peripheral artery disease) (HCC)    Swelling of left foot       Past Surgical History:   Procedure Laterality Date     SECTION          CHOLECYSTECTOMY  23    CHOLECYSTECTOMY LAPAROSCOPIC N/A 2023    Procedure: LAPAROSCOPIC CHOLECYSTECTOMY;  Surgeon: Guille Diaz MD;  Location: [facility];  Service: General    DIAGNOSTIC FLEXIBLE LARYNGOSCOPY N/A 2021    Procedure: DIAGNOSTIC FLEXIBLE LARYNGOSCOPY;  Surgeon: Jairo Christensen MD;  Location: [facility];  Service: Surgical Oncology    HERNIA REPAIR      HIP SURGERY      left side, bone decompression    HYSTEROSCOPY      IR BIOPSY KIDNEY RANDOM  10/3/2023    [place] HYSTEROSCOPY BX ENDOMETRIUM&/POLYPC W/WO D&C N/A 2023    Procedure: DILATATION AND CURETTAGE (D&C) WITH HYSTEROSCOPY;  Surgeon: Lauren Chase MD;  Location: [facility];  Service: Gynecology    WV PARATHYROIDECTOMY/EXPLORATION PARATHYROIDS Right 2021    Procedure: PARATHYROIDECTOMY, MINIMALLY INVASIVE, right, intraoperative PTH monitoring;  Surgeon: Jairo Christensen MD;  Location: [facility];  Service: Surgical Oncology    RENAL BIOPSY      Not sure    THYROID SURGERY      TUBAL LIGATION         Family History   Problem Relation Age of Onset    No Known Problems Mother     Diabetes type II Father      Diabetes Father     Stroke Father     Diabetes type II Paternal Aunt     Diabetes type II Paternal Uncle     Diabetes type II Paternal Grandmother     Stomach cancer Paternal Grandfather     No Known Problems Sister     No Known Problems Maternal Grandmother     No Known Problems Maternal Grandfather     No Known Problems Sister     No Known Problems Maternal Aunt     No Known Problems Maternal Aunt     No Known Problems Maternal Aunt     No Known Problems Paternal Aunt     No Known Problems Paternal Aunt        Social History     Socioeconomic History    Marital status: /Civil Union     Spouse name: Not on file    Number of children: Not on file    Years of education: Not on file    Highest education level: Not on file   Occupational History    Not on file   Tobacco Use    Smoking status: Never     Passive exposure: Never    Smokeless tobacco: Never   Vaping Use    Vaping status: Never Used   Substance and Sexual Activity    Alcohol use: Not Currently     Comment: Twice a year    Drug use: No    Sexual activity: Yes     Partners: Male     Birth control/protection: None   Other Topics Concern    Not on file   Social History Narrative    Not on file     Social Drivers of Health     Financial Resource Strain: Not on file   Food Insecurity: No Food Insecurity (2/14/2023)    Hunger Vital Sign     Worried About Running Out of Food in the Last Year: Never true     Ran Out of Food in the Last Year: Never true   Transportation Needs: No Transportation Needs (2/14/2023)    PRAPARE - Transportation     Lack of Transportation (Medical): No     Lack of Transportation (Non-Medical): No   Physical Activity: Not on file   Stress: Not on file   Social Connections: Not on file   Intimate Partner Violence: Not on file   Housing Stability: Low Risk  (2/14/2023)    Housing Stability Vital Sign     Unable to Pay for Housing in the Last Year: No     Number of Places Lived in the Last Year: 1     Unstable Housing in the Last Year:  No       Allergies   Allergen Reactions    Penicillins Itching         Current Outpatient Medications:     ciclopirox (LOPROX) 0.77 % cream, Apply topically 2 (two) times a day (Patient taking differently: Apply topically if needed), Disp: , Rfl:     fosinopril (MONOPRIL) 20 mg tablet, Take 1 tablet (20 mg total) by mouth 2 (two) times a day, Disp: 180 tablet, Rfl: 0    hydrocortisone 2.5 % cream, , Disp: , Rfl:     hydroxychloroquine (PLAQUENIL) 200 mg tablet, Take 1 tablet (200 mg total) by mouth 2 (two) times a day with meals, Disp: 180 tablet, Rfl: 1    levothyroxine 125 mcg tablet, Take one tablet daily on empty stomach 1 hour before breakfast and 4 hours apart from calcium and vitamin D supplementation, Disp: 90 tablet, Rfl: 1    NIFEdipine (PROCARDIA XL) 30 mg 24 hr tablet, Take 1 tablet (30 mg total) by mouth daily, Disp: 90 tablet, Rfl: 1    predniSONE 10 mg tablet, Take 1 tablet (10 mg total) by mouth daily for 5 days, Disp: 5 tablet, Rfl: 0    spironolactone (ALDACTONE) 25 mg tablet, Take 1 tablet (25 mg total) by mouth daily, Disp: 90 tablet, Rfl: 1    traMADol (Ultram) 50 mg tablet, Take 1 tablet (50 mg total) by mouth every 6 (six) hours as needed for moderate pain or severe pain, Disp: 30 tablet, Rfl: 0    warfarin (Coumadin) 1 mg tablet, As directed., Disp: 90 tablet, Rfl: 2    warfarin (COUMADIN) 3 mg tablet, TAKE ONE TABLET BY MOUTH EVERY DAY. DO NOT TAKE ON MONDAYS AND THURSDAYS, Disp: 70 tablet, Rfl: 0    warfarin (COUMADIN) 4 mg tablet, Take by mouth daily Every day except Thursday, Disp: , Rfl:     Cholecalciferol 25 MCG (1000 UT) TBDP, Take 1,000 Units by mouth in the morning, Disp: , Rfl:     folic acid (FOLVITE) 1 mg tablet, Take 1 tablet by mouth daily (Patient not taking: Reported on 3/11/2025), Disp: , Rfl:     triamcinolone (KENALOG) 0.1 % cream, Apply topically 2 (two) times a day for 7 days (Patient not taking: Reported on 3/11/2025), Disp: 80 g, Rfl: 0    /60 (BP Location: Right  "arm, Patient Position: Sitting, Cuff Size: Large)   Pulse 61   Ht 5' 1\" (1.549 m)   Wt 90.5 kg (199 lb 8 oz)   SpO2 97%   BMI 37.70 kg/m²      Physical Exam  Vitals and nursing note reviewed.   Constitutional:       Appearance: She is well-developed.   HENT:      Head: Normocephalic and atraumatic.   Cardiovascular:      Pulses:           Femoral pulses are 2+ on the right side and 2+ on the left side.       Dorsalis pedis pulses are 0 on the right side and 0 on the left side.        Posterior tibial pulses are 0 on the right side and 0 on the left side.      Heart sounds: Normal heart sounds.   Pulmonary:      Effort: Pulmonary effort is normal.      Breath sounds: Normal breath sounds.   Abdominal:      General: Bowel sounds are normal.      Palpations: Abdomen is soft.   Musculoskeletal:         General: Normal range of motion.      Cervical back: Neck supple.      Comments: Left lower extremity swelling, mild stasis changes, lipodermatosclerosis   Skin:     General: Skin is warm.   Neurological:      Mental Status: She is alert and oriented to person, place, and time.   Psychiatric:         Behavior: Behavior normal.         Thought Content: Thought content normal.         Administrative Statements   I have spent a total time of 25 minutes in caring for this patient on the day of the visit/encounter including Diagnostic results, Prognosis, Risks and benefits of tx options, Instructions for management, Patient and family education, Importance of tx compliance, Risk factor reductions, Impressions, Counseling / Coordination of care, and Documenting in the medical record.  "

## 2025-03-28 ENCOUNTER — TELEPHONE (OUTPATIENT)
Dept: INFUSION CENTER | Facility: HOSPITAL | Age: 60
End: 2025-03-28

## 2025-03-28 NOTE — TELEPHONE ENCOUNTER
TC to pt lm for pt to confirm appt on Monday and new pt information. Left call back number if questions.

## 2025-03-31 ENCOUNTER — HOSPITAL ENCOUNTER (OUTPATIENT)
Dept: INFUSION CENTER | Facility: HOSPITAL | Age: 60
Discharge: HOME | End: 2025-03-31
Attending: INTERNAL MEDICINE
Payer: COMMERCIAL

## 2025-03-31 VITALS
HEART RATE: 61 BPM | TEMPERATURE: 96.8 F | RESPIRATION RATE: 16 BRPM | WEIGHT: 203.26 LBS | BODY MASS INDEX: 38.41 KG/M2 | DIASTOLIC BLOOD PRESSURE: 86 MMHG | SYSTOLIC BLOOD PRESSURE: 137 MMHG

## 2025-03-31 DIAGNOSIS — M32.14 OTHER SYSTEMIC LUPUS ERYTHEMATOSUS WITH GLOMERULAR DISEASE (HCC): Primary | ICD-10-CM

## 2025-03-31 PROCEDURE — 96413 CHEMO IV INFUSION 1 HR: CPT

## 2025-03-31 RX ORDER — SODIUM CHLORIDE 9 MG/ML
20 INJECTION, SOLUTION INTRAVENOUS ONCE
OUTPATIENT
Start: 2025-04-14

## 2025-03-31 RX ORDER — SODIUM CHLORIDE 9 MG/ML
20 INJECTION, SOLUTION INTRAVENOUS ONCE
Status: CANCELLED | OUTPATIENT
Start: 2025-03-31

## 2025-03-31 RX ORDER — SODIUM CHLORIDE 9 MG/ML
20 INJECTION, SOLUTION INTRAVENOUS ONCE
Status: COMPLETED | OUTPATIENT
Start: 2025-03-31 | End: 2025-03-31

## 2025-03-31 RX ADMIN — BELIMUMAB 920 MG: 400 INJECTION, POWDER, LYOPHILIZED, FOR SOLUTION INTRAVENOUS at 11:11

## 2025-03-31 RX ADMIN — SODIUM CHLORIDE 20 ML/HR: 0.9 INJECTION, SOLUTION INTRAVENOUS at 11:11

## 2025-04-07 DIAGNOSIS — I82.5Y3 CHRONIC DEEP VEIN THROMBOSIS (DVT) OF PROXIMAL VEIN OF BOTH LOWER EXTREMITIES (HCC): Chronic | ICD-10-CM

## 2025-04-08 RX ORDER — WARFARIN SODIUM 3 MG/1
TABLET ORAL
Qty: 70 TABLET | Refills: 0 | Status: SHIPPED | OUTPATIENT
Start: 2025-04-08

## 2025-04-14 ENCOUNTER — HOSPITAL ENCOUNTER (OUTPATIENT)
Dept: INFUSION CENTER | Facility: HOSPITAL | Age: 60
Discharge: HOME | End: 2025-04-14
Attending: INTERNAL MEDICINE
Payer: COMMERCIAL

## 2025-04-14 VITALS
TEMPERATURE: 97 F | BODY MASS INDEX: 38.26 KG/M2 | RESPIRATION RATE: 16 BRPM | HEART RATE: 65 BPM | DIASTOLIC BLOOD PRESSURE: 84 MMHG | WEIGHT: 202.5 LBS | SYSTOLIC BLOOD PRESSURE: 136 MMHG

## 2025-04-14 DIAGNOSIS — M32.14 OTHER SYSTEMIC LUPUS ERYTHEMATOSUS WITH GLOMERULAR DISEASE (HCC): Primary | ICD-10-CM

## 2025-04-14 PROCEDURE — 96413 CHEMO IV INFUSION 1 HR: CPT

## 2025-04-14 RX ORDER — SODIUM CHLORIDE 9 MG/ML
20 INJECTION, SOLUTION INTRAVENOUS ONCE
OUTPATIENT
Start: 2025-04-28

## 2025-04-14 RX ORDER — SODIUM CHLORIDE 9 MG/ML
20 INJECTION, SOLUTION INTRAVENOUS ONCE
Status: COMPLETED | OUTPATIENT
Start: 2025-04-14 | End: 2025-04-14

## 2025-04-14 RX ADMIN — SODIUM CHLORIDE 20 ML/HR: 0.9 INJECTION, SOLUTION INTRAVENOUS at 10:37

## 2025-04-14 RX ADMIN — BELIMUMAB 920 MG: 400 INJECTION, POWDER, LYOPHILIZED, FOR SOLUTION INTRAVENOUS at 10:37

## 2025-04-14 NOTE — PROGRESS NOTES
Rina Taylor  tolerated treatment well with no complications.      Rina Taylor is aware of future appt on 5/12/25 at 1030.     AVS Declined by Rina Taylor

## 2025-04-20 DIAGNOSIS — I12.9 PARENCHYMAL RENAL HYPERTENSION, STAGE 1 THROUGH STAGE 4 OR UNSPECIFIED CHRONIC KIDNEY DISEASE: ICD-10-CM

## 2025-04-21 RX ORDER — FOSINOPRIL SODIUM 20 MG/1
20 TABLET ORAL 2 TIMES DAILY
Qty: 180 TABLET | Refills: 1 | Status: SHIPPED | OUTPATIENT
Start: 2025-04-21 | End: 2025-10-18

## 2025-05-09 ENCOUNTER — APPOINTMENT (OUTPATIENT)
Dept: LAB | Facility: HOSPITAL | Age: 60
End: 2025-05-09
Payer: COMMERCIAL

## 2025-05-09 DIAGNOSIS — I10 PRIMARY HYPERTENSION: ICD-10-CM

## 2025-05-09 DIAGNOSIS — E21.0 PRIMARY HYPERPARATHYROIDISM (HCC): ICD-10-CM

## 2025-05-09 DIAGNOSIS — E55.9 VITAMIN D DEFICIENCY: ICD-10-CM

## 2025-05-09 DIAGNOSIS — N18.2 ACUTE KIDNEY INJURY SUPERIMPOSED ON STAGE 2 CHRONIC KIDNEY DISEASE (HCC): ICD-10-CM

## 2025-05-09 DIAGNOSIS — N17.9 ACUTE KIDNEY INJURY SUPERIMPOSED ON STAGE 2 CHRONIC KIDNEY DISEASE (HCC): ICD-10-CM

## 2025-05-09 DIAGNOSIS — Z90.89 STATUS POST PARATHYROIDECTOMY: ICD-10-CM

## 2025-05-09 DIAGNOSIS — Z98.890 STATUS POST PARATHYROIDECTOMY: ICD-10-CM

## 2025-05-09 DIAGNOSIS — R80.8 OTHER PROTEINURIA: ICD-10-CM

## 2025-05-09 DIAGNOSIS — E83.52 HYPERCALCEMIA: ICD-10-CM

## 2025-05-09 DIAGNOSIS — N20.0 NEPHROLITHIASIS: ICD-10-CM

## 2025-05-09 LAB
ALBUMIN SERPL BCG-MCNC: 3.8 G/DL (ref 3.5–5)
ALP SERPL-CCNC: 79 U/L (ref 34–104)
ALT SERPL W P-5'-P-CCNC: 17 U/L (ref 7–52)
ANION GAP SERPL CALCULATED.3IONS-SCNC: 6 MMOL/L (ref 4–13)
AST SERPL W P-5'-P-CCNC: 19 U/L (ref 13–39)
BACTERIA UR QL AUTO: ABNORMAL /HPF
BILIRUB SERPL-MCNC: 0.58 MG/DL (ref 0.2–1)
BILIRUB UR QL STRIP: NEGATIVE
BUN SERPL-MCNC: 35 MG/DL (ref 5–25)
CALCIUM SERPL-MCNC: 9.9 MG/DL (ref 8.4–10.2)
CHLORIDE SERPL-SCNC: 110 MMOL/L (ref 96–108)
CLARITY UR: CLEAR
CO2 SERPL-SCNC: 23 MMOL/L (ref 21–32)
COLOR UR: ABNORMAL
CREAT SERPL-MCNC: 1.25 MG/DL (ref 0.6–1.3)
CREAT UR-MCNC: 147.8 MG/DL
CREAT UR-MCNC: 151.3 MG/DL
GFR SERPL CREATININE-BSD FRML MDRD: 47 ML/MIN/1.73SQ M
GLUCOSE SERPL-MCNC: 84 MG/DL (ref 65–140)
GLUCOSE UR STRIP-MCNC: NEGATIVE MG/DL
HGB UR QL STRIP.AUTO: NEGATIVE
HYALINE CASTS #/AREA URNS LPF: ABNORMAL /LPF
KETONES UR STRIP-MCNC: NEGATIVE MG/DL
LEUKOCYTE ESTERASE UR QL STRIP: ABNORMAL
MICROALBUMIN UR-MCNC: 282.1 MG/L
MICROALBUMIN/CREAT 24H UR: 186 MG/G CREATININE (ref 0–30)
MUCOUS THREADS UR QL AUTO: ABNORMAL
NITRITE UR QL STRIP: NEGATIVE
NON-SQ EPI CELLS URNS QL MICRO: ABNORMAL /HPF
PH UR STRIP.AUTO: 6 [PH]
POTASSIUM SERPL-SCNC: 4.8 MMOL/L (ref 3.5–5.3)
PROT SERPL-MCNC: 7.2 G/DL (ref 6.4–8.4)
PROT UR STRIP-MCNC: ABNORMAL MG/DL
PROT UR-MCNC: 50.5 MG/DL
PROT/CREAT UR: 0.3 MG/G{CREAT}
RBC #/AREA URNS AUTO: ABNORMAL /HPF
SODIUM SERPL-SCNC: 139 MMOL/L (ref 135–147)
SP GR UR STRIP.AUTO: 1.02 (ref 1–1.03)
UROBILINOGEN UR STRIP-ACNC: <2 MG/DL
WBC #/AREA URNS AUTO: ABNORMAL /HPF

## 2025-05-09 PROCEDURE — 80053 COMPREHEN METABOLIC PANEL: CPT

## 2025-05-09 PROCEDURE — 81001 URINALYSIS AUTO W/SCOPE: CPT

## 2025-05-09 PROCEDURE — 82570 ASSAY OF URINE CREATININE: CPT

## 2025-05-09 PROCEDURE — 84156 ASSAY OF PROTEIN URINE: CPT

## 2025-05-09 PROCEDURE — 36415 COLL VENOUS BLD VENIPUNCTURE: CPT

## 2025-05-09 PROCEDURE — 82043 UR ALBUMIN QUANTITATIVE: CPT

## 2025-05-11 DIAGNOSIS — M32.14 OTHER SYSTEMIC LUPUS ERYTHEMATOSUS WITH GLOMERULAR DISEASE (HCC): Primary | ICD-10-CM

## 2025-05-11 RX ORDER — SODIUM CHLORIDE 9 MG/ML
20 INJECTION, SOLUTION INTRAVENOUS ONCE
Status: CANCELLED | OUTPATIENT
Start: 2025-05-12

## 2025-05-12 ENCOUNTER — TELEPHONE (OUTPATIENT)
Age: 60
End: 2025-05-12

## 2025-05-12 ENCOUNTER — TELEPHONE (OUTPATIENT)
Dept: INFUSION CENTER | Facility: HOSPITAL | Age: 60
End: 2025-05-12

## 2025-05-12 ENCOUNTER — HOSPITAL ENCOUNTER (OUTPATIENT)
Dept: INFUSION CENTER | Facility: HOSPITAL | Age: 60
Discharge: HOME | End: 2025-05-12
Attending: INTERNAL MEDICINE
Payer: COMMERCIAL

## 2025-05-12 VITALS
TEMPERATURE: 97.2 F | BODY MASS INDEX: 38.41 KG/M2 | RESPIRATION RATE: 16 BRPM | SYSTOLIC BLOOD PRESSURE: 148 MMHG | HEART RATE: 57 BPM | WEIGHT: 203.26 LBS | DIASTOLIC BLOOD PRESSURE: 90 MMHG

## 2025-05-12 DIAGNOSIS — M32.14 OTHER SYSTEMIC LUPUS ERYTHEMATOSUS WITH GLOMERULAR DISEASE (HCC): Primary | ICD-10-CM

## 2025-05-12 DIAGNOSIS — D68.61 ANTI-PHOSPHOLIPID SYNDROME (HCC): Primary | ICD-10-CM

## 2025-05-12 PROCEDURE — 96413 CHEMO IV INFUSION 1 HR: CPT

## 2025-05-12 RX ORDER — SODIUM CHLORIDE 9 MG/ML
20 INJECTION, SOLUTION INTRAVENOUS ONCE
OUTPATIENT
Start: 2025-05-26

## 2025-05-12 RX ORDER — SODIUM CHLORIDE 9 MG/ML
20 INJECTION, SOLUTION INTRAVENOUS ONCE
Status: COMPLETED | OUTPATIENT
Start: 2025-05-12 | End: 2025-05-12

## 2025-05-12 RX ADMIN — SODIUM CHLORIDE 20 ML/HR: 0.9 INJECTION, SOLUTION INTRAVENOUS at 11:23

## 2025-05-12 RX ADMIN — BELIMUMAB 920 MG: 400 INJECTION, POWDER, LYOPHILIZED, FOR SOLUTION INTRAVENOUS at 11:27

## 2025-05-12 NOTE — PLAN OF CARE
Problem: Potential for Falls  Goal: Patient will remain free of falls  Description: INTERVENTIONS:- Educate patient/family on patient safety including physical limitations- Instruct patient to call for assistance with activity - Consult OT/PT to assist with strengthening/mobility - Keep Call bell within reach- Keep bed low and locked with side rails adjusted as appropriate- Keep care items and personal belongings within reach- Initiate and maintain comfort rounds- Make Fall Risk Sign visible to staff- Offer Toileting every Apply yellow socks and bracelet for high fall risk patients- Consider moving patient to room near nurses station  Outcome: Progressing

## 2025-05-12 NOTE — PROGRESS NOTES
Rina Taylor  tolerated treatment well with no complications.      Rina Taylor is aware of future appt on 6/9/25 at 0900.     AVS printed and given to Rina Taylor.

## 2025-05-14 ENCOUNTER — APPOINTMENT (OUTPATIENT)
Dept: LAB | Facility: HOSPITAL | Age: 60
End: 2025-05-14
Payer: COMMERCIAL

## 2025-05-14 DIAGNOSIS — D68.61 ANTI-PHOSPHOLIPID SYNDROME (HCC): ICD-10-CM

## 2025-05-14 LAB
INR PPP: 3.74 (ref 0.85–1.19)
PROTHROMBIN TIME: 36.1 SECONDS (ref 12.3–15)

## 2025-05-14 PROCEDURE — 85610 PROTHROMBIN TIME: CPT

## 2025-05-14 PROCEDURE — 36415 COLL VENOUS BLD VENIPUNCTURE: CPT

## 2025-05-15 ENCOUNTER — OFFICE VISIT (OUTPATIENT)
Dept: OBGYN CLINIC | Facility: CLINIC | Age: 60
End: 2025-05-15
Payer: COMMERCIAL

## 2025-05-15 ENCOUNTER — OFFICE VISIT (OUTPATIENT)
Dept: HEMATOLOGY ONCOLOGY | Facility: CLINIC | Age: 60
End: 2025-05-15
Payer: COMMERCIAL

## 2025-05-15 VITALS
RESPIRATION RATE: 17 BRPM | HEART RATE: 71 BPM | TEMPERATURE: 97.6 F | SYSTOLIC BLOOD PRESSURE: 138 MMHG | BODY MASS INDEX: 38.71 KG/M2 | DIASTOLIC BLOOD PRESSURE: 88 MMHG | HEIGHT: 61 IN | OXYGEN SATURATION: 97 % | WEIGHT: 205 LBS

## 2025-05-15 VITALS
BODY MASS INDEX: 38.51 KG/M2 | WEIGHT: 204 LBS | SYSTOLIC BLOOD PRESSURE: 138 MMHG | DIASTOLIC BLOOD PRESSURE: 90 MMHG | HEIGHT: 61 IN

## 2025-05-15 DIAGNOSIS — D68.61 ANTI-PHOSPHOLIPID SYNDROME (HCC): Primary | ICD-10-CM

## 2025-05-15 DIAGNOSIS — N89.8 VAGINAL IRRITATION: ICD-10-CM

## 2025-05-15 DIAGNOSIS — N95.2 ATROPHIC VAGINITIS: Primary | ICD-10-CM

## 2025-05-15 LAB
CLUE CELLS SPEC QL WET PREP: NORMAL
T VAGINALIS VAG QL WET PREP: NORMAL
YEAST VAG QL WET PREP: NORMAL

## 2025-05-15 PROCEDURE — 99213 OFFICE O/P EST LOW 20 MIN: CPT | Performed by: PHYSICIAN ASSISTANT

## 2025-05-15 PROCEDURE — 87210 SMEAR WET MOUNT SALINE/INK: CPT | Performed by: PHYSICIAN ASSISTANT

## 2025-05-15 PROCEDURE — 99213 OFFICE O/P EST LOW 20 MIN: CPT | Performed by: INTERNAL MEDICINE

## 2025-05-15 RX ORDER — ESTRADIOL 0.1 MG/G
CREAM VAGINAL
Qty: 42.5 G | Refills: 1 | Status: SHIPPED | OUTPATIENT
Start: 2025-05-15

## 2025-05-15 NOTE — PROGRESS NOTES
Name: Rina Taylor      : 1965      MRN: [id number]  Encounter Provider: Maira Gee PA-C  Encounter Date: 5/15/2025   Encounter department: [facility] [place]  :  Assessment & Plan  Atrophic vaginitis  - Given clear presence of excoriation on external vulva suspect symptoms to be related to atrophic vaginitis.  -Recommended treatment with a course of Estrace cream which patient is agreeable to.  Discussed risks and benefits.  - Will treat with Estrace cream daily for 1 week then decrease use to twice weekly  -Patient advised to call office if symptoms fail to resolve or if bleeding returns  Orders:    estradiol (ESTRACE VAGINAL) 0.1 mg/g vaginal cream; Insert 1 g intravaginally once daily for one week. Then decrease use to twice weekly    Vaginal irritation  - Wet mount negative  Orders:    POCT wet mount        History of Present Illness   HPI  Rina Taylor is a 60 y.o. female who presents to the office today for a problem visit. She reports increased vaginal irritation and discomfort which started on Saturday.  Symptoms started following intercourse.  She states she does struggle on and off with vaginal dryness and irritation with sex.  She occasionally uses lubricants.  She did not use lubricant with this instance of intercourse.    Following intercourse she noted light pink/red with wiping.  She is certain this bleeding was coming from external vulva.  She has had postmenopausal uterine bleeding and thickened endometrium on ultrasound in the past.  Had D&C hysteroscopy in  which yielded benign findings.  She has not had any episodes of bleeding since.    She reports a slight vaginal odor and slight change in discharge.  Denies fever, chills, abdominal pain, hematuria, dysuria, urgency, frequency.        Review of Systems   Constitutional: Negative.    Respiratory: Negative.     Cardiovascular: Negative.    Gastrointestinal: Negative.    Genitourinary:   "Positive for vaginal discharge.        Vulvar irritation, itching     Musculoskeletal: Negative.    Skin: Negative.    Psychiatric/Behavioral: Negative.            Objective   /90 (BP Location: Left arm, Patient Position: Sitting, Cuff Size: Large)   Ht 5' 1\" (1.549 m)   Wt 92.5 kg (204 lb)   BMI 38.55 kg/m²      Physical Exam  Vitals and nursing note reviewed.   Constitutional:       General: She is not in acute distress.     Appearance: Normal appearance. She is well-developed.   HENT:      Head: Normocephalic and atraumatic.     Eyes:      Conjunctiva/sclera: Conjunctivae normal.     Pulmonary:      Effort: Pulmonary effort is normal. No respiratory distress.   Abdominal:      General: Abdomen is flat. There is no distension.   Genitourinary:     General: Normal vulva.      Exam position: Lithotomy position.      Vagina: No vaginal discharge, bleeding or prolapsed vaginal walls.      Cervix: No cervical motion tenderness, discharge, lesion or cervical bleeding.      Uterus: Not enlarged and not tender.       Adnexa:         Right: No mass, tenderness or fullness.          Left: No mass, tenderness or fullness.          Comments: Inner aspect of right labia minora with excoriation and irritative changes.  Consistent with likely atrophy and friction with recent intercourse.  Vulva has mildly atrophic appearance    Musculoskeletal:      Cervical back: Neck supple.      Right lower leg: No edema.      Left lower leg: No edema.     Skin:     General: Skin is warm and dry.     Neurological:      General: No focal deficit present.      Mental Status: She is alert. Mental status is at baseline.     Psychiatric:         Mood and Affect: Mood normal.         Behavior: Behavior normal.         Thought Content: Thought content normal.         Judgment: Judgment normal.           "

## 2025-05-15 NOTE — PROGRESS NOTES
"Name: Rina Taylor      : 1965      MRN: [id number]  Encounter Provider: Azeem Duque DO  Encounter Date: 5/15/2025   Encounter department: [facility] [place]  :  Assessment & Plan  Anti-phospholipid syndrome (HCC)  Continues on warfarin.  Currently taking 4 mg p.o. daily except Thursday.  INR has generally been in the 3-4 range.  Some easy bruising at times, no major bleeding.  I asked her to decrease warfarin to 2 mg on Monday, none on Thursday, 4 mg on all other days.  This represents about a 10% reduction in Mg/Wk.  She has started Benlysta for lupus.  Continue monitoring under rheumatology, Dr. Tellez.  If all is well, follow-up in 1 year.  Orders:  •  Protime-INR; Standing        Return in about 1 year (around 5/15/2026) for Labs - See orders.    History of Present Illness   Chief Complaint   Patient presents with   • Follow-up       Pertinent Medical History   Phospholipid antibody syndrome.  Warfarin.     Review of Systems   Constitutional:  Negative for appetite change, diaphoresis, fatigue and fever.   HENT:  Negative for sinus pain.    Eyes:  Negative for discharge.   Respiratory:  Negative for cough and shortness of breath.    Cardiovascular:  Negative for chest pain.   Gastrointestinal:  Negative for abdominal pain, constipation and diarrhea.   Endocrine: Negative for cold intolerance.   Genitourinary:  Negative for difficulty urinating and hematuria.   Musculoskeletal:  Positive for arthralgias. Negative for joint swelling.   Skin:  Negative for rash.   Allergic/Immunologic: Negative for environmental allergies.   Neurological:  Negative for dizziness and headaches.   Hematological:  Negative for adenopathy.   Psychiatric/Behavioral:  Negative for agitation.            Objective   /88 (BP Location: Left arm, Patient Position: Sitting, Cuff Size: Adult)   Pulse 71   Temp 97.6 °F (36.4 °C) (Temporal)   Resp 17   Ht 5' 1\" (1.549 m)   Wt 93 kg " (205 lb)   SpO2 97%   BMI 38.73 kg/m²     Pain Screening:  Pain Score: 0-No pain  ECOG ECOG Performance Status: 1 - Restricted in physically strenuous activity but ambulatory and able to carry out work of a light or sedentary nature, e.g., light house work, office work   Physical Exam  Constitutional:       Appearance: She is well-developed.   HENT:      Head: Normocephalic and atraumatic.     Eyes:      Pupils: Pupils are equal, round, and reactive to light.       Cardiovascular:      Rate and Rhythm: Normal rate.      Heart sounds: No murmur heard.  Pulmonary:      Effort: No respiratory distress.      Breath sounds: No wheezing or rales.   Abdominal:      General: There is no distension.      Palpations: Abdomen is soft.      Tenderness: There is no abdominal tenderness. There is no rebound.     Musculoskeletal:         General: No tenderness.      Cervical back: Neck supple.   Lymphadenopathy:      Cervical: No cervical adenopathy.     Skin:     General: Skin is warm.      Findings: No rash.     Neurological:      Mental Status: She is alert and oriented to person, place, and time.      Deep Tendon Reflexes: Reflexes normal.     Psychiatric:         Thought Content: Thought content normal.         Labs: I have reviewed the following labs:  Lab Results   Component Value Date/Time    WBC 6.79 02/07/2025 02:32 PM    RBC 4.18 02/07/2025 02:32 PM    Hemoglobin 12.5 02/07/2025 02:32 PM    Hematocrit 36.4 02/07/2025 02:32 PM    MCV 87 02/07/2025 02:32 PM    MCH 29.9 02/07/2025 02:32 PM    RDW 12.2 02/07/2025 02:32 PM    Platelets 203 02/07/2025 02:32 PM    Segmented % 63 11/30/2024 10:59 AM    Lymphocytes % 27 11/30/2024 10:59 AM    Monocytes % 8 11/30/2024 10:59 AM    Eosinophils Relative 1 11/30/2024 10:59 AM    Basophils Relative 1 11/30/2024 10:59 AM    Immature Grans % 0 11/30/2024 10:59 AM    Absolute Neutrophils 3.67 11/30/2024 10:59 AM     Lab Results   Component Value Date/Time    Potassium 4.8 05/09/2025  02:48 PM    Chloride 110 (H) 05/09/2025 02:48 PM    CO2 23 05/09/2025 02:48 PM    BUN 35 (H) 05/09/2025 02:48 PM    Creatinine 1.25 05/09/2025 02:48 PM    Glucose, Fasting 88 02/04/2025 10:34 AM    Calcium 9.9 05/09/2025 02:48 PM    AST 19 05/09/2025 02:48 PM    ALT 17 05/09/2025 02:48 PM    Alkaline Phosphatase 79 05/09/2025 02:48 PM    Total Protein 7.2 05/09/2025 02:48 PM    Albumin 3.8 05/09/2025 02:48 PM    Total Bilirubin 0.58 05/09/2025 02:48 PM    eGFR 47 05/09/2025 02:48 PM

## 2025-05-15 NOTE — ASSESSMENT & PLAN NOTE
Continues on warfarin.  Currently taking 4 mg p.o. daily except Thursday.  INR has generally been in the 3-4 range.  Some easy bruising at times, no major bleeding.  I asked her to decrease warfarin to 2 mg on Monday, none on Thursday, 4 mg on all other days.  This represents about a 10% reduction in Mg/Wk.  She has started Benlysta for lupus.  Continue monitoring under rheumatology, Dr. Tellez.  If all is well, follow-up in 1 year.  Orders:  •  Protime-INR; Standing

## 2025-05-16 ENCOUNTER — TELEPHONE (OUTPATIENT)
Age: 60
End: 2025-05-16

## 2025-05-16 NOTE — TELEPHONE ENCOUNTER
Patient is returning Breanna's call. Patient is  agreeable to having her appointment changed to 8/4 at 9:30 with Dr. Tellez.      Please advise.

## 2025-05-16 NOTE — TELEPHONE ENCOUNTER
Left voicemail message for patient.  Need to cx 8/11 appointment.  Dr. Tellez has a meeting that morning.  Can offer 8/4 at 9:30.

## 2025-05-20 ENCOUNTER — OFFICE VISIT (OUTPATIENT)
Dept: NEPHROLOGY | Facility: CLINIC | Age: 60
End: 2025-05-20
Payer: COMMERCIAL

## 2025-05-20 VITALS
SYSTOLIC BLOOD PRESSURE: 120 MMHG | WEIGHT: 208 LBS | DIASTOLIC BLOOD PRESSURE: 72 MMHG | OXYGEN SATURATION: 97 % | HEART RATE: 75 BPM | BODY MASS INDEX: 39.3 KG/M2

## 2025-05-20 DIAGNOSIS — I10 PRIMARY HYPERTENSION: ICD-10-CM

## 2025-05-20 DIAGNOSIS — N20.0 NEPHROLITHIASIS: ICD-10-CM

## 2025-05-20 DIAGNOSIS — M32.9 SYSTEMIC LUPUS ERYTHEMATOSUS, UNSPECIFIED SLE TYPE, UNSPECIFIED ORGAN INVOLVEMENT STATUS (HCC): ICD-10-CM

## 2025-05-20 DIAGNOSIS — E21.0 PRIMARY HYPERPARATHYROIDISM (HCC): ICD-10-CM

## 2025-05-20 DIAGNOSIS — R80.8 OTHER PROTEINURIA: ICD-10-CM

## 2025-05-20 DIAGNOSIS — M35.00 SJOGREN'S SYNDROME WITHOUT EXTRAGLANDULAR INVOLVEMENT (HCC): ICD-10-CM

## 2025-05-20 DIAGNOSIS — N18.31 CKD STAGE 3A, GFR 45-59 ML/MIN (HCC): Primary | ICD-10-CM

## 2025-05-20 DIAGNOSIS — E55.9 VITAMIN D DEFICIENCY: ICD-10-CM

## 2025-05-20 DIAGNOSIS — E83.52 HYPERCALCEMIA: ICD-10-CM

## 2025-05-20 PROCEDURE — 99214 OFFICE O/P EST MOD 30 MIN: CPT | Performed by: INTERNAL MEDICINE

## 2025-05-20 NOTE — ASSESSMENT & PLAN NOTE
-+residual proteinuria from prior lupus nephritis (biopsy proven in 1996), now noted to have increased weight as well as HTN.   -Off HCTZ due to hypercalcemia, on fosinopril 20mg twice daily, aldactone 25mg daily   -last UpCr 0.3g as of this month, microalb:Cr 186mg/g  -Monitor UpCr along with microalb:Cr

## 2025-05-20 NOTE — PROGRESS NOTES
NEPHROLOGY OUTPATIENT PROGRESS NOTE   Rina Taylor 60 y.o. female MRN: [id number]  DATE: 5/20/2025  Reason for visit:   Chief Complaint   Patient presents with    Follow-up    Chronic Kidney Disease        Patient Instructions   CKD stage 3a, GFR 45-59 ml/min ([facility])  CKD stage II/history of lupus nephritis, membranous nephropathy d/t LN class V/III with mild activity and chronicity  -lupus avise showed complement activation despite normal C3/C4  -sCr seems to be near 1 as new baseline but last sCr higher at 1.25 as of 5/9/25, with sCr up to 1.4 as of 1/29/25  -Upcr stable  -continue to avoid nonsteroidals(ibuprofen, aleve, advil, motrin, celebrex, naproxen, toradol)  -latest urinalysis shows large leukocytes, 1+ protein, 2-4 RBCs, 4-10 WBCs, 10-25 hyaline casts  -monitor CMP, UA with microscopy as well along with repeat UpCr.     Primary hyperparathyroidism (HCC)  - follows with endo, +nephrolithiasis history with recent Urinalysis showing calcium oxalate crystals  -calcium has normalized on latest bloodwork  -PTH slightly elevated at 132 as of Nov. 2024 - off sensipar 30mg daily, per endocrinology  -calcium normal  -per endocrinology, s/p surgery May 2021  -off sensipar, monitor PTH  Primary hypertension  -BP well controlled today  -continue nifedipine XL 30mg daily(at night), fosinopril 20mg twice daily, spironolactone 25mg daily (at lunch)  -avoid caffeine/tea  Nephrolithiasis  -You must drink enough water to urinate at least 2L per day to prevent stone formation but this is difficult given her edema and need for diuretics. Unfortunately, diuretics will contribute to stone formation.  -avoid high salt diet both for stone reduction and to help blood pressure stay controlled  -Feb. 2025 renal ultrasound showed normal cortical echogenicity, nonobstructing 8mm left kidney stone  -left sided kidney stone noted on 12/30/24 KUB  -defer 24 hr urine testing for now  Hypercalcemia  -resolved, last calcium 9.9 as of this  month  -repeat CMP  Other proteinuria  -+residual proteinuria from prior lupus nephritis (biopsy proven in 1996), now noted to have increased weight as well as HTN.   -Off HCTZ due to hypercalcemia, on fosinopril 20mg twice daily, aldactone 25mg daily   -last UpCr 0.3g as of this month, microalb:Cr 186mg/g  -Monitor UpCr along with microalb:Cr  Vitamin D deficiency  - Vit D level 18.9 as of Nov. 2024   - on vitamin D3 1000u daily, per endocrinology  Sjogren's syndrome without extraglandular involvement (HCC)  -per rheumatology  Systemic lupus erythematosus, unspecified SLE type, unspecified organ involvement status ([facility])  -follows with rheumatology, on plaquenil 200mg twice daily, benlysta monthly infusions (begun 3 months ago)    RTC in 3-4 months.  Obtain blood and urine testing prior to next office visit.       Assessment & Plan  CKD stage 3a, GFR 45-59 ml/min ([facility])  CKD stage II/history of lupus nephritis, membranous nephropathy d/t LN class V/III with mild activity and chronicity  -lupus avise showed complement activation despite normal C3/C4  -sCr seems to be near 1 as new baseline but last sCr higher at 1.25 as of 5/9/25, with sCr up to 1.4 as of 1/29/25  -Upcr stable  -continue to avoid nonsteroidals(ibuprofen, aleve, advil, motrin, celebrex, naproxen, toradol)  -latest urinalysis shows large leukocytes, 1+ protein, 2-4 RBCs, 4-10 WBCs, 10-25 hyaline casts  -monitor CMP, UA with microscopy as well along with repeat UpCr.     Primary hyperparathyroidism (HCC)  - follows with endo, +nephrolithiasis history with recent Urinalysis showing calcium oxalate crystals  -calcium has normalized on latest bloodwork  -PTH slightly elevated at 132 as of Nov. 2024 - off sensipar 30mg daily, per endocrinology  -calcium normal  -per endocrinology, s/p surgery May 2021  -off sensipar, monitor PTH  Primary hypertension  -BP well controlled today  -continue nifedipine XL 30mg daily(at night), fosinopril 20mg twice daily,  spironolactone 25mg daily (at lunch)  -avoid caffeine/tea  Nephrolithiasis  -You must drink enough water to urinate at least 2L per day to prevent stone formation but this is difficult given her edema and need for diuretics. Unfortunately, diuretics will contribute to stone formation.  -avoid high salt diet both for stone reduction and to help blood pressure stay controlled  -Feb. 2025 renal ultrasound showed normal cortical echogenicity, nonobstructing 8mm left kidney stone  -left sided kidney stone noted on 12/30/24 KUB  -defer 24 hr urine testing for now  Hypercalcemia  -resolved, last calcium 9.9 as of this month  -repeat CMP  Other proteinuria  -+residual proteinuria from prior lupus nephritis (biopsy proven in 1996), now noted to have increased weight as well as HTN.   -Off HCTZ due to hypercalcemia, on fosinopril 20mg twice daily, aldactone 25mg daily   -last UpCr 0.3g as of this month, microalb:Cr 186mg/g  -Monitor UpCr along with microalb:Cr  Vitamin D deficiency  - Vit D level 18.9 as of Nov. 2024   - on vitamin D3 1000u daily, per endocrinology  Sjogren's syndrome without extraglandular involvement (HCC)  -per rheumatology  Systemic lupus erythematosus, unspecified SLE type, unspecified organ involvement status (HCC)  -follows with rheumatology, on plaquenil 200mg twice daily, benlysta monthly infusions (begun 3 months ago)    RTC in 3-4 months.  Obtain blood and urine testing prior to next office visit.     SUBJECTIVE / INTERVAL HISTORY:  60 y.o. female presents in follow up of CKD.     Rina Taylor went to the ER for foot pain on the left. She had arterial calcifications. She is advised to walk more.     Denies NSAID use.    Review of Systems   Constitutional:  Negative for chills and fever.   HENT:  Negative for sore throat and trouble swallowing.    Eyes:  Negative for visual disturbance.   Respiratory:  Negative for cough and shortness of breath.    Cardiovascular:  Positive for leg swelling (left  more than right LE, chronic). Negative for chest pain.   Gastrointestinal:  Negative for abdominal pain, constipation, diarrhea, nausea and vomiting.   Endocrine: Negative for polyuria.   Genitourinary:  Negative for difficulty urinating, dysuria and hematuria.        Vaginal dryness   Musculoskeletal:  Negative for back pain and neck pain.   Skin:  Negative for rash.   Neurological:  Negative for dizziness, light-headedness and numbness.   Psychiatric/Behavioral:  The patient is not nervous/anxious.        OBJECTIVE:  /72 (BP Location: Left arm, Patient Position: Sitting, Cuff Size: Adult)   Pulse 75   Wt 94.3 kg (208 lb)   SpO2 97%   BMI 39.30 kg/m²  Body mass index is 39.3 kg/m².    Physical exam:  Physical Exam  Vitals and nursing note reviewed.   Constitutional:       General: She is not in acute distress.     Appearance: Normal appearance. She is well-developed. She is obese. She is not diaphoretic.   HENT:      Head: Normocephalic and atraumatic.      Nose: Nose normal.      Mouth/Throat:      Mouth: Mucous membranes are moist.      Pharynx: No oropharyngeal exudate.     Eyes:      General: No scleral icterus.        Right eye: No discharge.         Left eye: No discharge.      Comments: eyeglasses   Neck:      Thyroid: No thyromegaly.     Cardiovascular:      Rate and Rhythm: Normal rate and regular rhythm.      Heart sounds: No murmur heard.  Pulmonary:      Effort: Pulmonary effort is normal. No respiratory distress.      Breath sounds: Normal breath sounds. No wheezing.   Abdominal:      General: Bowel sounds are normal. There is no distension.      Palpations: Abdomen is soft.     Musculoskeletal:         General: Swelling (trace b/l LE) present. Normal range of motion.      Cervical back: Normal range of motion and neck supple.     Skin:     General: Skin is warm and dry.      Coloration: Skin is not jaundiced.      Findings: No rash.     Neurological:      General: No focal deficit present.  "     Mental Status: She is alert.      Motor: No abnormal muscle tone.      Comments: awake   Psychiatric:         Mood and Affect: Mood normal.         Behavior: Behavior normal.         Medications:  Current Medications[1]    Allergies:  Allergies as of 05/20/2025 - Reviewed 05/20/2025   Allergen Reaction Noted    Penicillins Itching 04/25/2013       The following portions of the patient's history were reviewed and updated as appropriate: past family history, past surgical history and problem list.    Laboratory Results:  Lab Results   Component Value Date    SODIUM 139 05/09/2025    K 4.8 05/09/2025     (H) 05/09/2025    CO2 23 05/09/2025    BUN 35 (H) 05/09/2025    CREATININE 1.25 05/09/2025    GLUC 84 05/09/2025    CALCIUM 9.9 05/09/2025        Lab Results   Component Value Date    .9 (H) 11/30/2024    CALCIUM 9.9 05/09/2025    PHOS 2.7 06/09/2023       Portions of the record may have been created with voice recognition software.  Occasional wrong word or \"sound a like\" substitutions may have occurred due to the inherent limitations of voice recognition software.  Read the chart carefully and recognize, using context, where substitutions have occurred.         [1]   Current Outpatient Medications:     Cholecalciferol 25 MCG (1000 UT) TBDP, Take 1,000 Units by mouth in the morning, Disp: , Rfl:     ciclopirox (LOPROX) 0.77 % cream, Apply topically in the morning and in the evening., Disp: , Rfl:     estradiol (ESTRACE VAGINAL) 0.1 mg/g vaginal cream, Insert 1 g intravaginally once daily for one week. Then decrease use to twice weekly, Disp: 42.5 g, Rfl: 1    fosinopril (MONOPRIL) 20 mg tablet, Take 1 tablet (20 mg total) by mouth 2 (two) times a day, Disp: 180 tablet, Rfl: 1    hydrocortisone 2.5 % cream, , Disp: , Rfl:     hydroxychloroquine (PLAQUENIL) 200 mg tablet, Take 1 tablet (200 mg total) by mouth 2 (two) times a day with meals, Disp: 180 tablet, Rfl: 1    levothyroxine 125 mcg tablet, Take " one tablet daily on empty stomach 1 hour before breakfast and 4 hours apart from calcium and vitamin D supplementation, Disp: 90 tablet, Rfl: 1    NIFEdipine (PROCARDIA XL) 30 mg 24 hr tablet, Take 1 tablet (30 mg total) by mouth daily, Disp: 90 tablet, Rfl: 1    spironolactone (ALDACTONE) 25 mg tablet, Take 1 tablet (25 mg total) by mouth daily, Disp: 90 tablet, Rfl: 1    triamcinolone (KENALOG) 0.1 % cream, Apply topically 2 (two) times a day for 7 days, Disp: 80 g, Rfl: 0    warfarin (Coumadin) 1 mg tablet, As directed., Disp: 90 tablet, Rfl: 2    warfarin (COUMADIN) 3 mg tablet, TAKE ONE TABLET BY MOUTH EVERY DAY. DO NOT TAKE ON MONDAYS AND THURSDAYS, Disp: 70 tablet, Rfl: 0    warfarin (COUMADIN) 4 mg tablet, Take by mouth in the evening. Every day except Thursday., Disp: , Rfl:     folic acid (FOLVITE) 1 mg tablet, Take 1 tablet by mouth daily (Patient not taking: Reported on 3/11/2025), Disp: , Rfl:     traMADol (Ultram) 50 mg tablet, Take 1 tablet (50 mg total) by mouth every 6 (six) hours as needed for moderate pain or severe pain, Disp: 30 tablet, Rfl: 0

## 2025-05-20 NOTE — PATIENT INSTRUCTIONS
CKD stage 3a, GFR 45-59 ml/min ([facility])  CKD stage II/history of lupus nephritis, membranous nephropathy d/t LN class V/III with mild activity and chronicity  -lupus avise showed complement activation despite normal C3/C4  -sCr seems to be near 1 as new baseline but last sCr higher at 1.25 as of 5/9/25, with sCr up to 1.4 as of 1/29/25  -Upcr stable  -continue to avoid nonsteroidals(ibuprofen, aleve, advil, motrin, celebrex, naproxen, toradol)  -latest urinalysis shows large leukocytes, 1+ protein, 2-4 RBCs, 4-10 WBCs, 10-25 hyaline casts  -monitor CMP, UA with microscopy as well along with repeat UpCr.     Primary hyperparathyroidism (HCC)  - follows with endo, +nephrolithiasis history with recent Urinalysis showing calcium oxalate crystals  -calcium has normalized on latest bloodwork  -PTH slightly elevated at 132 as of Nov. 2024 - off sensipar 30mg daily, per endocrinology  -calcium normal  -per endocrinology, s/p surgery May 2021  -off sensipar, monitor PTH  Primary hypertension  -BP well controlled today  -continue nifedipine XL 30mg daily(at night), fosinopril 20mg twice daily, spironolactone 25mg daily (at lunch)  -avoid caffeine/tea  Nephrolithiasis  -You must drink enough water to urinate at least 2L per day to prevent stone formation but this is difficult given her edema and need for diuretics. Unfortunately, diuretics will contribute to stone formation.  -avoid high salt diet both for stone reduction and to help blood pressure stay controlled  -Feb. 2025 renal ultrasound showed normal cortical echogenicity, nonobstructing 8mm left kidney stone  -left sided kidney stone noted on 12/30/24 KUB  -defer 24 hr urine testing for now  Hypercalcemia  -resolved, last calcium 9.9 as of this month  -repeat CMP  Other proteinuria  -+residual proteinuria from prior lupus nephritis (biopsy proven in 1996), now noted to have increased weight as well as HTN.   -Off HCTZ due to hypercalcemia, on fosinopril 20mg twice daily,  aldactone 25mg daily   -last UpCr 0.3g as of this month, microalb:Cr 186mg/g  -Monitor UpCr along with microalb:Cr  Vitamin D deficiency  - Vit D level 18.9 as of Nov. 2024   - on vitamin D3 1000u daily, per endocrinology  Sjogren's syndrome without extraglandular involvement (HCC)  -per rheumatology  Systemic lupus erythematosus, unspecified SLE type, unspecified organ involvement status (HCC)  -follows with rheumatology, on plaquenil 200mg twice daily, benlysta monthly infusions (begun 3 months ago)    RTC in 3-4 months.  Obtain blood and urine testing prior to next office visit.

## 2025-05-20 NOTE — ASSESSMENT & PLAN NOTE
CKD stage II/history of lupus nephritis, membranous nephropathy d/t LN class V/III with mild activity and chronicity  -lupus avise showed complement activation despite normal C3/C4  -sCr seems to be near 1 as new baseline but last sCr higher at 1.25 as of 5/9/25, with sCr up to 1.4 as of 1/29/25  -Upcr stable  -continue to avoid nonsteroidals(ibuprofen, aleve, advil, motrin, celebrex, naproxen, toradol)  -latest urinalysis shows large leukocytes, 1+ protein, 2-4 RBCs, 4-10 WBCs, 10-25 hyaline casts  -monitor CMP, UA with microscopy as well along with repeat UpCr.

## 2025-05-20 NOTE — ASSESSMENT & PLAN NOTE
- follows with endo, +nephrolithiasis history with recent Urinalysis showing calcium oxalate crystals  -calcium has normalized on latest bloodwork  -PTH slightly elevated at 132 as of Nov. 2024 - off sensipar 30mg daily, per endocrinology  -calcium normal  -per endocrinology, s/p surgery May 2021  -off sensipar, monitor PTH

## 2025-05-20 NOTE — ASSESSMENT & PLAN NOTE
-follows with rheumatology, on plaquenil 200mg twice daily, benlysta monthly infusions (begun 3 months ago)

## 2025-05-20 NOTE — ASSESSMENT & PLAN NOTE
-You must drink enough water to urinate at least 2L per day to prevent stone formation but this is difficult given her edema and need for diuretics. Unfortunately, diuretics will contribute to stone formation.  -avoid high salt diet both for stone reduction and to help blood pressure stay controlled  -Feb. 2025 renal ultrasound showed normal cortical echogenicity, nonobstructing 8mm left kidney stone  -left sided kidney stone noted on 12/30/24 KUB  -defer 24 hr urine testing for now

## 2025-05-27 DIAGNOSIS — I10 ESSENTIAL HYPERTENSION: ICD-10-CM

## 2025-05-28 ENCOUNTER — HOSPITAL ENCOUNTER (OUTPATIENT)
Dept: RADIOLOGY | Age: 60
Discharge: HOME | End: 2025-05-28
Payer: COMMERCIAL

## 2025-05-28 VITALS — HEIGHT: 61 IN | WEIGHT: 203 LBS | BODY MASS INDEX: 38.33 KG/M2

## 2025-05-28 DIAGNOSIS — Z12.31 ENCOUNTER FOR SCREENING MAMMOGRAM FOR MALIGNANT NEOPLASM OF BREAST: ICD-10-CM

## 2025-05-28 PROCEDURE — 77067 SCR MAMMO BI INCL CAD: CPT

## 2025-05-28 PROCEDURE — 77063 BREAST TOMOSYNTHESIS BI: CPT

## 2025-05-28 RX ORDER — NIFEDIPINE 30 MG/1
30 TABLET, EXTENDED RELEASE ORAL DAILY
Qty: 90 TABLET | Refills: 1 | Status: SHIPPED | OUTPATIENT
Start: 2025-05-28

## 2025-06-02 ENCOUNTER — TELEPHONE (OUTPATIENT)
Dept: INFUSION CENTER | Facility: HOSPITAL | Age: 60
End: 2025-06-02

## 2025-06-02 ENCOUNTER — TELEPHONE (OUTPATIENT)
Age: 60
End: 2025-06-02

## 2025-06-02 RX ORDER — CLINDAMYCIN HYDROCHLORIDE 150 MG/1
CAPSULE ORAL
COMMUNITY
Start: 2025-05-30

## 2025-06-02 NOTE — TELEPHONE ENCOUNTER
Pt states she was told by infusion center to call for provider recommendations. Pt states she is currently on Clindamycin for tooth abscess and removal. Pt asking if she can still get infusion on June 9. Pt states she has 7 more days of antibiotic. Please call pt and infusion center to make aware of providers recommendations.

## 2025-06-02 NOTE — TELEPHONE ENCOUNTER
Patient called in to inform that she is currently taking an antibiotic (Clindamyacin) and will not be finished until 6/9. Next treatment of Benlysta is due 6/9. Advised patient to call provider's office.     Routing to provider and provider's RN. Please advise on how to proceed.

## 2025-06-03 NOTE — TELEPHONE ENCOUNTER
Rheum called to inform of update from doctor to delay infusion 10-14 days after antibiotic complete.

## 2025-06-03 NOTE — TELEPHONE ENCOUNTER
Attempt #1  LM to please call back regarding providers result.      Call placed to [facility] spoke with Linda they will make note of providers recommendations.

## 2025-06-03 NOTE — TELEPHONE ENCOUNTER
Spoke with patient, advised of the recommendations noted by Dr. Tellez of when to have her infusion after off antibiotics. That she will have to rescheduled the 6/9/2025 until after 6/18/2025.     Patient then asked how long she will need to be on the infusions and was advised right now every 4 weeks and that it is long term treatment and to discuss treatments with Dr. Tellez.      Patient verbalized understanding.

## 2025-06-10 ENCOUNTER — OFFICE VISIT (OUTPATIENT)
Dept: INTERNAL MEDICINE CLINIC | Facility: CLINIC | Age: 60
End: 2025-06-10
Payer: COMMERCIAL

## 2025-06-10 VITALS
HEART RATE: 65 BPM | WEIGHT: 200 LBS | OXYGEN SATURATION: 95 % | DIASTOLIC BLOOD PRESSURE: 60 MMHG | SYSTOLIC BLOOD PRESSURE: 120 MMHG | BODY MASS INDEX: 37.76 KG/M2 | HEIGHT: 61 IN

## 2025-06-10 DIAGNOSIS — Z00.00 ANNUAL PHYSICAL EXAM: Primary | ICD-10-CM

## 2025-06-10 DIAGNOSIS — Z12.11 SCREEN FOR COLON CANCER: ICD-10-CM

## 2025-06-10 DIAGNOSIS — Z13.220 SCREENING, LIPID: ICD-10-CM

## 2025-06-10 DIAGNOSIS — M87.052 AVASCULAR NECROSIS OF BONE OF HIP, LEFT (HCC): ICD-10-CM

## 2025-06-10 DIAGNOSIS — M87.051 AVASCULAR NECROSIS OF BONE OF RIGHT HIP (HCC): ICD-10-CM

## 2025-06-10 DIAGNOSIS — Z13.1 SCREENING FOR DIABETES MELLITUS: ICD-10-CM

## 2025-06-10 PROCEDURE — 99396 PREV VISIT EST AGE 40-64: CPT | Performed by: INTERNAL MEDICINE

## 2025-06-10 NOTE — PATIENT INSTRUCTIONS
"Patient Education     Routine physical for adults   The Basics   Written by the doctors and editors at [facility]   What is a physical? -- A physical is a routine visit, or \"check-up,\" with your doctor. You might also hear it called a \"wellness visit\" or \"preventive visit.\"  During each visit, the doctor will:   Ask about your physical and mental health   Ask about your habits, behaviors, and lifestyle   Do an exam   Give you vaccines if needed   Talk to you about any medicines you take   Give advice about your health   Answer your questions  Getting regular check-ups is an important part of taking care of your health. It can help your doctor find and treat any problems you have. But it's also important for preventing health problems.  A routine physical is different from a \"sick visit.\" A sick visit is when you see a doctor because of a health concern or problem. Since physicals are scheduled ahead of time, you can think about what you want to ask the doctor.  How often should I get a physical? -- It depends on your age and health. In general, for people age 21 years and older:   If you are younger than 50 years, you might be able to get a physical every 3 years.   If you are 50 years or older, your doctor might recommend a physical every year.  If you have an ongoing health condition, like diabetes or high blood pressure, your doctor will probably want to see you more often.  What happens during a physical? -- In general, each visit will include:   Physical exam - The doctor or nurse will check your height, weight, heart rate, and blood pressure. They will also look at your eyes and ears. They will ask about how you are feeling and whether you have any symptoms that bother you.   Medicines - It's a good idea to bring a list of all the medicines you take to each doctor visit. Your doctor will talk to you about your medicines and answer any questions. Tell them if you are having any side effects that bother you. You " "should also tell them if you are having trouble paying for any of your medicines.   Habits and behaviors - This includes:   Your diet   Your exercise habits   Whether you smoke, drink alcohol, or use drugs   Whether you are sexually active   Whether you feel safe at home  Your doctor will talk to you about things you can do to improve your health and lower your risk of health problems. They will also offer help and support. For example, if you want to quit smoking, they can give you advice and might prescribe medicines. If you want to improve your diet or get more physical activity, they can help you with this, too.   Lab tests, if needed - The tests you get will depend on your age and situation. For example, your doctor might want to check your:   Cholesterol   Blood sugar   Iron level   Vaccines - The recommended vaccines will depend on your age, health, and what vaccines you already had. Vaccines are very important because they can prevent certain serious or deadly infections.   Discussion of screening - \"Screening\" means checking for diseases or other health problems before they cause symptoms. Your doctor can recommend screening based on your age, risk, and preferences. This might include tests to check for:   Cancer, such as breast, prostate, cervical, ovarian, colorectal, prostate, lung, or skin cancer   Sexually transmitted infections, such as chlamydia and gonorrhea   Mental health conditions like depression and anxiety  Your doctor will talk to you about the different types of screening tests. They can help you decide which screenings to have. They can also explain what the results might mean.   Answering questions - The physical is a good time to ask the doctor or nurse questions about your health. If needed, they can refer you to other doctors or specialists, too.  Adults older than 65 years often need other care, too. As you get older, your doctor will talk to you about:   How to prevent falling at " home   Hearing or vision tests   Memory testing   How to take your medicines safely   Making sure that you have the help and support you need at home  All topics are updated as new evidence becomes available and our peer review process is complete.  This topic retrieved from [employer] on: May 02, 2024.  Topic 764473 Version 1.0  Release: 32.4.3 - C32.122  © 2024 UpToDate, Inc[employer] and/or its affiliates. All rights reserved.  Consumer Information Use and Disclaimer   Disclaimer: This generalized information is a limited summary of diagnosis, treatment, and/or medication information. It is not meant to be comprehensive and should be used as a tool to help the user understand and/or assess potential diagnostic and treatment options. It does NOT include all information about conditions, treatments, medications, side effects, or risks that may apply to a specific patient. It is not intended to be medical advice or a substitute for the medical advice, diagnosis, or treatment of a health care provider based on the health care provider's examination and assessment of a patient's specific and unique circumstances. Patients must speak with a health care provider for complete information about their health, medical questions, and treatment options, including any risks or benefits regarding use of medications. This information does not endorse any treatments or medications as safe, effective, or approved for treating a specific patient. UpToDate, Inc. and its affiliates disclaim any warranty or liability relating to this information or the use thereof.The use of this information is governed by the Terms of Use, available at https://www.[employer].com/en/know/clinical-effectiveness-terms. 2024© UpToDate, Inc. and its affiliates and/or licensors. All rights reserved.  Copyright   © 2024 UpToDate, Inc. and/or its affiliates. All rights reserved.

## 2025-06-10 NOTE — PROGRESS NOTES
Adult Annual Physical  Name: Rina Taylor      : 1965      MRN: [id number]  Encounter Provider: Lea Reyes, MD  Encounter Date: 6/10/2025   Enc[facility] department: [facility]    :  Assessment & Plan  Annual physical exam         Screen for colon cancer    Orders:  •  Cologuard    Screening, lipid    Orders:  •  Lipid panel    Screening for diabetes mellitus    Orders:  •  Hemoglobin A1c (w/out EAG)    Avascular necrosis of bone of hip, left (HCC)  Worsening pain in the left especially when she is on her feet for a long time  Requesting x-ray  Recommended to establish with Ortho Orders:  •  Ambulatory Referral to Orthopedic Surgery; Future  •  XR hips bilateral 3-4 vw w pelvis if performed; Future    Avascular necrosis of bone of right hip (HCC)    Orders:  •  Ambulatory Referral to Orthopedic Surgery; Future  •  XR hips bilateral 3-4 vw w pelvis if performed; Future    Annual physical exam               Preventive Screenings:  - Diabetes Screening: screening up-to-date and orders placed  - Cholesterol Screening: orders placed   - Hepatitis C screening: screening up-to-date   - Cervical cancer screening: screening up-to-date   - Breast cancer screening: screening up-to-date   - Colon cancer screening: risks/benefits discussed and orders placed   - Lung cancer screening: screening not indicated     Immunizations:  - Immunizations due: Prevnar 20 and Zoster (Shingrix)  - Risks/benefits immunizations discussed    - The patient declines recommended vaccines currently despite my recommendations           History of Present Illness     Adult Annual Physical:  Patient presents for annual physical.     Diet and Physical Activity:  - Diet/Nutrition: no special diet.  - Exercise: no formal exercise.    Depression Screening:  - PHQ-2 Score: 0    General Health:  - Sleep: 7-8 hours of sleep on average.  - Hearing: normal hearing bilateral ears.  - Vision: most recent eye exam < 1 year ago and wears  "glasses.  - Dental: regular dental visits and brushes teeth twice daily.    /GYN Health:  - Follows with GYN: yes.   - Menopause: postmenopausal.   - History of STDs: no  - Contraception: menopause.      Advanced Care Planning:  - Has an advanced directive?: no    - Has a durable medical POA?: no      Review of Systems   Respiratory:  Negative for shortness of breath.    Cardiovascular:  Negative for chest pain.   Gastrointestinal:  Negative for abdominal pain.   Genitourinary:  Negative for vaginal bleeding.         Vaginal dryness   Musculoskeletal:  Positive for arthralgias (hips left>right).   Hematological:  Bruises/bleeds easily.         Objective   /60 (BP Location: Left arm, Patient Position: Sitting, Cuff Size: Large)   Pulse 65   Ht 5' 1\" (1.549 m)   Wt 90.7 kg (200 lb)   SpO2 95%   BMI 37.79 kg/m²     Physical Exam  Constitutional:       General: She is not in acute distress.     Appearance: She is well-developed. She is obese. She is not ill-appearing, toxic-appearing or diaphoretic.   HENT:      Right Ear: External ear normal. There is no impacted cerumen.      Left Ear: External ear normal. There is no impacted cerumen.     Eyes:      Conjunctiva/sclera: Conjunctivae normal.       Cardiovascular:      Rate and Rhythm: Normal rate and regular rhythm.      Heart sounds: Normal heart sounds. No murmur heard.  Pulmonary:      Effort: Pulmonary effort is normal. No respiratory distress.      Breath sounds: Normal breath sounds. No stridor. No wheezing or rales.   Abdominal:      General: There is no distension.      Palpations: Abdomen is soft. There is no mass.      Tenderness: There is no abdominal tenderness. There is no guarding or rebound.     Musculoskeletal:      Cervical back: Neck supple.      Right lower leg: No edema.      Left lower leg: No edema.     Neurological:      Mental Status: She is alert and oriented to person, place, and time.      Gait: Gait abnormal.     Psychiatric:    "      Mood and Affect: Mood normal.         Behavior: Behavior normal.         Thought Content: Thought content normal.         Judgment: Judgment normal.

## 2025-06-20 DIAGNOSIS — M32.14 OTHER SYSTEMIC LUPUS ERYTHEMATOSUS WITH GLOMERULAR DISEASE (HCC): Primary | ICD-10-CM

## 2025-06-20 RX ORDER — SODIUM CHLORIDE 9 MG/ML
20 INJECTION, SOLUTION INTRAVENOUS ONCE
Status: CANCELLED | OUTPATIENT
Start: 2025-06-24

## 2025-06-22 DIAGNOSIS — M32.14 OTHER SYSTEMIC LUPUS ERYTHEMATOSUS WITH GLOMERULAR DISEASE (HCC): Primary | ICD-10-CM

## 2025-06-24 ENCOUNTER — HOSPITAL ENCOUNTER (OUTPATIENT)
Dept: INFUSION CENTER | Facility: HOSPITAL | Age: 60
Discharge: HOME | End: 2025-06-24
Attending: INTERNAL MEDICINE
Payer: COMMERCIAL

## 2025-06-24 VITALS
DIASTOLIC BLOOD PRESSURE: 85 MMHG | WEIGHT: 200.18 LBS | RESPIRATION RATE: 16 BRPM | BODY MASS INDEX: 37.82 KG/M2 | TEMPERATURE: 97.2 F | HEART RATE: 69 BPM | SYSTOLIC BLOOD PRESSURE: 126 MMHG

## 2025-06-24 DIAGNOSIS — M32.14 OTHER SYSTEMIC LUPUS ERYTHEMATOSUS WITH GLOMERULAR DISEASE (HCC): Primary | ICD-10-CM

## 2025-06-24 PROCEDURE — 96413 CHEMO IV INFUSION 1 HR: CPT

## 2025-06-24 RX ORDER — SODIUM CHLORIDE 9 MG/ML
20 INJECTION, SOLUTION INTRAVENOUS ONCE
Status: COMPLETED | OUTPATIENT
Start: 2025-06-24 | End: 2025-06-24

## 2025-06-24 RX ORDER — SODIUM CHLORIDE 9 MG/ML
20 INJECTION, SOLUTION INTRAVENOUS ONCE
OUTPATIENT
Start: 2025-07-07

## 2025-06-24 RX ADMIN — BELIMUMAB 920 MG: 400 INJECTION, POWDER, LYOPHILIZED, FOR SOLUTION INTRAVENOUS at 12:52

## 2025-06-24 RX ADMIN — SODIUM CHLORIDE 20 ML/HR: 0.9 INJECTION, SOLUTION INTRAVENOUS at 12:52

## 2025-06-27 ENCOUNTER — APPOINTMENT (OUTPATIENT)
Dept: RADIOLOGY | Age: 60
End: 2025-06-27
Payer: COMMERCIAL

## 2025-06-27 ENCOUNTER — APPOINTMENT (OUTPATIENT)
Dept: LAB | Age: 60
End: 2025-06-27
Payer: COMMERCIAL

## 2025-06-27 DIAGNOSIS — E21.0 PRIMARY HYPERPARATHYROIDISM (HCC): ICD-10-CM

## 2025-06-27 DIAGNOSIS — E55.9 VITAMIN D DEFICIENCY: ICD-10-CM

## 2025-06-27 DIAGNOSIS — M87.051 AVASCULAR NECROSIS OF BONE OF RIGHT HIP (HCC): ICD-10-CM

## 2025-06-27 DIAGNOSIS — Z13.1 SCREENING FOR DIABETES MELLITUS: ICD-10-CM

## 2025-06-27 DIAGNOSIS — M32.14 OTHER SYSTEMIC LUPUS ERYTHEMATOSUS WITH GLOMERULAR DISEASE (HCC): ICD-10-CM

## 2025-06-27 DIAGNOSIS — M87.052 AVASCULAR NECROSIS OF BONE OF HIP, LEFT (HCC): ICD-10-CM

## 2025-06-27 DIAGNOSIS — Z85.850 HISTORY OF THYROID CANCER: ICD-10-CM

## 2025-06-27 DIAGNOSIS — E89.0 POSTOPERATIVE HYPOTHYROIDISM: ICD-10-CM

## 2025-06-27 DIAGNOSIS — D68.61 ANTI-PHOSPHOLIPID SYNDROME (HCC): ICD-10-CM

## 2025-06-27 LAB
25(OH)D3 SERPL-MCNC: 20 NG/ML (ref 30–100)
ALBUMIN SERPL BCG-MCNC: 4 G/DL (ref 3.5–5)
ALP SERPL-CCNC: 78 U/L (ref 34–104)
ALT SERPL W P-5'-P-CCNC: 19 U/L (ref 7–52)
ANION GAP SERPL CALCULATED.3IONS-SCNC: 5 MMOL/L (ref 4–13)
AST SERPL W P-5'-P-CCNC: 22 U/L (ref 13–39)
BACTERIA UR QL AUTO: ABNORMAL /HPF
BASOPHILS # BLD AUTO: 0.03 THOUSANDS/ÂΜL (ref 0–0.1)
BASOPHILS NFR BLD AUTO: 1 % (ref 0–1)
BILIRUB SERPL-MCNC: 0.68 MG/DL (ref 0.2–1)
BILIRUB UR QL STRIP: NEGATIVE
BUN SERPL-MCNC: 37 MG/DL (ref 5–25)
C3 SERPL-MCNC: 135 MG/DL (ref 87–200)
C4 SERPL-MCNC: 52 MG/DL (ref 19–52)
CA-I BLD-SCNC: 1.32 MMOL/L (ref 1.12–1.32)
CALCIUM SERPL-MCNC: 9.9 MG/DL (ref 8.4–10.2)
CHLORIDE SERPL-SCNC: 107 MMOL/L (ref 96–108)
CHOLEST SERPL-MCNC: 160 MG/DL (ref ?–200)
CLARITY UR: ABNORMAL
CO2 SERPL-SCNC: 23 MMOL/L (ref 21–32)
COLOR UR: ABNORMAL
CREAT SERPL-MCNC: 1.17 MG/DL (ref 0.6–1.3)
CRP SERPL QL: 2.8 MG/L
EOSINOPHIL # BLD AUTO: 0.08 THOUSAND/ÂΜL (ref 0–0.61)
EOSINOPHIL NFR BLD AUTO: 1 % (ref 0–6)
ERYTHROCYTE [DISTWIDTH] IN BLOOD BY AUTOMATED COUNT: 12.3 % (ref 11.6–15.1)
EST. AVERAGE GLUCOSE BLD GHB EST-MCNC: 103 MG/DL
GFR SERPL CREATININE-BSD FRML MDRD: 51 ML/MIN/1.73SQ M
GLUCOSE P FAST SERPL-MCNC: 88 MG/DL (ref 65–99)
GLUCOSE UR STRIP-MCNC: NEGATIVE MG/DL
HBA1C MFR BLD: 5.2 %
HCT VFR BLD AUTO: 40.7 % (ref 34.8–46.1)
HDLC SERPL-MCNC: 41 MG/DL
HGB BLD-MCNC: 13.8 G/DL (ref 11.5–15.4)
HGB UR QL STRIP.AUTO: ABNORMAL
HYALINE CASTS #/AREA URNS LPF: ABNORMAL /LPF
IMM GRANULOCYTES # BLD AUTO: 0.06 THOUSAND/UL (ref 0–0.2)
IMM GRANULOCYTES NFR BLD AUTO: 1 % (ref 0–2)
INR PPP: 2.12 (ref 0.85–1.19)
KETONES UR STRIP-MCNC: NEGATIVE MG/DL
LDLC SERPL CALC-MCNC: 93 MG/DL (ref 0–100)
LEUKOCYTE ESTERASE UR QL STRIP: ABNORMAL
LYMPHOCYTES # BLD AUTO: 1.44 THOUSANDS/ÂΜL (ref 0.6–4.47)
LYMPHOCYTES NFR BLD AUTO: 22 % (ref 14–44)
MCH RBC QN AUTO: 30.3 PG (ref 26.8–34.3)
MCHC RBC AUTO-ENTMCNC: 33.9 G/DL (ref 31.4–37.4)
MCV RBC AUTO: 89 FL (ref 82–98)
MONOCYTES # BLD AUTO: 0.49 THOUSAND/ÂΜL (ref 0.17–1.22)
MONOCYTES NFR BLD AUTO: 8 % (ref 4–12)
MUCOUS THREADS UR QL AUTO: ABNORMAL
NEUTROPHILS # BLD AUTO: 4.42 THOUSANDS/ÂΜL (ref 1.85–7.62)
NEUTS SEG NFR BLD AUTO: 67 % (ref 43–75)
NITRITE UR QL STRIP: NEGATIVE
NON-SQ EPI CELLS URNS QL MICRO: ABNORMAL /HPF
NONHDLC SERPL-MCNC: 119 MG/DL
NRBC BLD AUTO-RTO: 0 /100 WBCS
PH UR STRIP.AUTO: 5.5 [PH]
PLATELET # BLD AUTO: 166 THOUSANDS/UL (ref 149–390)
PMV BLD AUTO: 10.6 FL (ref 8.9–12.7)
POTASSIUM SERPL-SCNC: 4.5 MMOL/L (ref 3.5–5.3)
PROT SERPL-MCNC: 7.5 G/DL (ref 6.4–8.4)
PROT UR STRIP-MCNC: ABNORMAL MG/DL
PROTHROMBIN TIME: 23.7 SECONDS (ref 12.3–15)
PTH-INTACT SERPL-MCNC: 180.2 PG/ML (ref 12–88)
RBC # BLD AUTO: 4.56 MILLION/UL (ref 3.81–5.12)
RBC #/AREA URNS AUTO: ABNORMAL /HPF
SODIUM SERPL-SCNC: 135 MMOL/L (ref 135–147)
SP GR UR STRIP.AUTO: 1.02 (ref 1–1.03)
T4 FREE SERPL-MCNC: 1.35 NG/DL (ref 0.61–1.12)
TRIGL SERPL-MCNC: 132 MG/DL (ref ?–150)
TSH SERPL DL<=0.05 MIU/L-ACNC: 0.08 UIU/ML (ref 0.45–4.5)
UROBILINOGEN UR STRIP-ACNC: <2 MG/DL
WBC # BLD AUTO: 6.52 THOUSAND/UL (ref 4.31–10.16)
WBC #/AREA URNS AUTO: ABNORMAL /HPF

## 2025-06-27 PROCEDURE — 80053 COMPREHEN METABOLIC PANEL: CPT

## 2025-06-27 PROCEDURE — 83036 HEMOGLOBIN GLYCOSYLATED A1C: CPT

## 2025-06-27 PROCEDURE — 86160 COMPLEMENT ANTIGEN: CPT

## 2025-06-27 PROCEDURE — 84443 ASSAY THYROID STIM HORMONE: CPT

## 2025-06-27 PROCEDURE — 84439 ASSAY OF FREE THYROXINE: CPT

## 2025-06-27 PROCEDURE — 85610 PROTHROMBIN TIME: CPT

## 2025-06-27 PROCEDURE — 86140 C-REACTIVE PROTEIN: CPT

## 2025-06-27 PROCEDURE — 86225 DNA ANTIBODY NATIVE: CPT

## 2025-06-27 PROCEDURE — 36415 COLL VENOUS BLD VENIPUNCTURE: CPT

## 2025-06-27 PROCEDURE — 82306 VITAMIN D 25 HYDROXY: CPT

## 2025-06-27 PROCEDURE — 86800 THYROGLOBULIN ANTIBODY: CPT

## 2025-06-27 PROCEDURE — 85025 COMPLETE CBC W/AUTO DIFF WBC: CPT

## 2025-06-27 PROCEDURE — 73521 X-RAY EXAM HIPS BI 2 VIEWS: CPT

## 2025-06-27 PROCEDURE — 82330 ASSAY OF CALCIUM: CPT

## 2025-06-27 PROCEDURE — 81001 URINALYSIS AUTO W/SCOPE: CPT

## 2025-06-27 PROCEDURE — 83970 ASSAY OF PARATHORMONE: CPT

## 2025-06-27 PROCEDURE — 84432 ASSAY OF THYROGLOBULIN: CPT

## 2025-06-28 LAB
THYROGLOB AB SERPL-ACNC: <1 IU/ML (ref 0–0.9)
THYROGLOB SERPL-MCNC: <0.1 NG/ML (ref 1.5–38.5)

## 2025-06-29 ENCOUNTER — APPOINTMENT (OUTPATIENT)
Dept: LAB | Age: 60
End: 2025-06-29
Attending: STUDENT IN AN ORGANIZED HEALTH CARE EDUCATION/TRAINING PROGRAM
Payer: COMMERCIAL

## 2025-06-29 DIAGNOSIS — E21.0 PRIMARY HYPERPARATHYROIDISM (HCC): ICD-10-CM

## 2025-06-29 LAB
CALCIUM 24H UR-MCNC: 52.8 MG/24 HRS (ref 100–300)
CREAT 24H UR-MRATE: 1.1 G/24HR (ref 0.6–1.8)
SPECIMEN VOL UR: 1200 ML
SPECIMEN VOL UR: 1200 ML

## 2025-06-29 PROCEDURE — 82340 ASSAY OF CALCIUM IN URINE: CPT

## 2025-06-29 PROCEDURE — 82570 ASSAY OF URINE CREATININE: CPT

## 2025-06-30 DIAGNOSIS — I82.5Y3 CHRONIC DEEP VEIN THROMBOSIS (DVT) OF PROXIMAL VEIN OF BOTH LOWER EXTREMITIES (HCC): Chronic | ICD-10-CM

## 2025-06-30 DIAGNOSIS — I10 ESSENTIAL HYPERTENSION, BENIGN: ICD-10-CM

## 2025-06-30 RX ORDER — WARFARIN SODIUM 3 MG/1
TABLET ORAL
Qty: 70 TABLET | Refills: 0 | Status: SHIPPED | OUTPATIENT
Start: 2025-06-30

## 2025-07-01 ENCOUNTER — OFFICE VISIT (OUTPATIENT)
Dept: ENDOCRINOLOGY | Facility: CLINIC | Age: 60
End: 2025-07-01
Payer: COMMERCIAL

## 2025-07-01 VITALS
SYSTOLIC BLOOD PRESSURE: 118 MMHG | WEIGHT: 201 LBS | HEART RATE: 65 BPM | BODY MASS INDEX: 37.95 KG/M2 | OXYGEN SATURATION: 97 % | DIASTOLIC BLOOD PRESSURE: 72 MMHG | HEIGHT: 61 IN

## 2025-07-01 DIAGNOSIS — Z90.89 STATUS POST PARATHYROIDECTOMY: ICD-10-CM

## 2025-07-01 DIAGNOSIS — E89.0 POSTOPERATIVE HYPOTHYROIDISM: Primary | ICD-10-CM

## 2025-07-01 DIAGNOSIS — E55.9 VITAMIN D DEFICIENCY: ICD-10-CM

## 2025-07-01 DIAGNOSIS — Z98.890 STATUS POST PARATHYROIDECTOMY: ICD-10-CM

## 2025-07-01 DIAGNOSIS — Z85.850 HISTORY OF THYROID CANCER: ICD-10-CM

## 2025-07-01 DIAGNOSIS — E21.0 PRIMARY HYPERPARATHYROIDISM (HCC): ICD-10-CM

## 2025-07-01 LAB — MISCELLANEOUS LAB TEST RESULT: NORMAL

## 2025-07-01 PROCEDURE — 99214 OFFICE O/P EST MOD 30 MIN: CPT | Performed by: PHYSICIAN ASSISTANT

## 2025-07-01 RX ORDER — SPIRONOLACTONE 25 MG/1
25 TABLET ORAL DAILY
Qty: 90 TABLET | Refills: 0 | Status: SHIPPED | OUTPATIENT
Start: 2025-07-01

## 2025-07-01 RX ORDER — LEVOTHYROXINE SODIUM 125 UG/1
TABLET ORAL
Qty: 90 TABLET | Refills: 1 | Status: SHIPPED | OUTPATIENT
Start: 2025-07-01

## 2025-07-01 NOTE — ASSESSMENT & PLAN NOTE
TSH 0.081 and free T4 elevated at 1.35.  Decrease dose of levothyroxine by taking half a tablet on Sunday  Repeat TFTs in 6 weeks  TSH goal 0.5-2.0  Orders:    levothyroxine 125 mcg tablet; Take 1 tablet Mon-Sat and 1/2 tablet on Sunday on empty stomach 1 hour before breakfast and 4 hours apart from calcium and vitamin D supplementation    T4, free; Future    TSH, 3rd generation; Future    T4, free; Future    TSH, 3rd generation; Future

## 2025-07-01 NOTE — PATIENT INSTRUCTIONS
Ultrasound due in August 2025  Repeat thyroid labs in 6 weeks   Repeat labs prior to next visit  Decrease levothyroxine to 125 mcg 1 tablet daily Monday through Saturday and 1/2 tablet on Sunday

## 2025-07-01 NOTE — ASSESSMENT & PLAN NOTE
Thyroglobulin antibody level was less than 1.0 and thyroglobulin < 0.1.    Head neck ultrasound done in August 2024 did not show evidence of recurrent or metastatic disease.    Monitor labs and imaging  Orders:    US head neck lymph node mapping; Future    Thyroglobulin; Future

## 2025-07-01 NOTE — ASSESSMENT & PLAN NOTE
S/p parathyroidectomy  PTH level elevated at 180.2.   Corrected calcium was 9.9.  Avoid calcium supplementation  24 hour urine calcium is low  Monitor labs  Orders:    Comprehensive metabolic panel; Future

## 2025-07-01 NOTE — PROGRESS NOTES
Name: Rina Taylor      : 1965      MRN: [id number]  Encounter Provider: Diana Jin PA-C  Encounter Date: 2025   Encounter department: [facility] [place]    CC: Primary hyperparathyroidism, history of thyroid cancer  Assessment & Plan  Postoperative hypothyroidism  TSH 0.081 and free T4 elevated at 1.35.  Decrease dose of levothyroxine by taking half a tablet on   Repeat TFTs in 6 weeks  TSH goal 0.5-2.0  Orders:    levothyroxine 125 mcg tablet; Take 1 tablet Mon-Sat and 1/2 tablet on  on empty stomach 1 hour before breakfast and 4 hours apart from calcium and vitamin D supplementation    T4, free; Future    TSH, 3rd generation; Future    T4, free; Future    TSH, 3rd generation; Future    History of thyroid cancer  Thyroglobulin antibody level was less than 1.0 and thyroglobulin < 0.1.    Head neck ultrasound done in 2024 did not show evidence of recurrent or metastatic disease.    Monitor labs and imaging  Orders:    US head neck lymph node mapping; Future    Thyroglobulin; Future    Status post parathyroidectomy  Hx of primary hyperparathyroidism  PTH still elevated       Primary hyperparathyroidism (HCC)  S/p parathyroidectomy  PTH level elevated at 180.2.   Corrected calcium was 9.9.  Avoid calcium supplementation  24 hour urine calcium is low  Monitor labs  Orders:    Comprehensive metabolic panel; Future    Vitamin D deficiency  Vitamin D low at 20  Take vitamin D3 1000 IU daily  Orders:    Vitamin D 25 hydroxy; Future          History of Present Illness     Rina Taylor is a 60 y.o. female here for follow-up of postsurgical hypothyroidism, primary hyperparathyroidism, vitamin-D deficiency.  She has a past medical history of stage I papillary thyroid cancer. She underwent right hemithyroidectomy and right parathyroid gland removal in , followed by completion of thyroidectomy in . In  she also had resection of left  parathyroid gland.  She also had LEONARDO therapy. Patient is on levothyroxine 125 mcg 1 tablet daily.  Patient is taking it 60 minutes before breakfast on an empty stomach and at least 4 hrs apart from supplements.  Tolerating medication well. No recent Iodine loading in form of medication, biotin or kelp supplements or radiological diagnostic studies.  Most recent thyroid function tests were abnormal, TSH 0.081 and free T4 elevated at 1.35. Thyroglobulin antibody level was less than 1.0 and thyroglobulin < 0.1.  Head neck ultrasound done in August 2024 did not show evidence of recurrent or metastatic disease.    Primary hyperparathyroidism: patient underwent a re-exploration parathyroidectomy in May 2021. Her parathyroid levels did improve during surgery. Most recent PTH level elevated at 180.2. She is not taking calcium supplement. Corrected calcium was 9.9.   Vitamin D is slightly low at 20.0. She is supposed to be taking vitamin D3 1000 IU a day but is not using it. The ionized calcium was normal and 24 hour urine calcium was low at 52.8.  Her last DEXA scan in March 2021 showed normal bone density.    Review of Systems   Constitutional:  Negative for activity change, appetite change, fatigue and unexpected weight change.   HENT:  Negative for trouble swallowing.    Eyes:  Negative for visual disturbance.   Respiratory:  Negative for shortness of breath.    Cardiovascular:  Negative for chest pain and palpitations.   Gastrointestinal:  Negative for constipation and diarrhea.   Endocrine: Negative for cold intolerance and heat intolerance.   Musculoskeletal:  Positive for arthralgias (left hip).   Skin: Negative.    Neurological:  Negative for tremors.   Psychiatric/Behavioral: Negative.      as per HPI  Medications Ordered Prior to Encounter[1]      Medical History Reviewed by provider this encounter:  Tobacco  Allergies  Meds  Problems  Med Hx  Surg Hx  Fam Hx     .    Objective   /72   Pulse 65   Ht  "5' 1\" (1.549 m)   Wt 91.2 kg (201 lb)   SpO2 97%   BMI 37.98 kg/m²      Body mass index is 37.98 kg/m².  Wt Readings from Last 3 Encounters:   07/01/25 91.2 kg (201 lb)   06/24/25 90.8 kg (200 lb 2.8 oz)   06/10/25 90.7 kg (200 lb)     Physical Exam  Vitals and nursing note reviewed.   Constitutional:       Appearance: She is well-developed.   HENT:      Head: Normocephalic.     Eyes:      General: No scleral icterus.    Neck:      Thyroid: No thyromegaly.     Cardiovascular:      Rate and Rhythm: Normal rate and regular rhythm.      Pulses:           Radial pulses are 2+ on the right side and 2+ on the left side.      Heart sounds: No murmur heard.  Pulmonary:      Effort: Pulmonary effort is normal. No respiratory distress.      Breath sounds: Normal breath sounds. No wheezing.     Musculoskeletal:      Cervical back: Neck supple.     Skin:     General: Skin is warm and dry.     Neurological:      Mental Status: She is alert.         Labs: I have reviewed pertinent labs including:   Lab Results   Component Value Date    HGBA1C 5.2 06/27/2025    HGBA1C 4.9 05/13/2024    HGBA1C 5.3 09/11/2023      Lab Results   Component Value Date    CREATININE 1.17 06/27/2025    CREATININE 1.25 05/09/2025    CREATININE 1.32 (H) 02/04/2025    BUN 37 (H) 06/27/2025     11/18/2017    K 4.5 06/27/2025     06/27/2025    CO2 23 06/27/2025      eGFR   Date Value Ref Range Status   06/27/2025 51 ml/min/1.73sq m Final      Lab Results   Component Value Date    [id number] 0.081 (L) 06/27/2025      Lab Results   Component Value Date    FREET4 1.35 (H) 06/27/2025      Lab Results   Component Value Date    [id number] 20.0 (L) 06/27/2025    [id number] 18.9 (L) 11/30/2024    [id number] 27.1 (L) 05/13/2024      Radiology Results Review: I have reviewed radiology reports from 2024 including: Ultrasound(s).  Patient Instructions   Ultrasound due in August 2025  Repeat thyroid labs in 6 weeks   Repeat labs prior to next " visit  Decrease levothyroxine to 125 mcg 1 tablet daily Monday through Saturday and 1/2 tablet on Sunday    Discussed with the patient and all questioned fully answered. She will call me if any problems arise.    Administrative Statements   I have spent a total time of 30 minutes in caring for this patient on the day of the visit/encounter including Importance of tx compliance, Documenting in the medical record, Reviewing/placing orders in the medical record (including tests, medications, and/or procedures), and Obtaining or reviewing history  .         [1]   Current Outpatient Medications on File Prior to Visit   Medication Sig Dispense Refill    Cholecalciferol 25 MCG (1000 UT) TBDP Take 1,000 Units by mouth in the morning      ciclopirox (LOPROX) 0.77 % cream Apply topically in the morning and in the evening.      ergocalciferol (VITAMIN D2) 50,000 units Take 1 capsule (50,000 Units total) by mouth once a week for 8 doses 8 capsule 0    estradiol (ESTRACE VAGINAL) 0.1 mg/g vaginal cream Insert 1 g intravaginally once daily for one week. Then decrease use to twice weekly 42.5 g 1    fosinopril (MONOPRIL) 20 mg tablet Take 1 tablet (20 mg total) by mouth 2 (two) times a day 180 tablet 1    hydrocortisone 2.5 % cream       hydroxychloroquine (PLAQUENIL) 200 mg tablet Take 1 tablet (200 mg total) by mouth 2 (two) times a day with meals 180 tablet 1    NIFEdipine (PROCARDIA XL) 30 mg 24 hr tablet Take 1 tablet (30 mg total) by mouth daily 90 tablet 1    spironolactone (ALDACTONE) 25 mg tablet Take 1 tablet (25 mg total) by mouth daily 90 tablet 1    triamcinolone (KENALOG) 0.1 % cream Apply topically 2 (two) times a day for 7 days 80 g 0    warfarin (Coumadin) 1 mg tablet As directed. (Patient taking differently: Pt states 2 mg) 90 tablet 2    warfarin (COUMADIN) 3 mg tablet TAKE ONE TABLET BY MOUTH EVERY DAY. DO NOT TAKE ON MONDAYS AND THURSDAYS 70 tablet 0    warfarin (COUMADIN) 4 mg tablet Take by mouth in the  evening. Every day except Thursday.      [DISCONTINUED] levothyroxine 125 mcg tablet Take one tablet daily on empty stomach 1 hour before breakfast and 4 hours apart from calcium and vitamin D supplementation 90 tablet 1    [DISCONTINUED] clindamycin (CLEOCIN) 150 mg capsule        No current facility-administered medications on file prior to visit.

## 2025-07-02 LAB — DSDNA AB SER QL CLIF: NEGATIVE

## 2025-07-03 ENCOUNTER — ESTABLISHED COMPREHENSIVE EXAM (OUTPATIENT)
Dept: URBAN - METROPOLITAN AREA CLINIC 6 | Facility: CLINIC | Age: 60
End: 2025-07-03

## 2025-07-03 DIAGNOSIS — M32.10: ICD-10-CM

## 2025-07-03 DIAGNOSIS — H25.043: ICD-10-CM

## 2025-07-03 DIAGNOSIS — H52.13: ICD-10-CM

## 2025-07-03 DIAGNOSIS — Z79.61: ICD-10-CM

## 2025-07-03 PROCEDURE — 92015 DETERMINE REFRACTIVE STATE: CPT

## 2025-07-03 PROCEDURE — 92014 COMPRE OPH EXAM EST PT 1/>: CPT

## 2025-07-03 PROCEDURE — 92250 FUNDUS PHOTOGRAPHY W/I&R: CPT

## 2025-07-03 ASSESSMENT — TONOMETRY
OS_IOP_MMHG: 15
OD_IOP_MMHG: 15

## 2025-07-03 ASSESSMENT — KERATOMETRY
OS_AXISANGLE2_DEGREES: 39
OD_AXISANGLE_DEGREES: 112
OD_K2POWER_DIOPTERS: 43.50
OD_AXISANGLE2_DEGREES: 22
OS_K1POWER_DIOPTERS: 42.25
OS_K2POWER_DIOPTERS: 42.75
OD_K1POWER_DIOPTERS: 43.25
OS_AXISANGLE_DEGREES: 129

## 2025-07-03 ASSESSMENT — VISUAL ACUITY
OD_CC: 20/30
OS_CC: 20/30
OS_GLARE: 20/60
OD_GLARE: 20/60

## 2025-07-14 DIAGNOSIS — I82.5Y3 CHRONIC DEEP VEIN THROMBOSIS (DVT) OF PROXIMAL VEIN OF BOTH LOWER EXTREMITIES (HCC): ICD-10-CM

## 2025-07-15 RX ORDER — WARFARIN SODIUM 1 MG/1
TABLET ORAL
Qty: 90 TABLET | Refills: 0 | Status: SHIPPED | OUTPATIENT
Start: 2025-07-15

## 2025-07-18 ENCOUNTER — HOSPITAL ENCOUNTER (OUTPATIENT)
Dept: INFUSION CENTER | Facility: HOSPITAL | Age: 60
End: 2025-07-18
Attending: INTERNAL MEDICINE
Payer: COMMERCIAL

## 2025-07-18 VITALS
DIASTOLIC BLOOD PRESSURE: 72 MMHG | SYSTOLIC BLOOD PRESSURE: 107 MMHG | BODY MASS INDEX: 38.74 KG/M2 | TEMPERATURE: 96.8 F | RESPIRATION RATE: 18 BRPM | WEIGHT: 205.03 LBS | HEART RATE: 52 BPM

## 2025-07-18 DIAGNOSIS — M32.14 OTHER SYSTEMIC LUPUS ERYTHEMATOSUS WITH GLOMERULAR DISEASE (HCC): Primary | ICD-10-CM

## 2025-07-18 PROCEDURE — 96413 CHEMO IV INFUSION 1 HR: CPT

## 2025-07-18 RX ORDER — SODIUM CHLORIDE 9 MG/ML
20 INJECTION, SOLUTION INTRAVENOUS ONCE
Status: COMPLETED | OUTPATIENT
Start: 2025-07-18 | End: 2025-07-18

## 2025-07-18 RX ORDER — SODIUM CHLORIDE 9 MG/ML
20 INJECTION, SOLUTION INTRAVENOUS ONCE
OUTPATIENT
Start: 2025-07-21

## 2025-07-18 RX ADMIN — SODIUM CHLORIDE 20 ML/HR: 0.9 INJECTION, SOLUTION INTRAVENOUS at 11:22

## 2025-07-18 RX ADMIN — BELIMUMAB 920 MG: 400 INJECTION, POWDER, LYOPHILIZED, FOR SOLUTION INTRAVENOUS at 11:23

## 2025-07-18 NOTE — PROGRESS NOTES
Rina Taylor  tolerated Benlysta with no complications.      Rina Taylor is aware of future appt on 8/15/25 at 9:00 am.     AVS printed and given to Rina Taylor:  Yes

## 2025-07-27 DIAGNOSIS — E89.0 POSTOPERATIVE HYPOTHYROIDISM: ICD-10-CM

## 2025-07-29 RX ORDER — LEVOTHYROXINE SODIUM 125 UG/1
TABLET ORAL
Qty: 90 TABLET | Refills: 1 | Status: SHIPPED | OUTPATIENT
Start: 2025-07-29

## 2025-08-15 ENCOUNTER — HOSPITAL ENCOUNTER (OUTPATIENT)
Dept: INFUSION CENTER | Facility: HOSPITAL | Age: 60
End: 2025-08-15
Attending: INTERNAL MEDICINE
Payer: COMMERCIAL

## (undated) DEVICE — TISSUE RETRIEVAL SYSTEM: Brand: INZII RETRIEVAL SYSTEM

## (undated) DEVICE — 3M™ STERI-STRIP™ REINFORCED ADHESIVE SKIN CLOSURES, R1547, 1/2 IN X 4 IN (12 MM X 100 MM), 6 STRIPS/ENVELOPE: Brand: 3M™ STERI-STRIP™

## (undated) DEVICE — TROCAR: Brand: KII® SLEEVE

## (undated) DEVICE — 3M™ STERI-STRIP™ COMPOUND BENZOIN TINCTURE 40 BAGS/CARTON 4 CARTONS/CASE C1544: Brand: 3M™ STERI-STRIP™

## (undated) DEVICE — SCD SEQUENTIAL COMPRESSION COMFORT SLEEVE MEDIUM KNEE LENGTH: Brand: KENDALL SCD

## (undated) DEVICE — LIGACLIP MCA MULTIPLE CLIP APPLIERS, 20 SMALL CLIPS: Brand: LIGACLIP

## (undated) DEVICE — 3000CC GUARDIAN II: Brand: GUARDIAN

## (undated) DEVICE — SUT MONOCRYL 5-0 P-3 18 IN Y493G

## (undated) DEVICE — MINOR PROCEDURE DRAPE: Brand: CONVERTORS

## (undated) DEVICE — NEEDLE 25G X 1 1/2

## (undated) DEVICE — CYSTO TUBING SINGLE IRRIGATION

## (undated) DEVICE — MAYO STAND COVER: Brand: CONVERTORS

## (undated) DEVICE — SUT VICRYL PLUS 0 UR-6 27IN VCP603H

## (undated) DEVICE — LIGAMAX 5 MM ENDOSCOPIC MULTIPLE CLIP APPLIER: Brand: LIGAMAX

## (undated) DEVICE — PENCIL ELECTROSURG E-Z CLEAN -0035H

## (undated) DEVICE — SUT MONOCRYL 4-0 PS-2 18 IN Y496G

## (undated) DEVICE — CHLORAPREP HI-LITE 10.5ML ORANGE

## (undated) DEVICE — ELECTRODE LAP J HOOK E-Z CLEAN 33CM-0021

## (undated) DEVICE — ELECTRODE BLADE MOD E-Z CLEAN 2.5IN 6.4CM -0012M

## (undated) DEVICE — GLOVE SRG BIOGEL ECLIPSE 7

## (undated) DEVICE — SUT VICRYL 3-0 SH 27 IN J416H

## (undated) DEVICE — DRAPE SURGIKIT SADDLE BAG

## (undated) DEVICE — GLOVE SRG LF STRL BGL SKNSNS 6 PF

## (undated) DEVICE — UNDER BUTTOCKS DRAPE W/FLUID CONTROL POUCH: Brand: CONVERTORS

## (undated) DEVICE — GLOVE INDICATOR PI UNDERGLOVE SZ 7 BLUE

## (undated) DEVICE — PACK UNIVERSAL NECK

## (undated) DEVICE — INTENDED FOR TISSUE SEPARATION, AND OTHER PROCEDURES THAT REQUIRE A SHARP SURGICAL BLADE TO PUNCTURE OR CUT.: Brand: BARD-PARKER ® CARBON RIB-BACK BLADES

## (undated) DEVICE — GLOVE SRG BIOGEL ORTHOPEDIC 7.5

## (undated) DEVICE — INTENDED FOR TISSUE SEPARATION, AND OTHER PROCEDURES THAT REQUIRE A SHARP SURGICAL BLADE TO PUNCTURE OR CUT.: Brand: BARD-PARKER SAFETY BLADES SIZE 11, STERILE

## (undated) DEVICE — CHLORHEXIDINE 4PCT 4 OZ

## (undated) DEVICE — Device: Brand: OMNICLOSE TROCAR SITE CLOSURE DEVICE

## (undated) DEVICE — PLUMEPEN PRO 10FT

## (undated) DEVICE — PACK PBDS LAP CHOLE RF

## (undated) DEVICE — GLOVE INDICATOR PI UNDERGLOVE SZ 6.5 BLUE

## (undated) DEVICE — SUT VICRYL 4-0 PS-2 27 IN J426H

## (undated) DEVICE — CHLORAPREP HI-LITE 26ML ORANGE

## (undated) DEVICE — TROCAR: Brand: KII FIOS FIRST ENTRY

## (undated) DEVICE — SURGICEL 4 X 8

## (undated) DEVICE — ADHESIVE SKIN HIGH VISCOSITY EXOFIN 1ML

## (undated) DEVICE — BETHLEHEM UNIVERSAL MINOR VAG: Brand: CARDINAL HEALTH

## (undated) DEVICE — PREMIUM DRY TRAY LF: Brand: MEDLINE INDUSTRIES, INC.

## (undated) DEVICE — BIPOLAR CORD DISP